# Patient Record
Sex: FEMALE | Race: WHITE | Employment: OTHER | URBAN - METROPOLITAN AREA
[De-identification: names, ages, dates, MRNs, and addresses within clinical notes are randomized per-mention and may not be internally consistent; named-entity substitution may affect disease eponyms.]

---

## 2017-02-21 ENCOUNTER — OFFICE VISIT (OUTPATIENT)
Dept: INTERNAL MEDICINE CLINIC | Age: 82
End: 2017-02-21

## 2017-02-21 VITALS
WEIGHT: 185.2 LBS | BODY MASS INDEX: 30.82 KG/M2 | HEART RATE: 84 BPM | DIASTOLIC BLOOD PRESSURE: 82 MMHG | SYSTOLIC BLOOD PRESSURE: 128 MMHG | RESPIRATION RATE: 18 BRPM

## 2017-02-21 DIAGNOSIS — M81.0 OSTEOPOROSIS: ICD-10-CM

## 2017-02-21 DIAGNOSIS — E03.9 HYPOTHYROIDISM, UNSPECIFIED TYPE: Primary | ICD-10-CM

## 2017-02-21 DIAGNOSIS — E55.9 VITAMIN D DEFICIENCY: ICD-10-CM

## 2017-02-21 LAB — TSH SERPL DL<=0.05 MIU/L-ACNC: 5.69 UIU/ML (ref 0.27–4.2)

## 2017-02-21 PROCEDURE — 1090F PRES/ABSN URINE INCON ASSESS: CPT | Performed by: INTERNAL MEDICINE

## 2017-02-21 PROCEDURE — 4040F PNEUMOC VAC/ADMIN/RCVD: CPT | Performed by: INTERNAL MEDICINE

## 2017-02-21 PROCEDURE — 99213 OFFICE O/P EST LOW 20 MIN: CPT | Performed by: INTERNAL MEDICINE

## 2017-02-21 PROCEDURE — 1036F TOBACCO NON-USER: CPT | Performed by: INTERNAL MEDICINE

## 2017-02-21 PROCEDURE — G8427 DOCREV CUR MEDS BY ELIG CLIN: HCPCS | Performed by: INTERNAL MEDICINE

## 2017-02-21 PROCEDURE — 4005F PHARM THX FOR OP RXD: CPT | Performed by: INTERNAL MEDICINE

## 2017-02-21 PROCEDURE — G8417 CALC BMI ABV UP PARAM F/U: HCPCS | Performed by: INTERNAL MEDICINE

## 2017-02-21 PROCEDURE — 1123F ACP DISCUSS/DSCN MKR DOCD: CPT | Performed by: INTERNAL MEDICINE

## 2017-02-21 PROCEDURE — G8484 FLU IMMUNIZE NO ADMIN: HCPCS | Performed by: INTERNAL MEDICINE

## 2017-02-21 RX ORDER — ERGOCALCIFEROL 1.25 MG/1
CAPSULE ORAL
Qty: 4 CAPSULE | Refills: 3 | Status: SHIPPED | OUTPATIENT
Start: 2017-02-21 | End: 2017-06-21 | Stop reason: SDUPTHER

## 2017-02-22 LAB — VITAMIN D 25-HYDROXY: 73.1 NG/ML

## 2017-02-26 ASSESSMENT — ENCOUNTER SYMPTOMS
RESPIRATORY NEGATIVE: 1
EYES NEGATIVE: 1
ALLERGIC/IMMUNOLOGIC NEGATIVE: 1
GASTROINTESTINAL NEGATIVE: 1

## 2017-04-28 RX ORDER — LEVOTHYROXINE SODIUM 0.05 MG/1
TABLET ORAL
Qty: 30 TABLET | Refills: 4 | Status: SHIPPED | OUTPATIENT
Start: 2017-04-28 | End: 2017-06-21 | Stop reason: SDUPTHER

## 2017-06-21 ENCOUNTER — OFFICE VISIT (OUTPATIENT)
Dept: INTERNAL MEDICINE CLINIC | Age: 82
End: 2017-06-21

## 2017-06-21 VITALS
RESPIRATION RATE: 16 BRPM | BODY MASS INDEX: 30.45 KG/M2 | WEIGHT: 183 LBS | SYSTOLIC BLOOD PRESSURE: 124 MMHG | HEART RATE: 74 BPM | DIASTOLIC BLOOD PRESSURE: 68 MMHG

## 2017-06-21 DIAGNOSIS — Z00.00 ANNUAL PHYSICAL EXAM: Primary | ICD-10-CM

## 2017-06-21 DIAGNOSIS — I10 ESSENTIAL HYPERTENSION: ICD-10-CM

## 2017-06-21 DIAGNOSIS — E03.9 HYPOTHYROIDISM, UNSPECIFIED TYPE: ICD-10-CM

## 2017-06-21 DIAGNOSIS — R53.83 FATIGUE, UNSPECIFIED TYPE: ICD-10-CM

## 2017-06-21 DIAGNOSIS — E55.9 VITAMIN D DEFICIENCY: ICD-10-CM

## 2017-06-21 DIAGNOSIS — M81.0 OSTEOPOROSIS: ICD-10-CM

## 2017-06-21 LAB
A/G RATIO: 1.6 (ref 1.1–2.2)
ALBUMIN SERPL-MCNC: 4.3 G/DL (ref 3.4–5)
ALP BLD-CCNC: 70 U/L (ref 40–129)
ALT SERPL-CCNC: 9 U/L (ref 10–40)
ANION GAP SERPL CALCULATED.3IONS-SCNC: 19 MMOL/L (ref 3–16)
AST SERPL-CCNC: 14 U/L (ref 15–37)
BASOPHILS ABSOLUTE: 0.1 K/UL (ref 0–0.2)
BASOPHILS RELATIVE PERCENT: 0.7 %
BILIRUB SERPL-MCNC: 0.5 MG/DL (ref 0–1)
BUN BLDV-MCNC: 29 MG/DL (ref 7–20)
CALCIUM SERPL-MCNC: 9.5 MG/DL (ref 8.3–10.6)
CHLORIDE BLD-SCNC: 105 MMOL/L (ref 99–110)
CHOLESTEROL, TOTAL: 226 MG/DL (ref 0–199)
CO2: 23 MMOL/L (ref 21–32)
CREAT SERPL-MCNC: 1 MG/DL (ref 0.6–1.2)
EOSINOPHILS ABSOLUTE: 0.3 K/UL (ref 0–0.6)
EOSINOPHILS RELATIVE PERCENT: 4.5 %
GFR AFRICAN AMERICAN: >60
GFR NON-AFRICAN AMERICAN: 52
GLOBULIN: 2.7 G/DL
GLUCOSE BLD-MCNC: 101 MG/DL (ref 70–99)
HCT VFR BLD CALC: 47.9 % (ref 36–48)
HDLC SERPL-MCNC: 51 MG/DL (ref 40–60)
HEMOGLOBIN: 15.4 G/DL (ref 12–16)
LDL CHOLESTEROL CALCULATED: 153 MG/DL
LYMPHOCYTES ABSOLUTE: 1.8 K/UL (ref 1–5.1)
LYMPHOCYTES RELATIVE PERCENT: 23.5 %
MCH RBC QN AUTO: 30 PG (ref 26–34)
MCHC RBC AUTO-ENTMCNC: 32.1 G/DL (ref 31–36)
MCV RBC AUTO: 93.5 FL (ref 80–100)
MONOCYTES ABSOLUTE: 0.8 K/UL (ref 0–1.3)
MONOCYTES RELATIVE PERCENT: 10.6 %
NEUTROPHILS ABSOLUTE: 4.5 K/UL (ref 1.7–7.7)
NEUTROPHILS RELATIVE PERCENT: 60.7 %
PDW BLD-RTO: 13.4 % (ref 12.4–15.4)
PLATELET # BLD: 226 K/UL (ref 135–450)
PMV BLD AUTO: 9.4 FL (ref 5–10.5)
POTASSIUM SERPL-SCNC: 4.6 MMOL/L (ref 3.5–5.1)
RBC # BLD: 5.12 M/UL (ref 4–5.2)
SODIUM BLD-SCNC: 147 MMOL/L (ref 136–145)
TOTAL PROTEIN: 7 G/DL (ref 6.4–8.2)
TRIGL SERPL-MCNC: 111 MG/DL (ref 0–150)
TSH SERPL DL<=0.05 MIU/L-ACNC: 4.24 UIU/ML (ref 0.27–4.2)
VLDLC SERPL CALC-MCNC: 22 MG/DL
WBC # BLD: 7.5 K/UL (ref 4–11)

## 2017-06-21 PROCEDURE — G0439 PPPS, SUBSEQ VISIT: HCPCS | Performed by: INTERNAL MEDICINE

## 2017-06-21 RX ORDER — LEVOTHYROXINE SODIUM 0.05 MG/1
TABLET ORAL
Qty: 30 TABLET | Refills: 4 | Status: SHIPPED | OUTPATIENT
Start: 2017-06-21 | End: 2017-10-23 | Stop reason: SDUPTHER

## 2017-06-21 RX ORDER — ERGOCALCIFEROL 1.25 MG/1
CAPSULE ORAL
Qty: 4 CAPSULE | Refills: 3 | Status: SHIPPED | OUTPATIENT
Start: 2017-06-21 | End: 2017-10-23 | Stop reason: SDUPTHER

## 2017-06-21 RX ORDER — ALENDRONATE SODIUM 70 MG/1
70 TABLET ORAL
Qty: 5 TABLET | Refills: 5 | Status: SHIPPED | OUTPATIENT
Start: 2017-06-21 | End: 2017-10-23 | Stop reason: SDUPTHER

## 2017-06-21 RX ORDER — TRAMADOL HYDROCHLORIDE 50 MG/1
50 TABLET ORAL 2 TIMES DAILY
Qty: 60 TABLET | Refills: 1 | Status: SHIPPED | OUTPATIENT
Start: 2017-06-21 | End: 2017-07-01

## 2017-06-22 ENCOUNTER — TELEPHONE (OUTPATIENT)
Dept: INTERNAL MEDICINE CLINIC | Age: 82
End: 2017-06-22

## 2017-06-22 LAB — VITAMIN D 25-HYDROXY: 78.8 NG/ML

## 2017-07-01 ASSESSMENT — ENCOUNTER SYMPTOMS
ALLERGIC/IMMUNOLOGIC NEGATIVE: 1
GASTROINTESTINAL NEGATIVE: 1
RESPIRATORY NEGATIVE: 1
EYES NEGATIVE: 1

## 2017-10-23 ENCOUNTER — OFFICE VISIT (OUTPATIENT)
Dept: INTERNAL MEDICINE CLINIC | Age: 82
End: 2017-10-23

## 2017-10-23 VITALS
SYSTOLIC BLOOD PRESSURE: 128 MMHG | HEART RATE: 84 BPM | DIASTOLIC BLOOD PRESSURE: 74 MMHG | RESPIRATION RATE: 14 BRPM | WEIGHT: 185.4 LBS | BODY MASS INDEX: 30.85 KG/M2

## 2017-10-23 DIAGNOSIS — M81.0 AGE-RELATED OSTEOPOROSIS WITHOUT CURRENT PATHOLOGICAL FRACTURE: ICD-10-CM

## 2017-10-23 DIAGNOSIS — I10 ESSENTIAL HYPERTENSION: Primary | ICD-10-CM

## 2017-10-23 PROCEDURE — G8484 FLU IMMUNIZE NO ADMIN: HCPCS | Performed by: INTERNAL MEDICINE

## 2017-10-23 PROCEDURE — 99213 OFFICE O/P EST LOW 20 MIN: CPT | Performed by: INTERNAL MEDICINE

## 2017-10-23 PROCEDURE — 1123F ACP DISCUSS/DSCN MKR DOCD: CPT | Performed by: INTERNAL MEDICINE

## 2017-10-23 PROCEDURE — 1036F TOBACCO NON-USER: CPT | Performed by: INTERNAL MEDICINE

## 2017-10-23 PROCEDURE — 1090F PRES/ABSN URINE INCON ASSESS: CPT | Performed by: INTERNAL MEDICINE

## 2017-10-23 PROCEDURE — 4005F PHARM THX FOR OP RXD: CPT | Performed by: INTERNAL MEDICINE

## 2017-10-23 PROCEDURE — G8427 DOCREV CUR MEDS BY ELIG CLIN: HCPCS | Performed by: INTERNAL MEDICINE

## 2017-10-23 PROCEDURE — G8417 CALC BMI ABV UP PARAM F/U: HCPCS | Performed by: INTERNAL MEDICINE

## 2017-10-23 PROCEDURE — 4040F PNEUMOC VAC/ADMIN/RCVD: CPT | Performed by: INTERNAL MEDICINE

## 2017-10-23 RX ORDER — LEVOTHYROXINE SODIUM 0.05 MG/1
TABLET ORAL
Qty: 30 TABLET | Refills: 5 | Status: SHIPPED | OUTPATIENT
Start: 2017-10-23 | End: 2018-04-24 | Stop reason: SDUPTHER

## 2017-10-23 RX ORDER — ERGOCALCIFEROL 1.25 MG/1
CAPSULE ORAL
Qty: 4 CAPSULE | Refills: 5 | Status: SHIPPED | OUTPATIENT
Start: 2017-10-23 | End: 2018-04-24 | Stop reason: SDUPTHER

## 2017-10-23 RX ORDER — ALENDRONATE SODIUM 70 MG/1
70 TABLET ORAL
Qty: 5 TABLET | Refills: 5 | Status: SHIPPED | OUTPATIENT
Start: 2017-10-23 | End: 2018-04-24 | Stop reason: SDUPTHER

## 2017-10-30 ASSESSMENT — ENCOUNTER SYMPTOMS
ALLERGIC/IMMUNOLOGIC NEGATIVE: 1
RESPIRATORY NEGATIVE: 1
EYES NEGATIVE: 1
GASTROINTESTINAL NEGATIVE: 1

## 2017-10-30 NOTE — PROGRESS NOTES
Subjective:      Patient ID: Meghana Marks is a 80 y.o. female. HPI      This patient is here for routine 3 month follow-up of her hypertension and general fatigue. She voices no new complaints at this time. Her blood pressure has remained controlled since her last visit on her current medications. Review of Systems   Constitutional: Negative. HENT: Negative. Eyes: Negative. Respiratory: Negative. Cardiovascular: Negative. Gastrointestinal: Negative. Endocrine: Negative. Genitourinary: Negative. Musculoskeletal: Negative. Allergic/Immunologic: Negative. Neurological: Negative. Hematological: Negative. Psychiatric/Behavioral: Negative. Objective:   Physical Exam   Constitutional: She is oriented to person, place, and time. She appears well-developed and well-nourished. Neck: Normal range of motion. Neck supple. No JVD present. Cardiovascular: Normal rate, regular rhythm, normal heart sounds and intact distal pulses. Pulmonary/Chest: Effort normal and breath sounds normal.   Abdominal: Soft. Bowel sounds are normal.   Musculoskeletal: Normal range of motion. Lymphadenopathy:     She has no cervical adenopathy. Neurological: She is alert and oriented to person, place, and time. Skin: Skin is warm and dry. Psychiatric: She has a normal mood and affect. Assessment:            Plan:      Bhargavi Giles was seen today for check-up, hypertension and hyperthyroidism. Diagnoses and all orders for this visit:    Essential hypertension    Age-related osteoporosis without current pathological fracture  -     vitamin D (ERGOCALCIFEROL) 23958 units CAPS capsule; TAKE ONE CAPSULE BY MOUTH ONCE WEEKLY    Other orders  -     alendronate (FOSAMAX) 70 MG tablet;  Take 1 tablet by mouth every 7 days  -     levothyroxine (SYNTHROID) 50 MCG tablet; TAKE ONE TABLET BY MOUTH DAILY

## 2018-04-24 ENCOUNTER — OFFICE VISIT (OUTPATIENT)
Dept: INTERNAL MEDICINE CLINIC | Age: 83
End: 2018-04-24

## 2018-04-24 VITALS
HEART RATE: 80 BPM | WEIGHT: 191 LBS | BODY MASS INDEX: 31.78 KG/M2 | DIASTOLIC BLOOD PRESSURE: 84 MMHG | SYSTOLIC BLOOD PRESSURE: 124 MMHG
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
## 2018-04-24 DIAGNOSIS — Z23 NEED FOR PROPHYLACTIC VACCINATION AGAINST STREPTOCOCCUS PNEUMONIAE (PNEUMOCOCCUS): ICD-10-CM

## 2018-04-24 DIAGNOSIS — E03.9 HYPOTHYROIDISM, UNSPECIFIED TYPE: Primary | ICD-10-CM

## 2018-04-24 DIAGNOSIS — M25.50 MULTIPLE JOINT PAIN: ICD-10-CM

## 2018-04-24 DIAGNOSIS — E55.9 VITAMIN D DEFICIENCY: ICD-10-CM

## 2018-04-24 DIAGNOSIS — M81.0 AGE-RELATED OSTEOPOROSIS WITHOUT CURRENT PATHOLOGICAL FRACTURE: ICD-10-CM

## 2018-04-24 DIAGNOSIS — E03.9 HYPOTHYROIDISM, UNSPECIFIED TYPE: ICD-10-CM

## 2018-04-24 DIAGNOSIS — Z91.81 AT HIGH RISK FOR FALLS: ICD-10-CM

## 2018-04-24 LAB
ANION GAP SERPL CALCULATED.3IONS-SCNC: 19 MMOL/L (ref 3–16)
BUN BLDV-MCNC: 28 MG/DL (ref 7–20)
C-REACTIVE PROTEIN: 4.2 MG/L (ref 0–5.1)
CALCIUM SERPL-MCNC: 9.4 MG/DL (ref 8.3–10.6)
CHLORIDE BLD-SCNC: 104 MMOL/L (ref 99–110)
CO2: 25 MMOL/L (ref 21–32)
CREAT SERPL-MCNC: 1 MG/DL (ref 0.6–1.2)
GFR AFRICAN AMERICAN: >60
GFR NON-AFRICAN AMERICAN: 52
GLUCOSE BLD-MCNC: 102 MG/DL (ref 70–99)
HCT VFR BLD CALC: 45.8 % (ref 36–48)
HEMOGLOBIN: 15.3 G/DL (ref 12–16)
MCH RBC QN AUTO: 31.7 PG (ref 26–34)
MCHC RBC AUTO-ENTMCNC: 33.5 G/DL (ref 31–36)
MCV RBC AUTO: 94.8 FL (ref 80–100)
PDW BLD-RTO: 13.7 % (ref 12.4–15.4)
PLATELET # BLD: 247 K/UL (ref 135–450)
PMV BLD AUTO: 9.5 FL (ref 5–10.5)
POTASSIUM SERPL-SCNC: 5.1 MMOL/L (ref 3.5–5.1)
RBC # BLD: 4.83 M/UL (ref 4–5.2)
RHEUMATOID FACTOR: <10 IU/ML
SEDIMENTATION RATE, ERYTHROCYTE: 12 MM/HR (ref 0–30)
SODIUM BLD-SCNC: 148 MMOL/L (ref 136–145)
WBC # BLD: 7.1 K/UL (ref 4–11)

## 2018-04-24 PROCEDURE — 99214 OFFICE O/P EST MOD 30 MIN: CPT | Performed by: INTERNAL MEDICINE

## 2018-04-24 PROCEDURE — G8427 DOCREV CUR MEDS BY ELIG CLIN: HCPCS | Performed by: INTERNAL MEDICINE

## 2018-04-24 PROCEDURE — G8417 CALC BMI ABV UP PARAM F/U: HCPCS | Performed by: INTERNAL MEDICINE

## 2018-04-24 PROCEDURE — 4040F PNEUMOC VAC/ADMIN/RCVD: CPT | Performed by: INTERNAL MEDICINE

## 2018-04-24 PROCEDURE — 90670 PCV13 VACCINE IM: CPT | Performed by: INTERNAL MEDICINE

## 2018-04-24 PROCEDURE — 1123F ACP DISCUSS/DSCN MKR DOCD: CPT | Performed by: INTERNAL MEDICINE

## 2018-04-24 PROCEDURE — 1036F TOBACCO NON-USER: CPT | Performed by: INTERNAL MEDICINE

## 2018-04-24 PROCEDURE — 1090F PRES/ABSN URINE INCON ASSESS: CPT | Performed by: INTERNAL MEDICINE

## 2018-04-24 PROCEDURE — G0009 ADMIN PNEUMOCOCCAL VACCINE: HCPCS | Performed by: INTERNAL MEDICINE

## 2018-04-24 RX ORDER — LEVOTHYROXINE SODIUM 0.05 MG/1
TABLET ORAL
Qty: 30 TABLET | Refills: 5 | Status: SHIPPED | OUTPATIENT
Start: 2018-04-24 | End: 2018-09-18 | Stop reason: SDUPTHER

## 2018-04-24 RX ORDER — ERGOCALCIFEROL 1.25 MG/1
CAPSULE ORAL
Qty: 4 CAPSULE | Refills: 5 | Status: SHIPPED | OUTPATIENT
Start: 2018-04-24 | End: 2018-05-22 | Stop reason: DRUGHIGH

## 2018-04-24 RX ORDER — ALENDRONATE SODIUM 70 MG/1
70 TABLET ORAL
Qty: 5 TABLET | Refills: 5 | Status: ON HOLD | OUTPATIENT
Start: 2018-04-24 | End: 2018-06-14 | Stop reason: HOSPADM

## 2018-04-24 ASSESSMENT — ENCOUNTER SYMPTOMS
WHEEZING: 0
NAUSEA: 0
PHOTOPHOBIA: 0
COUGH: 0
SHORTNESS OF BREATH: 0
VOMITING: 0
ABDOMINAL PAIN: 0
VOICE CHANGE: 0
TROUBLE SWALLOWING: 0

## 2018-04-25 LAB
ANA INTERPRETATION: ABNORMAL
ANA TITER: ABNORMAL
ANTI-NUCLEAR ANTIBODY (ANA): POSITIVE
TSH REFLEX: 3.83 UIU/ML (ref 0.27–4.2)
VITAMIN D 25-HYDROXY: 91.1 NG/ML

## 2018-04-26 ENCOUNTER — TELEPHONE (OUTPATIENT)
Dept: INTERNAL MEDICINE CLINIC | Age: 83
End: 2018-04-26

## 2018-05-22 ENCOUNTER — OFFICE VISIT (OUTPATIENT)
Dept: RHEUMATOLOGY | Age: 83
End: 2018-05-22

## 2018-05-22 VITALS
BODY MASS INDEX: 31.07 KG/M2 | HEART RATE: 80 BPM | DIASTOLIC BLOOD PRESSURE: 76 MMHG | HEIGHT: 65 IN | SYSTOLIC BLOOD PRESSURE: 114 MMHG | WEIGHT: 186.5 LBS

## 2018-05-22 DIAGNOSIS — G47.9 DISORDERED SLEEP: ICD-10-CM

## 2018-05-22 DIAGNOSIS — R53.82 CHRONIC FATIGUE: ICD-10-CM

## 2018-05-22 DIAGNOSIS — M79.10 MYALGIA: Primary | ICD-10-CM

## 2018-05-22 DIAGNOSIS — R76.8 POSITIVE ANA (ANTINUCLEAR ANTIBODY): ICD-10-CM

## 2018-05-22 LAB — TOTAL CK: 43 U/L (ref 26–192)

## 2018-05-22 PROCEDURE — 1036F TOBACCO NON-USER: CPT | Performed by: INTERNAL MEDICINE

## 2018-05-22 PROCEDURE — 99204 OFFICE O/P NEW MOD 45 MIN: CPT | Performed by: INTERNAL MEDICINE

## 2018-05-22 PROCEDURE — G8417 CALC BMI ABV UP PARAM F/U: HCPCS | Performed by: INTERNAL MEDICINE

## 2018-05-22 PROCEDURE — 4040F PNEUMOC VAC/ADMIN/RCVD: CPT | Performed by: INTERNAL MEDICINE

## 2018-05-22 PROCEDURE — G8427 DOCREV CUR MEDS BY ELIG CLIN: HCPCS | Performed by: INTERNAL MEDICINE

## 2018-05-22 PROCEDURE — 1090F PRES/ABSN URINE INCON ASSESS: CPT | Performed by: INTERNAL MEDICINE

## 2018-05-22 PROCEDURE — 1123F ACP DISCUSS/DSCN MKR DOCD: CPT | Performed by: INTERNAL MEDICINE

## 2018-05-22 RX ORDER — DULOXETIN HYDROCHLORIDE 30 MG/1
30 CAPSULE, DELAYED RELEASE ORAL DAILY
Qty: 30 CAPSULE | Refills: 2 | Status: SHIPPED | OUTPATIENT
Start: 2018-05-22 | End: 2018-09-18 | Stop reason: SDUPTHER

## 2018-05-22 RX ORDER — DULOXETIN HYDROCHLORIDE 30 MG/1
30 CAPSULE, DELAYED RELEASE ORAL DAILY
COMMUNITY
End: 2018-05-22 | Stop reason: SDUPTHER

## 2018-05-22 RX ORDER — MULTIVIT-MIN/IRON/FOLIC ACID/K 18-600-40
1 CAPSULE ORAL DAILY
COMMUNITY
End: 2022-10-19

## 2018-05-23 LAB — HEPATITIS C ANTIBODY INTERPRETATION: NORMAL
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
## 2018-05-26 PROBLEM — S72.001A CLOSED DISPLACED FRACTURE OF RIGHT FEMORAL NECK (HCC): Status: ACTIVE | Noted: 2018-05-26

## 2018-05-27 PROBLEM — S72.8X1A OTHER FRACTURE OF RIGHT FEMUR, INITIAL ENCOUNTER FOR CLOSED FRACTURE (HCC): Status: ACTIVE | Noted: 2018-05-26

## 2018-05-29 PROBLEM — S72.91XA CLOSED FRACTURE OF RIGHT FEMUR (HCC): Status: ACTIVE | Noted: 2018-05-26

## 2018-06-22 ENCOUNTER — TELEPHONE (OUTPATIENT)
Dept: ORTHOPEDIC SURGERY | Age: 83
End: 2018-06-22

## 2018-06-26 ENCOUNTER — OFFICE VISIT (OUTPATIENT)
Dept: ORTHOPEDIC SURGERY | Age: 83
End: 2018-06-26

## 2018-06-26 VITALS
SYSTOLIC BLOOD PRESSURE: 124 MMHG | BODY MASS INDEX: 30.49 KG/M2 | HEIGHT: 65 IN | WEIGHT: 183 LBS | HEART RATE: 78 BPM | RESPIRATION RATE: 16 BRPM | DIASTOLIC BLOOD PRESSURE: 69 MMHG

## 2018-06-26 DIAGNOSIS — S72.351A CLOSED DISPLACED COMMINUTED FRACTURE OF SHAFT OF RIGHT FEMUR, INITIAL ENCOUNTER (HCC): ICD-10-CM

## 2018-06-26 PROCEDURE — APPNB15 APP NON BILLABLE TIME 0-15 MINS: Performed by: NURSE PRACTITIONER

## 2018-06-26 PROCEDURE — 99024 POSTOP FOLLOW-UP VISIT: CPT | Performed by: NURSE PRACTITIONER

## 2018-06-28 ENCOUNTER — OFFICE VISIT (OUTPATIENT)
Dept: INTERNAL MEDICINE CLINIC | Age: 83
End: 2018-06-28

## 2018-06-28 VITALS — DIASTOLIC BLOOD PRESSURE: 80 MMHG | SYSTOLIC BLOOD PRESSURE: 126 MMHG | HEART RATE: 72 BPM

## 2018-06-28 DIAGNOSIS — R21 FACIAL RASH: ICD-10-CM

## 2018-06-28 DIAGNOSIS — H57.12 PAIN OF LEFT EYE: ICD-10-CM

## 2018-06-28 DIAGNOSIS — S72.351D CLOSED DISPLACED COMMINUTED FRACTURE OF SHAFT OF RIGHT FEMUR WITH ROUTINE HEALING, SUBSEQUENT ENCOUNTER: ICD-10-CM

## 2018-06-28 DIAGNOSIS — Z09 HOSPITAL DISCHARGE FOLLOW-UP: Primary | ICD-10-CM

## 2018-06-28 DIAGNOSIS — M80.00XA AGE-RELATED OSTEOPOROSIS WITH CURRENT PATHOLOGICAL FRACTURE, INITIAL ENCOUNTER: ICD-10-CM

## 2018-06-28 DIAGNOSIS — M85.851 OTHER SPECIFIED DISORDERS OF BONE DENSITY AND STRUCTURE, RIGHT THIGH: ICD-10-CM

## 2018-06-28 PROCEDURE — 1111F DSCHRG MED/CURRENT MED MERGE: CPT | Performed by: INTERNAL MEDICINE

## 2018-06-28 PROCEDURE — 99214 OFFICE O/P EST MOD 30 MIN: CPT | Performed by: INTERNAL MEDICINE

## 2018-06-28 RX ORDER — VALACYCLOVIR HYDROCHLORIDE 500 MG/1
500 TABLET, FILM COATED ORAL 3 TIMES DAILY
Qty: 21 TABLET | Refills: 0 | Status: SHIPPED | OUTPATIENT
Start: 2018-06-28 | End: 2018-07-05

## 2018-07-03 ENCOUNTER — TELEPHONE (OUTPATIENT)
Dept: ORTHOPEDIC SURGERY | Age: 83
End: 2018-07-03

## 2018-07-03 NOTE — TELEPHONE ENCOUNTER
Spoke with dennys and advised that per last office note that she can be WB as tolerated with a walker on 7/8.

## 2018-07-05 ENCOUNTER — HOSPITAL ENCOUNTER (OUTPATIENT)
Dept: GENERAL RADIOLOGY | Age: 83
Discharge: HOME | End: 2018-07-05
Attending: INTERNAL MEDICINE | Admitting: INTERNAL MEDICINE

## 2018-07-05 ENCOUNTER — TELEPHONE (OUTPATIENT)
Dept: INTERNAL MEDICINE CLINIC | Age: 83
End: 2018-07-05

## 2018-07-05 DIAGNOSIS — M85.851 OTHER SPECIFIED DISORDERS OF BONE DENSITY AND STRUCTURE, RIGHT THIGH: ICD-10-CM

## 2018-07-05 DIAGNOSIS — B02.23 POST-HERPETIC POLYNEUROPATHY: Primary | ICD-10-CM

## 2018-07-05 DIAGNOSIS — M80.00XA AGE-RELATED OSTEOPOROSIS WITH CURRENT PATHOLOGICAL FRACTURE, INITIAL ENCOUNTER: ICD-10-CM

## 2018-07-05 RX ORDER — GABAPENTIN 100 MG/1
100 CAPSULE ORAL 2 TIMES DAILY
Qty: 60 CAPSULE | Refills: 0 | Status: SHIPPED | OUTPATIENT
Start: 2018-07-05 | End: 2018-08-20 | Stop reason: HOSPADM

## 2018-07-05 NOTE — TELEPHONE ENCOUNTER
Patient was seen a week ago and dx w/shingles. The pain from the lesions are getting worse. It is painful when she touches her scalp. She has tried Tylenol but is does not manage her pain. Would like something stronger.

## 2018-07-06 NOTE — PROGRESS NOTES
Positive for osteoporosis  Please call patient and asked whether she ever had any medicine like Fosamax? If not start Fosamax 70 mg weekly.

## 2018-07-09 DIAGNOSIS — M81.0 AGE-RELATED OSTEOPOROSIS WITHOUT CURRENT PATHOLOGICAL FRACTURE: Primary | ICD-10-CM

## 2018-07-23 ENCOUNTER — TELEPHONE (OUTPATIENT)
Dept: ORTHOPEDIC SURGERY | Age: 83
End: 2018-07-23

## 2018-07-26 ENCOUNTER — OFFICE VISIT (OUTPATIENT)
Dept: ORTHOPEDIC SURGERY | Age: 83
End: 2018-07-26

## 2018-07-26 VITALS — RESPIRATION RATE: 16 BRPM | BODY MASS INDEX: 30.49 KG/M2 | HEIGHT: 65 IN | WEIGHT: 183 LBS

## 2018-07-26 DIAGNOSIS — M25.561 ACUTE PAIN OF RIGHT KNEE: Primary | ICD-10-CM

## 2018-07-26 PROCEDURE — G8417 CALC BMI ABV UP PARAM F/U: HCPCS | Performed by: ORTHOPAEDIC SURGERY

## 2018-07-26 PROCEDURE — G8427 DOCREV CUR MEDS BY ELIG CLIN: HCPCS | Performed by: ORTHOPAEDIC SURGERY

## 2018-07-26 PROCEDURE — 1123F ACP DISCUSS/DSCN MKR DOCD: CPT | Performed by: ORTHOPAEDIC SURGERY

## 2018-07-26 PROCEDURE — 1101F PT FALLS ASSESS-DOCD LE1/YR: CPT | Performed by: ORTHOPAEDIC SURGERY

## 2018-07-26 PROCEDURE — 4040F PNEUMOC VAC/ADMIN/RCVD: CPT | Performed by: ORTHOPAEDIC SURGERY

## 2018-07-26 PROCEDURE — 1036F TOBACCO NON-USER: CPT | Performed by: ORTHOPAEDIC SURGERY

## 2018-07-26 PROCEDURE — 99213 OFFICE O/P EST LOW 20 MIN: CPT | Performed by: ORTHOPAEDIC SURGERY

## 2018-07-26 PROCEDURE — 1090F PRES/ABSN URINE INCON ASSESS: CPT | Performed by: ORTHOPAEDIC SURGERY

## 2018-07-26 RX ORDER — NAPROXEN 500 MG/1
500 TABLET ORAL 2 TIMES DAILY WITH MEALS
Qty: 60 TABLET | Refills: 0 | Status: SHIPPED | OUTPATIENT
Start: 2018-07-26 | End: 2018-08-20 | Stop reason: HOSPADM

## 2018-07-26 NOTE — PROGRESS NOTES
Cancer Brother     Cancer Brother     Diabetes Brother        Current Outpatient Prescriptions on File Prior to Visit   Medication Sig Dispense Refill    gabapentin (NEURONTIN) 100 MG capsule Take 1 capsule by mouth 2 times daily for 30 days. . 60 capsule 0    acetaminophen (TYLENOL) 325 MG tablet Take 2 tablets by mouth every 6 hours as needed for Pain 120 tablet 1    aspirin 325 MG EC tablet Take 1 tablet by mouth daily for 14 days 14 tablet 0    Cholecalciferol (VITAMIN D) 2000 units CAPS capsule Take by mouth      DULoxetine (CYMBALTA) 30 MG extended release capsule Take 1 capsule by mouth daily 30 capsule 2    levothyroxine (SYNTHROID) 50 MCG tablet TAKE ONE TABLET BY MOUTH DAILY 30 tablet 5     No current facility-administered medications on file prior to visit. Pertinent items are noted in HPI  Review of systems reviewed from Patient History Form dated on 6/26/2018 and available in the patient's chart under the Media tab. No change noted. PHYSICAL EXAMINATION:  Ms. Cindi Geiger is a very pleasant 80 y.o.  female who presents today in no acute distress, awake, alert, and oriented. She is well dressed, nourished and  groomed. Patient with normal affect. Height is  5' 5\" (1.651 m), weight is 183 lb (83 kg), Body mass index is 30.45 kg/m². Resting respiratory rate is 16. Examination of the gait, showed that the patient walks 50% weightbearing using a walker.  Examination of both knees showing full ROM, right moderate crepitus, tenderness on medial joint line, stable to varus and valgus stress. She has intact sensation and good pedal pulses. She has good strength in 2 planes, and has mild tenderness on deep palpation over the medial joint line. Knee reflex 1+ bilaterally. IMAGING:  Xray 3 views of the right knee was obtained today in the office and reviewed.   These demonstrate severe degenerative changes with narrowing of the joint space in the medial joint space compartment, subchondral sclerosis, and marginal osteophytosis. IMPRESSION: Right knee DJD. PLAN: I discussed with the patient the treatment options including both surgical and non-surgical treatment. We recommended Quad exercises and stretching of the calf and hamstrings which was taught to the patient today. She will take NSAIDS as needed. I believe she will benefit from cortisone injection right knee, but she would like to hold off for now. F/u in 4 weeks, PT or cortisone injection if needed. She understands that this may need surgery if the pain did not to resolve. She would like to hold off on any surgery for her knees.     James Hoover MD

## 2018-08-07 ENCOUNTER — TELEPHONE (OUTPATIENT)
Dept: ORTHOPEDIC SURGERY | Age: 83
End: 2018-08-07

## 2018-08-07 DIAGNOSIS — S72.351A CLOSED DISPLACED COMMINUTED FRACTURE OF SHAFT OF RIGHT FEMUR, INITIAL ENCOUNTER (HCC): Primary | ICD-10-CM

## 2018-08-07 DIAGNOSIS — M17.11 OSTEOARTHRITIS OF RIGHT KNEE, UNSPECIFIED OSTEOARTHRITIS TYPE: ICD-10-CM

## 2018-08-07 NOTE — TELEPHONE ENCOUNTER
Patient is going to be discharged from in [facility] after this week    She would like to start outpatient at [facility]  Can orders be placed in [facility]

## 2018-08-14 ENCOUNTER — OFFICE VISIT (OUTPATIENT)
Dept: ORTHOPEDIC SURGERY | Age: 83
End: 2018-08-14

## 2018-08-14 VITALS — HEIGHT: 65 IN | RESPIRATION RATE: 16 BRPM | WEIGHT: 183 LBS | BODY MASS INDEX: 30.49 KG/M2

## 2018-08-14 DIAGNOSIS — S72.351A CLOSED DISPLACED COMMINUTED FRACTURE OF SHAFT OF RIGHT FEMUR, INITIAL ENCOUNTER (HCC): Primary | ICD-10-CM

## 2018-08-14 PROCEDURE — APPNB15 APP NON BILLABLE TIME 0-15 MINS: Performed by: NURSE PRACTITIONER

## 2018-08-14 PROCEDURE — 99024 POSTOP FOLLOW-UP VISIT: CPT | Performed by: NURSE PRACTITIONER

## 2018-08-20 ENCOUNTER — OFFICE VISIT (OUTPATIENT)
Dept: INTERNAL MEDICINE CLINIC | Age: 83
End: 2018-08-20

## 2018-08-20 VITALS
HEART RATE: 72 BPM | HEIGHT: 65 IN | DIASTOLIC BLOOD PRESSURE: 76 MMHG | WEIGHT: 178 LBS | BODY MASS INDEX: 29.66 KG/M2 | SYSTOLIC BLOOD PRESSURE: 120 MMHG

## 2018-08-20 DIAGNOSIS — E03.9 HYPOTHYROIDISM, UNSPECIFIED TYPE: ICD-10-CM

## 2018-08-20 DIAGNOSIS — K21.9 GASTROESOPHAGEAL REFLUX DISEASE, ESOPHAGITIS PRESENCE NOT SPECIFIED: Primary | ICD-10-CM

## 2018-08-20 DIAGNOSIS — M25.561 ACUTE PAIN OF RIGHT KNEE: ICD-10-CM

## 2018-08-20 DIAGNOSIS — M81.0 AGE-RELATED OSTEOPOROSIS WITHOUT CURRENT PATHOLOGICAL FRACTURE: ICD-10-CM

## 2018-08-20 PROCEDURE — 1101F PT FALLS ASSESS-DOCD LE1/YR: CPT | Performed by: INTERNAL MEDICINE

## 2018-08-20 PROCEDURE — G8417 CALC BMI ABV UP PARAM F/U: HCPCS | Performed by: INTERNAL MEDICINE

## 2018-08-20 PROCEDURE — G8427 DOCREV CUR MEDS BY ELIG CLIN: HCPCS | Performed by: INTERNAL MEDICINE

## 2018-08-20 PROCEDURE — 1090F PRES/ABSN URINE INCON ASSESS: CPT | Performed by: INTERNAL MEDICINE

## 2018-08-20 PROCEDURE — 1036F TOBACCO NON-USER: CPT | Performed by: INTERNAL MEDICINE

## 2018-08-20 PROCEDURE — 4040F PNEUMOC VAC/ADMIN/RCVD: CPT | Performed by: INTERNAL MEDICINE

## 2018-08-20 PROCEDURE — 99214 OFFICE O/P EST MOD 30 MIN: CPT | Performed by: INTERNAL MEDICINE

## 2018-08-20 PROCEDURE — G0444 DEPRESSION SCREEN ANNUAL: HCPCS | Performed by: INTERNAL MEDICINE

## 2018-08-20 PROCEDURE — 1123F ACP DISCUSS/DSCN MKR DOCD: CPT | Performed by: INTERNAL MEDICINE

## 2018-08-20 RX ORDER — PANTOPRAZOLE SODIUM 40 MG/1
40 TABLET, DELAYED RELEASE ORAL DAILY
Qty: 30 TABLET | Refills: 3 | Status: SHIPPED | OUTPATIENT
Start: 2018-08-20 | End: 2019-01-22 | Stop reason: SDUPTHER

## 2018-08-20 RX ORDER — ASPIRIN 81 MG/1
81 TABLET ORAL DAILY
Qty: 30 TABLET | Refills: 3 | COMMUNITY
Start: 2018-08-20 | End: 2018-09-18

## 2018-08-20 ASSESSMENT — ENCOUNTER SYMPTOMS
ABDOMINAL PAIN: 1
BLOOD IN STOOL: 0
NAUSEA: 0
STRIDOR: 0
ANAL BLEEDING: 0
DIARRHEA: 0
RECTAL PAIN: 0
SHORTNESS OF BREATH: 0
VOMITING: 0
CONSTIPATION: 0

## 2018-08-20 ASSESSMENT — PATIENT HEALTH QUESTIONNAIRE - PHQ9
4. FEELING TIRED OR HAVING LITTLE ENERGY: 3
5. POOR APPETITE OR OVEREATING: 2
3. TROUBLE FALLING OR STAYING ASLEEP: 2
10. IF YOU CHECKED OFF ANY PROBLEMS, HOW DIFFICULT HAVE THESE PROBLEMS MADE IT FOR YOU TO DO YOUR WORK, TAKE CARE OF THINGS AT HOME, OR GET ALONG WITH OTHER PEOPLE: 0
SUM OF ALL RESPONSES TO PHQ QUESTIONS 1-9: 12
8. MOVING OR SPEAKING SO SLOWLY THAT OTHER PEOPLE COULD HAVE NOTICED. OR THE OPPOSITE, BEING SO FIGETY OR RESTLESS THAT YOU HAVE BEEN MOVING AROUND A LOT MORE THAN USUAL: 0
1. LITTLE INTEREST OR PLEASURE IN DOING THINGS: 2
2. FEELING DOWN, DEPRESSED OR HOPELESS: 2
9. THOUGHTS THAT YOU WOULD BE BETTER OFF DEAD, OR OF HURTING YOURSELF: 0
6. FEELING BAD ABOUT YOURSELF - OR THAT YOU ARE A FAILURE OR HAVE LET YOURSELF OR YOUR FAMILY DOWN: 1
7. TROUBLE CONCENTRATING ON THINGS, SUCH AS READING THE NEWSPAPER OR WATCHING TELEVISION: 0
SUM OF ALL RESPONSES TO PHQ9 QUESTIONS 1 & 2: 4
SUM OF ALL RESPONSES TO PHQ QUESTIONS 1-9: 12

## 2018-08-20 NOTE — PROGRESS NOTES
Father         lung cancer; metastic    Cancer Daughter 52        colon cancer    Cancer Brother     Cancer Brother     Diabetes Brother        Review of Systems   Constitutional: Negative for fever and unexpected weight change. Respiratory: Negative for shortness of breath and stridor. Cardiovascular: Negative for chest pain and palpitations. Gastrointestinal: Positive for abdominal pain. Negative for anal bleeding, blood in stool, constipation, diarrhea, nausea, rectal pain and vomiting. Epigastric pain   Musculoskeletal: Positive for arthralgias. Neurological: Negative for dizziness, speech difficulty and light-headedness. OBJECTIVE:  Pulse Readings from Last 4 Encounters:   08/20/18 72   06/28/18 72   06/26/18 78   06/15/18 70      Wt Readings from Last 4 Encounters:   08/20/18 178 lb (80.7 kg)   08/14/18 183 lb (83 kg)   07/26/18 183 lb (83 kg)   06/26/18 183 lb (83 kg)     BP Readings from Last 4 Encounters:   08/20/18 120/76   06/28/18 126/80   06/26/18 124/69   06/15/18 116/63     Physical Exam   Eyes: Conjunctivae are normal. No scleral icterus. Neck: Neck supple. No thyromegaly present. Cardiovascular: Normal rate and normal heart sounds. Pulmonary/Chest: Effort normal and breath sounds normal. No respiratory distress. She has no wheezes. Abdominal: Soft. Bowel sounds are normal. She exhibits no distension. There is no tenderness. There is no rebound. Musculoskeletal:   Using walker   Lymphadenopathy:     She has no cervical adenopathy. Psychiatric: She has a normal mood and affect. Thought content normal.   Nursing note and vitals reviewed.       CBC:   Lab Results   Component Value Date    WBC 6.0 06/14/2018    HGB 9.4 06/14/2018    HCT 28.8 06/14/2018     06/14/2018     CMP:   Lab Results   Component Value Date     06/14/2018    K 4.6 06/14/2018     06/14/2018    CO2 29 06/14/2018    ANIONGAP 7 06/14/2018    GLUCOSE 116 06/14/2018    BUN 17 06/14/2018    CREATININE 0.8 06/14/2018    GFRAA >60 06/14/2018    GFRAA >60 04/10/2013    CALCIUM 8.5 06/14/2018    PROT 7.3 05/26/2018    PROT 7.2 03/04/2013    LABALBU 3.9 05/26/2018    AGRATIO 1.1 05/26/2018    BILITOT 0.5 05/26/2018    ALKPHOS 70 05/26/2018    ALT 10 05/26/2018    AST 17 05/26/2018    GLOB 3.4 05/26/2018     URINALYSIS:   Lab Results   Component Value Date    GLUCOSEU Negative 05/26/2018    GLUCOSEU NEGATIVE 02/21/2012    KETUA 15 05/26/2018    SPECGRAV 1.025 05/26/2018    BLOODU Negative 05/26/2018    PHUR 7.0 05/26/2018    PROTEINU Negative 05/26/2018    NITRU Negative 05/26/2018    LEUKOCYTESUR SMALL 05/26/2018    LABMICR YES 05/26/2018    URINETYPE Not Specified 05/26/2018     HBA1C:   Lab Results   Component Value Date    LABA1C 5.4 02/04/2015    .3 02/04/2015     MICRO/ALB:   Lab Results   Component Value Date    LABMICR YES 05/26/2018    LABCREA 137.3 09/02/2015     LIPID:   Lab Results   Component Value Date    CHOL 226 06/21/2017    TRIG 111 06/21/2017    HDL 51 06/21/2017    HDL 42 05/23/2012    LDLCALC 153 06/21/2017    LABVLDL 22 06/21/2017     TSH:   Lab Results   Component Value Date    TSHREFLEX 3.83 04/24/2018         ASSESSMENT/PLAN:    Hang Desouza was seen today for 3 month follow-up, abdominal pain and knee pain. Diagnoses and all orders for this visit:    Gastroesophageal reflux disease, esophagitis presence not specified  -     pantoprazole (PROTONIX) 40 MG tablet; Take 1 tablet by mouth daily  Stop naproxen and aspirin 325  Age-related osteoporosis without current pathological fracture. Patient failed with Fosamax in the past.  Does not have any rheumatology appointment. Need paperwork for Prolia approval  -     denosumab (PROLIA) 60 MG/ML SOLN SC injection;  Inject 1 mL into the skin once for 1 dose    Hypothyroidism, unspecified type  Continue current levothyroxine  Acute pain of right knee- resolved    OTC tylenol          No orders of the defined types were

## 2018-08-28 ENCOUNTER — HOSPITAL ENCOUNTER (OUTPATIENT)
Dept: PHYSICAL THERAPY | Age: 83
Discharge: HOME | End: 2018-08-31
Admitting: ORTHOPAEDIC SURGERY

## 2018-08-28 ASSESSMENT — PAIN DESCRIPTION - LOCATION: LOCATION: BACK

## 2018-08-28 ASSESSMENT — PAIN DESCRIPTION - DESCRIPTORS: DESCRIPTORS: ACHING

## 2018-08-28 ASSESSMENT — PAIN DESCRIPTION - ONSET: ONSET: AWAKENED FROM SLEEP

## 2018-08-28 ASSESSMENT — PAIN DESCRIPTION - FREQUENCY: FREQUENCY: CONTINUOUS

## 2018-08-28 ASSESSMENT — PAIN SCALES - GENERAL: PAINLEVEL_OUTOF10: 3

## 2018-08-28 ASSESSMENT — PAIN DESCRIPTION - PAIN TYPE: TYPE: ACUTE PAIN

## 2018-08-28 ASSESSMENT — PAIN DESCRIPTION - ORIENTATION: ORIENTATION: LOWER

## 2018-08-28 NOTE — FLOWSHEET NOTE
Prognosis, pathomechanics as well as frequency and duration of scheduling future physical therapy appointments. Time was also taken on this day to answer all patient questions and participation in PT. Recommended to pt and granddaughter that pt has someone live with her for increased safety due to occurrence of falls and continued high fall risk. Completed Dynamic Gait Index (DGI) this date. D/w pt and her granddtr at length re results of PT eval, DGI, and that pt continues to be at risk of falling. D/w them various strategies to increase safety at home including modifications to bathroom and increased levels of supervision/assistance from family. Pt states she feels strongly about wanting to cont to live alone    Home Exercise Program: 8/28/18 Patient was educated on home exercise program including distrubution of handout describing exercises, sets, repetitions, frequency and intensity. Exercises/activities include: marching, 3 way hip, TR/HR    Manual Treatments:      Modalities:      Timed Code Treatment Minutes:  39    Total Treatment Minutes:  59    Treatment/Activity Tolerance:  [x] Patient tolerated treatment well [] Patient limited by fatigue  [] Patient limited by pain  [] Patient limited by other medical complications  [] Other:     Prognosis: [x] Good [] Fair  [] Poor    Patient Requires Follow-up: [x] Yes  [] No    Plan:   [] Continue per plan of care [] Alter current plan (see comments)  [x] Plan of care initiated [] Hold pending MD visit [] Discharge    Plan for Next Session:  LE strengthening, balance training,     Electronically signed by:  RENETTA Paz  Therapist was present, directed the patient's care, made skilled judgement, and was responsible for assessment and treatment of the patient.       Chiki Merida, PT [id number], DPT
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
## 2018-08-28 NOTE — PLAN OF CARE
Outpatient Physical Therapy     Phone: [phone] Fax: [phone]     To: Referring Practitioner: Dr. Marlon Roth MD      Patient: Olivia Castle   : 1930   MRN: [id number]  Evaluation Date: 2018      Diagnosis Information:  · Diagnosis: Closed displaced comminuted fracture of shaft of right femur S72.351A   · Treatment Diagnosis: Decreased strength of RLE, decreased endurance, impaired balance     Physical Therapy Certification/Re-Certification Form  Dear Dr. Nathalie Hampton,  The following patient has been evaluated for physical therapy services. Please review the attached evaluation and/or summary of the patient's plan of care, and verify that you agree therapy should continue by signing the attached document and sending it back to our office. Plan of Care/Treatment to date:  [x] Therapeutic Exercise      [x] Modalities:prn for pain  [x] Therapeutic Activity        [] Ultrasound    [x] Gait Training        [] Cervical Traction   [x] Neuromuscular Re-education      [x] Cold/hotpack    [x] Instruction in HEP        [] Lumbar Traction  [x] Manual Therapy        [] Electrical Stimulation            [x] Aquatic Therapy-- may try        [] Iontophoresis        ? [] Lymphedema management  [] Women's Health     Other:  [] Vestibular Rehab        []    []  Needed     Frequency/Duration:  # Days per week: [] 1 day # Weeks: [] 1 week [] 5 weeks     [x] 2 days? [] 2 weeks [x] 6 weeks     [] 3 days   [] 3 weeks [] 7 weeks     [] 4 days   [] 4 weeks [] 8 weeks    Rehab Potential: [] Excellent [x] Good [] Fair  [] Poor     Electronically signed by:  RENETTA Ross  Therapist was present, directed the patient's care, made skilled judgement, and was responsible for assessment and treatment of the patient. Tamiko Quinteros, PT [id number], DPT    If you have any questions or concerns, please don't hesitate to call.   Thank you for your referral.      Physician

## 2018-09-01 ENCOUNTER — HOSPITAL ENCOUNTER (OUTPATIENT)
Dept: OTHER | Age: 83
Discharge: HOME | End: 2018-09-01
Attending: ORTHOPAEDIC SURGERY | Admitting: ORTHOPAEDIC SURGERY

## 2018-09-18 ENCOUNTER — OFFICE VISIT (OUTPATIENT)
Dept: INTERNAL MEDICINE CLINIC | Age: 83
End: 2018-09-18

## 2018-09-18 VITALS
HEART RATE: 70 BPM | BODY MASS INDEX: 29.89 KG/M2 | WEIGHT: 179.4 LBS | DIASTOLIC BLOOD PRESSURE: 84 MMHG | HEIGHT: 65 IN | SYSTOLIC BLOOD PRESSURE: 138 MMHG

## 2018-09-18 DIAGNOSIS — D64.9 ANEMIA, UNSPECIFIED TYPE: ICD-10-CM

## 2018-09-18 DIAGNOSIS — F03.90 DEMENTIA WITHOUT BEHAVIORAL DISTURBANCE, UNSPECIFIED DEMENTIA TYPE: ICD-10-CM

## 2018-09-18 DIAGNOSIS — M79.7 FIBROMYALGIA: ICD-10-CM

## 2018-09-18 DIAGNOSIS — E03.9 HYPOTHYROIDISM, UNSPECIFIED TYPE: ICD-10-CM

## 2018-09-18 DIAGNOSIS — K21.9 GASTROESOPHAGEAL REFLUX DISEASE, ESOPHAGITIS PRESENCE NOT SPECIFIED: Primary | ICD-10-CM

## 2018-09-18 DIAGNOSIS — M80.00XD AGE-RELATED OSTEOPOROSIS WITH CURRENT PATHOLOGICAL FRACTURE WITH ROUTINE HEALING, SUBSEQUENT ENCOUNTER: ICD-10-CM

## 2018-09-18 DIAGNOSIS — L71.9 ROSACEA: ICD-10-CM

## 2018-09-18 LAB
FERRITIN: 37.7 NG/ML (ref 15–150)
FOLATE: 6.05 NG/ML (ref 4.78–24.2)
HCT VFR BLD CALC: 45.3 % (ref 36–48)
HEMOGLOBIN: 14.2 G/DL (ref 12–16)
IRON SATURATION: 21 % (ref 15–50)
IRON: 68 UG/DL (ref 37–145)
MCH RBC QN AUTO: 28.5 PG (ref 26–34)
MCHC RBC AUTO-ENTMCNC: 31.4 G/DL (ref 31–36)
MCV RBC AUTO: 90.9 FL (ref 80–100)
PDW BLD-RTO: 15 % (ref 12.4–15.4)
PLATELET # BLD: 287 K/UL (ref 135–450)
PMV BLD AUTO: 9.4 FL (ref 5–10.5)
RBC # BLD: 4.99 M/UL (ref 4–5.2)
TOTAL IRON BINDING CAPACITY: 320 UG/DL (ref 260–445)
VITAMIN B-12: 397 PG/ML (ref 211–911)
WBC # BLD: 7.8 K/UL (ref 4–11)

## 2018-09-18 PROCEDURE — 1123F ACP DISCUSS/DSCN MKR DOCD: CPT | Performed by: INTERNAL MEDICINE

## 2018-09-18 PROCEDURE — 1101F PT FALLS ASSESS-DOCD LE1/YR: CPT | Performed by: INTERNAL MEDICINE

## 2018-09-18 PROCEDURE — G8417 CALC BMI ABV UP PARAM F/U: HCPCS | Performed by: INTERNAL MEDICINE

## 2018-09-18 PROCEDURE — 99214 OFFICE O/P EST MOD 30 MIN: CPT | Performed by: INTERNAL MEDICINE

## 2018-09-18 PROCEDURE — 1036F TOBACCO NON-USER: CPT | Performed by: INTERNAL MEDICINE

## 2018-09-18 PROCEDURE — 1090F PRES/ABSN URINE INCON ASSESS: CPT | Performed by: INTERNAL MEDICINE

## 2018-09-18 PROCEDURE — 4040F PNEUMOC VAC/ADMIN/RCVD: CPT | Performed by: INTERNAL MEDICINE

## 2018-09-18 PROCEDURE — G8427 DOCREV CUR MEDS BY ELIG CLIN: HCPCS | Performed by: INTERNAL MEDICINE

## 2018-09-18 RX ORDER — METRONIDAZOLE 7.5 MG/G
GEL TOPICAL
Qty: 45 G | Refills: 0 | Status: SHIPPED | OUTPATIENT
Start: 2018-09-18 | End: 2018-10-01

## 2018-09-18 RX ORDER — DONEPEZIL HYDROCHLORIDE 5 MG/1
5 TABLET, FILM COATED ORAL NIGHTLY
Qty: 30 TABLET | Refills: 3 | Status: SHIPPED | OUTPATIENT
Start: 2018-09-18 | End: 2018-10-01 | Stop reason: SDUPTHER

## 2018-09-18 RX ORDER — LEVOTHYROXINE SODIUM 0.05 MG/1
TABLET ORAL
Qty: 30 TABLET | Refills: 5 | Status: SHIPPED | OUTPATIENT
Start: 2018-09-18 | End: 2019-02-22 | Stop reason: SDUPTHER

## 2018-09-18 RX ORDER — DULOXETIN HYDROCHLORIDE 30 MG/1
30 CAPSULE, DELAYED RELEASE ORAL DAILY
Qty: 30 CAPSULE | Refills: 2 | Status: SHIPPED | OUTPATIENT
Start: 2018-09-18 | End: 2018-12-17 | Stop reason: SDUPTHER

## 2018-09-18 ASSESSMENT — ENCOUNTER SYMPTOMS
NAUSEA: 0
VOMITING: 0
ABDOMINAL PAIN: 0
WHEEZING: 0
PHOTOPHOBIA: 0
SHORTNESS OF BREATH: 0

## 2018-09-18 NOTE — TELEPHONE ENCOUNTER
Pt is here for an appt with Dr. Donna Mane. She is out of her Cymbalta. She is asking for a refill. She has only seen Dr. Manjeet Godfrey once as a NP and was advised to make a follow up appt for 6 weeks. She states she fractured her hip causing her to not be able to make appt with Dr. Manjeet Godfrey. Advised a message would be sent back to see if he would be ok filling this. She was also advised to make a follow up appt with Dr. Manjeet Godfrey. Pt uses [facility] on [address].

## 2018-09-18 NOTE — PROGRESS NOTES
Brother        Review of Systems   Constitutional: Negative for fatigue and unexpected weight change. Eyes: Negative for photophobia and visual disturbance. Respiratory: Negative for shortness of breath and wheezing. Cardiovascular: Negative for chest pain and palpitations. Gastrointestinal: Negative for abdominal pain, nausea and vomiting. Genitourinary: Negative for difficulty urinating and flank pain. Neurological: Negative for dizziness and light-headedness. Psychiatric/Behavioral: Negative for decreased concentration and sleep disturbance. The patient is not nervous/anxious. OBJECTIVE:  Pulse Readings from Last 4 Encounters:   09/18/18 70   08/20/18 72   06/28/18 72   06/26/18 78      Wt Readings from Last 4 Encounters:   09/18/18 179 lb 6.4 oz (81.4 kg)   08/20/18 178 lb (80.7 kg)   08/14/18 183 lb (83 kg)   07/26/18 183 lb (83 kg)     BP Readings from Last 4 Encounters:   09/18/18 138/84   08/20/18 120/76   06/28/18 126/80   06/26/18 124/69     Physical Exam   Constitutional: She is oriented to person, place, and time. She appears well-nourished. Eyes: Conjunctivae are normal. No scleral icterus. Neck: No thyromegaly present. Cardiovascular: Normal rate, regular rhythm and normal heart sounds. Pulmonary/Chest: Effort normal and breath sounds normal. She has no wheezes. Abdominal: Soft. Bowel sounds are normal.   Lymphadenopathy:     She has no cervical adenopathy. Neurological: She is alert and oriented to person, place, and time. She exhibits normal muscle tone. Psychiatric: She has a normal mood and affect. Thought content normal.   Nursing note and vitals reviewed.       CBC:   Lab Results   Component Value Date    WBC 6.0 06/14/2018    HGB 9.4 06/14/2018    HCT 28.8 06/14/2018     06/14/2018     CMP:   Lab Results   Component Value Date     06/14/2018    K 4.6 06/14/2018     06/14/2018    CO2 29 06/14/2018    ANIONGAP 7 06/14/2018    GLUCOSE 116 06/14/2018    BUN 17 06/14/2018    CREATININE 0.8 06/14/2018    GFRAA >60 06/14/2018    GFRAA >60 04/10/2013    CALCIUM 8.5 06/14/2018    PROT 7.3 05/26/2018    PROT 7.2 03/04/2013    LABALBU 3.9 05/26/2018    AGRATIO 1.1 05/26/2018    BILITOT 0.5 05/26/2018    ALKPHOS 70 05/26/2018    ALT 10 05/26/2018    AST 17 05/26/2018    GLOB 3.4 05/26/2018     URINALYSIS:   Lab Results   Component Value Date    GLUCOSEU Negative 05/26/2018    GLUCOSEU NEGATIVE 02/21/2012    KETUA 15 05/26/2018    SPECGRAV 1.025 05/26/2018    BLOODU Negative 05/26/2018    PHUR 7.0 05/26/2018    PROTEINU Negative 05/26/2018    NITRU Negative 05/26/2018    LEUKOCYTESUR SMALL 05/26/2018    LABMICR YES 05/26/2018    URINETYPE Not Specified 05/26/2018     HBA1C:   Lab Results   Component Value Date    LABA1C 5.4 02/04/2015    .3 02/04/2015     MICRO/ALB:   Lab Results   Component Value Date    LABMICR YES 05/26/2018    LABCREA 137.3 09/02/2015     LIPID:   Lab Results   Component Value Date    CHOL 226 06/21/2017    TRIG 111 06/21/2017    HDL 51 06/21/2017    HDL 42 05/23/2012    LDLCALC 153 06/21/2017    LABVLDL 22 06/21/2017     TSH:   Lab Results   Component Value Date    TSHREFLEX 3.83 04/24/2018         ASSESSMENT/PLAN:    Ross Griffin was seen today for follow-up. Diagnoses and all orders for this visit:    Gastroesophageal reflux disease, esophagitis presence not specified  As needed pantoprazole only  Hypothyroidism, unspecified type  -     levothyroxine (SYNTHROID) 50 MCG tablet; TAKE ONE TABLET BY MOUTH DAILY    Dementia without behavioral disturbance, unspecified dementia type  -     VITAMIN B12 & FOLATE; Future  -     Syphilis Antibody Cascading Reflex; Future  -     donepezil (ARICEPT) 5 MG tablet; Take 1 tablet by mouth nightly    Anemia, unspecified type  -     VITAMIN B12 & FOLATE; Future  -     CBC; Future  -     IRON AND TIBC; Future  -     FERRITIN; Future    Rosacea  -     metroNIDAZOLE (METROGEL) 0.75 % gel;  Apply topically

## 2018-09-19 LAB
RPR CONFIRMATORY: NORMAL
TOTAL SYPHILLIS IGG/IGM: REACTIVE

## 2018-09-25 ENCOUNTER — HOSPITAL ENCOUNTER (OUTPATIENT)
Dept: PHYSICAL THERAPY | Age: 83
Setting detail: THERAPIES SERIES
Discharge: HOME | End: 2018-09-25
Payer: MEDICARE

## 2018-09-25 PROCEDURE — 97116 GAIT TRAINING THERAPY: CPT

## 2018-09-25 PROCEDURE — 97110 THERAPEUTIC EXERCISES: CPT

## 2018-09-25 NOTE — FLOWSHEET NOTE
too far in front of her at times. 9/18:  Dtr present with pt this date; pt using rollator to enter clinic. Demonstrating good gait pattern however allows walker to get too far in front of her at times. Demonstrates difficulty rising from chair  9/10 pt's dtr yamilet present. Pt amb to/within clinic with 4 ww walker. Pt fatigues quickly and requires multiple seated rest breaks. Pt got SOB, lightheaded and dizzy during standing ex in // bars   9/7:  Knee crepitus B with all flexion activities  9/5: brought RW with 2 wheels, amb with hips flexed, will correct with cues  8/30: Patient presents to the clinic ambulating with arollator out in front of her with significant trunk/hip flexion  8/28: See eval  Test measurements: 9/20:   Formal measurements not taken this date   9/10 O2 sats 1 min after standing ex in // bars: 92%  And HR = 116; 5 min after standing ex: 02sats = 95% and HR = 100    Exercises:  Exercise/Equipment Resistance/Repetitions Other comments   Nustep/bike Nustep  x 4 min  Bike 2 seat#7   Stairs     CGA   IB/HR 2 x 30\"/2 x 10    // bars/barre   6\" frd step ups x 10 ea B       Hip extension B with orange t-band x 10   Hip abduction B with orange band x 10    TKE BLE x 10 with orange band        High marches x 12 B  9/10  CGA with all ex  9/10 pt c/o feeling lightheaded and dizzy after high marches and squats   TG   CGA on/off TG   Gait - see below      Mat Table     Lateral transfers    Sit to stand  From chair 2 x 5; unable to perform without slight push with UE's          Seated therex                      Other Therapeutic Activities: 9/25: amb with SPC x 80 ft, 80ft SBA  9/20:  Ambulated 115 ft x 1 and 20ft x 1 with str cane and CGA  9/18:  Ambulated 120 feet and 93 feet with str cane and CGA; demonstrated no LOB however had slight buckling of L knee x 2   9/10 d/w pt re safety with gait; advised she cont to use 4 WW until she can tolerate being up on her feet longer. Verbal review of HEP. PT advised pt and her dtr to call PCP today re lightheadedness/dizziness during standing ex today. They are agreeable. 8/30: Based on continuing to live alone and recent fall, this PT suggested increased family supervision, life alert, and returning back to a regular RW for increased safety  8/28/18 Pt and her granddtr were educated on PT POC, Diagnosis, Prognosis, pathomechanics as well as frequency and duration of scheduling future physical therapy appointments. Time was also taken on this day to answer all patient questions and participation in PT. Recommended to pt and granddaughter that pt has someone live with her for increased safety due to occurrence of falls and continued high fall risk. Completed Dynamic Gait Index (DGI) this date. D/w pt and her granddtr at length re results of PT eval, DGI, and that pt continues to be at risk of falling. D/w them various strategies to increase safety at home including modifications to bathroom and increased levels of supervision/assistance from family. Pt states she feels strongly about wanting to cont to live alone  9/7/18:  Cut 2 tennis balls for pt to use at home on RW since 4WW seems to be too fast for pt    Home Exercise Program: 9/20: No new additions this date  9/7/18: Added clamshells to HEP  8/28/18 Patient was educated on home exercise program including distrubution of handout describing exercises, sets, repetitions, frequency and intensity. Exercises/activities include: marching, 3 way hip, TR/HR    Manual Treatments:  9/20;  None this date  9/7/18:   Hip joint mobs Gr III-IV lateral and posterior/inferior; MWM hip flexion x 8 minutes    Modalities: 9/20:  None this date    Timed Code Treatment Minutes: 31    Total Treatment Minutes:  31    Treatment/Activity Tolerance:  [x] Patient tolerated treatment well [x] Patient limited by fatigue 9/10  [] Patient limited by pain  [] Patient limited by other medical complications  [x] Other: Endurance continues to improve during sessions    Prognosis: [x] Good [] Fair  [] Poor    Patient Requires Follow-up: [x] Yes  [] No    Plan:   [x] Continue per plan of care [] Alter current plan (see comments)  [] Plan of care initiated [] Hold pending MD visit [] Discharge    Plan for Next Session:  LE strengthening, balance training, gait training progressing to str cane as tolerated.       Electronically signed by:  Ananda Mesa, PT, DPT

## 2018-09-27 ENCOUNTER — HOSPITAL ENCOUNTER (OUTPATIENT)
Dept: PHYSICAL THERAPY | Age: 83
Setting detail: THERAPIES SERIES
Discharge: HOME | End: 2018-09-27
Payer: MEDICARE

## 2018-09-27 ENCOUNTER — TELEPHONE (OUTPATIENT)
Dept: INTERNAL MEDICINE CLINIC | Age: 83
End: 2018-09-27

## 2018-09-27 PROCEDURE — 97110 THERAPEUTIC EXERCISES: CPT

## 2018-09-27 NOTE — FLOWSHEET NOTE
93 feet with str cane and CGA; demonstrated no LOB however had slight buckling of L knee x 2   9/10 d/w pt re safety with gait; advised she cont to use 4 WW until she can tolerate being up on her feet longer. Verbal review of HEP. PT advised pt and her dtr to call PCP today re lightheadedness/dizziness during standing ex today. They are agreeable. 8/30: Based on continuing to live alone and recent fall, this PT suggested increased family supervision, life alert, and returning back to a regular RW for increased safety  8/28/18 Pt and her granddtr were educated on PT POC, Diagnosis, Prognosis, pathomechanics as well as frequency and duration of scheduling future physical therapy appointments. Time was also taken on this day to answer all patient questions and participation in PT. Recommended to pt and granddaughter that pt has someone live with her for increased safety due to occurrence of falls and continued high fall risk. Completed Dynamic Gait Index (DGI) this date. D/w pt and her granddtr at length re results of PT eval, DGI, and that pt continues to be at risk of falling. D/w them various strategies to increase safety at home including modifications to bathroom and increased levels of supervision/assistance from family. Pt states she feels strongly about wanting to cont to live alone  9/7/18:  Cut 2 tennis balls for pt to use at home on RW since 4WW seems to be too fast for pt    Home Exercise Program: 9/27: No new additions this date  9/7/18: Added clamshells to HEP  8/28/18 Patient was educated on home exercise program including distrubution of handout describing exercises, sets, repetitions, frequency and intensity. Exercises/activities include: marching, 3 way hip, TR/HR    Manual Treatments:  9/27;  None this date  9/7/18:   Hip joint mobs Gr III-IV lateral and posterior/inferior; MWM hip flexion x 8 minutes    Modalities: 9/27:  None this date    Timed Code Treatment Minutes: 30    Total

## 2018-10-01 ENCOUNTER — OFFICE VISIT (OUTPATIENT)
Dept: INTERNAL MEDICINE CLINIC | Age: 83
End: 2018-10-01
Payer: MEDICARE

## 2018-10-01 VITALS
HEART RATE: 72 BPM | WEIGHT: 176.4 LBS | DIASTOLIC BLOOD PRESSURE: 80 MMHG | BODY MASS INDEX: 29.35 KG/M2 | SYSTOLIC BLOOD PRESSURE: 122 MMHG

## 2018-10-01 DIAGNOSIS — F03.90 DEMENTIA WITHOUT BEHAVIORAL DISTURBANCE, UNSPECIFIED DEMENTIA TYPE: ICD-10-CM

## 2018-10-01 DIAGNOSIS — A53.9 SERUM POSITIVE FOR TREPONEMA PALLIDUM BY PCR: Primary | ICD-10-CM

## 2018-10-01 PROCEDURE — 1036F TOBACCO NON-USER: CPT | Performed by: INTERNAL MEDICINE

## 2018-10-01 PROCEDURE — 99213 OFFICE O/P EST LOW 20 MIN: CPT | Performed by: INTERNAL MEDICINE

## 2018-10-01 PROCEDURE — 4040F PNEUMOC VAC/ADMIN/RCVD: CPT | Performed by: INTERNAL MEDICINE

## 2018-10-01 PROCEDURE — 1123F ACP DISCUSS/DSCN MKR DOCD: CPT | Performed by: INTERNAL MEDICINE

## 2018-10-01 PROCEDURE — G8417 CALC BMI ABV UP PARAM F/U: HCPCS | Performed by: INTERNAL MEDICINE

## 2018-10-01 PROCEDURE — 1090F PRES/ABSN URINE INCON ASSESS: CPT | Performed by: INTERNAL MEDICINE

## 2018-10-01 PROCEDURE — G8484 FLU IMMUNIZE NO ADMIN: HCPCS | Performed by: INTERNAL MEDICINE

## 2018-10-01 PROCEDURE — G8427 DOCREV CUR MEDS BY ELIG CLIN: HCPCS | Performed by: INTERNAL MEDICINE

## 2018-10-01 PROCEDURE — 1101F PT FALLS ASSESS-DOCD LE1/YR: CPT | Performed by: INTERNAL MEDICINE

## 2018-10-01 RX ORDER — DONEPEZIL HYDROCHLORIDE 10 MG/1
10 TABLET, FILM COATED ORAL NIGHTLY
Qty: 30 TABLET | Refills: 2 | Status: SHIPPED | OUTPATIENT
Start: 2018-10-01 | End: 2018-12-17

## 2018-10-01 ASSESSMENT — ENCOUNTER SYMPTOMS
VOMITING: 0
NAUSEA: 0
SHORTNESS OF BREATH: 0
ABDOMINAL PAIN: 0
WHEEZING: 0

## 2018-10-01 NOTE — PROGRESS NOTES
Mj Ariza  4/13/1930  female  80 y.o. SUBJECTIVE:    Chief Complaint   Patient presents with    Check-Up     abdnormal labs        HPI:  Follow-up abnormal labs. 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  She get occasional rash of the face and hand which usually disappeared itself. She have early dementia symptoms. She does not thin 5 mg of Aricept has been helping him  Multiple joint arthritis pain. On further questioning in 1988 she was told to have positive syphilis test.  She can recall receiving penicillin injection from the physician at that time. She cannot recall the duration of treatment. Past Medical History:   Diagnosis Date    Acute renal failure ([facility] [address] 11-27-12    Arthritis     At risk for falling 08/28/2018    Score of 11/24 for Dynamic Gait Index    Frequent falls     Movement disorder     osteoporosis    Nephrotic syndrome 12/28/2012    Thyroid disease      Past Surgical History:   Procedure Laterality Date    COLONOSCOPY      EXCISION OF FACIAL MASS      skin ca    EYE SURGERY      cataract removal; bilat    OTHER SURGICAL HISTORY Right 05/27/2018    right gamma nail with cables    SKIN BIOPSY       Social History     Social History    Marital status:       Spouse name: N/A    Number of children: N/A    Years of education: N/A     Occupational History   [facility]      Social History Main Topics    Smoking status: Never Smoker    Smokeless tobacco: Never Used    Alcohol use No    Drug use: No    Sexual activity: Not on file     Other Topics Concern    Not on file     Social History Narrative    No narrative on file     Family History   Problem Relation Age of Onset    High Blood Pressure Mother     Cancer Father         lung cancer; metastic    Cancer Daughter 52        colon cancer    Cancer Brother     Cancer Brother     Diabetes Brother        Review of Systems   Constitutional: Negative for activity change, diaphoresis and Specified 05/26/2018     HBA1C:   Lab Results   Component Value Date    LABA1C 5.4 02/04/2015    .3 02/04/2015     MICRO/ALB:   Lab Results   Component Value Date    LABMICR YES 05/26/2018    LABCREA 137.3 09/02/2015     LIPID:   Lab Results   Component Value Date    CHOL 226 06/21/2017    TRIG 111 06/21/2017    HDL 51 06/21/2017    HDL 42 05/23/2012    LDLCALC 153 06/21/2017    LABVLDL 22 06/21/2017     TSH:   Lab Results   Component Value Date    TSHREFLEX 3.83 04/24/2018         ASSESSMENT/PLAN:    1. 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 Dementia without behavioral disturbance, unspecified dementia type    Most likely patient had a past infection of syphilis. We will obtain further recommendation from infectious diseases specialist.    2. Increase Aricept to 10 mg/qhs. Orders Placed This Encounter   Procedures   Mine Olivera MD     Referral Priority:   Routine     Referral Type:   Consult for Advice and Opinion     Referral Reason:   Specialty Services Required     Referred to Provider:   Jean Madrigal MD     Requested Specialty:   Infectious Diseases     Number of Visits Requested:   1     Current Outpatient Prescriptions   Medication Sig Dispense Refill    donepezil (ARICEPT) 10 MG tablet Take 1 tablet by mouth nightly 30 tablet 2    levothyroxine (SYNTHROID) 50 MCG tablet TAKE ONE TABLET BY MOUTH DAILY 30 tablet 5    DULoxetine (CYMBALTA) 30 MG extended release capsule Take 1 capsule by mouth daily 30 capsule 2    pantoprazole (PROTONIX) 40 MG tablet Take 1 tablet by mouth daily 30 tablet 3    acetaminophen (TYLENOL) 325 MG tablet Take 2 tablets by mouth every 6 hours as needed for Pain 120 tablet 1    Cholecalciferol (VITAMIN D) 2000 units CAPS capsule Take by mouth      denosumab (PROLIA) 60 MG/ML SOLN SC injection Inject 1 mL into the skin once for 1 dose 1 mL 0     No current facility-administered medications for this visit.         Keep appointment as

## 2018-10-02 ENCOUNTER — HOSPITAL ENCOUNTER (OUTPATIENT)
Dept: PHYSICAL THERAPY | Age: 83
Setting detail: THERAPIES SERIES
Discharge: HOME | End: 2018-10-02
Payer: MEDICARE

## 2018-10-02 PROCEDURE — G8978 MOBILITY CURRENT STATUS: HCPCS

## 2018-10-02 PROCEDURE — 97110 THERAPEUTIC EXERCISES: CPT

## 2018-10-02 PROCEDURE — G8979 MOBILITY GOAL STATUS: HCPCS

## 2018-10-02 PROCEDURE — 97530 THERAPEUTIC ACTIVITIES: CPT

## 2018-10-04 ENCOUNTER — APPOINTMENT (OUTPATIENT)
Dept: PHYSICAL THERAPY | Age: 83
End: 2018-10-04
Payer: MEDICARE

## 2018-10-09 ENCOUNTER — TELEPHONE (OUTPATIENT)
Dept: INTERNAL MEDICINE CLINIC | Age: 83
End: 2018-10-09

## 2018-10-16 ENCOUNTER — PRE-EVALUATION (OUTPATIENT)
Dept: ORTHOPEDIC SURGERY | Age: 83
End: 2018-10-16

## 2018-10-16 ENCOUNTER — OFFICE VISIT (OUTPATIENT)
Dept: ORTHOPEDIC SURGERY | Age: 83
End: 2018-10-16
Payer: MEDICARE

## 2018-10-16 VITALS — WEIGHT: 184 LBS | BODY MASS INDEX: 30.66 KG/M2 | HEIGHT: 65 IN

## 2018-10-16 DIAGNOSIS — S72.351A CLOSED DISPLACED COMMINUTED FRACTURE OF SHAFT OF RIGHT FEMUR, INITIAL ENCOUNTER (HCC): ICD-10-CM

## 2018-10-16 DIAGNOSIS — M17.11 PRIMARY OSTEOARTHRITIS OF RIGHT KNEE: Primary | ICD-10-CM

## 2018-10-16 PROCEDURE — 1123F ACP DISCUSS/DSCN MKR DOCD: CPT | Performed by: ORTHOPAEDIC SURGERY

## 2018-10-16 PROCEDURE — G8484 FLU IMMUNIZE NO ADMIN: HCPCS | Performed by: ORTHOPAEDIC SURGERY

## 2018-10-16 PROCEDURE — 1101F PT FALLS ASSESS-DOCD LE1/YR: CPT | Performed by: ORTHOPAEDIC SURGERY

## 2018-10-16 PROCEDURE — 1090F PRES/ABSN URINE INCON ASSESS: CPT | Performed by: ORTHOPAEDIC SURGERY

## 2018-10-16 PROCEDURE — 4040F PNEUMOC VAC/ADMIN/RCVD: CPT | Performed by: ORTHOPAEDIC SURGERY

## 2018-10-16 PROCEDURE — G8427 DOCREV CUR MEDS BY ELIG CLIN: HCPCS | Performed by: ORTHOPAEDIC SURGERY

## 2018-10-16 PROCEDURE — 20610 DRAIN/INJ JOINT/BURSA W/O US: CPT | Performed by: ORTHOPAEDIC SURGERY

## 2018-10-16 PROCEDURE — 99214 OFFICE O/P EST MOD 30 MIN: CPT | Performed by: ORTHOPAEDIC SURGERY

## 2018-10-16 PROCEDURE — G8417 CALC BMI ABV UP PARAM F/U: HCPCS | Performed by: ORTHOPAEDIC SURGERY

## 2018-10-16 PROCEDURE — APPSS30 APP SPLIT SHARED TIME 16-30 MINUTES: Performed by: NURSE PRACTITIONER

## 2018-10-16 PROCEDURE — 1036F TOBACCO NON-USER: CPT | Performed by: ORTHOPAEDIC SURGERY

## 2018-10-18 ENCOUNTER — OFFICE VISIT (OUTPATIENT)
Dept: INFECTIOUS DISEASES | Age: 83
End: 2018-10-18
Payer: MEDICARE

## 2018-10-18 VITALS
BODY MASS INDEX: 28.99 KG/M2 | HEART RATE: 96 BPM | TEMPERATURE: 98.1 F | WEIGHT: 174 LBS | DIASTOLIC BLOOD PRESSURE: 78 MMHG | OXYGEN SATURATION: 98 % | SYSTOLIC BLOOD PRESSURE: 126 MMHG | HEIGHT: 65 IN

## 2018-10-18 DIAGNOSIS — E03.9 HYPOTHYROIDISM, UNSPECIFIED TYPE: ICD-10-CM

## 2018-10-18 DIAGNOSIS — F03.90 SENILE DEMENTIA WITHOUT BEHAVIORAL DISTURBANCE (HCC): ICD-10-CM

## 2018-10-18 DIAGNOSIS — M25.50 MULTIPLE JOINT PAIN: ICD-10-CM

## 2018-10-18 DIAGNOSIS — R76.8 FALSE POSITIVE SYPHILIS SEROLOGY: Primary | ICD-10-CM

## 2018-10-18 DIAGNOSIS — M81.0 AGE-RELATED OSTEOPOROSIS WITHOUT CURRENT PATHOLOGICAL FRACTURE: ICD-10-CM

## 2018-10-18 PROCEDURE — 1123F ACP DISCUSS/DSCN MKR DOCD: CPT | Performed by: INTERNAL MEDICINE

## 2018-10-18 PROCEDURE — 99214 OFFICE O/P EST MOD 30 MIN: CPT | Performed by: INTERNAL MEDICINE

## 2018-10-18 PROCEDURE — 1036F TOBACCO NON-USER: CPT | Performed by: INTERNAL MEDICINE

## 2018-10-18 PROCEDURE — 4040F PNEUMOC VAC/ADMIN/RCVD: CPT | Performed by: INTERNAL MEDICINE

## 2018-10-18 PROCEDURE — G8484 FLU IMMUNIZE NO ADMIN: HCPCS | Performed by: INTERNAL MEDICINE

## 2018-10-18 PROCEDURE — 1101F PT FALLS ASSESS-DOCD LE1/YR: CPT | Performed by: INTERNAL MEDICINE

## 2018-10-18 PROCEDURE — G8427 DOCREV CUR MEDS BY ELIG CLIN: HCPCS | Performed by: INTERNAL MEDICINE

## 2018-10-18 PROCEDURE — 1090F PRES/ABSN URINE INCON ASSESS: CPT | Performed by: INTERNAL MEDICINE

## 2018-10-18 PROCEDURE — G8417 CALC BMI ABV UP PARAM F/U: HCPCS | Performed by: INTERNAL MEDICINE

## 2018-10-18 ASSESSMENT — ENCOUNTER SYMPTOMS
COUGH: 0
COLOR CHANGE: 0
CHOKING: 0
CHEST TIGHTNESS: 0
NAUSEA: 0
DIARRHEA: 0
ABDOMINAL PAIN: 0
TROUBLE SWALLOWING: 0
BLOOD IN STOOL: 0
APNEA: 0
STRIDOR: 0
FACIAL SWELLING: 0
SHORTNESS OF BREATH: 0
RHINORRHEA: 0
EYE DISCHARGE: 0
PHOTOPHOBIA: 0
EYE REDNESS: 0

## 2018-10-23 PROBLEM — M17.11 PRIMARY OSTEOARTHRITIS OF RIGHT KNEE: Status: ACTIVE | Noted: 2018-10-23

## 2018-10-24 ENCOUNTER — OFFICE VISIT (OUTPATIENT)
Dept: INTERNAL MEDICINE CLINIC | Age: 83
End: 2018-10-24
Payer: MEDICARE

## 2018-10-24 DIAGNOSIS — S50.812D ABRASION OF LEFT FOREARM, SUBSEQUENT ENCOUNTER: ICD-10-CM

## 2018-10-24 DIAGNOSIS — G30.1 LATE ONSET ALZHEIMER'S DISEASE WITHOUT BEHAVIORAL DISTURBANCE (HCC): Primary | ICD-10-CM
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
## 2018-10-24 DIAGNOSIS — F02.80 LATE ONSET ALZHEIMER'S DISEASE WITHOUT BEHAVIORAL DISTURBANCE (HCC): Primary | ICD-10-CM

## 2018-10-24 PROCEDURE — 1123F ACP DISCUSS/DSCN MKR DOCD: CPT | Performed by: INTERNAL MEDICINE

## 2018-10-24 PROCEDURE — 1036F TOBACCO NON-USER: CPT | Performed by: INTERNAL MEDICINE

## 2018-10-24 PROCEDURE — G8427 DOCREV CUR MEDS BY ELIG CLIN: HCPCS | Performed by: INTERNAL MEDICINE

## 2018-10-24 PROCEDURE — 4040F PNEUMOC VAC/ADMIN/RCVD: CPT | Performed by: INTERNAL MEDICINE

## 2018-10-24 PROCEDURE — 1090F PRES/ABSN URINE INCON ASSESS: CPT | Performed by: INTERNAL MEDICINE

## 2018-10-24 PROCEDURE — 1101F PT FALLS ASSESS-DOCD LE1/YR: CPT | Performed by: INTERNAL MEDICINE

## 2018-10-24 PROCEDURE — G8417 CALC BMI ABV UP PARAM F/U: HCPCS | Performed by: INTERNAL MEDICINE

## 2018-10-24 PROCEDURE — G8484 FLU IMMUNIZE NO ADMIN: HCPCS | Performed by: INTERNAL MEDICINE

## 2018-10-24 PROCEDURE — 99213 OFFICE O/P EST LOW 20 MIN: CPT | Performed by: INTERNAL MEDICINE

## 2018-10-24 ASSESSMENT — ENCOUNTER SYMPTOMS
NAUSEA: 0
VOMITING: 0
PHOTOPHOBIA: 0
ABDOMINAL PAIN: 0
COUGH: 0
WHEEZING: 0

## 2018-10-24 NOTE — PROGRESS NOTES
Karime Jansen  4/13/1930  female  80 y.o. SUBJECTIVE:    Chief Complaint   Patient presents with    Dementia       HPI:  Follow-up visit. Patient feels her memory is getting better with current Aricept. Since last visit seen infectious diseases specialist.  She was adequately treated for syphilis in the past.  She had a recent fall at home because she lost balance. And sustained an abrasion at the left forearm. She denies neck pain, back pain or any new musculoskeletal pain. Evaluated in urgent care. Recent tetanus immunization. She is not interested to do physical therapy to improve the gait. Past Medical History:   Diagnosis Date    Acute renal failure ([facility] [address] 11-27-12    Arthritis     At risk for falling 08/28/2018    Score of 11/24 for Dynamic Gait Index    Frequent falls     Movement disorder     osteoporosis    Nephrotic syndrome 12/28/2012    Thyroid disease      Past Surgical History:   Procedure Laterality Date    COLONOSCOPY      EXCISION OF FACIAL MASS      skin ca    EYE SURGERY      cataract removal; bilat    OTHER SURGICAL HISTORY Right 05/27/2018    right gamma nail with cables    SKIN BIOPSY       Social History     Social History    Marital status:      Spouse name: N/A    Number of children: N/A    Years of education: N/A     Occupational History    homeaker      Social History Main Topics    Smoking status: Never Smoker    Smokeless tobacco: Never Used    Alcohol use No    Drug use: No    Sexual activity: Not on file     Other Topics Concern    Not on file     Social History Narrative    No narrative on file     Family History   Problem Relation Age of Onset    High Blood Pressure Mother     Cancer Father         lung cancer; metastic    Cancer Daughter 52        colon cancer    Cancer Brother     Cancer Brother     Diabetes Brother        Review of Systems   Eyes: Negative for photophobia and visual disturbance.    Respiratory: Negative for cough and wheezing. Cardiovascular: Negative for chest pain. Gastrointestinal: Negative for abdominal pain, nausea and vomiting. Neurological: Negative for dizziness, tremors, syncope, facial asymmetry, weakness, light-headedness and headaches. Occasional tingling feelings to fore arm   Psychiatric/Behavioral: Negative for behavioral problems, hallucinations and sleep disturbance. OBJECTIVE:  Pulse Readings from Last 4 Encounters:   10/18/18 96   10/01/18 72   09/18/18 70   08/20/18 72     Wt Readings from Last 4 Encounters:   10/18/18 174 lb (78.9 kg)   10/16/18 184 lb (83.5 kg)   10/01/18 176 lb 6.4 oz (80 kg)   09/18/18 179 lb 6.4 oz (81.4 kg)     BP Readings from Last 4 Encounters:   10/18/18 126/78   10/01/18 122/80   09/18/18 138/84   08/20/18 120/76     Physical Exam   Constitutional: She is oriented to person, place, and time. Eyes: EOM are normal.   Cardiovascular: Normal rate, regular rhythm and normal heart sounds. Pulmonary/Chest: Effort normal.   Abdominal: Soft. Bowel sounds are normal.   Musculoskeletal:   Using cane   Neurological: She is alert and oriented to person, place, and time. Skin:   Abrasion to left forearm   Psychiatric: She has a normal mood and affect. Thought content normal.   Nursing note and vitals reviewed.       CBC:   Lab Results   Component Value Date    WBC 7.8 09/18/2018    HGB 14.2 09/18/2018    HCT 45.3 09/18/2018     09/18/2018     CMP:  Lab Results   Component Value Date     06/14/2018    K 4.6 06/14/2018     06/14/2018    CO2 29 06/14/2018    ANIONGAP 7 06/14/2018    GLUCOSE 116 06/14/2018    BUN 17 06/14/2018    CREATININE 0.8 06/14/2018    GFRAA >60 06/14/2018    GFRAA >60 04/10/2013    CALCIUM 8.5 06/14/2018    PROT 7.3 05/26/2018    PROT 7.2 03/04/2013    LABALBU 3.9 05/26/2018    AGRATIO 1.1 05/26/2018    BILITOT 0.5 05/26/2018    ALKPHOS 70 05/26/2018    ALT 10 05/26/2018    AST 17 05/26/2018    GLOB 3.4 05/26/2018

## 2018-12-17 ENCOUNTER — OFFICE VISIT (OUTPATIENT)
Dept: RHEUMATOLOGY | Age: 83
End: 2018-12-17
Payer: MEDICARE

## 2018-12-17 VITALS
HEART RATE: 72 BPM | HEIGHT: 65 IN | WEIGHT: 171.38 LBS | DIASTOLIC BLOOD PRESSURE: 76 MMHG | BODY MASS INDEX: 28.55 KG/M2 | SYSTOLIC BLOOD PRESSURE: 132 MMHG

## 2018-12-17 DIAGNOSIS — M81.0 AGE-RELATED OSTEOPOROSIS WITHOUT CURRENT PATHOLOGICAL FRACTURE: Primary | ICD-10-CM

## 2018-12-17 DIAGNOSIS — M79.7 FIBROMYALGIA: ICD-10-CM

## 2018-12-17 LAB
ALP BLD-CCNC: 133 U/L (ref 40–129)
CALCIUM SERPL-MCNC: 9.6 MG/DL (ref 8.3–10.6)
PARATHYROID HORMONE INTACT: 64.4 PG/ML (ref 14–72)

## 2018-12-17 PROCEDURE — 1036F TOBACCO NON-USER: CPT | Performed by: INTERNAL MEDICINE

## 2018-12-17 PROCEDURE — 1090F PRES/ABSN URINE INCON ASSESS: CPT | Performed by: INTERNAL MEDICINE

## 2018-12-17 PROCEDURE — 1101F PT FALLS ASSESS-DOCD LE1/YR: CPT | Performed by: INTERNAL MEDICINE

## 2018-12-17 PROCEDURE — 1123F ACP DISCUSS/DSCN MKR DOCD: CPT | Performed by: INTERNAL MEDICINE

## 2018-12-17 PROCEDURE — G8427 DOCREV CUR MEDS BY ELIG CLIN: HCPCS | Performed by: INTERNAL MEDICINE

## 2018-12-17 PROCEDURE — 4040F PNEUMOC VAC/ADMIN/RCVD: CPT | Performed by: INTERNAL MEDICINE

## 2018-12-17 PROCEDURE — G8417 CALC BMI ABV UP PARAM F/U: HCPCS | Performed by: INTERNAL MEDICINE

## 2018-12-17 PROCEDURE — 99213 OFFICE O/P EST LOW 20 MIN: CPT | Performed by: INTERNAL MEDICINE

## 2018-12-17 PROCEDURE — G8484 FLU IMMUNIZE NO ADMIN: HCPCS | Performed by: INTERNAL MEDICINE

## 2018-12-17 RX ORDER — DULOXETIN HYDROCHLORIDE 30 MG/1
30 CAPSULE, DELAYED RELEASE ORAL DAILY
Qty: 30 CAPSULE | Refills: 2 | Status: SHIPPED | OUTPATIENT
Start: 2018-12-17 | End: 2019-02-19 | Stop reason: SDUPTHER

## 2018-12-19 LAB
IGA: 165 MG/DL (ref 68–408)
TISSUE TRANSGLUTAMINASE IGA: 1 U/ML (ref 0–3)

## 2018-12-26 ENCOUNTER — TELEPHONE (OUTPATIENT)
Dept: INTERNAL MEDICINE CLINIC | Age: 83
End: 2018-12-26

## 2018-12-26 NOTE — TELEPHONE ENCOUNTER
[employer] calling-on the enrollment the gender is checked off as make but pt is female  So that needs to be corrected       They also need the prescription coverage Part D of her medicare coverage.     Fax [phone]

## 2019-01-02 ENCOUNTER — TELEPHONE (OUTPATIENT)
Dept: INTERNAL MEDICINE CLINIC | Age: 84
End: 2019-01-02

## 2019-01-22 ENCOUNTER — OFFICE VISIT (OUTPATIENT)
Dept: INTERNAL MEDICINE CLINIC | Age: 84
End: 2019-01-22
Payer: MEDICARE

## 2019-01-22 ENCOUNTER — OFFICE VISIT (OUTPATIENT)
Dept: RHEUMATOLOGY | Age: 84
End: 2019-01-22
Payer: MEDICARE

## 2019-01-22 ENCOUNTER — TELEPHONE (OUTPATIENT)
Dept: RHEUMATOLOGY | Age: 84
End: 2019-01-22

## 2019-01-22 VITALS
BODY MASS INDEX: 29.06 KG/M2 | HEIGHT: 65 IN | HEART RATE: 82 BPM | SYSTOLIC BLOOD PRESSURE: 120 MMHG | WEIGHT: 174.4 LBS | DIASTOLIC BLOOD PRESSURE: 84 MMHG

## 2019-01-22 VITALS
WEIGHT: 174.43 LBS | HEART RATE: 82 BPM | BODY MASS INDEX: 29.06 KG/M2 | DIASTOLIC BLOOD PRESSURE: 84 MMHG | SYSTOLIC BLOOD PRESSURE: 120 MMHG | HEIGHT: 65 IN

## 2019-01-22 DIAGNOSIS — M81.0 AGE-RELATED OSTEOPOROSIS WITHOUT CURRENT PATHOLOGICAL FRACTURE: Primary | ICD-10-CM

## 2019-01-22 DIAGNOSIS — K21.9 GASTROESOPHAGEAL REFLUX DISEASE, ESOPHAGITIS PRESENCE NOT SPECIFIED: ICD-10-CM

## 2019-01-22 DIAGNOSIS — R76.8 FALSE POSITIVE SYPHILIS SEROLOGY: ICD-10-CM

## 2019-01-22 DIAGNOSIS — M17.11 PRIMARY OSTEOARTHRITIS OF RIGHT KNEE: ICD-10-CM

## 2019-01-22 DIAGNOSIS — E03.9 HYPOTHYROIDISM, UNSPECIFIED TYPE: Primary | ICD-10-CM

## 2019-01-22 DIAGNOSIS — M25.50 MULTIPLE JOINT PAIN: ICD-10-CM

## 2019-01-22 DIAGNOSIS — F03.90 DEMENTIA WITHOUT BEHAVIORAL DISTURBANCE, UNSPECIFIED DEMENTIA TYPE: ICD-10-CM

## 2019-01-22 DIAGNOSIS — R25.1 TREMOR OF BOTH HANDS: ICD-10-CM

## 2019-01-22 PROCEDURE — 1036F TOBACCO NON-USER: CPT | Performed by: INTERNAL MEDICINE

## 2019-01-22 PROCEDURE — 1123F ACP DISCUSS/DSCN MKR DOCD: CPT | Performed by: INTERNAL MEDICINE

## 2019-01-22 PROCEDURE — 99214 OFFICE O/P EST MOD 30 MIN: CPT | Performed by: INTERNAL MEDICINE

## 2019-01-22 PROCEDURE — G8484 FLU IMMUNIZE NO ADMIN: HCPCS | Performed by: INTERNAL MEDICINE

## 2019-01-22 PROCEDURE — 1101F PT FALLS ASSESS-DOCD LE1/YR: CPT | Performed by: INTERNAL MEDICINE

## 2019-01-22 PROCEDURE — 1090F PRES/ABSN URINE INCON ASSESS: CPT | Performed by: INTERNAL MEDICINE

## 2019-01-22 PROCEDURE — G8417 CALC BMI ABV UP PARAM F/U: HCPCS | Performed by: INTERNAL MEDICINE

## 2019-01-22 PROCEDURE — G8427 DOCREV CUR MEDS BY ELIG CLIN: HCPCS | Performed by: INTERNAL MEDICINE

## 2019-01-22 PROCEDURE — 4040F PNEUMOC VAC/ADMIN/RCVD: CPT | Performed by: INTERNAL MEDICINE

## 2019-01-22 PROCEDURE — 99213 OFFICE O/P EST LOW 20 MIN: CPT | Performed by: INTERNAL MEDICINE

## 2019-01-22 RX ORDER — DONEPEZIL HYDROCHLORIDE 10 MG/1
10 TABLET, FILM COATED ORAL NIGHTLY
COMMUNITY
End: 2019-02-22 | Stop reason: SDUPTHER

## 2019-01-22 RX ORDER — PANTOPRAZOLE SODIUM 40 MG/1
40 TABLET, DELAYED RELEASE ORAL DAILY
Qty: 30 TABLET | Refills: 5 | Status: SHIPPED | OUTPATIENT
Start: 2019-01-22 | End: 2019-09-17 | Stop reason: SDUPTHER

## 2019-01-22 ASSESSMENT — ENCOUNTER SYMPTOMS
WHEEZING: 0
PHOTOPHOBIA: 0
SHORTNESS OF BREATH: 0
ABDOMINAL PAIN: 0
VOICE CHANGE: 0
VOMITING: 0
NAUSEA: 0
TROUBLE SWALLOWING: 0

## 2019-01-23 ENCOUNTER — TELEPHONE (OUTPATIENT)
Dept: INTERNAL MEDICINE CLINIC | Age: 84
End: 2019-01-23

## 2019-01-23 DIAGNOSIS — M81.0 AGE-RELATED OSTEOPOROSIS WITHOUT CURRENT PATHOLOGICAL FRACTURE: Primary | ICD-10-CM

## 2019-02-11 ENCOUNTER — TELEPHONE (OUTPATIENT)
Dept: INTERNAL MEDICINE CLINIC | Age: 84
End: 2019-02-11

## 2019-02-11 DIAGNOSIS — M81.0 AGE-RELATED OSTEOPOROSIS WITHOUT CURRENT PATHOLOGICAL FRACTURE: Primary | ICD-10-CM

## 2019-02-19 DIAGNOSIS — M79.7 FIBROMYALGIA: ICD-10-CM

## 2019-02-19 LAB
CALCIUM SERPL-MCNC: 9.3 MG/DL (ref 8.3–10.6)
CREAT SERPL-MCNC: 1 MG/DL (ref 0.6–1.2)
GFR AFRICAN AMERICAN: >60
GFR NON-AFRICAN AMERICAN: 52

## 2019-02-19 RX ORDER — DULOXETIN HYDROCHLORIDE 30 MG/1
30 CAPSULE, DELAYED RELEASE ORAL DAILY
Qty: 30 CAPSULE | Refills: 3 | Status: SHIPPED | OUTPATIENT
Start: 2019-02-19 | End: 2019-07-15 | Stop reason: SDUPTHER

## 2019-02-20 ENCOUNTER — TELEPHONE (OUTPATIENT)
Dept: RHEUMATOLOGY | Age: 84
End: 2019-02-20

## 2019-02-21 ENCOUNTER — TELEPHONE (OUTPATIENT)
Dept: INTERNAL MEDICINE CLINIC | Age: 84
End: 2019-02-21

## 2019-02-22 ENCOUNTER — OFFICE VISIT (OUTPATIENT)
Dept: INTERNAL MEDICINE CLINIC | Age: 84
End: 2019-02-22
Payer: MEDICARE

## 2019-02-22 VITALS
BODY MASS INDEX: 29.32 KG/M2 | HEIGHT: 65 IN | DIASTOLIC BLOOD PRESSURE: 98 MMHG | HEART RATE: 80 BPM | WEIGHT: 176 LBS | SYSTOLIC BLOOD PRESSURE: 148 MMHG

## 2019-02-22 DIAGNOSIS — R25.1 SHAKING: ICD-10-CM

## 2019-02-22 DIAGNOSIS — R61 NIGHT SWEAT: ICD-10-CM

## 2019-02-22 DIAGNOSIS — Z23 NEED FOR PROPHYLACTIC VACCINATION AGAINST STREPTOCOCCUS PNEUMONIAE (PNEUMOCOCCUS): ICD-10-CM

## 2019-02-22 DIAGNOSIS — F51.5 BAD DREAMS: ICD-10-CM

## 2019-02-22 DIAGNOSIS — R61 NIGHT SWEAT: Primary | ICD-10-CM

## 2019-02-22 DIAGNOSIS — M81.0 AGE-RELATED OSTEOPOROSIS WITHOUT CURRENT PATHOLOGICAL FRACTURE: ICD-10-CM

## 2019-02-22 DIAGNOSIS — E03.9 HYPOTHYROIDISM, UNSPECIFIED TYPE: ICD-10-CM

## 2019-02-22 LAB
A/G RATIO: 1.8 (ref 1.1–2.2)
ALBUMIN SERPL-MCNC: 4.4 G/DL (ref 3.4–5)
ALP BLD-CCNC: 123 U/L (ref 40–129)
ALT SERPL-CCNC: 8 U/L (ref 10–40)
ANION GAP SERPL CALCULATED.3IONS-SCNC: 13 MMOL/L (ref 3–16)
AST SERPL-CCNC: 14 U/L (ref 15–37)
BASOPHILS ABSOLUTE: 0.1 K/UL (ref 0–0.2)
BASOPHILS RELATIVE PERCENT: 1.1 %
BILIRUB SERPL-MCNC: 0.6 MG/DL (ref 0–1)
BILIRUBIN URINE: ABNORMAL
BLOOD, URINE: NEGATIVE
BUN BLDV-MCNC: 22 MG/DL (ref 7–20)
CALCIUM SERPL-MCNC: 9.4 MG/DL (ref 8.3–10.6)
CHLORIDE BLD-SCNC: 103 MMOL/L (ref 99–110)
CLARITY: CLEAR
CO2: 27 MMOL/L (ref 21–32)
COLOR: ABNORMAL
CREAT SERPL-MCNC: 1 MG/DL (ref 0.6–1.2)
EOSINOPHILS ABSOLUTE: 0.1 K/UL (ref 0–0.6)
EOSINOPHILS RELATIVE PERCENT: 1.9 %
EPITHELIAL CELLS, UA: 2 /HPF (ref 0–5)
GFR AFRICAN AMERICAN: >60
GFR NON-AFRICAN AMERICAN: 52
GLOBULIN: 2.5 G/DL
GLUCOSE BLD-MCNC: 100 MG/DL (ref 70–99)
GLUCOSE URINE: NEGATIVE MG/DL
HCT VFR BLD CALC: 46.1 % (ref 36–48)
HEMOGLOBIN: 15.1 G/DL (ref 12–16)
HYALINE CASTS: 4 /LPF (ref 0–8)
KETONES, URINE: ABNORMAL MG/DL
LEUKOCYTE ESTERASE, URINE: NEGATIVE
LYMPHOCYTES ABSOLUTE: 1.5 K/UL (ref 1–5.1)
LYMPHOCYTES RELATIVE PERCENT: 22.7 %
MCH RBC QN AUTO: 31.4 PG (ref 26–34)
MCHC RBC AUTO-ENTMCNC: 32.7 G/DL (ref 31–36)
MCV RBC AUTO: 96.3 FL (ref 80–100)
MICROSCOPIC EXAMINATION: YES
MONOCYTES ABSOLUTE: 0.7 K/UL (ref 0–1.3)
MONOCYTES RELATIVE PERCENT: 10.2 %
NEUTROPHILS ABSOLUTE: 4.3 K/UL (ref 1.7–7.7)
NEUTROPHILS RELATIVE PERCENT: 64.1 %
NITRITE, URINE: NEGATIVE
PDW BLD-RTO: 13.1 % (ref 12.4–15.4)
PH UA: 5.5
PLATELET # BLD: 287 K/UL (ref 135–450)
PMV BLD AUTO: 9 FL (ref 5–10.5)
POTASSIUM SERPL-SCNC: 4.5 MMOL/L (ref 3.5–5.1)
PROTEIN UA: ABNORMAL MG/DL
RBC # BLD: 4.79 M/UL (ref 4–5.2)
RBC UA: 5 /HPF (ref 0–4)
SODIUM BLD-SCNC: 143 MMOL/L (ref 136–145)
SPECIFIC GRAVITY UA: >1.03
TOTAL PROTEIN: 6.9 G/DL (ref 6.4–8.2)
TSH REFLEX: 3.47 UIU/ML (ref 0.27–4.2)
URINE TYPE: ABNORMAL
UROBILINOGEN, URINE: 1 E.U./DL
WBC # BLD: 6.7 K/UL (ref 4–11)
WBC UA: 2 /HPF (ref 0–5)

## 2019-02-22 PROCEDURE — 1101F PT FALLS ASSESS-DOCD LE1/YR: CPT | Performed by: INTERNAL MEDICINE

## 2019-02-22 PROCEDURE — 4040F PNEUMOC VAC/ADMIN/RCVD: CPT | Performed by: INTERNAL MEDICINE

## 2019-02-22 PROCEDURE — 1036F TOBACCO NON-USER: CPT | Performed by: INTERNAL MEDICINE

## 2019-02-22 PROCEDURE — 1123F ACP DISCUSS/DSCN MKR DOCD: CPT | Performed by: INTERNAL MEDICINE

## 2019-02-22 PROCEDURE — 90732 PPSV23 VACC 2 YRS+ SUBQ/IM: CPT | Performed by: INTERNAL MEDICINE

## 2019-02-22 PROCEDURE — G8427 DOCREV CUR MEDS BY ELIG CLIN: HCPCS | Performed by: INTERNAL MEDICINE

## 2019-02-22 PROCEDURE — 96372 THER/PROPH/DIAG INJ SC/IM: CPT | Performed by: INTERNAL MEDICINE

## 2019-02-22 PROCEDURE — G8484 FLU IMMUNIZE NO ADMIN: HCPCS | Performed by: INTERNAL MEDICINE

## 2019-02-22 PROCEDURE — G8417 CALC BMI ABV UP PARAM F/U: HCPCS | Performed by: INTERNAL MEDICINE

## 2019-02-22 PROCEDURE — G0009 ADMIN PNEUMOCOCCAL VACCINE: HCPCS | Performed by: INTERNAL MEDICINE

## 2019-02-22 PROCEDURE — 99214 OFFICE O/P EST MOD 30 MIN: CPT | Performed by: INTERNAL MEDICINE

## 2019-02-22 PROCEDURE — 1090F PRES/ABSN URINE INCON ASSESS: CPT | Performed by: INTERNAL MEDICINE

## 2019-02-22 RX ORDER — LEVOTHYROXINE SODIUM 0.05 MG/1
TABLET ORAL
Qty: 30 TABLET | Refills: 5 | Status: SHIPPED | OUTPATIENT
Start: 2019-02-22 | End: 2019-10-01 | Stop reason: SDUPTHER

## 2019-02-22 RX ORDER — DONEPEZIL HYDROCHLORIDE 10 MG/1
10 TABLET, FILM COATED ORAL NIGHTLY
Qty: 30 TABLET | Refills: 5 | Status: SHIPPED | OUTPATIENT
Start: 2019-02-22 | End: 2019-02-25 | Stop reason: SDUPTHER

## 2019-02-22 ASSESSMENT — ENCOUNTER SYMPTOMS
VOICE CHANGE: 0
VOMITING: 0
SHORTNESS OF BREATH: 0
CHEST TIGHTNESS: 0
ABDOMINAL PAIN: 0
WHEEZING: 0
TROUBLE SWALLOWING: 0
COUGH: 0

## 2019-02-25 ENCOUNTER — TELEPHONE (OUTPATIENT)
Dept: INTERNAL MEDICINE CLINIC | Age: 84
End: 2019-02-25

## 2019-02-25 DIAGNOSIS — R41.3 MEMORY DEFICIT: Primary | ICD-10-CM

## 2019-02-25 RX ORDER — DONEPEZIL HYDROCHLORIDE 5 MG/1
5 TABLET, FILM COATED ORAL NIGHTLY
Qty: 90 TABLET | Refills: 1 | Status: SHIPPED | OUTPATIENT
Start: 2019-02-25 | End: 2019-10-17 | Stop reason: SDUPTHER

## 2019-02-25 RX ORDER — DONEPEZIL HYDROCHLORIDE 10 MG/1
5 TABLET, FILM COATED ORAL NIGHTLY
Qty: 30 TABLET | Refills: 5
Start: 2019-02-25 | End: 2019-02-25 | Stop reason: SDUPTHER

## 2019-03-21 ENCOUNTER — TELEPHONE (OUTPATIENT)
Dept: INTERNAL MEDICINE CLINIC | Age: 84
End: 2019-03-21

## 2019-04-16 ENCOUNTER — TELEPHONE (OUTPATIENT)
Dept: INTERNAL MEDICINE CLINIC | Age: 84
End: 2019-04-16

## 2019-04-16 ENCOUNTER — OFFICE VISIT (OUTPATIENT)
Dept: ORTHOPEDIC SURGERY | Age: 84
End: 2019-04-16
Payer: MEDICARE

## 2019-04-16 VITALS — HEIGHT: 65 IN | RESPIRATION RATE: 16 BRPM | WEIGHT: 176 LBS | BODY MASS INDEX: 29.32 KG/M2

## 2019-04-16 DIAGNOSIS — M17.11 PRIMARY OSTEOARTHRITIS OF RIGHT KNEE: ICD-10-CM

## 2019-04-16 DIAGNOSIS — S72.351A CLOSED DISPLACED COMMINUTED FRACTURE OF SHAFT OF RIGHT FEMUR, INITIAL ENCOUNTER (HCC): Primary | ICD-10-CM

## 2019-04-16 DIAGNOSIS — M17.12 PRIMARY OSTEOARTHRITIS OF LEFT KNEE: ICD-10-CM

## 2019-04-16 PROCEDURE — G8427 DOCREV CUR MEDS BY ELIG CLIN: HCPCS | Performed by: ORTHOPAEDIC SURGERY

## 2019-04-16 PROCEDURE — 1090F PRES/ABSN URINE INCON ASSESS: CPT | Performed by: ORTHOPAEDIC SURGERY

## 2019-04-16 PROCEDURE — 20610 DRAIN/INJ JOINT/BURSA W/O US: CPT | Performed by: ORTHOPAEDIC SURGERY

## 2019-04-16 PROCEDURE — 99214 OFFICE O/P EST MOD 30 MIN: CPT | Performed by: ORTHOPAEDIC SURGERY

## 2019-04-16 PROCEDURE — 1036F TOBACCO NON-USER: CPT | Performed by: ORTHOPAEDIC SURGERY

## 2019-04-16 PROCEDURE — 1123F ACP DISCUSS/DSCN MKR DOCD: CPT | Performed by: ORTHOPAEDIC SURGERY

## 2019-04-16 PROCEDURE — G8417 CALC BMI ABV UP PARAM F/U: HCPCS | Performed by: ORTHOPAEDIC SURGERY

## 2019-04-16 PROCEDURE — 4040F PNEUMOC VAC/ADMIN/RCVD: CPT | Performed by: ORTHOPAEDIC SURGERY

## 2019-04-16 NOTE — TELEPHONE ENCOUNTER
Daughter came in to get renewal for handicap renewal expires in June. Call daughter if any questions.

## 2019-04-16 NOTE — PROGRESS NOTES
DIAGNOSIS:    1-Right femur proximal shaft comminuted displaced fracture, status post Gamma nail. 2-Bilateral knee pain/osteoarthritis-new. DATE OF SURGERY:  5/27/2018 . HISTORY OF PRESENT ILLNESS: Flavio Harris 80 y.o.  female  who came in today for 1 year postoperative visit and for c/o right knee pain. The patient denies any pain in the right hip and is doing very well. Denies any right hip pain. She has been working on ROM and WBAT using a walker. She has completed physical therapy and has been doing a home exercise program. She is only walking a few steps around her home with assistance using a walker for longer distances. No numbness or tingling sensation. No fever or Chills. She had a right knee cortisone injection on 10/16/2018 with good improvement until recent. She states now that her left knee has been bothering her more Rates her bilateral right knee pain a 10/10 VAS moderate, aching, intermittent and are worsening. Aggravating factors walking. Alleviating factors elevation and rest. She reports that she has been told she has bad arthritis in her right knee and has had a cortisone injection in the past several months ago with good improvement. Past Medical History:   Diagnosis Date    Acute renal failure ([facility] [address] 11-27-12    Arthritis     At risk for falling 08/28/2018    Score of 11/24 for Dynamic Gait Index    Frequent falls     Gastroesophageal reflux disease 1/22/2019    Movement disorder     osteoporosis    Nephrotic syndrome 12/28/2012    Thyroid disease        Past Surgical History:   Procedure Laterality Date    COLONOSCOPY      EXCISION OF FACIAL MASS      skin ca    EYE SURGERY      cataract removal; bilat    OTHER SURGICAL HISTORY Right 05/27/2018    right gamma nail with cables    SKIN BIOPSY         Social History     Socioeconomic History    Marital status:       Spouse name: Not on file    Number of children: Not on file    Years of education: Not on file    Highest education level: Not on file   Occupational History    Occupation: homeaker   Social Needs    Financial resource strain: Not on file    Food insecurity:     Worry: Not on file     Inability: Not on file    Transportation needs:     Medical: Not on file     Non-medical: Not on file   Tobacco Use    Smoking status: Never Smoker    Smokeless tobacco: Never Used   Substance and Sexual Activity    Alcohol use: No    Drug use: No    Sexual activity: Not on file   Lifestyle    Physical activity:     Days per week: Not on file     Minutes per session: Not on file    Stress: Not on file   Relationships    Social connections:     Talks on phone: Not on file     Gets together: Not on file     Attends Hoahaoism service: Not on file     Active member of club or organization: Not on file     Attends meetings of clubs or organizations: Not on file     Relationship status: Not on file    Intimate partner violence:     Fear of current or ex partner: Not on file     Emotionally abused: Not on file     Physically abused: Not on file     Forced sexual activity: Not on file   Other Topics Concern    Not on file   Social History Narrative    Not on file       Family History   Problem Relation Age of Onset    High Blood Pressure Mother     Cancer Father         lung cancer; metastic    Cancer Daughter 52        colon cancer    Cancer Brother     Cancer Brother     Diabetes Brother        Current Outpatient Medications on File Prior to Visit   Medication Sig Dispense Refill    donepezil (ARICEPT) 5 MG tablet Take 1 tablet by mouth nightly 90 tablet 1    levothyroxine (SYNTHROID) 50 MCG tablet TAKE ONE TABLET BY MOUTH DAILY 30 tablet 5    DULoxetine (CYMBALTA) 30 MG extended release capsule Take 1 capsule by mouth daily 30 capsule 3    pantoprazole (PROTONIX) 40 MG tablet Take 1 tablet by mouth daily 30 tablet 5    acetaminophen (TYLENOL) 325 MG tablet Take 2 tablets by mouth every 6 hours as the hip:  I have told the patient to work on ROM, WBAT as well as strengthening exercises. Compression hose to help with swelling. The patient will come back for a follow up PRN. For the knees:  I discussed with the patient the treatment options including both surgical and non-surgical treatment. We recommended Quad exercises and stretching of the calf and hamstrings which was taught to the patient today. I believe she will benefit from cortisone injection bilateral knee, and she agreed to have. PROCEDURE:    With the patient's permission, and under sterile condition, the bilateral knee was prepared and draped with alcohol and injected with a mixture of 1 mL Kenalog 40mg and 4 mL of 1% lidocaine through the medial parapatellar port area. The patient tolerated the procedure well. A band-aid was applied and the patient was advised to ice the knee for 15-20 minutes to relieve any injection site related pain. She reported a good improvement immediatly after the injection. F/u in 3-4 months, PT if needed. AFTER repeat cortisone injection in the future if needed. She understands that this may need surgery if the pain did not to resolve. As this patient has demonstrated risk factors for osteoporosis, such as age greater than [de-identified] years and evidence of a fracture, I have referred the patient back to the primary care physician for evaluation for osteoporosis, including consideration for DEXA scanning, if this is felt to be clinically indicated. The patient is advised to contact the primary care physician to follow-up for further evaluation.        Adan Weldon MD

## 2019-04-16 NOTE — LETTER
[facility]  [address] [address]  Phone: [phone]  Fax: [phone]    Daisy Miranda MD        April 16, 2019     Patient: Darrius Skinner   YOB: 1930   Date of Visit: 4/16/2019       To Whom It May Concern: It is my medical opinion that Darrius Skinner requires a disability parking placard for the following reasons:  She has limited walking ability due to an orthopedic condition. Duration of need: 6 months    If you have any questions or concerns, please don't hesitate to call.     Sincerely,      Daisy Miranda MD

## 2019-05-17 ENCOUNTER — TELEPHONE (OUTPATIENT)
Dept: INTERNAL MEDICINE CLINIC | Age: 84
End: 2019-05-17

## 2019-07-15 DIAGNOSIS — M79.7 FIBROMYALGIA: ICD-10-CM

## 2019-07-15 RX ORDER — DULOXETIN HYDROCHLORIDE 30 MG/1
CAPSULE, DELAYED RELEASE ORAL
Qty: 30 CAPSULE | Refills: 2 | Status: SHIPPED | OUTPATIENT
Start: 2019-07-15 | End: 2019-11-03 | Stop reason: SDUPTHER

## 2019-07-17 ENCOUNTER — OFFICE VISIT (OUTPATIENT)
Dept: NEUROLOGY | Age: 84
End: 2019-07-17
Payer: MEDICARE

## 2019-07-17 VITALS
HEART RATE: 87 BPM | DIASTOLIC BLOOD PRESSURE: 70 MMHG | HEIGHT: 65 IN | WEIGHT: 182 LBS | BODY MASS INDEX: 30.32 KG/M2 | SYSTOLIC BLOOD PRESSURE: 133 MMHG

## 2019-07-17 DIAGNOSIS — F02.80 LATE ONSET ALZHEIMER'S DISEASE WITHOUT BEHAVIORAL DISTURBANCE (HCC): Primary | ICD-10-CM

## 2019-07-17 DIAGNOSIS — G30.1 LATE ONSET ALZHEIMER'S DISEASE WITHOUT BEHAVIORAL DISTURBANCE (HCC): Primary | ICD-10-CM

## 2019-07-17 DIAGNOSIS — G47.33 OBSTRUCTIVE SLEEP APNEA: ICD-10-CM

## 2019-07-17 PROCEDURE — 4040F PNEUMOC VAC/ADMIN/RCVD: CPT | Performed by: PSYCHIATRY & NEUROLOGY

## 2019-07-17 PROCEDURE — 99205 OFFICE O/P NEW HI 60 MIN: CPT | Performed by: PSYCHIATRY & NEUROLOGY

## 2019-07-17 PROCEDURE — 1090F PRES/ABSN URINE INCON ASSESS: CPT | Performed by: PSYCHIATRY & NEUROLOGY

## 2019-07-17 PROCEDURE — G8427 DOCREV CUR MEDS BY ELIG CLIN: HCPCS | Performed by: PSYCHIATRY & NEUROLOGY

## 2019-07-17 PROCEDURE — 1123F ACP DISCUSS/DSCN MKR DOCD: CPT | Performed by: PSYCHIATRY & NEUROLOGY

## 2019-07-17 PROCEDURE — G8417 CALC BMI ABV UP PARAM F/U: HCPCS | Performed by: PSYCHIATRY & NEUROLOGY

## 2019-07-17 PROCEDURE — 1036F TOBACCO NON-USER: CPT | Performed by: PSYCHIATRY & NEUROLOGY

## 2019-07-17 NOTE — PROGRESS NOTES
NEUROLOGY CONSULTATION     Chief Complaint   Patient presents with    Tremors     Patient is here today to establish care for several symptoms such as dizziness,tremors and poor memory. HISTORY OF PRESENT ILLNESS :    Mandi Henson is a 80 y.o. female who is referred by Dr. Priyank Solis   History was obtained from patient  Additional history was obtained from the patient's daughter. Patient was referred for evaluation of memory impairment. Symptoms started early 2018. Onset was gradual and the symptoms are slowly progressive. No clear aggravating or relieving factors. Patient is on low-dose Aricept 5 mg daily. Patient's daughter takes care of her financial affairs. Patient still drives an automobile. She states that she would get lost in unfamiliar areas but usually she is able to go to the grocery store and come back. Patient had a hip fracture last year and since then has had some difficulty with her gait and uses a walker. Patient's daughter states that patient snores at night. Patient sleeps poorly at night and does not feel rested when she wakes up in the morning. She takes daytime naps.     REVIEW OF SYSTEMS    Constitutional:  []   Chills   [x]  Fatigue   []  Fevers   []  Malaise   []  Weight loss     [] Denies all of the above    Eyes:  []  Double vision   []  Blurry vision     [x] Denies all of the above    Ears, nose, mouth, throat, and face:   [x] Hearing loss    []   Hoarseness      []  Snoring    []  Tinnitus       [] Denies all of the above     Respiratory:   []  Cough    [x]  Shortness of breath         [] Denies all of the above     Cardiovascular:   []  Chest pain    []  Exertional chest pressure/discomfort           [] Palpitations    []  Syncope     [x] Denies all of the above    Gastrointestinal:   []  Abdominal pain   []  Constipation    []  Diarrhea    []   Dysphagia                      [x] Denies all of

## 2019-07-17 NOTE — PATIENT INSTRUCTIONS
Please call with any questions or concerns:   [facility]  @ [phone]. LAB RESULTS:  Please obtain any labs or diagnostic tests as discussed today. You should call the office to check the results. Please allow  3 to 7 days for us to get these results. MEDICATION LIST:  Please bring an accurate list of your medications to every visit. APPOINTMENT CONFIRMATION:  We will call you the day before your scheduled appointment to confirm. If we are unable to reach you, you MUST call back by the end of the day to confirm the appointment or we may be forced to cancel.

## 2019-08-20 ENCOUNTER — APPOINTMENT (OUTPATIENT)
Dept: GENERAL RADIOLOGY | Age: 84
DRG: 482 | End: 2019-08-20
Payer: MEDICARE

## 2019-08-20 ENCOUNTER — APPOINTMENT (OUTPATIENT)
Dept: CT IMAGING | Age: 84
DRG: 482 | End: 2019-08-20
Payer: MEDICARE

## 2019-08-20 ENCOUNTER — HOSPITAL ENCOUNTER (INPATIENT)
Age: 84
LOS: 2 days | Discharge: NURSING, REHAB OR LONG-TERM CARE | DRG: 482 | End: 2019-08-23
Attending: EMERGENCY MEDICINE | Admitting: INTERNAL MEDICINE
Payer: MEDICARE

## 2019-08-20 DIAGNOSIS — S72.002A CLOSED FRACTURE OF LEFT HIP, INITIAL ENCOUNTER (HCC): ICD-10-CM
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
## 2019-08-20 LAB
APTT: 28.1 SEC (ref 26–36)
BASOPHILS ABSOLUTE: 0.1 K/UL (ref 0–0.2)
BASOPHILS RELATIVE PERCENT: 0.4 %
EOSINOPHILS ABSOLUTE: 0.1 K/UL (ref 0–0.6)
EOSINOPHILS RELATIVE PERCENT: 0.7 %
HCT VFR BLD CALC: 42.1 % (ref 36–48)
HEMOGLOBIN: 14 G/DL (ref 12–16)
INR BLD: 1.01 (ref 0.86–1.14)
LYMPHOCYTES ABSOLUTE: 1.7 K/UL (ref 1–5.1)
LYMPHOCYTES RELATIVE PERCENT: 9.9 %
MCH RBC QN AUTO: 31 PG (ref 26–34)
MCHC RBC AUTO-ENTMCNC: 33.2 G/DL (ref 31–36)
MCV RBC AUTO: 93.3 FL (ref 80–100)
MONOCYTES ABSOLUTE: 1.4 K/UL (ref 0–1.3)
MONOCYTES RELATIVE PERCENT: 8.6 %
NEUTROPHILS ABSOLUTE: 13.4 K/UL (ref 1.7–7.7)
NEUTROPHILS RELATIVE PERCENT: 80.4 %
PDW BLD-RTO: 13.1 % (ref 12.4–15.4)
PLATELET # BLD: 231 K/UL (ref 135–450)
PMV BLD AUTO: 8.9 FL (ref 5–10.5)
PROTHROMBIN TIME: 11.5 SEC (ref 9.8–13)
RBC # BLD: 4.51 M/UL (ref 4–5.2)
WBC # BLD: 16.7 K/UL (ref 4–11)

## 2019-08-20 PROCEDURE — 86901 BLOOD TYPING SEROLOGIC RH(D): CPT

## 2019-08-20 PROCEDURE — 85610 PROTHROMBIN TIME: CPT

## 2019-08-20 PROCEDURE — 70450 CT HEAD/BRAIN W/O DYE: CPT

## 2019-08-20 PROCEDURE — 72125 CT NECK SPINE W/O DYE: CPT

## 2019-08-20 PROCEDURE — 80053 COMPREHEN METABOLIC PANEL: CPT

## 2019-08-20 PROCEDURE — 85730 THROMBOPLASTIN TIME PARTIAL: CPT

## 2019-08-20 PROCEDURE — 86850 RBC ANTIBODY SCREEN: CPT

## 2019-08-20 PROCEDURE — 82550 ASSAY OF CK (CPK): CPT

## 2019-08-20 PROCEDURE — 85025 COMPLETE CBC W/AUTO DIFF WBC: CPT

## 2019-08-20 PROCEDURE — 86900 BLOOD TYPING SEROLOGIC ABO: CPT

## 2019-08-20 PROCEDURE — 99285 EMERGENCY DEPT VISIT HI MDM: CPT

## 2019-08-20 RX ORDER — ONDANSETRON 2 MG/ML
4 INJECTION INTRAMUSCULAR; INTRAVENOUS ONCE
Status: COMPLETED | OUTPATIENT
Start: 2019-08-20 | End: 2019-08-21

## 2019-08-20 RX ORDER — CEPHALEXIN 500 MG/1
500 CAPSULE ORAL 4 TIMES DAILY
COMMUNITY
End: 2019-08-21 | Stop reason: ALTCHOICE

## 2019-08-20 RX ORDER — MORPHINE SULFATE 4 MG/ML
4 INJECTION, SOLUTION INTRAMUSCULAR; INTRAVENOUS ONCE
Status: COMPLETED | OUTPATIENT
Start: 2019-08-20 | End: 2019-08-21

## 2019-08-20 ASSESSMENT — PAIN SCALES - GENERAL: PAINLEVEL_OUTOF10: 10

## 2019-08-21 ENCOUNTER — APPOINTMENT (OUTPATIENT)
Dept: CT IMAGING | Age: 84
DRG: 482 | End: 2019-08-21
Payer: MEDICARE

## 2019-08-21 ENCOUNTER — TELEPHONE (OUTPATIENT)
Dept: ORTHOPEDIC SURGERY | Age: 84
End: 2019-08-21

## 2019-08-21 ENCOUNTER — ANESTHESIA (OUTPATIENT)
Dept: OPERATING ROOM | Age: 84
DRG: 482 | End: 2019-08-21
Payer: MEDICARE

## 2019-08-21 ENCOUNTER — APPOINTMENT (OUTPATIENT)
Dept: GENERAL RADIOLOGY | Age: 84
DRG: 482 | End: 2019-08-21
Payer: MEDICARE

## 2019-08-21 ENCOUNTER — ANESTHESIA EVENT (OUTPATIENT)
Dept: OPERATING ROOM | Age: 84
DRG: 482 | End: 2019-08-21
Payer: MEDICARE

## 2019-08-21 VITALS
SYSTOLIC BLOOD PRESSURE: 173 MMHG | OXYGEN SATURATION: 100 % | TEMPERATURE: 97.7 F | RESPIRATION RATE: 17 BRPM | DIASTOLIC BLOOD PRESSURE: 82 MMHG

## 2019-08-21 PROBLEM — S72.142A CLOSED DISPLACED INTERTROCHANTERIC FRACTURE OF LEFT FEMUR (HCC): Status: ACTIVE | Noted: 2019-08-21

## 2019-08-21 LAB
A/G RATIO: 1.1 (ref 1.1–2.2)
ABO/RH: NORMAL
ALBUMIN SERPL-MCNC: 3.4 G/DL (ref 3.4–5)
ALP BLD-CCNC: 105 U/L (ref 40–129)
ALT SERPL-CCNC: 12 U/L (ref 10–40)
ANION GAP SERPL CALCULATED.3IONS-SCNC: 11 MMOL/L (ref 3–16)
ANTIBODY SCREEN: NORMAL
AST SERPL-CCNC: 17 U/L (ref 15–37)
BILIRUB SERPL-MCNC: 0.4 MG/DL (ref 0–1)
BILIRUBIN URINE: NEGATIVE
BLOOD, URINE: NEGATIVE
BUN BLDV-MCNC: 24 MG/DL (ref 7–20)
CALCIUM SERPL-MCNC: 8.9 MG/DL (ref 8.3–10.6)
CHLORIDE BLD-SCNC: 104 MMOL/L (ref 99–110)
CLARITY: CLEAR
CO2: 24 MMOL/L (ref 21–32)
COLOR: YELLOW
CREAT SERPL-MCNC: 1.5 MG/DL (ref 0.6–1.2)
EKG ATRIAL RATE: 83 BPM
EKG DIAGNOSIS: NORMAL
EKG P AXIS: 89 DEGREES
EKG P-R INTERVAL: 156 MS
EKG Q-T INTERVAL: 426 MS
EKG QRS DURATION: 120 MS
EKG QTC CALCULATION (BAZETT): 500 MS
EKG R AXIS: 1 DEGREES
EKG T AXIS: 29 DEGREES
EKG VENTRICULAR RATE: 83 BPM
GFR AFRICAN AMERICAN: 40
GFR NON-AFRICAN AMERICAN: 33
GLOBULIN: 3.2 G/DL
GLUCOSE BLD-MCNC: 133 MG/DL (ref 70–99)
GLUCOSE URINE: NEGATIVE MG/DL
KETONES, URINE: NEGATIVE MG/DL
LEUKOCYTE ESTERASE, URINE: NEGATIVE
MICROSCOPIC EXAMINATION: NORMAL
NITRITE, URINE: NEGATIVE
PH UA: 5.5 (ref 5–8)
POTASSIUM REFLEX MAGNESIUM: 3.9 MMOL/L (ref 3.5–5.1)
PROTEIN UA: NEGATIVE MG/DL
SODIUM BLD-SCNC: 139 MMOL/L (ref 136–145)
SPECIFIC GRAVITY UA: 1.02 (ref 1–1.03)
TOTAL CK: 66 U/L (ref 26–192)
TOTAL PROTEIN: 6.6 G/DL (ref 6.4–8.2)
URINE REFLEX TO CULTURE: NORMAL
URINE TYPE: NORMAL
UROBILINOGEN, URINE: 1 E.U./DL

## 2019-08-21 PROCEDURE — 2720000010 HC SURG SUPPLY STERILE: Performed by: ORTHOPAEDIC SURGERY

## 2019-08-21 PROCEDURE — 96365 THER/PROPH/DIAG IV INF INIT: CPT

## 2019-08-21 PROCEDURE — C1713 ANCHOR/SCREW BN/BN,TIS/BN: HCPCS | Performed by: ORTHOPAEDIC SURGERY

## 2019-08-21 PROCEDURE — 2709999900 HC NON-CHARGEABLE SUPPLY: Performed by: ORTHOPAEDIC SURGERY

## 2019-08-21 PROCEDURE — 73560 X-RAY EXAM OF KNEE 1 OR 2: CPT

## 2019-08-21 PROCEDURE — 3209999900 FLUORO FOR SURGICAL PROCEDURES

## 2019-08-21 PROCEDURE — 6360000002 HC RX W HCPCS: Performed by: NURSE ANESTHETIST, CERTIFIED REGISTERED

## 2019-08-21 PROCEDURE — 3600000014 HC SURGERY LEVEL 4 ADDTL 15MIN: Performed by: ORTHOPAEDIC SURGERY

## 2019-08-21 PROCEDURE — 6360000002 HC RX W HCPCS: Performed by: PHYSICIAN ASSISTANT

## 2019-08-21 PROCEDURE — 3700000000 HC ANESTHESIA ATTENDED CARE: Performed by: ORTHOPAEDIC SURGERY

## 2019-08-21 PROCEDURE — 1200000000 HC SEMI PRIVATE

## 2019-08-21 PROCEDURE — 2580000003 HC RX 258: Performed by: ANESTHESIOLOGY

## 2019-08-21 PROCEDURE — 96361 HYDRATE IV INFUSION ADD-ON: CPT

## 2019-08-21 PROCEDURE — 2580000003 HC RX 258: Performed by: PHYSICIAN ASSISTANT

## 2019-08-21 PROCEDURE — 7100000000 HC PACU RECOVERY - FIRST 15 MIN: Performed by: ORTHOPAEDIC SURGERY

## 2019-08-21 PROCEDURE — 96376 TX/PRO/DX INJ SAME DRUG ADON: CPT

## 2019-08-21 PROCEDURE — 2500000003 HC RX 250 WO HCPCS: Performed by: NURSE ANESTHETIST, CERTIFIED REGISTERED

## 2019-08-21 PROCEDURE — 72128 CT CHEST SPINE W/O DYE: CPT

## 2019-08-21 PROCEDURE — 2580000003 HC RX 258: Performed by: ORTHOPAEDIC SURGERY

## 2019-08-21 PROCEDURE — 73502 X-RAY EXAM HIP UNI 2-3 VIEWS: CPT

## 2019-08-21 PROCEDURE — 2580000003 HC RX 258: Performed by: NURSE ANESTHETIST, CERTIFIED REGISTERED

## 2019-08-21 PROCEDURE — 96375 TX/PRO/DX INJ NEW DRUG ADDON: CPT

## 2019-08-21 PROCEDURE — 6360000002 HC RX W HCPCS: Performed by: ANESTHESIOLOGY

## 2019-08-21 PROCEDURE — 2500000003 HC RX 250 WO HCPCS: Performed by: PHYSICIAN ASSISTANT

## 2019-08-21 PROCEDURE — 0QS706Z REPOSITION LEFT UPPER FEMUR WITH INTRAMEDULLARY INTERNAL FIXATION DEVICE, OPEN APPROACH: ICD-10-PCS | Performed by: ORTHOPAEDIC SURGERY

## 2019-08-21 PROCEDURE — 3700000001 HC ADD 15 MINUTES (ANESTHESIA): Performed by: ORTHOPAEDIC SURGERY

## 2019-08-21 PROCEDURE — 6360000002 HC RX W HCPCS: Performed by: INTERNAL MEDICINE

## 2019-08-21 PROCEDURE — 7100000001 HC PACU RECOVERY - ADDTL 15 MIN: Performed by: ORTHOPAEDIC SURGERY

## 2019-08-21 PROCEDURE — 96366 THER/PROPH/DIAG IV INF ADDON: CPT

## 2019-08-21 PROCEDURE — 6360000002 HC RX W HCPCS: Performed by: NURSE PRACTITIONER

## 2019-08-21 PROCEDURE — 93005 ELECTROCARDIOGRAM TRACING: CPT | Performed by: PHYSICIAN ASSISTANT

## 2019-08-21 PROCEDURE — 93010 ELECTROCARDIOGRAM REPORT: CPT | Performed by: INTERNAL MEDICINE

## 2019-08-21 PROCEDURE — 2580000003 HC RX 258: Performed by: INTERNAL MEDICINE

## 2019-08-21 PROCEDURE — 71045 X-RAY EXAM CHEST 1 VIEW: CPT

## 2019-08-21 PROCEDURE — 27245 TREAT THIGH FRACTURE: CPT | Performed by: ORTHOPAEDIC SURGERY

## 2019-08-21 PROCEDURE — C1769 GUIDE WIRE: HCPCS | Performed by: ORTHOPAEDIC SURGERY

## 2019-08-21 PROCEDURE — 99222 1ST HOSP IP/OBS MODERATE 55: CPT | Performed by: ORTHOPAEDIC SURGERY

## 2019-08-21 PROCEDURE — 3600000004 HC SURGERY LEVEL 4 BASE: Performed by: ORTHOPAEDIC SURGERY

## 2019-08-21 PROCEDURE — 81003 URINALYSIS AUTO W/O SCOPE: CPT

## 2019-08-21 DEVICE — LOCKING SCREW, FULLY THREADED: Type: IMPLANTABLE DEVICE | Site: HIP | Status: FUNCTIONAL

## 2019-08-21 DEVICE — LONG NAIL KIT R1.5, TI, LEFT
Type: IMPLANTABLE DEVICE | Site: HIP | Status: FUNCTIONAL
Brand: GAMMA

## 2019-08-21 DEVICE — LAG SCREW, TI
Type: IMPLANTABLE DEVICE | Site: HIP | Status: FUNCTIONAL
Brand: GAMMA

## 2019-08-21 RX ORDER — DULOXETIN HYDROCHLORIDE 30 MG/1
30 CAPSULE, DELAYED RELEASE ORAL DAILY
Status: DISCONTINUED | OUTPATIENT
Start: 2019-08-21 | End: 2019-08-23 | Stop reason: HOSPADM

## 2019-08-21 RX ORDER — ONDANSETRON 2 MG/ML
4 INJECTION INTRAMUSCULAR; INTRAVENOUS
Status: DISCONTINUED | OUTPATIENT
Start: 2019-08-21 | End: 2019-08-21 | Stop reason: HOSPADM

## 2019-08-21 RX ORDER — SODIUM CHLORIDE 450 MG/100ML
INJECTION, SOLUTION INTRAVENOUS CONTINUOUS
Status: DISCONTINUED | OUTPATIENT
Start: 2019-08-21 | End: 2019-08-22

## 2019-08-21 RX ORDER — HYDROCODONE BITARTRATE AND ACETAMINOPHEN 5; 325 MG/1; MG/1
1 TABLET ORAL
Status: DISCONTINUED | OUTPATIENT
Start: 2019-08-21 | End: 2019-08-21 | Stop reason: HOSPADM

## 2019-08-21 RX ORDER — GLYCOPYRROLATE 1 MG/5 ML
SYRINGE (ML) INTRAVENOUS PRN
Status: DISCONTINUED | OUTPATIENT
Start: 2019-08-21 | End: 2019-08-21 | Stop reason: SDUPTHER

## 2019-08-21 RX ORDER — ACETAMINOPHEN 500 MG
1000 TABLET ORAL EVERY 6 HOURS PRN
Status: DISCONTINUED | OUTPATIENT
Start: 2019-08-21 | End: 2019-08-23 | Stop reason: HOSPADM

## 2019-08-21 RX ORDER — LORAZEPAM 2 MG/ML
0.25 INJECTION INTRAMUSCULAR ONCE
Status: COMPLETED | OUTPATIENT
Start: 2019-08-21 | End: 2019-08-21

## 2019-08-21 RX ORDER — HYDROMORPHONE HCL 110MG/55ML
0.5 PATIENT CONTROLLED ANALGESIA SYRINGE INTRAVENOUS EVERY 5 MIN PRN
Status: DISCONTINUED | OUTPATIENT
Start: 2019-08-21 | End: 2019-08-21 | Stop reason: HOSPADM

## 2019-08-21 RX ORDER — DOCUSATE SODIUM 100 MG/1
100 CAPSULE, LIQUID FILLED ORAL 2 TIMES DAILY
Status: DISCONTINUED | OUTPATIENT
Start: 2019-08-21 | End: 2019-08-23 | Stop reason: HOSPADM

## 2019-08-21 RX ORDER — LEVOTHYROXINE SODIUM 0.03 MG/1
50 TABLET ORAL DAILY
Status: DISCONTINUED | OUTPATIENT
Start: 2019-08-22 | End: 2019-08-23 | Stop reason: HOSPADM

## 2019-08-21 RX ORDER — ONDANSETRON 2 MG/ML
4 INJECTION INTRAMUSCULAR; INTRAVENOUS EVERY 6 HOURS PRN
Status: DISCONTINUED | OUTPATIENT
Start: 2019-08-21 | End: 2019-08-23 | Stop reason: HOSPADM

## 2019-08-21 RX ORDER — SODIUM CHLORIDE, SODIUM LACTATE, POTASSIUM CHLORIDE, CALCIUM CHLORIDE 600; 310; 30; 20 MG/100ML; MG/100ML; MG/100ML; MG/100ML
INJECTION, SOLUTION INTRAVENOUS CONTINUOUS
Status: DISCONTINUED | OUTPATIENT
Start: 2019-08-21 | End: 2019-08-22

## 2019-08-21 RX ORDER — CEFAZOLIN SODIUM 2 G/100ML
2 INJECTION, SOLUTION INTRAVENOUS ONCE
Status: COMPLETED | OUTPATIENT
Start: 2019-08-21 | End: 2019-08-21

## 2019-08-21 RX ORDER — DEXAMETHASONE SODIUM PHOSPHATE 4 MG/ML
INJECTION, SOLUTION INTRA-ARTICULAR; INTRALESIONAL; INTRAMUSCULAR; INTRAVENOUS; SOFT TISSUE PRN
Status: DISCONTINUED | OUTPATIENT
Start: 2019-08-21 | End: 2019-08-21 | Stop reason: SDUPTHER

## 2019-08-21 RX ORDER — HYDROMORPHONE HCL 110MG/55ML
0.25 PATIENT CONTROLLED ANALGESIA SYRINGE INTRAVENOUS EVERY 5 MIN PRN
Status: COMPLETED | OUTPATIENT
Start: 2019-08-21 | End: 2019-08-21

## 2019-08-21 RX ORDER — NEOSTIGMINE METHYLSULFATE 5 MG/5 ML
SYRINGE (ML) INTRAVENOUS PRN
Status: DISCONTINUED | OUTPATIENT
Start: 2019-08-21 | End: 2019-08-21 | Stop reason: SDUPTHER

## 2019-08-21 RX ORDER — HYDROMORPHONE HYDROCHLORIDE 1 MG/ML
0.5 INJECTION, SOLUTION INTRAMUSCULAR; INTRAVENOUS; SUBCUTANEOUS ONCE
Status: DISCONTINUED | OUTPATIENT
Start: 2019-08-21 | End: 2019-08-21

## 2019-08-21 RX ORDER — 0.9 % SODIUM CHLORIDE 0.9 %
1000 INTRAVENOUS SOLUTION INTRAVENOUS ONCE
Status: COMPLETED | OUTPATIENT
Start: 2019-08-21 | End: 2019-08-21

## 2019-08-21 RX ORDER — PROPOFOL 10 MG/ML
INJECTION, EMULSION INTRAVENOUS PRN
Status: DISCONTINUED | OUTPATIENT
Start: 2019-08-21 | End: 2019-08-21 | Stop reason: SDUPTHER

## 2019-08-21 RX ORDER — ONDANSETRON 2 MG/ML
4 INJECTION INTRAMUSCULAR; INTRAVENOUS EVERY 6 HOURS PRN
Status: DISCONTINUED | OUTPATIENT
Start: 2019-08-21 | End: 2019-08-21 | Stop reason: SDUPTHER

## 2019-08-21 RX ORDER — BUPIVACAINE HYDROCHLORIDE AND EPINEPHRINE 5; 5 MG/ML; UG/ML
INJECTION, SOLUTION PERINEURAL
Status: COMPLETED | OUTPATIENT
Start: 2019-08-21 | End: 2019-08-21

## 2019-08-21 RX ORDER — SODIUM CHLORIDE 0.9 % (FLUSH) 0.9 %
10 SYRINGE (ML) INJECTION EVERY 12 HOURS SCHEDULED
Status: DISCONTINUED | OUTPATIENT
Start: 2019-08-21 | End: 2019-08-23 | Stop reason: HOSPADM

## 2019-08-21 RX ORDER — PANTOPRAZOLE SODIUM 40 MG/1
40 TABLET, DELAYED RELEASE ORAL DAILY
Status: DISCONTINUED | OUTPATIENT
Start: 2019-08-22 | End: 2019-08-23 | Stop reason: HOSPADM

## 2019-08-21 RX ORDER — MORPHINE SULFATE 2 MG/ML
2 INJECTION, SOLUTION INTRAMUSCULAR; INTRAVENOUS
Status: DISCONTINUED | OUTPATIENT
Start: 2019-08-21 | End: 2019-08-23 | Stop reason: HOSPADM

## 2019-08-21 RX ORDER — ROCURONIUM BROMIDE 10 MG/ML
INJECTION, SOLUTION INTRAVENOUS PRN
Status: DISCONTINUED | OUTPATIENT
Start: 2019-08-21 | End: 2019-08-21 | Stop reason: SDUPTHER

## 2019-08-21 RX ORDER — POTASSIUM CHLORIDE 7.45 MG/ML
10 INJECTION INTRAVENOUS PRN
Status: DISCONTINUED | OUTPATIENT
Start: 2019-08-21 | End: 2019-08-23 | Stop reason: HOSPADM

## 2019-08-21 RX ORDER — DONEPEZIL HYDROCHLORIDE 5 MG/1
5 TABLET, FILM COATED ORAL NIGHTLY
Status: DISCONTINUED | OUTPATIENT
Start: 2019-08-21 | End: 2019-08-23 | Stop reason: HOSPADM

## 2019-08-21 RX ORDER — LIDOCAINE HYDROCHLORIDE 10 MG/ML
1 INJECTION, SOLUTION EPIDURAL; INFILTRATION; INTRACAUDAL; PERINEURAL
Status: DISCONTINUED | OUTPATIENT
Start: 2019-08-21 | End: 2019-08-21 | Stop reason: HOSPADM

## 2019-08-21 RX ORDER — SODIUM CHLORIDE 0.9 % (FLUSH) 0.9 %
10 SYRINGE (ML) INJECTION PRN
Status: DISCONTINUED | OUTPATIENT
Start: 2019-08-21 | End: 2019-08-23 | Stop reason: HOSPADM

## 2019-08-21 RX ORDER — SODIUM CHLORIDE 0.9 % (FLUSH) 0.9 %
10 SYRINGE (ML) INJECTION EVERY 12 HOURS SCHEDULED
Status: DISCONTINUED | OUTPATIENT
Start: 2019-08-21 | End: 2019-08-21 | Stop reason: SDUPTHER

## 2019-08-21 RX ORDER — SODIUM CHLORIDE 0.9 % (FLUSH) 0.9 %
10 SYRINGE (ML) INJECTION PRN
Status: DISCONTINUED | OUTPATIENT
Start: 2019-08-21 | End: 2019-08-21 | Stop reason: SDUPTHER

## 2019-08-21 RX ORDER — ONDANSETRON 2 MG/ML
INJECTION INTRAMUSCULAR; INTRAVENOUS PRN
Status: DISCONTINUED | OUTPATIENT
Start: 2019-08-21 | End: 2019-08-21 | Stop reason: SDUPTHER

## 2019-08-21 RX ORDER — MAGNESIUM SULFATE 1 G/100ML
1 INJECTION INTRAVENOUS PRN
Status: DISCONTINUED | OUTPATIENT
Start: 2019-08-21 | End: 2019-08-23 | Stop reason: HOSPADM

## 2019-08-21 RX ORDER — ONDANSETRON 2 MG/ML
4 INJECTION INTRAMUSCULAR; INTRAVENOUS ONCE
Status: COMPLETED | OUTPATIENT
Start: 2019-08-21 | End: 2019-08-21

## 2019-08-21 RX ORDER — PHENYLEPHRINE HCL IN 0.9% NACL 1 MG/10 ML
SYRINGE (ML) INTRAVENOUS PRN
Status: DISCONTINUED | OUTPATIENT
Start: 2019-08-21 | End: 2019-08-21 | Stop reason: SDUPTHER

## 2019-08-21 RX ORDER — HYDROMORPHONE HYDROCHLORIDE 1 MG/ML
0.5 INJECTION, SOLUTION INTRAMUSCULAR; INTRAVENOUS; SUBCUTANEOUS ONCE
Status: COMPLETED | OUTPATIENT
Start: 2019-08-21 | End: 2019-08-21

## 2019-08-21 RX ORDER — SODIUM CHLORIDE 9 MG/ML
INJECTION, SOLUTION INTRAVENOUS CONTINUOUS
Status: DISCONTINUED | OUTPATIENT
Start: 2019-08-21 | End: 2019-08-21

## 2019-08-21 RX ORDER — FENTANYL CITRATE 50 UG/ML
INJECTION, SOLUTION INTRAMUSCULAR; INTRAVENOUS PRN
Status: DISCONTINUED | OUTPATIENT
Start: 2019-08-21 | End: 2019-08-21 | Stop reason: SDUPTHER

## 2019-08-21 RX ORDER — LIDOCAINE HYDROCHLORIDE 20 MG/ML
INJECTION, SOLUTION INFILTRATION; PERINEURAL PRN
Status: DISCONTINUED | OUTPATIENT
Start: 2019-08-21 | End: 2019-08-21 | Stop reason: SDUPTHER

## 2019-08-21 RX ORDER — CEFAZOLIN SODIUM 2 G/100ML
2 INJECTION, SOLUTION INTRAVENOUS EVERY 8 HOURS
Status: COMPLETED | OUTPATIENT
Start: 2019-08-22 | End: 2019-08-22

## 2019-08-21 RX ORDER — SODIUM CHLORIDE 9 MG/ML
INJECTION, SOLUTION INTRAVENOUS CONTINUOUS PRN
Status: DISCONTINUED | OUTPATIENT
Start: 2019-08-21 | End: 2019-08-21 | Stop reason: SDUPTHER

## 2019-08-21 RX ADMIN — ONDANSETRON 4 MG: 2 INJECTION INTRAMUSCULAR; INTRAVENOUS at 00:02

## 2019-08-21 RX ADMIN — SODIUM CHLORIDE, POTASSIUM CHLORIDE, SODIUM LACTATE AND CALCIUM CHLORIDE: 600; 310; 30; 20 INJECTION, SOLUTION INTRAVENOUS at 03:49

## 2019-08-21 RX ADMIN — ONDANSETRON 4 MG: 2 INJECTION INTRAMUSCULAR; INTRAVENOUS at 02:31

## 2019-08-21 RX ADMIN — HYDROMORPHONE HYDROCHLORIDE 0.5 MG: 1 INJECTION, SOLUTION INTRAMUSCULAR; INTRAVENOUS; SUBCUTANEOUS at 02:30

## 2019-08-21 RX ADMIN — CEFAZOLIN SODIUM 2 G: 2 INJECTION, SOLUTION INTRAVENOUS at 16:25

## 2019-08-21 RX ADMIN — Medication 0.4 MG: at 17:23

## 2019-08-21 RX ADMIN — LIDOCAINE HYDROCHLORIDE 60 MG: 20 INJECTION, SOLUTION INFILTRATION; PERINEURAL at 16:37

## 2019-08-21 RX ADMIN — HYDROMORPHONE HYDROCHLORIDE 0.5 MG: 2 INJECTION INTRAMUSCULAR; INTRAVENOUS; SUBCUTANEOUS at 18:35

## 2019-08-21 RX ADMIN — MORPHINE SULFATE 2 MG: 2 INJECTION, SOLUTION INTRAMUSCULAR; INTRAVENOUS at 03:52

## 2019-08-21 RX ADMIN — Medication 100 MCG: at 16:59

## 2019-08-21 RX ADMIN — Medication 4 MG: at 17:23

## 2019-08-21 RX ADMIN — LORAZEPAM 0.25 MG: 2 INJECTION INTRAMUSCULAR; INTRAVENOUS at 19:04

## 2019-08-21 RX ADMIN — SODIUM CHLORIDE: 9 INJECTION, SOLUTION INTRAVENOUS at 16:30

## 2019-08-21 RX ADMIN — MORPHINE SULFATE 4 MG: 4 INJECTION INTRAVENOUS at 00:02

## 2019-08-21 RX ADMIN — HYDROMORPHONE HYDROCHLORIDE 0.25 MG: 2 INJECTION INTRAMUSCULAR; INTRAVENOUS; SUBCUTANEOUS at 18:20

## 2019-08-21 RX ADMIN — DEXAMETHASONE SODIUM PHOSPHATE 4 MG: 4 INJECTION, SOLUTION INTRAMUSCULAR; INTRAVENOUS at 16:50

## 2019-08-21 RX ADMIN — ROCURONIUM BROMIDE 20 MG: 10 INJECTION, SOLUTION INTRAVENOUS at 16:37

## 2019-08-21 RX ADMIN — PROPOFOL 70 MG: 10 INJECTION, EMULSION INTRAVENOUS at 16:37

## 2019-08-21 RX ADMIN — FENTANYL CITRATE 50 MCG: 50 INJECTION, SOLUTION INTRAMUSCULAR; INTRAVENOUS at 17:22

## 2019-08-21 RX ADMIN — MORPHINE SULFATE 2 MG: 2 INJECTION, SOLUTION INTRAMUSCULAR; INTRAVENOUS at 08:10

## 2019-08-21 RX ADMIN — SODIUM CHLORIDE 1000 ML: 9 INJECTION, SOLUTION INTRAVENOUS at 02:31

## 2019-08-21 RX ADMIN — ONDANSETRON 4 MG: 2 INJECTION INTRAMUSCULAR; INTRAVENOUS at 16:50

## 2019-08-21 RX ADMIN — HYDROMORPHONE HYDROCHLORIDE 0.25 MG: 2 INJECTION INTRAMUSCULAR; INTRAVENOUS; SUBCUTANEOUS at 18:10

## 2019-08-21 RX ADMIN — SODIUM CHLORIDE: 9 INJECTION, SOLUTION INTRAVENOUS at 15:34

## 2019-08-21 RX ADMIN — SODIUM CHLORIDE: 4.5 INJECTION, SOLUTION INTRAVENOUS at 19:00

## 2019-08-21 RX ADMIN — FENTANYL CITRATE 50 MCG: 50 INJECTION, SOLUTION INTRAMUSCULAR; INTRAVENOUS at 16:37

## 2019-08-21 ASSESSMENT — PULMONARY FUNCTION TESTS
PIF_VALUE: 21
PIF_VALUE: 1
PIF_VALUE: 18
PIF_VALUE: 0
PIF_VALUE: 0
PIF_VALUE: 21
PIF_VALUE: 2
PIF_VALUE: 20
PIF_VALUE: 21
PIF_VALUE: 21
PIF_VALUE: 0
PIF_VALUE: 21
PIF_VALUE: 22
PIF_VALUE: 21
PIF_VALUE: 21
PIF_VALUE: 15
PIF_VALUE: 15
PIF_VALUE: 2
PIF_VALUE: 21
PIF_VALUE: 21
PIF_VALUE: 3
PIF_VALUE: 22
PIF_VALUE: 22
PIF_VALUE: 3
PIF_VALUE: 29
PIF_VALUE: 22
PIF_VALUE: 24
PIF_VALUE: 25
PIF_VALUE: 2
PIF_VALUE: 21
PIF_VALUE: 22
PIF_VALUE: 21
PIF_VALUE: 21
PIF_VALUE: 11
PIF_VALUE: 22
PIF_VALUE: 21
PIF_VALUE: 15
PIF_VALUE: 2
PIF_VALUE: 0
PIF_VALUE: 29
PIF_VALUE: 19
PIF_VALUE: 2
PIF_VALUE: 30
PIF_VALUE: 21
PIF_VALUE: 20
PIF_VALUE: 26
PIF_VALUE: 26
PIF_VALUE: 1
PIF_VALUE: 21
PIF_VALUE: 2
PIF_VALUE: 21
PIF_VALUE: 22
PIF_VALUE: 24

## 2019-08-21 ASSESSMENT — ENCOUNTER SYMPTOMS
SHORTNESS OF BREATH: 0
COLOR CHANGE: 0
PHOTOPHOBIA: 0
COUGH: 0
ABDOMINAL PAIN: 0
BACK PAIN: 0
DIARRHEA: 0
RESPIRATORY NEGATIVE: 1
CONSTIPATION: 0
VOMITING: 0
NAUSEA: 0

## 2019-08-21 ASSESSMENT — PAIN DESCRIPTION - PAIN TYPE
TYPE: SURGICAL PAIN

## 2019-08-21 ASSESSMENT — PAIN SCALES - GENERAL
PAINLEVEL_OUTOF10: 0
PAINLEVEL_OUTOF10: 6
PAINLEVEL_OUTOF10: 10
PAINLEVEL_OUTOF10: 4
PAINLEVEL_OUTOF10: 6
PAINLEVEL_OUTOF10: 10
PAINLEVEL_OUTOF10: 6
PAINLEVEL_OUTOF10: 10
PAINLEVEL_OUTOF10: 0

## 2019-08-21 ASSESSMENT — PAIN DESCRIPTION - LOCATION
LOCATION: HIP

## 2019-08-21 ASSESSMENT — PAIN - FUNCTIONAL ASSESSMENT: PAIN_FUNCTIONAL_ASSESSMENT: 0-10

## 2019-08-21 ASSESSMENT — PAIN DESCRIPTION - ORIENTATION
ORIENTATION: LEFT

## 2019-08-21 ASSESSMENT — PAIN DESCRIPTION - ONSET: ONSET: ON-GOING

## 2019-08-21 NOTE — ED PROVIDER NOTES
[phone]   CK    Narrative:     Performed at:  [facility]  [address] [facility], [address]   Phone [phone]   URINE RT REFLEX TO CULTURE   TYPE AND SCREEN    Narrative:     Performed at:  [facility]  [address] [facility], [address]   Phone [phone]     RADIOLOGY:     Plain x-rays were viewed by me:   XR KNEE LEFT (1-2 VIEWS)   Final Result   No acute osseous abnormality. Moderate to severe tricompartmental degenerative changes, similar in   appearance. CT Thoracic Spine WO Contrast   Final Result   There is minimal compression deformity involving the superior endplate of T1   which is age indeterminate but remote/subacute in appearance. Remainder of   thoracic vertebra are without acute process. Mild multilevel degenerative changes. XR HIP 2-3 VW W PELVIS LEFT   Final Result   Acute traumatic mildly displaced left intertrochanteric femur fracture. XR CHEST 1 VW   Final Result   Chronic interstitial fibrotic changes. No acute process. CT Cervical Spine WO Contrast   Final Result   Head CT: No acute intracranial abnormality. Cervical spine CT: Age-indeterminate but probably subacute to chronic mild   compression fracture of the superior endplate of T1. No acute fracture or   traumatic malalignment is seen otherwise. Ground-glass opacity within the upper lungs bilaterally. Correlate with any   clinical evidence edema. CT Head WO Contrast   Final Result   Head CT: No acute intracranial abnormality. Cervical spine CT: Age-indeterminate but probably subacute to chronic mild   compression fracture of the superior endplate of T1. No acute fracture or   traumatic malalignment is seen otherwise. Ground-glass opacity within the upper lungs bilaterally. Correlate with any   clinical evidence edema.            EKG:  Read by me in the absence of a cardiologist shows: Sinus rhythm, right bundle branch block, rate 83, nonspecific ST-T wave abnormality, axis normal, prior EKG similar from September 2015 with less artifact    Medications administered:  Medications   0.9 % sodium chloride bolus (1,000 mLs Intravenous New Bag 8/21/19 0231)   morphine injection 4 mg (4 mg Intravenous Given 8/21/19 0002)   ondansetron (ZOFRAN) injection 4 mg (4 mg Intravenous Given 8/21/19 0002)   HYDROmorphone HCl PF (DILAUDID) injection 0.5 mg (0.5 mg Intravenous Given 8/21/19 0230)   ondansetron (ZOFRAN) injection 4 mg (4 mg Intravenous Given 8/21/19 0231)     FINAL IMPRESSION:    1. Fall from slip, trip, or stumble, initial encounter    2.  Closed fracture of left hip, [facility]       DISPOSITION Admitted 08/21/2019 02:07:28 AM       Tray Gonzalez MD  08/21/19 5160

## 2019-08-21 NOTE — ED NOTES
Pharmacy Medication History Note      List of current medications patient is taking is complete. Source of information: Emmanuel    Changes made to medication list:  Medications flagged for removal (include reason, ex. noncompliance):  N/A    Medications removed (include reason, ex. therapy complete or physician discontinued):  Cephalexin- therapy complete  Tylenol- therapy complete    Medications added/doses adjusted:  N/A    Other notes (ex. Recent course of antibiotics, Coumadin dosing):  Denies use of other OTC or herbal medications. Last dose times updated. Alexandra Campbell [facility]    No current facility-administered medications on file prior to encounter.         Current Outpatient Medications on File Prior to Encounter   Medication Sig Dispense Refill    DULoxetine (CYMBALTA) 30 MG extended release capsule TAKE ONE CAPSULE BY MOUTH DAILY 30 capsule 2    donepezil (ARICEPT) 5 MG tablet Take 1 tablet by mouth nightly 90 tablet 1    levothyroxine (SYNTHROID) 50 MCG tablet TAKE ONE TABLET BY MOUTH DAILY 30 tablet 5    pantoprazole (PROTONIX) 40 MG tablet Take 1 tablet by mouth daily 30 tablet 5    Cholecalciferol (VITAMIN D) 2000 units CAPS capsule Take 1 capsule by mouth daily       [DISCONTINUED] cephALEXin (KEFLEX) 500 MG capsule Take 500 mg by mouth 4 times daily      [DISCONTINUED] acetaminophen (TYLENOL) 325 MG tablet Take 2 tablets by mouth every 6 hours as needed for Pain (Patient not taking: Reported on 7/17/2019) 120 tablet 1

## 2019-08-21 NOTE — TELEPHONE ENCOUNTER
[facility]ult [facility] for a left leg fx and nurse is Harinder Wilson and her call back number is [phone]. Dank Deng has been texted.

## 2019-08-21 NOTE — CONSULTS
on file   Tobacco Use    Smoking status: Never Smoker    Smokeless tobacco: Never Used   Substance and Sexual Activity    Alcohol use: No    Drug use: No    Sexual activity: Not on file   Lifestyle    Physical activity:     Days per week: Not on file     Minutes per session: Not on file    Stress: Not on file   Relationships    Social connections:     Talks on phone: Not on file     Gets together: Not on file     Attends Alevism service: Not on file     Active member of club or organization: Not on file     Attends meetings of clubs or organizations: Not on file     Relationship status: Not on file    Intimate partner violence:     Fear of current or ex partner: Not on file     Emotionally abused: Not on file     Physically abused: Not on file     Forced sexual activity: Not on file   Other Topics Concern    Not on file   Social History Narrative    Not on file       Family History:  Family History   Problem Relation Age of Onset    High Blood Pressure Mother     Cancer Father         lung cancer; metastic    Cancer Daughter 52        colon cancer    Cancer Brother     Cancer Brother     Diabetes Brother          REVIEW OF SYSTEMS:   CONSTITUTIONAL: Denies unexplained weight loss, fevers, chills or fatigue  NEUROLOGICAL: Denies unsteady gait or progressive weakness    PSYCHOLOGICAL: Denies anxiety, depression   SKIN: Denies skin changes, delayed healing, rash, itching   HEMATOLOGIC: Denies easy bleeding or bruising  ENDOCRINE: Denies excessive thirst, urination, heat/cold  RESPIRATORY: Denies current dyspnea, cough  CARDIOVASCULAR: Negative for chest pain at this time. EYES: Negative for photophobia and visual disturbance. ENT:  Negative for rhinorrhea, epistaxis, sore throat, or hearing loss. GI: Denies nausea, vomiting, diarrhea   : Denies bowel or bladder issues   MUSCULOSKELETAL: Left hip pain. All other ROS reviewed in chart or with patient or family and are grossly negative.

## 2019-08-21 NOTE — ANESTHESIA PRE PROCEDURE
CMP:   Lab Results   Component Value Date     08/20/2019    K 3.9 08/20/2019     08/20/2019    CO2 24 08/20/2019    BUN 24 08/20/2019    CREATININE 1.5 08/20/2019    GFRAA 40 08/20/2019    GFRAA >60 04/10/2013    AGRATIO 1.1 08/20/2019    LABGLOM 33 08/20/2019    GLUCOSE 133 08/20/2019    PROT 6.6 08/20/2019    PROT 7.2 03/04/2013    CALCIUM 8.9 08/20/2019    BILITOT 0.4 08/20/2019    ALKPHOS 105 08/20/2019    AST 17 08/20/2019    ALT 12 08/20/2019       POC Tests: No results for input(s): POCGLU, POCNA, POCK, POCCL, POCBUN, POCHEMO, POCHCT in the last 72 hours. Coags:   Lab Results   Component Value Date    PROTIME 11.5 08/20/2019    INR 1.01 08/20/2019    APTT 28.1 08/20/2019       HCG (If Applicable): No results found for: PREGTESTUR, PREGSERUM, HCG, HCGQUANT     ABGs: No results found for: PHART, PO2ART, MMH7JCM, MVQ2DVX, BEART, V1PDBHSZ     Type & Screen (If Applicable):  No results found for: LABABO, [address]    Anesthesia Evaluation  Patient summary reviewed no history of anesthetic complications:   Airway: Mallampati: II  TM distance: >3 FB   Neck ROM: full  Mouth opening: > = 3 FB Dental:          Pulmonary:Negative Pulmonary ROS breath sounds clear to auscultation                             Cardiovascular:        (-) CABG/stent, dysrhythmias and  angina      Rhythm: regular  Rate: normal                    Neuro/Psych:      (-) seizures, TIA and CVA            ROS comment: Uses walker GI/Hepatic/Renal:            ROS comment: Patient denies acid reflux symptoms. Endo/Other:    (+) hypothyroidism: arthritis: OA., .                 Abdominal:           Vascular:                                        Anesthesia Plan      general     ASA 3       Induction: intravenous. MIPS: Postoperative opioids intended, Prophylactic antiemetics administered and Postoperative trial extubation. Anesthetic plan and risks discussed with patient. Plan discussed with CRNA.                   Nae Ann

## 2019-08-21 NOTE — ED PROVIDER NOTES
[address] [facility]        Pt Name: Sukhwinder Esparza  MRN: [id number]  [place] 4/13/1930  Date of evaluation: 8/20/2019  Provider: NISHANT Jones  PCP: Edgar Plaza MD    This patient was seen and evaluated by the attending physician Yesenia Ramon, *. CHIEF COMPLAINT       Chief Complaint   Patient presents with    Fall     Pt fell this evening after losing their balance while going to the kitchen to get popcorn. Pt states they fell on their left hip. Has hx of falls, uses cane and walker. Denies taking bloodthinner        HISTORY OF PRESENT ILLNESS   (Location/Symptom, Timing/Onset, Context/Setting, Quality, Duration, Modifying Factors, Severity)  Note limiting factors. Sukhwinder Esparza is a 80 y.o. female with past medical history of reflux, frequent falls, osteoporosis, thyroid disease and previous renal failure who presents to the ED with complaint of a fall. Patient states she normally walks with a walker. Patient states she also uses a cane. She states she was watching the [employer] game this afternoon and states she was making some popcorn. States she went to the kitchen to get her popcorn when her walker slipped out from underneath her. Patient states she did not have the brakes on and states she fell. States she fell onto her left hip. Denies head trauma or loss of consciousness. Denies neck pain or stiffness. States she has pain to her left hip that she rates as a 10/10. States pain is sharp in nature. States worsened with palpation and movement. States she did not have her life alert bracelet on so had to crawl to her tablet and was able to send some emails to her granddaughter who called the police/EMS and she was brought to the ED. Apparently fell around 8:00 and thinks she was on the ground for approximately 2 to 3 hours.   Patient denies chest pain, shortness of breath, abdominal pain, nausea/vomiting, strength. Distal neurovascular intact. No pelvis instability. Patient does have tender palpation over the left hip. Patient has what appears to be shortening and rotation to the left lower extremity. Skin tear noted to the left leg without bleeding or drainage. No wound requiring repair. Decreased range of motion and strength at the hip and to the left leg due to pain. Able to wiggle toes without difficulty. Full plantar flexion and dorsiflexion. No other TTP to the lower extremities bilaterally. Gait obviously deferred. Compartments soft. Lymphadenopathy:     She has no cervical adenopathy. Neurological: She is alert and oriented to person, place, and time. No cranial nerve deficit or sensory deficit. GCS eye subscore is 4. GCS verbal subscore is 5. GCS motor subscore is 6. Skin: Skin is warm and dry. No rash noted. She is not diaphoretic. No erythema. No pallor. Psychiatric: She has a normal mood and affect.  Her behavior is normal.       DIAGNOSTIC RESULTS   LABS:    Labs Reviewed   CBC WITH AUTO DIFFERENTIAL - Abnormal; Notable for the following components:       Result Value    WBC 16.7 (*)     Neutrophils # 13.4 (*)     Monocytes # 1.4 (*)     All other components within normal limits    Narrative:     Performed at:  [facility] [address], [place], [address]   Phone [phone]   COMPREHENSIVE METABOLIC PANEL W/ REFLEX TO MG FOR LOW K - Abnormal; Notable for the following components:    Glucose 133 (*)     BUN 24 (*)     CREATININE 1.5 (*)     GFR Non- 33 (*)     GFR  40 (*)     All other components within normal limits    Narrative:     Performed at:  [facility]  [address] [facility] [employer]   Phone [phone]   PROTIME-INR    Narrative:     Performed at:  [facility]  [address] [address] [address]   Phone [phone]   APTT to get popcorn. Pt states they fell on their left hip. Has hx of falls, uses cane and walker. Denies taking bloodthinner ) FINDINGS: BONES/ALIGNMENT: There is convex right curvature of the thoracic spine. There is mild compression deformity involving the superior endplate of T1 which is remote/subacute in appearance. The remainder of the thoracic vertebrae demonstrate normal height without fracture identified. No areas of subluxation identified. There are no suspicious osseous lesions. The vertebral body heights are maintained. No osseous destructive lesion is seen. DEGENERATIVE CHANGES: There is mild multilevel disc height loss and degenerative endplate spurring. No significant bony canal stenosis. SOFT TISSUES: No paraspinal mass is seen. There is minimal compression deformity involving the superior endplate of T1 which is age indeterminate but remote/subacute in appearance. Remainder of thoracic vertebra are without acute process. Mild multilevel degenerative changes. Xr Chest 1 Vw    Result Date: 8/21/2019  EXAMINATION: ONE XRAY VIEW OF THE CHEST 8/20/2019 11:28 pm COMPARISON: Chest radiograph May 26, 2018. HISTORY: ORDERING SYSTEM PROVIDED HISTORY: pre-op TECHNOLOGIST PROVIDED HISTORY: Reason for exam:->pre-op Reason for Exam: pre-op L/hip fx Acuity: Acute Type of Exam: Initial FINDINGS: Chronic interstitial fibrotic changes are again seen within the lungs. No focal acute process. No pneumothorax or pleural effusion. Cardiac and mediastinal contours are without acute process. No acute osseous abnormality. Chronic interstitial fibrotic changes. No acute process. Xr Hip 2-3 Vw W Pelvis Left    Result Date: 8/21/2019  EXAMINATION: ONE XRAY VIEW OF THE PELVIS AND TWO XRAY VIEWS LEFT HIP 8/20/2019 11:28 pm COMPARISON: April 16, 2019.  HISTORY: ORDERING SYSTEM PROVIDED HISTORY: fall - fx TECHNOLOGIST PROVIDED HISTORY: Reason for exam:->fall - fx Reason for Exam: L/hip pain Acuity: Acute Type

## 2019-08-22 LAB
HCT VFR BLD CALC: 37.1 % (ref 36–48)
HEMOGLOBIN: 12.2 G/DL (ref 12–16)

## 2019-08-22 PROCEDURE — 99024 POSTOP FOLLOW-UP VISIT: CPT | Performed by: NURSE PRACTITIONER

## 2019-08-22 PROCEDURE — 85014 HEMATOCRIT: CPT

## 2019-08-22 PROCEDURE — 1200000000 HC SEMI PRIVATE

## 2019-08-22 PROCEDURE — 2700000000 HC OXYGEN THERAPY PER DAY

## 2019-08-22 PROCEDURE — 6360000002 HC RX W HCPCS: Performed by: ORTHOPAEDIC SURGERY

## 2019-08-22 PROCEDURE — 6370000000 HC RX 637 (ALT 250 FOR IP): Performed by: HOSPITALIST

## 2019-08-22 PROCEDURE — 6370000000 HC RX 637 (ALT 250 FOR IP): Performed by: ORTHOPAEDIC SURGERY

## 2019-08-22 PROCEDURE — 97116 GAIT TRAINING THERAPY: CPT

## 2019-08-22 PROCEDURE — 97165 OT EVAL LOW COMPLEX 30 MIN: CPT

## 2019-08-22 PROCEDURE — 97530 THERAPEUTIC ACTIVITIES: CPT

## 2019-08-22 PROCEDURE — 85018 HEMOGLOBIN: CPT

## 2019-08-22 PROCEDURE — APPNB60 APP NON BILLABLE TIME 46-60 MINS: Performed by: NURSE PRACTITIONER

## 2019-08-22 PROCEDURE — 36415 COLL VENOUS BLD VENIPUNCTURE: CPT

## 2019-08-22 PROCEDURE — 2580000003 HC RX 258: Performed by: ORTHOPAEDIC SURGERY

## 2019-08-22 PROCEDURE — 97535 SELF CARE MNGMENT TRAINING: CPT

## 2019-08-22 PROCEDURE — 51798 US URINE CAPACITY MEASURE: CPT

## 2019-08-22 PROCEDURE — 94760 N-INVAS EAR/PLS OXIMETRY 1: CPT

## 2019-08-22 PROCEDURE — 97162 PT EVAL MOD COMPLEX 30 MIN: CPT

## 2019-08-22 RX ORDER — ASPIRIN 325 MG
325 TABLET, DELAYED RELEASE (ENTERIC COATED) ORAL DAILY
Qty: 30 TABLET | Refills: 0 | Status: ON HOLD | OUTPATIENT
Start: 2019-08-22 | End: 2019-09-05 | Stop reason: SDUPTHER

## 2019-08-22 RX ORDER — DIAPER,BRIEF,INFANT-TODD,DISP
EACH MISCELLANEOUS 2 TIMES DAILY
Status: DISCONTINUED | OUTPATIENT
Start: 2019-08-22 | End: 2019-08-23 | Stop reason: HOSPADM

## 2019-08-22 RX ORDER — HYDROCODONE BITARTRATE AND ACETAMINOPHEN 5; 325 MG/1; MG/1
1 TABLET ORAL EVERY 4 HOURS PRN
Status: DISCONTINUED | OUTPATIENT
Start: 2019-08-22 | End: 2019-08-23 | Stop reason: HOSPADM

## 2019-08-22 RX ORDER — HYDROCODONE BITARTRATE AND ACETAMINOPHEN 5; 325 MG/1; MG/1
2 TABLET ORAL EVERY 4 HOURS PRN
Status: DISCONTINUED | OUTPATIENT
Start: 2019-08-22 | End: 2019-08-23 | Stop reason: HOSPADM

## 2019-08-22 RX ORDER — HYDROCODONE BITARTRATE AND ACETAMINOPHEN 5; 325 MG/1; MG/1
1-2 TABLET ORAL EVERY 4 HOURS PRN
Qty: 40 TABLET | Refills: 0 | Status: ON HOLD | OUTPATIENT
Start: 2019-08-22 | End: 2019-09-05 | Stop reason: HOSPADM

## 2019-08-22 RX ADMIN — DONEPEZIL HYDROCHLORIDE 5 MG: 5 TABLET, FILM COATED ORAL at 22:52

## 2019-08-22 RX ADMIN — SODIUM CHLORIDE: 4.5 INJECTION, SOLUTION INTRAVENOUS at 07:12

## 2019-08-22 RX ADMIN — DONEPEZIL HYDROCHLORIDE 5 MG: 5 TABLET, FILM COATED ORAL at 01:15

## 2019-08-22 RX ADMIN — HYDROCORTISONE: 1 CREAM TOPICAL at 22:52

## 2019-08-22 RX ADMIN — CEFAZOLIN SODIUM 2 G: 2 INJECTION, SOLUTION INTRAVENOUS at 08:41

## 2019-08-22 RX ADMIN — DOCUSATE SODIUM 100 MG: 100 CAPSULE, LIQUID FILLED ORAL at 08:41

## 2019-08-22 RX ADMIN — HYDROCODONE BITARTRATE AND ACETAMINOPHEN 1 TABLET: 5; 325 TABLET ORAL at 13:44

## 2019-08-22 RX ADMIN — MORPHINE SULFATE 2 MG: 2 INJECTION, SOLUTION INTRAMUSCULAR; INTRAVENOUS at 01:15

## 2019-08-22 RX ADMIN — CEFAZOLIN SODIUM 2 G: 2 INJECTION, SOLUTION INTRAVENOUS at 01:16

## 2019-08-22 RX ADMIN — HYDROCODONE BITARTRATE AND ACETAMINOPHEN 1 TABLET: 5; 325 TABLET ORAL at 22:52

## 2019-08-22 RX ADMIN — DOCUSATE SODIUM 100 MG: 100 CAPSULE, LIQUID FILLED ORAL at 22:52

## 2019-08-22 RX ADMIN — MORPHINE SULFATE 2 MG: 2 INJECTION, SOLUTION INTRAMUSCULAR; INTRAVENOUS at 08:41

## 2019-08-22 RX ADMIN — LEVOTHYROXINE SODIUM 50 MCG: 25 TABLET ORAL at 08:41

## 2019-08-22 RX ADMIN — ENOXAPARIN SODIUM 30 MG: 30 INJECTION SUBCUTANEOUS at 08:41

## 2019-08-22 RX ADMIN — PANTOPRAZOLE SODIUM 40 MG: 40 TABLET, DELAYED RELEASE ORAL at 08:41

## 2019-08-22 RX ADMIN — DULOXETINE HYDROCHLORIDE 30 MG: 30 CAPSULE, DELAYED RELEASE ORAL at 08:41

## 2019-08-22 RX ADMIN — HYDROCORTISONE: 1 CREAM TOPICAL at 10:44

## 2019-08-22 RX ADMIN — DOCUSATE SODIUM 100 MG: 100 CAPSULE, LIQUID FILLED ORAL at 01:15

## 2019-08-22 ASSESSMENT — PAIN SCALES - GENERAL
PAINLEVEL_OUTOF10: 2
PAINLEVEL_OUTOF10: 0
PAINLEVEL_OUTOF10: 7
PAINLEVEL_OUTOF10: 10
PAINLEVEL_OUTOF10: 5
PAINLEVEL_OUTOF10: 5
PAINLEVEL_OUTOF10: 7
PAINLEVEL_OUTOF10: 2
PAINLEVEL_OUTOF10: 10

## 2019-08-22 ASSESSMENT — PAIN DESCRIPTION - LOCATION
LOCATION: LEG
LOCATION: KNEE
LOCATION: LEG

## 2019-08-22 ASSESSMENT — PAIN DESCRIPTION - PAIN TYPE
TYPE: SURGICAL PAIN
TYPE: SURGICAL PAIN;ACUTE PAIN
TYPE: SURGICAL PAIN
TYPE: ACUTE PAIN
TYPE: SURGICAL PAIN

## 2019-08-22 ASSESSMENT — PAIN DESCRIPTION - ORIENTATION
ORIENTATION: LEFT

## 2019-08-22 ASSESSMENT — PAIN DESCRIPTION - DIRECTION: RADIATING_TOWARDS: KNEE DOWN

## 2019-08-22 NOTE — PROGRESS NOTES
rehabilitation services?: Yes  Family / Caregiver Present: No  Referring Practitioner: Fabienne Almanza MD, for d/c planning  Diagnosis: L femur fx/ s/p ORIF  Subjective  Subjective: Pt supine in bed, pleasant and agreeable to OT eval. Pt reports feeling \"awful\" amd 10/10 L LE, knee to foot. RN awre. General Comment  Comments: Pt recently had pain medication.   Patient Currently in Pain: Yes  Pain Assessment  Pain Assessment: 0-10  Pain Level: 10  Pain Type: Surgical pain  Pain Location: Leg  Pain Orientation: Left  Pain Radiating Towards: knee down  Response to Pain Intervention: Patient Satisfied  Pre Treatment Pain Screening  Intervention List: Patient able to continue with treatment;Nurse/Physician notified  Vital Signs  Pulse: 90  Heart Rate Source: Monitor  BP: 103/64  BP Location: Right upper arm  BP Upper/Lower: Upper  Patient Position: Sitting  Patient Currently in Pain: Yes  Oxygen Therapy  SpO2: 96 %  Pulse Oximeter Device Mode: Intermittent  Pulse Oximeter Device Location: Finger  O2 Device: Nasal cannula  O2 Flow Rate (L/min): 1L/min  Social/Functional History  Social/Functional History  Lives With: Alone  Type of Home: Apartment(condo)  Home Layout: One level  Home Access: Stairs to enter without rails(1 SHERON)  Bathroom Shower/Tub: Tub/Shower unit(been sponge bathing)  Bathroom Toilet: Standard  Bathroom Accessibility: Not accessible  Home Equipment: 4 wheeled walker, Rolling walker, Wheelchair-manual, Cane, Reacher, Sock aid, Leg , Alert Button, Lift chair(sliding board)  ADL Assistance: Independent(prefers tub baths, but unable recently, so performs sponge baths.; pt reports don/doff socks increasingly difficult; doesn't use equipment, takes a lot of tiime)  Homemaking Assistance: Needs assistance(assist from family with food but mostly does for herself.)  Homemaking Responsibilities: Yes  Ambulation Assistance: Independent  Transfer Assistance: Independent  Active : Yes  Leisure & Hobbies: watch TV  Additional Comments: Pt reporting 4 falls in past 6 mo. suffering skin tears. Pt able to get self up from floor. DAughter lives close and helps with rides and grocery shopping. Objective        Orientation  Overall Orientation Status: Within Normal Limits  Observation/Palpation  Observation: abrasion R knee with bandage  Edema: mod swelling L LE. Balance  Sitting Balance: Stand by assistance(on EOB)  Standing Balance: Dependent/Total(initially Min/Mod A; Max A of 2 for final transfer, d/t lightheadedness; used RW)  Standing Balance  Time: 4 min, ~2 min  Activity: static standing, wt shifting, SPT EOB to BSC on R; transfer BSC to recliner  Functional Mobility  Functional - Mobility Device: Rolling Walker  Activity: Other(transfers)  Assist Level: Dependent/Total(Min/Mod A to Max A of 2)  Functional Mobility Comments: Needed assist to turn walker; pt increasingly lightheaded. Toilet Transfers  Toilet - Technique: To right  Equipment Used: Extra wide bedside commode  Toilet Transfer: Moderate assistance;Minimal assistance  ADL  Grooming: Setup(oral care, wash face, seated)  LE Dressing: Dependent/Total(socks, don pullup)  Toileting: Dependent/Total  Tone RUE  RUE Tone: Normotonic  Tone LUE  LUE Tone: Normotonic  Coordination  Movements Are Fluid And Coordinated: Yes     Bed mobility  Supine to Sit: Moderate assistance(HOB elevated)  Transfers  Sit to stand: Moderate assistance(from EOB, from [facility])  Stand to sit: Dependent/Total(Min A SPT to BSC on R; Max A of 2 to recliner d/t pt BP drop.)  Transfer Comments: Max verbal cues, assist to turn walker. Extra time. Vision - Basic Assessment  Prior Vision: Wears glasses only for reading  Visual History: Cataracts; Corrective eye surgery(bilat cataract removal)  Patient Visual Report: No visual complaint reported.   Cognition  Overall Cognitive Status: WNL  Perception  Overall Perceptual Status: WFL     Sensation  Overall Sensation Status: WNL(UEs WNL) LUE AROM (degrees)  LUE AROM : Exceptions  LUE General AROM: shoulder flexion ~60 degrees, elbow to wrist WFL  Left Hand AROM (degrees)  Left Hand AROM: WFL  RUE AROM (degrees)  RUE AROM : Exceptions  RUE General AROM: shoulder flexion ~60 degrees, elbow to wrist WFL  Right Hand AROM (degrees)  Right Hand AROM: Exceptions  Right Hand General AROM: WFL except 3rd digit unable to flex  LUE Strength  Gross LUE Strength: WFL(elbow grossly 5/5, NT shoulder)  L Hand General: 5/5  RUE Strength  Gross RUE Strength: WFL(5/5 elbow, NT shoulder )  R Hand General: 5/5                   Plan   Plan  Times per week: 7  Times per day: Daily  Specific instructions for Next Treatment: cotx  Current Treatment Recommendations: Endurance Training, Functional Mobility Training, Balance Training, Safety Education & Training    G-Code     OutComes Score                                                  AM-PAC Score        AM-[facility] Inpatient Daily Activity Raw Score: 13 (08/22/19 1049)  AM-PAC Inpatient ADL T-Scale Score : 32.03 (08/22/19 1049)  ADL Inpatient CMS 0-100% Score: 63.03 (08/22/19 1049)  ADL Inpatient CMS G-Code Modifier : CL (08/22/19 1049)    Goals  Short term goals  Time Frame for Short term goals: Discharge  Short term goal 1: CGA for functional transfers to ADL surfaces w/RW  Short term goal 2: CGA for functional mobility w/RW for ADL activity  Short term goal 3: Mod A for toileting  Short term goal 4: Min A for LB bathing/dressing w/AE  Short term goal 5: Set-up for UB bathing/dressing  Long term goals  Time Frame for Long term goals : LTG=STG  Long term goal 1: Pt to tolerate standing 5-10 min for ADL/IADL activity/functional mobility       Therapy Time   Individual Concurrent Group Co-treatment   Time In 0932         Time Out 1032         Minutes 60               Timed Code Treatment Minutes:  45 Minutes    Total Treatment Minutes: [address], [place] [address], OTR/L, [id number]

## 2019-08-22 NOTE — PROGRESS NOTES
[facility]   Progress Note    CHIEF COMPLAINT/DIAGNOSIS: S/p LEFT gamma nail    SUBJECTIVE: Patient is sitting up in the bed; describes moderate achinig pain in her left lower leg and hip. Denies numbness or tingling. Has not yet worked with therapy. Kris removed this AM, but has not yet tried to void. OBJECTIVE  Physical    VITALS:  /74   Pulse 87   Temp 98.1 °F (36.7 °C) (Oral)   Resp 16   Ht 5' 5\" (1.651 m)   Wt 184 lb (83.5 kg)   SpO2 95%   BMI 30.62 kg/m²     GENERAL: Alert and oriented x3, in no acute distress. MUSCULOSKELETAL:  Able to dorsi and plantarflex the ankle without issue. INCISION:  Covered with post-op dressing; moderate serosanguinous drainage noted on all three dressings. ROM: Limited secondary pain and swelling. Sensory:  Intact to light touch in peroneal and tibial distributions. Vascular:   2+ DP pulses with brisk cap refill;  calf soft and nontender. Data    ALL MEDICATIONS HAVE BEEN REVIEWED    CBC:   Recent Labs     08/20/19  2342 08/22/19  0553   WBC 16.7*  --    HGB 14.0 12.2   HCT 42.1 37.1     --      BMP:   Recent Labs     08/20/19  2342      K 3.9      CO2 24   BUN 24*   CREATININE 1.5*     INR:   Recent Labs     08/20/19  2342   INR 1.01       ASSESSMENT:  S/p LEFT gamma nail (8/21/19), POD#1  Osteoporosis - treated with Prolia    PLAN:   - WB status:  100% weight bearing through operative extremity   - DVT prophylaxis: Lovenox as inpt, will d/c with ASA 325mg daily x 30 days (script in chart). - PT/OT  - D/C Plan: pending therapy recs (went to ARU after right gamma nail last year)  - Pain Control: current regimen. Norco script in chart. Due to orthopaedic surgical procedure/condition, patient may require pain medication for up to 6-8 weeks. - Expected post op acute blood loss anemia: H/H today:12.2/37.1     Follow-up in two weeks with Dr. Mario Schneider.   Office # [phone]    Halle Villa, APRN-CNP  8/22/2019  9:03 AM

## 2019-08-22 NOTE — PROGRESS NOTES
Urine output is 125 mL for whole shift, message sent to doctor asking if they want fluids restarted on patient. Bladder scanned at 56 mL. No need to void at this time. 1949 no response/new orders placed.

## 2019-08-22 NOTE — DISCHARGE INSTR - COC
Continuity of Care Form    Patient Name: Rolando Jane   :  1930  MRN:  [id number]    Admit date:  2019  Discharge date:  ***    Code Status Order: Full Code   Advance Directives:   [address] [facility] Documentation     Date/Time Healthcare Directive Type of Healthcare Directive Copy in [address] Agent's Name Healthcare Agent's Phone Number    19 1524  No, patient does not have an advance directive for healthcare treatment -- -- -- -- --          Admitting Physician:  Author Taya MD  PCP: Ranjeet Saini MD    Discharging Nurse: [facility] Unit/Room#: 0LP-9007/3664-42  Discharging Unit Phone Number: ***    Emergency Contact:   Extended Emergency Contact Information  Primary Emergency Contact: Anthony Rudolph[address] [facility] [address] Phone: [phone]  Relation: Child  Secondary Emergency Contact: Snow Goodman  [place] Phone: [phone]  Relation: Grandchild    Past Surgical History:  Past Surgical History:   Procedure Laterality Date    COLONOSCOPY      EXCISION OF FACIAL MASS      skin ca    EYE SURGERY      cataract removal; bilat    OTHER SURGICAL HISTORY Right 2018    right gamma nail with cables    SKIN BIOPSY         Immunization History:   Immunization History   Administered Date(s) Administered    Pneumococcal Conjugate 13-valent (Usepokn96) 2018    Pneumococcal Polysaccharide (Ljxpzlylq33) 2019    Td, unspecified formulation 2014    Tdap (Boostrix, Adacel) 10/18/2018       Active Problems:  Patient Active Problem List   Diagnosis Code    Nephrotic syndrome N04.9    Edema R60.9    Hypothyroid E03.9    Multiple joint pain M25.50    Age-related osteoporosis without current pathological fracture M81.0    Closed fracture of right femur ([address] S72.91XA    Closed displaced comminuted fracture of shaft of right femur ([address] S72.351A    Primary osteoarthritis Net -525 ml     I/O last 3 completed shifts: In: 500 [I.V.:500]  Out: 1025 [Urine:1000; Blood:25]    Safety Concerns:     [employer] Safety Concerns:027605651}    Impairments/Disabilities:      [employer] Impairments/Disabilities:531298789}    Nutrition Therapy:  Current Nutrition Therapy:   [employer] Diet List:182178668}    Routes of Feeding: {[facility] DME Other Feedings:938383280}  Liquids: {Slp liquid thickness:46670}  Daily Fluid Restriction: {P DME Yes amt example:729501195}  Last Modified Barium Swallow with Video (Video Swallowing Test): {Done Not Done JPDF:140708734}    Treatments at the Time of Hospital Discharge:   Respiratory Treatments: ***  Oxygen Therapy:  {Therapy; copd oxygen:39419}  Ventilator:    {MH CC Vent [facility]:254879775}    Rehab Therapies: Physical Therapy and Occupational Therapy  Weight Bearing Status/Restrictions: No weight bearing restirctions  Other Medical Equipment (for information only, NOT a DME order):  {EQUIPMENT:089677710}  Other Treatments: Wound Care:   Change dressing daily as needed for saturation. May leave wound open to air on postop day #7 if no drainage. Home health care or facility to discontinue staples/sutures 10-14 days post op. DVT prophylaxis:  Aspirin 325mg daily for 30 days after surgery    Follow up: Follow up Dr Jose Serrano 2 weeks post op. Call [phone] for appointment. You have demonstrated risk factors for osteoporosis, such as age greater than [de-identified] years and evidence of a fracture. Please see your primary care physician for evaluation for osteoporosis, including consideration for DEXA scanning, if this is felt to be clinically indicated.     Patient's personal belongings (please select all that are sent with patient):  {[facility] DME Belongings:013724000}    RN SIGNATURE:  {Esignature:648045861}    CASE MANAGEMENT/SOCIAL WORK SECTION    Inpatient Status Date: ***    Readmission Risk Assessment Score:  Readmission Risk              Risk of Unplanned Readmission:        11           Discharging to Facility/ Agency   · Name:   · Address:  · Phone:  · Fax:    Dialysis Facility (if applicable)   · Name:  · Address:  · Dialysis Schedule:  · Phone:  · Fax:    / signature: {Esignature:136504746}    PHYSICIAN SECTION    Prognosis: {Prognosis:0999854507}    Condition at Discharge: Juana Johnson Patient Condition:092513610}    Rehab Potential (if transferring to Rehab): {Prognosis:0207869803}    Recommended Labs or Other Treatments After Discharge: ***    Physician Certification: I certify the above information and transfer of Andrea Montes  is necessary for the continuing treatment of the diagnosis listed and that she requires {Admit to Appropriate Level of Care:11333} for {GREATER/LESS:820283676} 30 days.      Update Admission H&P: {CHP DME Changes in MBPSR:655912651}    PHYSICIAN SIGNATURE:  {Esignature:841116717}

## 2019-08-23 ENCOUNTER — HOSPITAL ENCOUNTER (INPATIENT)
Age: 84
LOS: 14 days | Discharge: HOME HEALTH | DRG: 561 | End: 2019-09-06
Attending: PHYSICAL MEDICINE & REHABILITATION | Admitting: PHYSICAL MEDICINE & REHABILITATION
Payer: MEDICARE

## 2019-08-23 VITALS
OXYGEN SATURATION: 97 % | BODY MASS INDEX: 30.66 KG/M2 | WEIGHT: 184 LBS | HEART RATE: 82 BPM | RESPIRATION RATE: 16 BRPM | SYSTOLIC BLOOD PRESSURE: 102 MMHG | DIASTOLIC BLOOD PRESSURE: 57 MMHG | TEMPERATURE: 97.8 F | HEIGHT: 65 IN

## 2019-08-23 DIAGNOSIS — S72.002A CLOSED FRACTURE OF LEFT HIP, INITIAL ENCOUNTER (HCC): ICD-10-CM

## 2019-08-23 DIAGNOSIS — S72.002D CLOSED FRACTURE OF NECK OF LEFT FEMUR WITH ROUTINE HEALING, SUBSEQUENT ENCOUNTER: Primary | ICD-10-CM

## 2019-08-23 PROBLEM — S72.92XA CLOSED FRACTURE OF LEFT FEMUR (HCC): Status: ACTIVE | Noted: 2019-08-23

## 2019-08-23 LAB
ANION GAP SERPL CALCULATED.3IONS-SCNC: 8 MMOL/L (ref 3–16)
BUN BLDV-MCNC: 23 MG/DL (ref 7–20)
CALCIUM SERPL-MCNC: 8.4 MG/DL (ref 8.3–10.6)
CHLORIDE BLD-SCNC: 101 MMOL/L (ref 99–110)
CO2: 28 MMOL/L (ref 21–32)
CREAT SERPL-MCNC: 1.4 MG/DL (ref 0.6–1.2)
GFR AFRICAN AMERICAN: 43
GFR NON-AFRICAN AMERICAN: 35
GLUCOSE BLD-MCNC: 131 MG/DL (ref 70–99)
POTASSIUM SERPL-SCNC: 4 MMOL/L (ref 3.5–5.1)
SODIUM BLD-SCNC: 137 MMOL/L (ref 136–145)

## 2019-08-23 PROCEDURE — 2700000000 HC OXYGEN THERAPY PER DAY

## 2019-08-23 PROCEDURE — 6370000000 HC RX 637 (ALT 250 FOR IP): Performed by: PHYSICAL MEDICINE & REHABILITATION

## 2019-08-23 PROCEDURE — 36415 COLL VENOUS BLD VENIPUNCTURE: CPT

## 2019-08-23 PROCEDURE — 97530 THERAPEUTIC ACTIVITIES: CPT

## 2019-08-23 PROCEDURE — 99024 POSTOP FOLLOW-UP VISIT: CPT | Performed by: NURSE PRACTITIONER

## 2019-08-23 PROCEDURE — APPNB30 APP NON BILLABLE TIME 0-30 MINS: Performed by: NURSE PRACTITIONER

## 2019-08-23 PROCEDURE — 1280000000 HC REHAB R&B

## 2019-08-23 PROCEDURE — 6370000000 HC RX 637 (ALT 250 FOR IP): Performed by: ORTHOPAEDIC SURGERY

## 2019-08-23 PROCEDURE — 6360000002 HC RX W HCPCS: Performed by: ORTHOPAEDIC SURGERY

## 2019-08-23 PROCEDURE — 94760 N-INVAS EAR/PLS OXIMETRY 1: CPT

## 2019-08-23 PROCEDURE — 80048 BASIC METABOLIC PNL TOTAL CA: CPT

## 2019-08-23 PROCEDURE — 97116 GAIT TRAINING THERAPY: CPT

## 2019-08-23 RX ORDER — TRAZODONE HYDROCHLORIDE 50 MG/1
50 TABLET ORAL NIGHTLY PRN
Status: CANCELLED | OUTPATIENT
Start: 2019-08-23

## 2019-08-23 RX ORDER — ONDANSETRON 2 MG/ML
4 INJECTION INTRAMUSCULAR; INTRAVENOUS EVERY 6 HOURS PRN
Status: DISCONTINUED | OUTPATIENT
Start: 2019-08-23 | End: 2019-09-06 | Stop reason: HOSPADM

## 2019-08-23 RX ORDER — SODIUM CHLORIDE 0.9 % (FLUSH) 0.9 %
10 SYRINGE (ML) INJECTION PRN
Status: DISCONTINUED | OUTPATIENT
Start: 2019-08-23 | End: 2019-09-06 | Stop reason: HOSPADM

## 2019-08-23 RX ORDER — HYDROCODONE BITARTRATE AND ACETAMINOPHEN 5; 325 MG/1; MG/1
1 TABLET ORAL EVERY 4 HOURS PRN
Status: CANCELLED | OUTPATIENT
Start: 2019-08-23

## 2019-08-23 RX ORDER — ONDANSETRON 2 MG/ML
4 INJECTION INTRAMUSCULAR; INTRAVENOUS EVERY 6 HOURS PRN
Status: CANCELLED | OUTPATIENT
Start: 2019-08-23

## 2019-08-23 RX ORDER — DONEPEZIL HYDROCHLORIDE 5 MG/1
5 TABLET, FILM COATED ORAL NIGHTLY
Status: CANCELLED | OUTPATIENT
Start: 2019-08-23

## 2019-08-23 RX ORDER — ONDANSETRON 4 MG/1
4 TABLET, ORALLY DISINTEGRATING ORAL EVERY 8 HOURS PRN
Status: CANCELLED | OUTPATIENT
Start: 2019-08-23

## 2019-08-23 RX ORDER — HYDRALAZINE HYDROCHLORIDE 25 MG/1
50 TABLET, FILM COATED ORAL EVERY 8 HOURS PRN
Status: DISCONTINUED | OUTPATIENT
Start: 2019-08-23 | End: 2019-09-06 | Stop reason: HOSPADM

## 2019-08-23 RX ORDER — SODIUM CHLORIDE 0.9 % (FLUSH) 0.9 %
10 SYRINGE (ML) INJECTION EVERY 12 HOURS SCHEDULED
Status: DISCONTINUED | OUTPATIENT
Start: 2019-08-23 | End: 2019-08-27

## 2019-08-23 RX ORDER — TRAZODONE HYDROCHLORIDE 50 MG/1
50 TABLET ORAL NIGHTLY PRN
Status: DISCONTINUED | OUTPATIENT
Start: 2019-08-23 | End: 2019-09-06 | Stop reason: HOSPADM

## 2019-08-23 RX ORDER — DIPHENHYDRAMINE HCL 25 MG
25 TABLET ORAL EVERY 6 HOURS PRN
Status: CANCELLED | OUTPATIENT
Start: 2019-08-23

## 2019-08-23 RX ORDER — DULOXETIN HYDROCHLORIDE 30 MG/1
30 CAPSULE, DELAYED RELEASE ORAL DAILY
Status: CANCELLED | OUTPATIENT
Start: 2019-08-24

## 2019-08-23 RX ORDER — DOCUSATE SODIUM 100 MG/1
100 CAPSULE, LIQUID FILLED ORAL 2 TIMES DAILY
Status: CANCELLED | OUTPATIENT
Start: 2019-08-23

## 2019-08-23 RX ORDER — PANTOPRAZOLE SODIUM 40 MG/1
40 TABLET, DELAYED RELEASE ORAL DAILY
Status: CANCELLED | OUTPATIENT
Start: 2019-08-24

## 2019-08-23 RX ORDER — DOCUSATE SODIUM 100 MG/1
100 CAPSULE, LIQUID FILLED ORAL 2 TIMES DAILY
Status: DISCONTINUED | OUTPATIENT
Start: 2019-08-23 | End: 2019-09-06 | Stop reason: HOSPADM

## 2019-08-23 RX ORDER — HYDROCODONE BITARTRATE AND ACETAMINOPHEN 5; 325 MG/1; MG/1
1 TABLET ORAL EVERY 4 HOURS PRN
Status: DISCONTINUED | OUTPATIENT
Start: 2019-08-23 | End: 2019-09-06 | Stop reason: HOSPADM

## 2019-08-23 RX ORDER — SODIUM CHLORIDE 0.9 % (FLUSH) 0.9 %
10 SYRINGE (ML) INJECTION EVERY 12 HOURS SCHEDULED
Status: CANCELLED | OUTPATIENT
Start: 2019-08-23

## 2019-08-23 RX ORDER — PANTOPRAZOLE SODIUM 40 MG/1
40 TABLET, DELAYED RELEASE ORAL DAILY
Status: DISCONTINUED | OUTPATIENT
Start: 2019-08-24 | End: 2019-09-06 | Stop reason: HOSPADM

## 2019-08-23 RX ORDER — LEVOTHYROXINE SODIUM 0.03 MG/1
50 TABLET ORAL DAILY
Status: CANCELLED | OUTPATIENT
Start: 2019-08-24

## 2019-08-23 RX ORDER — DIAPER,BRIEF,INFANT-TODD,DISP
EACH MISCELLANEOUS 2 TIMES DAILY
Status: CANCELLED | OUTPATIENT
Start: 2019-08-23

## 2019-08-23 RX ORDER — ACETAMINOPHEN 500 MG
1000 TABLET ORAL EVERY 6 HOURS PRN
Status: DISCONTINUED | OUTPATIENT
Start: 2019-08-23 | End: 2019-09-06 | Stop reason: HOSPADM

## 2019-08-23 RX ORDER — HYDROCODONE BITARTRATE AND ACETAMINOPHEN 5; 325 MG/1; MG/1
2 TABLET ORAL EVERY 4 HOURS PRN
Status: CANCELLED | OUTPATIENT
Start: 2019-08-23

## 2019-08-23 RX ORDER — HYDRALAZINE HYDROCHLORIDE 25 MG/1
50 TABLET, FILM COATED ORAL EVERY 8 HOURS PRN
Status: CANCELLED | OUTPATIENT
Start: 2019-08-23

## 2019-08-23 RX ORDER — SODIUM CHLORIDE 0.9 % (FLUSH) 0.9 %
10 SYRINGE (ML) INJECTION PRN
Status: CANCELLED | OUTPATIENT
Start: 2019-08-23

## 2019-08-23 RX ORDER — ONDANSETRON 4 MG/1
4 TABLET, ORALLY DISINTEGRATING ORAL EVERY 8 HOURS PRN
Status: DISCONTINUED | OUTPATIENT
Start: 2019-08-23 | End: 2019-09-06 | Stop reason: HOSPADM

## 2019-08-23 RX ORDER — DULOXETIN HYDROCHLORIDE 30 MG/1
30 CAPSULE, DELAYED RELEASE ORAL DAILY
Status: DISCONTINUED | OUTPATIENT
Start: 2019-08-24 | End: 2019-09-06 | Stop reason: HOSPADM

## 2019-08-23 RX ORDER — ACETAMINOPHEN 500 MG
1000 TABLET ORAL EVERY 6 HOURS PRN
Status: CANCELLED | OUTPATIENT
Start: 2019-08-23

## 2019-08-23 RX ORDER — DIPHENHYDRAMINE HCL 25 MG
25 TABLET ORAL EVERY 6 HOURS PRN
Status: DISCONTINUED | OUTPATIENT
Start: 2019-08-23 | End: 2019-09-06 | Stop reason: HOSPADM

## 2019-08-23 RX ORDER — LEVOTHYROXINE SODIUM 0.1 MG/1
50 TABLET ORAL DAILY
Status: DISCONTINUED | OUTPATIENT
Start: 2019-08-24 | End: 2019-09-06 | Stop reason: HOSPADM

## 2019-08-23 RX ORDER — DONEPEZIL HYDROCHLORIDE 5 MG/1
5 TABLET, FILM COATED ORAL NIGHTLY
Status: DISCONTINUED | OUTPATIENT
Start: 2019-08-23 | End: 2019-09-06 | Stop reason: HOSPADM

## 2019-08-23 RX ORDER — DIAPER,BRIEF,INFANT-TODD,DISP
EACH MISCELLANEOUS 2 TIMES DAILY
Status: DISCONTINUED | OUTPATIENT
Start: 2019-08-23 | End: 2019-09-06 | Stop reason: HOSPADM

## 2019-08-23 RX ORDER — HYDROCODONE BITARTRATE AND ACETAMINOPHEN 5; 325 MG/1; MG/1
2 TABLET ORAL EVERY 4 HOURS PRN
Status: DISCONTINUED | OUTPATIENT
Start: 2019-08-23 | End: 2019-09-06 | Stop reason: HOSPADM

## 2019-08-23 RX ADMIN — ENOXAPARIN SODIUM 30 MG: 30 INJECTION SUBCUTANEOUS at 09:45

## 2019-08-23 RX ADMIN — HYDROCORTISONE: 1 CREAM TOPICAL at 09:45

## 2019-08-23 RX ADMIN — DOCUSATE SODIUM 100 MG: 100 CAPSULE, LIQUID FILLED ORAL at 09:45

## 2019-08-23 RX ADMIN — HYDROCODONE BITARTRATE AND ACETAMINOPHEN 1 TABLET: 5; 325 TABLET ORAL at 20:20

## 2019-08-23 RX ADMIN — HYDROCODONE BITARTRATE AND ACETAMINOPHEN 1 TABLET: 5; 325 TABLET ORAL at 08:16

## 2019-08-23 RX ADMIN — DULOXETINE HYDROCHLORIDE 30 MG: 30 CAPSULE, DELAYED RELEASE ORAL at 09:45

## 2019-08-23 RX ADMIN — LEVOTHYROXINE SODIUM 50 MCG: 25 TABLET ORAL at 09:45

## 2019-08-23 RX ADMIN — DONEPEZIL HYDROCHLORIDE 5 MG: 5 TABLET, FILM COATED ORAL at 20:19

## 2019-08-23 RX ADMIN — HYDROCORTISONE: 1 CREAM TOPICAL at 20:24

## 2019-08-23 RX ADMIN — PANTOPRAZOLE SODIUM 40 MG: 40 TABLET, DELAYED RELEASE ORAL at 09:45

## 2019-08-23 RX ADMIN — DOCUSATE SODIUM 100 MG: 100 CAPSULE, LIQUID FILLED ORAL at 20:19

## 2019-08-23 ASSESSMENT — PAIN DESCRIPTION - LOCATION
LOCATION: LEG
LOCATION: LEG
LOCATION: HIP

## 2019-08-23 ASSESSMENT — PAIN DESCRIPTION - ONSET
ONSET: ON-GOING
ONSET: ON-GOING

## 2019-08-23 ASSESSMENT — PAIN SCALES - GENERAL
PAINLEVEL_OUTOF10: 1
PAINLEVEL_OUTOF10: 3
PAINLEVEL_OUTOF10: 6
PAINLEVEL_OUTOF10: 0
PAINLEVEL_OUTOF10: 6
PAINLEVEL_OUTOF10: 4

## 2019-08-23 ASSESSMENT — PAIN DESCRIPTION - PAIN TYPE
TYPE: SURGICAL PAIN

## 2019-08-23 ASSESSMENT — PAIN DESCRIPTION - ORIENTATION
ORIENTATION: LEFT

## 2019-08-23 ASSESSMENT — PAIN DESCRIPTION - FREQUENCY: FREQUENCY: INTERMITTENT

## 2019-08-23 ASSESSMENT — PAIN DESCRIPTION - PROGRESSION: CLINICAL_PROGRESSION: GRADUALLY IMPROVING

## 2019-08-23 NOTE — PROGRESS NOTES
[facility] Orthopedic Surgery   Progress Note    CHIEF COMPLAINT/DIAGNOSIS:  8/21/19 LEFT hip IM nail    SUBJECTIVE: Patient sitting up in chair with family at bedside; describes hip pain well controlled. Plans to go to Acute Rehab today    OBJECTIVE  Physical    VITALS:  BP 96/63   Pulse 84   Temp 98.6 °F (37 °C) (Oral)   Resp 16   Ht 5' 5\" (1.651 m)   Wt 184 lb (83.5 kg)   SpO2 96%   BMI 30.62 kg/m²     GENERAL: Alert, NAD   MUSCULOSKELETAL: LEFT LE  INCISION:  Proximal dressing with moderate serosanguinous drainage  ROM: intact DF/PF  Sensory:  Intact to light touch in peroneal and tibial distributions  Vascular:   Intact post tib pulse;  calf soft and nontender    Data    ALL MEDICATIONS HAVE BEEN REVIEWED    CBC:   Recent Labs     08/20/19 2342 08/22/19  0553   WBC 16.7*  --    HGB 14.0 12.2   HCT 42.1 37.1     --      BMP:   Recent Labs     08/20/19 2342 08/23/19  0320    137   K 3.9 4.0    101   CO2 24 28   BUN 24* 23*   CREATININE 1.5* 1.4*     INR:   Recent Labs     08/20/19 2342   INR 1.01       ASSESSMENT:  8/21/19 LEFT hip IM nail, POD#2  Movement disorder    PLAN:   - WB status:  WBAT   - DVT prophylaxis: Lovenox; transition to ASA as OP  - PT/OT  - D/C Plan: Acute Rehab today  - Pain Control: current regimen. Due to orthopaedic surgical procedure/condition, patient may require pain medication for up to 6-8 weeks. - F U with Dr. Jasmyne Talbot in 2 weeks; call [phone] for appt.       PINEDA DISLA-CNP  8/23/2019  10:44 AM    .

## 2019-08-23 NOTE — PROGRESS NOTES
Physical Therapy  Facility/Department: [address] ORTHO/NEURO NURSING  Daily Treatment Note  NAME: Lucero Hairston  : 1930  MRN: [id number]    Date of Service: 2019    Discharge Recommendations:    Lucero Hairston scored a 17/24 on the AM-PAC short mobility form. Current research shows that an AM-PAC score of 17 or less is typically not associated with a discharge to the patient's home setting. Based on the patients AM-PAC score and their current functional mobility deficits, it is recommended that the patient have 5-7 sessions per week of Physical Therapy at d/c to increase the patients independence. PT Equipment Recommendations  Equipment Needed: No    Assessment   Body structures, Functions, Activity limitations: Decreased functional mobility ; Decreased ADL status; Decreased ROM; Decreased strength;Decreased safe awareness;Decreased endurance;Decreased balance; Increased Pain  Assessment: Pt with increased pain, decreased mobility and balance following hip surgery. Pt needing skilled PT services to address these issues. Treatment Diagnosis: difficulty with gait, weakness  Prognosis: Good  Decision Making: Medium Complexity  PT Education: Goals;PT Role;Plan of Care;Transfer Training;General Safety;Weight-bearing Education;Gait Training;Energy Conservation  REQUIRES PT FOLLOW UP: Yes  Activity Tolerance  Activity Tolerance: Patient limited by pain; Patient limited by endurance; Patient limited by fatigue;Patient Tolerated treatment well     Patient Diagnosis(es): The primary encounter diagnosis was Fall from slip, trip, or stumble, initial encounter. A diagnosis of Closed fracture of left hip, initial encounter [facility]) was also pertinent to this visit. has a past medical history of Acute renal failure ([facility] [address] [facility] Arthritis, At risk for falling, Frequent falls, Gastroesophageal reflux disease, Movement disorder, Nephrotic syndrome, and Thyroid disease.    has a past surgical history that includes standing rest breaks     Balance  Posture: Fair  Sitting - Static: Good;-  Sitting - Dynamic: Fair;+  Standing - Static: Fair  Standing - Dynamic: Fair;-  Comments: RW needed  Exercises  Comments: educated on B LE AP, LAQ and B UE stretches                        G-Code     OutComes Score                                                     AM-PAC Score  AM-PAC Inpatient Mobility Raw Score : 17 (08/23/19 0855)  AM-PAC Inpatient T-Scale Score : 42.13 (08/23/19 0855)  Mobility Inpatient CMS 0-100% Score: 50.57 (08/23/19 0855)  Mobility Inpatient CMS G-Code Modifier : CK (08/23/19 0855)          Goals  Short term goals  Time Frame for Short term goals: To be met by DC. Short term goal 1: Pt to perform bed mob with SBA. Short term goal 2: Pt to perform transfers with Tika. Short term goal 3: Pt to perform amb with AAD and CGA for 10 ft. Long term goals  Time Frame for Long term goals : LTGs=STGs  Patient Goals   Patient goals : To go to ARU, eventually life independly again   **no goals met this date    Plan    Plan  Times per week: 7x  Times per day: Daily  Current Treatment Recommendations: Strengthening, ROM, Balance Training, Functional Mobility Training, Transfer Training, Endurance Training, Gait Training, Stair training, Safety Education & Training, Home Exercise Program, Pain Management, Patient/Caregiver Education & Training, Equipment Evaluation, Education, & procurement  Safety Devices  Type of devices: All fall risk precautions in place, Call light within reach, Chair alarm in place, Gait belt, Patient at risk for falls, Left in chair, Nurse notified  Restraints  Initially in place: No     Therapy Time   Individual Concurrent Group Co-treatment   Time In       0808   Time Out       0839   Minutes       31   Timed Code Treatment Minutes: [address], [address]  I agree with the above note and have addressed this patient's goals.   hTien Thayer, PT, DPT, [id number]

## 2019-08-23 NOTE — PLAN OF CARE
ADLs with AE PRN   Short term goal 4: mod I toileting   Short term goal 5: SBA simple meal prep  :  Long term goals  Time Frame for Long term goals : LTG=STG :  These goals were reviewed with this patient at the time of assessment and Praveen Angella is in agreement    Plan of Care:  Pt to be seen 5 out of 7 days per week per ARU protocol (90   minutes with OT)       SPEECH THERAPY: Goals will be left blank if speech is not following this patient. Goals: These goals were reviewed with this patient at the time of assessment and Praveen Perales is in agreement    Plan of Care:  Pt to be seen 5 out of 7 days per week per ARU protocol (    minutes with SLP)    Therapy Treatments will include:  [x]  therapeutic exercises    [x]  gait training     [x]  neuromuscular re-ed                            [x]  transfer training             [] community reintegration    [x] bed mobility                          [x]  w/c mobility and training  [x]  self care    [x]home mgmt    [x]  cognitive training            [x]  energy conservation        []  dysphagia tx    []  speech/language/communication therapy   [x]  group therapy    [x]  patient/family education    [] Other:    If the patient is admitted with a diagnosis of 'CVA' and is appropriate for group therapy, their treatment plan will include educational group therapy interventions in the form of \"Stroke Education Group Therapy Class\". CASE MANAGEMENT:  Goals:   Assist patient/family with discharge planning, patient/family counseling,   and coordination with insurance during ARU stay.     Admission Period/Goal FIM SCORES  FIM Admit/Goal Score   Eating/Swallowing 6/6   Grooming 5/6   Bathing 3/5   Upper Body Dressing 5/7   Lower Body Dressing 2/4   Toileting 1/6   Bladder More, Accidents = FIM 2/6   Bowel  More, Accidents = FIM 2/6   Bed/Chair Transfer 3/6   Toilet Transfer 2/6   Tub/Shower Transfer  3 Locomotion:  Ambulation (Walk) / Wheelchair:  W = walk , w/c = wheelchair  Distance:   1= 0-49 ft , 2=  ft, 3= 150+ ft Distance: 1   Level of Assist: 1   Mode: W   FIM: 1/6      Stairs 1   Comprehension 4/6   Expression 3/6   Social Interaction 3/6   Problem Solving 3/5   Memory 3/5        Tamika Hoover will be seen a minimum of 3 hours of therapy per day, a minimum of 5 out of 7 days per week  (please see above for specific treatment plan per PT/OT/SLP). [] In this rare instance due to the nature of this patient's medical involvement, this patient will be seen 15 hours per week (900 minutes within a 7 day period). In addition, dietician/nutritionist may monitor calorie count as well as intake and collaboratively work with SLP on dietary upgrades. Neuropsychology/Psychology may evaluate and provide necessary support. Medical issues being managed closely and that require 24 hour availability of a physician:   [] Swallowing Precautions  [x] Bowel/Bladder Fx  [] Weight bearing precautions   [] Wound Care    [x] Pain Mgmt   [] Infection Protection   [x] DVT Prophylaxis   [x] Fall Precautions  [x] Fluid/Electrolyte/Nutrition Balance   [] Voice Protection   [] Respiratory  [] Other:    Medical Prognosis: [x] Good  [] Fair    [] Guarded   Total expected IRF days 7 days  Anticipated discharge destination:    [] Home Independently   [] Home Modified Independent  [x] Home with supervision    []SNF     [] Other                                           Physician anticipated functional outcomes:   Will regain function to a supervision level for most activities   IPOC brief synthesis: 70-year-old female with a history of GERD, and prior right femur fracture who was admitted on 8/21 after a fall with resultant left hip pain.  Imaging revealed an intertrochanteric femur fracture.  She was evaluated by Ortho and suggested to undergo an IMN which was performed on the same date with Dr. Kingston Aguilera procedure was uncomplicated. Nae Fuller postoperative course has been unremarkable with the exception of anticipated blood loss anemia.  She reports her pain remains mildly uncontrolled especially with movement.  She was evaluated by therapy and suggested to continue in an inpatient setting prior to returning home. She was admitted with the above goal.    This plan has been reviewed with Khadra Castle on ______8/26/2019______  in a language the patient understands. Khadra Castle has had the opportunity to include input with the therapy team.    I have reviewed this initial plan of care and agree with its contents:    Title   Name    Date    Time    Physician: Electronically signed by Yi Isabel MD on 8/26/2019 at 9:51 AM      Case Mgmt: DORA Barillas, LSW on 8/26/19 at 8:02am    OT: Rohith Loza OTR/JOSE [id number], 8/24/2019, 2:47 PM    PT: Levi Barraza DPT [id number] 8/24/19 1040    RN:  Winter Craft RN MSn CRRN  08/23/19  2718    :  Norman Mendoza, [address] [place] [address], 8/26/19 8055    Other:

## 2019-08-23 NOTE — PROGRESS NOTES
Incentive Spirometry education and demonstration completed by Respiratory Therapy Yes      Response to education: Excellent     Teaching Time: 5 minutes    Minimum Predicted Vital Capacity - 547 mL. Patient's Actual Vital Capacity - 500 mL. Turning over to Nursing for routine follow-up Yes.     Comments:     Electronically signed by Jamil Laguna RCP on 8/22/2019 at 8:48 PM

## 2019-08-23 NOTE — FLOWSHEET NOTE
Patient admitted to room 4903 Oriented to room, call light, phone, TV, thermostat, bed controls, bathroom, emergency cord, white board, therapy times, unit routine, visiting hours,  and meal times. Patient verbalized understanding. States bowel routine is every day  Call light and personal items in reach, alarms active and in place.

## 2019-08-23 NOTE — PLAN OF CARE
Problem: Falls - Risk of:  Goal: Will remain free from falls  Description  Will remain free from falls  Outcome: Ongoing  Goal: Absence of physical injury  Description  Absence of physical injury  Outcome: Ongoing  Assess risk factors for falls  Assess fall prevention measures  Assist ambulation and toileting    Problem: Skin Integrity:  Goal: Will show no infection signs and symptoms  Description  Will show no infection signs and symptoms  Outcome: Ongoing  Goal: Absence of new skin breakdown  Description  Absence of new skin breakdown  Outcome: Ongoing  Goal: Demonstration of wound healing without infection will improve  Description  Demonstration of wound healing without infection will improve  Outcome: Ongoing    Problem: Activity:  Goal: Ability to ambulate will improve  Description  Ability to ambulate will improve  Outcome: Ongoing  Goal: Ability to perform activities at highest level will improve  Description  Ability to perform activities at highest level will improve  Outcome: Ongoing     Problem: Nutritional:  Goal: Ability to attain and maintain optimal nutritional status will improve  Description  Ability to attain and maintain optimal nutritional status will improve  Outcome: Ongoing     Problem: Physical Regulation:  Goal: Will remain free from infection  Description  Will remain free from infection  Outcome: Ongoing  Goal: Postoperative complications will be avoided or minimized  Description  Postoperative complications will be avoided or minimized  Outcome: Ongoing    Problem: Respiratory:  Goal: Ability to maintain adequate ventilation will improve  Description  Ability to maintain adequate ventilation will improve  Outcome: Ongoing  IS use encouraged. Problem: Sensory:  Goal: Pain level will decrease  Description  Pain level will decrease  Outcome: Ongoing  Patient is able to rate pain on 0-10 pain scale. Pain level tolerable with PRN pain medications.     Patient aware of medications, side
to assist in meeting health care needs will improve  8/22/2019 0736 by Parvez Shipley RN  Outcome: Ongoing  8/22/2019 0324 by Lala Patterson RN  Outcome: Ongoing     Problem: Nutritional:  Goal: Ability to attain and maintain optimal nutritional status will improve  Description  Ability to attain and maintain optimal nutritional status will improve  8/22/2019 0736 by Parvez Shipley RN  Outcome: Ongoing  8/22/2019 0324 by Lala Patterson RN  Outcome: Ongoing     Problem: Physical Regulation:  Goal: Will remain free from infection  Description  Will remain free from infection  8/22/2019 0736 by Parvez Shipley RN  Outcome: Ongoing  8/22/2019 0324 by Lala Patterson RN  Outcome: Ongoing  Goal: Postoperative complications will be avoided or minimized  Description  Postoperative complications will be avoided or minimized  8/22/2019 0736 by Parvez Shipley RN  Outcome: Ongoing  8/22/2019 0324 by Lala aPtterson RN  Outcome: Ongoing  Goal: Diagnostic test results will improve  Description  Diagnostic test results will improve  8/22/2019 0736 by Parvez Shipley RN  Outcome: Ongoing  8/22/2019 0324 by Lala Patterson RN  Outcome: Ongoing     Problem: Respiratory:  Goal: Ability to maintain adequate ventilation will improve  Description  Ability to maintain adequate ventilation will improve  8/22/2019 0736 by Parvez Shipley RN  Outcome: Ongoing  8/22/2019 0324 by Lala Patterson RN  Outcome: Ongoing     Problem: Safety:  Goal: Ability to remain free from injury will improve  Description  Ability to remain free from injury will improve  8/22/2019 0736 by Parvez Shipley RN  Outcome: Ongoing  8/22/2019 0324 by Lala Patterson RN  Outcome: Ongoing     Problem: Self-Care:  Goal: Ability to meet self-care needs will improve  Description  Ability to meet self-care needs will improve  8/22/2019 0736 by Parvez Shipley RN  Outcome: Ongoing  8/22/2019 0324 by Lala Patterson RN  Outcome: Ongoing     Problem: Self-Concept:  Goal: Ability to maintain and perform

## 2019-08-23 NOTE — H&P
pressure/discomfort, edema, syncope. GASTROINTESTINAL: negative for nausea, vomiting, diarrhea, constipation, blood in stool and abdominal pain. GENITOURINARY: negative for frequency, dysuria, urinary incontinence, decreased urine volume, and hematuria. HEMATOLOGIC/LYMPHATIC: negative for easy bruising, bleeding and lymphadenopathy. ALLERGIC/IMMUNOLOGIC: negative for recurrent infections, angioedema, anaphylaxis and drug reactions. ENDOCRINE: negative for weight changes and diabetic symptoms including polyuria, polydipsia and polyphagia. MUSCULOSKELETAL: negative for pain, joint swelling, decreased range of motion and muscle weakness. NEUROLOGICAL: negative for headaches, slurred speech, unilateral weakness. PSYCHIATRIC/BEHAVIORAL: negative for hallucinations, behavioral problems, confusion and agitation.      All pertinent positives are noted in the HPI. [facility] Physical Examination:  Vitals:   Patient Vitals for the past 24 hrs:   BP Temp Temp src Pulse Resp Height Weight   08/23/19 1421 (!) 110/58 98.3 °F (36.8 °C) Oral 88 18 5' 5\" (1.651 m) 183 lb (83 kg)     Psych: Stable mood, normal judgement, normal affect   Const: No distress  Eyes: Conjunctiva noninjected, no icterus noted; pupils equal, round. HENT: Atraumatic, normocephalic; Oral mucosa moist  Neck: Trachea midline, neck supple. No thyromegaly noted. CV: Regular rate and rhythm, no murmur rub or gallop noted  Resp: Lungs clear to auscultation bilaterally, no rales wheezes or ronchi, no retractions. Respirations unlabored. GI: Soft, nontender, nondistended. Normal bowel sounds. No palpable masses. Neuro: Alert, oriented, appropriate. No cranial nerve deficits appreciated. Sensation intact to light touch. Motor examination reveals: BUE 5/5 RLE 4/5 LLE 2/5 HF/KE 5/5 DF/PF. Reflexes normal and symmetric. Skin: Normal temperature and turgor. Surgical sites covered with clean dressings. MSK: No joint abnormalities noted.      Ext: No inpatient  rehabilitation facility consisting of at least 180 minutes of therapy a day,  5 out of 7 days a week. The patient/family has a good understanding of our discharge process. The  patient has potential to make improvement and is in need of at least two of  the following multidisciplinary therapies including but not limited to  physical, occupational, respiratory, and speech, nutritional services, wound care, and prosthetics and orthotics. Given the patients complex condition  and risk of further medical complications, rehabilitation services cannot be  safely provided at a lower level of care such as a skilled nursing facility. I have compared the patients medical and functional status at the time of the  preadmission screening and the same on this date, and there are no significant changes except as documented below in the assessment and plan. By signing this document, I acknowledge that I have personally performed a  full physical examination on this patient within 24 hours of admission to  this inpatient rehabilitation facility and have determined the patient to be  able to tolerate the above course of treatment at an intensive level for a  reasonable period of time. I will be completing a detailed individualized  Plan of Care for this patient by day four of the patients stay based upon the  Preadmission Screen, this Post-Admission Evaluation, and the therapy  evaluations. Assessment and Plan:  Left intertrochanteric femur fracture: S/P IMN on 8/21 with Dr. Mayco Hill. WBAT. Pain control. PT/OT. Postoperative anemia: Monitor    Mild dementia: Aricept per home regimen    Hypothyroidism: Synthroid 50      Bowels: Per protocol  Bladder: Per protocol   Sleep: Trazodone provided prn. Pain: Cymbalta, Norco  DVT PPx: Lovenox    Kendal Libman, MD 8/23/2019, 3:57 PM     * This document was created using dictation software. While all precautions were taken to ensure accuracy, errors may have occurred.

## 2019-08-24 PROCEDURE — 97530 THERAPEUTIC ACTIVITIES: CPT

## 2019-08-24 PROCEDURE — 1280000000 HC REHAB R&B

## 2019-08-24 PROCEDURE — 97165 OT EVAL LOW COMPLEX 30 MIN: CPT

## 2019-08-24 PROCEDURE — 6360000002 HC RX W HCPCS: Performed by: PHYSICAL MEDICINE & REHABILITATION

## 2019-08-24 PROCEDURE — 6370000000 HC RX 637 (ALT 250 FOR IP): Performed by: PHYSICAL MEDICINE & REHABILITATION

## 2019-08-24 PROCEDURE — 97162 PT EVAL MOD COMPLEX 30 MIN: CPT

## 2019-08-24 PROCEDURE — 97535 SELF CARE MNGMENT TRAINING: CPT

## 2019-08-24 PROCEDURE — 97116 GAIT TRAINING THERAPY: CPT

## 2019-08-24 PROCEDURE — 97110 THERAPEUTIC EXERCISES: CPT

## 2019-08-24 RX ADMIN — HYDROCODONE BITARTRATE AND ACETAMINOPHEN 2 TABLET: 5; 325 TABLET ORAL at 09:05

## 2019-08-24 RX ADMIN — DOCUSATE SODIUM 100 MG: 100 CAPSULE, LIQUID FILLED ORAL at 09:05

## 2019-08-24 RX ADMIN — ENOXAPARIN SODIUM 30 MG: 30 INJECTION SUBCUTANEOUS at 09:05

## 2019-08-24 RX ADMIN — LEVOTHYROXINE SODIUM 50 MCG: 25 TABLET ORAL at 06:27

## 2019-08-24 RX ADMIN — DONEPEZIL HYDROCHLORIDE 5 MG: 5 TABLET, FILM COATED ORAL at 21:44

## 2019-08-24 RX ADMIN — DULOXETINE HYDROCHLORIDE 30 MG: 30 CAPSULE, DELAYED RELEASE ORAL at 09:05

## 2019-08-24 RX ADMIN — HYDROCODONE BITARTRATE AND ACETAMINOPHEN 2 TABLET: 5; 325 TABLET ORAL at 13:42

## 2019-08-24 RX ADMIN — PANTOPRAZOLE SODIUM 40 MG: 40 TABLET, DELAYED RELEASE ORAL at 06:27

## 2019-08-24 RX ADMIN — HYDROCORTISONE: 1 CREAM TOPICAL at 21:45

## 2019-08-24 RX ADMIN — DOCUSATE SODIUM 100 MG: 100 CAPSULE, LIQUID FILLED ORAL at 21:44

## 2019-08-24 ASSESSMENT — PAIN SCALES - GENERAL
PAINLEVEL_OUTOF10: 3
PAINLEVEL_OUTOF10: 5
PAINLEVEL_OUTOF10: 1
PAINLEVEL_OUTOF10: 10
PAINLEVEL_OUTOF10: 10

## 2019-08-24 ASSESSMENT — PAIN DESCRIPTION - LOCATION: LOCATION: HIP

## 2019-08-24 ASSESSMENT — PAIN DESCRIPTION - FREQUENCY: FREQUENCY: INTERMITTENT

## 2019-08-24 ASSESSMENT — PAIN DESCRIPTION - ORIENTATION: ORIENTATION: LEFT

## 2019-08-24 ASSESSMENT — PAIN DESCRIPTION - PAIN TYPE: TYPE: SURGICAL PAIN

## 2019-08-24 ASSESSMENT — PAIN DESCRIPTION - PROGRESSION: CLINICAL_PROGRESSION: GRADUALLY IMPROVING

## 2019-08-24 ASSESSMENT — PAIN DESCRIPTION - ONSET: ONSET: ON-GOING

## 2019-08-24 NOTE — PROGRESS NOTES
Contact guard assistance  Functional Mobility  Functional - Mobility Device: Rolling Walker  Assist Level: Contact guard assistance  Functional Mobility Comments: 5 steps only prior to fatiguing   Shower Transfers  Shower - Transfer From: Wheelchair  Shower - Transfer Type: To and From  Shower - Transfer To: Shower seat with back  Shower - Technique: Stand pivot  Shower Transfers: Minimal assistance  ADL  Grooming: Setup;Stand by assistance(in sitting at sink )  UE Bathing: Stand by assistance;Setup  LE Bathing: Maximum assistance  UE Dressing: Stand by assistance;Setup(pull over shirt )  LE Dressing: Dependent/Total  Toileting: None  Additional Comments: pt in chair on arrival - ambulated about 5 steps to w/c and then w/c used to enter bathroom - SPT to shower chair and showering and dressing completed from shower chair in walk in shower - RN Giselle Meng in to address L hip incision and skin tear L LE - pt sat at sink to brush teeth and remained up in chair end of session   Tone RUE  RUE Tone: Normotonic  Tone LUE  LUE Tone: Normotonic  Coordination  Movements Are Fluid And Coordinated: Yes     Bed mobility  Comment: up in chair beginning and end of session   Transfers  Stand Step Transfers: Contact guard assistance  Vision - Basic Assessment  Prior Vision: Wears glasses only for reading  Patient Visual Report: No visual complaint reported. Cognition  Overall Cognitive Status: Exceptions  Following Commands:  Follows one step commands consistently  Attention Span: Appears intact  Memory: Decreased recall of biographical Information;Decreased recall of recent events;Decreased short term memory  Safety Judgement: Good awareness of safety precautions  Problem Solving: Assistance required to generate solutions;Assistance required to implement solutions  Insights: Decreased awareness of deficits  Initiation: Requires cues for some  Sequencing: Requires cues for some  Cognition Comment: pt reports difficulty with memory at home including occaisionally forgetting her medication, states she will no longer shop along bc she gets lost in stores          LUE AROM (degrees)  LUE AROM : WFL  LUE General AROM: WFL for ADl tasks   Left Hand AROM (degrees)  Left Hand AROM: WFL  RUE AROM (degrees)  RUE AROM : WFL  LUE Strength  Gross LUE Strength: WFL  RUE Strength  Gross RUE Strength: WFL       PM session - pt in chair on arrival with family present - complaining of increased pain this pm as compared to am and RN provided pain medication during session - ambulated 5 steps with RW and CGA to w/c - to ADL suite in w/c - practiced dry tub transfer with mod A (CGA during SPT but assist to bring B LEs in/out of tub) - pt SPT to return to w/c with CGA - returned to room - SPT to toilet with CGA - max A for toileting - mod A to stand from toilet and CGA to ambulated to return to room - mod A sit to supine for B LEs - in bed with alarm in place and needs in reach end of session  Plan   Plan  Times per week: 90 minutes 5 days per week   Current Treatment Recommendations: Endurance Training, Functional Mobility Training, Balance Training, Safety Education & Training, Equipment Evaluation, Education, & procurement, Self-Care / ADL, Patient/Caregiver Education & Training            Goals  Short term goals  Time Frame for Short term goals: 10 days   Short term goal 1: mod I functional transfer to toilet, supevision to tub bench   Short term goal 2: mod I UB ADLs  Short term goal 3: min A LB ADLs with AE PRN   Short term goal 4: mod I toileting   Short term goal 5: SBA simple meal prep   Long term goals  Time Frame for Long term goals : LTG=STG       Therapy Time   Individual Concurrent Group Co-treatment   Time In 0830         Time Out 0930         Minutes 60         Second Session Therapy Time:   Individual Concurrent Group Co-treatment   Time In 1320         Time Out 1350         Minutes 30           Timed Code Treatment Minutes:  80    Total Treatment

## 2019-08-24 NOTE — PROGRESS NOTES
Nutrition Assessment (Low Risk)    Type and Reason for Visit: Initial, Consult(new rehab admission)    Nutrition Recommendations:   D/c Ensure Enlive     Nutrition Assessment:  Patient assessed for nutritional risk. Deemed to be at low risk at this time. Will continue to monitor for changes in status. Pt is adequately nourished AEB po intake at least 50% of meals & wt is stable per hx. Pt with increased nutrient needs d/t left femur fx, however pt declines need for supplements & reports appetite is good. No further nutrition compromise indicated.      Malnutrition Assessment:  · Malnutrition Status: No malnutrition    Nutrition Risk Level   Risk Level: Low    Nutrition Diagnosis:   · Problem: Increased nutrient needs  · Etiology: Increased demand for energy/nutrients    Signs and symptoms: Presence of wounds(s/p surgery)    Nutrition Intervention:  Food and/or Delivery: Continue current diet, Discontinue ONS  Nutrition Education/Counseling/Coordination of Care:  Continued Inpatient Monitoring, Education Not Indicated      Electronically signed by Shaun Mcallister RD, LD on 8/24/19 at 11:53 AM    Contact Number: [phone]

## 2019-08-24 NOTE — PROGRESS NOTES
Physical Therapy    Facility/Department: [facility] [place] ACUTE REHAB UNIT  Initial Assessment    NAME: Mandi Henson  : 1930  MRN: [id number]    Date of Service: 2019    Discharge Recommendations:  Home with assist PRN        Assessment   Body structures, Functions, Activity limitations: Decreased functional mobility ; Decreased ADL status; Decreased ROM; Decreased strength;Decreased safe awareness;Decreased endurance;Decreased balance; Increased Pain  Assessment: Pt with increased pain, decreased mobility and balance following hip surgery. Pt needing skilled PT services to address these issues. Treatment Diagnosis: difficulty with gait, weakness  Prognosis: Good  Decision Making: Medium Complexity  PT Education: Home Exercise Program;Disease Specific Education;Gait Training;Transfer Training;PT Role;Goals;Plan of Care  Barriers to Learning: [place]  REQUIRES PT FOLLOW UP: Yes  Activity Tolerance  Activity Tolerance: Patient Tolerated treatment well;Patient limited by fatigue;Patient limited by pain       Patient Diagnosis(es): There were no encounter diagnoses. has a past medical history of Acute renal failure ([facility] [address] Arthritis, At risk for falling, Frequent falls, Gastroesophageal reflux disease, Movement disorder, Nephrotic syndrome, and Thyroid disease. has a past surgical history that includes Excision of Facial Mass; skin biopsy; Colonoscopy; eye surgery; other surgical history (Right, 2018); and Hip fracture surgery (Left, 2019). Restrictions  Position Activity Restriction  Other position/activity restrictions: Pt fell after losing their balance while going to the kitchen to get popcorn. Pt states they fell on their left hip. Has hx of falls, uses cane and walker.  Denies taking bloodthinner   Vision/Hearing  Vision: Impaired  Vision Exceptions: Wears glasses for reading  Hearing: Exceptions to [facility]  Hearing Exceptions: Hard of hearing/hearing concerns     Subjective  General  Chart Reviewed:

## 2019-08-25 PROCEDURE — 1280000000 HC REHAB R&B

## 2019-08-25 PROCEDURE — 6360000002 HC RX W HCPCS: Performed by: PHYSICAL MEDICINE & REHABILITATION

## 2019-08-25 PROCEDURE — 6370000000 HC RX 637 (ALT 250 FOR IP): Performed by: PHYSICAL MEDICINE & REHABILITATION

## 2019-08-25 RX ADMIN — LEVOTHYROXINE SODIUM 50 MCG: 25 TABLET ORAL at 06:42

## 2019-08-25 RX ADMIN — DOCUSATE SODIUM 100 MG: 100 CAPSULE, LIQUID FILLED ORAL at 20:38

## 2019-08-25 RX ADMIN — ENOXAPARIN SODIUM 30 MG: 30 INJECTION SUBCUTANEOUS at 09:01

## 2019-08-25 RX ADMIN — PANTOPRAZOLE SODIUM 40 MG: 40 TABLET, DELAYED RELEASE ORAL at 06:42

## 2019-08-25 RX ADMIN — DONEPEZIL HYDROCHLORIDE 5 MG: 5 TABLET, FILM COATED ORAL at 20:38

## 2019-08-25 RX ADMIN — HYDROCODONE BITARTRATE AND ACETAMINOPHEN 2 TABLET: 5; 325 TABLET ORAL at 03:57

## 2019-08-25 RX ADMIN — DULOXETINE HYDROCHLORIDE 30 MG: 30 CAPSULE, DELAYED RELEASE ORAL at 09:01

## 2019-08-25 RX ADMIN — HYDROCODONE BITARTRATE AND ACETAMINOPHEN 2 TABLET: 5; 325 TABLET ORAL at 20:37

## 2019-08-25 RX ADMIN — DOCUSATE SODIUM 100 MG: 100 CAPSULE, LIQUID FILLED ORAL at 09:01

## 2019-08-25 ASSESSMENT — PAIN SCALES - GENERAL
PAINLEVEL_OUTOF10: 0
PAINLEVEL_OUTOF10: 4
PAINLEVEL_OUTOF10: 7
PAINLEVEL_OUTOF10: 0
PAINLEVEL_OUTOF10: 10

## 2019-08-25 ASSESSMENT — PAIN DESCRIPTION - LOCATION: LOCATION: HIP

## 2019-08-25 ASSESSMENT — PAIN DESCRIPTION - ORIENTATION: ORIENTATION: LEFT

## 2019-08-25 ASSESSMENT — PAIN DESCRIPTION - PROGRESSION: CLINICAL_PROGRESSION: NOT CHANGED

## 2019-08-25 ASSESSMENT — PAIN DESCRIPTION - FREQUENCY: FREQUENCY: INTERMITTENT

## 2019-08-25 ASSESSMENT — PAIN DESCRIPTION - ONSET: ONSET: ON-GOING

## 2019-08-25 ASSESSMENT — PAIN DESCRIPTION - PAIN TYPE: TYPE: ACUTE PAIN;SURGICAL PAIN

## 2019-08-25 NOTE — PROGRESS NOTES
AM assessment complete, charted in doc flowsheet. VSS. Pt assisted to bathroom, pt voided, brushed teeth, cleaned and put in dentures and retur to bedside chair, call light and telephone in reach. Bed and chair alarm on.

## 2019-08-25 NOTE — PLAN OF CARE
Problem: Falls - Risk of:  Goal: Will remain free from falls  Description  Will remain free from falls  Outcome: Ongoing  Goal: Absence of physical injury  Description  Absence of physical injury  Outcome: Ongoing     Problem: Risk for Impaired Skin Integrity  Goal: Tissue integrity - skin and mucous membranes  Description  Structural intactness and normal physiological function of skin and  mucous membranes. Outcome: Ongoing     Problem: NUTRITION  Goal: Patient maintains adequate hydration  Outcome: Ongoing     Problem: SAFETY  Goal: LTG - Patient will demonstrate safety requirements appropriate to situation/environment  Outcome: Ongoing     Problem: SKIN INTEGRITY  Goal: LTG - patient will maintain/improve skin integrity through proper skin care techniques  Outcome: Ongoing     Problem: PAIN  Goal: STG - pain is manageable through therapies  Outcome: Ongoing  Goal: STG - Patient will verbalize an acceptable level of pain  Outcome: Ongoing  Note:   Pain assessment done every shift, pain is currently being managed with PO medications, see MAR. Staff assist with repositioning when needed and pillow support is provided for comfort, patient verbalizes satisfaction with current pain interventions.        Problem: Pain:  Goal: Pain level will decrease  Description  Pain level will decrease  Outcome: Ongoing  Goal: Control of acute pain  Description  Control of acute pain  Outcome: Ongoing  Goal: Control of chronic pain  Description  Control of chronic pain  Outcome: Ongoing

## 2019-08-26 LAB
ANION GAP SERPL CALCULATED.3IONS-SCNC: 7 MMOL/L (ref 3–16)
BUN BLDV-MCNC: 25 MG/DL (ref 7–20)
CALCIUM SERPL-MCNC: 8.5 MG/DL (ref 8.3–10.6)
CHLORIDE BLD-SCNC: 105 MMOL/L (ref 99–110)
CO2: 29 MMOL/L (ref 21–32)
CREAT SERPL-MCNC: 0.8 MG/DL (ref 0.6–1.2)
GFR AFRICAN AMERICAN: >60
GFR NON-AFRICAN AMERICAN: >60
GLUCOSE BLD-MCNC: 109 MG/DL (ref 70–99)
HCT VFR BLD CALC: 30.6 % (ref 36–48)
HEMOGLOBIN: 10.3 G/DL (ref 12–16)
MCH RBC QN AUTO: 31.4 PG (ref 26–34)
MCHC RBC AUTO-ENTMCNC: 33.5 G/DL (ref 31–36)
MCV RBC AUTO: 93.5 FL (ref 80–100)
PDW BLD-RTO: 13.1 % (ref 12.4–15.4)
PLATELET # BLD: 267 K/UL (ref 135–450)
PMV BLD AUTO: 8.3 FL (ref 5–10.5)
POTASSIUM SERPL-SCNC: 4.1 MMOL/L (ref 3.5–5.1)
RBC # BLD: 3.27 M/UL (ref 4–5.2)
SODIUM BLD-SCNC: 141 MMOL/L (ref 136–145)
WBC # BLD: 7.7 K/UL (ref 4–11)

## 2019-08-26 PROCEDURE — 80048 BASIC METABOLIC PNL TOTAL CA: CPT

## 2019-08-26 PROCEDURE — 6370000000 HC RX 637 (ALT 250 FOR IP): Performed by: PHYSICAL MEDICINE & REHABILITATION

## 2019-08-26 PROCEDURE — 97530 THERAPEUTIC ACTIVITIES: CPT

## 2019-08-26 PROCEDURE — 97535 SELF CARE MNGMENT TRAINING: CPT

## 2019-08-26 PROCEDURE — 97110 THERAPEUTIC EXERCISES: CPT

## 2019-08-26 PROCEDURE — 85027 COMPLETE CBC AUTOMATED: CPT

## 2019-08-26 PROCEDURE — 6360000002 HC RX W HCPCS: Performed by: PHYSICAL MEDICINE & REHABILITATION

## 2019-08-26 PROCEDURE — 97116 GAIT TRAINING THERAPY: CPT

## 2019-08-26 PROCEDURE — 36415 COLL VENOUS BLD VENIPUNCTURE: CPT

## 2019-08-26 PROCEDURE — 1280000000 HC REHAB R&B

## 2019-08-26 RX ADMIN — HYDROCODONE BITARTRATE AND ACETAMINOPHEN 2 TABLET: 5; 325 TABLET ORAL at 05:07

## 2019-08-26 RX ADMIN — DOCUSATE SODIUM 100 MG: 100 CAPSULE, LIQUID FILLED ORAL at 22:30

## 2019-08-26 RX ADMIN — HYDROCORTISONE: 1 CREAM TOPICAL at 22:35

## 2019-08-26 RX ADMIN — DONEPEZIL HYDROCHLORIDE 5 MG: 5 TABLET, FILM COATED ORAL at 22:31

## 2019-08-26 RX ADMIN — LEVOTHYROXINE SODIUM 50 MCG: 25 TABLET ORAL at 05:07

## 2019-08-26 RX ADMIN — HYDROCODONE BITARTRATE AND ACETAMINOPHEN 2 TABLET: 5; 325 TABLET ORAL at 12:55

## 2019-08-26 RX ADMIN — PANTOPRAZOLE SODIUM 40 MG: 40 TABLET, DELAYED RELEASE ORAL at 05:07

## 2019-08-26 RX ADMIN — HYDROCODONE BITARTRATE AND ACETAMINOPHEN 2 TABLET: 5; 325 TABLET ORAL at 18:40

## 2019-08-26 RX ADMIN — DOCUSATE SODIUM 100 MG: 100 CAPSULE, LIQUID FILLED ORAL at 08:44

## 2019-08-26 RX ADMIN — DULOXETINE HYDROCHLORIDE 30 MG: 30 CAPSULE, DELAYED RELEASE ORAL at 08:44

## 2019-08-26 RX ADMIN — HYDROCODONE BITARTRATE AND ACETAMINOPHEN 2 TABLET: 5; 325 TABLET ORAL at 22:31

## 2019-08-26 RX ADMIN — ENOXAPARIN SODIUM 30 MG: 30 INJECTION SUBCUTANEOUS at 08:44

## 2019-08-26 RX ADMIN — HYDROCORTISONE: 1 CREAM TOPICAL at 05:08

## 2019-08-26 RX ADMIN — HYDROCODONE BITARTRATE AND ACETAMINOPHEN 1 TABLET: 5; 325 TABLET ORAL at 08:44

## 2019-08-26 ASSESSMENT — PAIN DESCRIPTION - LOCATION
LOCATION: HIP
LOCATION: HIP

## 2019-08-26 ASSESSMENT — PAIN DESCRIPTION - ORIENTATION
ORIENTATION: LEFT
ORIENTATION: LEFT

## 2019-08-26 ASSESSMENT — PAIN SCALES - GENERAL
PAINLEVEL_OUTOF10: 7
PAINLEVEL_OUTOF10: 5
PAINLEVEL_OUTOF10: 3
PAINLEVEL_OUTOF10: 10
PAINLEVEL_OUTOF10: 7

## 2019-08-26 NOTE — DISCHARGE SUMMARY
MG EC tablet  Take 1 tablet by mouth daily     HYDROcodone-acetaminophen 5-325 MG per tablet  Commonly known as:  NORCO  Take 1-2 tablets by mouth every 4 hours as needed for Pain for up to 7 days. CONTINUE taking these medications    donepezil 5 MG tablet  Commonly known as:  ARICEPT  Take 1 tablet by mouth nightly     DULoxetine 30 MG extended release capsule  Commonly known as:  CYMBALTA  TAKE ONE CAPSULE BY MOUTH DAILY     levothyroxine 50 MCG tablet  Commonly known as:  SYNTHROID  TAKE ONE TABLET BY MOUTH DAILY     pantoprazole 40 MG tablet  Commonly known as:  PROTONIX  Take 1 tablet by mouth daily     vitamin D 2000 units Caps capsule        STOP taking these medications    acetaminophen 325 MG tablet  Commonly known as:  TYLENOL     cephALEXin 500 MG capsule  Commonly known as:  Ralene Plum           Where to Get Your Medications      You can get these medications from any pharmacy    Bring a paper prescription for each of these medications  · aspirin 325 MG EC tablet  · HYDROcodone-acetaminophen 5-325 MG per tablet         Discharge recommendations given to patient. Follow Up. pcp in 1 week   Disposition. akbar Avelar  Activity. activity as tolerated  Diet: No diet orders on file      Spent 45  minutes in discharge process.     Signed:  Mary Longo MD     8/26/2019 3:35 PM

## 2019-08-26 NOTE — PROGRESS NOTES
Occupational Therapy  Facility/Department: [facility] ACUTE REHAB UNIT  Daily Treatment Note  NAME: Mandi Henson  : 1930  MRN: [id number]    Date of Service: 2019    Discharge Recommendations:  Home with assist PRN, Home with nursing aide, Home with Home health OT, S Level 3  OT Equipment Recommendations  Other: pt owns tub transfer bench, reacher     Assessment   Performance deficits / Impairments: Decreased functional mobility ; Decreased ADL status; Decreased endurance;Decreased balance;Decreased high-level IADLs  Assessment: Pt is not at her baseline level of occupational function, based on the above deficits associated with L femur fx, s/p ORIF. Pt would benefit from continued skilled acute OT services to address these deficits. Sonny's increased independence with transfers, mobility and LB dressing, continues to require max A with LB dressing at this time and limited distance in ambulation due to pain/fatigue   Treatment Diagnosis: Decreased ADL/IADL status, functional mobility, endurance, and balance associated with L femur fx, s/p ORIF  Prognosis: Good;Fair  REQUIRES OT FOLLOW UP: Yes  Activity Tolerance  Activity Tolerance: Patient Tolerated treatment well  Safety Devices  Safety Devices in place: Yes  Type of devices: Call light within reach; Chair alarm in place; Left in chair         Patient Diagnosis(es): R ORIF after fall/fracture      has a past medical history of Acute renal failure ([address] Arthritis, At risk for falling, Frequent falls, Gastroesophageal reflux disease, Movement disorder, Nephrotic syndrome, and Thyroid disease. has a past surgical history that includes Excision of Facial Mass; skin biopsy; Colonoscopy; eye surgery; other surgical history (Right, 2018); and Hip fracture surgery (Left, 2019).     Restrictions  Restrictions/Precautions  Restrictions/Precautions: Fall Risk(high fall risk )  Lower Extremity Weight Bearing Restrictions  Right Lower Extremity Weight

## 2019-08-26 NOTE — PROGRESS NOTES
Occupational Therapy  Occupational Therapy Discharge Summary    Name: Eddie Skaggs  : 1930    The pt was evaluated by OT on 19 and seen for 1 treatment sessions prior to DC to ARU on 19 per MD order. The pt's acute therapy goals were:  Short term goals  Time Frame for Short term goals: Discharge  Short term goal 1: CGA for functional transfers to ADL surfaces w/RW - goal met EOB to chair   Short term goal 2: CGA for functional mobility w/RW for ADL activity - goal met   Short term goal 3: Mod A for toileting - pt declined   Short term goal 4: Min A for LB bathing/dressing w/AE - goal not addressed   Short term goal 5: Set-up for UB bathing/dressing - goal not addressed   Long term goals  Time Frame for Long term goals : LTG=STG  Long term goal 1: Pt to tolerate standing 5-10 min for ADL/IADL activity/functional mobility - goal met      Patient met 2 goals during stay. Number of Refusals:0  Number of Holds: 0  During this hospitalization, the patient was educated on:  Patient Education: D/C recommendation    DC pt from OT caseload at this time. Thank you!     Darien Lindquist OTR/L [id number]

## 2019-08-27 PROCEDURE — 97535 SELF CARE MNGMENT TRAINING: CPT

## 2019-08-27 PROCEDURE — 1280000000 HC REHAB R&B

## 2019-08-27 PROCEDURE — 94760 N-INVAS EAR/PLS OXIMETRY 1: CPT

## 2019-08-27 PROCEDURE — 97530 THERAPEUTIC ACTIVITIES: CPT

## 2019-08-27 PROCEDURE — 6360000002 HC RX W HCPCS: Performed by: PHYSICAL MEDICINE & REHABILITATION

## 2019-08-27 PROCEDURE — 6370000000 HC RX 637 (ALT 250 FOR IP): Performed by: PHYSICAL MEDICINE & REHABILITATION

## 2019-08-27 PROCEDURE — 97110 THERAPEUTIC EXERCISES: CPT

## 2019-08-27 PROCEDURE — 97116 GAIT TRAINING THERAPY: CPT

## 2019-08-27 PROCEDURE — 92523 SPEECH SOUND LANG COMPREHEN: CPT

## 2019-08-27 RX ADMIN — LEVOTHYROXINE SODIUM 50 MCG: 25 TABLET ORAL at 06:29

## 2019-08-27 RX ADMIN — ACETAMINOPHEN 1000 MG: 325 TABLET, FILM COATED ORAL at 14:35

## 2019-08-27 RX ADMIN — HYDROCODONE BITARTRATE AND ACETAMINOPHEN 2 TABLET: 5; 325 TABLET ORAL at 06:29

## 2019-08-27 RX ADMIN — ENOXAPARIN SODIUM 30 MG: 30 INJECTION SUBCUTANEOUS at 09:55

## 2019-08-27 RX ADMIN — DULOXETINE HYDROCHLORIDE 30 MG: 30 CAPSULE, DELAYED RELEASE ORAL at 09:56

## 2019-08-27 RX ADMIN — DONEPEZIL HYDROCHLORIDE 5 MG: 5 TABLET, FILM COATED ORAL at 20:09

## 2019-08-27 RX ADMIN — DOCUSATE SODIUM 100 MG: 100 CAPSULE, LIQUID FILLED ORAL at 20:09

## 2019-08-27 RX ADMIN — HYDROCODONE BITARTRATE AND ACETAMINOPHEN 2 TABLET: 5; 325 TABLET ORAL at 20:13

## 2019-08-27 RX ADMIN — PANTOPRAZOLE SODIUM 40 MG: 40 TABLET, DELAYED RELEASE ORAL at 06:28

## 2019-08-27 RX ADMIN — DOCUSATE SODIUM 100 MG: 100 CAPSULE, LIQUID FILLED ORAL at 09:56

## 2019-08-27 ASSESSMENT — PAIN SCALES - GENERAL
PAINLEVEL_OUTOF10: 7
PAINLEVEL_OUTOF10: 7
PAINLEVEL_OUTOF10: 0
PAINLEVEL_OUTOF10: 6
PAINLEVEL_OUTOF10: 0
PAINLEVEL_OUTOF10: 7

## 2019-08-27 NOTE — PROGRESS NOTES
Occupational Therapy  Facility/Department: [facility] ACUTE REHAB UNIT  Daily Treatment Note  NAME: Mellisa Ibarra  : 1930  MRN: [id number]    Date of Service: 2019    Discharge Recommendations:  Home with assist PRN, Home with nursing aide, Home with Home health OT, S Level 3  OT Equipment Recommendations  Other: pt owns: tub transfer bench (declines to use and prefers to sponge bathe per pt, reacher, sock aid, long handles shoe horn    Assessment   Performance deficits / Impairments: Decreased functional mobility ; Decreased ADL status; Decreased endurance;Decreased balance;Decreased high-level IADLs  Assessment: Pt is not at her baseline level of occupational function, based on the above deficits associated with L femur fx, s/p ORIF. Pt would benefit from continued skilled acute OT services to address these deficits. Sonny's increased independence with transfers, mobility and LB dressing, continues to require max A with LB dressing at this time and limited distance in ambulation due to pain/fatigue   Treatment Diagnosis: Decreased ADL/IADL status, functional mobility, endurance, and balance associated with L femur fx, s/p ORIF  Prognosis: Good  History: Pt 79 yo, lives alone, family support, independent ADLs, IADLs, dtr brings meals at times, independent ambulation, 4 falls past 6 mos. PMH: R femur fx 1 yr ago, per pt, osteoporosis, acute renal failure, frequent falls  Exam: ADLs  Assistance / Modification: CGA functional mobility  OT Education: OT Role;Plan of Care;Orientation;IADL Safety;Precautions; ADL Adaptive Strategies;Transfer Training  Patient Education: safety  Barriers to Learning: short term memory   REQUIRES OT FOLLOW UP: Yes  Activity Tolerance  Activity Tolerance: Patient Tolerated treatment well  Safety Devices  Safety Devices in place: Yes  Type of devices: Call light within reach; Chair alarm in place; Left in chair; All fall risk precautions in place         Patient Diagnosis(es): There were Appears intact  Memory: Decreased recall of biographical Information;Decreased recall of recent events;Decreased short term memory  Safety Judgement: Good awareness of safety precautions  Problem Solving: Assistance required to generate solutions;Assistance required to implement solutions  Insights: Decreased awareness of deficits  Initiation: Requires cues for some  Sequencing: Requires cues for some                  Second Tx session: Patient seated in recliner at arrival, agreeable to tx. Pt's granddaughter present initially, but left as session started. Pt completed functional transfers/mobility with RW and CGA to from bathroom and in room. Toileting:   CGA stand to sit,  MOD A sit to stand using grab bar from standard toilet. CGA in stance during pericare and clothing management. Extended time in bathroom secondary to patient attempting to have a BM (unsuccesseful). Hand washing in stance at sink w/ CGA. Bed mobility sit to supine with MOD A for both LE's. Patient left in bed, alarm set, call light and needs in reach. Pt tolerated well, but fatigued at end of tx session.                         Plan   Plan  Times per week: 90 minutes 5 days per week   Times per day: Daily  Specific instructions for Next Treatment: cotx  Current Treatment Recommendations: Endurance Training, Functional Mobility Training, Balance Training, Safety Education & Training, Equipment Evaluation, Education, & procurement, Self-Care / ADL, Patient/Caregiver Education & Training  G-Code     OutComes Score                                                  AM-PAC Score             Goals  Short term goals  Time Frame for Short term goals: 10 days   Short term goal 1: mod I functional transfer to toilet, supevision to tub bench   Short term goal 2: mod I UB ADLs -   Short term goal 3: min A LB ADLs with AE PRN   Short term goal 4: mod I toileting   Short term goal 5: SBA simple meal prep   Long term goals  Time Frame for Long term goals : LTG=STG       Therapy Time   Individual Concurrent Group Co-treatment   Time In 0900  (1030)         Time Out 1000 (1100)         Minutes 60  (30)          Timed Code Treatment Minutes: 60 and 30     Total Treatment Minutes:   German 6471,  [address] (second tx session)     ROWDY Rogers/L [id number]    I have reviewed and am in agreement with this treatment session.     STORMY Covington OTR/L [id number]

## 2019-08-28 PROCEDURE — 6360000002 HC RX W HCPCS: Performed by: PHYSICAL MEDICINE & REHABILITATION

## 2019-08-28 PROCEDURE — 6370000000 HC RX 637 (ALT 250 FOR IP): Performed by: PHYSICAL MEDICINE & REHABILITATION

## 2019-08-28 PROCEDURE — 97535 SELF CARE MNGMENT TRAINING: CPT

## 2019-08-28 PROCEDURE — 97116 GAIT TRAINING THERAPY: CPT

## 2019-08-28 PROCEDURE — 1280000000 HC REHAB R&B

## 2019-08-28 PROCEDURE — 97530 THERAPEUTIC ACTIVITIES: CPT

## 2019-08-28 PROCEDURE — 97127 HC SP THER IVNTJ W/FOCUS COG FUNCJ: CPT

## 2019-08-28 PROCEDURE — 97110 THERAPEUTIC EXERCISES: CPT

## 2019-08-28 RX ADMIN — HYDROCODONE BITARTRATE AND ACETAMINOPHEN 2 TABLET: 5; 325 TABLET ORAL at 21:03

## 2019-08-28 RX ADMIN — DOCUSATE SODIUM 100 MG: 100 CAPSULE, LIQUID FILLED ORAL at 21:00

## 2019-08-28 RX ADMIN — HYDROCODONE BITARTRATE AND ACETAMINOPHEN 2 TABLET: 5; 325 TABLET ORAL at 10:24

## 2019-08-28 RX ADMIN — ENOXAPARIN SODIUM 30 MG: 30 INJECTION SUBCUTANEOUS at 08:22

## 2019-08-28 RX ADMIN — DULOXETINE HYDROCHLORIDE 30 MG: 30 CAPSULE, DELAYED RELEASE ORAL at 08:22

## 2019-08-28 RX ADMIN — HYDROCORTISONE: 1 CREAM TOPICAL at 21:22

## 2019-08-28 RX ADMIN — DOCUSATE SODIUM 100 MG: 100 CAPSULE, LIQUID FILLED ORAL at 08:22

## 2019-08-28 RX ADMIN — ACETAMINOPHEN 675 MG: 325 TABLET, FILM COATED ORAL at 14:27

## 2019-08-28 RX ADMIN — PANTOPRAZOLE SODIUM 40 MG: 40 TABLET, DELAYED RELEASE ORAL at 05:47

## 2019-08-28 RX ADMIN — DONEPEZIL HYDROCHLORIDE 5 MG: 5 TABLET, FILM COATED ORAL at 21:00

## 2019-08-28 RX ADMIN — LEVOTHYROXINE SODIUM 50 MCG: 25 TABLET ORAL at 05:47

## 2019-08-28 RX ADMIN — HYDROCORTISONE: 1 CREAM TOPICAL at 09:04

## 2019-08-28 RX ADMIN — HYDROCODONE BITARTRATE AND ACETAMINOPHEN 2 TABLET: 5; 325 TABLET ORAL at 05:47

## 2019-08-28 ASSESSMENT — PAIN DESCRIPTION - ORIENTATION
ORIENTATION: LEFT
ORIENTATION: LEFT

## 2019-08-28 ASSESSMENT — PAIN DESCRIPTION - PAIN TYPE
TYPE: ACUTE PAIN
TYPE: ACUTE PAIN

## 2019-08-28 ASSESSMENT — PAIN DESCRIPTION - LOCATION
LOCATION: KNEE
LOCATION: HIP

## 2019-08-28 ASSESSMENT — PAIN SCALES - GENERAL
PAINLEVEL_OUTOF10: 7
PAINLEVEL_OUTOF10: 8
PAINLEVEL_OUTOF10: 10
PAINLEVEL_OUTOF10: 10
PAINLEVEL_OUTOF10: 0
PAINLEVEL_OUTOF10: 10

## 2019-08-28 ASSESSMENT — PAIN DESCRIPTION - DESCRIPTORS: DESCRIPTORS: ACHING;CONSTANT

## 2019-08-28 ASSESSMENT — PAIN DESCRIPTION - FREQUENCY: FREQUENCY: INTERMITTENT

## 2019-08-28 ASSESSMENT — PAIN DESCRIPTION - DIRECTION: RADIATING_TOWARDS: L FOOT

## 2019-08-28 NOTE — PROGRESS NOTES
ACUTE REHAB UNIT  SPEECH/LANGUAGE PATHOLOGY      [x] Daily  [] Weekly Care Conference Note  [] Discharge    Clair Renee     FHV:[id number]  PVB:[id number]  Room #: DGV-6776/1491-31   Rehab Dx/Hx: Closed fracture of left femur [facility]) Dexter Mcwilliams  Date of Admit: 2019      Precautions: falls  Home situation: Pt lives in an apartment by herself. Pt no longer driving. Pt's daughter recently began assisting with medical and finance management due to memory decline. ST Dx: Cognitive linguistic deficits     Daily Treatment Info:   Date: 2019   Tx session 1   Pain Left knee pain. Denied intervention    Subjective     Pt sitting up in recliner chair. Remained alert and engaged in session. Objective:  Goals    Patient will be instructed in memory strategies to utilize in order to promote recall of newly learned information with min cues   Reviewed 'writing list' and 'calendar' strategies from improved recall of daily information and management of scheduling. Pt reported she was using a schedule but got behind on it so her daughter took it over. Encouraged pt to begin use to remain more engaged in self care. Verbalized understanding and agreement. Completed working memory task (My sister Laurie Smith)   Recalled list of 10 objects with 90% accuracy, using built in rehearsal and repetition techniques. Completed 'Listening Comprehension' subtest of  [address] Processing Assessment - Geriatric, 2nd Edition (RIPA )  Scored in 44th percentile, WNL in severity according to age comparison. Pt did have moderate difficulty with story recall      Pt will recall safety routines in variety of environments given occ cues    Goal not targeted this session. Pt will complete basic problem solving tasks (finance/medication management) with 80% accuracy or min cues    Pt reported that her neighbor has her daughter's phone number and has used to call in emergency when she has fallen.      Pt will

## 2019-08-28 NOTE — PROGRESS NOTES
disease    False positive syphilis serology    Memory deficit    Closed displaced intertrochanteric fracture of left femur ([address]    Closed fracture of left femur (HCC)       Plan:   Left intertrochanteric femur fracture: S/P IMN on 8/21 with Dr. Jhonny Blount. WBAT. Pain control. PT/OT.     Postoperative anemia: Monitor     Mild dementia: Aricept per home regimen     Hypothyroidism: Synthroid 50      Decreased cognition: SLP     Bowels: Per protocol  Bladder: Per protocol   Sleep: Trazodone provided prn. Pain: Cymbalta, Norco  DVT PPx: Lovenox, AC for 4 weeks    Angela Yeung MD 8/28/2019, 8:52 AM    * This document was created using dictation software. While all precautions were taken to ensure accuracy, errors may have occurred. Please disregard any typographical errors.

## 2019-08-29 LAB
ANION GAP SERPL CALCULATED.3IONS-SCNC: 8 MMOL/L (ref 3–16)
BUN BLDV-MCNC: 23 MG/DL (ref 7–20)
CALCIUM SERPL-MCNC: 8.4 MG/DL (ref 8.3–10.6)
CHLORIDE BLD-SCNC: 101 MMOL/L (ref 99–110)
CO2: 30 MMOL/L (ref 21–32)
CREAT SERPL-MCNC: 0.8 MG/DL (ref 0.6–1.2)
GFR AFRICAN AMERICAN: >60
GFR NON-AFRICAN AMERICAN: >60
GLUCOSE BLD-MCNC: 104 MG/DL (ref 70–99)
HCT VFR BLD CALC: 33 % (ref 36–48)
HEMOGLOBIN: 11 G/DL (ref 12–16)
MCH RBC QN AUTO: 31.3 PG (ref 26–34)
MCHC RBC AUTO-ENTMCNC: 33.3 G/DL (ref 31–36)
MCV RBC AUTO: 93.9 FL (ref 80–100)
PDW BLD-RTO: 13.2 % (ref 12.4–15.4)
PLATELET # BLD: 321 K/UL (ref 135–450)
PMV BLD AUTO: 7.6 FL (ref 5–10.5)
POTASSIUM SERPL-SCNC: 4.2 MMOL/L (ref 3.5–5.1)
RBC # BLD: 3.52 M/UL (ref 4–5.2)
SODIUM BLD-SCNC: 139 MMOL/L (ref 136–145)
WBC # BLD: 7.5 K/UL (ref 4–11)

## 2019-08-29 PROCEDURE — 6360000002 HC RX W HCPCS: Performed by: PHYSICAL MEDICINE & REHABILITATION

## 2019-08-29 PROCEDURE — 85027 COMPLETE CBC AUTOMATED: CPT

## 2019-08-29 PROCEDURE — 97110 THERAPEUTIC EXERCISES: CPT

## 2019-08-29 PROCEDURE — 97535 SELF CARE MNGMENT TRAINING: CPT

## 2019-08-29 PROCEDURE — 97530 THERAPEUTIC ACTIVITIES: CPT

## 2019-08-29 PROCEDURE — 97127 HC SP THER IVNTJ W/FOCUS COG FUNCJ: CPT

## 2019-08-29 PROCEDURE — 36415 COLL VENOUS BLD VENIPUNCTURE: CPT

## 2019-08-29 PROCEDURE — 1280000000 HC REHAB R&B

## 2019-08-29 PROCEDURE — 6370000000 HC RX 637 (ALT 250 FOR IP): Performed by: PHYSICAL MEDICINE & REHABILITATION

## 2019-08-29 PROCEDURE — 97116 GAIT TRAINING THERAPY: CPT

## 2019-08-29 PROCEDURE — 80048 BASIC METABOLIC PNL TOTAL CA: CPT

## 2019-08-29 RX ADMIN — DONEPEZIL HYDROCHLORIDE 5 MG: 5 TABLET, FILM COATED ORAL at 20:43

## 2019-08-29 RX ADMIN — ENOXAPARIN SODIUM 30 MG: 30 INJECTION SUBCUTANEOUS at 09:12

## 2019-08-29 RX ADMIN — DOCUSATE SODIUM 100 MG: 100 CAPSULE, LIQUID FILLED ORAL at 20:43

## 2019-08-29 RX ADMIN — DULOXETINE HYDROCHLORIDE 30 MG: 30 CAPSULE, DELAYED RELEASE ORAL at 09:12

## 2019-08-29 RX ADMIN — HYDROCODONE BITARTRATE AND ACETAMINOPHEN 2 TABLET: 5; 325 TABLET ORAL at 11:30

## 2019-08-29 RX ADMIN — HYDROCODONE BITARTRATE AND ACETAMINOPHEN 2 TABLET: 5; 325 TABLET ORAL at 06:59

## 2019-08-29 RX ADMIN — PANTOPRAZOLE SODIUM 40 MG: 40 TABLET, DELAYED RELEASE ORAL at 06:12

## 2019-08-29 RX ADMIN — HYDROCORTISONE: 1 CREAM TOPICAL at 09:12

## 2019-08-29 RX ADMIN — DOCUSATE SODIUM 100 MG: 100 CAPSULE, LIQUID FILLED ORAL at 09:12

## 2019-08-29 RX ADMIN — HYDROCORTISONE: 1 CREAM TOPICAL at 20:44

## 2019-08-29 RX ADMIN — LEVOTHYROXINE SODIUM 50 MCG: 25 TABLET ORAL at 06:12

## 2019-08-29 RX ADMIN — HYDROCODONE BITARTRATE AND ACETAMINOPHEN 2 TABLET: 5; 325 TABLET ORAL at 20:43

## 2019-08-29 ASSESSMENT — PAIN DESCRIPTION - ORIENTATION
ORIENTATION: LEFT

## 2019-08-29 ASSESSMENT — PAIN SCALES - GENERAL
PAINLEVEL_OUTOF10: 3
PAINLEVEL_OUTOF10: 0
PAINLEVEL_OUTOF10: 6
PAINLEVEL_OUTOF10: 3
PAINLEVEL_OUTOF10: 4
PAINLEVEL_OUTOF10: 8
PAINLEVEL_OUTOF10: 8

## 2019-08-29 ASSESSMENT — PAIN DESCRIPTION - LOCATION
LOCATION: KNEE

## 2019-08-29 ASSESSMENT — PAIN DESCRIPTION - PAIN TYPE
TYPE: ACUTE PAIN

## 2019-08-29 ASSESSMENT — PAIN DESCRIPTION - DESCRIPTORS: DESCRIPTORS: CONSTANT

## 2019-08-29 NOTE — PROGRESS NOTES
ACUTE REHAB UNIT  SPEECH/LANGUAGE PATHOLOGY      [x] Daily  [x] Weekly Care Conference Note  [] Discharge    Sun Payne     [facility]:0/45/3354  IYT:[id number]  Room #: LQY-2030/1586-50   Rehab Dx/Hx: Closed fracture of left femur [facility]) Hellen Lozoya  Date of Admit: 8/23/2019      Precautions: falls  Home situation: Pt lives in an apartment by herself. Pt no longer driving. Pt's daughter recently began assisting with medical and finance management due to memory decline. ST Dx: Cognitive linguistic deficits     Daily Treatment Info:   Date: 8/29/2019   Tx session 1 Tx session 2   Pain Reported 10/10 pain in hip. Notified PCA/RN via call light. None reported   Subjective     Pt seen sitting upright in wheelchair following OT session. Pt pleasant and cooperative. Missed minutes due to OT session running into ST session. Will make up minutes later this date. Made up missed minutes from earlier session. Pt seen sitting upright in wheelchair. She was alert and cooperative. Daughter present during session. Weekly Conference: Pt presents with mild to moderate cognitive linguistic deficits characterized by reduced short term memory resulting in reduced safety in environment (frequent falls, getting lost, forgetting life alert), ability to complete problem solving tasks (medication and finance management) and reduced thought organization (occasional word finding). Pt would benefit from increasing engagement in functional cognitive tasks. Objective:  Goals     Patient will be instructed in memory strategies to utilize in order to promote recall of newly learned information with min cues   Reviewed writing lists and using a calendar/schedule strategies discussed yesterday. Educated pt re: use of mnemonic devices to assist with memory. Provided mnemonic device to recall memory strategies (WRAP=write, repeat, associate, picture). Provided examples of each strategy.  Pt provided an example of how she utilized

## 2019-08-29 NOTE — PROGRESS NOTES
39 Minutes     Second Session Therapy Time:   Individual Concurrent Group Co-treatment   Time In 3932         Time Out 1315         Minutes 30           Timed Code Treatment Minutes:  45 + 30    Total Treatment Minutes:  75 minutes    Kingsley Keith PT, DPT - [id number]

## 2019-08-30 PROCEDURE — 97535 SELF CARE MNGMENT TRAINING: CPT

## 2019-08-30 PROCEDURE — 97127 HC SP THER IVNTJ W/FOCUS COG FUNCJ: CPT

## 2019-08-30 PROCEDURE — 97116 GAIT TRAINING THERAPY: CPT

## 2019-08-30 PROCEDURE — 6370000000 HC RX 637 (ALT 250 FOR IP): Performed by: PHYSICAL MEDICINE & REHABILITATION

## 2019-08-30 PROCEDURE — 97110 THERAPEUTIC EXERCISES: CPT

## 2019-08-30 PROCEDURE — 1280000000 HC REHAB R&B

## 2019-08-30 PROCEDURE — 97530 THERAPEUTIC ACTIVITIES: CPT

## 2019-08-30 PROCEDURE — 6360000002 HC RX W HCPCS: Performed by: PHYSICAL MEDICINE & REHABILITATION

## 2019-08-30 RX ORDER — FUROSEMIDE 40 MG/1
40 TABLET ORAL DAILY
Status: COMPLETED | OUTPATIENT
Start: 2019-08-30 | End: 2019-08-31

## 2019-08-30 RX ADMIN — ENOXAPARIN SODIUM 30 MG: 30 INJECTION SUBCUTANEOUS at 09:01

## 2019-08-30 RX ADMIN — PANTOPRAZOLE SODIUM 40 MG: 40 TABLET, DELAYED RELEASE ORAL at 05:15

## 2019-08-30 RX ADMIN — DONEPEZIL HYDROCHLORIDE 5 MG: 5 TABLET, FILM COATED ORAL at 20:09

## 2019-08-30 RX ADMIN — HYDROCORTISONE: 1 CREAM TOPICAL at 09:04

## 2019-08-30 RX ADMIN — DULOXETINE HYDROCHLORIDE 30 MG: 30 CAPSULE, DELAYED RELEASE ORAL at 09:01

## 2019-08-30 RX ADMIN — DOCUSATE SODIUM 100 MG: 100 CAPSULE, LIQUID FILLED ORAL at 09:01

## 2019-08-30 RX ADMIN — LEVOTHYROXINE SODIUM 50 MCG: 25 TABLET ORAL at 05:15

## 2019-08-30 RX ADMIN — DOCUSATE SODIUM 100 MG: 100 CAPSULE, LIQUID FILLED ORAL at 20:10

## 2019-08-30 RX ADMIN — FUROSEMIDE 40 MG: 40 TABLET ORAL at 09:01

## 2019-08-30 RX ADMIN — HYDROCODONE BITARTRATE AND ACETAMINOPHEN 2 TABLET: 5; 325 TABLET ORAL at 05:15

## 2019-08-30 RX ADMIN — HYDROCORTISONE: 1 CREAM TOPICAL at 20:10

## 2019-08-30 RX ADMIN — HYDROCODONE BITARTRATE AND ACETAMINOPHEN 1 TABLET: 5; 325 TABLET ORAL at 20:09

## 2019-08-30 ASSESSMENT — PAIN SCALES - GENERAL
PAINLEVEL_OUTOF10: 0
PAINLEVEL_OUTOF10: 3
PAINLEVEL_OUTOF10: 0
PAINLEVEL_OUTOF10: 10
PAINLEVEL_OUTOF10: 0

## 2019-08-30 NOTE — PROGRESS NOTES
Von Reese  8/30/2019  [id number]    Chief Complaint: Closed fracture of left femur ([facility] [address]    Subjective:   No overnight events. No current complaints. Pain controlled currently. ROS: No CP, SOB, dyspnea    Objective:  Patient Vitals for the past 24 hrs:   BP Temp Temp src Pulse Resp SpO2   08/30/19 0745 112/65 97.6 °F (36.4 °C) Oral 88 18 97 %   08/29/19 2030 121/63 98 °F (36.7 °C) Oral 83 18 95 %     Gen: No distress, pleasant. Resting in bed  HEENT: Normocephalic, atraumatic  CV: Regular rate and rhythm. No MRG   Resp: No respiratory distress. CTAB   Abd: Soft, nontender nondistended  Ext: 2+ LLE and 1+ RLE edema. Left thigh wound well approximated with moderate serosanguineous drainage  Neuro: Alert, oriented, appropriately interactive. Laboratory data: Available via EMR. Therapy progress:  PT  Position Activity Restriction  Other position/activity restrictions: Pt fell after losing their balance while going to the kitchen to get popcorn. Pt states they fell on their left hip. Has hx of falls, uses cane and walker. Denies taking bloodthinner   Objective     Sit to Stand: Stand by assistance  Stand to sit: Stand by assistance  Bed to Chair: Stand by assistance  Device: Rolling Walker  Assistance: Stand by assistance  Distance: 140 feet  OT  PT Equipment Recommendations  Equipment Needed: No  Toilet - Technique: Ambulating  Equipment Used: Standard toilet  Toilet Transfers Comments: use of grab bars  Assessment        SLP                Body mass index is 30.45 kg/m².     Assessment:  Patient Active Problem List   Diagnosis    Nephrotic syndrome    Edema    Hypothyroid    Multiple joint pain    Age-related osteoporosis without current pathological fracture    Closed fracture of right femur ([facility] [address]    Closed displaced comminuted fracture of shaft of right femur ([facility] [address]    Primary osteoarthritis of right knee    Gastroesophageal reflux disease    False positive syphilis serology    Memory deficit    Closed displaced intertrochanteric fracture of left femur ([address]    Closed fracture of left femur (HCC)       Plan:   Left intertrochanteric femur fracture: S/P IMN on 8/21 with Dr. Marnie Miller. WBAT. Pain control. PT/OT.     Postoperative anemia: Monitor     Mild dementia: Aricept per home regimen     Hypothyroidism: Synthroid 50      Decreased cognition: SLP    BLE edema: recurrent - start lasix 40 x2 and daily weights     Bowels: Per protocol  Bladder: Per protocol   Sleep: Trazodone provided prn. Pain: Cymbalta, Norco  DVT PPx: Lovenox, AC for 4 weeks    Kalen Brower MD 8/30/2019, 4:17 PM    * This document was created using dictation software. While all precautions were taken to ensure accuracy, errors may have occurred. Please disregard any typographical errors.

## 2019-08-30 NOTE — PROGRESS NOTES
Assist patient to toilet. Patient tolerated well. Assist back to chair. Dressing to left lower extremity saturated. New dressing applied. Assessment completed. See flow sheet. Denies any need for pain intervention. Pain tolerable at 3 on 10 scale.

## 2019-08-30 NOTE — PROGRESS NOTES
Physical Therapy  Facility/Department: [facility] ACUTE REHAB UNIT  Daily Treatment Note  NAME: Khadra January  : 1930  MRN: [id number]    Date of Service: 2019    Discharge Recommendations:  Home with assist PRN, Home with Home health PT, S Level 3        Assessment   Body structures, Functions, Activity limitations: Decreased functional mobility ; Decreased ADL status; Decreased ROM; Decreased strength;Decreased safe awareness;Decreased endurance;Decreased balance; Increased Pain  Assessment: Pt presenting with continued improvement in gait mechanics and significant increase in ambulation distance. Pt would benefit from ongoing skilled PT services to regain baseline functional independence to decrease risk of falling. Treatment Diagnosis: impaired gait, weakness, impaired balance  Prognosis: Good  PT Education: Gait Training;Plan of Care; Functional Mobility Training;Transfer Training;Equipment  Barriers to Learning: [place]  REQUIRES PT FOLLOW UP: Yes  Activity Tolerance  Activity Tolerance: Patient Tolerated treatment well     Patient Diagnosis(es): IM Nailing L hip   has a past medical history of Acute renal failure ([address] Arthritis, At risk for falling, Frequent falls, Gastroesophageal reflux disease, Movement disorder, Nephrotic syndrome, and Thyroid disease. has a past surgical history that includes Excision of Facial Mass; skin biopsy; Colonoscopy; eye surgery; other surgical history (Right, 2018); and Hip fracture surgery (Left, 2019). Restrictions  Restrictions/Precautions  Restrictions/Precautions: Fall Risk(high fall risk )  Lower Extremity Weight Bearing Restrictions  Right Lower Extremity Weight Bearing: Weight Bearing As Tolerated  Position Activity Restriction  Other position/activity restrictions: Pt fell after losing their balance while going to the kitchen to get popcorn. Pt states they fell on their left hip. Has hx of falls, uses cane and walker.  Denies taking bloodthinner

## 2019-08-30 NOTE — PROGRESS NOTES
risk for falling, Frequent falls, Gastroesophageal reflux disease, Movement disorder, Nephrotic syndrome, and Thyroid disease. has a past surgical history that includes Excision of Facial Mass; skin biopsy; Colonoscopy; eye surgery; other surgical history (Right, 05/27/2018); and Hip fracture surgery (Left, 8/21/2019). Restrictions  Restrictions/Precautions  Restrictions/Precautions: Fall Risk(high fall risk )  Lower Extremity Weight Bearing Restrictions  Right Lower Extremity Weight Bearing: Weight Bearing As Tolerated  Position Activity Restriction  Other position/activity restrictions: Pt fell after losing their balance while going to the kitchen to get popcorn. Pt states they fell on their left hip. Has hx of falls, uses cane and walker. Denies taking bloodthinner   Subjective   General  Chart Reviewed: Yes  Patient assessed for rehabilitation services?: Yes  Response to previous treatment: Patient with no complaints from previous session  Family / Caregiver Present: No  Referring Practitioner: Lorraine Perdomo MD, for d/c planning  Diagnosis: L femur fx/ s/p ORIF  Subjective  Subjective: Pt seated in chair upon entry, pleasant and having just returned from toileting with nursing. Pt agreeable to therapy session/ADL shower.   Vital Signs  Patient Currently in Pain: Denies   Orientation  Orientation  Overall Orientation Status: Within Functional Limits  Objective    ADL  Equipment Provided: Reacher;Long-handled sponge;Sock aid  Grooming: Setup  UE Bathing: Modified independent   LE Bathing: Contact guard assistance  UE Dressing: Modified independent   LE Dressing: Contact guard assistance  Toileting: Contact guard assistance     Balance  Standing Balance: Contact guard assistance(SBA for static and CGA for dynamic)  Functional Mobility  Functional - Mobility Device: Rolling Walker  Assist Level: Stand by assistance  Toilet Transfers  Toilet - Technique: Ambulating  Equipment Used: Standard toilet  Toilet reps x2, wrist flexion/exension x10 reps x2, push/pull x10 reps x2. Handoff w/ PT in therapy gym at end of session.          Plan   Plan  Times per week: 75 minutes 5 days per week   Times per day: Daily  Specific instructions for Next Treatment: cotx  Current Treatment Recommendations: Endurance Training, Functional Mobility Training, Balance Training, Safety Education & Training, Equipment Evaluation, Education, & procurement, Self-Care / ADL, Patient/Caregiver Education & Training    Goals  Short term goals  Time Frame for Short term goals: 10 days   Short term goal 1: mod I functional transfer to toilet, supevision to tub bench   Short term goal 2: mod I UB ADLs -   Short term goal 3: min A LB ADLs with AE PRN - goal met 8/30  Short term goal 4: mod I toileting   Short term goal 5: SBA simple meal prep   Long term goals  Time Frame for Long term goals : LTG=STG         First Session Therapy Time:   Individual Concurrent Group Co-treatment   Time In 900         Time Out 930         Minutes 30            Timed Code Treatment Minutes:  30         Therapy Time   Individual Concurrent Group Co-treatment   Time In 1100         Time Out 1145         Minutes 45         Timed Code Treatment Minutes: 45 Minutes       Timed Code Treatment Minutes:  30 + 45   Total Treatment Minutes:  75 minutes     Lenard GROVE/JOSE [place] #[id number]  (Treatment and documentation for first session only)     TALAT Peters [id number]  Cosign: Lenard CONNER [place] #[id number]

## 2019-08-31 PROCEDURE — 97116 GAIT TRAINING THERAPY: CPT

## 2019-08-31 PROCEDURE — 6370000000 HC RX 637 (ALT 250 FOR IP): Performed by: PHYSICAL MEDICINE & REHABILITATION

## 2019-08-31 PROCEDURE — 97127 HC SP THER IVNTJ W/FOCUS COG FUNCJ: CPT

## 2019-08-31 PROCEDURE — 1280000000 HC REHAB R&B

## 2019-08-31 PROCEDURE — 97110 THERAPEUTIC EXERCISES: CPT

## 2019-08-31 PROCEDURE — 94760 N-INVAS EAR/PLS OXIMETRY 1: CPT

## 2019-08-31 PROCEDURE — 6360000002 HC RX W HCPCS: Performed by: PHYSICAL MEDICINE & REHABILITATION

## 2019-08-31 PROCEDURE — 97530 THERAPEUTIC ACTIVITIES: CPT

## 2019-08-31 PROCEDURE — 97535 SELF CARE MNGMENT TRAINING: CPT

## 2019-08-31 RX ADMIN — PANTOPRAZOLE SODIUM 40 MG: 40 TABLET, DELAYED RELEASE ORAL at 05:54

## 2019-08-31 RX ADMIN — HYDROCORTISONE: 1 CREAM TOPICAL at 20:50

## 2019-08-31 RX ADMIN — HYDROCORTISONE: 1 CREAM TOPICAL at 10:17

## 2019-08-31 RX ADMIN — DONEPEZIL HYDROCHLORIDE 5 MG: 5 TABLET, FILM COATED ORAL at 20:38

## 2019-08-31 RX ADMIN — HYDROCODONE BITARTRATE AND ACETAMINOPHEN 1 TABLET: 5; 325 TABLET ORAL at 05:54

## 2019-08-31 RX ADMIN — HYDROCODONE BITARTRATE AND ACETAMINOPHEN 2 TABLET: 5; 325 TABLET ORAL at 10:16

## 2019-08-31 RX ADMIN — ENOXAPARIN SODIUM 30 MG: 30 INJECTION SUBCUTANEOUS at 10:16

## 2019-08-31 RX ADMIN — DOCUSATE SODIUM 100 MG: 100 CAPSULE, LIQUID FILLED ORAL at 10:16

## 2019-08-31 RX ADMIN — FUROSEMIDE 40 MG: 40 TABLET ORAL at 10:16

## 2019-08-31 RX ADMIN — HYDROCODONE BITARTRATE AND ACETAMINOPHEN 2 TABLET: 5; 325 TABLET ORAL at 20:37

## 2019-08-31 RX ADMIN — LEVOTHYROXINE SODIUM 50 MCG: 25 TABLET ORAL at 05:54

## 2019-08-31 RX ADMIN — DOCUSATE SODIUM 100 MG: 100 CAPSULE, LIQUID FILLED ORAL at 20:38

## 2019-08-31 RX ADMIN — DULOXETINE HYDROCHLORIDE 30 MG: 30 CAPSULE, DELAYED RELEASE ORAL at 10:16

## 2019-08-31 ASSESSMENT — PAIN DESCRIPTION - PAIN TYPE: TYPE: ACUTE PAIN

## 2019-08-31 ASSESSMENT — PAIN DESCRIPTION - PROGRESSION
CLINICAL_PROGRESSION: GRADUALLY WORSENING
CLINICAL_PROGRESSION: GRADUALLY IMPROVING
CLINICAL_PROGRESSION: GRADUALLY WORSENING

## 2019-08-31 ASSESSMENT — PAIN DESCRIPTION - ORIENTATION: ORIENTATION: LEFT

## 2019-08-31 ASSESSMENT — PAIN DESCRIPTION - ONSET
ONSET: ON-GOING
ONSET: ON-GOING

## 2019-08-31 ASSESSMENT — PAIN SCALES - GENERAL
PAINLEVEL_OUTOF10: 7
PAINLEVEL_OUTOF10: 0
PAINLEVEL_OUTOF10: 5
PAINLEVEL_OUTOF10: 2
PAINLEVEL_OUTOF10: 10

## 2019-08-31 ASSESSMENT — PAIN DESCRIPTION - LOCATION: LOCATION: KNEE

## 2019-08-31 ASSESSMENT — PAIN - FUNCTIONAL ASSESSMENT: PAIN_FUNCTIONAL_ASSESSMENT: PREVENTS OR INTERFERES WITH MANY ACTIVE NOT PASSIVE ACTIVITIES

## 2019-08-31 ASSESSMENT — PAIN DESCRIPTION - DESCRIPTORS: DESCRIPTORS: ACHING;SHARP

## 2019-08-31 ASSESSMENT — PAIN DESCRIPTION - FREQUENCY: FREQUENCY: INTERMITTENT

## 2019-08-31 ASSESSMENT — PAIN DESCRIPTION - DIRECTION: RADIATING_TOWARDS: FOOT

## 2019-08-31 NOTE — PROGRESS NOTES
reduced safety in environment (frequent falls, getting lost, forgetting life alert), ability to complete problem solving tasks (medication and finance management) and reduced thought organization (occasional word finding),     Current Diet Order: DIET GENERAL;  Dietary Nutrition Supplements: Standard High Calorie Oral Supplement            Plan:     Frequency:  5days/week   30 minutes/day  Discharge Recommendations:   Barriers: cognition   Discharge Recommendations:  [] Home independently  [x] Home with assistance []  24 hour supervision  [] SNF [] Other:  Continued SLP Treatment:  [x] Yes [] No [x] TBD based on progress while on ARU [] Vital Stim indicated [] Other:   Estimated discharge date: 9/6/19    Type of Total Treatment Minutes   Session 1     Time In 1130   Time Out 1200   Timed Code Minutes  30   Individual Treatment Minutes  30   Co-Treatment Minutes     Group Treatment Minutes     Concurrent Treatment Minutes       TOTAL DAILY MINUTES:  30    Electronically Signed by     Debbie REY CCC-SLP,   Speech-Language Pathologist   8/31/2019 12:33 PM

## 2019-08-31 NOTE — PROGRESS NOTES
Occupational Therapy  Facility/Department: [facility] ACUTE REHAB UNIT  Daily Treatment Note  NAME: Mandi Henson  : 1930  MRN: [id number]    Date of Service: 2019    Discharge Recommendations:  Home with assist PRN, Home with nursing aide, Home with Home health OT, S Level 3  OT Equipment Recommendations  Other: pt owns: tub transfer bench, has reacher and sock aid and elevated commode with arms     Assessment   Performance deficits / Impairments: Decreased functional mobility ; Decreased ADL status; Decreased endurance;Decreased balance;Decreased high-level IADLs  Assessment: Pt is not at her baseline level of occupational function, based on the above deficits associated with L femur fx, s/p ORIF. Pt would benefit from continued skilled acute OT services to address these deficits. Demo's increased independence with transfers, mobility and LB dressing    Treatment Diagnosis: Decreased ADL/IADL status, functional mobility, endurance, and balance associated with L femur fx, s/p ORIF  Prognosis: Good  REQUIRES OT FOLLOW UP: Yes  Activity Tolerance  Activity Tolerance: Patient Tolerated treatment well  Safety Devices  Safety Devices in place: Yes  Type of devices: Call light within reach; Chair alarm in place; Left in chair; All fall risk precautions in place  Restraints  Initially in place: No         Patient Diagnosis(es): L hip fracture with ORIF      has a past medical history of Acute renal failure ([address] Arthritis, At risk for falling, Frequent falls, Gastroesophageal reflux disease, Movement disorder, Nephrotic syndrome, and Thyroid disease. has a past surgical history that includes Excision of Facial Mass; skin biopsy; Colonoscopy; eye surgery; other surgical history (Right, 2018); and Hip fracture surgery (Left, 2019).     Restrictions  Restrictions/Precautions  Restrictions/Precautions: Fall Risk(high fall risk )  Lower Extremity Weight Bearing Restrictions  Right Lower Extremity Weight Bearing: Weight Bearing As Tolerated  Position Activity Restriction  Other position/activity restrictions: Pt fell after losing their balance while going to the kitchen to get popcorn. Pt states they fell on their left hip. Has hx of falls, uses cane and walker. Denies taking bloodthinner   Subjective   General  Chart Reviewed: Yes  Patient assessed for rehabilitation services?: Yes  Response to previous treatment: Patient with no complaints from previous session  Family / Caregiver Present: No  Referring Practitioner: Dr. John Sam  Diagnosis: L femur fx/ s/p ORIF  Subjective  Subjective: \"I have no pain unless I move\" states she took pain medications recently   General Comment  Comments: Pt sit to stand SBA and pt ambulated with rw at SBA ~20 ft total (stopping to retrieve clothing from closet using rw) and entering bathroom. Pt stated her brief was dry, pt incontinent of urine in brief. Pt shower transfer (SBA to and CGA from shower chair). Pt bathed UB (Mod I) and LB (CGA in stance to wash periarea/buttocks, sock aid, reacher). Pt became incontinent of urine in shower. Pt combed hair in shower at setup. Pt then toilet transfer SBA and pt toileted CGA. Pt then ambulated SBA to recliner ~20 ft with rw and stand to sit SBA. Pt fatigued at end of therapy session. Call light in reach and chair alarm on.       Orientation  Orientation  Overall Orientation Status: Within Functional Limits  Objective    ADL  Grooming: Setup;Supervision(in standing at sink, heavy reliance on elbows/UEs for prolonged standing )  UE Dressing: Modified independent ;Setup  LE Dressing: Minimal assistance(assist with donning and tying shoes, able to denise underwear and pants with reacher and socks with sock aid )  Toileting: Stand by assistance  Additional Comments: pt in bed on arrival - ambulated to/from bathroom with RW and SBA - voided urine and sponge bathed LB, changed clothing from raised toilet seat with rails using reacher, sock aid - stood at sink to brush teeth - pt c/o difficulty with FM coordination during ADL tasks and provided with theraputty and written exercises once sitting in chair - set up with breakfast end of session         Toilet Transfers  Equipment Used: Raised toilet seat with rails  Toilet Transfer: Stand by assistance  Bed mobility  Supine to Sit: Stand by assistance(HOB raised and use of grab bar )  Transfers  Stand Step Transfers: Stand by assistance         Plan   Plan  Times per week: 75 minutes 5 days per week   Times per day: Daily  Specific instructions for Next Treatment: cotx  Current Treatment Recommendations: Endurance Training, Functional Mobility Training, Balance Training, Safety Education & Training, Equipment Evaluation, Education, & procurement, Self-Care / ADL, Patient/Caregiver Education & Training       Goals  Short term goals  Time Frame for Short term goals: 10 days   Short term goal 1: mod I functional transfer to toilet, supevision to tub bench   Short term goal 2: mod I UB ADLs -   Short term goal 3: min A LB ADLs with AE PRN - goal met 8/30  Short term goal 4: mod I toileting   Short term goal 5: SBA simple meal prep   Long term goals  Time Frame for Long term goals : LTG=STG       Therapy Time   Individual Concurrent Group Co-treatment   Time In 0830         Time Out 0912         Minutes 42         Timed Code Treatment Minutes: 42 Minutes   Total minutes 42    Rohith Hopkins, OT   Rohith GROVE/JOSE [id number], 8/31/2019, 9:26 AM

## 2019-08-31 NOTE — PROGRESS NOTES
Physical Therapy  Facility/Department: [facility] ACUTE REHAB UNIT  Daily Treatment Note  NAME: Sukhwinder Esparza  : 1930  MRN: [id number]    Date of Service: 2019    Discharge Recommendations:  Home with assist PRN, Home with Home health PT, S Level 3   PT Equipment Recommendations  Equipment Needed: No    Assessment   Body structures, Functions, Activity limitations: Decreased functional mobility ; Decreased ADL status; Decreased ROM; Decreased strength;Decreased safe awareness;Decreased endurance;Decreased balance; Increased Pain  Assessment: Pt presenting with continued improvement in stability during transfers and continued improvement in ambulation distance. Pt would benefit from ongoing skilled PT services to regain baseline functional independence to decrease risk of falling. Treatment Diagnosis: impaired gait, weakness, impaired balance  Prognosis: Good  PT Education: Gait Training;Plan of Care; Functional Mobility Training;Transfer Training;Equipment  Barriers to Learning: [place]  REQUIRES PT FOLLOW UP: Yes  Activity Tolerance  Activity Tolerance: Patient Tolerated treatment well;Patient limited by pain  Activity Tolerance: Ongoing c/o pain with mobility tasks, but presenting with increased capacity to complete standing based activities. Patient Diagnosis(es): (L) femur fracture   has a past medical history of Acute renal failure ([facility] [address] Arthritis, At risk for falling, Frequent falls, Gastroesophageal reflux disease, Movement disorder, Nephrotic syndrome, and Thyroid disease. has a past surgical history that includes Excision of Facial Mass; skin biopsy; Colonoscopy; eye surgery; other surgical history (Right, 2018); and Hip fracture surgery (Left, 2019).     Restrictions  Restrictions/Precautions  Restrictions/Precautions: Fall Risk(high fall risk )  Lower Extremity Weight Bearing Restrictions  Right Lower Extremity Weight Bearing: Weight Bearing As Tolerated  Position Activity

## 2019-08-31 NOTE — PLAN OF CARE
Problem: Falls - Risk of:  Goal: Will remain free from falls  Description  Will remain free from falls  Outcome: Ongoing   Fall precautions in place, hourly rounding, call light and belongings in reach, bed in lowest position, wheels locked in place, side rails up x 2, walkways free of clutter    Problem: Risk for Impaired Skin Integrity  Goal: Tissue integrity - skin and mucous membranes  Description  Structural intactness and normal physiological function of skin and  mucous membranes. Outcome: Ongoing   Skin remains dry and intact, no signs of breakdown noted. Patient encouraged to reposition every 2 hours and is assisted to do so when unable to reposition independently.  Dressing on left lower leg changed,

## 2019-08-31 NOTE — PROGRESS NOTES
Shift assessment completed ,patient sitting up in chair, no needs at this time. Dressing on left leg changed to ABD pad and kerlix, large amount of sanguinous drainage. Swelling in bilateral lower legs. Legs elevated. Fall precautions in place, hourly rounding, call light and belongings in reach, bed in lowest position, wheels locked in place, side rails up x 2, walkways free of clutter, chair alarm on.

## 2019-08-31 NOTE — PROGRESS NOTES
Patient incontinent of urine. Elizabeth care provided. Assist back to bed with one person min assist with walker. Compression stockings removed. Pain managed with 1 norco at this time. Denies any additional needs. Call light in reach. Bed alarm on.

## 2019-09-01 PROCEDURE — 6360000002 HC RX W HCPCS: Performed by: PHYSICAL MEDICINE & REHABILITATION

## 2019-09-01 PROCEDURE — 97116 GAIT TRAINING THERAPY: CPT

## 2019-09-01 PROCEDURE — 97535 SELF CARE MNGMENT TRAINING: CPT

## 2019-09-01 PROCEDURE — 97530 THERAPEUTIC ACTIVITIES: CPT

## 2019-09-01 PROCEDURE — 97112 NEUROMUSCULAR REEDUCATION: CPT

## 2019-09-01 PROCEDURE — 94760 N-INVAS EAR/PLS OXIMETRY 1: CPT

## 2019-09-01 PROCEDURE — 97110 THERAPEUTIC EXERCISES: CPT

## 2019-09-01 PROCEDURE — 6370000000 HC RX 637 (ALT 250 FOR IP): Performed by: PHYSICAL MEDICINE & REHABILITATION

## 2019-09-01 PROCEDURE — 97127 HC SP THER IVNTJ W/FOCUS COG FUNCJ: CPT

## 2019-09-01 PROCEDURE — 1280000000 HC REHAB R&B

## 2019-09-01 RX ADMIN — LEVOTHYROXINE SODIUM 50 MCG: 25 TABLET ORAL at 06:17

## 2019-09-01 RX ADMIN — DOCUSATE SODIUM 100 MG: 100 CAPSULE, LIQUID FILLED ORAL at 20:49

## 2019-09-01 RX ADMIN — ACETAMINOPHEN 1000 MG: 325 TABLET, FILM COATED ORAL at 03:20

## 2019-09-01 RX ADMIN — HYDROCORTISONE: 1 CREAM TOPICAL at 08:50

## 2019-09-01 RX ADMIN — HYDROCODONE BITARTRATE AND ACETAMINOPHEN 2 TABLET: 5; 325 TABLET ORAL at 06:18

## 2019-09-01 RX ADMIN — HYDROCODONE BITARTRATE AND ACETAMINOPHEN 2 TABLET: 5; 325 TABLET ORAL at 13:48

## 2019-09-01 RX ADMIN — DONEPEZIL HYDROCHLORIDE 5 MG: 5 TABLET, FILM COATED ORAL at 20:49

## 2019-09-01 RX ADMIN — ENOXAPARIN SODIUM 30 MG: 30 INJECTION SUBCUTANEOUS at 08:49

## 2019-09-01 RX ADMIN — PANTOPRAZOLE SODIUM 40 MG: 40 TABLET, DELAYED RELEASE ORAL at 06:18

## 2019-09-01 RX ADMIN — DOCUSATE SODIUM 100 MG: 100 CAPSULE, LIQUID FILLED ORAL at 08:49

## 2019-09-01 RX ADMIN — HYDROCORTISONE: 1 CREAM TOPICAL at 20:49

## 2019-09-01 RX ADMIN — DULOXETINE HYDROCHLORIDE 30 MG: 30 CAPSULE, DELAYED RELEASE ORAL at 08:50

## 2019-09-01 ASSESSMENT — PAIN SCALES - GENERAL
PAINLEVEL_OUTOF10: 2
PAINLEVEL_OUTOF10: 10
PAINLEVEL_OUTOF10: 2
PAINLEVEL_OUTOF10: 6

## 2019-09-01 NOTE — PROGRESS NOTES
Performed the following exercises in sitting x 10 reps: AP, LAQ, marching, hip add with pillow. All patient needs met. Call bell and phone left within patient reach. G-Code     OutComes Score                                                     AM-PAC Score             Goals  Short term goals  Time Frame for Short term goals: To be met by DC from ARU  Short term goal 1: Patient to perform all bed mobility tasks with modified independence  Short term goal 2: Patient to perform sit to stand transfer with modified independence  Short term goal 3: Patient to be able to ambulate 150 feet with rolling walker and modified independence  Short term goal 4: Patient to be able to walk up and down a curb step with rolling walker and SBA  Long term goals  Time Frame for Long term goals : LTGs=STGs  Patient Goals   Patient goals : To go to ARU, eventually life independly again    Plan    Plan  Times per week: 90 minutes of PT a day, 5 out of the 7 days of the week  Times per day: Daily  Current Treatment Recommendations: Strengthening, ROM, Balance Training, Functional Mobility Training, Transfer Training, Endurance Training, Gait Training, Stair training, Safety Education & Training, Home Exercise Program, Pain Management, Patient/Caregiver Education & Training, Equipment Evaluation, Education, & procurement, ADL/Self-care Training, Manual Therapy - Soft Tissue Mobilization, Modalities  Safety Devices  Type of devices:  All fall risk precautions in place, Call light within reach, Gait belt, Chair alarm in place, Left in chair  Restraints  Initially in place: No     Therapy Time   Individual Concurrent Group Co-treatment   Time In 0730         Time Out 0815         Minutes 45             Therapy Time   Individual Concurrent Group Co-treatment   Time In 1015         Time Out  [place], [place]

## 2019-09-01 NOTE — PROGRESS NOTES
Stan Hines  9/1/2019  [id number]    Chief Complaint: Closed fracture of left femur ([facility] [address]    Subjective:   No overnight events. No current complaints. Pain controlled. Feels her legs are more \"loose\" following lasix. ROS: No CP, SOB, dyspnea    Objective:  Patient Vitals for the past 24 hrs:   BP Temp Temp src Pulse Resp SpO2   08/1934 (!) 108/55 98.3 °F (36.8 °C) Oral 84 16 94 %   08/31/19 1000 (!) 95/54 98 °F (36.7 °C) Oral 83 16 98 %   08/31/19 0951 -- -- -- -- 16 98 %     Gen: No distress, pleasant. Ambulating with therapy. HEENT: Normocephalic, atraumatic  CV: Regular rate and rhythm. No MRG   Resp: No respiratory distress. CTAB   Abd: Soft, nontender nondistended  Ext: 1+ LLE and 1+ RLE edema. Left thigh wound well approximated without drainage  Neuro: Alert, oriented, appropriately interactive. Laboratory data: Available via EMR. Therapy progress:  PT  Position Activity Restriction  Other position/activity restrictions: Pt fell after losing their balance while going to the kitchen to get popcorn. Pt states they fell on their left hip. Has hx of falls, uses cane and walker. Denies taking bloodthinner   Objective     Sit to Stand: Stand by assistance  Stand to sit: Stand by assistance  Bed to Chair: Stand by assistance  Device: Rolling Walker  Assistance: Contact guard assistance  Distance: 160 feet + 100 feet  OT  PT Equipment Recommendations  Equipment Needed: No  Toilet - Technique: Ambulating  Equipment Used: Raised toilet seat with rails  Toilet Transfers Comments: use of grab bars  Assessment        SLP                Body mass index is 30.45 kg/m².     Assessment:  Patient Active Problem List   Diagnosis    Nephrotic syndrome    Edema    Hypothyroid    Multiple joint pain    Age-related osteoporosis without current pathological fracture    Closed fracture of right femur ([facility] [address]    Closed displaced comminuted fracture of shaft of right femur ([facility] [address]    Primary osteoarthritis of

## 2019-09-01 NOTE — PROGRESS NOTES
ACUTE REHAB UNIT  SPEECH/LANGUAGE PATHOLOGY      [x] Daily  [] Weekly Care Conference Note  [] Discharge    Ricarda Smaller     EGP:[birth year]  YJE:[id number]  Room #: AXN-9843/5962-07   Rehab Dx/Hx: Closed fracture of left femur [facility]) [place] [facility]  Date of Admit: 8/23/2019      Precautions: falls  Home situation: Pt lives in an apartment by herself. Pt no longer driving. Pt's daughter recently began assisting with medical and finance management due to memory decline. ST Dx: Cognitive linguistic deficits     Daily Treatment Info:   Date: 9/1/2019   Tx session 1   Pain No pain complaints    Subjective     Pt was seen bedside  Pt was in recliner and had just finished OT   Objective:  Goals    Patient will be instructed in memory strategies to utilize in order to promote recall of newly learned information with min cues   Targeted auditory working memory strategies:    -without use of strategy for recall of 3 pc information for manipulation:  mild repetition warranted  -with use of strategy for recall of 3 pc information for manipulation: intermittent to minimal repetition   Pt will recall safety routines in variety of environments given occ cues    VPS  -structured Rehab Unit (safety routines-call light etc): >90%  -Home situations (safety routines 911 etc): VPS via [address] [facility] ? Format: 100%; VPS via [address]? Format: mild prompting warranted   Pt will complete basic problem solving tasks (finance/medication management) with 80% accuracy or min cues    [facility] 3 utilized  -Recognizing problems in printed descriptions of events: [facility]? Format: 100%; [address]? Format >90%  -ID POA to problems situations: [facility]? Formation 100%; [address]? Format: >80%     Pt will complete convergent/ divergent naming tasks to improve overall thought organization to > 90%. Divergent naming: (target of 10 items during generative naming task): minimal prompts for consistent target of '10' items listed.  (concrete categories)    -convergent Naming (making inferences): 80%     Other areas targeted: Task attention: Mild frequency of pt straying from task with need to re-direct. Education:   Provided education re: for therapeutic tasks completed this date    Safety Devices: [x] Call light within reach  [x] Chair alarm activated  [] Bed alarm activated  [] Other:     Assessment:   Speech Therapy Diagnosis  Cognitive Diagnosis: Pt presents with mild to moderate cognitive linguistic deficits characterized by reduced short term memory resulting in reduced safety in environment (frequent falls, getting lost, forgetting life alert), ability to complete problem solving tasks (medication and finance management) and reduced thought organization (occasional word finding),     Current Diet Order: DIET GENERAL;  Dietary Nutrition Supplements: Standard High Calorie Oral Supplement            Plan:     Frequency:  5days/week   30 minutes/day  Discharge Recommendations:   Barriers: cognition   Discharge Recommendations:  [] Home independently  [x] Home with assistance []  24 hour supervision  [] SNF [] Other:  Continued SLP Treatment:  [x] Yes [] No [x] TBD based on progress while on ARU [] Vital Stim indicated [] Other:   Estimated discharge date: 9/6/19    Type of Total Treatment Minutes   Session 1     Time In 0945   Time Out 1016   Timed Code Minutes  30   Individual Treatment Minutes  30   Co-Treatment Minutes     Group Treatment Minutes     Concurrent Treatment Minutes       TOTAL DAILY MINUTES:  30    Electronically Signed by     Suzanne Tiwari. [place],MS,CCC,SLP [id number]  Speech and Language Pathologist    9/1/2019 10:51 AM

## 2019-09-01 NOTE — PROGRESS NOTES
Orientation  Orientation  Overall Orientation Status: Within Functional Limits  Objective    ADL  Equipment Provided: Reacher;Long-handled sponge;Sock aid  Grooming: Stand by assistance;Contact guard assistance;Setup  UE Bathing: Modified independent   LE Bathing: Contact guard assistance;Setup  UE Dressing: Modified independent ;Setup  LE Dressing: Stand by assistance;Supervision  Toileting: Stand by assistance;Contact guard assistance  Additional Comments: Pt completed shower level ADL (shower chair w/ back, walk in shower)  Pt used AE for bathing dressing assist (long sponge, reacher, sock aid)  Seated for majority of time, CGA in stance, grab bars for steadying assist.         Balance  Sitting Balance: Supervision  Standing Balance: Contact guard assistance  Standing Balance  Time: ~  20 minutes   Activity: portions of bathing/dressing, functional transfers/mobility to /from bathroom, and in room   Comment: RW, CGA   Functional Mobility  Functional - Mobility Device: Rolling Walker  Activity: To/from bathroom; Other  Assist Level: Contact guard assistance  Functional Mobility Comments: RW, CGA, w/ periods of SBA   Toilet Transfers  Toilet - Technique: Ambulating  Equipment Used: Raised toilet seat with rails  Toilet Transfer: Stand by assistance  Shower Transfers  Shower - Transfer From: Dianne Zabala - Transfer Type: To and From  Shower - Transfer To: Shower seat with back  Shower - Technique: Ambulating  Shower Transfers: Contact Guard  Bed mobility  Comment: Not Assesesd this tx session  Transfers  Stand Step Transfers: Stand by assistance;Contact guard assistance  Sit to stand: Stand by assistance  Stand to sit: Stand by assistance  Transfer Comments: functional mobility RW, CGA, w/ periods of SBA , sit<>stands with SBA                 Second Tx session:  Pt seated in recliner at arrival, agreeable to tx. Sit <>stands with SBA.   Functional mobility to/form bathroom , in room and hallway with RW, CGA, w/

## 2019-09-02 LAB
ANION GAP SERPL CALCULATED.3IONS-SCNC: 8 MMOL/L (ref 3–16)
BUN BLDV-MCNC: 28 MG/DL (ref 7–20)
CALCIUM SERPL-MCNC: 8 MG/DL (ref 8.3–10.6)
CHLORIDE BLD-SCNC: 106 MMOL/L (ref 99–110)
CO2: 30 MMOL/L (ref 21–32)
CREAT SERPL-MCNC: 0.9 MG/DL (ref 0.6–1.2)
GFR AFRICAN AMERICAN: >60
GFR NON-AFRICAN AMERICAN: 59
GLUCOSE BLD-MCNC: 121 MG/DL (ref 70–99)
HCT VFR BLD CALC: 32.1 % (ref 36–48)
HEMOGLOBIN: 10.4 G/DL (ref 12–16)
MCH RBC QN AUTO: 30.9 PG (ref 26–34)
MCHC RBC AUTO-ENTMCNC: 32.3 G/DL (ref 31–36)
MCV RBC AUTO: 95.6 FL (ref 80–100)
PDW BLD-RTO: 13.5 % (ref 12.4–15.4)
PLATELET # BLD: 346 K/UL (ref 135–450)
PMV BLD AUTO: 7.8 FL (ref 5–10.5)
POTASSIUM SERPL-SCNC: 4.4 MMOL/L (ref 3.5–5.1)
RBC # BLD: 3.36 M/UL (ref 4–5.2)
SODIUM BLD-SCNC: 144 MMOL/L (ref 136–145)
WBC # BLD: 8.2 K/UL (ref 4–11)

## 2019-09-02 PROCEDURE — 6360000002 HC RX W HCPCS: Performed by: PHYSICAL MEDICINE & REHABILITATION

## 2019-09-02 PROCEDURE — 80048 BASIC METABOLIC PNL TOTAL CA: CPT

## 2019-09-02 PROCEDURE — 36415 COLL VENOUS BLD VENIPUNCTURE: CPT

## 2019-09-02 PROCEDURE — 85027 COMPLETE CBC AUTOMATED: CPT

## 2019-09-02 PROCEDURE — 1280000000 HC REHAB R&B

## 2019-09-02 PROCEDURE — 94760 N-INVAS EAR/PLS OXIMETRY 1: CPT

## 2019-09-02 PROCEDURE — 6370000000 HC RX 637 (ALT 250 FOR IP): Performed by: PHYSICAL MEDICINE & REHABILITATION

## 2019-09-02 RX ADMIN — HYDROCORTISONE: 1 CREAM TOPICAL at 20:54

## 2019-09-02 RX ADMIN — HYDROCODONE BITARTRATE AND ACETAMINOPHEN 1 TABLET: 5; 325 TABLET ORAL at 21:01

## 2019-09-02 RX ADMIN — DOCUSATE SODIUM 100 MG: 100 CAPSULE, LIQUID FILLED ORAL at 09:29

## 2019-09-02 RX ADMIN — LEVOTHYROXINE SODIUM 50 MCG: 25 TABLET ORAL at 05:33

## 2019-09-02 RX ADMIN — ENOXAPARIN SODIUM 30 MG: 30 INJECTION SUBCUTANEOUS at 09:28

## 2019-09-02 RX ADMIN — DONEPEZIL HYDROCHLORIDE 5 MG: 5 TABLET, FILM COATED ORAL at 21:01

## 2019-09-02 RX ADMIN — HYDROCORTISONE: 1 CREAM TOPICAL at 09:29

## 2019-09-02 RX ADMIN — DOCUSATE SODIUM 100 MG: 100 CAPSULE, LIQUID FILLED ORAL at 21:01

## 2019-09-02 RX ADMIN — HYDROCODONE BITARTRATE AND ACETAMINOPHEN 1 TABLET: 5; 325 TABLET ORAL at 14:26

## 2019-09-02 RX ADMIN — DULOXETINE HYDROCHLORIDE 30 MG: 30 CAPSULE, DELAYED RELEASE ORAL at 09:29

## 2019-09-02 RX ADMIN — PANTOPRAZOLE SODIUM 40 MG: 40 TABLET, DELAYED RELEASE ORAL at 05:34

## 2019-09-02 RX ADMIN — HYDROCODONE BITARTRATE AND ACETAMINOPHEN 2 TABLET: 5; 325 TABLET ORAL at 05:33

## 2019-09-02 ASSESSMENT — PAIN SCALES - GENERAL
PAINLEVEL_OUTOF10: 7
PAINLEVEL_OUTOF10: 9
PAINLEVEL_OUTOF10: 9
PAINLEVEL_OUTOF10: 2
PAINLEVEL_OUTOF10: 2
PAINLEVEL_OUTOF10: 4
PAINLEVEL_OUTOF10: 6

## 2019-09-02 ASSESSMENT — PAIN DESCRIPTION - ONSET: ONSET: ON-GOING

## 2019-09-02 ASSESSMENT — PAIN DESCRIPTION - PAIN TYPE: TYPE: ACUTE PAIN

## 2019-09-02 ASSESSMENT — PAIN DESCRIPTION - DESCRIPTORS: DESCRIPTORS: CONSTANT

## 2019-09-02 ASSESSMENT — PAIN DESCRIPTION - FREQUENCY: FREQUENCY: CONTINUOUS

## 2019-09-02 ASSESSMENT — PAIN DESCRIPTION - ORIENTATION: ORIENTATION: RIGHT;LEFT

## 2019-09-02 ASSESSMENT — PAIN DESCRIPTION - PROGRESSION: CLINICAL_PROGRESSION: NOT CHANGED

## 2019-09-02 ASSESSMENT — PAIN DESCRIPTION - LOCATION: LOCATION: KNEE

## 2019-09-02 NOTE — PLAN OF CARE
Problem: Falls - Risk of:  Goal: Will remain free from falls  Description  Will remain free from falls  9/1/2019 1117 by Gail Benitez RN  Outcome: Ongoing     Problem: Risk for Impaired Skin Integrity  Goal: Tissue integrity - skin and mucous membranes  Description  Structural intactness and normal physiological function of skin and  mucous membranes. 9/1/2019 1117 by Gail Benitez RN  Outcome: Ongoing     Problem: NUTRITION  Description  Altered Nutrition and Hydration  Goal: Patient maintains adequate hydration  9/1/2019 1117 by Gail Benitez RN  Outcome: Ongoing     Problem: SAFETY  Goal: LTG - Patient will demonstrate safety requirements appropriate to situation/environment  9/1/2019 1117 by Gail Benitez RN  Outcome: Ongoing     Problem: SKIN INTEGRITY  Goal: LTG - patient will maintain/improve skin integrity through proper skin care techniques  9/1/2019 1117 by Gail Benitez RN  Outcome: Ongoing     Problem: PAIN  Goal: STG - pain is manageable through therapies  9/1/2019 1117 by Gail Benitez RN  Outcome: Ongoing  Goal: STG - Patient will verbalize an acceptable level of pain  9/1/2019 2027 by Dionne Brewer RN  Outcome: Ongoing  9/1/2019 1117 by Gail Benitez RN  Outcome: Ongoing     Problem: Pain:  Description  Pain management should include both nonpharmacologic and pharmacologic interventions.   Goal: Pain level will decrease  Description  Pain level will decrease  9/1/2019 1117 by Gail Benitez RN  Outcome: Ongoing  Goal: Control of acute pain  Description  Control of acute pain  9/1/2019 1117 by Gail Benitez RN  Outcome: Ongoing  Goal: Control of chronic pain  Description  Control of chronic pain  9/1/2019 1117 by Gail Benitez RN  Outcome: Ongoing

## 2019-09-02 NOTE — PLAN OF CARE
Problem: Falls - Risk of:  Goal: Will remain free from falls  Description  Will remain free from falls  Outcome: Ongoing     Problem: Risk for Impaired Skin Integrity  Goal: Tissue integrity - skin and mucous membranes  Description  Structural intactness and normal physiological function of skin and  mucous membranes.   Outcome: Ongoing     Problem: NUTRITION  Goal: Patient maintains adequate hydration  Outcome: Ongoing     Problem: SAFETY  Goal: LTG - Patient will demonstrate safety requirements appropriate to situation/environment  Outcome: Ongoing     Problem: SKIN INTEGRITY  Goal: LTG - patient will maintain/improve skin integrity through proper skin care techniques  Outcome: Ongoing     Problem: PAIN  Goal: STG - pain is manageable through therapies  Outcome: Ongoing  Goal: STG - Patient will verbalize an acceptable level of pain  Outcome: Ongoing     Problem: Pain:  Goal: Pain level will decrease  Description  Pain level will decrease  Outcome: Ongoing  Goal: Control of acute pain  Description  Control of acute pain  Outcome: Ongoing  Goal: Control of chronic pain  Description  Control of chronic pain  Outcome: Ongoing

## 2019-09-03 PROCEDURE — 6360000002 HC RX W HCPCS: Performed by: PHYSICAL MEDICINE & REHABILITATION

## 2019-09-03 PROCEDURE — 1280000000 HC REHAB R&B

## 2019-09-03 PROCEDURE — 6370000000 HC RX 637 (ALT 250 FOR IP): Performed by: PHYSICAL MEDICINE & REHABILITATION

## 2019-09-03 PROCEDURE — 97530 THERAPEUTIC ACTIVITIES: CPT

## 2019-09-03 PROCEDURE — 97535 SELF CARE MNGMENT TRAINING: CPT

## 2019-09-03 PROCEDURE — 94760 N-INVAS EAR/PLS OXIMETRY 1: CPT

## 2019-09-03 PROCEDURE — 97127 HC SP THER IVNTJ W/FOCUS COG FUNCJ: CPT

## 2019-09-03 PROCEDURE — 97110 THERAPEUTIC EXERCISES: CPT

## 2019-09-03 PROCEDURE — 97116 GAIT TRAINING THERAPY: CPT

## 2019-09-03 RX ADMIN — LEVOTHYROXINE SODIUM 50 MCG: 25 TABLET ORAL at 06:41

## 2019-09-03 RX ADMIN — ENOXAPARIN SODIUM 30 MG: 30 INJECTION SUBCUTANEOUS at 09:07

## 2019-09-03 RX ADMIN — DOCUSATE SODIUM 100 MG: 100 CAPSULE, LIQUID FILLED ORAL at 09:07

## 2019-09-03 RX ADMIN — DONEPEZIL HYDROCHLORIDE 5 MG: 5 TABLET, FILM COATED ORAL at 20:54

## 2019-09-03 RX ADMIN — DOCUSATE SODIUM 100 MG: 100 CAPSULE, LIQUID FILLED ORAL at 20:54

## 2019-09-03 RX ADMIN — HYDROCODONE BITARTRATE AND ACETAMINOPHEN 2 TABLET: 5; 325 TABLET ORAL at 20:54

## 2019-09-03 RX ADMIN — DULOXETINE HYDROCHLORIDE 30 MG: 30 CAPSULE, DELAYED RELEASE ORAL at 09:08

## 2019-09-03 RX ADMIN — HYDROCODONE BITARTRATE AND ACETAMINOPHEN 2 TABLET: 5; 325 TABLET ORAL at 09:33

## 2019-09-03 RX ADMIN — PANTOPRAZOLE SODIUM 40 MG: 40 TABLET, DELAYED RELEASE ORAL at 06:41

## 2019-09-03 ASSESSMENT — PAIN DESCRIPTION - ORIENTATION: ORIENTATION: LEFT

## 2019-09-03 ASSESSMENT — PAIN SCALES - GENERAL
PAINLEVEL_OUTOF10: 10
PAINLEVEL_OUTOF10: 8
PAINLEVEL_OUTOF10: 0
PAINLEVEL_OUTOF10: 0
PAINLEVEL_OUTOF10: 5

## 2019-09-03 ASSESSMENT — PAIN DESCRIPTION - LOCATION: LOCATION: HIP

## 2019-09-03 ASSESSMENT — PAIN DESCRIPTION - PAIN TYPE: TYPE: ACUTE PAIN

## 2019-09-03 NOTE — PROGRESS NOTES
items from cabinets and placing in microwave - pt with difficulty reaching items in upper cabinets due to shoulder weakness - states she plans to leave frequent use items on counter and have microwave moved down to counter height - discussed sliding items and passing between surfaces instead of carrying with walker - pt ambulating or standing for about 3 minutes x 4 during session -  pt's daughter present at end of session and pt and daughter agreed that daughter would stay with pt upon initial discharge to home - pt toileted with SBA and assist to change brief soiled due to stress incontinence - discussed using pads inside brief or instead of brief during the day at home to avoid having to change LB clothing completely when underwear partially soiled - SBA for transfer on/off toilet from elevated commode seat - pt remained up in reclining chair end of session with needs in reach   Plan   Plan  Times per week: 75 minutes 5 days per week   Times per day: Daily  Specific instructions for Next Treatment: .   Current Treatment Recommendations: Endurance Training, Functional Mobility Training, Balance Training, Safety Education & Training, Equipment Evaluation, Education, & procurement, Self-Care / ADL, Patient/Caregiver Education & Training          Goals  Short term goals  Time Frame for Short term goals: 10 days   Short term goal 1: mod I functional transfer to toilet, supevision to tub bench   Short term goal 2: mod I UB ADLs -   Short term goal 3: min A LB ADLs with AE PRN - goal met 8/30  Short term goal 4: mod I toileting   Short term goal 5: SBA simple meal prep   Long term goals  Time Frame for Long term goals : LTG=STG       Therapy Time   Individual Concurrent Group Co-treatment   Time In 0920         Time Out 1005         Minutes 45         Timed Code Treatment Minutes: 45 Minutes    Second Session Therapy Time:   Individual Concurrent Group Co-treatment   Time In 3683         Time Out 3476         LBAGPDD 42

## 2019-09-03 NOTE — PROGRESS NOTES
ACUTE REHAB UNIT  SPEECH/LANGUAGE PATHOLOGY      [x] Daily  [] Weekly Care Conference Note  [] Discharge    Nadira Groves     NQR:0/83/1060  OUE:[id number]  Room #: CWV-9611/0463-80   Rehab Dx/Hx: Closed fracture of left femur [facility]) Pricila Becerra  Date of Admit: 8/23/2019      Precautions: falls  Home situation: Pt lives in an apartment by herself. Pt no longer driving. Pt's daughter recently began assisting with medical and finance management due to memory decline. ST Dx: Cognitive linguistic deficits     Daily Treatment Info:   Date: 9/3/2019   Tx session 1   Pain Denied pain    Subjective     Pt sitting up in recliner chair. Remained alert and engaged in treatment    Objective:  Goals    Patient will be instructed in memory strategies to utilize in order to promote recall of newly learned information with min cues   Reviewed and implemented strategies for delayed recall task: Rehearsal, Visualization, Repetition     Recall of new but personally relevant information  - 2/3 after 5 minutes;   - 3/3 after 10 minutes and additional implementation of strategy     Pt unable to recall information obtain last night ([employer] game, winner, team played) but able to grossly recall watching the game and talking to her daughter about it. Pt will recall safety routines in variety of environments given occ cues    Goal not targeted this session. Pt will complete basic problem solving tasks (finance/medication management) with 80% accuracy or min cues    Goal not targeted this session. Pt will complete convergent/ divergent naming tasks to improve overall thought organization to > 90%. Divergent naming (initial letter)   - 13 words/ minute     Thought organization via recipe recall   - able to recall 2 personal recipes with min cues for details and one redirection (\"wait I have to ask you, am I telling you how to make bread\"). Other areas targeted:  Thought organization and working memory targeted via

## 2019-09-03 NOTE — PROGRESS NOTES
fracture of shaft of right femur ([facility] [address]    Primary osteoarthritis of right knee    Gastroesophageal reflux disease    False positive syphilis serology    Memory deficit    Closed displaced intertrochanteric fracture of left femur ([address]    Closed fracture of left femur ([facility])       Plan:   Left intertrochanteric femur fracture: S/P IMN on 8/21 with Dr. Janine Gould. WBAT. Pain control. PT/OT. - ortho FU tomorrow.     Postoperative anemia: Monitor     Mild dementia: Aricept per home regimen     Hypothyroidism: Synthroid 50      Decreased cognition: SLP    BLE edema: recurrent, s/p lasix 40 x2 and daily weights     Bowels: Per protocol  Bladder: Per protocol   Sleep: Trazodone provided prn. Pain: Cymbalta, Norco  DVT PPx: Lovenox, AC for 4 weeks    Lidia Servin MD 9/3/2019, 8:39 AM    * This document was created using dictation software. While all precautions were taken to ensure accuracy, errors may have occurred. Please disregard any typographical errors.

## 2019-09-03 NOTE — PROGRESS NOTES
Nutrition Assessment     Type and Reason for Visit: Reassess    Nutrition Recommendations:   Continue general diet  Discontinue Ensure Enlive     Nutrition Assessment:   Pt nutritionally stable. Pt on general diet and po intake recorded as % most meals. Pt states her appetite has been just ok, she does not care for the Ensure and is not drinking. Wt up 4 lb since admission, pt remains on lasix. Not at risk for nutrition compromise at this time. Will continue to monitor for changes in status.     Malnutrition Assessment:  · Malnutrition Status: No malnutrition    Nutrition Risk Level   Risk Level: Low    Nutrition Diagnosis:   · Problem: No nutrition diagnosis at this time  · Etiology: Increased demand for energy/nutrients    Signs and symptoms: Presence of wounds(s/p surgery)    Nutrition Intervention:  Food and/or Delivery: Continue current diet, Discontinue Tube Feeding  Nutrition Education/Counseling/Coordination of Care:  Continued Inpatient Monitoring, Education Not Indicated      Electronically signed by Rodriguez Montes RD, LD on 9/3/19 at 3:34 PM  Contact Number: [phone]

## 2019-09-03 NOTE — PROGRESS NOTES
Physical Therapy  Facility/Department: [facility] ACUTE REHAB UNIT  Daily Treatment Note  NAME: Estuardo Galvan  : 1930  MRN: [id number]    Date of Service: 9/3/2019    Discharge Recommendations:  Home with assist PRN, Home with Home health PT, S Level 3   PT Equipment Recommendations  Equipment Needed: No    Assessment   Body structures, Functions, Activity limitations: Decreased functional mobility ; Decreased ADL status; Decreased ROM; Decreased strength;Decreased safe awareness;Decreased endurance;Decreased balance; Increased Pain  Assessment: Strength, endurance, and balance deficits causing continued difficulty with walking  Treatment Diagnosis: impaired gait, weakness, impaired balance  Prognosis: Good  Decision Making: Medium Complexity  PT Education: Gait Training;Plan of Care; Functional Mobility Training;Transfer Training;Equipment  Barriers to Learning: [place]  REQUIRES PT FOLLOW UP: Yes  Activity Tolerance  Activity Tolerance: Patient Tolerated treatment well;Patient limited by endurance     Patient Diagnosis(es): There were no encounter diagnoses. has a past medical history of Acute renal failure ([facility] [address] Arthritis, At risk for falling, Frequent falls, Gastroesophageal reflux disease, Movement disorder, Nephrotic syndrome, and Thyroid disease. has a past surgical history that includes Excision of Facial Mass; skin biopsy; Colonoscopy; eye surgery; other surgical history (Right, 2018); and Hip fracture surgery (Left, 2019). Restrictions  Restrictions/Precautions  Restrictions/Precautions: Fall Risk  Lower Extremity Weight Bearing Restrictions  Right Lower Extremity Weight Bearing: Weight Bearing As Tolerated  Position Activity Restriction  Other position/activity restrictions: Pt fell after losing their balance while going to the kitchen to get popcorn. Pt states they fell on their left hip. Has hx of falls, uses cane and walker.  Denies taking bloodthinner   Subjective   General  Response To

## 2019-09-04 ENCOUNTER — APPOINTMENT (OUTPATIENT)
Dept: GENERAL RADIOLOGY | Age: 84
DRG: 561 | End: 2019-09-04
Attending: PHYSICAL MEDICINE & REHABILITATION
Payer: MEDICARE

## 2019-09-04 PROCEDURE — 6360000002 HC RX W HCPCS: Performed by: PHYSICAL MEDICINE & REHABILITATION

## 2019-09-04 PROCEDURE — 97116 GAIT TRAINING THERAPY: CPT

## 2019-09-04 PROCEDURE — 97110 THERAPEUTIC EXERCISES: CPT

## 2019-09-04 PROCEDURE — 97127 HC SP THER IVNTJ W/FOCUS COG FUNCJ: CPT

## 2019-09-04 PROCEDURE — 1280000000 HC REHAB R&B

## 2019-09-04 PROCEDURE — 99024 POSTOP FOLLOW-UP VISIT: CPT | Performed by: NURSE PRACTITIONER

## 2019-09-04 PROCEDURE — 6370000000 HC RX 637 (ALT 250 FOR IP): Performed by: PHYSICAL MEDICINE & REHABILITATION

## 2019-09-04 PROCEDURE — 97530 THERAPEUTIC ACTIVITIES: CPT

## 2019-09-04 PROCEDURE — 73502 X-RAY EXAM HIP UNI 2-3 VIEWS: CPT

## 2019-09-04 PROCEDURE — 94760 N-INVAS EAR/PLS OXIMETRY 1: CPT

## 2019-09-04 PROCEDURE — 73552 X-RAY EXAM OF FEMUR 2/>: CPT

## 2019-09-04 PROCEDURE — 97535 SELF CARE MNGMENT TRAINING: CPT

## 2019-09-04 PROCEDURE — APPNB15 APP NON BILLABLE TIME 0-15 MINS: Performed by: NURSE PRACTITIONER

## 2019-09-04 RX ORDER — FUROSEMIDE 40 MG/1
40 TABLET ORAL DAILY
Status: DISCONTINUED | OUTPATIENT
Start: 2019-09-04 | End: 2019-09-06 | Stop reason: HOSPADM

## 2019-09-04 RX ADMIN — DOCUSATE SODIUM 100 MG: 100 CAPSULE, LIQUID FILLED ORAL at 07:46

## 2019-09-04 RX ADMIN — DOCUSATE SODIUM 100 MG: 100 CAPSULE, LIQUID FILLED ORAL at 21:51

## 2019-09-04 RX ADMIN — PANTOPRAZOLE SODIUM 40 MG: 40 TABLET, DELAYED RELEASE ORAL at 05:57

## 2019-09-04 RX ADMIN — LEVOTHYROXINE SODIUM 50 MCG: 25 TABLET ORAL at 05:57

## 2019-09-04 RX ADMIN — ENOXAPARIN SODIUM 30 MG: 30 INJECTION SUBCUTANEOUS at 07:46

## 2019-09-04 RX ADMIN — DONEPEZIL HYDROCHLORIDE 5 MG: 5 TABLET, FILM COATED ORAL at 21:51

## 2019-09-04 RX ADMIN — DULOXETINE HYDROCHLORIDE 30 MG: 30 CAPSULE, DELAYED RELEASE ORAL at 07:46

## 2019-09-04 RX ADMIN — HYDROCODONE BITARTRATE AND ACETAMINOPHEN 1 TABLET: 5; 325 TABLET ORAL at 21:52

## 2019-09-04 ASSESSMENT — PAIN DESCRIPTION - ORIENTATION: ORIENTATION: LEFT

## 2019-09-04 ASSESSMENT — PAIN DESCRIPTION - LOCATION: LOCATION: HIP

## 2019-09-04 ASSESSMENT — PAIN SCALES - GENERAL
PAINLEVEL_OUTOF10: 4
PAINLEVEL_OUTOF10: 4
PAINLEVEL_OUTOF10: 3
PAINLEVEL_OUTOF10: 1

## 2019-09-04 ASSESSMENT — PAIN DESCRIPTION - PAIN TYPE: TYPE: SURGICAL PAIN

## 2019-09-04 NOTE — PROGRESS NOTES
[facility] Orthopedic Surgery   Progress Note    CHIEF COMPLAINT/DIAGNOSIS:  8/21/19 LEFT hip IM nail    SUBJECTIVE: Patient sitting up in chair in Acute Rehab. She is 2 weeks post op. Reports minimal pain and is ambulating. OBJECTIVE  Physical    VITALS:  /63   Pulse 70   Temp 97.5 °F (36.4 °C) (Oral)   Resp 16   Ht 5' 5\" (1.651 m)   Wt 197 lb 6.4 oz (89.5 kg)   SpO2 94%   BMI 32.85 kg/m²     GENERAL: Alert, NAD   MUSCULOSKELETAL: LEFT LE  INCISION:  Surgical incision is well approximated with staples which are ready to be removed  ROM: unable to lift leg off chair in seated position. Unable to externally rotate hip; but active internal rotation w/o pain. Passive hip adduction/abduction without pain. Sensory:  Intact to light touch in peroneal and tibial distributions  Vascular:   Intact post tib pulse;  calf soft and nontender    Data    ALL MEDICATIONS HAVE BEEN REVIEWED    CBC:   Recent Labs     09/02/19  0637   WBC 8.2   HGB 10.4*   HCT 32.1*        BMP:   Recent Labs     09/02/19  0637      K 4.4      CO2 30   BUN 28*   CREATININE 0.9     INR:   No results for input(s): INR in the last 72 hours. ASSESSMENT:  8/21/19 LEFT hip IM nail  Movement disorder    PLAN:   - DC staples today  - Post op x-rays pending  - Patient reports she plans to DC from Acute Rehab on Friday. - F JEET with Dr. Yamilet Morton in 6 weeks; call [phone] for appt.       PINEDA DISLA-CNP  9/4/2019  12:48 PM    .

## 2019-09-04 NOTE — PROGRESS NOTES
while going to the kitchen to get popcorn. Pt states they fell on their left hip. Has hx of falls, uses cane and walker. Denies taking bloodthinner     Subjective   General  Chart Reviewed: Yes  Additional Pertinent Hx: Pt with R hip fx 1 yr ago with ORIF, went to ARU and home. Response To Previous Treatment: Patient with no complaints from previous session. Family / Caregiver Present: No  Subjective  Subjective: Denies pain at rest, reporting 5/10 pain during ambulation. RN delivered pain medication per patient request.   General Comment  Comments: Pt seated in recliner upon arrival.  Agreeable to PT session. Objective   Bed mobility  Rolling to Left: Modified independent  Rolling to Right: Modified independent  Supine to Sit: Modified independent  Sit to Supine: Modified independent  Scooting: Modified independent  Comment: Completed on standard bed without use of HR. Modified independent with use of leg . Transfers  Sit to Stand: Supervision  Stand to sit: Supervision  Bed to Chair: Supervision  Stand Pivot Transfers: Supervision  Ambulation  Ambulation?: Yes  Ambulation 1  Surface: level tile  Device: Rolling Walker  Assistance: Supervision  Quality of Gait: slow kaelyn with antalgic gait pattern resulting in decreased (L) LE stance time. No evidence of balance loss. Gait Deviations: Slow Kaelyn;Decreased step length  Distance: 170 ft + 100 ft  Stairs/Curb  Stairs?: Yes  Stairs  # Steps : 6  Stairs Height: 6\"  Rails: Bilateral  Assistance: Stand by assistance  Comment: Step to pattern. Requires VC for sequence     Balance  Posture: Fair  Sitting - Static: Good  Sitting - Dynamic: Good  Standing - Static: Fair;+  Standing - Dynamic: Fair;-     Comment: 1st session: See above functional mobility. Session begins with LB/UB dressing completed at supervision level with use of RW for external support during LB clothing management.        2nd session:  Pt supine in bed upon arrival, reporting Therapy - Soft Tissue Mobilization, Modalities  Safety Devices  Type of devices:  All fall risk precautions in place, Call light within reach, Gait belt, Chair alarm in place, Left in chair, Patient at risk for falls, Nurse notified  Restraints  Initially in place: No     Therapy Time   Individual Concurrent Group Co-treatment   Time In 0830         Time Out 0915         Minutes 45         Timed Code Treatment Minutes: [address] Time:   Individual Concurrent Group Co-treatment   Time In 1445         Time Out 1515         Minutes 30           Timed Code Treatment Minutes:  45 + 30     Total Treatment Minutes:  75 minutes    Abiodun Dawson PT, DPT - [id number]

## 2019-09-04 NOTE — PROGRESS NOTES
ORIF     has a past medical history of Acute renal failure ([facility] [address] Arthritis, At risk for falling, Frequent falls, Gastroesophageal reflux disease, Movement disorder, Nephrotic syndrome, and Thyroid disease. has a past surgical history that includes Excision of Facial Mass; skin biopsy; Colonoscopy; eye surgery; other surgical history (Right, 05/27/2018); and Hip fracture surgery (Left, 8/21/2019). Restrictions  Restrictions/Precautions  Restrictions/Precautions: Fall Risk(High Fall Risk)  Lower Extremity Weight Bearing Restrictions  Right Lower Extremity Weight Bearing: Weight Bearing As Tolerated  Position Activity Restriction  Other position/activity restrictions: Pt fell after losing their balance while going to the kitchen to get popcorn. Pt states they fell on their left hip. Has hx of falls, uses cane and walker. Denies taking bloodthinner   Subjective   General  Chart Reviewed: Yes  Patient assessed for rehabilitation services?: Yes  Response to previous treatment: Patient with no complaints from previous session  Family / Caregiver Present: No  Referring Practitioner: Dr. Deandra Riddle  Diagnosis: L femur fx/ s/p ORIF  Subjective  Subjective: Pt seated in recliner chair upon arrival-denies pain at this time. Agreeable to therapy session  General Comment  Comments: Terry Jules Vital Signs  Patient Currently in Pain: Denies   Orientation  Orientation  Overall Orientation Status: Within Functional Limits  Objective    ADL  Feeding: None  Grooming: Stand by assistance(pt performed hand hygiene in stance at sink following toileting)  UE Bathing: None  LE Bathing: None  UE Dressing: None  LE Dressing: None  Toileting: Stand by assistance;Contact guard assistance(CGA while pt in stance to complete caro care for steadying assist)  Additional Comments: Pt completed toileting SBA. CGA while pt in stance to complete caro care and LE clothing management over hips for steadying assist. Pt complete hand hygiene in stance with CGA.

## 2019-09-04 NOTE — PROGRESS NOTES
Andrea Conway  9/4/2019  [id number]    Chief Complaint: Closed fracture of left femur ([address]    Subjective:   No overnight events. No current complaints. ROS: No CP, SOB, dyspnea    Objective:  Patient Vitals for the past 24 hrs:   BP Temp Temp src Pulse Resp SpO2 Weight   09/04/19 0800 126/63 97.5 °F (36.4 °C) Oral 70 16 94 % --   09/04/19 0552 -- -- -- -- -- -- 197 lb 6.4 oz (89.5 kg)   09/03/19 2030 137/76 97.3 °F (36.3 °C) Oral 74 16 95 % --   09/03/19 1722 -- -- -- -- 16 95 % --     Gen: No distress, pleasant. Resting in bed  HEENT: Normocephalic, atraumatic  CV: Regular rate and rhythm. No MRG   Resp: No respiratory distress. CTAB   Abd: Soft, nontender nondistended  Ext: 2+ LLE and 1+ RLE edema. Left thigh wound well approximated with staples  Neuro: Alert, oriented, appropriately interactive. Laboratory data: Available via EMR. Therapy progress:  PT  Position Activity Restriction  Other position/activity restrictions: Pt fell after losing their balance while going to the kitchen to get popcorn. Pt states they fell on their left hip. Has hx of falls, uses cane and walker. Denies taking bloodthinner   Objective     Sit to Stand: Stand by assistance, Contact guard assistance(SBA from EOB to RW; CGA from chair in gym to RW)  Stand to sit: Stand by assistance, Contact guard assistance  Bed to Chair: Stand by assistance  Device: Rolling Walker  Assistance: Contact guard assistance  Distance: 160 feet + 100 feet  OT  PT Equipment Recommendations  Equipment Needed: No  Toilet - Technique: Ambulating  Equipment Used: Raised toilet seat with rails  Toilet Transfers Comments: use of grab bars and RW   Assessment        SLP                Body mass index is 32.85 kg/m².     Assessment:  Patient Active Problem List   Diagnosis    Nephrotic syndrome    Edema    Hypothyroid    Multiple joint pain    Age-related osteoporosis without current pathological fracture    Closed fracture of right femur ([address]   

## 2019-09-05 LAB
ANION GAP SERPL CALCULATED.3IONS-SCNC: 9 MMOL/L (ref 3–16)
BUN BLDV-MCNC: 21 MG/DL (ref 7–20)
CALCIUM SERPL-MCNC: 8.6 MG/DL (ref 8.3–10.6)
CHLORIDE BLD-SCNC: 104 MMOL/L (ref 99–110)
CO2: 28 MMOL/L (ref 21–32)
CREAT SERPL-MCNC: 0.9 MG/DL (ref 0.6–1.2)
GFR AFRICAN AMERICAN: >60
GFR NON-AFRICAN AMERICAN: 59
GLUCOSE BLD-MCNC: 114 MG/DL (ref 70–99)
HCT VFR BLD CALC: 34.6 % (ref 36–48)
HEMOGLOBIN: 11.3 G/DL (ref 12–16)
MCH RBC QN AUTO: 31.2 PG (ref 26–34)
MCHC RBC AUTO-ENTMCNC: 32.7 G/DL (ref 31–36)
MCV RBC AUTO: 95.4 FL (ref 80–100)
PDW BLD-RTO: 13.7 % (ref 12.4–15.4)
PLATELET # BLD: 373 K/UL (ref 135–450)
PMV BLD AUTO: 7.8 FL (ref 5–10.5)
POTASSIUM SERPL-SCNC: 4.4 MMOL/L (ref 3.5–5.1)
RBC # BLD: 3.62 M/UL (ref 4–5.2)
SODIUM BLD-SCNC: 141 MMOL/L (ref 136–145)
WBC # BLD: 6.8 K/UL (ref 4–11)

## 2019-09-05 PROCEDURE — 97127 HC SP THER IVNTJ W/FOCUS COG FUNCJ: CPT

## 2019-09-05 PROCEDURE — 1280000000 HC REHAB R&B

## 2019-09-05 PROCEDURE — 97110 THERAPEUTIC EXERCISES: CPT

## 2019-09-05 PROCEDURE — 97116 GAIT TRAINING THERAPY: CPT

## 2019-09-05 PROCEDURE — 6370000000 HC RX 637 (ALT 250 FOR IP): Performed by: PHYSICAL MEDICINE & REHABILITATION

## 2019-09-05 PROCEDURE — 85027 COMPLETE CBC AUTOMATED: CPT

## 2019-09-05 PROCEDURE — 80048 BASIC METABOLIC PNL TOTAL CA: CPT

## 2019-09-05 PROCEDURE — 94760 N-INVAS EAR/PLS OXIMETRY 1: CPT

## 2019-09-05 PROCEDURE — 6360000002 HC RX W HCPCS: Performed by: PHYSICAL MEDICINE & REHABILITATION

## 2019-09-05 PROCEDURE — 97535 SELF CARE MNGMENT TRAINING: CPT

## 2019-09-05 PROCEDURE — 36415 COLL VENOUS BLD VENIPUNCTURE: CPT

## 2019-09-05 PROCEDURE — 97530 THERAPEUTIC ACTIVITIES: CPT

## 2019-09-05 RX ORDER — ASPIRIN 325 MG
325 TABLET, DELAYED RELEASE (ENTERIC COATED) ORAL DAILY
Qty: 14 TABLET | Refills: 0 | Status: SHIPPED | OUTPATIENT
Start: 2019-09-05 | End: 2022-10-19

## 2019-09-05 RX ORDER — HYDROCODONE BITARTRATE AND ACETAMINOPHEN 5; 325 MG/1; MG/1
1-2 TABLET ORAL EVERY 4 HOURS PRN
Qty: 40 TABLET | Refills: 0 | Status: SHIPPED | OUTPATIENT
Start: 2019-09-05 | End: 2019-09-12

## 2019-09-05 RX ADMIN — HYDROCODONE BITARTRATE AND ACETAMINOPHEN 2 TABLET: 5; 325 TABLET ORAL at 06:47

## 2019-09-05 RX ADMIN — FUROSEMIDE 40 MG: 40 TABLET ORAL at 08:33

## 2019-09-05 RX ADMIN — DOCUSATE SODIUM 100 MG: 100 CAPSULE, LIQUID FILLED ORAL at 21:22

## 2019-09-05 RX ADMIN — HYDROCODONE BITARTRATE AND ACETAMINOPHEN 2 TABLET: 5; 325 TABLET ORAL at 12:11

## 2019-09-05 RX ADMIN — HYDROCORTISONE: 1 CREAM TOPICAL at 21:30

## 2019-09-05 RX ADMIN — LEVOTHYROXINE SODIUM 50 MCG: 25 TABLET ORAL at 06:30

## 2019-09-05 RX ADMIN — ACETAMINOPHEN 1000 MG: 325 TABLET, FILM COATED ORAL at 21:28

## 2019-09-05 RX ADMIN — DULOXETINE HYDROCHLORIDE 30 MG: 30 CAPSULE, DELAYED RELEASE ORAL at 08:33

## 2019-09-05 RX ADMIN — DOCUSATE SODIUM 100 MG: 100 CAPSULE, LIQUID FILLED ORAL at 08:33

## 2019-09-05 RX ADMIN — HYDROCORTISONE: 1 CREAM TOPICAL at 08:34

## 2019-09-05 RX ADMIN — ENOXAPARIN SODIUM 30 MG: 30 INJECTION SUBCUTANEOUS at 08:33

## 2019-09-05 RX ADMIN — DONEPEZIL HYDROCHLORIDE 5 MG: 5 TABLET, FILM COATED ORAL at 21:22

## 2019-09-05 ASSESSMENT — PAIN SCALES - GENERAL
PAINLEVEL_OUTOF10: 0
PAINLEVEL_OUTOF10: 10
PAINLEVEL_OUTOF10: 6
PAINLEVEL_OUTOF10: 0
PAINLEVEL_OUTOF10: 4

## 2019-09-05 ASSESSMENT — PAIN DESCRIPTION - PAIN TYPE: TYPE: ACUTE PAIN

## 2019-09-05 ASSESSMENT — PAIN DESCRIPTION - LOCATION: LOCATION: HEAD

## 2019-09-05 ASSESSMENT — PAIN DESCRIPTION - DESCRIPTORS: DESCRIPTORS: HEADACHE

## 2019-09-05 NOTE — FLOWSHEET NOTE
ARU PATIENT TREATMENT PLAN  [facility]   [address] [facility], [address]  [phone]      Selvin Zhong    : 1930  Acct #: [de-identified]  MRN: [id number]   PHYSICIAN:  Doc Adame MD  Primary Problem    Patient Active Problem List   Diagnosis    Nephrotic syndrome    Edema    Hypothyroid    Multiple joint pain    Age-related osteoporosis without current pathological fracture    Closed fracture of right femur ([address]    Closed displaced comminuted fracture of shaft of right femur ([address]    Primary osteoarthritis of right knee    Gastroesophageal reflux disease    False positive syphilis serology    Memory deficit    Closed displaced intertrochanteric fracture of left femur ([address]    Closed fracture of left femur ([address]       Rehabilitation Diagnosis:     ***   ADMIT DATE:2019    Patient Goals: ***  Admitting Impairments: ***  Activities: ***  Participation: ***   CARE PLAN     NURSING:  Selvin Zhong while on this unit will:   [x] Be continent of bowel and bladder      [x] Have an adequate number of bowel movements   [] Urinate with no urinary retention >300ml in bladder   [] Complete bladder protocol with marcos removal   [x] Maintain O2 SATs at ___%   [x] Have pain managed while on ARU        [x] Be pain free by discharge    [x] Have no skin breakdown while on ARU   [] Have improved skin integrity via wound measurements   [] Have no signs/symptoms of infection at the wound site  [] Be free from injury during hospitalization   [] Complete education with patient/family with understanding demonstrated for:  [] Adjustment   [] Other:   Nursing Interventions will include:  [x] bowel/bladder training   [x] education for medical assistive devices   [] medication education   [x] O2 saturation management   [x] energy conservation   [x] stress management techniques   [] fall prevention   [] alarms protocol   [] seating and positioning   [x] skin/wound care   [] pressure relief instruction   [x] dressing changes     [] infection protection   [] DVT prophylaxis  [x] assistance with in room safety with transfers to bed, toilet, wheelchair, shower   [x] bathroom activities and hygiene  [] Other:    Patient/Caregiver Education for:   [] Disease/sustained injury/management      [x] Medication Use   [x] Surgical intervention   [x] Safety   [x] Body mechanics and or joint protection   [] Health maintenance      [] Other:     PHYSICAL THERAPY:  Goals:                  Short term goals  Time Frame for Short term goals: To be met by DC from ARU  Short term goal 1: Patient to perform all bed mobility tasks with modified independence - goal met 9/4/19  Short term goal 2: Patient to perform sit to stand transfer with modified independence - goal met 9/5/19  Short term goal 3: Patient to be able to ambulate 150 feet with rolling walker and modified independence - goal met 9/5/19  Short term goal 4: Patient to be able to walk up and down a curb step with rolling walker and SBA - goal met 9/5/19            Long term goals  Time Frame for Long term goals : LTGs=STGs  These goals were reviewed with this patient at the time of assessment and Praveen Perales is in agreement.      Plan of Care: Pt to be seen 5 out of 7 days per week per ARU protocol (   minutes with PT)                  Current Treatment Recommendations: Strengthening, ROM, Balance Training, Functional Mobility Training, Transfer Training, Endurance Training, Gait Training, Stair training, Safety Education & Training, Home Exercise Program, Pain Management, Patient/Caregiver Education & Training, Equipment Evaluation, Education, & procurement, ADL/Self-care Training, Manual Therapy - Soft Tissue Mobilization, Modalities    OCCUPATIONAL THERAPY:  Goals:             Short term goals  Time Frame for Short term goals: 10 days   Short term goal 1: mod I functional transfer to toilet, supevision to tub bench   Short term goal 2: mod I UB ADLs -   Short term goal 3: min A LB ADLs with AE PRN - goal met 8/30  Short term goal 4: mod I toileting   Short term goal 5: SBA simple meal prep  :  Long term goals  Time Frame for Long term goals : LTG=STG  Long term goal 1: Pt to tolerate standing 5-10 min for ADL/IADL activity/functional mobility  : These goals were reviewed with this patient at the time of assessment and Stan Hines is in agreement    Plan of Care:  Pt to be seen 5 out of 7 days per week per ARU protocol (   minutes with OT)  Patient Education: safety, LB dressing     SPEECH THERAPY: Goals will be left blank if speech is not following this patient. Goals:                                                         Short-term Goals  Goal 1: Patient will be instructed in memory strategies to utilize in order to promote recall of newly learned information with min cues  Goal 2: Pt will recall safety routines in variety of environments given occ cues   Goal 3: Pt will complete basic problem solving tasks (finance/medication management) with 80% accuracy or min cues   Goal 4: Pt will complete convergent/ divergent naming tasks to improve overall thought organization to > 90%.                   These goals were reviewed with this patient at the time of assessment and Stan Hines is in agreement    Plan of Care:  Pt to be seen 5 out of 7 days per week per ARU protocol (    minutes with SLP)    Therapy Treatments will include:  []  therapeutic exercises    []  gait training     []  neuromuscular re-ed                            []  transfer training             [] community reintegration    [] bed mobility                          []  w/c mobility and training  []  self care    []home mgmt    []  cognitive training            []  energy conservation        []  dysphagia tx    []  speech/language/communication therapy   []  group therapy    []  patient/family education    [] Other:    If the patient is admitted with a diagnosis of 'CVA' and is appropriate for group therapy, their treatment plan will include educational group therapy interventions in the form of \"Stroke Education Group Therapy Class\". CASE MANAGEMENT:  Goals:   Assist patient/family with discharge planning, patient/family counseling,   and coordination with insurance during ARU stay. Admission Period/Goal FIM SCORES  FIM Admit/Goal Score   Eating/Swallowing    Grooming    Bathing    Upper Body Dressing    Lower Body Dressing    Toileting    Bladder More, Accidents = FIM    Bowel  More, Accidents = FIM    Bed/Chair Transfer    Toilet Transfer    Tub/Shower Transfer     Locomotion:  Ambulation (Walk) / Wheelchair:  W = walk , w/c = wheelchair  Distance:   1= 0-49 ft , 2=  ft, 3= 150+ ft Distance:    Level of Assist:    Mode:    FIM:       Stairs    Comprehension    Expression    Social Interaction    Problem Solving    Memory         Khadra Castle will be seen a minimum of 3 hours of therapy per day, a minimum of 5 out of 7 days per week  (please see above for specific treatment plan per PT/OT/SLP). [] In this rare instance due to the nature of this patient's medical involvement, this patient will be seen 15 hours per week (900 minutes within a 7 day period). In addition, dietician/nutritionist may monitor calorie count as well as intake and collaboratively work with SLP on dietary upgrades. Neuropsychology/Psychology may evaluate and provide necessary support.     Medical issues being managed closely and that require 24 hour availability of a physician:   [] Swallowing Precautions  [] Bowel/Bladder Fx  [] Weight bearing precautions   [] Wound Care    [] Pain Mgmt   [] Infection Protection   [] DVT Prophylaxis   [] Fall Precautions  [] Fluid/Electrolyte/Nutrition Balance   [] Voice Protection   [] Respiratory  [] Other:    Medical Prognosis: [] Good  [] Fair    [] Guarded   Total expected IRF days ***  Anticipated discharge destination:    [] Home Independently   [] Home Modified Independent  [] Home with supervision    []SNF     [] Other                                           Physician anticipated functional outcomes:  ***   IPOC brief synthesis: ***    This plan has been reviewed with Lucero Hairston on ____________  in a language the patient understands. Lucero Hairston has had the opportunity to include input with the therapy team.    I have reviewed this initial plan of care and agree with its contents:    Title   Name    Date    Time    Physician:     Case Mgmt:    OT:     PT:    RN: Phyllis Munson RN MSN  09/05/19   1081  ST:    :     Other:

## 2019-09-05 NOTE — CARE COORDINATION
[address] Transitions Interview     2019    Patient: Roylene Meckel Patient : 1930   MRN: [id number]  Reason for Admission: LEFT hip IM nail  RARS: Readmission Risk Score: 14    OT/PT in room at this time, will attempt to revisit         Follow Up  Future Appointments   Date Time Provider Marco Barreto   10/15/2019 11:20 AM Brittany Keita MD FF SLEEP MED [facility]   2019 11:10 AM Ramon King MD FF NEURO [facility]       Health Maintenance  There are no preventive care reminders to display for this patient.     Saud Kennedy RN
Discharge planning-    Anticipate discharge tomorrow. [facility] to be delivered by [facility]. Discussed with the patient at the bedside. IMM letter signed- copy placed on hard chart, original given to the patient. Patient identifies no other needs at this time. Plan- [facility]. Referral to [facility] for walker.     Will continue to follow for support and discharge planning.    -Joce Magdaleno, MSW, LSW
Social Work Admission Assessment    Objective:  Met with pt to complete initial assessment and review role of  in rehab process. Pt oriented to unit. Pt states understanding of this. Current Home Situation:  Patient lives at home alone. Pt's plans re:  Return to work/school/volunteer:  Patient was a homemaker. Accessibility to community resources/transportation:  Patient reports that she was independent in her ADLs prior to admission. Patient reports that her daughter and granddaughter are supportive. Has pt experienced a recent loss or signigicant life event that would impact their care or ability to participate? XNo  __Yes - Explain    Has pt ever been treated for emotional disorders? XNo  __Yes--How does that affect current situation:    How does pt and family cope with stressful events and this hospitalization? Patient appears to be coping appropriately with recent change in health status; hospitalization. Special Problem Areas:  None identified. Discharge Plan: Home with family, possible home care needs (has used [facility] in the past) vs outpatient therapy needs. Impression/Plan: Patient is a pleasant 70-year-old female who appears motivated to participate in ARU therapies. Discussed with the patient at the bedside. Patient reports that she has been a patient on ARU in the past, and is familiar with what to expect. Discussed Team Conferences on Thursday. This worker offered to call the patient's family to notify/ invite to conference, patient reports that her family will be on the unit later today and she will notify them at that time. Patient reports that she has used [facility] for home care in the past- would like to use [facility] again, if home care recommended at discharge. Patient identifies no other needs at this time.     Will continue to follow for support and discharge planning.    -Beto Richard, MSW, LSW
Assessment: Pt is not at her baseline level of occupational function, based on the above deficits associated with L femur fx, s/p ORIF. Pt would benefit from continued skilled acute OT services to address these deficits. Sonny's increased independence with transfers, mobility and LB dressing, continues to require max A with LB dressing at this time and limited distance in ambulation due to pain/fatigue       NUTRITION:  Weight: 183 lb (83 kg) / Body mass index is 30.45 kg/m². Diet Order: General    Supplements: Ensure Enlive    Please see nutrition note for details. NURSING:  FIMS:  Bladder: 6 - Uses device independently (including EMPTYING of device, or uses medication)  Bladder Level of Assistance: 1- requires 75%+ assistance for bladder management tasks, helper changes soiled linens, clothes, absorbant pads, helper cares for and empties marcos catheter  Bladder Frequency of Accidents: (1)  Bowel: 6 - Uses toilet independently with device or oral medication(s)  Bowel Level of Assistance: 6- Requires device like bedpan, diaper, bedside commode, but patient obtains and empties own device including colostomy.   Or requires bowel management medication including stool softeners, laxatives, inserts own suppository  Bowel Frequency of Accidents: 1 - Five or more accidents (soiling linens or clothing) in past 7 days, includes bedpan spills by patient    [address] Score:High  Wounds/Incisions/Ulcers:Left hip incision and multiple skin tears  Medication Review: Reviewed daily with patient  Pain: Left hip pain  Consultations/Labs/X-rays: Labs Monday and Thursday            Risk for Readmission: 14%  Critical Items: If High Risk, consider the following recommendations:Patient not at high risk        Patient/Family Education provided by team:        Discharge Plan   Estimated Length of Stay:8 days  Destination: home health  Pass:No  Services at Discharge: HHOT/PT s3  Equipment at Discharge: owns tub transfer

## 2019-09-05 NOTE — PROGRESS NOTES
Memory deficit    Closed displaced intertrochanteric fracture of left femur ([facility] [address]    Closed fracture of left femur ([facility] [address]       Plan:   Left intertrochanteric femur fracture: S/P IMN on 8/21 with Dr. Ross Newton. WBAT. Pain control. PT/OT. Appreciate ortho FU      Postoperative anemia: Monitor     Mild dementia: Aricept per home regimen     Hypothyroidism: Synthroid 50      Decreased cognition: SLP    BLE edema: recurrent, s/p lasix 40 x2 and daily weights. Resumed lasix while in house     Bowels: Per protocol  Bladder: Per protocol   Sleep: Trazodone provided prn. Pain: Cymbalta, Norco  DVT PPx: Lovenox, AC for 4 weeks    Dispo: Prep d/c for tomorrow. Team conference was held today on the patient and discussed directly with the patient utilizing their entire treatment team. Please see separate team note for details. Total treatment time for today's care >35 min. Lina Ridley was evaluated today and a DME order was entered for a wheeled walker because she requires this to successfully complete daily living tasks of personal cares and ambulating. A wheeled walker is necessary due to the patient's unsteady gait, upper body weakness, and inability to  an ambulation device; and she can ambulate only by pushing a walker instead of a lesser assistive device such as a cane, crutch, or standard walker. The need for this equipment was discussed with the patient and she understands and is in agreement. Cisco Dugan MD 9/5/2019, 11:25 AM    * This document was created using dictation software. While all precautions were taken to ensure accuracy, errors may have occurred. Please disregard any typographical errors.

## 2019-09-05 NOTE — PROGRESS NOTES
disease, Movement disorder, Nephrotic syndrome, and Thyroid disease. has a past surgical history that includes Excision of Facial Mass; skin biopsy; Colonoscopy; eye surgery; other surgical history (Right, 05/27/2018); and Hip fracture surgery (Left, 8/21/2019). Restrictions  Restrictions/Precautions  Restrictions/Precautions: Fall Risk(High Fall Risk )  Lower Extremity Weight Bearing Restrictions  Right Lower Extremity Weight Bearing: Weight Bearing As Tolerated  Position Activity Restriction  Other position/activity restrictions: Pt fell after losing their balance while going to the kitchen to get popcorn. Pt states they fell on their left hip. Has hx of falls, uses cane and walker. Denies taking bloodthinner     Subjective   General  Chart Reviewed: Yes  Additional Pertinent Hx: Pt with R hip fx 1 yr ago with ORIF, went to ARU and home. Response To Previous Treatment: Patient with no complaints from previous session. Family / Caregiver Present: No  Subjective  Subjective: Denies pain at rest, reporting 4/10 pain during ambulation. Pain medication in place prior to arrival.   General Comment  Comments: Pt seated in recliner upon arrival.  Agreeable to PT session. Cognition   Cognition  Overall Cognitive Status: Exceptions  Following Commands:  Follows one step commands consistently  Attention Span: Appears intact  Memory: Decreased recall of biographical Information;Decreased recall of recent events;Decreased short term memory  Safety Judgement: Good awareness of safety precautions  Problem Solving: Assistance required to generate solutions;Assistance required to implement solutions  Insights: Decreased awareness of deficits  Initiation: Requires cues for some  Sequencing: Requires cues for some    Objective   Bed mobility  Bridging: Modified independent   Rolling to Left: Modified independent  Rolling to Right: Modified independent  Supine to Sit: Modified independent  Sit to Supine: Modified Adduction with ball squeeze, Hip Abduction with purple t-band, LAQ, marching. Goals  Short term goals  Time Frame for Short term goals: To be met by DC from [facility]  Short term goal 1: Patient to perform all bed mobility tasks with modified independence - goal met 9/4/19  Short term goal 2: Patient to perform sit to stand transfer with modified independence - goal met 9/5/19  Short term goal 3: Patient to be able to ambulate 150 feet with rolling walker and modified independence - goal met 9/5/19  Short term goal 4: Patient to be able to walk up and down a curb step with rolling walker and SBA - goal met 9/5/19  Long term goals  Time Frame for Long term goals : LTGs=STGs  Patient Goals   Patient goals : To go to ARU, eventually life independly again    Plan    Plan  Times per week: 90 minutes of PT a day, 5 out of the 7 days of the week  Times per day: Daily  Current Treatment Recommendations: Strengthening, ROM, Balance Training, Functional Mobility Training, Transfer Training, Endurance Training, Gait Training, Stair training, Safety Education & Training, Home Exercise Program, Pain Management, Patient/Caregiver Education & Training, Equipment Evaluation, Education, & procurement, ADL/Self-care Training, Manual Therapy - Soft Tissue Mobilization, Modalities  Plan Comment: Plan for d/c to home on 9/6/19 with transition to home health services. Patient reports daughter to stay in home a few weeks to ease transition. Safety Devices  Type of devices:  All fall risk precautions in place, Call light within reach, Gait belt, Chair alarm in place, Left in chair  Restraints  Initially in place: No     Therapy Time   Individual Concurrent Group Co-treatment   Time In  0830         Time Out  0945         Minutes  75         Timed Code Treatment Minutes: [address] John Miller PT, DPT - [id number]

## 2019-09-05 NOTE — PLAN OF CARE
Problem: Pain:  Description  Pain management should include both nonpharmacologic and pharmacologic interventions.   Goal: Pain level will decrease  Description  Pain level will decrease  Outcome: Ongoing  Goal: Control of acute pain  Description  Control of acute pain  Outcome: Ongoing

## 2019-09-05 NOTE — PROGRESS NOTES
ACUTE REHAB UNIT  SPEECH/LANGUAGE PATHOLOGY      [x] Daily  [x] Weekly Care Conference Note  [x] Discharge    lCair Renee     MRP:[birth year]  [facility]:[id number]  Room #: YHP-8307/7025-02   Rehab Dx/Hx: Closed fracture of left femur [facility]) [S72.92XA]  Date of Admit: 8/23/2019      Precautions: falls  Home situation: Pt lives in an apartment by herself. Pt no longer driving. Pt's daughter recently began assisting with medical and finance management due to memory decline. ST Dx: Cognitive linguistic deficits     Daily Treatment Info:   Date: 9/5/2019   Tx session 1   Pain Pt reported pain in hip but declined intervention, indicating she took pain medication earlier. Subjective     Pt sitting upright in recliner chair for session. She was pleasant and cooperative. Weekly progress/discharge summary: Pt continues to present with cognitive-linguistic deficits characterized by deficits in short-term memory, problem solving, and thought organization. She is progressing with use of memory strategies but does require cues to initiate use of strategies. Pt may require assistance with medication management tasks upon return to home. Objective:  Goals    Patient will be instructed in memory strategies to utilize in order to promote recall of newly learned information with min cues   Reviewed memory strategies, including \"WRAP\" mnemonic device written down on a paper in pt's room to assist with recall of common memory strategies. Encouraged use of memory book when returning to home where pt writes down any information she wants/needs to remember so it is all in one place. Goal met inconsistently; recommend continue goal for consistency. Pt will recall safety routines in variety of environments given occ cues    Goal not targeted this session.     GOAL MET   Pt will complete basic problem solving tasks (finance/medication management) with 80% accuracy or min cues    ID errors in pill organizer:  -pt

## 2019-09-05 NOTE — DISCHARGE SUMMARY
HCT 34.6 (L) 09/05/2019    MCV 95.4 09/05/2019     09/05/2019       Disposition:  Home in stable condition. Follow-up:  See after visit summary from hospitalization    Discharge Medications:     Medication List      CONTINUE taking these medications    aspirin 325 MG EC tablet  Take 1 tablet by mouth daily     donepezil 5 MG tablet  Commonly known as:  ARICEPT  Take 1 tablet by mouth nightly     DULoxetine 30 MG extended release capsule  Commonly known as:  CYMBALTA  TAKE ONE CAPSULE BY MOUTH DAILY     HYDROcodone-acetaminophen 5-325 MG per tablet  Commonly known as:  NORCO  Take 1-2 tablets by mouth every 4 hours as needed for Pain for up to 7 days. levothyroxine 50 MCG tablet  Commonly known as:  SYNTHROID  TAKE ONE TABLET BY MOUTH DAILY     pantoprazole 40 MG tablet  Commonly known as:  PROTONIX  Take 1 tablet by mouth daily     vitamin D 2000 units Caps capsule           Where to Get Your Medications      You can get these medications from any pharmacy    Bring a paper prescription for each of these medications  · aspirin 325 MG EC tablet  · HYDROcodone-acetaminophen 5-325 MG per tablet         I spent over 32 minutes on this discharge encounter between counseling, coordination of care, and medication reconciliation. To comply with Branda Hamman bylaw R.II.4.1:  Discharge order placed in advance to facilitate patient's discharge needs. Martin Garcia MD    * This document was created using dictation software. While all precautions were taken to ensure accuracy, errors may have occurred. Please disregard any typographical errors.

## 2019-09-05 NOTE — PROGRESS NOTES
clothing items SBA. Pt completed shower SBA. Pt requiring CGA in stance to wash caro area and for LE clothing management over hips. Balance  Sitting Balance: Supervision  Standing Balance: Stand by assistance  Standing Balance  Time: up to 5 minutes  Activity: functional mobility to/from restroom, in stance to complete LE bathing/donning clothing over hips  Comment: with use of rw, SBA  Functional Mobility  Functional - Mobility Device: Rolling Walker  Activity: To/from bathroom  Assist Level: Stand by assistance  Functional Mobility Comments: with use of rw, SBA   Shower Transfers  Shower - Transfer From: Walker  Shower - Transfer Type: To and From  Shower - Transfer To: Shower seat with back  Shower - Technique: Ambulating  Shower Transfers: Stand by assistance  Bed mobility  Supine to Sit: Modified independent  Scooting: Modified independent  Comment: HOB elevated, increased time to complete   Transfers  Sit to stand: Stand by assistance  Stand to sit: Stand by assistance        Cognition  Overall Cognitive Status: Exceptions  Following Commands: Follows one step commands consistently  Attention Span: Appears intact  Memory: Decreased recall of biographical Information;Decreased recall of recent events;Decreased short term memory  Safety Judgement: Good awareness of safety precautions  Problem Solving: Assistance required to generate solutions;Assistance required to implement solutions  Insights: Decreased awareness of deficits  Initiation: Requires cues for some  Sequencing: Requires cues for some      ADDENDUM: [id number]    Pt seated in recliner chair upon arrival-reporting some pain following physical therapy session, but able to continue with treatment session. Pt sit to stand SBA and ambulated ~20 ft to w/c stand to sit SBA. Pt demonstrating good understanding of hand placement with transfers. Pt propelled in w/c by therapist to ADL suite to complete dry run of tub transfer.  Pt ambulated ~5 ft to side

## 2019-09-06 VITALS
HEART RATE: 79 BPM | OXYGEN SATURATION: 95 % | WEIGHT: 195.99 LBS | DIASTOLIC BLOOD PRESSURE: 57 MMHG | RESPIRATION RATE: 16 BRPM | BODY MASS INDEX: 32.65 KG/M2 | SYSTOLIC BLOOD PRESSURE: 103 MMHG | TEMPERATURE: 97.5 F | HEIGHT: 65 IN

## 2019-09-06 PROCEDURE — 6370000000 HC RX 637 (ALT 250 FOR IP): Performed by: PHYSICAL MEDICINE & REHABILITATION

## 2019-09-06 PROCEDURE — 6360000002 HC RX W HCPCS: Performed by: PHYSICAL MEDICINE & REHABILITATION

## 2019-09-06 RX ADMIN — PANTOPRAZOLE SODIUM 40 MG: 40 TABLET, DELAYED RELEASE ORAL at 05:31

## 2019-09-06 RX ADMIN — DULOXETINE HYDROCHLORIDE 30 MG: 30 CAPSULE, DELAYED RELEASE ORAL at 09:17

## 2019-09-06 RX ADMIN — DOCUSATE SODIUM 100 MG: 100 CAPSULE, LIQUID FILLED ORAL at 09:17

## 2019-09-06 RX ADMIN — ENOXAPARIN SODIUM 30 MG: 30 INJECTION SUBCUTANEOUS at 09:17

## 2019-09-06 RX ADMIN — LEVOTHYROXINE SODIUM 50 MCG: 25 TABLET ORAL at 05:31

## 2019-09-06 RX ADMIN — FUROSEMIDE 40 MG: 40 TABLET ORAL at 09:17

## 2019-09-06 NOTE — PLAN OF CARE
Problem: Falls - Risk of:  Goal: Will remain free from falls  Description  Will remain free from falls  9/6/2019 0357 by Belkis Moore RN  Outcome: Ongoing  Note:   No falls thus far this shift. Fall precautions remain in place.    9/5/2019 1631 by Esteban Bradford RN  Outcome: Ongoing

## 2019-09-07 ENCOUNTER — CARE COORDINATION (OUTPATIENT)
Dept: CASE MANAGEMENT | Age: 84
End: 2019-09-07

## 2019-09-07 DIAGNOSIS — S72.8X2D OTHER CLOSED FRACTURE OF LEFT FEMUR WITH ROUTINE HEALING, UNSPECIFIED PORTION OF FEMUR, SUBSEQUENT ENCOUNTER: Primary | ICD-10-CM

## 2019-09-07 PROCEDURE — 1111F DSCHRG MED/CURRENT MED MERGE: CPT | Performed by: INTERNAL MEDICINE

## 2019-09-07 NOTE — CARE COORDINATION
[facility] Transitions Initial Follow Up Call    Call within 2 business days of discharge: Yes    Patient: Sukhwinder Esparza Patient : 1930   MRN: [id number]  Reason for Admission: Fall IM nail  Discharge Date: 19 RARS: Readmission Risk Score: 16      Last Discharge [facility]       Complaint Diagnosis Description Type Department Provider    19  Closed fracture of neck of left femur with routine healing, subsequent encounter . .. Admission (Discharged) Araceli Colunga MD           Spoke with: Daughter \"Siomara\"    Facility: [facility]    Non-face-to-face services provided:  Obtained and reviewed discharge summary and/or continuity of care documents    Care Transitions 24 Hour Call    Do you have any ongoing symptoms?:  No  Do you have a copy of your discharge instructions?:  Yes  Do you have all of your prescriptions and are they filled?:  No  Have you been contacted by a [address]?:  No  Have you scheduled your follow up appointment?:  Yes  How are you going to get to your appointment?:  Car - family or friend to transport  Were you discharged with any Home Care or Post Acute Services:  Yes  Post Acute Services:  [facility] [address] you feel like you have everything you need to keep you well at home?:  Yes  Care Transitions Interventions         Follow Up: Patient's daughter reports that she is doing very well, home health care has been out to visit already today. Discussed discharge instructions, daughter will  new medications today, and [facility] RN reviewed med list with them. CTN reviewed medications also, 1111F completed. Patient's incisions are clean, dry, and intact, no redness or swelling, \"healing nicely\" according to daughter. [facility] RN is going to bring a bandage for an scraped area on patient's knee, it is uncomfortable for the patient.   CTN encouraged daughter to call Dr. Eh Moreira and see if she can get [facility] f/u moved up, she verbalized that she would like to keep it the

## 2019-09-10 ENCOUNTER — CARE COORDINATION (OUTPATIENT)
Dept: CASE MANAGEMENT | Age: 84
End: 2019-09-10

## 2019-09-17 ENCOUNTER — OFFICE VISIT (OUTPATIENT)
Dept: RHEUMATOLOGY | Age: 84
End: 2019-09-17
Payer: MEDICARE

## 2019-09-17 ENCOUNTER — OFFICE VISIT (OUTPATIENT)
Dept: INTERNAL MEDICINE CLINIC | Age: 84
End: 2019-09-17
Payer: MEDICARE

## 2019-09-17 ENCOUNTER — TELEPHONE (OUTPATIENT)
Dept: RHEUMATOLOGY | Age: 84
End: 2019-09-17

## 2019-09-17 ENCOUNTER — HOSPITAL ENCOUNTER (OUTPATIENT)
Age: 84
Discharge: HOME | End: 2019-09-17
Payer: MEDICARE

## 2019-09-17 ENCOUNTER — HOSPITAL ENCOUNTER (OUTPATIENT)
Dept: GENERAL RADIOLOGY | Age: 84
Discharge: HOME | End: 2019-09-17
Payer: MEDICARE

## 2019-09-17 VITALS
DIASTOLIC BLOOD PRESSURE: 78 MMHG | BODY MASS INDEX: 31.32 KG/M2 | HEIGHT: 65 IN | HEART RATE: 78 BPM | SYSTOLIC BLOOD PRESSURE: 126 MMHG | WEIGHT: 188 LBS

## 2019-09-17 VITALS
HEIGHT: 65 IN | HEART RATE: 78 BPM | SYSTOLIC BLOOD PRESSURE: 126 MMHG | WEIGHT: 188 LBS | DIASTOLIC BLOOD PRESSURE: 78 MMHG | BODY MASS INDEX: 31.32 KG/M2

## 2019-09-17 DIAGNOSIS — M81.0 AGE-RELATED OSTEOPOROSIS WITHOUT CURRENT PATHOLOGICAL FRACTURE: Primary | ICD-10-CM

## 2019-09-17 DIAGNOSIS — S89.92XD INJURY OF LEFT KNEE, SUBSEQUENT ENCOUNTER: ICD-10-CM

## 2019-09-17 DIAGNOSIS — Z09 HOSPITAL DISCHARGE FOLLOW-UP: Primary | ICD-10-CM

## 2019-09-17 DIAGNOSIS — L03.116 CELLULITIS OF LEFT LOWER EXTREMITY: ICD-10-CM

## 2019-09-17 DIAGNOSIS — L08.9 WOUND INFECTION: ICD-10-CM

## 2019-09-17 DIAGNOSIS — M80.00XD AGE-RELATED OSTEOPOROSIS WITH CURRENT PATHOLOGICAL FRACTURE WITH ROUTINE HEALING, SUBSEQUENT ENCOUNTER: ICD-10-CM

## 2019-09-17 DIAGNOSIS — K21.9 GASTROESOPHAGEAL REFLUX DISEASE, ESOPHAGITIS PRESENCE NOT SPECIFIED: ICD-10-CM

## 2019-09-17 DIAGNOSIS — T14.8XXA WOUND INFECTION: ICD-10-CM

## 2019-09-17 PROCEDURE — 99213 OFFICE O/P EST LOW 20 MIN: CPT | Performed by: INTERNAL MEDICINE

## 2019-09-17 PROCEDURE — 73560 X-RAY EXAM OF KNEE 1 OR 2: CPT

## 2019-09-17 PROCEDURE — G8427 DOCREV CUR MEDS BY ELIG CLIN: HCPCS | Performed by: INTERNAL MEDICINE

## 2019-09-17 PROCEDURE — 1111F DSCHRG MED/CURRENT MED MERGE: CPT | Performed by: INTERNAL MEDICINE

## 2019-09-17 PROCEDURE — 1123F ACP DISCUSS/DSCN MKR DOCD: CPT | Performed by: INTERNAL MEDICINE

## 2019-09-17 PROCEDURE — 1036F TOBACCO NON-USER: CPT | Performed by: INTERNAL MEDICINE

## 2019-09-17 PROCEDURE — 4040F PNEUMOC VAC/ADMIN/RCVD: CPT | Performed by: INTERNAL MEDICINE

## 2019-09-17 PROCEDURE — G8417 CALC BMI ABV UP PARAM F/U: HCPCS | Performed by: INTERNAL MEDICINE

## 2019-09-17 PROCEDURE — 99495 TRANSJ CARE MGMT MOD F2F 14D: CPT | Performed by: INTERNAL MEDICINE

## 2019-09-17 PROCEDURE — 1090F PRES/ABSN URINE INCON ASSESS: CPT | Performed by: INTERNAL MEDICINE

## 2019-09-17 RX ORDER — PANTOPRAZOLE SODIUM 40 MG/1
40 TABLET, DELAYED RELEASE ORAL DAILY
Qty: 30 TABLET | Refills: 5 | Status: SHIPPED | OUTPATIENT
Start: 2019-09-17 | End: 2019-11-13 | Stop reason: ALTCHOICE

## 2019-09-17 RX ORDER — CEPHALEXIN 500 MG/1
500 CAPSULE ORAL 4 TIMES DAILY
Qty: 40 CAPSULE | Refills: 0 | Status: SHIPPED | OUTPATIENT
Start: 2019-09-17 | End: 2019-11-13 | Stop reason: ALTCHOICE

## 2019-09-17 RX ORDER — CAL/D3/MAG11/ZINC/COP/MANG/BOR 600 MG-800
1 TABLET ORAL 2 TIMES DAILY
Qty: 180 TABLET | Refills: 1 | Status: SHIPPED | OUTPATIENT
Start: 2019-09-17 | End: 2020-08-27 | Stop reason: SDUPTHER

## 2019-09-17 RX ORDER — HYDROCODONE BITARTRATE AND ACETAMINOPHEN 5; 325 MG/1; MG/1
1 TABLET ORAL EVERY 6 HOURS PRN
COMMUNITY
End: 2019-11-13 | Stop reason: ALTCHOICE

## 2019-09-17 ASSESSMENT — PATIENT HEALTH QUESTIONNAIRE - PHQ9
SUM OF ALL RESPONSES TO PHQ9 QUESTIONS 1 & 2: 0
SUM OF ALL RESPONSES TO PHQ QUESTIONS 1-9: 0
SUM OF ALL RESPONSES TO PHQ QUESTIONS 1-9: 0
1. LITTLE INTEREST OR PLEASURE IN DOING THINGS: 0
2. FEELING DOWN, DEPRESSED OR HOPELESS: 0

## 2019-09-17 NOTE — PROGRESS NOTES
Discharge summary reviewed- see chart. Interval history/Current status: left leg/knee wound infection. A comprehensive review of systems was negative. Patient denies any exertional chest pain, dyspnea, palpitations, syncope, orthopnea, edema or paroxysmal nocturnal dyspnea. The patient denies cough, chest pain, dyspnea, wheezing or hemoptysis. Mild pain at the left hip. Pain at the left knee. Increased pain on weightbearing. Using a walker    Vitals:    09/17/19 1125   BP: 126/78   Site: Right Upper Arm   Position: Sitting   Cuff Size: Large Adult   Pulse: 78   Weight: 188 lb (85.3 kg)   Height: 5' 5\" (1.651 m)     Body mass index is 31.28 kg/m². Wt Readings from Last 3 Encounters:   09/17/19 188 lb (85.3 kg)   09/06/19 195 lb 15.8 oz (88.9 kg)   08/20/19 184 lb (83.5 kg)     BP Readings from Last 3 Encounters:   09/17/19 126/78   09/06/19 (!) 103/57   08/23/19 (!) 102/57        Physical Exam:  General Appearance: alert and oriented to person, place and time, well developed and well- nourished, in no acute distress  Skin: warm and dry, no rash or erythema  Head: normocephalic and atraumatic  Eyes: pupils equal, round, and reactive to light, extraocular eye movements intact, conjunctivae normal  ENT: tympanic membrane, external ear and ear canal normal bilaterally, nose without deformity, nasal mucosa and turbinates normal without polyps  Neck: supple and non-tender without mass, no thyromegaly or thyroid nodules, no cervical lymphadenopathy  Pulmonary/Chest: clear to auscultation bilaterally- no wheezes, rales or rhonchi, normal air movement, no respiratory distress  Cardiovascular: normal rate, regular rhythm, normal S1 and S2, no murmurs, rubs, clicks, or gallops, distal pulses intact, no carotid bruits  Abdomen: soft, non-tender, non-distended, normal bowel sounds, no masses or organomegaly  Extremities: No left hip tenderness. Open wound at the lower leg just below the knee.   Positive yellowish drainage. Positive local redness  Musculoskeletal: No midline spine tenderness  Neurologic: reflexes normal and symmetric, no cranial nerve deficit, gait,speech normal.  Using a walker    Assessment/Plan:  1. Hospital discharge follow-up  Patient will continue to benefit from home physical therapy  - OR DISCHARGE MEDS RECONCILED W/ CURRENT OUTPATIENT MED LIST    2. Gastroesophageal reflux disease, esophagitis presence not specified  refill  - pantoprazole (PROTONIX) 40 MG tablet; Take 1 tablet by mouth daily  Dispense: 30 tablet; Refill: 5    3. Cellulitis of left lower extremity    - cephALEXin (KEFLEX) 500 MG capsule; Take 1 capsule by mouth 4 times daily  Dispense: 40 capsule; Refill: 0    4. Injury of left knee, subsequent encounter    - XR KNEE LEFT (3 VIEWS); Future    5. Age-related osteoporosis with current pathological fracture with routine healing, subsequent encounter  With both hip fracture as well as mild compression fracture at the T1 thoracic spine. Patient is due for Prolia. She is going to see Dr. Dick Carney to consider any other alternative medications or not. 6. Wound infection  Will call at [facility] [employer] to continue dressing of the local wound and provide home care service  - cephALEXin (KEFLEX) 500 MG capsule; Take 1 capsule by mouth 4 times daily  Dispense: 40 capsule; Refill: 0  - OR DISCHARGE MEDS RECONCILED W/ CURRENT OUTPATIENT MED LIST        Medical Decision Making: moderate complexity    An After Visit Summary was printed and given to the patient. Documentation was done using voice recognition dragon software. Every effort was made to ensure accuracy; however, inadvertent  Unintentional computerized transcription errors may be present.

## 2019-09-17 NOTE — PROGRESS NOTES
Subjective:      Patient ID: Cameron Carlos is a 80 y.o. female. HPI  The patient returns for follow-up of osteoporosis. Since I last saw her she fractured her other  hip. She did receive 1 injection of Prolia sometime in January  Review of Systems  No recent cardiovascular or thrombotic event to contraindicate the use of Evenity  Objective:   Physical Exam  /78   Pulse 78   Ht 5' 5\" (1.651 m)   Wt 188 lb (85.3 kg)   BMI 31.28 kg/m²   Alert female in no acute distress. Assessment:      Osteoporosis      Plan: We will attempt to get coverage for Evenity. .  Patient will optimize her calcium and vitamin D intake. A work-up for secondary causes of osteoporosis was performed late last year. I will see her back in 3 months time but she will be contacted by telephone to begin treatment once approval is documented.     Aristides Snyder MD

## 2019-09-18 ENCOUNTER — CARE COORDINATION (OUTPATIENT)
Dept: CASE MANAGEMENT | Age: 84
End: 2019-09-18

## 2019-09-19 ENCOUNTER — TELEPHONE (OUTPATIENT)
Dept: INTERNAL MEDICINE CLINIC | Age: 84
End: 2019-09-19

## 2019-09-19 DIAGNOSIS — R60.0 BILATERAL LEG EDEMA: Primary | ICD-10-CM

## 2019-09-19 DIAGNOSIS — N04.9 NEPHROTIC SYNDROME: ICD-10-CM

## 2019-09-19 RX ORDER — FUROSEMIDE 20 MG/1
20 TABLET ORAL DAILY
Qty: 60 TABLET | Refills: 3 | Status: SHIPPED | OUTPATIENT
Start: 2019-09-19 | End: 2020-01-13 | Stop reason: SDUPTHER

## 2019-09-20 ENCOUNTER — HOSPITAL ENCOUNTER (OUTPATIENT)
Age: 84
Setting detail: SPECIMEN
Discharge: HOME | End: 2019-09-20
Payer: MEDICARE

## 2019-09-20 LAB
ALBUMIN SERPL-MCNC: 3.6 G/DL (ref 3.4–5)
ALP BLD-CCNC: 199 U/L (ref 40–129)
ALT SERPL-CCNC: 7 U/L (ref 10–40)
AST SERPL-CCNC: 13 U/L (ref 15–37)
BILIRUB SERPL-MCNC: 0.4 MG/DL (ref 0–1)
BILIRUBIN DIRECT: <0.2 MG/DL (ref 0–0.3)
BILIRUBIN URINE: NEGATIVE
BILIRUBIN, INDIRECT: ABNORMAL MG/DL (ref 0–1)
BLOOD, URINE: NEGATIVE
CLARITY: CLEAR
COLOR: YELLOW
GLUCOSE URINE: NEGATIVE MG/DL
KETONES, URINE: NEGATIVE MG/DL
LEUKOCYTE ESTERASE, URINE: NEGATIVE
MICROSCOPIC EXAMINATION: NORMAL
NITRITE, URINE: NEGATIVE
PH UA: 6 (ref 5–8)
PROTEIN UA: NEGATIVE MG/DL
SPECIFIC GRAVITY UA: 1.02 (ref 1–1.03)
TOTAL PROTEIN: 6.5 G/DL (ref 6.4–8.2)
URINE TYPE: NORMAL
UROBILINOGEN, URINE: 1 E.U./DL

## 2019-09-20 PROCEDURE — 81003 URINALYSIS AUTO W/O SCOPE: CPT

## 2019-09-20 PROCEDURE — 36415 COLL VENOUS BLD VENIPUNCTURE: CPT

## 2019-09-20 PROCEDURE — 80076 HEPATIC FUNCTION PANEL: CPT

## 2019-09-23 ENCOUNTER — HOSPITAL ENCOUNTER (OUTPATIENT)
Age: 84
Setting detail: SPECIMEN
Discharge: HOME | End: 2019-09-23
Payer: MEDICARE

## 2019-09-23 LAB
24HR URINE VOLUME (ML): 900 ML
CREATININE 24 HOUR URINE: 0.6 G/24HR (ref 0.6–1.5)
PROTEIN 24 HOUR URINE: 0.14 G/24HR

## 2019-09-23 PROCEDURE — 84156 ASSAY OF PROTEIN URINE: CPT

## 2019-09-26 ENCOUNTER — TELEPHONE (OUTPATIENT)
Dept: INTERNAL MEDICINE CLINIC | Age: 84
End: 2019-09-26

## 2019-10-01 DIAGNOSIS — E03.9 HYPOTHYROIDISM, UNSPECIFIED TYPE: ICD-10-CM

## 2019-10-01 RX ORDER — LEVOTHYROXINE SODIUM 0.05 MG/1
TABLET ORAL
Qty: 30 TABLET | Refills: 4 | Status: SHIPPED | OUTPATIENT
Start: 2019-10-01 | End: 2020-03-16

## 2019-10-03 ENCOUNTER — OFFICE VISIT (OUTPATIENT)
Dept: ORTHOPEDIC SURGERY | Age: 84
End: 2019-10-03

## 2019-10-03 VITALS — RESPIRATION RATE: 16 BRPM | WEIGHT: 188 LBS | HEIGHT: 65 IN | BODY MASS INDEX: 31.32 KG/M2

## 2019-10-03 DIAGNOSIS — S72.351A CLOSED DISPLACED COMMINUTED FRACTURE OF SHAFT OF RIGHT FEMUR, INITIAL ENCOUNTER (HCC): Primary | ICD-10-CM

## 2019-10-03 PROCEDURE — APPNB30 APP NON BILLABLE TIME 0-30 MINS: Performed by: NURSE PRACTITIONER

## 2019-10-03 PROCEDURE — 99024 POSTOP FOLLOW-UP VISIT: CPT | Performed by: NURSE PRACTITIONER

## 2019-10-04 ENCOUNTER — TELEPHONE (OUTPATIENT)
Dept: INTERNAL MEDICINE CLINIC | Age: 84
End: 2019-10-04

## 2019-10-08 ENCOUNTER — NURSE ONLY (OUTPATIENT)
Dept: RHEUMATOLOGY | Age: 84
End: 2019-10-08
Payer: MEDICARE

## 2019-10-08 DIAGNOSIS — M81.0 AGE-RELATED OSTEOPOROSIS WITHOUT CURRENT PATHOLOGICAL FRACTURE: Primary | ICD-10-CM

## 2019-10-08 PROCEDURE — 96372 THER/PROPH/DIAG INJ SC/IM: CPT | Performed by: INTERNAL MEDICINE

## 2019-10-17 ENCOUNTER — OFFICE VISIT (OUTPATIENT)
Dept: INTERNAL MEDICINE CLINIC | Age: 84
End: 2019-10-17
Payer: MEDICARE

## 2019-10-17 VITALS
SYSTOLIC BLOOD PRESSURE: 126 MMHG | HEIGHT: 65 IN | DIASTOLIC BLOOD PRESSURE: 82 MMHG | HEART RATE: 80 BPM | BODY MASS INDEX: 30.92 KG/M2 | WEIGHT: 185.6 LBS

## 2019-10-17 DIAGNOSIS — R41.3 MEMORY DEFICIT: ICD-10-CM

## 2019-10-17 DIAGNOSIS — F03.90 DEMENTIA WITHOUT BEHAVIORAL DISTURBANCE, UNSPECIFIED DEMENTIA TYPE: ICD-10-CM

## 2019-10-17 DIAGNOSIS — R60.0 BILATERAL LEG EDEMA: Primary | ICD-10-CM

## 2019-10-17 DIAGNOSIS — R60.0 BILATERAL LEG EDEMA: ICD-10-CM

## 2019-10-17 DIAGNOSIS — L30.9 DERMATITIS: ICD-10-CM

## 2019-10-17 LAB
ANION GAP SERPL CALCULATED.3IONS-SCNC: 14 MMOL/L (ref 3–16)
BUN BLDV-MCNC: 17 MG/DL (ref 7–20)
CALCIUM SERPL-MCNC: 9.2 MG/DL (ref 8.3–10.6)
CHLORIDE BLD-SCNC: 101 MMOL/L (ref 99–110)
CO2: 29 MMOL/L (ref 21–32)
CREAT SERPL-MCNC: 0.9 MG/DL (ref 0.6–1.2)
GFR AFRICAN AMERICAN: >60
GFR NON-AFRICAN AMERICAN: 59
GLUCOSE BLD-MCNC: 92 MG/DL (ref 70–99)
POTASSIUM SERPL-SCNC: 4.3 MMOL/L (ref 3.5–5.1)
SODIUM BLD-SCNC: 144 MMOL/L (ref 136–145)

## 2019-10-17 PROCEDURE — 1090F PRES/ABSN URINE INCON ASSESS: CPT | Performed by: INTERNAL MEDICINE

## 2019-10-17 PROCEDURE — 1123F ACP DISCUSS/DSCN MKR DOCD: CPT | Performed by: INTERNAL MEDICINE

## 2019-10-17 PROCEDURE — 1036F TOBACCO NON-USER: CPT | Performed by: INTERNAL MEDICINE

## 2019-10-17 PROCEDURE — 99213 OFFICE O/P EST LOW 20 MIN: CPT | Performed by: INTERNAL MEDICINE

## 2019-10-17 PROCEDURE — 4040F PNEUMOC VAC/ADMIN/RCVD: CPT | Performed by: INTERNAL MEDICINE

## 2019-10-17 PROCEDURE — G8427 DOCREV CUR MEDS BY ELIG CLIN: HCPCS | Performed by: INTERNAL MEDICINE

## 2019-10-17 PROCEDURE — G8417 CALC BMI ABV UP PARAM F/U: HCPCS | Performed by: INTERNAL MEDICINE

## 2019-10-17 PROCEDURE — G8484 FLU IMMUNIZE NO ADMIN: HCPCS | Performed by: INTERNAL MEDICINE

## 2019-10-17 RX ORDER — CLOBETASOL PROPIONATE 0.5 MG/G
OINTMENT TOPICAL
Qty: 45 G | Refills: 0 | Status: SHIPPED | OUTPATIENT
Start: 2019-10-17 | End: 2019-11-13 | Stop reason: ALTCHOICE

## 2019-10-17 RX ORDER — DONEPEZIL HYDROCHLORIDE 5 MG/1
5 TABLET, FILM COATED ORAL NIGHTLY
Qty: 90 TABLET | Refills: 1 | Status: SHIPPED | OUTPATIENT
Start: 2019-10-17 | End: 2019-11-13 | Stop reason: HOSPADM

## 2019-10-17 RX ORDER — MEMANTINE HYDROCHLORIDE 5 MG/1
TABLET ORAL
Qty: 180 TABLET | Refills: 0 | Status: SHIPPED | OUTPATIENT
Start: 2019-10-17 | End: 2020-01-27 | Stop reason: SDUPTHER

## 2019-10-17 ASSESSMENT — ENCOUNTER SYMPTOMS
NAUSEA: 0
ABDOMINAL PAIN: 0
SHORTNESS OF BREATH: 0
VOMITING: 0
WHEEZING: 0

## 2019-10-28 ENCOUNTER — HOSPITAL ENCOUNTER (OUTPATIENT)
Dept: PHYSICAL THERAPY | Age: 84
Setting detail: THERAPIES SERIES
Discharge: HOME | End: 2019-10-28
Payer: MEDICARE

## 2019-10-28 PROCEDURE — 97116 GAIT TRAINING THERAPY: CPT

## 2019-10-28 PROCEDURE — 97161 PT EVAL LOW COMPLEX 20 MIN: CPT

## 2019-10-28 PROCEDURE — 97530 THERAPEUTIC ACTIVITIES: CPT

## 2019-11-01 ENCOUNTER — HOSPITAL ENCOUNTER (OUTPATIENT)
Dept: PHYSICAL THERAPY | Age: 84
Setting detail: THERAPIES SERIES
Discharge: HOME | End: 2019-11-01
Payer: MEDICARE

## 2019-11-01 PROCEDURE — 97110 THERAPEUTIC EXERCISES: CPT

## 2019-11-03 DIAGNOSIS — M79.7 FIBROMYALGIA: Primary | ICD-10-CM

## 2019-11-04 ENCOUNTER — HOSPITAL ENCOUNTER (OUTPATIENT)
Dept: PHYSICAL THERAPY | Age: 84
Setting detail: THERAPIES SERIES
Discharge: HOME | End: 2019-11-04
Payer: MEDICARE

## 2019-11-04 PROCEDURE — 97110 THERAPEUTIC EXERCISES: CPT

## 2019-11-04 RX ORDER — DULOXETIN HYDROCHLORIDE 30 MG/1
CAPSULE, DELAYED RELEASE ORAL
Qty: 30 CAPSULE | Refills: 2 | Status: SHIPPED | OUTPATIENT
Start: 2019-11-04 | End: 2020-01-13 | Stop reason: SDUPTHER

## 2019-11-07 ENCOUNTER — NURSE ONLY (OUTPATIENT)
Dept: RHEUMATOLOGY | Age: 84
End: 2019-11-07
Payer: MEDICARE

## 2019-11-07 DIAGNOSIS — M81.0 AGE-RELATED OSTEOPOROSIS WITHOUT CURRENT PATHOLOGICAL FRACTURE: Primary | ICD-10-CM

## 2019-11-07 PROCEDURE — 96372 THER/PROPH/DIAG INJ SC/IM: CPT | Performed by: INTERNAL MEDICINE

## 2019-11-08 ENCOUNTER — HOSPITAL ENCOUNTER (OUTPATIENT)
Dept: PHYSICAL THERAPY | Age: 84
Setting detail: THERAPIES SERIES
Discharge: HOME | End: 2019-11-08
Payer: MEDICARE

## 2019-11-08 PROCEDURE — 97110 THERAPEUTIC EXERCISES: CPT

## 2019-11-08 PROCEDURE — 97530 THERAPEUTIC ACTIVITIES: CPT

## 2019-11-08 PROCEDURE — 97112 NEUROMUSCULAR REEDUCATION: CPT

## 2019-11-11 ENCOUNTER — HOSPITAL ENCOUNTER (OUTPATIENT)
Dept: PHYSICAL THERAPY | Age: 84
Setting detail: THERAPIES SERIES
Discharge: HOME | End: 2019-11-11
Payer: MEDICARE

## 2019-11-13 ENCOUNTER — HOSPITAL ENCOUNTER (OUTPATIENT)
Dept: PHYSICAL THERAPY | Age: 84
Setting detail: THERAPIES SERIES
Discharge: HOME | End: 2019-11-13
Payer: MEDICARE

## 2019-11-13 ENCOUNTER — OFFICE VISIT (OUTPATIENT)
Dept: INTERNAL MEDICINE CLINIC | Age: 84
End: 2019-11-13
Payer: MEDICARE

## 2019-11-13 VITALS
HEART RATE: 66 BPM | BODY MASS INDEX: 30.92 KG/M2 | SYSTOLIC BLOOD PRESSURE: 124 MMHG | HEIGHT: 65 IN | WEIGHT: 185.6 LBS | DIASTOLIC BLOOD PRESSURE: 76 MMHG

## 2019-11-13 DIAGNOSIS — Z00.00 ROUTINE GENERAL MEDICAL EXAMINATION AT A HEALTH CARE FACILITY: ICD-10-CM

## 2019-11-13 DIAGNOSIS — Z23 NEED FOR INFLUENZA VACCINATION: ICD-10-CM

## 2019-11-13 DIAGNOSIS — Z13.31 POSITIVE DEPRESSION SCREENING: Primary | ICD-10-CM

## 2019-11-13 DIAGNOSIS — R41.3 MEMORY DEFICIT: ICD-10-CM

## 2019-11-13 PROCEDURE — 1123F ACP DISCUSS/DSCN MKR DOCD: CPT | Performed by: INTERNAL MEDICINE

## 2019-11-13 PROCEDURE — G8484 FLU IMMUNIZE NO ADMIN: HCPCS | Performed by: INTERNAL MEDICINE

## 2019-11-13 PROCEDURE — 97110 THERAPEUTIC EXERCISES: CPT

## 2019-11-13 PROCEDURE — 97530 THERAPEUTIC ACTIVITIES: CPT

## 2019-11-13 PROCEDURE — G0008 ADMIN INFLUENZA VIRUS VAC: HCPCS | Performed by: INTERNAL MEDICINE

## 2019-11-13 PROCEDURE — G0438 PPPS, INITIAL VISIT: HCPCS | Performed by: INTERNAL MEDICINE

## 2019-11-13 PROCEDURE — 90653 IIV ADJUVANT VACCINE IM: CPT | Performed by: INTERNAL MEDICINE

## 2019-11-13 PROCEDURE — G8431 POS CLIN DEPRES SCRN F/U DOC: HCPCS | Performed by: INTERNAL MEDICINE

## 2019-11-13 PROCEDURE — 4040F PNEUMOC VAC/ADMIN/RCVD: CPT | Performed by: INTERNAL MEDICINE

## 2019-11-13 RX ORDER — SERTRALINE HYDROCHLORIDE 25 MG/1
25 TABLET, FILM COATED ORAL DAILY
Qty: 30 TABLET | Refills: 3 | Status: SHIPPED | OUTPATIENT
Start: 2019-11-13 | End: 2020-03-03

## 2019-11-13 ASSESSMENT — PATIENT HEALTH QUESTIONNAIRE - PHQ9: SUM OF ALL RESPONSES TO PHQ QUESTIONS 1-9: 7

## 2019-11-14 ENCOUNTER — OFFICE VISIT (OUTPATIENT)
Dept: ORTHOPEDIC SURGERY | Age: 84
End: 2019-11-14
Payer: MEDICARE

## 2019-11-14 ENCOUNTER — TELEPHONE (OUTPATIENT)
Dept: INTERNAL MEDICINE CLINIC | Age: 84
End: 2019-11-14

## 2019-11-14 VITALS — WEIGHT: 185 LBS | HEIGHT: 65 IN | BODY MASS INDEX: 30.82 KG/M2 | RESPIRATION RATE: 16 BRPM

## 2019-11-14 DIAGNOSIS — S72.351A CLOSED DISPLACED COMMINUTED FRACTURE OF SHAFT OF RIGHT FEMUR, INITIAL ENCOUNTER (HCC): ICD-10-CM

## 2019-11-14 DIAGNOSIS — M17.12 PRIMARY OSTEOARTHRITIS OF LEFT KNEE: Primary | ICD-10-CM

## 2019-11-14 DIAGNOSIS — S72.142A CLOSED DISPLACED INTERTROCHANTERIC FRACTURE OF LEFT FEMUR, INITIAL ENCOUNTER (HCC): Chronic | ICD-10-CM

## 2019-11-14 DIAGNOSIS — M17.11 PRIMARY OSTEOARTHRITIS OF RIGHT KNEE: ICD-10-CM

## 2019-11-14 PROCEDURE — G8427 DOCREV CUR MEDS BY ELIG CLIN: HCPCS | Performed by: ORTHOPAEDIC SURGERY

## 2019-11-14 PROCEDURE — G8417 CALC BMI ABV UP PARAM F/U: HCPCS | Performed by: ORTHOPAEDIC SURGERY

## 2019-11-14 PROCEDURE — 1036F TOBACCO NON-USER: CPT | Performed by: ORTHOPAEDIC SURGERY

## 2019-11-14 PROCEDURE — 1123F ACP DISCUSS/DSCN MKR DOCD: CPT | Performed by: ORTHOPAEDIC SURGERY

## 2019-11-14 PROCEDURE — G8482 FLU IMMUNIZE ORDER/ADMIN: HCPCS | Performed by: ORTHOPAEDIC SURGERY

## 2019-11-14 PROCEDURE — 99214 OFFICE O/P EST MOD 30 MIN: CPT | Performed by: ORTHOPAEDIC SURGERY

## 2019-11-14 PROCEDURE — 99024 POSTOP FOLLOW-UP VISIT: CPT | Performed by: ORTHOPAEDIC SURGERY

## 2019-11-14 PROCEDURE — 1090F PRES/ABSN URINE INCON ASSESS: CPT | Performed by: ORTHOPAEDIC SURGERY

## 2019-11-14 PROCEDURE — 20610 DRAIN/INJ JOINT/BURSA W/O US: CPT | Performed by: ORTHOPAEDIC SURGERY

## 2019-11-14 PROCEDURE — 4040F PNEUMOC VAC/ADMIN/RCVD: CPT | Performed by: ORTHOPAEDIC SURGERY

## 2019-11-17 PROBLEM — M17.12 PRIMARY OSTEOARTHRITIS OF LEFT KNEE: Status: ACTIVE | Noted: 2019-11-17

## 2019-11-18 ENCOUNTER — HOSPITAL ENCOUNTER (OUTPATIENT)
Dept: PHYSICAL THERAPY | Age: 84
Setting detail: THERAPIES SERIES
Discharge: HOME | End: 2019-11-18
Payer: MEDICARE

## 2019-11-18 PROCEDURE — 97112 NEUROMUSCULAR REEDUCATION: CPT

## 2019-11-18 PROCEDURE — 97110 THERAPEUTIC EXERCISES: CPT

## 2019-11-20 ENCOUNTER — HOSPITAL ENCOUNTER (OUTPATIENT)
Dept: PHYSICAL THERAPY | Age: 84
Setting detail: THERAPIES SERIES
Discharge: HOME | End: 2019-11-20
Payer: MEDICARE

## 2019-11-20 PROCEDURE — 97112 NEUROMUSCULAR REEDUCATION: CPT

## 2019-11-20 PROCEDURE — 97110 THERAPEUTIC EXERCISES: CPT

## 2019-11-25 ENCOUNTER — HOSPITAL ENCOUNTER (OUTPATIENT)
Dept: PHYSICAL THERAPY | Age: 84
Setting detail: THERAPIES SERIES
Discharge: HOME | End: 2019-11-25
Payer: MEDICARE

## 2019-11-25 PROCEDURE — 97110 THERAPEUTIC EXERCISES: CPT

## 2019-11-25 PROCEDURE — 97530 THERAPEUTIC ACTIVITIES: CPT

## 2019-11-27 ENCOUNTER — APPOINTMENT (OUTPATIENT)
Dept: PHYSICAL THERAPY | Age: 84
End: 2019-11-27
Payer: MEDICARE

## 2019-12-09 ENCOUNTER — HOSPITAL ENCOUNTER (OUTPATIENT)
Dept: PHYSICAL THERAPY | Age: 84
Setting detail: THERAPIES SERIES
Discharge: HOME | End: 2019-12-09
Payer: MEDICARE

## 2019-12-09 PROCEDURE — 97110 THERAPEUTIC EXERCISES: CPT

## 2019-12-09 PROCEDURE — 97112 NEUROMUSCULAR REEDUCATION: CPT

## 2019-12-11 ENCOUNTER — OFFICE VISIT (OUTPATIENT)
Dept: RHEUMATOLOGY | Age: 84
End: 2019-12-11
Payer: MEDICARE

## 2019-12-11 VITALS
DIASTOLIC BLOOD PRESSURE: 78 MMHG | HEIGHT: 65 IN | SYSTOLIC BLOOD PRESSURE: 130 MMHG | WEIGHT: 180 LBS | HEART RATE: 80 BPM | BODY MASS INDEX: 29.99 KG/M2

## 2019-12-11 DIAGNOSIS — M81.0 AGE-RELATED OSTEOPOROSIS WITHOUT CURRENT PATHOLOGICAL FRACTURE: Primary | ICD-10-CM

## 2019-12-11 DIAGNOSIS — M17.11 PRIMARY OSTEOARTHRITIS OF RIGHT KNEE: ICD-10-CM

## 2019-12-11 PROCEDURE — 1090F PRES/ABSN URINE INCON ASSESS: CPT | Performed by: INTERNAL MEDICINE

## 2019-12-11 PROCEDURE — G8427 DOCREV CUR MEDS BY ELIG CLIN: HCPCS | Performed by: INTERNAL MEDICINE

## 2019-12-11 PROCEDURE — G8417 CALC BMI ABV UP PARAM F/U: HCPCS | Performed by: INTERNAL MEDICINE

## 2019-12-11 PROCEDURE — 96372 THER/PROPH/DIAG INJ SC/IM: CPT | Performed by: INTERNAL MEDICINE

## 2019-12-11 PROCEDURE — 4040F PNEUMOC VAC/ADMIN/RCVD: CPT | Performed by: INTERNAL MEDICINE

## 2019-12-11 PROCEDURE — G8482 FLU IMMUNIZE ORDER/ADMIN: HCPCS | Performed by: INTERNAL MEDICINE

## 2019-12-11 PROCEDURE — 1036F TOBACCO NON-USER: CPT | Performed by: INTERNAL MEDICINE

## 2019-12-11 PROCEDURE — 1123F ACP DISCUSS/DSCN MKR DOCD: CPT | Performed by: INTERNAL MEDICINE

## 2019-12-11 PROCEDURE — 99213 OFFICE O/P EST LOW 20 MIN: CPT | Performed by: INTERNAL MEDICINE

## 2019-12-12 ENCOUNTER — TELEPHONE (OUTPATIENT)
Dept: RHEUMATOLOGY | Age: 84
End: 2019-12-12

## 2019-12-12 NOTE — TELEPHONE ENCOUNTER
Jose A Arellano this pt gets Evenity monthly She has appt with Dr Eileen Sanders next month and would like to receive at that time ( its a Monday and I am not here) It is 2 sc injections if you have questions please ask Gurpreet Malin or Shadia Meng

## 2019-12-17 ENCOUNTER — TELEPHONE (OUTPATIENT)
Dept: INTERNAL MEDICINE CLINIC | Age: 84
End: 2019-12-17

## 2019-12-19 ENCOUNTER — TELEPHONE (OUTPATIENT)
Dept: INTERNAL MEDICINE CLINIC | Age: 84
End: 2019-12-19

## 2019-12-30 ENCOUNTER — HOSPITAL ENCOUNTER (OUTPATIENT)
Dept: PHYSICAL THERAPY | Age: 84
Setting detail: THERAPIES SERIES
Discharge: HOME | End: 2019-12-30
Payer: MEDICARE

## 2019-12-30 PROCEDURE — 97110 THERAPEUTIC EXERCISES: CPT

## 2019-12-30 PROCEDURE — 97530 THERAPEUTIC ACTIVITIES: CPT

## 2020-01-13 ENCOUNTER — OFFICE VISIT (OUTPATIENT)
Dept: INTERNAL MEDICINE CLINIC | Age: 85
End: 2020-01-13
Payer: MEDICARE

## 2020-01-13 VITALS
DIASTOLIC BLOOD PRESSURE: 80 MMHG | HEIGHT: 65 IN | HEART RATE: 90 BPM | WEIGHT: 181 LBS | SYSTOLIC BLOOD PRESSURE: 110 MMHG | BODY MASS INDEX: 30.16 KG/M2

## 2020-01-13 DIAGNOSIS — R43.2 DYSGEUSIA: ICD-10-CM

## 2020-01-13 LAB
A/G RATIO: 1.6 (ref 1.1–2.2)
ALBUMIN SERPL-MCNC: 4.1 G/DL (ref 3.4–5)
ALP BLD-CCNC: 118 U/L (ref 40–129)
ALT SERPL-CCNC: 9 U/L (ref 10–40)
ANION GAP SERPL CALCULATED.3IONS-SCNC: 19 MMOL/L (ref 3–16)
AST SERPL-CCNC: 14 U/L (ref 15–37)
BILIRUB SERPL-MCNC: 0.6 MG/DL (ref 0–1)
BUN BLDV-MCNC: 19 MG/DL (ref 7–20)
CALCIUM SERPL-MCNC: 9.8 MG/DL (ref 8.3–10.6)
CHLORIDE BLD-SCNC: 100 MMOL/L (ref 99–110)
CO2: 24 MMOL/L (ref 21–32)
CREAT SERPL-MCNC: 1.1 MG/DL (ref 0.6–1.2)
FOLATE: 8.23 NG/ML (ref 4.78–24.2)
GFR AFRICAN AMERICAN: 56
GFR NON-AFRICAN AMERICAN: 47
GLOBULIN: 2.6 G/DL
GLUCOSE BLD-MCNC: 92 MG/DL (ref 70–99)
HCT VFR BLD CALC: 44.6 % (ref 36–48)
HEMOGLOBIN: 14.8 G/DL (ref 12–16)
MCH RBC QN AUTO: 30.1 PG (ref 26–34)
MCHC RBC AUTO-ENTMCNC: 33.1 G/DL (ref 31–36)
MCV RBC AUTO: 91.1 FL (ref 80–100)
PDW BLD-RTO: 16 % (ref 12.4–15.4)
PLATELET # BLD: 277 K/UL (ref 135–450)
PMV BLD AUTO: 9.5 FL (ref 5–10.5)
POTASSIUM SERPL-SCNC: 5 MMOL/L (ref 3.5–5.1)
RBC # BLD: 4.9 M/UL (ref 4–5.2)
SEDIMENTATION RATE, ERYTHROCYTE: 19 MM/HR (ref 0–30)
SODIUM BLD-SCNC: 143 MMOL/L (ref 136–145)
TOTAL PROTEIN: 6.7 G/DL (ref 6.4–8.2)
VITAMIN B-12: 349 PG/ML (ref 211–911)
WBC # BLD: 7.4 K/UL (ref 4–11)

## 2020-01-13 PROCEDURE — 96372 THER/PROPH/DIAG INJ SC/IM: CPT | Performed by: INTERNAL MEDICINE

## 2020-01-13 PROCEDURE — G8482 FLU IMMUNIZE ORDER/ADMIN: HCPCS | Performed by: INTERNAL MEDICINE

## 2020-01-13 PROCEDURE — 99214 OFFICE O/P EST MOD 30 MIN: CPT | Performed by: INTERNAL MEDICINE

## 2020-01-13 PROCEDURE — 1036F TOBACCO NON-USER: CPT | Performed by: INTERNAL MEDICINE

## 2020-01-13 PROCEDURE — 1123F ACP DISCUSS/DSCN MKR DOCD: CPT | Performed by: INTERNAL MEDICINE

## 2020-01-13 PROCEDURE — 1090F PRES/ABSN URINE INCON ASSESS: CPT | Performed by: INTERNAL MEDICINE

## 2020-01-13 PROCEDURE — G8417 CALC BMI ABV UP PARAM F/U: HCPCS | Performed by: INTERNAL MEDICINE

## 2020-01-13 PROCEDURE — 4040F PNEUMOC VAC/ADMIN/RCVD: CPT | Performed by: INTERNAL MEDICINE

## 2020-01-13 PROCEDURE — G8427 DOCREV CUR MEDS BY ELIG CLIN: HCPCS | Performed by: INTERNAL MEDICINE

## 2020-01-13 RX ORDER — FUROSEMIDE 20 MG/1
20 TABLET ORAL DAILY
Qty: 60 TABLET | Refills: 3 | Status: SHIPPED | OUTPATIENT
Start: 2020-01-13 | End: 2020-04-09 | Stop reason: SDUPTHER

## 2020-01-13 RX ORDER — FLUTICASONE PROPIONATE 50 MCG
1 SPRAY, SUSPENSION (ML) NASAL DAILY
Qty: 1 BOTTLE | Refills: 0 | Status: SHIPPED | OUTPATIENT
Start: 2020-01-13 | End: 2020-02-11

## 2020-01-13 RX ORDER — DULOXETIN HYDROCHLORIDE 30 MG/1
CAPSULE, DELAYED RELEASE ORAL
Qty: 30 CAPSULE | Refills: 2 | Status: SHIPPED | OUTPATIENT
Start: 2020-01-13 | End: 2020-04-09 | Stop reason: SDUPTHER

## 2020-01-13 ASSESSMENT — ENCOUNTER SYMPTOMS
PHOTOPHOBIA: 0
NAUSEA: 0
VOMITING: 0
VOICE CHANGE: 0
TROUBLE SWALLOWING: 0
ABDOMINAL PAIN: 0
SORE THROAT: 0

## 2020-01-13 NOTE — PROGRESS NOTES
Smokeless tobacco: Never Used   Substance and Sexual Activity    Alcohol use: No    Drug use: No    Sexual activity: Not Currently     Partners: Male   Lifestyle    Physical activity:     Days per week: Not on file     Minutes per session: Not on file    Stress: Not on file   Relationships    Social connections:     Talks on phone: Not on file     Gets together: Not on file     Attends [employer] service: Not on file     Active member of club or organization: Not on file     Attends meetings of clubs or organizations: Not on file     Relationship status: Not on file    Intimate partner violence:     Fear of current or ex partner: Not on file     Emotionally abused: Not on file     Physically abused: Not on file     Forced sexual activity: Not on file   Other Topics Concern    Not on file   Social History Narrative    Not on file     Family History   Problem Relation Age of Onset    High Blood Pressure Mother     Cancer Father         lung cancer; metastic    Cancer Daughter 52        colon cancer    Cancer Brother     Cancer Brother     Diabetes Brother        Review of Systems   Constitutional: Negative for diaphoresis and unexpected weight change. HENT: Negative for mouth sores, sore throat, trouble swallowing and voice change. Eyes: Negative for photophobia and visual disturbance. Cardiovascular: Negative for chest pain and palpitations. Gastrointestinal: Negative for abdominal pain, nausea and vomiting. Neurological: Negative for dizziness and light-headedness.        OBJECTIVE:  Pulse Readings from Last 4 Encounters:   01/13/20 90   12/11/19 80   11/13/19 66   10/17/19 80     Wt Readings from Last 4 Encounters:   01/13/20 181 lb (82.1 kg)   12/11/19 180 lb (81.6 kg)   11/14/19 185 lb (83.9 kg)   11/13/19 185 lb 9.6 oz (84.2 kg)     BP Readings from Last 4 Encounters:   01/13/20 110/80   12/11/19 130/78   11/13/19 124/76   10/17/19 126/82     Physical Exam  Vitals signs and nursing note reviewed. Constitutional:       Appearance: Normal appearance. She is well-developed. HENT:      Mouth/Throat:      Mouth: Mucous membranes are moist.      Pharynx: No oropharyngeal exudate. Comments: It appears more than extra white coating on the tongue  Eyes:      General: No scleral icterus. Conjunctiva/sclera: Conjunctivae normal.   Cardiovascular:      Rate and Rhythm: Normal rate and regular rhythm. Pulses: Normal pulses. Heart sounds: Normal heart sounds. Pulmonary:      Effort: Pulmonary effort is normal.      Breath sounds: Normal breath sounds. Abdominal:      General: Bowel sounds are normal.      Palpations: Abdomen is soft. Neurological:      General: No focal deficit present. Mental Status: She is alert and oriented to person, place, and time.    Psychiatric:         Behavior: Behavior normal.         CBC:   Lab Results   Component Value Date    WBC 6.8 09/05/2019    HGB 11.3 09/05/2019    HCT 34.6 09/05/2019     09/05/2019     CMP:  Lab Results   Component Value Date     10/17/2019    K 4.3 10/17/2019    K 3.9 08/20/2019     10/17/2019    CO2 29 10/17/2019    ANIONGAP 14 10/17/2019    GLUCOSE 92 10/17/2019    BUN 17 10/17/2019    CREATININE 0.9 10/17/2019    GFRAA >60 10/17/2019    GFRAA >60 04/10/2013    CALCIUM 9.2 10/17/2019    PROT 6.5 09/20/2019    PROT 7.2 03/04/2013    LABALBU 3.6 09/20/2019    AGRATIO 1.1 08/20/2019    BILITOT 0.4 09/20/2019    ALKPHOS 199 09/20/2019    ALT 7 09/20/2019    AST 13 09/20/2019    GLOB 3.2 08/20/2019     URINALYSIS:  Lab Results   Component Value Date    GLUCOSEU Negative 09/20/2019    GLUCOSEU NEGATIVE 02/21/2012    KETUA Negative 09/20/2019    SPECGRAV 1.022 09/20/2019    BLOODU Negative 09/20/2019    PHUR 6.0 09/20/2019    PROTEINU Negative 09/20/2019    NITRU Negative 09/20/2019    LEUKOCYTESUR Negative 09/20/2019    LABMICR Not Indicated 09/20/2019    URINETYPE Not Specified 09/20/2019     HBA1C:   Lab Results   Component Value Date    LABA1C 5.4 02/04/2015    .3 02/04/2015     MICRO/ALB:   Lab Results   Component Value Date    LABMICR Not Indicated 09/20/2019    LABCREA 137.3 09/02/2015     LIPID:  Lab Results   Component Value Date    CHOL 226 06/21/2017    TRIG 111 06/21/2017    HDL 51 06/21/2017    HDL 42 05/23/2012    LDLCALC 153 06/21/2017    LABVLDL 22 06/21/2017     TSH:   Lab Results   Component Value Date    TSHREFLEX 3.47 02/22/2019       ASSESSMENT/PLAN:    Thiago Brito was seen today for other. Diagnoses and all orders for this visit:    Loss of perception for taste  -     nystatin (MYCOSTATIN) 289597 UNIT/ML suspension; Take 5 mLs by mouth 4 times daily for 10 days  -     fluticasone (FLONASE) 50 MCG/ACT nasal spray; 1 spray by Nasal route daily    Bilateral leg edema  -     furosemide (LASIX) 20 MG tablet; Take 1 tablet by mouth daily, advised to take maybe 3-4 times a week. Monitor side effect. Take potassium rich food. Fibromyalgia  Continue-     DULoxetine (CYMBALTA) 30 MG extended release capsule; TAKE ONE CAPSULE BY MOUTH DAILY    Dysgeusia of unclear etiology. We will try to exclude underlying treatable condition  -     CBC; Future  -     SEDIMENTATION RATE; Future  -     COMPREHENSIVE METABOLIC PANEL; Future  -     RENETTA; Future  -     VITAMIN B12 & FOLATE; Future  -     Anti SSA; Future  -     Anti SSB; Future  -     nystatin (MYCOSTATIN) 472042 UNIT/ML suspension;  Take 5 mLs by mouth 4 times daily for 10 days  -     fluticasone (FLONASE) 50 MCG/ACT nasal spray; 1 spray by Nasal route daily    Follow-up visit in 2 weeks to see medicine response for Dysguesia          Orders Placed This Encounter   Procedures    CBC     Standing Status:   Future     Number of Occurrences:   1     Standing Expiration Date:   1/13/2021    SEDIMENTATION RATE     Standing Status:   Future     Number of Occurrences:   1     Standing Expiration Date:   1/13/2021    COMPREHENSIVE METABOLIC PANEL Unintentional computerized transcription errors may be present.

## 2020-01-14 LAB
ANA INTERPRETATION: ABNORMAL
ANA TITER: ABNORMAL
ANTI-NUCLEAR ANTIBODY (ANA): POSITIVE
ANTI-SS-A IGG: 0.3 AI (ref 0–0.9)
ANTI-SS-B IGG: <0.2 AI (ref 0–0.9)

## 2020-01-27 ENCOUNTER — OFFICE VISIT (OUTPATIENT)
Dept: INTERNAL MEDICINE CLINIC | Age: 85
End: 2020-01-27
Payer: MEDICARE

## 2020-01-27 VITALS
DIASTOLIC BLOOD PRESSURE: 74 MMHG | SYSTOLIC BLOOD PRESSURE: 122 MMHG | HEART RATE: 80 BPM | WEIGHT: 183.4 LBS | BODY MASS INDEX: 30.52 KG/M2

## 2020-01-27 PROCEDURE — G8510 SCR DEP NEG, NO PLAN REQD: HCPCS | Performed by: INTERNAL MEDICINE

## 2020-01-27 PROCEDURE — 1036F TOBACCO NON-USER: CPT | Performed by: INTERNAL MEDICINE

## 2020-01-27 PROCEDURE — 1123F ACP DISCUSS/DSCN MKR DOCD: CPT | Performed by: INTERNAL MEDICINE

## 2020-01-27 PROCEDURE — 99213 OFFICE O/P EST LOW 20 MIN: CPT | Performed by: INTERNAL MEDICINE

## 2020-01-27 PROCEDURE — 4040F PNEUMOC VAC/ADMIN/RCVD: CPT | Performed by: INTERNAL MEDICINE

## 2020-01-27 PROCEDURE — G8427 DOCREV CUR MEDS BY ELIG CLIN: HCPCS | Performed by: INTERNAL MEDICINE

## 2020-01-27 PROCEDURE — G8482 FLU IMMUNIZE ORDER/ADMIN: HCPCS | Performed by: INTERNAL MEDICINE

## 2020-01-27 PROCEDURE — 1090F PRES/ABSN URINE INCON ASSESS: CPT | Performed by: INTERNAL MEDICINE

## 2020-01-27 PROCEDURE — G8417 CALC BMI ABV UP PARAM F/U: HCPCS | Performed by: INTERNAL MEDICINE

## 2020-01-27 RX ORDER — MEMANTINE HYDROCHLORIDE 5 MG/1
5 TABLET ORAL 2 TIMES DAILY
Qty: 60 TABLET | Refills: 5 | Status: SHIPPED | OUTPATIENT
Start: 2020-01-27 | End: 2020-04-29 | Stop reason: SDUPTHER

## 2020-01-27 ASSESSMENT — PATIENT HEALTH QUESTIONNAIRE - PHQ9
2. FEELING DOWN, DEPRESSED OR HOPELESS: 0
SUM OF ALL RESPONSES TO PHQ QUESTIONS 1-9: 0
SUM OF ALL RESPONSES TO PHQ9 QUESTIONS 1 & 2: 0
1. LITTLE INTEREST OR PLEASURE IN DOING THINGS: 0
SUM OF ALL RESPONSES TO PHQ QUESTIONS 1-9: 0

## 2020-01-27 ASSESSMENT — ENCOUNTER SYMPTOMS
PHOTOPHOBIA: 0
WHEEZING: 0
VOMITING: 0
SHORTNESS OF BREATH: 0
TROUBLE SWALLOWING: 0
NAUSEA: 0
ABDOMINAL PAIN: 0
VOICE CHANGE: 0

## 2020-01-27 NOTE — PROGRESS NOTES
type (HCC)  -     memantine (NAMENDA) 5 MG tablet; Take 1 tablet by mouth 2 times daily  -     [facility]          Orders Placed This Encounter   Procedures   Jairo Yuan MD, Neurology, [facility]     Referral Priority:   Routine     Referral Type:   Eval and Treat     Referral Reason:   Specialty Services Required     Referred to Provider:   Katey Lorenzana DO     Requested Specialty:   Neurology     Number of Visits Requested:   Carli Justin MD, Otolaryngology, [facility]     Referral Priority:   Routine     Referral Type:   Eval and Treat     Referral Reason:   Specialty Services Required     Referred to Provider:   Matthew Mcdaniel MD     Requested Specialty:   Otolaryngology     Number of Visits Requested:   1     Current Outpatient Medications   Medication Sig Dispense Refill    memantine (NAMENDA) 5 MG tablet Take 1 tablet by mouth 2 times daily 60 tablet 5    furosemide (LASIX) 20 MG tablet Take 1 tablet by mouth daily 60 tablet 3    DULoxetine (CYMBALTA) 30 MG extended release capsule TAKE ONE CAPSULE BY MOUTH DAILY 30 capsule 2    fluticasone (FLONASE) 50 MCG/ACT nasal spray 1 spray by Nasal route daily 1 Bottle 0    sertraline (ZOLOFT) 25 MG tablet Take 1 tablet by mouth daily 30 tablet 3    levothyroxine (SYNTHROID) 50 MCG tablet TAKE ONE TABLET BY MOUTH DAILY 30 tablet 4    CALTRATE 600+D PLUS MINERALS (CALTRATE) 600-800 MG-UNIT TABS tablet Take 1 tablet by mouth 2 times daily 180 tablet 1    aspirin 325 MG EC tablet Take 1 tablet by mouth daily 14 tablet 0    Cholecalciferol (VITAMIN D) 2000 units CAPS capsule Take 1 capsule by mouth daily        No current facility-administered medications for this visit. Return in about 3 months (around 4/27/2020). An After Visit Summary was printed and given to the patient. Documentation was done using voice recognition dragon software.   Every effort was made to ensure accuracy; however, inadvertent  Unintentional computerized transcription errors may be present.

## 2020-01-28 ENCOUNTER — TELEPHONE (OUTPATIENT)
Dept: INTERNAL MEDICINE CLINIC | Age: 85
End: 2020-01-28

## 2020-02-11 ENCOUNTER — OFFICE VISIT (OUTPATIENT)
Dept: ENT CLINIC | Age: 85
End: 2020-02-11
Payer: MEDICARE

## 2020-02-11 VITALS — OXYGEN SATURATION: 94 % | HEART RATE: 77 BPM | SYSTOLIC BLOOD PRESSURE: 138 MMHG | DIASTOLIC BLOOD PRESSURE: 76 MMHG

## 2020-02-11 PROCEDURE — G8427 DOCREV CUR MEDS BY ELIG CLIN: HCPCS | Performed by: OTOLARYNGOLOGY

## 2020-02-11 PROCEDURE — 4040F PNEUMOC VAC/ADMIN/RCVD: CPT | Performed by: OTOLARYNGOLOGY

## 2020-02-11 PROCEDURE — 99203 OFFICE O/P NEW LOW 30 MIN: CPT | Performed by: OTOLARYNGOLOGY

## 2020-02-11 PROCEDURE — 1090F PRES/ABSN URINE INCON ASSESS: CPT | Performed by: OTOLARYNGOLOGY

## 2020-02-11 PROCEDURE — G8482 FLU IMMUNIZE ORDER/ADMIN: HCPCS | Performed by: OTOLARYNGOLOGY

## 2020-02-11 PROCEDURE — 1123F ACP DISCUSS/DSCN MKR DOCD: CPT | Performed by: OTOLARYNGOLOGY

## 2020-02-11 PROCEDURE — G8417 CALC BMI ABV UP PARAM F/U: HCPCS | Performed by: OTOLARYNGOLOGY

## 2020-02-11 PROCEDURE — 1036F TOBACCO NON-USER: CPT | Performed by: OTOLARYNGOLOGY

## 2020-02-11 RX ORDER — CLOTRIMAZOLE 10 MG/1
10 LOZENGE ORAL; TOPICAL 3 TIMES DAILY
Qty: 30 TABLET | Refills: 1 | Status: SHIPPED | OUTPATIENT
Start: 2020-02-11 | End: 2020-02-21

## 2020-02-11 RX ORDER — OMEPRAZOLE 40 MG/1
40 CAPSULE, DELAYED RELEASE ORAL DAILY
Qty: 30 CAPSULE | Refills: 1 | Status: SHIPPED | OUTPATIENT
Start: 2020-02-11 | End: 2020-08-27

## 2020-02-11 ASSESSMENT — ENCOUNTER SYMPTOMS
TROUBLE SWALLOWING: 0
EYES NEGATIVE: 1
ALLERGIC/IMMUNOLOGIC NEGATIVE: 1
RESPIRATORY NEGATIVE: 1
SORE THROAT: 0

## 2020-02-11 NOTE — PROGRESS NOTES
SUBJECTIVE:    Chief Complaint   Patient presents with    Other     toung issue, can't taste anything, spicy. Cait Bustamante is a 80 y.o. female    Patient relates a 2-month history of absolute loss of sense of taste but with the sense of smell intact. No history of trauma or medications or exposure to anything unusual has been noted. He is on no medications likely to precipitate problems with taste. No fevers been noted. There is no history of diabetes or other significant condition of health. She does not smoke. Past Medical History:   Diagnosis Date    Acute renal failure ([address] 11-27-12    Arthritis     At risk for falling 08/28/2018    Score of 11/24 for Dynamic Gait Index    Frequent falls     Gastroesophageal reflux disease 1/22/2019    Movement disorder     osteoporosis    Nephrotic syndrome 12/28/2012    Thyroid disease       Past Surgical History:   Procedure Laterality Date    COLONOSCOPY      EXCISION OF FACIAL MASS      skin ca    EYE SURGERY      cataract removal; bilat    HIP FRACTURE SURGERY Left 8/21/2019    OPEN REDUCTION INTERNAL FIXATION OF INTERTROCHANTERIC FRACTURE OF LEFT HIPUSING GAMMA NAIL performed by Clinton Robison MD at [address] Right 05/27/2018    right gamma nail with cables    SKIN BIOPSY        Family History   Problem Relation Age of Onset    High Blood Pressure Mother     Cancer Father         lung cancer; metastic    Cancer Daughter 52        colon cancer    Cancer Brother     Cancer Brother     Diabetes Brother       Social History     Tobacco Use    Smoking status: Never Smoker    Smokeless tobacco: Never Used   Substance Use Topics    Alcohol use: No        Review of Systems:  Review of Systems   Constitutional: Negative. Negative for fatigue, fever and unexpected weight change. HENT: Negative. Negative for dental problem, drooling, ear discharge, ear pain, mouth sores, sore throat and trouble swallowing. Eyes: Negative. Respiratory: Negative. Cardiovascular: Negative. Endocrine: Negative. Skin: Negative. Allergic/Immunologic: Negative. Neurological: Negative. Hematological: Negative. Psychiatric/Behavioral: Negative. OBJECTIVE:  /76   Pulse 77   SpO2 94%   Physical Exam  Constitutional:       General: She is not in acute distress. Appearance: Normal appearance. She is normal weight. She is not ill-appearing or toxic-appearing. HENT:      Head: Normocephalic and atraumatic. Comments: Indirect laryngoscopy is unremarkable. Right Ear: Tympanic membrane, ear canal and external ear normal. There is no impacted cerumen. Left Ear: Tympanic membrane, ear canal and external ear normal. There is no impacted cerumen. Nose: Nose normal. No congestion or rhinorrhea. Mouth/Throat:      Mouth: Mucous membranes are moist.      Pharynx: Oropharynx is clear. Posterior oropharyngeal erythema present. No oropharyngeal exudate. Comments: Mild Tatian of the tongue is noted but it is not severe and no lesions are noted. Eyes:      Extraocular Movements: Extraocular movements intact. Conjunctiva/sclera: Conjunctivae normal.      Pupils: Pupils are equal, round, and reactive to light. Neck:      Musculoskeletal: Normal range of motion and neck supple. No neck rigidity or muscular tenderness. Cardiovascular:      Rate and Rhythm: Normal rate and regular rhythm. Pulmonary:      Effort: Pulmonary effort is normal.   Lymphadenopathy:      Cervical: No cervical adenopathy. Skin:     General: Skin is warm and dry. Neurological:      General: No focal deficit present. Mental Status: She is alert and oriented to person, place, and time. Mental status is at baseline. Psychiatric:         Mood and Affect: Mood normal.         Behavior: Behavior normal.         Thought Content:  Thought content normal.         Judgment: Judgment normal.

## 2020-02-18 ENCOUNTER — NURSE ONLY (OUTPATIENT)
Dept: RHEUMATOLOGY | Age: 85
End: 2020-02-18
Payer: MEDICARE

## 2020-02-18 ENCOUNTER — OFFICE VISIT (OUTPATIENT)
Dept: ORTHOPEDIC SURGERY | Age: 85
End: 2020-02-18
Payer: MEDICARE

## 2020-02-18 VITALS
HEIGHT: 67 IN | HEART RATE: 74 BPM | DIASTOLIC BLOOD PRESSURE: 78 MMHG | SYSTOLIC BLOOD PRESSURE: 131 MMHG | BODY MASS INDEX: 28.72 KG/M2 | WEIGHT: 183 LBS

## 2020-02-18 PROCEDURE — 1036F TOBACCO NON-USER: CPT | Performed by: ORTHOPAEDIC SURGERY

## 2020-02-18 PROCEDURE — 1090F PRES/ABSN URINE INCON ASSESS: CPT | Performed by: ORTHOPAEDIC SURGERY

## 2020-02-18 PROCEDURE — 96372 THER/PROPH/DIAG INJ SC/IM: CPT | Performed by: INTERNAL MEDICINE

## 2020-02-18 PROCEDURE — G8417 CALC BMI ABV UP PARAM F/U: HCPCS | Performed by: ORTHOPAEDIC SURGERY

## 2020-02-18 PROCEDURE — G8427 DOCREV CUR MEDS BY ELIG CLIN: HCPCS | Performed by: ORTHOPAEDIC SURGERY

## 2020-02-18 PROCEDURE — G8482 FLU IMMUNIZE ORDER/ADMIN: HCPCS | Performed by: ORTHOPAEDIC SURGERY

## 2020-02-18 PROCEDURE — 99214 OFFICE O/P EST MOD 30 MIN: CPT | Performed by: ORTHOPAEDIC SURGERY

## 2020-02-18 PROCEDURE — 4040F PNEUMOC VAC/ADMIN/RCVD: CPT | Performed by: ORTHOPAEDIC SURGERY

## 2020-02-18 PROCEDURE — 1123F ACP DISCUSS/DSCN MKR DOCD: CPT | Performed by: ORTHOPAEDIC SURGERY

## 2020-02-18 PROCEDURE — 20610 DRAIN/INJ JOINT/BURSA W/O US: CPT | Performed by: ORTHOPAEDIC SURGERY

## 2020-02-18 RX ORDER — LIDOCAINE HYDROCHLORIDE 10 MG/ML
40 INJECTION, SOLUTION INFILTRATION; PERINEURAL ONCE
Status: COMPLETED | OUTPATIENT
Start: 2020-02-18 | End: 2020-02-18

## 2020-02-18 RX ORDER — TRIAMCINOLONE ACETONIDE 40 MG/ML
40 INJECTION, SUSPENSION INTRA-ARTICULAR; INTRAMUSCULAR ONCE
Status: COMPLETED | OUTPATIENT
Start: 2020-02-18 | End: 2020-02-18

## 2020-02-18 RX ADMIN — TRIAMCINOLONE ACETONIDE 40 MG: 40 INJECTION, SUSPENSION INTRA-ARTICULAR; INTRAMUSCULAR at 11:36

## 2020-02-18 RX ADMIN — LIDOCAINE HYDROCHLORIDE 40 MG: 10 INJECTION, SOLUTION INFILTRATION; PERINEURAL at 11:34

## 2020-02-18 RX ADMIN — TRIAMCINOLONE ACETONIDE 40 MG: 40 INJECTION, SUSPENSION INTRA-ARTICULAR; INTRAMUSCULAR at 11:35

## 2020-02-18 RX ADMIN — LIDOCAINE HYDROCHLORIDE 40 MG: 10 INJECTION, SOLUTION INFILTRATION; PERINEURAL at 11:32

## 2020-02-18 NOTE — PROGRESS NOTES
DIAGNOSIS:    1-Left intertrochanteric femur displaced fracture, status post Gamma nail. 2-Bilateral knee pain/osteoarthritis    DATE OF SURGERY: 8/21/2019. HISTORY OF PRESENT ILLNESS: Yovany Rm 80 y.o.  female  who came in today for 6 months follow up postoperative visit. She was discharged from the hospital on 9/6/2019. The patient denies any significant pain in the left hip. Rates pain a 2/10 VAS mild aching, intermittent and are improving. Aggravating factors walking. Alleviating factors elevation and rest. She has been working on ROM and WBAT. She completed physical therapy with [facility]. She states that now both of her knees are bothering her since she has been more active with physical therapy. She has had this problem in the past and has had intermittent cortisone injections with improvement. Her left knee pain is worse than her right. Rates pain a 7/10 VAS in her knees. Pain is worse with walking and better with rest and elevation. She uses a walker to ambulate. Her most recent cortisone injections in bilateral knees on 11/14/2019 with good improvement until recent. She would like to repeat the injections. No numbness or tingling sensation. No fever or Chills.      Past Medical History:   Diagnosis Date    Acute renal failure ([facility] [address] 11-27-12    Arthritis     At risk for falling 08/28/2018    Score of 11/24 for Dynamic Gait Index    Frequent falls     Gastroesophageal reflux disease 1/22/2019    Movement disorder     osteoporosis    Nephrotic syndrome 12/28/2012    Thyroid disease        Past Surgical History:   Procedure Laterality Date    COLONOSCOPY      EXCISION OF FACIAL MASS      skin ca    EYE SURGERY      cataract removal; bilat    HIP FRACTURE SURGERY Left 8/21/2019    OPEN REDUCTION INTERNAL FIXATION OF INTERTROCHANTERIC FRACTURE OF LEFT HIPUSING GAMMA NAIL performed by Alvarado Roberts MD at [place] [address] Right 05/27/2018    right gamma nail with cables    SKIN BIOPSY         Social History     Socioeconomic History    Marital status:       Spouse name: Not on file    Number of children: Not on file    Years of education: Not on file    Highest education level: Not on file   Occupational History    Occupation: homeaker   Social Needs    Financial resource strain: Not on file    Food insecurity:     Worry: Not on file     Inability: Not on file    Transportation needs:     Medical: Not on file     Non-medical: Not on file   Tobacco Use    Smoking status: Never Smoker    Smokeless tobacco: Never Used   Substance and Sexual Activity    Alcohol use: No    Drug use: No    Sexual activity: Not Currently     Partners: Male   Lifestyle    Physical activity:     Days per week: Not on file     Minutes per session: Not on file    Stress: Not on file   Relationships    Social connections:     Talks on phone: Not on file     Gets together: Not on file     Attends Baptism service: Not on file     Active member of club or organization: Not on file     Attends meetings of clubs or organizations: Not on file     Relationship status: Not on file    Intimate partner violence:     Fear of current or ex partner: Not on file     Emotionally abused: Not on file     Physically abused: Not on file     Forced sexual activity: Not on file   Other Topics Concern    Not on file   Social History Narrative    Not on file       Family History   Problem Relation Age of Onset    High Blood Pressure Mother     Cancer Father         lung cancer; metastic    Cancer Daughter 52        colon cancer    Cancer Brother     Cancer Brother     Diabetes Brother        Current Outpatient Medications on File Prior to Visit   Medication Sig Dispense Refill    omeprazole (PRILOSEC) 40 MG delayed release capsule Take 1 capsule by mouth daily Take tablet one hour before evening meal 30 capsule 1    clotrimazole (MYCELEX) 10 MG daquan Take 1 tablet by mouth 3 times daily for 10 days 30 tablet 1    memantine (NAMENDA) 5 MG tablet Take 1 tablet by mouth 2 times daily (Patient taking differently: Take 5 mg by mouth 2 times daily Doing 10 in a morning 5 at a night.) 60 tablet 5    furosemide (LASIX) 20 MG tablet Take 1 tablet by mouth daily 60 tablet 3    DULoxetine (CYMBALTA) 30 MG extended release capsule TAKE ONE CAPSULE BY MOUTH DAILY 30 capsule 2    sertraline (ZOLOFT) 25 MG tablet Take 1 tablet by mouth daily 30 tablet 3    levothyroxine (SYNTHROID) 50 MCG tablet TAKE ONE TABLET BY MOUTH DAILY 30 tablet 4    CALTRATE 600+D PLUS MINERALS (CALTRATE) 600-800 MG-UNIT TABS tablet Take 1 tablet by mouth 2 times daily 180 tablet 1    aspirin 325 MG EC tablet Take 1 tablet by mouth daily 14 tablet 0    Cholecalciferol (VITAMIN D) 2000 units CAPS capsule Take 1 capsule by mouth daily        No current facility-administered medications on file prior to visit. Pertinent items are noted in HPI  Review of systems reviewed from Patient History Form dated on 10/3/2019 and available in the patient's chart under the Media tab. No change noted. PHYSICAL EXAMINATION:  Ms. Taiwo Ramires is a very pleasant 80 y.o.  female who presents today in no acute distress, awake, alert, and oriented. She is well dressed, nourished and  groomed. Patient with normal affect. Height is  5' 7\" (1.702 m), weight is 183 lb (83 kg), Body mass index is 28.66 kg/m². Resting respiratory rate is 16. She ambulates with a slow gait using a walker. The incision is healed well, left hip. No signs of any erythema or drainage. She has no pain with the active or passive range of motion of the left hip. She has intact sensation, distally, and is neurovascularly intact. BLE 2+ pitting edema, L>R. Calf is nontender. Negative Homans sign. She has moderate crepitus with range of motion bilateral knees. Bilateral knees are stable to valgus and varus stress.   She has full range of

## 2020-02-18 NOTE — PROGRESS NOTES
Pt came in for Evenity injections. She reported no issues with her previous injections.    [employer]  Lot# [id number]   NDC# 79445-842-44  Exp: 06/22  1 syringe each arm given at 10:20a

## 2020-03-16 ENCOUNTER — TELEPHONE (OUTPATIENT)
Dept: INTERNAL MEDICINE CLINIC | Age: 85
End: 2020-03-16

## 2020-03-16 RX ORDER — LEVOTHYROXINE SODIUM 0.05 MG/1
TABLET ORAL
Qty: 30 TABLET | Refills: 3 | Status: SHIPPED | OUTPATIENT
Start: 2020-03-16 | End: 2020-06-18 | Stop reason: SDUPTHER

## 2020-03-16 NOTE — TELEPHONE ENCOUNTER
Pt daughter calling to find out if really necessary to bring pt this Thurs for the prolia shot---concerned about pt age and exposure to anything---please call the pt. Daughter Siomara---Thanks.

## 2020-03-19 ENCOUNTER — NURSE ONLY (OUTPATIENT)
Dept: RHEUMATOLOGY | Age: 85
End: 2020-03-19
Payer: MEDICARE

## 2020-03-19 PROCEDURE — 96372 THER/PROPH/DIAG INJ SC/IM: CPT | Performed by: INTERNAL MEDICINE

## 2020-04-09 ENCOUNTER — TELEPHONE (OUTPATIENT)
Dept: INTERNAL MEDICINE CLINIC | Age: 85
End: 2020-04-09

## 2020-04-09 DIAGNOSIS — R60.0 BILATERAL LEG EDEMA: ICD-10-CM

## 2020-04-09 DIAGNOSIS — M79.7 FIBROMYALGIA: ICD-10-CM

## 2020-04-09 RX ORDER — FUROSEMIDE 20 MG/1
20 TABLET ORAL DAILY
Qty: 60 TABLET | Refills: 5 | Status: SHIPPED | OUTPATIENT
Start: 2020-04-09 | End: 2020-08-27

## 2020-04-09 RX ORDER — DULOXETIN HYDROCHLORIDE 30 MG/1
CAPSULE, DELAYED RELEASE ORAL
Qty: 30 CAPSULE | Refills: 5 | Status: SHIPPED | OUTPATIENT
Start: 2020-04-09 | End: 2020-08-27 | Stop reason: SDUPTHER

## 2020-04-20 ENCOUNTER — NURSE ONLY (OUTPATIENT)
Dept: RHEUMATOLOGY | Age: 85
End: 2020-04-20
Payer: MEDICARE

## 2020-04-20 PROCEDURE — 96372 THER/PROPH/DIAG INJ SC/IM: CPT | Performed by: INTERNAL MEDICINE

## 2020-04-20 NOTE — PROGRESS NOTES
Pt came in for Evenity injections. She reported no issues with her previous injections. [employer]  Lot# [id number]   NDC# 69545-888-78  Exp: 09/22     1 syringe given SQ in each arm. 11:10A     Pt was instructed to call to make her next injection appt.

## 2020-04-29 ENCOUNTER — VIRTUAL VISIT (OUTPATIENT)
Dept: INTERNAL MEDICINE CLINIC | Age: 85
End: 2020-04-29
Payer: MEDICARE

## 2020-04-29 VITALS
HEART RATE: 96 BPM | BODY MASS INDEX: 29.05 KG/M2 | DIASTOLIC BLOOD PRESSURE: 76 MMHG | SYSTOLIC BLOOD PRESSURE: 102 MMHG | WEIGHT: 185.5 LBS

## 2020-04-29 DIAGNOSIS — R60.0 BILATERAL LEG EDEMA: ICD-10-CM

## 2020-04-29 DIAGNOSIS — E03.9 HYPOTHYROIDISM, UNSPECIFIED TYPE: ICD-10-CM

## 2020-04-29 LAB
ANION GAP SERPL CALCULATED.3IONS-SCNC: 12 MMOL/L (ref 3–16)
BUN BLDV-MCNC: 18 MG/DL (ref 7–20)
CALCIUM SERPL-MCNC: 9.6 MG/DL (ref 8.3–10.6)
CHLORIDE BLD-SCNC: 99 MMOL/L (ref 99–110)
CO2: 28 MMOL/L (ref 21–32)
CREAT SERPL-MCNC: 1.2 MG/DL (ref 0.6–1.2)
GFR AFRICAN AMERICAN: 51
GFR NON-AFRICAN AMERICAN: 42
GLUCOSE BLD-MCNC: 85 MG/DL (ref 70–99)
POTASSIUM SERPL-SCNC: 4.2 MMOL/L (ref 3.5–5.1)
SODIUM BLD-SCNC: 139 MMOL/L (ref 136–145)
TSH REFLEX: 3.97 UIU/ML (ref 0.27–4.2)

## 2020-04-29 PROCEDURE — 1036F TOBACCO NON-USER: CPT | Performed by: INTERNAL MEDICINE

## 2020-04-29 PROCEDURE — 1090F PRES/ABSN URINE INCON ASSESS: CPT | Performed by: INTERNAL MEDICINE

## 2020-04-29 PROCEDURE — 4040F PNEUMOC VAC/ADMIN/RCVD: CPT | Performed by: INTERNAL MEDICINE

## 2020-04-29 PROCEDURE — 99214 OFFICE O/P EST MOD 30 MIN: CPT | Performed by: INTERNAL MEDICINE

## 2020-04-29 PROCEDURE — 1123F ACP DISCUSS/DSCN MKR DOCD: CPT | Performed by: INTERNAL MEDICINE

## 2020-04-29 PROCEDURE — G8427 DOCREV CUR MEDS BY ELIG CLIN: HCPCS | Performed by: INTERNAL MEDICINE

## 2020-04-29 PROCEDURE — G8417 CALC BMI ABV UP PARAM F/U: HCPCS | Performed by: INTERNAL MEDICINE

## 2020-04-29 RX ORDER — MEMANTINE HYDROCHLORIDE 5 MG/1
5 TABLET ORAL 2 TIMES DAILY
Qty: 60 TABLET | Refills: 0 | Status: SHIPPED | OUTPATIENT
Start: 2020-04-29 | End: 2020-05-29 | Stop reason: SDUPTHER

## 2020-04-29 ASSESSMENT — ENCOUNTER SYMPTOMS
VOICE CHANGE: 0
TROUBLE SWALLOWING: 0
NAUSEA: 0
PHOTOPHOBIA: 0
SHORTNESS OF BREATH: 0
WHEEZING: 0
ABDOMINAL PAIN: 0
VOMITING: 0

## 2020-04-29 NOTE — PROGRESS NOTES
Pressure Mother     Cancer Father         lung cancer; metastic    Cancer Daughter 52        colon cancer    Cancer Brother     Cancer Brother     Diabetes Brother        PHYSICAL EXAMINATION:  [ INSTRUCTIONS:  \"[x]\" Indicates a positive item  \"[]\" Indicates a negative item  -- DELETE ALL ITEMS NOT EXAMINED]  Vital Signs: (As obtained by patient/caregiver or practitioner observation)  Vitals:    04/29/20 1147 04/29/20 1207   BP: 102/76    Pulse: 96    Weight:  185 lb 8 oz (84.1 kg)     Wt Readings from Last 3 Encounters:   04/29/20 185 lb 8 oz (84.1 kg)   02/18/20 183 lb (83 kg)   01/27/20 183 lb 6.4 oz (83.2 kg)             Constitutional: [x] Appears well-developed and well-nourished [x] No apparent distress      [] Abnormal-   Mental status  [] Alert and awake  [] Oriented to person/place/time []Able to follow commands      Eyes:  EOM    [x]  Normal  [] Abnormal-  Sclera  [x]  Normal  [] Abnormal -         Discharge []  None visible  [] Abnormal -    HENT:   [] Normocephalic, atraumatic. [] Abnormal   [] Mouth/Throat: Mucous membranes are moist.     External Ears [x] Normal  [] Abnormal-     Neck: [x] No visualized mass     Pulmonary/Chest: [x] Respiratory effort normal.  [x] No visualized signs of difficulty breathing or respiratory distress        [] Abnormal-      Musculoskeletal:   [] Normal gait with no signs of ataxia         [] Normal range of motion of neck        [] Abnormal-       Neurological:        [x] No Facial Asymmetry (Cranial nerve 7 motor function) (limited exam to video visit)          [] No gaze palsy        [] Abnormal-         Skin:        [x] No significant exanthematous lesions or discoloration noted on facial skin         [] Abnormal-            Psychiatric:       [x] Normal Affect [] No Hallucinations        [] Abnormal-     Other pertinent observable physical exam findings-     ASSESSMENT/PLAN:  1. Hypothyroidism, unspecified type  Continue current levothyroxine.   May need dose homes.    --Pat Perry MD on 4/29/2020 at 12:14 PM    An electronic signature was used to authenticate this note.

## 2020-05-05 ENCOUNTER — TELEPHONE (OUTPATIENT)
Dept: INTERNAL MEDICINE CLINIC | Age: 85
End: 2020-05-05

## 2020-05-08 RX ORDER — SERTRALINE HYDROCHLORIDE 25 MG/1
25 TABLET, FILM COATED ORAL DAILY
Qty: 30 TABLET | Refills: 3 | Status: SHIPPED | OUTPATIENT
Start: 2020-05-08 | End: 2020-08-27 | Stop reason: SDUPTHER

## 2020-05-15 ENCOUNTER — TELEPHONE (OUTPATIENT)
Dept: INTERNAL MEDICINE CLINIC | Age: 85
End: 2020-05-15

## 2020-05-15 NOTE — TELEPHONE ENCOUNTER
[facility] received a call from:    Name of Caller: Pt's daughter Matthew Reyna    Relationship to patient: daughter    Organization name: n/a    Best contact number: [phone]    Reason for call: Called to say that her mother has memory problems and does not remember anything for 2 minutes. She would like her mother's number removed completely from her file so the daughter is always the one called.    Number to remove: [phone]

## 2020-05-21 ENCOUNTER — NURSE ONLY (OUTPATIENT)
Dept: RHEUMATOLOGY | Age: 85
End: 2020-05-21
Payer: MEDICARE

## 2020-05-21 ENCOUNTER — OFFICE VISIT (OUTPATIENT)
Dept: INTERNAL MEDICINE CLINIC | Age: 85
End: 2020-05-21
Payer: MEDICARE

## 2020-05-21 VITALS — DIASTOLIC BLOOD PRESSURE: 78 MMHG | HEART RATE: 72 BPM | TEMPERATURE: 98.1 F | SYSTOLIC BLOOD PRESSURE: 132 MMHG

## 2020-05-21 VITALS — TEMPERATURE: 97.8 F

## 2020-05-21 DIAGNOSIS — L30.9 DERMATITIS: ICD-10-CM

## 2020-05-21 DIAGNOSIS — L03.113 CELLULITIS OF RIGHT UPPER EXTREMITY: ICD-10-CM

## 2020-05-21 DIAGNOSIS — R21 RASH: ICD-10-CM

## 2020-05-21 LAB
ALBUMIN SERPL-MCNC: 3.7 G/DL (ref 3.4–5)
ALP BLD-CCNC: 99 U/L (ref 40–129)
ALT SERPL-CCNC: 9 U/L (ref 10–40)
AMORPHOUS: ABNORMAL /HPF
ANION GAP SERPL CALCULATED.3IONS-SCNC: 10 MMOL/L (ref 3–16)
AST SERPL-CCNC: 15 U/L (ref 15–37)
BILIRUB SERPL-MCNC: 0.7 MG/DL (ref 0–1)
BILIRUBIN DIRECT: <0.2 MG/DL (ref 0–0.3)
BILIRUBIN URINE: ABNORMAL
BILIRUBIN, INDIRECT: ABNORMAL MG/DL (ref 0–1)
BLOOD, URINE: NEGATIVE
BUN BLDV-MCNC: 16 MG/DL (ref 7–20)
CALCIUM SERPL-MCNC: 9.3 MG/DL (ref 8.3–10.6)
CHLORIDE BLD-SCNC: 102 MMOL/L (ref 99–110)
CLARITY: ABNORMAL
CO2: 30 MMOL/L (ref 21–32)
COLOR: ABNORMAL
CREAT SERPL-MCNC: 1 MG/DL (ref 0.6–1.2)
CRYSTALS, UA: ABNORMAL /HPF
EPITHELIAL CELLS, UA: 3 /HPF (ref 0–5)
GFR AFRICAN AMERICAN: >60
GFR NON-AFRICAN AMERICAN: 52
GLUCOSE BLD-MCNC: 90 MG/DL (ref 70–99)
GLUCOSE URINE: NEGATIVE MG/DL
HCT VFR BLD CALC: 46.6 % (ref 36–48)
HEMOGLOBIN: 15.1 G/DL (ref 12–16)
HYALINE CASTS: 9 /LPF (ref 0–8)
KETONES, URINE: ABNORMAL MG/DL
LEUKOCYTE ESTERASE, URINE: ABNORMAL
MCH RBC QN AUTO: 30.7 PG (ref 26–34)
MCHC RBC AUTO-ENTMCNC: 32.4 G/DL (ref 31–36)
MCV RBC AUTO: 94.8 FL (ref 80–100)
MICROSCOPIC EXAMINATION: YES
NITRITE, URINE: NEGATIVE
PDW BLD-RTO: 13.5 % (ref 12.4–15.4)
PH UA: 6 (ref 5–8)
PLATELET # BLD: 288 K/UL (ref 135–450)
PMV BLD AUTO: 9.1 FL (ref 5–10.5)
POTASSIUM SERPL-SCNC: 4.8 MMOL/L (ref 3.5–5.1)
PROTEIN UA: ABNORMAL MG/DL
RBC # BLD: 4.92 M/UL (ref 4–5.2)
RBC UA: ABNORMAL /HPF (ref 0–4)
SODIUM BLD-SCNC: 142 MMOL/L (ref 136–145)
SPECIFIC GRAVITY UA: 1.02 (ref 1–1.03)
TOTAL PROTEIN: 6.4 G/DL (ref 6.4–8.2)
URINE TYPE: ABNORMAL
UROBILINOGEN, URINE: 1 E.U./DL
WBC # BLD: 7.9 K/UL (ref 4–11)
WBC UA: 3 /HPF (ref 0–5)

## 2020-05-21 PROCEDURE — 99213 OFFICE O/P EST LOW 20 MIN: CPT | Performed by: INTERNAL MEDICINE

## 2020-05-21 PROCEDURE — 1123F ACP DISCUSS/DSCN MKR DOCD: CPT | Performed by: INTERNAL MEDICINE

## 2020-05-21 PROCEDURE — 1036F TOBACCO NON-USER: CPT | Performed by: INTERNAL MEDICINE

## 2020-05-21 PROCEDURE — 4040F PNEUMOC VAC/ADMIN/RCVD: CPT | Performed by: INTERNAL MEDICINE

## 2020-05-21 PROCEDURE — 96372 THER/PROPH/DIAG INJ SC/IM: CPT | Performed by: INTERNAL MEDICINE

## 2020-05-21 PROCEDURE — 1090F PRES/ABSN URINE INCON ASSESS: CPT | Performed by: INTERNAL MEDICINE

## 2020-05-21 PROCEDURE — G8417 CALC BMI ABV UP PARAM F/U: HCPCS | Performed by: INTERNAL MEDICINE

## 2020-05-21 PROCEDURE — G8427 DOCREV CUR MEDS BY ELIG CLIN: HCPCS | Performed by: INTERNAL MEDICINE

## 2020-05-21 RX ORDER — CEPHALEXIN 500 MG/1
500 CAPSULE ORAL 3 TIMES DAILY
Qty: 30 CAPSULE | Refills: 0 | Status: SHIPPED | OUTPATIENT
Start: 2020-05-21 | End: 2020-08-27 | Stop reason: ALTCHOICE

## 2020-05-21 RX ORDER — CETIRIZINE HYDROCHLORIDE 10 MG/1
10 TABLET ORAL DAILY
Qty: 30 TABLET | Refills: 0 | Status: SHIPPED | OUTPATIENT
Start: 2020-05-21 | End: 2020-05-29 | Stop reason: SDUPTHER

## 2020-05-21 RX ORDER — PREDNISONE 20 MG/1
20 TABLET ORAL 2 TIMES DAILY
Qty: 14 TABLET | Refills: 0 | Status: SHIPPED | OUTPATIENT
Start: 2020-05-21 | End: 2020-05-28

## 2020-05-21 ASSESSMENT — ENCOUNTER SYMPTOMS
TROUBLE SWALLOWING: 0
SHORTNESS OF BREATH: 0
VOICE CHANGE: 0
WHEEZING: 0

## 2020-05-21 NOTE — PROGRESS NOTES
Ernesto Villagomez  4/13/1930  female  80 y.o. SUBJECTIVE:       Chief Complaint   Patient presents with    Bleeding/Bruising     open wound on right upper arm    Fall     yesterday. fell on carpet.  Rash     rash / itching around eye. taking benadryl. eye itsself is ok. HPI:  Same-day visit. Patient has been complaining of increasing itchiness burning pain and rash affecting mostly the right upper extremity and periorbital area. Symptoms started about 5 days ago. She tried over-the-counter Benadryl without any significant improvement. Patient denies any other systemic symptoms. She denies fever, chills, nausea, vomiting, cough, shortness of breath, muscle pain,    History of recurrent fall and femur fracture. Patient has been persistently declining to get home health care as well as living with her daughter or other assisted care. She denies any neck pain, back pain, hip pain, knee pain. She use walker for mobilization. Past Medical History:   Diagnosis Date    Acute renal failure ([facility] [address] 11-27-12    Arthritis     At risk for falling 08/28/2018    Score of 11/24 for Dynamic Gait Index    Frequent falls     Gastroesophageal reflux disease 1/22/2019    Movement disorder     osteoporosis    Nephrotic syndrome 12/28/2012    Thyroid disease      Past Surgical History:   Procedure Laterality Date    COLONOSCOPY      EXCISION OF FACIAL MASS      skin ca    EYE SURGERY      cataract removal; bilat    HIP FRACTURE SURGERY Left 8/21/2019    OPEN REDUCTION INTERNAL FIXATION OF INTERTROCHANTERIC FRACTURE OF LEFT HIPUSING GAMMA NAIL performed by Katerine Flores MD at [address] Right 05/27/2018    right gamma nail with cables    SKIN BIOPSY       Social History     Socioeconomic History    Marital status:       Spouse name: Not on file    Number of children: Not on file    Years of education: Not on file    Highest education level: Not on file   Occupational Negative 09/20/2019    LEUKOCYTESUR Negative 09/20/2019    LABMICR Not Indicated 09/20/2019    URINETYPE Not Specified 09/20/2019     HBA1C:   Lab Results   Component Value Date    LABA1C 5.4 02/04/2015    .3 02/04/2015     MICRO/ALB:   Lab Results   Component Value Date    LABMICR Not Indicated 09/20/2019    LABCREA 137.3 09/02/2015     LIPID:  Lab Results   Component Value Date    CHOL 226 06/21/2017    TRIG 111 06/21/2017    HDL 51 06/21/2017    HDL 42 05/23/2012    LDLCALC 153 06/21/2017    LABVLDL 22 06/21/2017     TSH:   Lab Results   Component Value Date    TSHREFLEX 3.97 04/29/2020         ASSESSMENT/PLAN:    1. Dermatitis    2. Cellulitis of right upper extremity    3. Rash      Patient have no other symptoms dermatomyositis or polymyositis. 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 She has evaluated by rheumatologist in the past.    Patient has been declining to get assistance about moving with her daughter.   History of recurrent fall      Orders Placed This Encounter   Procedures    CBC     Standing Status:   Future     Standing Expiration Date:   5/21/2021    BASIC METABOLIC PANEL     Standing Status:   Future     Standing Expiration Date:   5/21/2021    HEPATIC FUNCTION PANEL     Standing Status:   Future     Standing Expiration Date:   5/21/2021    Urinalysis     Standing Status:   Future     Standing Expiration Date:   5/21/2021     Current Outpatient Medications   Medication Sig Dispense Refill    predniSONE (DELTASONE) 20 MG tablet Take 1 tablet by mouth 2 times daily for 7 days 14 tablet 0    cephALEXin (KEFLEX) 500 MG capsule Take 1 capsule by mouth 3 times daily 30 capsule 0    cetirizine (ZYRTEC ALLERGY) 10 MG tablet Take 1 tablet by mouth daily 30 tablet 0    Ascorbic Acid 500 MG CHEW Take 1 tablet by mouth daily 30 tablet 0    sertraline (ZOLOFT) 25 MG tablet Take 1 tablet by mouth daily 30 tablet 3    memantine (NAMENDA) 5 MG tablet Take 1 tablet by mouth 2 times daily 60 tablet 0    cyanocobalamin ([employer] VITAMIN B12) 1000 MCG tablet Take 1 tablet by mouth daily 30 tablet 3    DULoxetine (CYMBALTA) 30 MG extended release capsule TAKE ONE CAPSULE BY MOUTH DAILY 30 capsule 5    furosemide (LASIX) 20 MG tablet- not taking for quite sometimes Take 1 tablet by mouth daily 60 tablet 5    levothyroxine (SYNTHROID) 50 MCG tablet TAKE ONE TABLET BY MOUTH DAILY 30 tablet 3    omeprazole (PRILOSEC) 40 MG delayed release capsule Take 1 capsule by mouth daily Take tablet one hour before evening meal 30 capsule 1    CALTRATE 600+D PLUS MINERALS (CALTRATE) 600-800 MG-UNIT TABS tablet Take 1 tablet by mouth 2 times daily 180 tablet 1    aspirin 325 MG EC tablet Take 1 tablet by mouth daily 14 tablet 0    Cholecalciferol (VITAMIN D) 2000 units CAPS capsule Take 1 capsule by mouth daily        No current facility-administered medications for this visit. Return in about 1 week (around 5/28/2020). An After Visit Summary was printed and given to the patient. Documentation was done using voice recognition dragon software. Every effort was made to ensure accuracy; however, inadvertent  Unintentional computerized transcription errors may be present.

## 2020-05-28 ENCOUNTER — OFFICE VISIT (OUTPATIENT)
Dept: INTERNAL MEDICINE CLINIC | Age: 85
End: 2020-05-28
Payer: MEDICARE

## 2020-05-28 VITALS
SYSTOLIC BLOOD PRESSURE: 136 MMHG | HEART RATE: 90 BPM | DIASTOLIC BLOOD PRESSURE: 88 MMHG | WEIGHT: 186 LBS | HEIGHT: 67 IN | BODY MASS INDEX: 29.19 KG/M2

## 2020-05-28 PROCEDURE — 1123F ACP DISCUSS/DSCN MKR DOCD: CPT | Performed by: INTERNAL MEDICINE

## 2020-05-28 PROCEDURE — 1036F TOBACCO NON-USER: CPT | Performed by: INTERNAL MEDICINE

## 2020-05-28 PROCEDURE — 4040F PNEUMOC VAC/ADMIN/RCVD: CPT | Performed by: INTERNAL MEDICINE

## 2020-05-28 PROCEDURE — 99213 OFFICE O/P EST LOW 20 MIN: CPT | Performed by: INTERNAL MEDICINE

## 2020-05-28 PROCEDURE — G8427 DOCREV CUR MEDS BY ELIG CLIN: HCPCS | Performed by: INTERNAL MEDICINE

## 2020-05-28 PROCEDURE — 1090F PRES/ABSN URINE INCON ASSESS: CPT | Performed by: INTERNAL MEDICINE

## 2020-05-28 PROCEDURE — G8417 CALC BMI ABV UP PARAM F/U: HCPCS | Performed by: INTERNAL MEDICINE

## 2020-05-28 ASSESSMENT — ENCOUNTER SYMPTOMS
VOMITING: 0
SHORTNESS OF BREATH: 0
WHEEZING: 0
NAUSEA: 0

## 2020-05-28 NOTE — PROGRESS NOTES
Subjective:      Chief Complaint   Patient presents with    Other     1 week f/u  itching less, arm healing       Patient ID: Bella Dominguez is a 80 y.o. female. HPI  Follow-up visit for cellulitis and dermatitis. Patient denies fever chills nausea vomiting systemic symptoms. Redness, swelling affecting the arms has been getting better. She denies any other systemic symptoms. The rash around her eyes has been resolved. She denies any significant joint pain. Review of Systems   Constitutional: Negative for diaphoresis and unexpected weight change. Respiratory: Negative for shortness of breath and wheezing. Cardiovascular: Negative for chest pain and palpitations. Gastrointestinal: Negative for nausea and vomiting. Hematological: Negative for adenopathy. Does not bruise/bleed easily. Past Medical History:   Diagnosis Date    Acute renal failure ([facility] [address] 11-27-12    Arthritis     At risk for falling 08/28/2018    Score of 11/24 for Dynamic Gait Index    Frequent falls     Gastroesophageal reflux disease 1/22/2019    Movement disorder     osteoporosis    Nephrotic syndrome 12/28/2012    Thyroid disease      Allergies   Allergen Reactions    Metoprolol Hives     Severe rash and itching    Amlodipine Itching     Itching    Sulfa Antibiotics Nausea And Vomiting    Adhesive Tape Other (See Comments)     Skin tears very easily       Objective:   Physical Exam  Vitals signs and nursing note reviewed. Constitutional:       Appearance: Normal appearance. Cardiovascular:      Rate and Rhythm: Normal rate and regular rhythm. Pulses: Normal pulses. Heart sounds: Normal heart sounds. Pulmonary:      Effort: Pulmonary effort is normal.      Breath sounds: Normal breath sounds. Abdominal:      General: Bowel sounds are normal.      Palpations: Abdomen is soft.    Skin:     Comments: Examination of the right upper extremity  Several areas of excoriation with minimal redness-improved

## 2020-05-29 RX ORDER — MEMANTINE HYDROCHLORIDE 10 MG/1
10 TABLET ORAL 2 TIMES DAILY
Qty: 60 TABLET | Refills: 2 | Status: SHIPPED | OUTPATIENT
Start: 2020-05-29 | End: 2020-08-27 | Stop reason: SDUPTHER

## 2020-05-29 RX ORDER — CETIRIZINE HYDROCHLORIDE 10 MG/1
10 TABLET ORAL DAILY
Qty: 30 TABLET | Refills: 2 | Status: SHIPPED | OUTPATIENT
Start: 2020-05-29 | End: 2020-08-27 | Stop reason: SDUPTHER

## 2020-06-18 ENCOUNTER — TELEPHONE (OUTPATIENT)
Dept: INTERNAL MEDICINE CLINIC | Age: 85
End: 2020-06-18

## 2020-06-18 DIAGNOSIS — E03.9 HYPOTHYROIDISM, UNSPECIFIED TYPE: ICD-10-CM

## 2020-06-18 RX ORDER — LEVOTHYROXINE SODIUM 0.05 MG/1
TABLET ORAL
Qty: 45 TABLET | Refills: 0 | Status: SHIPPED | OUTPATIENT
Start: 2020-06-18 | End: 2020-08-27 | Stop reason: SDUPTHER

## 2020-06-24 ENCOUNTER — OFFICE VISIT (OUTPATIENT)
Dept: RHEUMATOLOGY | Age: 85
End: 2020-06-24
Payer: MEDICARE

## 2020-06-24 ENCOUNTER — TELEPHONE (OUTPATIENT)
Dept: RHEUMATOLOGY | Age: 85
End: 2020-06-24

## 2020-06-24 VITALS
DIASTOLIC BLOOD PRESSURE: 80 MMHG | HEART RATE: 80 BPM | SYSTOLIC BLOOD PRESSURE: 122 MMHG | WEIGHT: 183 LBS | TEMPERATURE: 97.9 F | HEIGHT: 67 IN | BODY MASS INDEX: 28.72 KG/M2

## 2020-06-24 PROCEDURE — 96372 THER/PROPH/DIAG INJ SC/IM: CPT | Performed by: INTERNAL MEDICINE

## 2020-06-24 PROCEDURE — G8417 CALC BMI ABV UP PARAM F/U: HCPCS | Performed by: INTERNAL MEDICINE

## 2020-06-24 PROCEDURE — 4040F PNEUMOC VAC/ADMIN/RCVD: CPT | Performed by: INTERNAL MEDICINE

## 2020-06-24 PROCEDURE — 1090F PRES/ABSN URINE INCON ASSESS: CPT | Performed by: INTERNAL MEDICINE

## 2020-06-24 PROCEDURE — 99213 OFFICE O/P EST LOW 20 MIN: CPT | Performed by: INTERNAL MEDICINE

## 2020-06-24 PROCEDURE — 1123F ACP DISCUSS/DSCN MKR DOCD: CPT | Performed by: INTERNAL MEDICINE

## 2020-06-24 PROCEDURE — 1036F TOBACCO NON-USER: CPT | Performed by: INTERNAL MEDICINE

## 2020-06-24 PROCEDURE — G8427 DOCREV CUR MEDS BY ELIG CLIN: HCPCS | Performed by: INTERNAL MEDICINE

## 2020-06-24 NOTE — PROGRESS NOTES
Pt received Evenity 210mg/2.3ml  SC upper left and right arms. [employer], lot [id number], exp 9/22.  Tolerated well

## 2020-07-24 ENCOUNTER — NURSE ONLY (OUTPATIENT)
Dept: RHEUMATOLOGY | Age: 85
End: 2020-07-24
Payer: MEDICARE

## 2020-07-24 PROCEDURE — 96372 THER/PROPH/DIAG INJ SC/IM: CPT | Performed by: INTERNAL MEDICINE

## 2020-07-24 NOTE — PROGRESS NOTES
Pt here for Evenity injections Travel screeening done per registration   Temp 98.1  Pt had to be reminded multiple times to please wear mask        [employer] Evenity 105mg sc each arm  Lot# [id number]  NDC# [id number]  Exp: 11/22     1 syringe given SQ in each arm. 11:10A     daughter was instructed to call to make her next injection appt.   Tolerated well

## 2020-07-28 ENCOUNTER — TELEPHONE (OUTPATIENT)
Dept: INTERNAL MEDICINE CLINIC | Age: 85
End: 2020-07-28

## 2020-07-28 NOTE — TELEPHONE ENCOUNTER
----- Message from Kamryn Garcia sent at 7/28/2020  2:30 PM EDT -----  Subject: Message to Provider    QUESTIONS  Information for Provider? Pt is having very itchy and red eyes and would   like a RX sent to pharmacy  ---------------------------------------------------------------------------  --------------  [address]  What is the best way for the office to contact you? OK to leave message on   voicemail  Preferred Call Back Phone Number? [phone]  ---------------------------------------------------------------------------  --------------  SCRIPT ANSWERS  Relationship to Patient? Other  Representative Name? Grceia Canas   Is the Representative on the appropriate HIPAA document in Epic?  Yes

## 2020-07-29 ENCOUNTER — TELEPHONE (OUTPATIENT)
Dept: RHEUMATOLOGY | Age: 85
End: 2020-07-29

## 2020-07-29 NOTE — TELEPHONE ENCOUNTER
Patient's daughter calling to move appt from 10/27 to 10/26. I did not see any openings. Please advise.

## 2020-08-18 ENCOUNTER — OFFICE VISIT (OUTPATIENT)
Dept: ORTHOPEDIC SURGERY | Age: 85
End: 2020-08-18
Payer: MEDICARE

## 2020-08-18 VITALS — BODY MASS INDEX: 28.72 KG/M2 | WEIGHT: 183 LBS | HEIGHT: 67 IN | TEMPERATURE: 97.3 F

## 2020-08-18 PROCEDURE — G8417 CALC BMI ABV UP PARAM F/U: HCPCS | Performed by: ORTHOPAEDIC SURGERY

## 2020-08-18 PROCEDURE — 4040F PNEUMOC VAC/ADMIN/RCVD: CPT | Performed by: ORTHOPAEDIC SURGERY

## 2020-08-18 PROCEDURE — 1123F ACP DISCUSS/DSCN MKR DOCD: CPT | Performed by: ORTHOPAEDIC SURGERY

## 2020-08-18 PROCEDURE — G8427 DOCREV CUR MEDS BY ELIG CLIN: HCPCS | Performed by: ORTHOPAEDIC SURGERY

## 2020-08-18 PROCEDURE — 1090F PRES/ABSN URINE INCON ASSESS: CPT | Performed by: ORTHOPAEDIC SURGERY

## 2020-08-18 PROCEDURE — 99214 OFFICE O/P EST MOD 30 MIN: CPT | Performed by: ORTHOPAEDIC SURGERY

## 2020-08-18 PROCEDURE — 20610 DRAIN/INJ JOINT/BURSA W/O US: CPT | Performed by: ORTHOPAEDIC SURGERY

## 2020-08-18 PROCEDURE — 1036F TOBACCO NON-USER: CPT | Performed by: ORTHOPAEDIC SURGERY

## 2020-08-18 RX ORDER — LIDOCAINE HYDROCHLORIDE 10 MG/ML
4 INJECTION, SOLUTION INFILTRATION; PERINEURAL ONCE
Status: COMPLETED | OUTPATIENT
Start: 2020-08-18 | End: 2020-08-18

## 2020-08-18 RX ORDER — TRIAMCINOLONE ACETONIDE 40 MG/ML
40 INJECTION, SUSPENSION INTRA-ARTICULAR; INTRAMUSCULAR ONCE
Status: COMPLETED | OUTPATIENT
Start: 2020-08-18 | End: 2020-08-18

## 2020-08-18 RX ADMIN — LIDOCAINE HYDROCHLORIDE 4 ML: 10 INJECTION, SOLUTION INFILTRATION; PERINEURAL at 11:30

## 2020-08-18 RX ADMIN — TRIAMCINOLONE ACETONIDE 40 MG: 40 INJECTION, SUSPENSION INTRA-ARTICULAR; INTRAMUSCULAR at 11:32

## 2020-08-18 NOTE — PROGRESS NOTES
DIAGNOSIS:    1-Left intertrochanteric femur displaced fracture, status post Gamma nail. 2-Bilateral knee pain/osteoarthritis  3-Right knee contusion with deep abrasion. DATE OF SURGERY: 8/21/2019. HISTORY OF PRESENT ILLNESS: Lonny Gonsalez 80 y.o.  female  who came in today for 1 year follow up postoperative visit for her hip and for recurrent bilateral knee pain. The patient denies any significant pain in the left hip and is doing well with that. She has been working on ROM and WBAT. She completed physical therapy with improvement. She states she did have a new fall last week where she hit the bed frame and has a large bruise on her right shoulder and a wound on her right foot knee laterally. Her left knee pain is worse than her right. Rates pain a 7/10 VAS in her knees. Pain is worse with walking and better with rest and elevation. She uses a walker to ambulate. Her most recent cortisone injections in bilateral knees on 2/18/2020 with good improvement until recent. She would like to repeat the injections. No numbness or tingling sensation. No fever or Chills. Past Medical History:   Diagnosis Date    Acute renal failure ([facility] [address] 11-27-12    Arthritis     At risk for falling 08/28/2018    Score of 11/24 for Dynamic Gait Index    Frequent falls     Gastroesophageal reflux disease 1/22/2019    Movement disorder     osteoporosis    Nephrotic syndrome 12/28/2012    Thyroid disease        Past Surgical History:   Procedure Laterality Date    COLONOSCOPY      EXCISION OF FACIAL MASS      skin ca    EYE SURGERY      cataract removal; bilat    HIP FRACTURE SURGERY Left 8/21/2019    OPEN REDUCTION INTERNAL FIXATION OF INTERTROCHANTERIC FRACTURE OF LEFT HIPUSING GAMMA NAIL performed by Sandra Gudino MD at [address] [place] [address] Right 05/27/2018    right gamma nail with cables    SKIN BIOPSY         Social History     Socioeconomic History    Marital status:   Spouse name: Not on file    Number of children: Not on file    Years of education: Not on file    Highest education level: Not on file   Occupational History    Occupation: homeaker   Social Needs    Financial resource strain: Not on file    Food insecurity     Worry: Not on file     Inability: Not on file   [employer] needs     Medical: Not on file     Non-medical: Not on file   Tobacco Use    Smoking status: Never Smoker    Smokeless tobacco: Never Used   Substance and Sexual Activity    Alcohol use: No    Drug use: No    Sexual activity: Not Currently     Partners: Male   Lifestyle    Physical activity     Days per week: Not on file     Minutes per session: Not on file    Stress: Not on file   Relationships    Social connections     Talks on phone: Not on file     Gets together: Not on file     Attends [employer] service: Not on file     Active member of club or organization: Not on file     Attends meetings of clubs or organizations: Not on file     Relationship status: Not on file    Intimate partner violence     Fear of current or ex partner: Not on file     Emotionally abused: Not on file     Physically abused: Not on file     Forced sexual activity: Not on file   Other Topics Concern    Not on file   Social History Narrative    Not on file       Family History   Problem Relation Age of Onset    High Blood Pressure Mother     Cancer Father         lung cancer; metastic    Cancer Daughter 52        colon cancer    Cancer Brother     Cancer Brother     Diabetes Brother        Current Outpatient Medications on File Prior to Visit   Medication Sig Dispense Refill    levothyroxine (SYNTHROID) 50 MCG tablet 1 tab qd 45 tablet 0    memantine (NAMENDA) 10 MG tablet Take 1 tablet by mouth 2 times daily 60 tablet 2    cetirizine (ZYRTEC ALLERGY) 10 MG tablet Take 1 tablet by mouth daily 30 tablet 2    Ascorbic Acid 500 MG CHEW Take 1 tablet by mouth daily 30 tablet 2    cephALEXin (KEFLEX) 500 MG capsule Take 1 capsule by mouth 3 times daily 30 capsule 0    sertraline (ZOLOFT) 25 MG tablet Take 1 tablet by mouth daily 30 tablet 3    cyanocobalamin ([employer] VITAMIN B12) 1000 MCG tablet Take 1 tablet by mouth daily 30 tablet 3    DULoxetine (CYMBALTA) 30 MG extended release capsule TAKE ONE CAPSULE BY MOUTH DAILY 30 capsule 5    furosemide (LASIX) 20 MG tablet Take 1 tablet by mouth daily 60 tablet 5    omeprazole (PRILOSEC) 40 MG delayed release capsule Take 1 capsule by mouth daily Take tablet one hour before evening meal (Patient not taking: Reported on 5/28/2020) 30 capsule 1    CALTRATE 600+D PLUS MINERALS (CALTRATE) 600-800 MG-UNIT TABS tablet Take 1 tablet by mouth 2 times daily 180 tablet 1    aspirin 325 MG EC tablet Take 1 tablet by mouth daily 14 tablet 0    Cholecalciferol (VITAMIN D) 2000 units CAPS capsule Take 1 capsule by mouth daily        No current facility-administered medications on file prior to visit. Pertinent items are noted in HPI  Review of systems reviewed from Patient History Form dated on 10/3/2019 and available in the patient's chart under the Media tab. No change noted. PHYSICAL EXAMINATION:  Ms. Kamila Thompson is a very pleasant 80 y.o.  female who presents today in no acute distress, awake, alert, and oriented. She is well dressed, nourished and  groomed. Patient with normal affect. Height is  5' 7\" (1.702 m), weight is 183 lb (83 kg), Body mass index is 28.66 kg/m². Resting respiratory rate is 16. She ambulates with a slow gait using a walker. The incision is healed well, left hip. No signs of any erythema or drainage. She has no pain with the active or passive range of motion of the left hip. She has intact sensation, distally, and is neurovascularly intact. BLE 2+ pitting edema, L>R. Calf is nontender. Negative Homans sign. She has moderate crepitus with range of motion bilateral knees.   Bilateral knees are stable to valgus and varus stress. She has full range of motion bilateral knees. No pain with range of motion. There is a new wound that is scabbed with a large ecchymotic area right lateral knee. IMAGING:  Two views left hip and femur, and AP pelvis taken today in the office showed anatomic alignment of the intertrochanteric fracture, Gamma nail in good position, no loosening, or hardware failure. IMPRESSION:    1-Left Gamma nail intertrochanteric femur comminuted fracture and doing very well. 2-Bilateral knee DJD  3-Right knee contusion with deep abrasion. PLAN:  I have told the patient to work on ROM that was given to her by PT, WBAT as well as strengthening exercises with physical therapy. Compression hose to help with the swelling. She can go back to normal activity with no restrictions. She will be seen PRN. I told the patient that it is not unusual to have some achy pain and swelling for up to a year or longerafter a fracture. For the knees we instructed her to work on range of motion and strengthening exercises with physical therapy. Discussed that she should work on quadricep strengthening exercises. Dry dressing as needed for the right knee wound. Discussed with the patient that she would benefit from left knee cortisone injections and she agreed to proceed. We will hold off on the right knee cortisone injection due to her wound. PROCEDURE:    With the patient's permission, and under sterile condition, the left knee was prepared and draped with alcohol and injected with a mixture of 1 mL Kenalog 40mg and 4 mL of 1% lidocaine through the medial parapatellar port area. The patient tolerated the procedure well. A band-aid was applied and the patient was advised to ice the knee for 15-20 minutes to relieve any injection site related pain. She reported a good improvement immediatly after the injection.     As this patient has demonstrated risk factors for osteoporosis, such as age greater than [de-identified] years and evidence of a fracture, I have referred the patient back to the primary care physician for evaluation for osteoporosis, including consideration for DEXA scanning, if this is felt to be clinically indicated. The patient is advised to contact the primary care physician to follow-up for further evaluation.        Rina Ridley MD

## 2020-08-25 ENCOUNTER — TELEPHONE (OUTPATIENT)
Dept: INTERNAL MEDICINE CLINIC | Age: 85
End: 2020-08-25

## 2020-08-25 NOTE — TELEPHONE ENCOUNTER
Called daughter to offer appt for tomorrow   She said the bleeding stopped and pt has an appt thurs for both Dr Yenifer Ivory and Rheumatology    She will see how pt is in am and call at 8:30 if she needs an appt   SUMIT for Tai

## 2020-08-25 NOTE — TELEPHONE ENCOUNTER
----- Message from Leelee Garcia sent at 8/25/2020  4:37 PM EDT -----  Subject: Message to Provider    QUESTIONS  Information for Provider? patient's daughter María Boogie called. Patient fell   today and they did have the life squad come out. Pt is scheduled to see   Dr. Anthony Farmer on 8/27   would like an urgent visit on 8/26 if at all possible.   ---------------------------------------------------------------------------  --------------  [address]  What is the best way for the office to contact you? OK to leave message on   voicemail  Preferred Call Back Phone Number? [phone]  ---------------------------------------------------------------------------  --------------  SCRIPT ANSWERS  Relationship to Patient? Other  Representative Name? María Boogie   daughter   Is the Representative on the appropriate HIPAA document in Epic?  Yes

## 2020-08-26 NOTE — TELEPHONE ENCOUNTER
Yes--spoke to dtr --she is using pressure dressing--squad told her she did not need stitches--just steri strips.

## 2020-08-26 NOTE — TELEPHONE ENCOUNTER
Please make sure patient have a pressure dressing  If active bleeding she can not wait until tomorrow

## 2020-08-26 NOTE — TELEPHONE ENCOUNTER
Pt's daughter María Boogie called requesting to speak with you states they called the squad yesterday  and they wrapped the area but it is now bleeding and doesn't know if pt should come to office today to be checked or wait until tomorrow when she has her apt. .  Daughter states its about a 3 inch long open wound area   Squad personnel said he thought it was not necessary to go to the ER

## 2020-08-27 ENCOUNTER — OFFICE VISIT (OUTPATIENT)
Dept: INTERNAL MEDICINE CLINIC | Age: 85
End: 2020-08-27
Payer: MEDICARE

## 2020-08-27 ENCOUNTER — NURSE ONLY (OUTPATIENT)
Dept: RHEUMATOLOGY | Age: 85
End: 2020-08-27
Payer: MEDICARE

## 2020-08-27 VITALS
BODY MASS INDEX: 29.29 KG/M2 | WEIGHT: 187 LBS | HEART RATE: 96 BPM | DIASTOLIC BLOOD PRESSURE: 78 MMHG | TEMPERATURE: 98.4 F | SYSTOLIC BLOOD PRESSURE: 104 MMHG

## 2020-08-27 PROCEDURE — 96372 THER/PROPH/DIAG INJ SC/IM: CPT | Performed by: INTERNAL MEDICINE

## 2020-08-27 PROCEDURE — 1036F TOBACCO NON-USER: CPT | Performed by: INTERNAL MEDICINE

## 2020-08-27 PROCEDURE — G8427 DOCREV CUR MEDS BY ELIG CLIN: HCPCS | Performed by: INTERNAL MEDICINE

## 2020-08-27 PROCEDURE — 99213 OFFICE O/P EST LOW 20 MIN: CPT | Performed by: INTERNAL MEDICINE

## 2020-08-27 PROCEDURE — G8417 CALC BMI ABV UP PARAM F/U: HCPCS | Performed by: INTERNAL MEDICINE

## 2020-08-27 PROCEDURE — 4040F PNEUMOC VAC/ADMIN/RCVD: CPT | Performed by: INTERNAL MEDICINE

## 2020-08-27 PROCEDURE — 1123F ACP DISCUSS/DSCN MKR DOCD: CPT | Performed by: INTERNAL MEDICINE

## 2020-08-27 PROCEDURE — 1090F PRES/ABSN URINE INCON ASSESS: CPT | Performed by: INTERNAL MEDICINE

## 2020-08-27 RX ORDER — CAL/D3/MAG11/ZINC/COP/MANG/BOR 600 MG-800
1 TABLET ORAL 2 TIMES DAILY
Qty: 180 TABLET | Refills: 1 | Status: SHIPPED | OUTPATIENT
Start: 2020-08-27

## 2020-08-27 RX ORDER — CEPHALEXIN 500 MG/1
500 CAPSULE ORAL 3 TIMES DAILY
Qty: 21 CAPSULE | Refills: 0 | Status: SHIPPED | OUTPATIENT
Start: 2020-08-27 | End: 2020-09-02 | Stop reason: SDUPTHER

## 2020-08-27 RX ORDER — SERTRALINE HYDROCHLORIDE 25 MG/1
25 TABLET, FILM COATED ORAL DAILY
Qty: 90 TABLET | Refills: 1 | Status: SHIPPED | OUTPATIENT
Start: 2020-08-27 | End: 2021-03-08

## 2020-08-27 RX ORDER — DULOXETIN HYDROCHLORIDE 30 MG/1
CAPSULE, DELAYED RELEASE ORAL
Qty: 90 CAPSULE | Refills: 1 | Status: SHIPPED | OUTPATIENT
Start: 2020-08-27 | End: 2021-03-08

## 2020-08-27 RX ORDER — MEMANTINE HYDROCHLORIDE 10 MG/1
10 TABLET ORAL 2 TIMES DAILY
Qty: 180 TABLET | Refills: 2 | Status: SHIPPED | OUTPATIENT
Start: 2020-08-27 | End: 2020-12-10 | Stop reason: SINTOL

## 2020-08-27 RX ORDER — CETIRIZINE HYDROCHLORIDE 10 MG/1
10 TABLET ORAL DAILY
Qty: 90 TABLET | Refills: 1 | Status: SHIPPED | OUTPATIENT
Start: 2020-08-27 | End: 2020-09-02

## 2020-08-27 RX ORDER — CYPROHEPTADINE HYDROCHLORIDE 4 MG/1
4 TABLET ORAL 3 TIMES DAILY PRN
Qty: 30 TABLET | Refills: 0 | Status: SHIPPED | OUTPATIENT
Start: 2020-08-27 | End: 2020-09-02 | Stop reason: SDUPTHER

## 2020-08-27 RX ORDER — LEVOTHYROXINE SODIUM 0.05 MG/1
TABLET ORAL
Qty: 45 TABLET | Refills: 0 | Status: SHIPPED | OUTPATIENT
Start: 2020-08-27 | End: 2020-09-28

## 2020-08-27 RX ORDER — CYPROHEPTADINE HYDROCHLORIDE 4 MG/1
4 TABLET ORAL 3 TIMES DAILY PRN
Qty: 30 TABLET | Refills: 0 | Status: SHIPPED | OUTPATIENT
Start: 2020-08-27 | End: 2020-08-27 | Stop reason: SDUPTHER

## 2020-08-27 RX ORDER — CEPHALEXIN 500 MG/1
500 CAPSULE ORAL 3 TIMES DAILY
Qty: 21 CAPSULE | Refills: 0 | Status: SHIPPED | OUTPATIENT
Start: 2020-08-27 | End: 2020-08-27 | Stop reason: SDUPTHER

## 2020-08-27 ASSESSMENT — ENCOUNTER SYMPTOMS
WHEEZING: 0
SHORTNESS OF BREATH: 0

## 2020-08-27 NOTE — PROGRESS NOTES
Author Cynthia  4/13/1930  female  80 y.o. SUBJECTIVE:       Chief Complaint   Patient presents with    Wound Check     3 inch skin tear on arm       HPI:  Status post accidental fall while picking up something from the floor about 3 days ago sustained laceration to the right forearm. Patient was initially evaluated by EMS. Patient continued to get occasional bleeding from the wound at the right forearm associated with itching. She denies any loss of consciousness, headache, nausea vomiting or any other systemic symptoms  Patient denies any exertional chest pain, dyspnea, palpitations, syncope, orthopnea, edema or paroxysmal nocturnal dyspnea. The patient denies any symptoms of neurological impairment or TIA's; no amaurosis, diplopia, dysphasia, or unilateral disturbance of motor or sensory function. He is up-to-date on tetanus immunization    Past Medical History:   Diagnosis Date    Acute renal failure ([facility] [address] 11-27-12    Arthritis     At risk for falling 08/28/2018    Score of 11/24 for Dynamic Gait Index    Frequent falls     Gastroesophageal reflux disease 1/22/2019    Movement disorder     osteoporosis    Nephrotic syndrome 12/28/2012    Thyroid disease      Past Surgical History:   Procedure Laterality Date    COLONOSCOPY      EXCISION OF FACIAL MASS      skin ca    EYE SURGERY      cataract removal; bilat    HIP FRACTURE SURGERY Left 8/21/2019    OPEN REDUCTION INTERNAL FIXATION OF INTERTROCHANTERIC FRACTURE OF LEFT HIPUSING GAMMA NAIL performed by Jaleesa Lynn MD at [place] [address] Right 05/27/2018    right gamma nail with cables    SKIN BIOPSY       Social History     Socioeconomic History    Marital status:       Spouse name: None    Number of children: None    Years of education: None    Highest education level: None   Occupational History    Occupation: homeaker   Social Needs    Financial resource strain: None    Food insecurity     Worry: None Inability: None    Transportation needs     Medical: None     Non-medical: None   Tobacco Use    Smoking status: Never Smoker    Smokeless tobacco: Never Used   Substance and Sexual Activity    Alcohol use: No    Drug use: No    Sexual activity: Not Currently     Partners: Male   Lifestyle    Physical activity     Days per week: None     Minutes per session: None    Stress: None   Relationships    Social connections     Talks on phone: None     Gets together: None     Attends Confucianist service: None     Active member of club or organization: None     Attends meetings of clubs or organizations: None     Relationship status: None    Intimate partner violence     Fear of current or ex partner: None     Emotionally abused: None     Physically abused: None     Forced sexual activity: None   Other Topics Concern    None   Social History Narrative    None     Family History   Problem Relation Age of Onset    High Blood Pressure Mother     Cancer Father         lung cancer; metastic    Cancer Daughter 52        colon cancer    Cancer Brother     Cancer Brother     Diabetes Brother        Review of Systems   Constitutional: Negative for diaphoresis and unexpected weight change. Respiratory: Negative for shortness of breath and wheezing. Cardiovascular: Negative for chest pain and palpitations. Neurological: Negative for tremors, syncope, speech difficulty, light-headedness, numbness and headaches. OBJECTIVE:  Pulse Readings from Last 4 Encounters:   08/27/20 96   06/24/20 80   05/28/20 90   05/21/20 72     Wt Readings from Last 4 Encounters:   08/27/20 187 lb (84.8 kg)   08/18/20 183 lb (83 kg)   06/24/20 183 lb (83 kg)   05/28/20 186 lb (84.4 kg)     BP Readings from Last 4 Encounters:   08/27/20 104/78   06/24/20 122/80   05/28/20 136/88   05/21/20 132/78     Physical Exam  Vitals signs and nursing note reviewed. Cardiovascular:      Pulses: Normal pulses.       Heart sounds: Normal heart sounds. Pulmonary:      Effort: Pulmonary effort is normal.      Breath sounds: Normal breath sounds. Skin:     Comments: Doing since long  horizontal superficial to avulsion laceration at the right forearm extensor surface. No active bleeding at this time. Slight yellowish drainage           CBC:   Lab Results   Component Value Date    WBC 7.9 05/21/2020    HGB 15.1 05/21/2020    HCT 46.6 05/21/2020     05/21/2020     CMP:  Lab Results   Component Value Date     05/21/2020    K 4.8 05/21/2020    K 3.9 08/20/2019     05/21/2020    CO2 30 05/21/2020    ANIONGAP 10 05/21/2020    GLUCOSE 90 05/21/2020    BUN 16 05/21/2020    CREATININE 1.0 05/21/2020    GFRAA >60 05/21/2020    GFRAA >60 04/10/2013    CALCIUM 9.3 05/21/2020    PROT 6.4 05/21/2020    PROT 7.2 03/04/2013    LABALBU 3.7 05/21/2020    AGRATIO 1.6 01/13/2020    BILITOT 0.7 05/21/2020    ALKPHOS 99 05/21/2020    ALT 9 05/21/2020    AST 15 05/21/2020    GLOB 2.6 01/13/2020     URINALYSIS:  Lab Results   Component Value Date    GLUCOSEU Negative 05/21/2020    GLUCOSEU NEGATIVE 02/21/2012    KETUA TRACE 05/21/2020    SPECGRAV 1.023 05/21/2020    BLOODU Negative 05/21/2020    PHUR 6.0 05/21/2020    PROTEINU TRACE 05/21/2020    NITRU Negative 05/21/2020    LEUKOCYTESUR TRACE 05/21/2020    LABMICR YES 05/21/2020    URINETYPE Voided 05/21/2020     HBA1C:   Lab Results   Component Value Date    LABA1C 5.4 02/04/2015    .3 02/04/2015     MICRO/ALB:   Lab Results   Component Value Date    LABMICR YES 05/21/2020    LABCREA 137.3 09/02/2015     LIPID:  Lab Results   Component Value Date    CHOL 226 06/21/2017    TRIG 111 06/21/2017    HDL 51 06/21/2017    HDL 42 05/23/2012    LDLCALC 153 06/21/2017    LABVLDL 22 06/21/2017     TSH:   Lab Results   Component Value Date    TSHREFLEX 3.97 04/29/2020         ASSESSMENT/PLAN:  Assessment/Plan:  Tam Enriquez was seen today for wound check.     Diagnoses and all orders for this visit:    Laceration of right forearm, initial encounter  -     cyproheptadine (PERIACTIN) 4 MG tablet; Take 1 tablet by mouth 3 times daily as needed (itching)  -     cephALEXin (KEFLEX) 500 MG capsule; Take 1 capsule by mouth 3 times daily for 7 days  After cleaning with iodine. Laceration margin is approximated with sterile strip. Wound is covered with Tegaderm  Itching  -     cyproheptadine (PERIACTIN) 4 MG tablet; Take 1 tablet by mouth 3 times daily as needed (itching)    Follow-up visit in 5 days  General wound care information    No orders of the defined types were placed in this encounter. Current Outpatient Medications   Medication Sig Dispense Refill    romosozumab-aqqg (EVENITY) 105 MG/1. 17ML SOSY injection Inject 210 mg into the skin every 30 days 8-      triamcinolone (KENALOG) 0.1 % ointment Apply topically 2 times daily Apply topically 2 times daily.       cyproheptadine (PERIACTIN) 4 MG tablet Take 1 tablet by mouth 3 times daily as needed (itching) 30 tablet 0    cephALEXin (KEFLEX) 500 MG capsule Take 1 capsule by mouth 3 times daily for 7 days 21 capsule 0    levothyroxine (SYNTHROID) 50 MCG tablet 1 tab qd 45 tablet 0    memantine (NAMENDA) 10 MG tablet Take 1 tablet by mouth 2 times daily 60 tablet 2    cetirizine (ZYRTEC ALLERGY) 10 MG tablet Take 1 tablet by mouth daily 30 tablet 2    Ascorbic Acid 500 MG CHEW Take 1 tablet by mouth daily 30 tablet 2    sertraline (ZOLOFT) 25 MG tablet Take 1 tablet by mouth daily 30 tablet 3    cyanocobalamin ([employer]S VITAMIN B12) 1000 MCG tablet Take 1 tablet by mouth daily 30 tablet 3    DULoxetine (CYMBALTA) 30 MG extended release capsule TAKE ONE CAPSULE BY MOUTH DAILY 30 capsule 5    CALTRATE 600+D PLUS MINERALS (CALTRATE) 600-800 MG-UNIT TABS tablet Take 1 tablet by mouth 2 times daily 180 tablet 1    aspirin 325 MG EC tablet Take 1 tablet by mouth daily 14 tablet 0    Cholecalciferol (VITAMIN D) 2000 units CAPS capsule Take 1 capsule by mouth daily No current facility-administered medications for this visit. Return in about 5 days (around 9/1/2020) for wound check. An After Visit Summary was printed and given to the patient. Documentation was done using voice recognition dragon software. Every effort was made to ensure accuracy; however, inadvertent  Unintentional computerized transcription errors may be present.

## 2020-08-27 NOTE — PROGRESS NOTES
Pt temp 98.4  Daughter 98.7    Pt here for Evenity injections Travel screeening done per registration              [employer] Evenity 105mg sc each arm  Lot# [id number]  NDC# 44586-711-36  Exp: 02/23   1 syringe given SQ in each arm.    Tolerated well Per daughter pt has fallen dew times since starting med bit no fx's

## 2020-08-31 ENCOUNTER — TELEPHONE (OUTPATIENT)
Dept: INTERNAL MEDICINE CLINIC | Age: 85
End: 2020-08-31

## 2020-09-02 ENCOUNTER — OFFICE VISIT (OUTPATIENT)
Dept: INTERNAL MEDICINE CLINIC | Age: 85
End: 2020-09-02
Payer: MEDICARE

## 2020-09-02 VITALS
DIASTOLIC BLOOD PRESSURE: 78 MMHG | SYSTOLIC BLOOD PRESSURE: 126 MMHG | BODY MASS INDEX: 27.57 KG/M2 | HEART RATE: 96 BPM | TEMPERATURE: 99.2 F | WEIGHT: 176 LBS

## 2020-09-02 PROCEDURE — 99213 OFFICE O/P EST LOW 20 MIN: CPT | Performed by: INTERNAL MEDICINE

## 2020-09-02 PROCEDURE — 1123F ACP DISCUSS/DSCN MKR DOCD: CPT | Performed by: INTERNAL MEDICINE

## 2020-09-02 PROCEDURE — 1036F TOBACCO NON-USER: CPT | Performed by: INTERNAL MEDICINE

## 2020-09-02 PROCEDURE — 1090F PRES/ABSN URINE INCON ASSESS: CPT | Performed by: INTERNAL MEDICINE

## 2020-09-02 PROCEDURE — 4040F PNEUMOC VAC/ADMIN/RCVD: CPT | Performed by: INTERNAL MEDICINE

## 2020-09-02 PROCEDURE — G8427 DOCREV CUR MEDS BY ELIG CLIN: HCPCS | Performed by: INTERNAL MEDICINE

## 2020-09-02 PROCEDURE — G8417 CALC BMI ABV UP PARAM F/U: HCPCS | Performed by: INTERNAL MEDICINE

## 2020-09-02 RX ORDER — CYPROHEPTADINE HYDROCHLORIDE 4 MG/1
4 TABLET ORAL 3 TIMES DAILY PRN
Qty: 30 TABLET | Refills: 0 | Status: SHIPPED | OUTPATIENT
Start: 2020-09-02 | End: 2020-12-04

## 2020-09-02 RX ORDER — CEPHALEXIN 500 MG/1
500 CAPSULE ORAL 3 TIMES DAILY
Qty: 21 CAPSULE | Refills: 0 | Status: SHIPPED | OUTPATIENT
Start: 2020-09-02 | End: 2020-09-09

## 2020-09-02 ASSESSMENT — ENCOUNTER SYMPTOMS
WHEEZING: 0
SHORTNESS OF BREATH: 0

## 2020-09-02 NOTE — PROGRESS NOTES
Subjective:      Chief Complaint   Patient presents with    Wound Check       Patient ID: Diana Mireles is a 80 y.o. female. HPI  Follow-up visit of right forearm laceration. Patient and daughter denies any fever, chills, purulent drainage . It appears wound is healing. Presently her itching improved significantly with Periactin. They are asking refill of Periactin. While on Periactin she need to hold zyrtec. Review of Systems   Constitutional: Negative for diaphoresis and fatigue. Respiratory: Negative for shortness of breath and wheezing. Cardiovascular: Negative for palpitations. Neurological: Negative for dizziness and light-headedness. Vitals:    09/02/20 1402   BP: 126/78   Site: Left Upper Arm   Position: Sitting   Cuff Size: Large Adult   Pulse: 96   Temp: 99.2 °F (37.3 °C)   Weight: 176 lb (79.8 kg)       Objective:   Physical Exam  Constitutional:       Appearance: Normal appearance. Cardiovascular:      Rate and Rhythm: Normal rate and regular rhythm. Pulses: Normal pulses. Heart sounds: Normal heart sounds. Pulmonary:      Effort: Pulmonary effort is normal.      Breath sounds: Normal breath sounds. Skin:     Comments: Right forearm laceration  Wound margins appear more approximated than last visit. No active yellow drainage. No local tenderness. No local crepitation. No evidence of expanding cellulitis   Neurological:      Mental Status: She is alert. Miguel Angel Mane was seen today for wound check. Diagnoses and all orders for this visit:    Laceration of right forearm, initial encounter  Extended-     cephALEXin (KEFLEX) 500 MG capsule; Take 1 capsule by mouth 3 times daily for 7 days  Extended use of-     cyproheptadine (PERIACTIN) 4 MG tablet; Take 1 tablet by mouth 3 times daily as needed (itching)  We will discontinue ceftriaxone for now while on Periactin  Itching  -     cyproheptadine (PERIACTIN) 4 MG tablet;  Take 1 tablet by mouth 3 times daily as needed (itching)    Follow-up visit in 3 weeks. If wound healed completely may cancel appointment.   May schedule 3 months appointment for chronic problems    Nichole Ramos MD

## 2020-09-08 ENCOUNTER — TELEPHONE (OUTPATIENT)
Dept: INTERNAL MEDICINE CLINIC | Age: 85
End: 2020-09-08

## 2020-09-08 NOTE — TELEPHONE ENCOUNTER
Spoke with the patient's daughter she will need to check her calendar because all injection takes will change if she modifies the September injections date

## 2020-09-08 NOTE — TELEPHONE ENCOUNTER
Patient's daughter, Alfonzo  is calling regarding Evenity. She states patient received injection 08/27/20. She wanted to schedule for September. Patient has an appointment with Dr Mike Guillermo on 09/23/20. She asked if this would be too soon to get the injection.

## 2020-09-22 ENCOUNTER — HOSPITAL ENCOUNTER (INPATIENT)
Age: 85
LOS: 3 days | Discharge: HOME | DRG: 441 | End: 2020-09-25
Attending: EMERGENCY MEDICINE | Admitting: INTERNAL MEDICINE
Payer: MEDICARE

## 2020-09-22 ENCOUNTER — APPOINTMENT (OUTPATIENT)
Dept: CT IMAGING | Age: 85
DRG: 441 | End: 2020-09-22
Payer: MEDICARE

## 2020-09-22 ENCOUNTER — APPOINTMENT (OUTPATIENT)
Dept: GENERAL RADIOLOGY | Age: 85
DRG: 441 | End: 2020-09-22
Payer: MEDICARE

## 2020-09-22 ENCOUNTER — NURSE TRIAGE (OUTPATIENT)
Dept: OTHER | Facility: CLINIC | Age: 85
End: 2020-09-22

## 2020-09-22 LAB
ALBUMIN SERPL-MCNC: 4 G/DL (ref 3.4–5)
ALP BLD-CCNC: 109 U/L (ref 40–129)
ALT SERPL-CCNC: 11 U/L (ref 10–40)
ANION GAP SERPL CALCULATED.3IONS-SCNC: 11 MMOL/L (ref 3–16)
AST SERPL-CCNC: 18 U/L (ref 15–37)
BASOPHILS ABSOLUTE: 0.1 K/UL (ref 0–0.2)
BASOPHILS RELATIVE PERCENT: 0.7 %
BILIRUB SERPL-MCNC: 0.4 MG/DL (ref 0–1)
BILIRUBIN DIRECT: <0.2 MG/DL (ref 0–0.3)
BILIRUBIN URINE: NEGATIVE
BILIRUBIN, INDIRECT: NORMAL MG/DL (ref 0–1)
BLOOD, URINE: NEGATIVE
BUN BLDV-MCNC: 32 MG/DL (ref 7–20)
CALCIUM SERPL-MCNC: 9.7 MG/DL (ref 8.3–10.6)
CHLORIDE BLD-SCNC: 104 MMOL/L (ref 99–110)
CLARITY: ABNORMAL
CO2: 27 MMOL/L (ref 21–32)
COLOR: YELLOW
CREAT SERPL-MCNC: 1.4 MG/DL (ref 0.6–1.2)
CRYSTALS, UA: ABNORMAL /HPF
EOSINOPHILS ABSOLUTE: 0.3 K/UL (ref 0–0.6)
EOSINOPHILS RELATIVE PERCENT: 3.7 %
EPITHELIAL CELLS, UA: 4 /HPF (ref 0–5)
GFR AFRICAN AMERICAN: 43
GFR NON-AFRICAN AMERICAN: 35
GLUCOSE BLD-MCNC: 102 MG/DL (ref 70–99)
GLUCOSE URINE: NEGATIVE MG/DL
HCT VFR BLD CALC: 46.4 % (ref 36–48)
HEMOGLOBIN: 15.4 G/DL (ref 12–16)
HYALINE CASTS: 11 /LPF (ref 0–8)
KETONES, URINE: NEGATIVE MG/DL
LEUKOCYTE ESTERASE, URINE: ABNORMAL
LYMPHOCYTES ABSOLUTE: 1.8 K/UL (ref 1–5.1)
LYMPHOCYTES RELATIVE PERCENT: 23 %
MAGNESIUM: 2.6 MG/DL (ref 1.8–2.4)
MCH RBC QN AUTO: 31.2 PG (ref 26–34)
MCHC RBC AUTO-ENTMCNC: 33.3 G/DL (ref 31–36)
MCV RBC AUTO: 93.7 FL (ref 80–100)
MICROSCOPIC EXAMINATION: YES
MONOCYTES ABSOLUTE: 0.8 K/UL (ref 0–1.3)
MONOCYTES RELATIVE PERCENT: 10.6 %
NEUTROPHILS ABSOLUTE: 4.8 K/UL (ref 1.7–7.7)
NEUTROPHILS RELATIVE PERCENT: 62 %
NITRITE, URINE: NEGATIVE
PDW BLD-RTO: 13.9 % (ref 12.4–15.4)
PH UA: 5.5 (ref 5–8)
PLATELET # BLD: 242 K/UL (ref 135–450)
PMV BLD AUTO: 8.9 FL (ref 5–10.5)
POTASSIUM SERPL-SCNC: 4.8 MMOL/L (ref 3.5–5.1)
PROTEIN UA: NEGATIVE MG/DL
RBC # BLD: 4.95 M/UL (ref 4–5.2)
RBC UA: 4 /HPF (ref 0–4)
SODIUM BLD-SCNC: 142 MMOL/L (ref 136–145)
SPECIFIC GRAVITY UA: 1.03 (ref 1–1.03)
TOTAL CK: 42 U/L (ref 26–192)
TOTAL PROTEIN: 7.4 G/DL (ref 6.4–8.2)
TROPONIN: <0.01 NG/ML
URINE REFLEX TO CULTURE: YES
URINE TYPE: ABNORMAL
UROBILINOGEN, URINE: 1 E.U./DL
WBC # BLD: 7.8 K/UL (ref 4–11)
WBC UA: 10 /HPF (ref 0–5)

## 2020-09-22 PROCEDURE — 80048 BASIC METABOLIC PNL TOTAL CA: CPT

## 2020-09-22 PROCEDURE — 71045 X-RAY EXAM CHEST 1 VIEW: CPT

## 2020-09-22 PROCEDURE — 81001 URINALYSIS AUTO W/SCOPE: CPT

## 2020-09-22 PROCEDURE — 85025 COMPLETE CBC W/AUTO DIFF WBC: CPT

## 2020-09-22 PROCEDURE — 87086 URINE CULTURE/COLONY COUNT: CPT

## 2020-09-22 PROCEDURE — 2580000003 HC RX 258: Performed by: PHYSICIAN ASSISTANT

## 2020-09-22 PROCEDURE — 70450 CT HEAD/BRAIN W/O DYE: CPT

## 2020-09-22 PROCEDURE — 84443 ASSAY THYROID STIM HORMONE: CPT

## 2020-09-22 PROCEDURE — 83735 ASSAY OF MAGNESIUM: CPT

## 2020-09-22 PROCEDURE — 6360000002 HC RX W HCPCS: Performed by: PHYSICIAN ASSISTANT

## 2020-09-22 PROCEDURE — 94760 N-INVAS EAR/PLS OXIMETRY 1: CPT

## 2020-09-22 PROCEDURE — 96360 HYDRATION IV INFUSION INIT: CPT

## 2020-09-22 PROCEDURE — 36415 COLL VENOUS BLD VENIPUNCTURE: CPT

## 2020-09-22 PROCEDURE — 93005 ELECTROCARDIOGRAM TRACING: CPT | Performed by: EMERGENCY MEDICINE

## 2020-09-22 PROCEDURE — 6370000000 HC RX 637 (ALT 250 FOR IP): Performed by: PHYSICIAN ASSISTANT

## 2020-09-22 PROCEDURE — 1200000000 HC SEMI PRIVATE

## 2020-09-22 PROCEDURE — 99285 EMERGENCY DEPT VISIT HI MDM: CPT

## 2020-09-22 PROCEDURE — 84484 ASSAY OF TROPONIN QUANT: CPT

## 2020-09-22 PROCEDURE — 80076 HEPATIC FUNCTION PANEL: CPT

## 2020-09-22 PROCEDURE — 82550 ASSAY OF CK (CPK): CPT

## 2020-09-22 PROCEDURE — 84439 ASSAY OF FREE THYROXINE: CPT

## 2020-09-22 RX ORDER — MEMANTINE HYDROCHLORIDE 5 MG/1
10 TABLET ORAL 2 TIMES DAILY
Status: DISCONTINUED | OUTPATIENT
Start: 2020-09-22 | End: 2020-09-25 | Stop reason: HOSPADM

## 2020-09-22 RX ORDER — SODIUM CHLORIDE 0.9 % (FLUSH) 0.9 %
10 SYRINGE (ML) INJECTION EVERY 12 HOURS SCHEDULED
Status: DISCONTINUED | OUTPATIENT
Start: 2020-09-22 | End: 2020-09-25 | Stop reason: HOSPADM

## 2020-09-22 RX ORDER — LACTOBACILLUS RHAMNOSUS GG 10B CELL
1 CAPSULE ORAL 2 TIMES DAILY WITH MEALS
Status: DISCONTINUED | OUTPATIENT
Start: 2020-09-23 | End: 2020-09-25 | Stop reason: HOSPADM

## 2020-09-22 RX ORDER — SODIUM CHLORIDE 0.9 % (FLUSH) 0.9 %
10 SYRINGE (ML) INJECTION PRN
Status: DISCONTINUED | OUTPATIENT
Start: 2020-09-22 | End: 2020-09-25 | Stop reason: HOSPADM

## 2020-09-22 RX ORDER — SODIUM CHLORIDE 9 MG/ML
INJECTION, SOLUTION INTRAVENOUS CONTINUOUS
Status: DISCONTINUED | OUTPATIENT
Start: 2020-09-22 | End: 2020-09-24

## 2020-09-22 RX ORDER — POLYETHYLENE GLYCOL 3350 17 G/17G
17 POWDER, FOR SOLUTION ORAL DAILY PRN
Status: DISCONTINUED | OUTPATIENT
Start: 2020-09-22 | End: 2020-09-25 | Stop reason: HOSPADM

## 2020-09-22 RX ORDER — DULOXETIN HYDROCHLORIDE 30 MG/1
30 CAPSULE, DELAYED RELEASE ORAL DAILY
Status: DISCONTINUED | OUTPATIENT
Start: 2020-09-23 | End: 2020-09-25 | Stop reason: HOSPADM

## 2020-09-22 RX ORDER — FAMOTIDINE 20 MG/1
10 TABLET, FILM COATED ORAL DAILY
Status: DISCONTINUED | OUTPATIENT
Start: 2020-09-23 | End: 2020-09-25 | Stop reason: HOSPADM

## 2020-09-22 RX ORDER — 0.9 % SODIUM CHLORIDE 0.9 %
500 INTRAVENOUS SOLUTION INTRAVENOUS ONCE
Status: COMPLETED | OUTPATIENT
Start: 2020-09-22 | End: 2020-09-22

## 2020-09-22 RX ORDER — LEVOTHYROXINE SODIUM 0.03 MG/1
50 TABLET ORAL DAILY
Status: DISCONTINUED | OUTPATIENT
Start: 2020-09-23 | End: 2020-09-25 | Stop reason: HOSPADM

## 2020-09-22 RX ORDER — ACETAMINOPHEN 325 MG/1
650 TABLET ORAL EVERY 6 HOURS PRN
Status: DISCONTINUED | OUTPATIENT
Start: 2020-09-22 | End: 2020-09-25 | Stop reason: HOSPADM

## 2020-09-22 RX ORDER — ONDANSETRON 2 MG/ML
4 INJECTION INTRAMUSCULAR; INTRAVENOUS EVERY 6 HOURS PRN
Status: DISCONTINUED | OUTPATIENT
Start: 2020-09-22 | End: 2020-09-25 | Stop reason: HOSPADM

## 2020-09-22 RX ORDER — HEPARIN SODIUM 5000 [USP'U]/ML
5000 INJECTION, SOLUTION INTRAVENOUS; SUBCUTANEOUS EVERY 8 HOURS SCHEDULED
Status: DISCONTINUED | OUTPATIENT
Start: 2020-09-22 | End: 2020-09-25 | Stop reason: HOSPADM

## 2020-09-22 RX ADMIN — Medication 10 ML: at 23:34

## 2020-09-22 RX ADMIN — SODIUM CHLORIDE: 9 INJECTION, SOLUTION INTRAVENOUS at 23:29

## 2020-09-22 RX ADMIN — MEMANTINE 10 MG: 5 TABLET ORAL at 23:29

## 2020-09-22 RX ADMIN — Medication 1 G: at 23:35

## 2020-09-22 RX ADMIN — HEPARIN SODIUM 5000 UNITS: 5000 INJECTION INTRAVENOUS; SUBCUTANEOUS at 23:34

## 2020-09-22 RX ADMIN — ASPIRIN 325 MG: 325 TABLET, COATED ORAL at 23:29

## 2020-09-22 RX ADMIN — SODIUM CHLORIDE 500 ML: 9 INJECTION, SOLUTION INTRAVENOUS at 18:44

## 2020-09-22 ASSESSMENT — ENCOUNTER SYMPTOMS
ABDOMINAL PAIN: 0
COLOR CHANGE: 0
DIARRHEA: 0
BACK PAIN: 0
COUGH: 0
SHORTNESS OF BREATH: 0
NAUSEA: 0
RESPIRATORY NEGATIVE: 1
VOMITING: 0
CONSTIPATION: 0

## 2020-09-22 ASSESSMENT — PAIN SCALES - GENERAL
PAINLEVEL_OUTOF10: 0
PAINLEVEL_OUTOF10: 0

## 2020-09-22 NOTE — TELEPHONE ENCOUNTER
Reason for Disposition   Very strange or paranoid behavior    Answer Assessment - Initial Assessment Questions  1. LEVEL OF CONSCIOUSNESS: \"How is he (she, the patient) acting right now? \" (e.g., alert-oriented, confused, lethargic, stuporous, comatose)      Thought she saw a wasp on curtain and though there was a luau in bathroom and large flowers on ceiling  2. ONSET: \"When did the confusion start? \"  (minutes, hours, days)      9/22  3. PATTERN \"Does this come and go, or has it been constant since it started? \"  \"Is it present now? \"      Constant today  4. ALCOHOL or DRUGS: \"Has he been drinking alcohol or taking any drugs? \"       No  5. NARCOTIC MEDICATIONS: \"Has he been receiving any narcotic medications? \" (e.g., morphine, Vicodin)      No  6. CAUSE: \"What do you think is causing the confusion? \"       unsure  7. OTHER SYMPTOMS: Rozina Kamara there any other symptoms? \" (e.g., difficulty breathing, headache, fever, weakness)      no    Protocols used: CONFUSION - DELIRIUM-ADULT-[place]    Caller provided care advice and instructed to call back with worsening symptoms.

## 2020-09-22 NOTE — ED PROVIDER NOTES
Constitutional: Positive for activity change, appetite change and fatigue. Negative for chills, diaphoresis and fever. Respiratory: Negative. Negative for cough and shortness of breath. Cardiovascular: Negative. Negative for chest pain, palpitations and leg swelling. Gastrointestinal: Negative for abdominal pain, constipation, diarrhea, nausea and vomiting. Genitourinary: Negative for decreased urine volume, difficulty urinating, dysuria, flank pain, frequency, hematuria and urgency. Musculoskeletal: Negative for arthralgias, back pain, myalgias, neck pain and neck stiffness. Skin: Negative for color change, pallor, rash and wound. Neurological: Negative for dizziness, tremors, seizures, syncope, facial asymmetry, speech difficulty, weakness, light-headedness, numbness and headaches. Psychiatric/Behavioral: Positive for confusion, hallucinations and sleep disturbance. Negative for agitation, behavioral problems, decreased concentration, dysphoric mood, self-injury and suicidal ideas. The patient is not nervous/anxious and is not hyperactive. Positives and Pertinent negatives as per HPI. Except as noted above in the ROS, all other systems were reviewed and negative.        PAST MEDICAL HISTORY     Past Medical History:   Diagnosis Date    Acute renal failure ([facility] [address] 11-27-12    Arthritis     At risk for falling 08/28/2018    Score of 11/24 for Dynamic Gait Index    Frequent falls     Gastroesophageal reflux disease 1/22/2019    Movement disorder     osteoporosis    Nephrotic syndrome 12/28/2012    Thyroid disease          SURGICAL HISTORY     Past Surgical History:   Procedure Laterality Date    COLONOSCOPY      EXCISION OF FACIAL MASS      skin ca    EYE SURGERY      cataract removal; bilat    HIP FRACTURE SURGERY Left 8/21/2019    OPEN REDUCTION INTERNAL FIXATION OF INTERTROCHANTERIC FRACTURE OF LEFT HIPUSING GAMMA NAIL performed by Alexx Ma MD at [address] [place] [address] SURGICAL HISTORY Right 05/27/2018    right gamma nail with cables    SKIN BIOPSY           CURRENTMEDICATIONS       Previous Medications    ASCORBIC ACID 500 MG CHEW    Take 1 tablet by mouth daily    ASPIRIN 325 MG EC TABLET    Take 1 tablet by mouth daily    CALTRATE 600+D PLUS MINERALS (CALTRATE) 600-800 MG-UNIT TABS TABLET    Take 1 tablet by mouth 2 times daily    CHOLECALCIFEROL (VITAMIN D) 2000 UNITS CAPS CAPSULE    Take 1 capsule by mouth daily     CYANOCOBALAMIN ([employer] VITAMIN B12) 1000 MCG TABLET    Take 1 tablet by mouth daily    CYPROHEPTADINE (PERIACTIN) 4 MG TABLET    Take 1 tablet by mouth 3 times daily as needed (itching)    DULOXETINE (CYMBALTA) 30 MG EXTENDED RELEASE CAPSULE    TAKE ONE CAPSULE BY MOUTH DAILY    LEVOTHYROXINE (SYNTHROID) 50 MCG TABLET    1 tab qd    MEMANTINE (NAMENDA) 10 MG TABLET    Take 1 tablet by mouth 2 times daily    ROMOSOZUMAB-AQQG (EVENITY) 105 MG/1. 17ML SOSY INJECTION    Inject 210 mg into the skin every 30 days 8-    SERTRALINE (ZOLOFT) 25 MG TABLET    Take 1 tablet by mouth daily    TRIAMCINOLONE (KENALOG) 0.1 % OINTMENT    Apply topically 2 times daily Apply topically 2 times daily. ALLERGIES     Metoprolol;  Amlodipine; Sulfa antibiotics; and Adhesive tape    FAMILYHISTORY       Family History   Problem Relation Age of Onset    High Blood Pressure Mother     Cancer Father         lung cancer; metastic    Cancer Daughter 52        colon cancer    Cancer Brother     Cancer Brother     Diabetes Brother           SOCIAL HISTORY       Social History     Tobacco Use    Smoking status: Never Smoker    Smokeless tobacco: Never Used   Substance Use Topics    Alcohol use: No    Drug use: No       SCREENINGS             PHYSICAL EXAM    (up to 7 for level 4, 8 or more for level 5)     ED Triage Vitals [09/22/20 1509]   BP Temp Temp Source Pulse Resp SpO2 Height Weight   107/73 98.2 °F (36.8 °C) Oral 90 16 92 % 5' 5\" (1.651 m) 187 lb (84.8 kg) Physical Exam  Constitutional:       General: She is not in acute distress. Appearance: Normal appearance. She is well-developed. She is not ill-appearing, toxic-appearing or diaphoretic. HENT:      Head: Normocephalic and atraumatic. Comments: Atraumatic. No raccoon eyes or matthew sign. Right Ear: External ear normal.      Left Ear: External ear normal.      Mouth/Throat:      Mouth: Mucous membranes are moist.      Pharynx: No oropharyngeal exudate or posterior oropharyngeal erythema. Eyes:      General:         Right eye: No discharge. Left eye: No discharge. Extraocular Movements: Extraocular movements intact. Conjunctiva/sclera: Conjunctivae normal.      Pupils: Pupils are equal, round, and reactive to light. Neck:      Musculoskeletal: Normal range of motion and neck supple. No neck rigidity or muscular tenderness. Cardiovascular:      Rate and Rhythm: Normal rate and regular rhythm. Pulses: Normal pulses. Heart sounds: Normal heart sounds. No murmur. No friction rub. No gallop. Comments: 2+ radial pulses bilaterally. No pedal edema. No calf tenderness. No JVD. Pulmonary:      Effort: Pulmonary effort is normal. No respiratory distress. Breath sounds: Normal breath sounds. No stridor. No wheezing, rhonchi or rales. Chest:      Chest wall: No tenderness. Abdominal:      General: Abdomen is flat. Bowel sounds are normal. There is no distension. Palpations: Abdomen is soft. There is no mass. Tenderness: There is no abdominal tenderness. There is no right CVA tenderness, left CVA tenderness, guarding or rebound. Hernia: No hernia is present. Musculoskeletal: Normal range of motion. Lymphadenopathy:      Cervical: No cervical adenopathy. Skin:     General: Skin is warm and dry. Coloration: Skin is not pale. Findings: No erythema or rash. Neurological:      General: No focal deficit present.       Mental 11 (*)     WBC, UA 10 (*)     All other components within normal limits    Narrative:     Performed at:  [facility]  [address], [address]   Phone [phone]   CULTURE, URINE   CBC WITH AUTO DIFFERENTIAL    Narrative:     Performed at:  [facility] [address],  [place], [address]   Phone [phone]   TROPONIN    Narrative:     Performed at:  [facility] [address] [facility], [address]   Phone [phone]   HEPATIC FUNCTION PANEL    Narrative:     Performed at:  [facility] [address],  [place], [address]   Phone [phone]   CK    Narrative:     Performed at:  [facility] [address], [address]   Phone [phone]       All other labs were within normal range or not returned as of this dictation. EKG: All EKG's are interpreted by the Emergency Department Physician in the absence of a cardiologist.  Please see their note for interpretation of EKG. RADIOLOGY:   Non-plain film images such as CT, Ultrasound and MRI are read by the radiologist. Plain radiographic images are visualized and preliminarily interpreted by the ED Provider with the below findings:        Interpretation per the Radiologist below, if available at the time of this note:    XR CHEST PORTABLE   Final Result   No acute cardiopulmonary disease. CT HEAD WO CONTRAST   Final Result   No acute intracranial abnormality. Diffuse atrophic changes with findings suggesting chronic microvascular   ischemia           No results found.         PROCEDURES   Unless otherwise noted below, none     Procedures    CRITICAL CARE TIME   N/A    CONSULTS:  IP CONSULT TO HOSPITALIST      EMERGENCY DEPARTMENT COURSE and DIFFERENTIAL DIAGNOSIS/MDM:   Vitals:    Vitals:    09/22/20 1930 09/22/20 1933 09/22/20 2045 09/22/20 2215 BP:  123/69 134/68    Pulse: 69 71 78 82   Resp: 17 11 18 20   Temp:       TempSrc:       SpO2: 95% 95% 94%    Weight:       Height:           Patient was given the following medications:  Medications   cefTRIAXone (ROCEPHIN) 1 g in sterile water 10 mL IV syringe (has no administration in time range)   0.9 % sodium chloride bolus (0 mLs Intravenous Stopped 9/22/20 1952)           Patient is a 63-year-old female who presents to the ED with complaint of weakness, fatigue, decreased oral intake and hallucinations per report. Patient does have a history of dementia. Patient alert and oriented to person and place only at this time. Patient apparently in hallucinations per family. Also sleeping multiple hours a day decreased oral intake. Altered mental status per report. Urinalysis showed 10 white blood cells concerning for potential UTI. Given Rocephin here in the ED. Given small bolus of fluid. BMP showed creatinine of 1.4 with GFR of 35 and BUN 32. Slightly increased from previous documented results concerning for acute kidney injury. CBC showed normal white count, hemoglobin and platelets. Troponin normal.  Hepatic function unremarkable. CK normal.  Magnesium 2.6. Chest x-ray unremarkable. CT of the head showed no acute abnormality. EKG interpreted by attending. Given history and physical examination suffering from altered mental status with fatigue, hallucinations decreased oral intake. Suffering from acute kidney injury which may potentially be due to decreased oral intake. Concern for acute cystitis as well. Case discussed with hospitalist for admission who graciously agreed to accept patient for admission at this time. FINAL IMPRESSION      1. Hallucination    2. Other fatigue    3. SHARAD (acute kidney injury) ([facility] [address]    4.  Acute cystitis without hematuria          DISPOSITION/PLAN   DISPOSITION Admitted 09/22/2020 08:32:59 PM      PATIENT REFERREDTO:  No follow-up provider specified.     DISCHARGE MEDICATIONS:  New Prescriptions    No medications on file       DISCONTINUED MEDICATIONS:  Discontinued Medications    No medications on file              (Please note that portions of this note were completed with a voice recognition program.  Efforts were made to edit the dictations but occasionally words are mis-transcribed.)    NISHANT Parks (electronically signed)          NISHANT Childs  09/22/20 1592

## 2020-09-23 PROBLEM — N17.9 AKI (ACUTE KIDNEY INJURY) (HCC): Status: ACTIVE | Noted: 2020-09-23

## 2020-09-23 PROBLEM — G93.41 ACUTE METABOLIC ENCEPHALOPATHY: Status: ACTIVE | Noted: 2020-09-23

## 2020-09-23 PROBLEM — N39.0 UTI (URINARY TRACT INFECTION): Status: ACTIVE | Noted: 2020-09-23

## 2020-09-23 LAB
AMMONIA: 59 UMOL/L (ref 11–51)
ANION GAP SERPL CALCULATED.3IONS-SCNC: 9 MMOL/L (ref 3–16)
BUN BLDV-MCNC: 28 MG/DL (ref 7–20)
CALCIUM SERPL-MCNC: 8.5 MG/DL (ref 8.3–10.6)
CHLORIDE BLD-SCNC: 108 MMOL/L (ref 99–110)
CO2: 24 MMOL/L (ref 21–32)
CREAT SERPL-MCNC: 1.1 MG/DL (ref 0.6–1.2)
EKG ATRIAL RATE: 87 BPM
EKG DIAGNOSIS: NORMAL
EKG P AXIS: 36 DEGREES
EKG P-R INTERVAL: 136 MS
EKG Q-T INTERVAL: 398 MS
EKG QRS DURATION: 124 MS
EKG QTC CALCULATION (BAZETT): 478 MS
EKG R AXIS: -10 DEGREES
EKG T AXIS: 11 DEGREES
EKG VENTRICULAR RATE: 87 BPM
FOLATE: 8.72 NG/ML (ref 4.78–24.2)
GFR AFRICAN AMERICAN: 56
GFR NON-AFRICAN AMERICAN: 47
GLUCOSE BLD-MCNC: 98 MG/DL (ref 70–99)
HCT VFR BLD CALC: 40.8 % (ref 36–48)
HEMOGLOBIN: 13.4 G/DL (ref 12–16)
MAGNESIUM: 2.2 MG/DL (ref 1.8–2.4)
MCH RBC QN AUTO: 30.3 PG (ref 26–34)
MCHC RBC AUTO-ENTMCNC: 32.9 G/DL (ref 31–36)
MCV RBC AUTO: 92.2 FL (ref 80–100)
PDW BLD-RTO: 13.8 % (ref 12.4–15.4)
PLATELET # BLD: 203 K/UL (ref 135–450)
PMV BLD AUTO: 9.4 FL (ref 5–10.5)
POTASSIUM SERPL-SCNC: 4.2 MMOL/L (ref 3.5–5.1)
RBC # BLD: 4.42 M/UL (ref 4–5.2)
SODIUM BLD-SCNC: 141 MMOL/L (ref 136–145)
T4 FREE: 1.4 NG/DL (ref 0.9–1.8)
TSH REFLEX: 4.43 UIU/ML (ref 0.27–4.2)
URINE CULTURE, ROUTINE: NORMAL
VITAMIN B-12: >2000 PG/ML (ref 211–911)
WBC # BLD: 7.3 K/UL (ref 4–11)

## 2020-09-23 PROCEDURE — 82607 VITAMIN B-12: CPT

## 2020-09-23 PROCEDURE — 6370000000 HC RX 637 (ALT 250 FOR IP): Performed by: PHYSICIAN ASSISTANT

## 2020-09-23 PROCEDURE — 6370000000 HC RX 637 (ALT 250 FOR IP): Performed by: INTERNAL MEDICINE

## 2020-09-23 PROCEDURE — 1200000000 HC SEMI PRIVATE

## 2020-09-23 PROCEDURE — 36415 COLL VENOUS BLD VENIPUNCTURE: CPT

## 2020-09-23 PROCEDURE — 82140 ASSAY OF AMMONIA: CPT

## 2020-09-23 PROCEDURE — 93010 ELECTROCARDIOGRAM REPORT: CPT | Performed by: INTERNAL MEDICINE

## 2020-09-23 PROCEDURE — 6360000002 HC RX W HCPCS: Performed by: PHYSICIAN ASSISTANT

## 2020-09-23 PROCEDURE — 2580000003 HC RX 258: Performed by: PHYSICIAN ASSISTANT

## 2020-09-23 PROCEDURE — 85027 COMPLETE CBC AUTOMATED: CPT

## 2020-09-23 PROCEDURE — 80048 BASIC METABOLIC PNL TOTAL CA: CPT

## 2020-09-23 PROCEDURE — 94760 N-INVAS EAR/PLS OXIMETRY 1: CPT

## 2020-09-23 PROCEDURE — 92526 ORAL FUNCTION THERAPY: CPT

## 2020-09-23 PROCEDURE — 83735 ASSAY OF MAGNESIUM: CPT

## 2020-09-23 PROCEDURE — 82746 ASSAY OF FOLIC ACID SERUM: CPT

## 2020-09-23 PROCEDURE — 92610 EVALUATE SWALLOWING FUNCTION: CPT

## 2020-09-23 RX ORDER — LACTULOSE 10 G/15ML
20 SOLUTION ORAL 2 TIMES DAILY
Status: DISCONTINUED | OUTPATIENT
Start: 2020-09-23 | End: 2020-09-25 | Stop reason: HOSPADM

## 2020-09-23 RX ORDER — QUETIAPINE FUMARATE 25 MG/1
12.5 TABLET, FILM COATED ORAL NIGHTLY
Status: DISCONTINUED | OUTPATIENT
Start: 2020-09-23 | End: 2020-09-25 | Stop reason: HOSPADM

## 2020-09-23 RX ADMIN — LACTULOSE 20 G: 20 SOLUTION ORAL at 20:36

## 2020-09-23 RX ADMIN — ACETAMINOPHEN 650 MG: 325 TABLET ORAL at 19:34

## 2020-09-23 RX ADMIN — SERTRALINE HYDROCHLORIDE 25 MG: 50 TABLET ORAL at 08:57

## 2020-09-23 RX ADMIN — Medication 1 G: at 23:09

## 2020-09-23 RX ADMIN — DULOXETINE HYDROCHLORIDE 30 MG: 30 CAPSULE, DELAYED RELEASE ORAL at 08:57

## 2020-09-23 RX ADMIN — MEMANTINE 10 MG: 5 TABLET ORAL at 20:36

## 2020-09-23 RX ADMIN — LACTULOSE 20 G: 20 SOLUTION ORAL at 11:10

## 2020-09-23 RX ADMIN — FAMOTIDINE 10 MG: 20 TABLET ORAL at 08:58

## 2020-09-23 RX ADMIN — HEPARIN SODIUM 5000 UNITS: 5000 INJECTION INTRAVENOUS; SUBCUTANEOUS at 14:14

## 2020-09-23 RX ADMIN — MEMANTINE 10 MG: 5 TABLET ORAL at 08:58

## 2020-09-23 RX ADMIN — LEVOTHYROXINE SODIUM 50 MCG: 0.03 TABLET ORAL at 06:02

## 2020-09-23 RX ADMIN — Medication 1 CAPSULE: at 08:57

## 2020-09-23 RX ADMIN — ASPIRIN 325 MG: 325 TABLET, COATED ORAL at 20:36

## 2020-09-23 RX ADMIN — Medication 1 CAPSULE: at 18:17

## 2020-09-23 RX ADMIN — HEPARIN SODIUM 5000 UNITS: 5000 INJECTION INTRAVENOUS; SUBCUTANEOUS at 20:37

## 2020-09-23 RX ADMIN — QUETIAPINE FUMARATE 12.5 MG: 25 TABLET ORAL at 20:37

## 2020-09-23 RX ADMIN — SODIUM CHLORIDE: 9 INJECTION, SOLUTION INTRAVENOUS at 11:42

## 2020-09-23 RX ADMIN — HEPARIN SODIUM 5000 UNITS: 5000 INJECTION INTRAVENOUS; SUBCUTANEOUS at 06:02

## 2020-09-23 ASSESSMENT — PAIN SCALES - GENERAL
PAINLEVEL_OUTOF10: 10
PAINLEVEL_OUTOF10: 9

## 2020-09-23 ASSESSMENT — PAIN DESCRIPTION - LOCATION
LOCATION: GENERALIZED
LOCATION: GENERALIZED

## 2020-09-23 ASSESSMENT — PAIN DESCRIPTION - PAIN TYPE
TYPE: ACUTE PAIN
TYPE: ACUTE PAIN

## 2020-09-23 NOTE — PROGRESS NOTES
Speech Language Pathology  CLINICAL BEDSIDE SWALLOWING EVALUATION  Speech Therapy Department    Patient Name:  Mando Dickson  :  1930  Pain level:Pt denies pain at this time  Medical Diagnosis:   UTI (urinary tract infection) [N39.0]     HPI:Patient is a 80 y.o.  female admitted with UTI (urinary tract infection) [N39.0] who is seen in consult for elevated ammonia. H/o dementia. She is conversant but forgetful at baseline per her daughter. She was hallucinating at home so was brought to the ED. Dx with metabolic encephalopathy, UTI, and SHARAD. Ammonia was checked and was elevated at 59. No h/o cirrhosis. No h/o alcohol abuse. No GI complaints such as abdominal pain, N/V, melena, hematochezia. Speech Therapy/Clinical Swallow Evaluation order received. Pt is currently alert and verbally responsive but demonstrates reduced short term recall which is consistent with her baseline function. Per nurse and Pt report, Pt verbalizes sensation of something \"stuck\" in her throat as well as occasional coughing with liquids prior to this admission. Per chart review, Pt received speech therapy cognitive treatment on the ARU at this facility from 19-19 (see notes). Pt's daughter reports that although cognitive deficits continue to be noted, they do not appear significantly declined from her prior baseline function    Treatment Diagnosis: Mild Oropharyngeal Dysphagia    Impressions: Pt demonstrates mild reduced oral motor strength and ROM impacting bolus control and A-P propulsion with all textures. Clinical symptoms of premature bolus loss to pharynx, mild delayed swallow initiation and intermittent delayed pharyngeal clearing noted with all textures. Intermittent delayed throat clears due to suspected laryngeal penetration, noted with thin liquids via straw. No overt signs of aspiration noted with thin liquids via cup or with any other textures in limited amounts.  However, precautions

## 2020-09-23 NOTE — PLAN OF CARE
Problem: Falls - Risk of:  Goal: Will remain free from falls  Description: Will remain free from falls  Outcome: Ongoing  Goal: Absence of physical injury  Description: Absence of physical injury  Outcome: Ongoing     Problem: Pain:  Description: Pain management should include both nonpharmacologic and pharmacologic interventions.   Goal: Pain level will decrease  Description: Pain level will decrease  Outcome: Ongoing  Goal: Control of acute pain  Description: Control of acute pain  Outcome: Ongoing  Goal: Control of chronic pain  Description: Control of chronic pain  Outcome: Ongoing     Problem: Confusion - Acute:  Goal: Absence of continued neurological deterioration signs and symptoms  Description: Absence of continued neurological deterioration signs and symptoms  Outcome: Ongoing  Goal: Mental status will be restored to baseline  Description: Mental status will be restored to baseline  Outcome: Ongoing     Problem: Discharge Planning:  Goal: Ability to perform activities of daily living will improve  Description: Ability to perform activities of daily living will improve  Outcome: Ongoing  Goal: Participates in care planning  Description: Participates in care planning  Outcome: Ongoing     Problem: Injury - Risk of, Physical Injury:  Goal: Will remain free from falls  Description: Will remain free from falls  Outcome: Ongoing  Goal: Absence of physical injury  Description: Absence of physical injury  Outcome: Ongoing     Problem: Sensory Perception - Impaired:  Goal: Demonstrations of improved sensory functioning will increase  Description: Demonstrations of improved sensory functioning will increase  Outcome: Ongoing  Goal: Decrease in sensory misperception frequency  Description: Decrease in sensory misperception frequency  Outcome: Ongoing  Goal: Able to refrain from responding to false sensory perceptions  Description: Able to refrain from responding to false sensory perceptions  Outcome: Ongoing  Goal: Demonstrates accurate environmental perceptions  Description: Demonstrates accurate environmental perceptions  Outcome: Ongoing  Goal: Able to distinguish between reality-based and nonreality-based thinking  Description: Able to distinguish between reality-based and nonreality-based thinking  Outcome: Ongoing  Goal: Able to interrupt nonreality-based thinking  Description: Able to interrupt nonreality-based thinking  Outcome: Ongoing     Problem: Sleep Pattern Disturbance:  Goal: Appears well-rested  Description: Appears well-rested  Outcome: Ongoing

## 2020-09-23 NOTE — ED PROVIDER NOTES
This patient was seen by the Mid-Level Provider. I have seen and examined the patient, agree with the workup, evaluation, management and diagnosis. Care plan has been discussed. My assessment reveals a 80-year-old female who presents with increased confusion and hallucinations. This is a 5year-old female with a history of dementia who presents with the above complaints she is had all day long. Radiology results:    XR CHEST PORTABLE   Final Result   No acute cardiopulmonary disease. CT HEAD WO CONTRAST   Final Result   No acute intracranial abnormality. Diffuse atrophic changes with findings suggesting chronic microvascular   ischemia               LABS:    Labs Reviewed   BASIC METABOLIC PANEL - Abnormal; Notable for the following components:       Result Value    Glucose 102 (*)     BUN 32 (*)     CREATININE 1.4 (*)     GFR Non- 35 (*)     GFR  37 (*)     All other components within normal limits    Narrative:     Performed at:  [facility] [address] [facility]  [place], [address]   Phone [phone]   URINE RT REFLEX TO CULTURE - Abnormal; Notable for the following components:    Clarity, UA CLOUDY (*)     Leukocyte Esterase, Urine SMALL (*)     All other components within normal limits    Narrative:     Performed at:  [facility] [address] [facility]  [place], [address]   Phone [phone]   MAGNESIUM - Abnormal; Notable for the following components:    Magnesium 2.60 (*)     All other components within normal limits    Narrative:     Performed at:  [facility] [address] [facility]  [place], [address]   Phone [phone]   MICROSCOPIC URINALYSIS - Abnormal; Notable for the following components:    Crystals, UA 1+ Ca.  Oxalate (*)     Hyaline Casts, UA 11 (*)     WBC, UA 10 (*)     All other components within normal limits    Narrative:     Performed at:  [facility]  [address], [address]   Phone [phone]   CULTURE, URINE   CBC WITH AUTO DIFFERENTIAL    Narrative:     Performed at:  [facility]  [address],  [place], [address]   Phone [phone]   TROPONIN    Narrative:     Performed at:  [facility]  [address], [address]   Phone [phone]   HEPATIC FUNCTION PANEL    Narrative:     Performed at:  [facility]  [address],  [place], [address]   Phone [phone]   CK    Narrative:     Performed at:  [facility]  [address] [place],  [place], [address]   Phone [phone]           EKG:    Sinus rhythm at a rate of 87 beats a minute with no acute ST elevations or depressions or pathologic Q waves. Exam:    No focal neurological abnormalities. She was alert to person but not place or time. Abdomen was soft and benign. Medical decision making: This is a 24-year-old female presents with increased confusion. Her work-up is consistent with a urinary tract infection and acute kidney injury. The patient be treated with IV antibiotics and admitted for further care and work-up. FINAL IMPRESSION:    1. Hallucination    2. Other fatigue    3. SHARAD (acute kidney injury) ([facility] [address]    4.  Acute cystitis without hematuria            Silvestre Diggs MD  09/22/20 2008

## 2020-09-23 NOTE — CARE COORDINATION
Discharge Planning Assessment  Readmission risk score 13%  RN discharge planner met with patient/ (and family member) to discuss reason for admission, current living situation, and potential needs at the time of discharge    Demographics/Insurance verified Yes lives with daughter    Current type of dwelling: single level home    Patient from ECF/SW confirmed with: n/a    Living arrangements: recently moved in with daughter in a single level home, has a wheelchair, walker, cane, and med minder    Level of function/Support: requires reminders for management but is independent. Daughter is with patient 24/7    PCP: Alistair    Last Visit to PCP: in past week    DME: wheelchair, walker, cane, med minder    Active with any community resources/agencies/skilled home care: not at present    Medication compliance issues: daughter supervises medication administration    Financial issues that could impact healthcare: not reported      Tentative discharge plan: home with daughter    Discussed and provided facilities of choice if transition to a skilled nursing facility is required at the time of discharge  Not indicated    Discussed with patient and/or family that on the day of discharge home tentative time of discharge will be between 10 AM and noon.     Transportation at the time of discharge: daughter    Gaetano Jovana, BSN, CCM, RN  [facility]  [phone]

## 2020-09-23 NOTE — PROGRESS NOTES
Patient called this nurse to room at 0610 tearful, stating that there was a man staring at her from the hallway. I reassured patient that no one was in the hallway. Patient the requested that I check behind curtains in the room, in the bedside commode, and under the bed to verify that no one was there. Patient visually relaxed once this nurse physically checked each location and reassured patient that no one was present. Patient stated that she felt that the visions were not real, but that they appeared very real to her. Stayed with patient for a few more moments, turned on all the lights in the room and reassured patient I would return to check in on her.     Electronically signed by Carmenza Taylor RN on 9/23/2020 at 6:36 AM

## 2020-09-23 NOTE — H&P
[facility] Medicine History & Physical      Patient Name: Warren Allan    : 1930    PCP: Luis M Shine MD    Date of Service:  Patient seen and examined on 2020     Chief Complaint:  Hallucinations    History Of Present Illness:    Warren Allan is a 80 y.o. female who presented to ED with complaint of hallucinations. History is supplemented by family at bedside. Patient reports that when she woke up this morning she was having hallucinations. Patient reports that she currently sees birds on the floor in front of her. Patient is alert and oriented x3 and is aware that she is hallucinating. Patient lives with her daughter who is at bedside. Daughter reports that patient has been sleeping approximately 20 hours a day for the last several days. Patient does have a history of dementia but the increased fatigue and hallucinations are unusual for her. Patient has also had decreased oral intake. She denies fever, chills, dizziness, chest pain, shortness of breath, cough, edema, abdominal pain, nausea, vomiting, diarrhea, constipation, pain with urination. Work-up initiated in ED. CT head negative for acute process. CXR negative for acute process. UA concerning for UTI. No leukocytosis or anemia. Creatinine 1.4, slightly above patient's baseline. No significant electrolyte abnormalities noted. EKG shows NSR without evidence of acute ischemia or infarction. Troponin negative, proBNP 42.    Past Medical History:    Patient  has a past medical history of Acute renal failure ([address] Arthritis, At risk for falling, Frequent falls, Gastroesophageal reflux disease, Movement disorder, Nephrotic syndrome, and Thyroid disease. Past Surgical History:    Patient  has a past surgical history that includes Excision of Facial Mass; skin biopsy; Colonoscopy; eye surgery; other surgical history (Right, 2018); and Hip fracture surgery (Left, 2019).     Medications Prior to Admission:      Prior to Admission medications    Medication Sig Start Date End Date Taking? Authorizing Provider   cyproheptadine (PERIACTIN) 4 MG tablet Take 1 tablet by mouth 3 times daily as needed (itching) 9/2/20  Yes MILLICENT Bradshaw MD   Ascorbic Acid 500 MG CHEW Take 1 tablet by mouth daily 8/31/20  Yes Lenard Black MD   romosozumab-aqqg (EVENITY) 105 MG/1. 17ML SOSY injection Inject 210 mg into the skin every 30 days 8-   Yes Historical Provider, MD   levothyroxine (SYNTHROID) 50 MCG tablet 1 tab qd 8/27/20  Yes Lenard Black MD   memantine (NAMENDA) 10 MG tablet Take 1 tablet by mouth 2 times daily 8/27/20 11/25/20 Yes Lenard Black MD   sertraline (ZOLOFT) 25 MG tablet Take 1 tablet by mouth daily 8/27/20  Yes Lenard Black MD   cyanocobalamin ([employer] VITAMIN B12) 1000 MCG tablet Take 1 tablet by mouth daily 8/27/20  Yes Lenard Black MD   DULoxetine (CYMBALTA) 30 MG extended release capsule TAKE ONE CAPSULE BY MOUTH DAILY 8/27/20  Yes Lenard Black MD   Caltrate 600+D Plus Minerals (CALTRATE) 600-800 MG-UNIT TABS tablet Take 1 tablet by mouth 2 times daily 8/27/20  Yes Luis Alberto Sainz MD   aspirin 325 MG EC tablet Take 1 tablet by mouth daily 9/5/19  Yes Calvin Justice MD   Cholecalciferol (VITAMIN D) 2000 units CAPS capsule Take 1 capsule by mouth daily    Yes Historical Provider, MD   triamcinolone (KENALOG) 0.1 % ointment Apply topically 2 times daily Apply topically 2 times daily. Historical Provider, MD       Allergies:  Metoprolol; Amlodipine; Sulfa antibiotics; and Adhesive tape    Social History:      TOBACCO:   reports that she has never smoked. She has never used smokeless tobacco.  ETOH:   reports no history of alcohol use. DRUGS:  reports no history of drug use.     Family History:      Reviewed in detail positive as follows:        Problem Relation Age of Onset    High Blood Pressure Mother     Cancer Father         lung cancer; metastic    Cancer Daughter 52 colon cancer    Cancer Brother     Cancer Brother     Diabetes Brother        REVIEW OF SYSTEMS:   Pertinent positives as noted in the HPI. All other systems reviewed and negative. PHYSICAL EXAM PERFORMED:    /69   Pulse 71   Temp 98.2 °F (36.8 °C) (Oral)   Resp 11   Ht 5' 5\" (1.651 m)   Wt 187 lb (84.8 kg)   SpO2 95%   BMI 31.12 kg/m²     General appearance:  Awake, alert, no apparent distress  HEENT:  Normocephalic, atraumatic without obvious deformity. PERRL. EOM intact. Conjunctivae/corneas clear. Neck: Supple, with full range of motion. No JVD. Trachea midline. Respiratory:  Clear to auscultation bilaterally without rales, wheezes, or rhonchi. Normal respiratory effort. Cardiovascular:  Regular rate and rhythm without murmurs, rubs or gallops. Abdomen: Soft, NT, ND, without rebound or guarding. Normal bowel sounds. Extremities:  No clubbing, cyanosis, or edema bilaterally. Full range of motion without deformity. +2 palpable pulses, equal bilaterally. Capillary refill brisk,< 3 seconds   Skin: No rashes or lesions. Warm/dry. Neurologic:  Neurovascularly intact without any focal sensory/motor deficits. Cranial nerves: II-XII intact, grossly non-focal. Alert and oriented x 3. Normal speech. Psychiatric:  Hallucinating. Normal insight. Labs:   CBC   Recent Labs     09/22/20  1530   WBC 7.8   HGB 15.4   HCT 46.4           RENAL  Recent Labs     09/22/20  1530      K 4.8      CO2 27   BUN 32*   CREATININE 1.4*       LFTS  Recent Labs     09/22/20  1530   AST 18   ALT 11   BILIDIR <0.2   BILITOT 0.4   ALKPHOS 109       COAG  No results for input(s): INR in the last 72 hours.     CARDIAC ENZYMES  Recent Labs     09/22/20  1530   TROPONINI <0.01       LIPIDS  Cholesterol, Total   Date/Time Value Ref Range Status   06/21/2017 12:40  (H) 0 - 199 mg/dL Final     Triglycerides   Date/Time Value Ref Range Status   06/21/2017 12:40  0 - 150 mg/dL Final HDL   Date/Time Value Ref Range Status   06/21/2017 12:40 PM 51 40 - 60 mg/dL Final   05/23/2012 01:17 PM 42 40 - 60 mg/dl Final     LDL Calculated   Date/Time Value Ref Range Status   06/21/2017 12:40  (H) <100 mg/dL Final         Radiology:     XR CHEST PORTABLE   Final Result   No acute cardiopulmonary disease. CT HEAD WO CONTRAST   Final Result   No acute intracranial abnormality. Diffuse atrophic changes with findings suggesting chronic microvascular   ischemia             EKG:   Read by ED physician in the absence of a cardiologist:  \"Sinus rhythm at a rate of 87 beats a minute with no acute ST elevations or depressions or pathologic Q waves. \"      ASSESSMENT/PLAN:    Urinary tract infection  Urine culture sent. Previous urine culture results reviewed  Rocephin started in the ED, will continue  Gentle IVF    Acute metabolic encephalopathy   In the setting of dementia  Likely due to acute illness  CT head negative for acute process  Check TSH  Will monitor and provide supportive care. Elevated creatinine   Does not meet criteria for SHARAD  Cr 1.4, baseline 1.0-1.2  Suspect due to dehydration  IVF  Monitor for electrolyte abnormalities    Hypothyroidism  Continue home synthroid  Check TSH      DVT prophylaxis: SQ heparin  GI prophylaxis: Pepcid  Probiotic if on abx: Yes    Diet: DIET GENERAL;  Code Status: Full Code    Consults:  IP CONSULT TO HOSPITALIST    Disposition: Admit to Inpatient   ELOS: Greater than two midnights due to medical therapy     Roseanna Suresh PA-C    Thank you Alecia Birmingham MD for the opportunity to be involved in this patient's care. If you have any questions or concerns please feel free to contact me at [phone].

## 2020-09-23 NOTE — PROGRESS NOTES
obese  Eyes: Sclera clear. Pupils equal.  ENT: Moist oral mucosa. Trachea midline, no adenopathy. Cardiovascular: Regular rhythm, normal S1, S2. No murmur. No edema in lower extremities  Respiratory: Not using accessory muscles. Good inspiratory effort. Clear to auscultation bilaterally, no wheeze or crackles. GI: Abdomen soft, no tenderness, not distended, normal bowel sounds  Musculoskeletal: No cyanosis in digits, neck supple  Neurology: Grossly intact. No speech or motor deficits  Psych: Normal affect. Alert and oriented in time, place and person  Skin: Warm, dry, normal turgor  Extremity exam shows brisk capillary refill. Peripheral pulses are palpable in lower extremities     Labs and Tests:  CBC:   Recent Labs     09/22/20  1530 09/23/20  0744   WBC 7.8 7.3   HGB 15.4 13.4    203     BMP:    Recent Labs     09/22/20  1530 09/23/20  0744    141   K 4.8 4.2    108   CO2 27 24   BUN 32* 28*   CREATININE 1.4* 1.1   GLUCOSE 102* 98     Hepatic:   Recent Labs     09/22/20  1530   AST 18   ALT 11   BILITOT 0.4   ALKPHOS 109     XR CHEST PORTABLE   Final Result   No acute cardiopulmonary disease. CT HEAD WO CONTRAST   Final Result   No acute intracranial abnormality. Diffuse atrophic changes with findings suggesting chronic microvascular   ischemia         MRI BRAIN WO CONTRAST    (Results Pending)       Problem List  Principal Problem:    Acute metabolic encephalopathy  Active Problems:    Hypothyroid    UTI (urinary tract infection)    SHARAD (acute kidney injury) ([facility] [address]  Resolved Problems:    * No resolved hospital problems. *       Assessment & Plan:   1. Cont IVF  2. Cont Rocephin for UTI  3. Start Seroquel for hallucinations/sundowning  4. Avoid neurosedating meds  5. Check MRI Brain w/o contrast to r/o stroke  6. Check Ammonia  7. GI consult for elevated ammonia  8. Lactulose bid  9.  PT/OT eval      IV Access: Peripheral  Ponce: No  Diet: DIET GENERAL;  Code:Full Code  DVT PPX Lovenox  Disposition Home with home PT/OT    Discussed with patient, nursing and CM. Hopefully home by weekend.       Sue Goldsmith MD   9/23/2020 10:29 AM

## 2020-09-23 NOTE — ACP (ADVANCE CARE PLANNING)
Advanced Care Planning Note. Purpose of Encounter: Advanced care planning in light of acute metabolic encephalopathy  Parties In Attendance: Patient  Decisional Capacity: Yes  Subjective: Patient with hallucinations  Objective: Cr 1.1  Goals of Care Determination: Patient wants limited support (NO CPR, no vent, no HD, no trach, no PEG, no surgery)  Plan:  IVF, IV Abx, GI consult, MRI Brain  Code Status: DNR CCA   Time spent on Advanced care Plannin minutes  Advanced Care Planning Documents: Completed advanced directives on chart, daughter is the POA.     Sandra Childers MD  2020 10:32 AM

## 2020-09-23 NOTE — ED NOTES
Pt given sandwich water and jello to eat. Pt alert and orientated, daughter at the bedside, no s/s of distress.       Danyelle Angel RN  09/22/20 9510

## 2020-09-23 NOTE — ED NOTES
Pt resting in bed, with lights off denies any needs at this time. Updated pt on poc.       Christopher Rosado RN  09/22/20 1586

## 2020-09-23 NOTE — CONSULTS
Gastroenterology Consult Note      Patient: Reji Clay  : 1930  Acct#:      Date:  2020    Subjective:       History of Present Illness  Patient is a 80 y.o.  female admitted with UTI (urinary tract infection) [N39.0] who is seen in consult for elevated ammonia. H/o dementia. She is conversant but forgetful at baseline per her daughter. She was hallucinating at home so was brought to the ED. Dx with metabolic encephalopathy, UTI, and SHARAD. Ammonia was checked and was elevated at 59. No h/o cirrhosis. No h/o alcohol abuse. No GI complaints such as abdominal pain, N/V, melena, hematochezia. Past Medical History:   Diagnosis Date    Acute renal failure ([facility] [address] 12    Arthritis     At risk for falling 2018    Score of  for Dynamic Gait Index    Frequent falls     Gastroesophageal reflux disease 2019    Movement disorder     osteoporosis    Nephrotic syndrome 2012    Thyroid disease       Past Surgical History:   Procedure Laterality Date    COLONOSCOPY      EXCISION OF FACIAL MASS      skin ca    EYE SURGERY      cataract removal; bilat    HIP FRACTURE SURGERY Left 2019    OPEN REDUCTION INTERNAL FIXATION OF INTERTROCHANTERIC FRACTURE OF LEFT HIPUSING GAMMA NAIL performed by Lawrence Matute MD at [address] [place] [address] Right 2018    right gamma nail with cables    SKIN BIOPSY        Past Endoscopic History    Admission Meds  No current facility-administered medications on file prior to encounter. Current Outpatient Medications on File Prior to Encounter   Medication Sig Dispense Refill    cyproheptadine (PERIACTIN) 4 MG tablet Take 1 tablet by mouth 3 times daily as needed (itching) 30 tablet 0    Ascorbic Acid 500 MG CHEW Take 1 tablet by mouth daily 90 tablet 1    romosozumab-aqqg (EVENITY) 105 MG/1. 17ML SOSY injection Inject 210 mg into the skin every 30 days 2020      levothyroxine (SYNTHROID) 50 MCG tablet 1 tab qd 45 tablet 0    memantine (NAMENDA) 10 MG tablet Take 1 tablet by mouth 2 times daily 180 tablet 2    sertraline (ZOLOFT) 25 MG tablet Take 1 tablet by mouth daily 90 tablet 1    cyanocobalamin ([employer]S VITAMIN B12) 1000 MCG tablet Take 1 tablet by mouth daily 90 tablet 1    DULoxetine (CYMBALTA) 30 MG extended release capsule TAKE ONE CAPSULE BY MOUTH DAILY 90 capsule 1    Caltrate 600+D Plus Minerals (CALTRATE) 600-800 MG-UNIT TABS tablet Take 1 tablet by mouth 2 times daily 180 tablet 1    aspirin 325 MG EC tablet Take 1 tablet by mouth daily 14 tablet 0    Cholecalciferol (VITAMIN D) 2000 units CAPS capsule Take 1 capsule by mouth daily       triamcinolone (KENALOG) 0.1 % ointment Apply topically 2 times daily Apply topically 2 times daily. Allergies  Allergies   Allergen Reactions    Metoprolol Hives     Severe rash and itching    Amlodipine Itching     Itching    Sulfa Antibiotics Nausea And Vomiting    Adhesive Tape Other (See Comments)     Skin tears very easily      Social   Social History     Tobacco Use    Smoking status: Never Smoker    Smokeless tobacco: Never Used   Substance Use Topics    Alcohol use: No        Family History   Problem Relation Age of Onset    High Blood Pressure Mother     Cancer Father         lung cancer; metastic    Cancer Daughter 52        colon cancer    Cancer Brother     Cancer Brother     Diabetes Brother         Review of Systems  Review of systems not obtained due to patient factors. Physical Exam  Blood pressure 121/78, pulse 78, temperature 98.1 °F (36.7 °C), temperature source Oral, resp. rate 16, height 5' 5\" (1.651 m), weight 187 lb 3.2 oz (84.9 kg), SpO2 93 %, not currently breastfeeding.     General appearance: alert, cooperative, no distress, appears stated age  Eyes: Anicteric  Head: Normocephalic, without obvious abnormality  Lungs: clear to auscultation bilaterally, Normal Effort  Heart: regular rate and rhythm, normal S1 and S2, no murmurs or rubs  Abdomen: soft, non-tender. Bowel sounds normal. No masses,  no organomegaly. Extremities: atraumatic, no cyanosis or edema  Skin: warm and dry, no jaundice  Neuro: Grossly intact, A& oriented to person. No asterixis. Musculoskeletal: 5/5  strength BUE      Data Review:    Recent Labs     09/22/20  1530 09/23/20  0744   WBC 7.8 7.3   HGB 15.4 13.4   HCT 46.4 40.8   MCV 93.7 92.2    203     Recent Labs     09/22/20  1530 09/23/20  0744    141   K 4.8 4.2    108   CO2 27 24   BUN 32* 28*   CREATININE 1.4* 1.1     Recent Labs     09/22/20  1530   AST 18   ALT 11   BILIDIR <0.2   BILITOT 0.4   ALKPHOS 109     No results for input(s): LIPASE, AMYLASE in the last 72 hours. No results for input(s): PROTIME, INR in the last 72 hours. No results for input(s): PTT in the last 72 hours. No results for input(s): OCCULTBLD in the last 72 hours. Assessment:     Principal Problem:    Acute metabolic encephalopathy  Active Problems:    Hypothyroid    UTI (urinary tract infection)    SHARAD (acute kidney injury) ([facility] [address]  Resolved Problems:    * No resolved hospital problems. *    Elevated ammonia - no h/o cirrhosis. Liver function tests and liver enzymes are normal. Normal plts. Will image liver. Other causes of hyperammonemia include meds (I don't see any offending meds), urea cycle disorder (typically presents in infancy), and proteus infections. She does have UTI. Await culture. UTI    Metabolic encephalopathy     Recommendations:   - US of liver  - on lactulose   - f/u urine cx  - antibiotics for UTI    Discussed with Dr. Mami Cosme, PA-C  [facility]  I have personally performed a face to face diagnostic evaluation on this patient. I have interviewed and examined the patient and I agree with the findings and recommended plan of care. In summary, my findings and plan are the following:  As above, elderly woman with new delerium found to have UTI and mildly elevated NH3 level. She had visual hallucinations last pm, some better today. Denies pain, alcohol hx or other risk factors for liver disease. UTI likely cause of this picture, but will US liver to r/o obvious cirrhosis and would catharse with lactulose -> titrate dose to 2 - 3 loose BM/day.     Pretty Ulrich MD  [address]  9/23/2020

## 2020-09-23 NOTE — PROGRESS NOTES
Shift assessment complete see flowsheets, meds per orders see eMAR. Patient alert and oriented x4, bilateral weakness to lower extremities. Per patient, she has been having vivid hallucinations for a few days, but is aware that what she is seeing is not real.  Patient reported to this nurse that she was seeing images that were not there. Patient has remained calm throughout shift, and is using call light appropriately for assistance. Patient resting in bed with eyes closed at this time. The care plan and education has been reviewed and mutually agreed upon with the patient. Patient remains free from falls. All fall precautions in place. Yellow blanket at bedside, yellow bracelet on patient. SAFE sign on door. Bed and chair alarms being used. Bed in lowest position. Will monitor.      Electronically signed by Valentin Amador RN on 9/23/2020 at 5:07 AM

## 2020-09-23 NOTE — ED NOTES
Report called to [facility] ANGÉLICA FINK.       James Matamoros RN  09/22/20 6681

## 2020-09-23 NOTE — PLAN OF CARE
Problem: Falls - Risk of:  Goal: Will remain free from falls  Description: Will remain free from falls  Outcome: Ongoing  Goal: Absence of physical injury  Description: Absence of physical injury  Outcome: Ongoing     Problem: Pain:  Goal: Pain level will decrease  Description: Pain level will decrease  Outcome: Ongoing  Goal: Control of acute pain  Description: Control of acute pain  9/23/2020 1921 by Oliver Diez RN  Outcome: Ongoing  9/23/2020 0943 by Carlos Schrader RN  Outcome: Ongoing  Goal: Control of chronic pain  Description: Control of chronic pain  Outcome: Ongoing     Problem: Confusion - Acute:  Goal: Absence of continued neurological deterioration signs and symptoms  Description: Absence of continued neurological deterioration signs and symptoms  Outcome: Ongoing  Goal: Mental status will be restored to baseline  Description: Mental status will be restored to baseline  Outcome: Ongoing     Problem: Discharge Planning:  Goal: Ability to perform activities of daily living will improve  Description: Ability to perform activities of daily living will improve  Outcome: Ongoing  Goal: Participates in care planning  Description: Participates in care planning  Outcome: Ongoing     Problem: Injury - Risk of, Physical Injury:  Goal: Will remain free from falls  Description: Will remain free from falls  Outcome: Ongoing  Goal: Absence of physical injury  Description: Absence of physical injury  Outcome: Ongoing     Problem: Sensory Perception - Impaired:  Goal: Demonstrations of improved sensory functioning will increase  Description: Demonstrations of improved sensory functioning will increase  Outcome: Ongoing  Goal: Decrease in sensory misperception frequency  Description: Decrease in sensory misperception frequency  Outcome: Ongoing  Goal: Able to refrain from responding to false sensory perceptions  Description: Able to refrain from responding to false sensory perceptions  Outcome: Ongoing  Goal: Demonstrates accurate environmental perceptions  Description: Demonstrates accurate environmental perceptions  Outcome: Ongoing  Goal: Able to distinguish between reality-based and nonreality-based thinking  Description: Able to distinguish between reality-based and nonreality-based thinking  Outcome: Ongoing  Goal: Able to interrupt nonreality-based thinking  Description: Able to interrupt nonreality-based thinking  Outcome: Ongoing     Problem: Sleep Pattern Disturbance:  Goal: Appears well-rested  Description: Appears well-rested  Outcome: Ongoing

## 2020-09-24 ENCOUNTER — APPOINTMENT (OUTPATIENT)
Dept: MRI IMAGING | Age: 85
DRG: 441 | End: 2020-09-24
Payer: MEDICARE

## 2020-09-24 ENCOUNTER — APPOINTMENT (OUTPATIENT)
Dept: ULTRASOUND IMAGING | Age: 85
DRG: 441 | End: 2020-09-24
Payer: MEDICARE

## 2020-09-24 LAB
AMMONIA: 23 UMOL/L (ref 11–51)
ANION GAP SERPL CALCULATED.3IONS-SCNC: 6 MMOL/L (ref 3–16)
BASOPHILS ABSOLUTE: 0 K/UL (ref 0–0.2)
BASOPHILS RELATIVE PERCENT: 0.6 %
BUN BLDV-MCNC: 27 MG/DL (ref 7–20)
CALCIUM SERPL-MCNC: 8.4 MG/DL (ref 8.3–10.6)
CHLORIDE BLD-SCNC: 115 MMOL/L (ref 99–110)
CO2: 27 MMOL/L (ref 21–32)
CREAT SERPL-MCNC: 1 MG/DL (ref 0.6–1.2)
EOSINOPHILS ABSOLUTE: 0.4 K/UL (ref 0–0.6)
EOSINOPHILS RELATIVE PERCENT: 6.2 %
GFR AFRICAN AMERICAN: >60
GFR NON-AFRICAN AMERICAN: 52
GLUCOSE BLD-MCNC: 110 MG/DL (ref 70–99)
HCT VFR BLD CALC: 40.2 % (ref 36–48)
HEMOGLOBIN: 13 G/DL (ref 12–16)
LYMPHOCYTES ABSOLUTE: 1.9 K/UL (ref 1–5.1)
LYMPHOCYTES RELATIVE PERCENT: 32.3 %
MCH RBC QN AUTO: 30.2 PG (ref 26–34)
MCHC RBC AUTO-ENTMCNC: 32.3 G/DL (ref 31–36)
MCV RBC AUTO: 93.4 FL (ref 80–100)
MONOCYTES ABSOLUTE: 0.8 K/UL (ref 0–1.3)
MONOCYTES RELATIVE PERCENT: 14 %
NEUTROPHILS ABSOLUTE: 2.8 K/UL (ref 1.7–7.7)
NEUTROPHILS RELATIVE PERCENT: 46.9 %
PDW BLD-RTO: 14.6 % (ref 12.4–15.4)
PLATELET # BLD: 191 K/UL (ref 135–450)
PMV BLD AUTO: 9.2 FL (ref 5–10.5)
POTASSIUM SERPL-SCNC: 4.2 MMOL/L (ref 3.5–5.1)
RBC # BLD: 4.3 M/UL (ref 4–5.2)
SODIUM BLD-SCNC: 148 MMOL/L (ref 136–145)
WBC # BLD: 6 K/UL (ref 4–11)

## 2020-09-24 PROCEDURE — 2580000003 HC RX 258: Performed by: PHYSICIAN ASSISTANT

## 2020-09-24 PROCEDURE — 1200000000 HC SEMI PRIVATE

## 2020-09-24 PROCEDURE — 6370000000 HC RX 637 (ALT 250 FOR IP): Performed by: INTERNAL MEDICINE

## 2020-09-24 PROCEDURE — 97530 THERAPEUTIC ACTIVITIES: CPT

## 2020-09-24 PROCEDURE — 97165 OT EVAL LOW COMPLEX 30 MIN: CPT

## 2020-09-24 PROCEDURE — 6360000002 HC RX W HCPCS: Performed by: PHYSICIAN ASSISTANT

## 2020-09-24 PROCEDURE — 85025 COMPLETE CBC W/AUTO DIFF WBC: CPT

## 2020-09-24 PROCEDURE — 94760 N-INVAS EAR/PLS OXIMETRY 1: CPT

## 2020-09-24 PROCEDURE — 97116 GAIT TRAINING THERAPY: CPT

## 2020-09-24 PROCEDURE — 97161 PT EVAL LOW COMPLEX 20 MIN: CPT

## 2020-09-24 PROCEDURE — 80048 BASIC METABOLIC PNL TOTAL CA: CPT

## 2020-09-24 PROCEDURE — 82140 ASSAY OF AMMONIA: CPT

## 2020-09-24 PROCEDURE — 76705 ECHO EXAM OF ABDOMEN: CPT

## 2020-09-24 PROCEDURE — 70551 MRI BRAIN STEM W/O DYE: CPT

## 2020-09-24 PROCEDURE — 6360000002 HC RX W HCPCS: Performed by: INTERNAL MEDICINE

## 2020-09-24 PROCEDURE — 6370000000 HC RX 637 (ALT 250 FOR IP): Performed by: PHYSICIAN ASSISTANT

## 2020-09-24 PROCEDURE — 36415 COLL VENOUS BLD VENIPUNCTURE: CPT

## 2020-09-24 PROCEDURE — 2580000003 HC RX 258: Performed by: INTERNAL MEDICINE

## 2020-09-24 RX ORDER — DEXTROSE MONOHYDRATE 50 MG/ML
INJECTION, SOLUTION INTRAVENOUS CONTINUOUS
Status: DISCONTINUED | OUTPATIENT
Start: 2020-09-24 | End: 2020-09-25 | Stop reason: HOSPADM

## 2020-09-24 RX ADMIN — DEXTROSE MONOHYDRATE: 50 INJECTION, SOLUTION INTRAVENOUS at 12:24

## 2020-09-24 RX ADMIN — QUETIAPINE FUMARATE 12.5 MG: 25 TABLET ORAL at 21:11

## 2020-09-24 RX ADMIN — ASPIRIN 325 MG: 325 TABLET, COATED ORAL at 21:11

## 2020-09-24 RX ADMIN — Medication 1 G: at 23:10

## 2020-09-24 RX ADMIN — MEMANTINE 10 MG: 5 TABLET ORAL at 21:10

## 2020-09-24 RX ADMIN — HEPARIN SODIUM 5000 UNITS: 5000 INJECTION INTRAVENOUS; SUBCUTANEOUS at 21:12

## 2020-09-24 RX ADMIN — SODIUM CHLORIDE: 9 INJECTION, SOLUTION INTRAVENOUS at 00:33

## 2020-09-24 RX ADMIN — MEMANTINE 10 MG: 5 TABLET ORAL at 09:01

## 2020-09-24 RX ADMIN — LACTULOSE 20 G: 20 SOLUTION ORAL at 09:01

## 2020-09-24 RX ADMIN — SERTRALINE HYDROCHLORIDE 25 MG: 50 TABLET ORAL at 09:01

## 2020-09-24 RX ADMIN — DULOXETINE HYDROCHLORIDE 30 MG: 30 CAPSULE, DELAYED RELEASE ORAL at 09:01

## 2020-09-24 RX ADMIN — Medication 1 CAPSULE: at 09:01

## 2020-09-24 RX ADMIN — Medication 10 ML: at 09:01

## 2020-09-24 RX ADMIN — Medication 1 CAPSULE: at 16:56

## 2020-09-24 RX ADMIN — FAMOTIDINE 10 MG: 20 TABLET ORAL at 09:01

## 2020-09-24 RX ADMIN — HEPARIN SODIUM 5000 UNITS: 5000 INJECTION INTRAVENOUS; SUBCUTANEOUS at 14:44

## 2020-09-24 RX ADMIN — HEPARIN SODIUM 5000 UNITS: 5000 INJECTION INTRAVENOUS; SUBCUTANEOUS at 05:33

## 2020-09-24 ASSESSMENT — PAIN SCALES - GENERAL
PAINLEVEL_OUTOF10: 0

## 2020-09-24 NOTE — PLAN OF CARE
perceptions  Outcome: Ongoing  Goal: Able to distinguish between reality-based and nonreality-based thinking  Description: Able to distinguish between reality-based and nonreality-based thinking  Outcome: Ongoing  Goal: Able to interrupt nonreality-based thinking  Description: Able to interrupt nonreality-based thinking  Outcome: Ongoing     Problem: Sleep Pattern Disturbance:  Goal: Appears well-rested  Description: Appears well-rested  Outcome: Ongoing

## 2020-09-24 NOTE — PLAN OF CARE
Problem: Falls - Risk of:  Goal: Will remain free from falls  Description: Will remain free from falls  9/24/2020 1910 by Martin Tarango RN  Outcome: Ongoing  9/24/2020 1140 by Le Ellis RN  Outcome: Ongoing  Goal: Absence of physical injury  Description: Absence of physical injury  9/24/2020 1910 by Martin Tarango RN  Outcome: Ongoing  9/24/2020 1140 by Le Ellis RN  Outcome: Ongoing     Problem: Pain:  Goal: Pain level will decrease  Description: Pain level will decrease  9/24/2020 1910 by Martin Tarango RN  Outcome: Ongoing  9/24/2020 1140 by Le Ellis RN  Outcome: Ongoing  Goal: Control of acute pain  Description: Control of acute pain  9/24/2020 1910 by Martin Tarango RN  Outcome: Ongoing  9/24/2020 1140 by Le Ellis RN  Outcome: Ongoing  Goal: Control of chronic pain  Description: Control of chronic pain  9/24/2020 1910 by Martin Tarango RN  Outcome: Ongoing  9/24/2020 1140 by Le Ellis RN  Outcome: Ongoing     Problem: Confusion - Acute:  Goal: Absence of continued neurological deterioration signs and symptoms  Description: Absence of continued neurological deterioration signs and symptoms  9/24/2020 1910 by Martin Tarango RN  Outcome: Ongoing  9/24/2020 1140 by Le Ellis RN  Outcome: Ongoing  Goal: Mental status will be restored to baseline  Description: Mental status will be restored to baseline  9/24/2020 1910 by Martin Tarango RN  Outcome: Ongoing  9/24/2020 1140 by Le Ellis RN  Outcome: Ongoing     Problem: Discharge Planning:  Goal: Ability to perform activities of daily living will improve  Description: Ability to perform activities of daily living will improve  9/24/2020 1910 by Martin Tarango RN  Outcome: Ongoing  9/24/2020 1140 by Le Ellis RN  Outcome: Ongoing  Goal: Participates in care planning  Description: Participates in care planning  9/24/2020 1910 by Martin Tarango RN  Outcome: Ongoing  9/24/2020 1140 by Le Ellis RN  Outcome: Ongoing

## 2020-09-24 NOTE — PROGRESS NOTES
[facility]  Gastroenterology Progress Note    Amanda Miller is a 80 y.o. female patient. Principal Problem:    Acute metabolic encephalopathy  Active Problems:    Hypothyroid    UTI (urinary tract infection)    SHARAD (acute kidney injury) ([address]  Resolved Problems:    * No resolved hospital problems. *      SUBJECTIVE:  Had hallucinations last night but is ok this morning. No GI complaints. Had a good BM yesterday. ROS:  No fever, chills  No chest pain, palpitations  No SOB, cough  Gastrointestinal ROS: no abdominal pain, N/V    Physical    VITALS:  BP (!) 159/108   Pulse 80   Temp 97.7 °F (36.5 °C) (Oral)   Resp 16   Ht 5' 5\" (1.651 m)   Wt 187 lb 3.2 oz (84.9 kg)   SpO2 97%   BMI 31.15 kg/m²   TEMPERATURE:  Current - Temp: 97.7 °F (36.5 °C); Max - Temp  Av.7 °F (36.5 °C)  Min: 97.4 °F (36.3 °C)  Max: 98.1 °F (36.7 °C)    NAD  Regular rate   Lungs CTA Bilaterally  Abdomen soft, ND, NT,  Bowel sounds normal.    Data    Data Review:    Recent Labs     20  1530 20  0744 20  0621   WBC 7.8 7.3 6.0   HGB 15.4 13.4 13.0   HCT 46.4 40.8 40.2   MCV 93.7 92.2 93.4    203 191     Recent Labs     20  1530 20  0744 20  0621    141 148*   K 4.8 4.2 4.2    108 115*   CO2 27 24 27   BUN 32* 28* 27*   CREATININE 1.4* 1.1 1.0     Recent Labs     20  1530   AST 18   ALT 11   BILIDIR <0.2   BILITOT 0.4   ALKPHOS 109     No results for input(s): LIPASE, AMYLASE in the last 72 hours. No results for input(s): PROTIME, INR in the last 72 hours. No results for input(s): PTT in the last 72 hours. ASSESSMENT :    Elevated ammonia - ammonia was mildly elevated at 59 and is now normal at 23 today. no h/o cirrhosis. Liver function tests and liver enzymes are normal. Normal plts. Will image liver. consider proteus infections.  UA concerning for UTI but cx negative.      Metabolic encephalopathy     PLAN :  - US of liver  - on lactulose      Discussed with  Dereje Plunkett PA-C  [address]  I have personally performed a face to face diagnostic evaluation on this patient. I have interviewed and examined the patient and I agree with the findings and recommended plan of care. In summary, my findings and plan are the following: As above, NH3 now normal.  US shows maybe mild cirrhotic changes vs age. Would continue lactulose and titrate to 2 - 3 loose BM/day.     Ced Zamorano MD  [address]  9/24/2020

## 2020-09-24 NOTE — PROGRESS NOTES
Physical Therapy    Facility/Department: [address] ORTHO/NEURO NURSING  Initial Assessment    NAME: Anand Butler  : 1930  MRN: [id number]    Date of Service: 2020    Discharge Recommendations:  Anand Butler scored a 17 on the AM-PAC short mobility form. Current research shows that an AM-PAC score of 18 or greater is typically associated with a discharge to the patient's home setting. Based on the patient's AM-PAC score and their current functional mobility deficits, it is recommended that the patient have 2-3 sessions per week of Physical Therapy at d/c to increase the patient's independence. At this time, this patient demonstrates the endurance and safety to discharge home with home health therapy services and a follow up treatment frequency of 2-3x/wk. Please see assessment section for further patient specific details. If patient discharges prior to next session this note will serve as a discharge summary. Please see below for the latest assessment towards goals.    HOME HEALTH CARE: LEVEL 3 SAFETY     - Initial home health evaluation to occur within 24-48 hours, in patient home   - Therapy evaluations in home within 24-48 hours of discharge; including DME and home safety   - Frontload therapy 5 days, then 3x a week   - Therapy to evaluate if patient has [address] needs for personal care   -  evaluation within 24-48 hours, includes evaluation of resources and insurance to determine [place], [place], LTC, and Medicaid options     S Level 3, 24 hour supervision or assist, Home with Home health PT, Home with assist PRN   PT Equipment Recommendations  Equipment Needed: No(Has RW at home)    Assessment   Body structures, Functions, Activity limitations: Decreased safe awareness;Decreased endurance;Decreased strength;Decreased functional mobility   Assessment: Pt is not currently at baseline function due to decreased functional mobility during transfers and ambulation, decreased BLE strength, and decrased endurance. During ambulation, pt showed some dragging of the feet and strength deficits that should be addressed in continued therapy. She should also be reminded with VC how to appropriately position herself with her RW during ambulation for safety. Treatment Diagnosis: Decreased mobility with transfers and ambulation, decreased BLE strength, decreased safety awareness with RW utilization, decreased endurance  Prognosis: Good  Decision Making: Low Complexity  PT Education: PT Role;Plan of Care  Patient Education: Pt educated on DC plan of care, verbalized understanding  Barriers to Learning: none  REQUIRES PT FOLLOW UP: Yes  Activity Tolerance  Activity Tolerance: Patient limited by endurance  Activity Tolerance: Pt was limited in today's evaluation due to decreased endurance and tolerance for ambulatory activity. She showed some SOB towards the end of ambulation, but SPO2 was measured at 96% when initially sitting after walking. Patient Diagnosis(es): The primary encounter diagnosis was Hallucination. Diagnoses of Other fatigue, SHARAD (acute kidney injury) ([address] and Acute cystitis without hematuria were also pertinent to this visit. has a past medical history of Acute renal failure ([address] Arthritis, At risk for falling, Frequent falls, Gastroesophageal reflux disease, Movement disorder, Nephrotic syndrome, and Thyroid disease. has a past surgical history that includes Excision of Facial Mass; skin biopsy; Colonoscopy; eye surgery; other surgical history (Right, 05/27/2018); and Hip fracture surgery (Left, 8/21/2019). Restrictions  Restrictions/Precautions  Restrictions/Precautions: Fall Risk(high fall risk)  Position Activity Restriction  Other position/activity restrictions: 80 y.o. female with past medical history of frequent falls, dementia, nephrotic syndrome, thyroid disease and GERD who presents the ED with complaint of altered mental status.   Family member states for the past several days patient is been sleeping approximately 20 hours a day. States today she woke up and was having hallucinations. Apparently was seen in animals on the counter and fish in the floor. Family member became concerned and brought patient to the ED for further evaluation and treatment. Family member states patient does have history of dementia and states he been sleeping more over the past couple days. States is also had decreased oral intake. Vision/Hearing        Subjective  General  Chart Reviewed: Yes  Patient assessed for rehabilitation services?: Yes  Family / Caregiver Present: No  Diagnosis: UTI  Follows Commands: Within Functional Limits  General Comment  Comments: Pt was initially found laying in bed awake  Subjective  Subjective: Pt reports that she is doing and feeling much better today. She feels more like her usual self and is very happy about that. She is still reporting hallucinations such as people coming in to put up flowery wallpaper in her room, but she understands that this is not true. She does also report that she does not see distance very well and should probably go have it checked by her eye doctor.   Pain Screening  Patient Currently in Pain: Denies  Vital Signs  Patient Currently in Pain: Denies     Orientation  Orientation  Overall Orientation Status: Within Functional Limits  Social/Functional History  Social/Functional History  Lives With: Daughter(Lives with daughter)  Type of Home: House  Home Layout: One level  Home Access: Stairs to enter with rails  Entrance Stairs - Number of Steps: 2 SHERON  Entrance Stairs - Rails: Both  Bathroom Shower/Tub: Tub/Shower unit  Bathroom Toilet: Bedside commode  Bathroom Equipment: Tub transfer bench, Hand-held shower, Grab bars in shower, Grab bars around toilet  Bathroom Accessibility: Accessible  Home Equipment: Rolling walker, Julito Gary, 4 wheeled walker, Alert [employer] Help From: Family(Daughter helps

## 2020-09-24 NOTE — PROGRESS NOTES
Speech Language Pathology  Attempt  Marquis Holcomb   4/13/1930   [id number]     Attempted to see pt for follow-up dysphagia tx. Pt currently NPO for procedure today. Will re-attempt as tx schedule allows and pt is able to participate. Thank you,    Venus REY CCC-SLP S.P. [id number]  Speech-Language Pathologist

## 2020-09-24 NOTE — PROGRESS NOTES
Shift assessment completed. Medications given per MAR. Tylenol for pain. Patient up to bathroom using walker with contact guard assistance. Patient confused with hallucination. Camera and bed alarm on. Fall precaution in place. The care plan and education has been reviewed.

## 2020-09-24 NOTE — ACP (ADVANCE CARE PLANNING)
Advanced Care Planning Note. Purpose of Encounter: Advanced care planning in light of acute metabolic encephalopathy  Parties In Attendance: Patient, daughter  Decisional Capacity: Yes  Subjective: Patient with less hallucinations  Objective: Cr 1.0  Goals of Care Determination: Patient wants limited support (NO CPR, no vent, no HD, no trach, no PEG, no surgery)  Plan:  IVF, IV Abx, GI consult, MRI Brain, RUQ US, PT/OT eval  Code Status: DNR CCA   Time spent on Advanced care Plannin minutes  Advanced Care Planning Documents: Completed advanced directives on chart, daughter is the POA.     Kendall Bo MD  2020 2:22 PM

## 2020-09-24 NOTE — PROGRESS NOTES
UTI  Prognosis: Good  Decision Making: Low Complexity  Assistance / Modification: RW  OT Education: OT Role;Plan of Care;Transfer Training;Energy Conservation  Patient Education: Evaluation, OT role, plan of care, transfer training, energy conservation, discharge recommendations - patient verbalised understanding  Barriers to Learning: Cognition  REQUIRES OT FOLLOW UP: Yes  Activity Tolerance  Activity Tolerance: Patient Tolerated treatment well;Patient limited by fatigue  Activity Tolerance: Patient tolerated treatment well, patient with some SOB following functional mobility  Safety Devices  Safety Devices in place: Yes  Type of devices: All fall risk precautions in place; Bed alarm in place;Call light within reach; Chair alarm in place;Gait belt;Patient at risk for falls; Left in chair;Nurse notified  Restraints  Initially in place: No           Patient Diagnosis(es): The primary encounter diagnosis was Hallucination. Diagnoses of Other fatigue, SHARAD (acute kidney injury) ([facility] and Acute cystitis without hematuria were also pertinent to this visit. has a past medical history of Acute renal failure ([facility] Arthritis, At risk for falling, Frequent falls, Gastroesophageal reflux disease, Movement disorder, Nephrotic syndrome, and Thyroid disease. has a past surgical history that includes Excision of Facial Mass; skin biopsy; Colonoscopy; eye surgery; other surgical history (Right, 05/27/2018); and Hip fracture surgery (Left, 8/21/2019). Treatment Diagnosis: Acute metabolic encephalopathy and UTI      Restrictions  Restrictions/Precautions  Restrictions/Precautions: Fall Risk(high fall risk)  Position Activity Restriction  Other position/activity restrictions: 80 y.o. female with past medical history of frequent falls, dementia, nephrotic syndrome, thyroid disease and GERD who presents the ED with complaint of altered mental status.   Family member states for the past several days patient is been sleeping approximately 20 hours a day. States today she woke up and was having hallucinations. Apparently was seen in animals on the counter and fish in the floor. Family member became concerned and brought patient to the ED for further evaluation and treatment. Family member states patient does have history of dementia and states he been sleeping more over the past couple days. States is also had decreased oral intake.     Subjective   General  Chart Reviewed: Yes  Patient assessed for rehabilitation services?: Yes  Family / Caregiver Present: No  Subjective  Subjective: Patient supine in bed upon arrival, agreeable to therapy  Patient Currently in Pain: Denies  Pain Assessment  Pain Assessment: 0-10  Pain Level: 0  Patient's Stated Pain Goal: No pain  Vital Signs  Patient Currently in Pain: Denies  Social/Functional History  Social/Functional History  Lives With: Daughter(Lives with daughter)  Type of Home: House  Home Layout: One level  Home Access: Stairs to enter with rails  Entrance Stairs - Number of Steps: 2 SHERON  Entrance Stairs - Rails: Both  Bathroom Shower/Tub: Tub/Shower unit  Bathroom Toilet: Bedside commode  Bathroom Equipment: Tub transfer bench, Hand-held shower, Grab bars in shower, Grab bars around toilet  Bathroom Accessibility: Accessible  Home Equipment: Rolling walker, Loretta Johnathan, 4 wheeled walker, Alert [employer] Help From: Family(Daughter helps as needed)  ADL Assistance: Independent  Homemaking Assistance: Needs assistance  Homemaking Responsibilities: No  Ambulation Assistance: Independent  Transfer Assistance: Independent  Active : No  Patient's  Info: Daughter  Occupation: Retired  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  Leisure & Hobbies: Baking  Additional Comments: Patient reports a lot of falls,~12 falls       Objective   Vision: Impaired  Vision Exceptions: Wears glasses for reading  Hearing: Within functional limits    Orientation  Overall Orientation Status: Within Sensation Status: WFL        LUE AROM (degrees)  LUE AROM : WFL  LUE General AROM: Patient with artritis in LUE, patient reported it has limited her ROM for awhile ~95 degrees shoulder flexion  Left Hand AROM (degrees)  Left Hand AROM: WFL  RUE AROM (degrees)  RUE AROM : WFL  RUE General AROM: Patient with artritis in RUE, patient reported it has limited her ROM for awhile ~95 degrees shoulder flexion  Right Hand AROM (degrees)  Right Hand AROM: WFL  LUE Strength  Gross LUE Strength: [facility]  L Hand General: 5/5  LUE Strength Comment: Patient with MP and IP stuck in flexion following  strength, patient then instructed to complete thumb opposition on LUE to fix finger flexion  RUE Strength  Gross RUE Strength: [facility]  R Hand General: 5/5     Hand Dominance  Hand Dominance: Right     Plan   Plan  Times per week: 3-5x/wk  Times per day: Daily  Current Treatment Recommendations: Strengthening, Stair training, Balance Training, Functional Mobility Training, Safety Education & Training, Self-Care / ADL, Endurance Training    -PAC Score   -[facility] Inpatient Daily Activity Raw Score: 20 (09/24/20 1430)  AM-PAC Inpatient ADL T-Scale Score : 42.03 (09/24/20 1430)  ADL Inpatient CMS 0-100% Score: 38.32 (09/24/20 1430)  ADL Inpatient CMS G-Code Modifier : Lorraine Tomas (09/24/20 1430)    Goals  Short term goals  Time Frame for Short term goals: STG=Discharge  Short term goal 1: Patient will complete functional transfers with sup  Short term goal 2: Patient will complete functional mobility with mod I  Short term goal 3: Patient will complete toileting tasks with SBA  Short term goal 4: Patient will complete UB/LB ADLs with SBA  Long term goals  Time Frame for Long term goals : LTG=STG  Patient Goals   Patient goals : Patient wants to return home       Therapy Time   Individual Concurrent Group Co-treatment   Time In 1319         Time Out 1359         Minutes 40             Timed Code Treatment Minutes:  25  Total Treatment Minutes:  40 Marianne Davidson, PARIS Kinney, OTR/L [id number] (I have reviewed and agree with the above documentation.)

## 2020-09-24 NOTE — PROGRESS NOTES
[facility] PROGRESS NOTE    9/24/2020 2:19 PM        Name: Wes Denson . Admitted: 9/22/2020  Primary Care Provider: Rivas Wall MD (Tel: [phone])    Brief Course:  81 yo F with dementia, depression came to ER with AMS. Admitted as inpatient for acute metabolic encephalopathy, SHARAD and UTI. Ammonia elevated. GI consulted. MRI Brain ordered. RUQ US with cirrhotic changes. CC: AMS    Subjective:  . Patient has less hallucinations. No SI. No CP, SOB, HA or fevers. No abdominal pain.     Reviewed interval ancillary notes    Current Medications  dextrose 5 % solution, Continuous  QUEtiapine (SEROQUEL) tablet 12.5 mg, Nightly  lactulose (CHRONULAC) 10 GM/15ML solution 20 g, BID  aspirin EC tablet 325 mg, Nightly  DULoxetine (CYMBALTA) extended release capsule 30 mg, Daily  levothyroxine (SYNTHROID) tablet 50 mcg, Daily  memantine (NAMENDA) tablet 10 mg, BID  sertraline (ZOLOFT) tablet 25 mg, Daily  sodium chloride flush 0.9 % injection 10 mL, 2 times per day  sodium chloride flush 0.9 % injection 10 mL, PRN  acetaminophen (TYLENOL) tablet 650 mg, Q6H PRN  polyethylene glycol (GLYCOLAX) packet 17 g, Daily PRN  ondansetron (ZOFRAN) injection 4 mg, Q6H PRN  heparin (porcine) injection 5,000 Units, 3 times per day  cefTRIAXone (ROCEPHIN) 1 g in sterile water 10 mL IV syringe, Q24H  lactobacillus (CULTURELLE) capsule 1 capsule, BID WC  famotidine (PEPCID) tablet 10 mg, Daily        Objective:  BP (!) 128/92   Pulse 81   Temp 97.2 °F (36.2 °C) (Oral)   Resp 16   Ht 5' 5\" (1.651 m)   Wt 187 lb 3.2 oz (84.9 kg)   SpO2 93%   BMI 31.15 kg/m²     Intake/Output Summary (Last 24 hours) at 9/24/2020 1419  Last data filed at 9/24/2020 1226  Gross per 24 hour   Intake 1323 ml   Output 250 ml   Net 1073 ml      Wt Readings from Last 3 Encounters:   09/22/20 187 lb 3.2 oz (84.9 kg)   09/02/20 176 lb (79.8 kg)   08/27/20 187 lb (84.8 kg)       General appearance:  Appears comfortable, obese  Eyes: Sclera clear. Pupils equal.  ENT: Moist oral mucosa. Trachea midline, no adenopathy. Cardiovascular: Regular rhythm, normal S1, S2. No murmur. No edema in lower extremities  Respiratory: Not using accessory muscles. Good inspiratory effort. Clear to auscultation bilaterally, no wheeze or crackles. GI: Abdomen soft, no tenderness, not distended, normal bowel sounds  Musculoskeletal: No cyanosis in digits, neck supple  Neurology: Grossly intact. No speech or motor deficits  Psych: Normal affect. Alert and oriented in time, place and person  Skin: Warm, dry, normal turgor  Extremity exam shows brisk capillary refill. Peripheral pulses are palpable in lower extremities     Labs and Tests:  CBC:   Recent Labs     09/22/20  1530 09/23/20  0744 09/24/20  0621   WBC 7.8 7.3 6.0   HGB 15.4 13.4 13.0    203 191     BMP:    Recent Labs     09/22/20  1530 09/23/20  0744 09/24/20  0621    141 148*   K 4.8 4.2 4.2    108 115*   CO2 27 24 27   BUN 32* 28* 27*   CREATININE 1.4* 1.1 1.0   GLUCOSE 102* 98 110*     Hepatic:   Recent Labs     09/22/20  1530   AST 18   ALT 11   BILITOT 0.4   ALKPHOS 109     US LIVER   Final Result   Somewhat nodular contour of the liver and slight coarse echogenicity of the   liver which can be seen in cirrhosis. No discrete lesion. XR CHEST PORTABLE   Final Result   No acute cardiopulmonary disease. CT HEAD WO CONTRAST   Final Result   No acute intracranial abnormality. Diffuse atrophic changes with findings suggesting chronic microvascular   ischemia         MRI BRAIN WO CONTRAST    (Results Pending)       Problem List  Principal Problem:    Acute metabolic encephalopathy  Active Problems:    Hypothyroid    UTI (urinary tract infection)    SHARAD (acute kidney injury) ([address]  Resolved Problems:    * No resolved hospital problems. *       Assessment & Plan:   1.  Change IVF to D5W for hypernatremia  2. Cont Rocephin for UTI for 3 days total (Day 3/3 today)  3. Cont Seroquel for hallucinations/sundowning  4. Avoid neurosedating meds  5. Await MRI Brain w/o contrast to r/o stroke  6. Repeat Ammonia improved  7. GI consult for cirrhosis/hepatic encephalopathy appreciated  8. Lactulose bid  9. PT/OT eval  10. Cont Namenda      IV Access: Peripheral  Ponce: No  Diet: DIET GENERAL;  Code:DNR-CCA  DVT PPX Lovenox  Disposition Home with home PT/OT    Discussed with patient, Angely Tovar (GI PA), nursing and CM. D/w daughter. Hopefully home tomorrow if MRI completed.       Syliva Gowers, MD   9/24/2020 2:19 PM

## 2020-09-25 VITALS
HEART RATE: 77 BPM | HEIGHT: 65 IN | BODY MASS INDEX: 31.19 KG/M2 | DIASTOLIC BLOOD PRESSURE: 68 MMHG | SYSTOLIC BLOOD PRESSURE: 112 MMHG | TEMPERATURE: 97.9 F | OXYGEN SATURATION: 93 % | RESPIRATION RATE: 16 BRPM | WEIGHT: 187.2 LBS

## 2020-09-25 PROBLEM — K76.82 HEPATIC ENCEPHALOPATHY: Status: ACTIVE | Noted: 2020-09-25

## 2020-09-25 PROBLEM — F03.918 DEMENTIA WITH BEHAVIORAL PROBLEM: Status: ACTIVE | Noted: 2020-09-25

## 2020-09-25 PROBLEM — K74.60 CIRRHOSIS (HCC): Status: ACTIVE | Noted: 2020-09-25

## 2020-09-25 LAB
ANION GAP SERPL CALCULATED.3IONS-SCNC: 8 MMOL/L (ref 3–16)
BASOPHILS ABSOLUTE: 0 K/UL (ref 0–0.2)
BASOPHILS RELATIVE PERCENT: 0.6 %
BUN BLDV-MCNC: 20 MG/DL (ref 7–20)
CALCIUM SERPL-MCNC: 8.5 MG/DL (ref 8.3–10.6)
CHLORIDE BLD-SCNC: 110 MMOL/L (ref 99–110)
CO2: 24 MMOL/L (ref 21–32)
CREAT SERPL-MCNC: 0.9 MG/DL (ref 0.6–1.2)
EOSINOPHILS ABSOLUTE: 0.4 K/UL (ref 0–0.6)
EOSINOPHILS RELATIVE PERCENT: 6.7 %
GFR AFRICAN AMERICAN: >60
GFR NON-AFRICAN AMERICAN: 59
GLUCOSE BLD-MCNC: 105 MG/DL (ref 70–99)
HCT VFR BLD CALC: 41.2 % (ref 36–48)
HEMOGLOBIN: 13.3 G/DL (ref 12–16)
LYMPHOCYTES ABSOLUTE: 1.7 K/UL (ref 1–5.1)
LYMPHOCYTES RELATIVE PERCENT: 27.5 %
MCH RBC QN AUTO: 30.4 PG (ref 26–34)
MCHC RBC AUTO-ENTMCNC: 32.2 G/DL (ref 31–36)
MCV RBC AUTO: 94.5 FL (ref 80–100)
MONOCYTES ABSOLUTE: 0.7 K/UL (ref 0–1.3)
MONOCYTES RELATIVE PERCENT: 12 %
NEUTROPHILS ABSOLUTE: 3.3 K/UL (ref 1.7–7.7)
NEUTROPHILS RELATIVE PERCENT: 53.2 %
PDW BLD-RTO: 14.4 % (ref 12.4–15.4)
PLATELET # BLD: 169 K/UL (ref 135–450)
PMV BLD AUTO: 9 FL (ref 5–10.5)
POTASSIUM SERPL-SCNC: 4.3 MMOL/L (ref 3.5–5.1)
RBC # BLD: 4.36 M/UL (ref 4–5.2)
SODIUM BLD-SCNC: 142 MMOL/L (ref 136–145)
WBC # BLD: 6.2 K/UL (ref 4–11)

## 2020-09-25 PROCEDURE — 36415 COLL VENOUS BLD VENIPUNCTURE: CPT

## 2020-09-25 PROCEDURE — 92526 ORAL FUNCTION THERAPY: CPT

## 2020-09-25 PROCEDURE — 6370000000 HC RX 637 (ALT 250 FOR IP): Performed by: PHYSICIAN ASSISTANT

## 2020-09-25 PROCEDURE — 6360000002 HC RX W HCPCS: Performed by: PHYSICIAN ASSISTANT

## 2020-09-25 PROCEDURE — 80048 BASIC METABOLIC PNL TOTAL CA: CPT

## 2020-09-25 PROCEDURE — 85025 COMPLETE CBC W/AUTO DIFF WBC: CPT

## 2020-09-25 PROCEDURE — 2580000003 HC RX 258: Performed by: INTERNAL MEDICINE

## 2020-09-25 RX ORDER — DONEPEZIL HYDROCHLORIDE 5 MG/1
5 TABLET, FILM COATED ORAL NIGHTLY
Qty: 30 TABLET | Refills: 0 | Status: SHIPPED | OUTPATIENT
Start: 2020-09-25 | End: 2020-12-10 | Stop reason: SINTOL

## 2020-09-25 RX ORDER — LACTULOSE 10 G/15ML
20 SOLUTION ORAL 2 TIMES DAILY
Qty: 1800 ML | Refills: 5 | Status: SHIPPED | OUTPATIENT
Start: 2020-09-25 | End: 2021-03-24

## 2020-09-25 RX ORDER — QUETIAPINE FUMARATE 25 MG/1
12.5 TABLET, FILM COATED ORAL NIGHTLY
Qty: 30 TABLET | Refills: 0 | Status: SHIPPED | OUTPATIENT
Start: 2020-09-25 | End: 2020-12-28 | Stop reason: SDUPTHER

## 2020-09-25 RX ADMIN — MEMANTINE 10 MG: 5 TABLET ORAL at 09:46

## 2020-09-25 RX ADMIN — HEPARIN SODIUM 5000 UNITS: 5000 INJECTION INTRAVENOUS; SUBCUTANEOUS at 05:33

## 2020-09-25 RX ADMIN — DULOXETINE HYDROCHLORIDE 30 MG: 30 CAPSULE, DELAYED RELEASE ORAL at 09:46

## 2020-09-25 RX ADMIN — SERTRALINE HYDROCHLORIDE 25 MG: 50 TABLET ORAL at 09:46

## 2020-09-25 RX ADMIN — LEVOTHYROXINE SODIUM 50 MCG: 0.03 TABLET ORAL at 05:33

## 2020-09-25 RX ADMIN — FAMOTIDINE 10 MG: 20 TABLET ORAL at 09:46

## 2020-09-25 RX ADMIN — Medication 1 CAPSULE: at 09:46

## 2020-09-25 RX ADMIN — DEXTROSE MONOHYDRATE: 50 INJECTION, SOLUTION INTRAVENOUS at 01:49

## 2020-09-25 ASSESSMENT — PAIN SCALES - GENERAL: PAINLEVEL_OUTOF10: 0

## 2020-09-25 NOTE — PROGRESS NOTES
[facility] home care referral. Spoke with pt and re: home care plan of care/services. Agreeable. Demographic's verified. Will follow for home care.

## 2020-09-25 NOTE — PROGRESS NOTES
Went over d/c instructions with pt and her daughter, and they understand instructions pt ready to be d/c. Prescriptions sent to the pt's pharmacy.

## 2020-09-25 NOTE — PROGRESS NOTES
Shift assessment and AM vitals complete, VSS. AM meds given. Pt doing well, should go home today with home care. MRI normal, no sign of stroke. Ultrasound of liver did show some cirrhosis, but no lesions. Will see what the plan is for today. The care plan and education has been reviewed and mutually agreed upon with the patient.

## 2020-09-25 NOTE — CARE COORDINATION
CM RN spoke to patient and daughter regarding [address]. There given [address] list with quality ratings and state they have used [address] in the past and would like to use them again. Voicemail left with Eli  with [facility]. Patient will need signed BHANU for discharge with [address]. *Case management will continue to follow progress and update discharge plan as needed.     MICHAEL RomanN, RN  [phone]

## 2020-09-25 NOTE — DISCHARGE SUMMARY
Hospital Medicine Discharge Summary    Patient: Marlon Pugh     Gender: female  : 1930   Age: 80 y.o. MRN: [id number]    Admitting Physician: Shelby Kelley MD  Discharge Physician: Magali Matthew MD     Code Status: DNR-CCA     Admit Date: 2020   Discharge Date:   20    Disposition:  Home    Discharge Diagnoses: Active Hospital Problems    Diagnosis Date Noted    Hepatic encephalopathy ([facility] [K72.90] 2020    Cirrhosis ([facility] [K74.60] 2020    Dementia with behavioral problem ([facility] [F03.91] 2020    UTI (urinary tract infection) [N39.0] 2020    Acute metabolic encephalopathy [S74.95] 2020    SHARAD (acute kidney injury) ([facility] [N17.9] 2020    Hypothyroid [E03.9] 2012       Follow-up appointments:  one week    Outpatient to do list: F/U with PCP and GI    Condition at Discharge:  Stable    Hospital Course:    79 yo F with dementia, depression came to ER with AMS. Admitted as inpatient for acute metabolic encephalopathy, SHARAD and UTI. Ammonia elevated. GI consulted. MRI Brain acutely negative for stroke. RUQ US with cirrhotic changes. Started on Lactulose for hepatic encephalopathy. Less hallucinations. Started on Seroquel and Aricept for dementia with behavior changes. Arranged for home PT/OT/VNS/HHA per CM/SW.   Will f/u with PCP and GI.       Discharge Medications:   Current Discharge Medication List      START taking these medications    Details   QUEtiapine (SEROQUEL) 25 MG tablet Take 0.5 tablets by mouth nightly  Qty: 30 tablet, Refills: 0      lactulose (CHRONULAC) 10 GM/15ML solution Take 30 mLs by mouth 2 times daily  Qty: 1800 mL, Refills: 5      donepezil (ARICEPT) 5 MG tablet Take 1 tablet by mouth nightly  Qty: 30 tablet, Refills: 0           Current Discharge Medication List        Current Discharge Medication List      CONTINUE these medications which have NOT CHANGED    Details   cyproheptadine (PERIACTIN) 4 MG tablet Take 1 tablet by mouth 3 times daily as needed (itching)  Qty: 30 tablet, Refills: 0    Associated Diagnoses: Laceration of right forearm, initial encounter; Itching      Ascorbic Acid 500 MG CHEW Take 1 tablet by mouth daily  Qty: 90 tablet, Refills: 1    Comments: 2nd escribe  Associated Diagnoses: Dermatitis      romosozumab-aqqg (EVENITY) 105 MG/1. 17ML SOSY injection Inject 210 mg into the skin every 30 days 8-      levothyroxine (SYNTHROID) 50 MCG tablet 1 tab qd  Qty: 45 tablet, Refills: 0    Comments: Quantity change to sync up med refills. Associated Diagnoses: Hypothyroidism, unspecified type      memantine (NAMENDA) 10 MG tablet Take 1 tablet by mouth 2 times daily  Qty: 180 tablet, Refills: 2    Associated Diagnoses: Memory deficit; Dementia without behavioral disturbance, unspecified dementia type ([facility])      sertraline (ZOLOFT) 25 MG tablet Take 1 tablet by mouth daily  Qty: 90 tablet, Refills: 1      cyanocobalamin ([employer] VITAMIN B12) 1000 MCG tablet Take 1 tablet by mouth daily  Qty: 90 tablet, Refills: 1    Associated Diagnoses: Dementia without behavioral disturbance, unspecified dementia type ([facility])      DULoxetine (CYMBALTA) 30 MG extended release capsule TAKE ONE CAPSULE BY MOUTH DAILY  Qty: 90 capsule, Refills: 1    Associated Diagnoses: Fibromyalgia      Caltrate 600+D Plus Minerals (CALTRATE) 600-800 MG-UNIT TABS tablet Take 1 tablet by mouth 2 times daily  Qty: 180 tablet, Refills: 1    Associated Diagnoses: Age-related osteoporosis with current pathological fracture with routine healing, subsequent encounter      aspirin 325 MG EC tablet Take 1 tablet by mouth daily  Qty: 14 tablet, Refills: 0    Associated Diagnoses: Closed fracture of left hip, initial encounter ([facility])      Cholecalciferol (VITAMIN D) 2000 units CAPS capsule Take 1 capsule by mouth daily       triamcinolone (KENALOG) 0.1 % ointment Apply topically 2 times daily Apply topically 2 times daily.            Current Discharge Medication List            Discharge Exam:    /75   Pulse 77   Temp 97.6 °F (36.4 °C) (Oral)   Resp 16   Ht 5' 5\" (1.651 m)   Wt 187 lb 3.2 oz (84.9 kg)   SpO2 92%   BMI 31.15 kg/m²   General appearance:  Appears comfortable, obese  Eyes: Sclera clear. Pupils equal.  ENT: Moist oral mucosa. Trachea midline, no adenopathy. Cardiovascular: Regular rhythm, normal S1, S2. No murmur. No edema in lower extremities  Respiratory: Not using accessory muscles. Good inspiratory effort. Clear to auscultation bilaterally, no wheeze or crackles. GI: Abdomen soft, no tenderness, not distended, normal bowel sounds  Musculoskeletal: No cyanosis in digits, neck supple  Neurology: Grossly intact. No speech or motor deficits  Psych: Normal affect. Alert and oriented in time, place and person  Skin: Warm, dry, normal turgor  Extremity exam shows brisk capillary refill. Peripheral pulses are palpable in lower extremities     Labs:  For convenience and continuity at follow-up the following most recent labs are provided:    Lab Results   Component Value Date    WBC 6.2 09/25/2020    HGB 13.3 09/25/2020    HCT 41.2 09/25/2020    MCV 94.5 09/25/2020     09/25/2020     09/25/2020    K 4.3 09/25/2020    K 3.9 08/20/2019     09/25/2020    CO2 24 09/25/2020    BUN 20 09/25/2020    CREATININE 0.9 09/25/2020    CALCIUM 8.5 09/25/2020    PHOS 3.1 09/02/2015    ALKPHOS 109 09/22/2020    ALT 11 09/22/2020    AST 18 09/22/2020    BILITOT 0.4 09/22/2020    BILIDIR <0.2 09/22/2020    LABALBU 4.0 09/22/2020    LDLCALC 153 06/21/2017    TRIG 111 06/21/2017     Lab Results   Component Value Date    INR 1.01 08/20/2019    INR 0.89 12/31/2012    INR 0.94 12/28/2012       Radiology:  Ct Head Wo Contrast    Result Date: 9/22/2020  EXAMINATION: CT OF THE HEAD WITHOUT CONTRAST  9/22/2020 5:41 pm TECHNIQUE: CT of the head was performed without the administration of intravenous contrast. Dose modulation, iterative reconstruction, ORDERING SYSTEM PROVIDED HISTORY: ams TECHNOLOGIST PROVIDED HISTORY: Reason for exam:->ams Reason for Exam: ams/hallucinations Acuity: Unknown Type of Exam: Ongoing FINDINGS: The cardiac silhouette is normal.  Thoracic aortic calcification is present. Hilar contours are normal.  No focal airspace disease or pleural effusion is seen. No acute cardiopulmonary disease. Mri Brain Wo Contrast    Result Date: 9/24/2020  EXAMINATION: MRI OF THE BRAIN WITHOUT CONTRAST  9/24/2020 3:48 pm TECHNIQUE: Multiplanar multisequence MRI of the brain was performed without the administration of intravenous contrast. COMPARISON: CT head 09/22/2020 HISTORY: ORDERING SYSTEM PROVIDED HISTORY: AMS TECHNOLOGIST PROVIDED HISTORY: Reason for exam:->AMS Reason for Exam: PT BEING EVALUATED FOR ALTERED MENTAL STATUS  2 DAYS AGO AND SOME BLURRED VISION Acuity: Acute Type of Exam: Initial Additional signs and symptoms: PT BEING EVALUATED FOR ALTERED MENTAL STATUS 2 DAYS AGO AND SOME BLURRED VISION Relevant Medical/Surgical History: PT BEING EVALUATED FOR ALTERED MENTAL STATUS  2 DAYS AGO AND SOME BLURRED VISION FINDINGS: INTRACRANIAL STRUCTURES/VENTRICLES: There is no acute infarct. No mass effect or midline shift. No evidence of an acute intracranial hemorrhage. Moderate generalized involutional changes brain parenchyma identified with prominence of ventricles sulci. Mild-to-moderate periventricular subcortical T2 prolongation suggestive chronic small ischemic disease. Mild patchy T2 prolongation involving the jean midbrain suggestive chronic microvascular disease. The sellar/suprasellar regions appear unremarkable. The normal signal voids within the major intracranial vessels appear maintained. ORBITS: The visualized portion of the orbits demonstrate no acute abnormality. SINUSES: The visualized paranasal sinuses and mastoid air cells are well aerated.  BONES/SOFT TISSUES: The bone marrow signal intensity appears normal. The soft tissues demonstrate no acute abnormality. Moderate generalized involutional changes and mild-to-moderate chronic small vessel ischemic disease. No acute stroke, midline shift or mass effect. The patient was seen and examined on day of discharge and this discharge summary is in conjunction with any daily progress note from day of discharge. Time Spent on discharge is 45 minutes  in the examination, evaluation, counseling and review of medications and discharge plan. Note that more than 30 minutes was spent in preparing discharge papers, discussing discharge with patient, medication review, etc.       Signed:    Penny Lopez MD   9/25/2020      Thank you Arnulfo Diehl MD for the opportunity to be involved in this patient's care.  If you have any questions or concerns please feel free to contact me at [address] [place] [address]

## 2020-09-25 NOTE — PLAN OF CARE
Problem: Falls - Risk of:  Goal: Will remain free from falls  Description: Will remain free from falls  Outcome: Completed  Goal: Absence of physical injury  Description: Absence of physical injury  Outcome: Completed     Problem: Pain:  Goal: Pain level will decrease  Description: Pain level will decrease  Outcome: Completed  Goal: Control of acute pain  Description: Control of acute pain  Outcome: Completed  Goal: Control of chronic pain  Description: Control of chronic pain  Outcome: Completed     Problem: Confusion - Acute:  Goal: Absence of continued neurological deterioration signs and symptoms  Description: Absence of continued neurological deterioration signs and symptoms  Outcome: Completed  Goal: Mental status will be restored to baseline  Description: Mental status will be restored to baseline  Outcome: Completed     Problem: Discharge Planning:  Goal: Ability to perform activities of daily living will improve  Description: Ability to perform activities of daily living will improve  Outcome: Completed  Goal: Participates in care planning  Description: Participates in care planning  Outcome: Completed     Problem: Injury - Risk of, Physical Injury:  Goal: Will remain free from falls  Description: Will remain free from falls  Outcome: Completed  Goal: Absence of physical injury  Description: Absence of physical injury  Outcome: Completed     Problem: Sensory Perception - Impaired:  Goal: Demonstrations of improved sensory functioning will increase  Description: Demonstrations of improved sensory functioning will increase  Outcome: Completed  Goal: Decrease in sensory misperception frequency  Description: Decrease in sensory misperception frequency  Outcome: Completed  Goal: Able to refrain from responding to false sensory perceptions  Description: Able to refrain from responding to false sensory perceptions  Outcome: Completed  Goal: Demonstrates accurate environmental perceptions  Description: Demonstrates accurate environmental perceptions  Outcome: Completed  Goal: Able to distinguish between reality-based and nonreality-based thinking  Description: Able to distinguish between reality-based and nonreality-based thinking  Outcome: Completed  Goal: Able to interrupt nonreality-based thinking  Description: Able to interrupt nonreality-based thinking  Outcome: Completed     Problem: Sleep Pattern Disturbance:  Goal: Appears well-rested  Description: Appears well-rested  Outcome: Completed

## 2020-09-25 NOTE — DISCHARGE INSTR - COC
Continuity of Care Form    Patient Name: Rayna Ac   :  1930  MRN:  [id number]    Admit date:  2020  Discharge date:  ***    Code Status Order: DNR-CCA   Advance Directives:   [facility] Documentation       Date/Time Healthcare Directive Type of Healthcare Directive Copy in [address] Agent's Name Healthcare Agent's Phone Number    [phone]  No, patient does not have an advance directive for healthcare treatment -- -- -- -- --            Admitting Physician:  Tammie Morel MD  PCP: Andrey Cervantes MD    Discharging Nurse: [facility] Unit/Room#: 0UH-1709/0832-85  Discharging Unit Phone Number: ***    Emergency Contact:   Extended Emergency Contact Information  Primary Emergency Contact: Eloisa Rudolph W. [address] Phone: [phone]  Relation: Child  Secondary Emergency Contact: [facility] Phone: [phone]  Relation: Grandchild    Past Surgical History:  Past Surgical History:   Procedure Laterality Date    COLONOSCOPY      EXCISION OF FACIAL MASS      skin ca    EYE SURGERY      cataract removal; bilat    HIP FRACTURE SURGERY Left 2019    OPEN REDUCTION INTERNAL FIXATION OF INTERTROCHANTERIC FRACTURE OF LEFT HIPUSING GAMMA NAIL performed by Nadira Rodriguez MD at [place] [address] Right 2018    right gamma nail with cables    SKIN BIOPSY         Immunization History:   Immunization History   Administered Date(s) Administered    Influenza, Triv, inactivated, subunit, adjuvanted, IM (Fluad 65 yrs and older) 2019    Pneumococcal Conjugate 13-valent (Weautxe39) 2018    Pneumococcal Polysaccharide (Eslmrkxuj13) 2019    Td, unspecified formulation 2014    Tdap (Boostrix, Adacel) 10/18/2018       Active Problems:  Patient Active Problem List   Diagnosis Code    Nephrotic syndrome N04.9    Edema R60.9    Hypothyroid E03.9    Multiple joint pain M25.50    Age-related osteoporosis without current pathological fracture M81.0    Closed fracture of right femur ([facility]) S72.91XA    Closed displaced comminuted fracture of shaft of right femur ([facility] S72.351A    Primary osteoarthritis of right knee M17.11    Gastroesophageal reflux disease K21.9    False positive syphilis serology R76.8    Memory deficit R41.3    Closed displaced intertrochanteric fracture of left femur ([facility] S72.142A    Closed fracture of left femur ([facility]) S72. 80XA    Primary osteoarthritis of left knee M17.12    UTI (urinary tract infection) N39.0    Acute metabolic encephalopathy P58.89    SHARAD (acute kidney injury) ([facility] N17.9       Isolation/Infection:   Isolation            No Isolation          Patient Infection Status       None to display            Nurse Assessment:  Last Vital Signs: /75   Pulse 77   Temp 97.6 °F (36.4 °C) (Oral)   Resp 16   Ht 5' 5\" (1.651 m)   Wt 187 lb 3.2 oz (84.9 kg)   SpO2 92%   BMI 31.15 kg/m²     Last documented pain score (0-10 scale): Pain Level: 0  Last Weight:   Wt Readings from Last 1 Encounters:   09/22/20 187 lb 3.2 oz (84.9 kg)     Mental Status:  Alert with some confusion r/t dementia      IV Access:  - None    Nursing Mobility/ADLs:  Walking   Assisted  Transfer  Assisted  Bathing  Assisted  Dressing  Assisted  Toileting  Assisted  Feeding  Independent  Med Admin  Assisted  Med Delivery   whole one at a time or with applesauce as tolerated. No drinking straws. Wound Care Documentation and Therapy:  Wound 08/21/19 Pretibial Left;Proximal abrasion from fall (Active)   Number of days: 400        Elimination:  Continence:   · Bowel:  Yes  · Bladder: Yes  Urinary Catheter: None   Colostomy/Ileostomy/Ileal Conduit: No       Date of Last BM: 09/24/2020    Intake/Output Summary (Last 24 hours) at 9/25/2020 1105  Last data filed at 9/25/2020 0532  Gross per 24 hour   Intake 1871 ml   Output --   Net 1871 ml I/O last 3 completed shifts: In: Belkis Bearden [P.O.:240; I.V.:1631]  Out: -     Safety Concerns:     Sundowners [place] and At Risk for Falls    Impairments/Disabilities:      None    Nutrition Therapy:  Current Nutrition Therapy:   - Oral Diet:  General no straws      Routes of Feeding: Oral  Liquids: Thin Liquids  Daily Fluid Restriction: no  Last Modified Barium Swallow with Video (Video Swallowing Test): not done    Treatments at the Time of Hospital Discharge:   Respiratory Treatments: none    Oxygen Therapy:  is not on home oxygen therapy. Ventilator:    - No ventilator support    Rehab Therapies: SN,PT,OT. HHA  Weight Bearing Status/Restrictions: No weight bearing restirctions  Other Medical Equipment (for information only, NOT a DME order):  walker  Other Treatments: HOME HEALTH CARE: LEVEL 3 SAFETY        -Initial home health evaluation to occur within 24-48 hours, in patient home    -Home health agency to establish plan of care for patient over 60 day period    -Medication Reconciliation    -PT/OT/Speech evaluations in home within 24-48 hours of discharge; including  -DME and home safety    -Frontload therapy 5 days, then 3x a week    -OT to evaluate if patient has [address] needs for personal care    - evaluation within 24-48 hours, includes evaluation of resources   and insurance to determine [place], [place], LTC, and Medicaid options    -PCP Visit scheduled within three to seven days of discharge         Patient's personal belongings (please select all that are sent with patient):  Meri    RN SIGNATURE:  Electronically signed by Alli Strickland RN on 9/25/20 at 2:30 PM EDT    CASE MANAGEMENT/SOCIAL WORK SECTION    Inpatient Status Date: ***    Readmission Risk Assessment Score:  Readmission Risk              Risk of Unplanned Readmission:        12           Discharging to Facility/ Agency   FACILITY:     [address] [place] [address]:   [address] [facility] [address] [place]   PHONE:        [phone]  · FAX:              [phone]      Dialysis Facility (if applicable)   · Name:  · Address:  · Dialysis Schedule:  · Phone:  · Fax:    / signature: {Esignature:[id number]:::0}    PHYSICIAN SECTION    Prognosis: Good    Condition at Discharge: Stable    Rehab Potential (if transferring to Rehab): Good    Recommended Labs or Other Treatments After Discharge:     Physician Certification: I certify the above information and transfer of Lindsay Cisneros  is necessary for the continuing treatment of the diagnosis listed and that she requires Home Care for less 30 days.      Update Admission H&P: No change in H&P    PHYSICIAN SIGNATURE:  Electronically signed by Kendall Bo MD on 9/25/20 at 11:05 AM EDT

## 2020-09-25 NOTE — PROGRESS NOTES
diet/liquid level. (d/c goal, not indicated, 9/25/2020)    Plan: Will discharge from Dysphagia treatment due to meeting goals and tolerating diet. Patient/Family Education:Education given to the Pt, daughter, and nurse, who verbalized understanding    Discharge Recommendations:  Pt does not require further Speech Therapy for Dysphagia services. Timed Code Treatment: 0 minutes    Total Treatment Time: 10 minutes    If patient discharges prior to next session this note will serve as a discharge summary. Signature:    JAYLAN Bello  Speech-Language Pathologist

## 2020-09-26 ENCOUNTER — CARE COORDINATION (OUTPATIENT)
Dept: CASE MANAGEMENT | Age: 85
End: 2020-09-26

## 2020-09-26 PROCEDURE — 1111F DSCHRG MED/CURRENT MED MERGE: CPT | Performed by: INTERNAL MEDICINE

## 2020-09-26 NOTE — CARE COORDINATION
[facility] Transitions Initial Follow Up Call    Call within 2 business days of discharge: Yes    Patient: Yary Rosenberg Patient : 1930   MRN: [id number]  Reason for Admission: Cirrhosis, hepatic Encephalopathy  Discharge Date: 20 RARS: Readmission Risk Score: 15      Last Discharge [address] [address]       Complaint Diagnosis Description Type Department Provider    20 Hallucinations Hallucination . .. ED to Hosp-Admission (Discharged) (ADMITTED) Anita Tipton MD; [facility] .. Spoke with: [id number]: F    Non-face-to-face services provided:  Obtained and reviewed discharge summary and/or continuity of care documents    Care Transitions 24 Hour Call    Do you have any ongoing symptoms?:  No  Do you have a copy of your discharge instructions?:  Yes  Do you have all of your prescriptions and are they filled?:  Yes  Have you been contacted by a [facility] Pharmacist?:  No  Have you scheduled your follow up appointment?:  Yes  How are you going to get to your appointment?:  Car - family or friend to transport  Were you discharged with any Home Care or Post Acute Services:  No  Post Acute Services:  Home Health  Do you feel like you have everything you need to keep you well at home?:  Yes  Care Transitions Interventions         Follow Up: Daughter reports that patient is doing well, denies any questions or concerns at this time. Discussed discharge instructions and reviewed medications, 1111F completed. [facility] has contacted patient and patient will follow up with PCP on Monday. CTN will continue with outreach follow up calls.     Future Appointments   Date Time Provider Marco Barreto   2020 11:40 AM MILLICENT Spencer MD [facility]   10/28/2020  1:45 PM Amanda Leo MD [facility]       Kylah Trujillo, DANAE

## 2020-09-28 ENCOUNTER — OFFICE VISIT (OUTPATIENT)
Dept: INTERNAL MEDICINE CLINIC | Age: 85
End: 2020-09-28
Payer: MEDICARE

## 2020-09-28 VITALS
SYSTOLIC BLOOD PRESSURE: 122 MMHG | DIASTOLIC BLOOD PRESSURE: 78 MMHG | BODY MASS INDEX: 31.49 KG/M2 | WEIGHT: 189 LBS | HEIGHT: 65 IN | TEMPERATURE: 98.1 F | HEART RATE: 84 BPM

## 2020-09-28 PROCEDURE — G0008 ADMIN INFLUENZA VIRUS VAC: HCPCS | Performed by: INTERNAL MEDICINE

## 2020-09-28 PROCEDURE — 1111F DSCHRG MED/CURRENT MED MERGE: CPT | Performed by: INTERNAL MEDICINE

## 2020-09-28 PROCEDURE — 96372 THER/PROPH/DIAG INJ SC/IM: CPT | Performed by: INTERNAL MEDICINE

## 2020-09-28 PROCEDURE — 90694 VACC AIIV4 NO PRSRV 0.5ML IM: CPT | Performed by: INTERNAL MEDICINE

## 2020-09-28 PROCEDURE — 99495 TRANSJ CARE MGMT MOD F2F 14D: CPT | Performed by: INTERNAL MEDICINE

## 2020-09-28 RX ORDER — LEVOTHYROXINE SODIUM 0.05 MG/1
TABLET ORAL
Qty: 30 TABLET | Refills: 0 | Status: SHIPPED | OUTPATIENT
Start: 2020-09-28 | End: 2020-10-23

## 2020-09-28 NOTE — PROGRESS NOTES
Post-Discharge Transitional Care Management Services or Hospital Follow Up      Nikojon Lockhart   YOB: 1930    Date of Office Visit:  9/28/2020  Date of Hospital Admission: 9/22/20  Date of Hospital Discharge: 9/25/20  Risk of hospital readmission (high >=14%.  Medium >=10%) :Readmission Risk Score: 15      Care management risk score Rising risk (score 2-5) and Complex Care (Scores >=6): 1     Non face to face  following discharge, date last encounter closed (first attempt may have been earlier): 9/26/2020  2:11 PM    Call initiated 2 business days of discharge: Yes    Patient Active Problem List   Diagnosis    Nephrotic syndrome    Edema    Hypothyroid    Multiple joint pain    Age-related osteoporosis without current pathological fracture    Closed fracture of right femur ([address]    Closed displaced comminuted fracture of shaft of right femur ([address]    Primary osteoarthritis of right knee    Gastroesophageal reflux disease    False positive syphilis serology    Memory deficit    Closed displaced intertrochanteric fracture of left femur ([address]    Closed fracture of left femur ([address]    Primary osteoarthritis of left knee    UTI (urinary tract infection)    Acute metabolic encephalopathy    SHARAD (acute kidney injury) ([address]    Hepatic encephalopathy ([address]    Cirrhosis ([address]    Dementia with behavioral problem (HCC)       Allergies   Allergen Reactions    Metoprolol Hives     Severe rash and itching    Amlodipine Itching     Itching    Sulfa Antibiotics Nausea And Vomiting    Adhesive Tape Other (See Comments)     Skin tears very easily       Medications listed as ordered at the time of discharge from hospital   [facility] Medication Instructions MAGGIE:    Printed on:09/28/20 1218   Medication Information                      Ascorbic Acid 500 MG CHEW  Take 1 tablet by mouth daily             aspirin 325 MG EC tablet  Take 1 tablet by mouth daily             Caltrate 600+D Plus Minerals (CALTRATE) 600-800 MG-UNIT TABS tablet  Take 1 tablet by mouth 2 times daily             Cholecalciferol (VITAMIN D) 2000 units CAPS capsule  Take 1 capsule by mouth daily              cyanocobalamin ([employer] VITAMIN B12) 1000 MCG tablet  Take 1 tablet by mouth daily             cyproheptadine (PERIACTIN) 4 MG tablet  Take 1 tablet by mouth 3 times daily as needed (itching)             donepezil (ARICEPT) 5 MG tablet  Take 1 tablet by mouth nightly             DULoxetine (CYMBALTA) 30 MG extended release capsule  TAKE ONE CAPSULE BY MOUTH DAILY             lactulose (CHRONULAC) 10 GM/15ML solution  Take 30 mLs by mouth 2 times daily             levothyroxine (SYNTHROID) 50 MCG tablet  TAKE ONE TABLET BY MOUTH DAILY             memantine (NAMENDA) 10 MG tablet  Take 1 tablet by mouth 2 times daily             QUEtiapine (SEROQUEL) 25 MG tablet  Take 0.5 tablets by mouth nightly             romosozumab-aqqg (EVENITY) 105 MG/1. 17ML SOSY injection  Inject 210 mg into the skin every 30 days 8-             sertraline (ZOLOFT) 25 MG tablet  Take 1 tablet by mouth daily             triamcinolone (KENALOG) 0.1 % ointment  Apply topically 2 times daily Apply topically 2 times daily.                    Medications marked \"taking\" at this time  Outpatient Medications Marked as Taking for the 9/28/20 encounter (Office Visit) with Avinash Ayala MD   Medication Sig Dispense Refill    levothyroxine (SYNTHROID) 50 MCG tablet TAKE ONE TABLET BY MOUTH DAILY 30 tablet 0    QUEtiapine (SEROQUEL) 25 MG tablet Take 0.5 tablets by mouth nightly 30 tablet 0    lactulose (CHRONULAC) 10 GM/15ML solution Take 30 mLs by mouth 2 times daily 1800 mL 5    donepezil (ARICEPT) 5 MG tablet Take 1 tablet by mouth nightly 30 tablet 0    cyproheptadine (PERIACTIN) 4 MG tablet Take 1 tablet by mouth 3 times daily as needed (itching) 30 tablet 0    Ascorbic Acid 500 MG CHEW Take 1 tablet by mouth daily 90 tablet 1    romosozumab-aqqg (EVENITY) 105 MG/1. 17ML SOSY injection Inject 210 mg into the skin every 30 days 8-      triamcinolone (KENALOG) 0.1 % ointment Apply topically 2 times daily Apply topically 2 times daily.  memantine (NAMENDA) 10 MG tablet Take 1 tablet by mouth 2 times daily 180 tablet 2    sertraline (ZOLOFT) 25 MG tablet Take 1 tablet by mouth daily 90 tablet 1    cyanocobalamin ([employer] VITAMIN B12) 1000 MCG tablet Take 1 tablet by mouth daily 90 tablet 1    DULoxetine (CYMBALTA) 30 MG extended release capsule TAKE ONE CAPSULE BY MOUTH DAILY 90 capsule 1    Caltrate 600+D Plus Minerals (CALTRATE) 600-800 MG-UNIT TABS tablet Take 1 tablet by mouth 2 times daily 180 tablet 1    aspirin 325 MG EC tablet Take 1 tablet by mouth daily 14 tablet 0    Cholecalciferol (VITAMIN D) 2000 units CAPS capsule Take 1 capsule by mouth daily           Medications patient taking as of now reconciled against medications ordered at time of hospital discharge: Yes    Chief Complaint   Patient presents with    Follow-Up from Hospital     [facility] for hallucinations       History of Present illness - Follow up of Hospital diagnosis(es): Hallucination, metabolic incompatibility, dehydration, UTI  Inpatient course: Discharge summary reviewed- see chart. Interval history/Current status: Since hospital discharge patient did not have any further episode of hallucination. She is tolerating both Aricept as well as Namenda. She denies any urinary symptoms. Patient denies chest pain palpitation dizziness. He was found to have incidental finding of possible cirrhosis of liver. Patient and family not considering to continue to follow-up with gastroenterologist.  Patient denies any black his stool, abdominal pain swelling. A comprehensive review of systems was negative except for what was noted in the HPI.   Patient denies any exertional chest pain, dyspnea, palpitations, syncope, orthopnea, edema or paroxysmal nocturnal dyspnea. The patient denies cough, chest pain, dyspnea, wheezing or hemoptysis. The patient denies abdominal or flank pain, anorexia, nausea or vomiting, dysphagia, change in bowel habits or black or bloody stools or weight loss. History of chronic dermatitis and itching. Currently stable with cetirizine every day. No longer taking Periactin  Vitals:    09/28/20 1133   BP: 122/78   Pulse: 84   Temp: 98.1 °F (36.7 °C)   TempSrc: Temporal   Weight: 189 lb (85.7 kg)   Height: 5' 5\" (1.651 m)     Body mass index is 31.45 kg/m². Wt Readings from Last 3 Encounters:   09/28/20 189 lb (85.7 kg)   09/22/20 187 lb 3.2 oz (84.9 kg)   09/02/20 176 lb (79.8 kg)     BP Readings from Last 3 Encounters:   09/28/20 122/78   09/25/20 112/68   09/02/20 126/78        Physical Exam:  General Appearance: alert and oriented to person, place and time, well developed and well- nourished, in no acute distress  Skin: warm and dry, stable eczema-like skin lesion at both forearms. Head: normocephalic and atraumatic  Eyes: pupils equal, round, and reactive to light, extraocular eye movements intact, conjunctivae normal    Neck: supple and non-tender without mass, no thyromegaly or thyroid nodules, no cervical lymphadenopathy  Pulmonary/Chest: clear to auscultation bilaterally- no wheezes, rales or rhonchi, normal air movement, no respiratory distress  Cardiovascular: normal rate, regular rhythm, normal S1 and S2, no murmurs, rubs, clicks, or gallops, distal pulses intact, no carotid bruits  Abdomen: soft, non-tender, non-distended, normal bowel sounds, no masses or organomegaly  Extremities: no cyanosis, clubbing or edema. Patient has been using walker  Musculoskeletal: normal range of motion, no joint swelling, deformity or tenderness  Neurologic: reflexes normal and symmetric, no cranial nerve deficit, speech normal    Assessment/Plan:  1. Hospital discharge follow-up  Stable. 2. Renal impairment  Encourage hydration.   Renal function is back to normal.   - MO DISCHARGE MEDS RECONCILED W/ CURRENT OUTPATIENT MED LIST    3. Late onset Alzheimer's disease with behavioral disturbance ([facility]  We will continue current dose of Aricept as well as Namenda. - MO DISCHARGE MEDS RECONCILED W/ CURRENT OUTPATIENT MED LIST    4. Hallucinations  Responded well with low-dose Seroquel.  - MO DISCHARGE MEDS RECONCILED W/ CURRENT OUTPATIENT MED LIST        Medical Decision Making: moderate complexity  An After Visit Summary was printed and given to the patient. Documentation was done using voice recognition dragon software. Every effort was made to ensure accuracy; however, inadvertent  Unintentional computerized transcription errors may be present.

## 2020-09-30 ENCOUNTER — CARE COORDINATION (OUTPATIENT)
Dept: CASE MANAGEMENT | Age: 85
End: 2020-09-30

## 2020-09-30 NOTE — CARE COORDINATION
[facility] Transitions Follow Up Call    2020    Patient: Tanisha Daniels  Patient : 1930   MRN: [id number]  Reason for Admission:   Discharge Date: 20 RARS: Readmission Risk Score: 13         Spoke with: pt's daughter    Care Transitions Subsequent and Final Call    Subsequent and Final Calls  Do you have any ongoing symptoms?:  No  Have your medications changed?:  No  Do you have any questions related to your medications?:  No  Do you currently have any active services?:  Yes  Are you currently active with any services?:  Home Health  Do you have any needs or concerns that I can assist you with?:  No  Identified Barriers:  None  Care Transitions Interventions  No Identified Needs  Other Interventions:          Pt's daughter states pt is doing well, no issues or concerns, just seems to be sleeping a lot.  Agreed to more CTC f/u calls      Follow Up  Future Appointments   Date Time Provider Marco Barreto   10/28/2020  1:45 PM Kavon Myers MD Counts include 234 beds at the [facility]   2020 11:00 AM MILLICENT Seymour MD [facility]       Harpreet Hernandes RN

## 2020-10-02 ENCOUNTER — TELEPHONE (OUTPATIENT)
Dept: INTERNAL MEDICINE CLINIC | Age: 85
End: 2020-10-02

## 2020-10-07 ENCOUNTER — CARE COORDINATION (OUTPATIENT)
Dept: CASE MANAGEMENT | Age: 85
End: 2020-10-07

## 2020-10-07 NOTE — CARE COORDINATION
[facility] Transitions Follow Up Call    10/7/2020    Patient: Rayna Ac  Patient : 1930   MRN: [id number]  Reason for Admission:   Discharge Date: 20 RARS: Readmission Risk Score: 13         Spoke with: pt's daughter    Care Transitions Subsequent and Final Call    Subsequent and Final Calls  Do you have any ongoing symptoms?:  No  Have your medications changed?:  No  Do you have any questions related to your medications?:  No  Do you currently have any active services?:  Yes  Are you currently active with any services?:  Home Health  Do you have any needs or concerns that I can assist you with?:  No  Identified Barriers:  None  Care Transitions Interventions  No Identified Needs  Other Interventions:          Pt's daughter states pt is doing well, no issues or concerns. [facility] [place], [facility]. nurse and therapies were both out today.  Agreed to more CTC f/u calls      Follow Up  Future Appointments   Date Time Provider Marco Barreto   10/28/2020  1:45 PM Adelaide Adam MD [facility]   2020 11:00 AM MILLICENT Mao MD [facility]       Mary Adame RN

## 2020-10-14 ENCOUNTER — CARE COORDINATION (OUTPATIENT)
Dept: CASE MANAGEMENT | Age: 85
End: 2020-10-14

## 2020-10-14 NOTE — CARE COORDINATION
[facility] Transitions Follow Up Call    10/14/2020    Patient: Stephanie Hernadez  Patient : 1930   MRN: [id number]  Reason for Admission:   Discharge Date: 20 RARS: Readmission Risk Score: 13         Spoke with: pt's daughter    Care Transitions Subsequent and Final Call    Subsequent and Final Calls  Do you have any ongoing symptoms?:  No  Have your medications changed?:  No  Do you have any questions related to your medications?:  No  Do you currently have any active services?:  Yes  Are you currently active with any services?:  Home Health  Do you have any needs or concerns that I can assist you with?:  No  Identified Barriers:  None  Care Transitions Interventions  No Identified Needs  Other Interventions:          Daughter states pt is doing well, no issues or concerns. [facility]. nursing and therapy continue to come ou.  Daughter feels mother is doing well and since [facility] is still coming out she politely declines and further CTC f/u calls      Follow Up  Future Appointments   Date Time Provider Marco Barreto   10/28/2020  1:45 PM Mendoza Askew MD [facility]   2020 11:00 AM [facility]       Durga Villagomez, RN

## 2020-10-23 PROBLEM — N39.0 UTI (URINARY TRACT INFECTION): Status: RESOLVED | Noted: 2020-09-23 | Resolved: 2020-10-23

## 2020-10-23 RX ORDER — LEVOTHYROXINE SODIUM 0.05 MG/1
TABLET ORAL
Qty: 30 TABLET | Refills: 0 | Status: SHIPPED | OUTPATIENT
Start: 2020-10-23 | End: 2020-11-30

## 2020-10-28 ENCOUNTER — OFFICE VISIT (OUTPATIENT)
Dept: RHEUMATOLOGY | Age: 85
End: 2020-10-28
Payer: MEDICARE

## 2020-10-28 VITALS
BODY MASS INDEX: 30.82 KG/M2 | HEART RATE: 80 BPM | TEMPERATURE: 96.6 F | HEIGHT: 65 IN | WEIGHT: 185 LBS | SYSTOLIC BLOOD PRESSURE: 124 MMHG | DIASTOLIC BLOOD PRESSURE: 72 MMHG

## 2020-10-28 PROCEDURE — 4040F PNEUMOC VAC/ADMIN/RCVD: CPT | Performed by: INTERNAL MEDICINE

## 2020-10-28 PROCEDURE — G8427 DOCREV CUR MEDS BY ELIG CLIN: HCPCS | Performed by: INTERNAL MEDICINE

## 2020-10-28 PROCEDURE — G8484 FLU IMMUNIZE NO ADMIN: HCPCS | Performed by: INTERNAL MEDICINE

## 2020-10-28 PROCEDURE — 1036F TOBACCO NON-USER: CPT | Performed by: INTERNAL MEDICINE

## 2020-10-28 PROCEDURE — 1123F ACP DISCUSS/DSCN MKR DOCD: CPT | Performed by: INTERNAL MEDICINE

## 2020-10-28 PROCEDURE — 1090F PRES/ABSN URINE INCON ASSESS: CPT | Performed by: INTERNAL MEDICINE

## 2020-10-28 PROCEDURE — G8417 CALC BMI ABV UP PARAM F/U: HCPCS | Performed by: INTERNAL MEDICINE

## 2020-10-28 PROCEDURE — 99213 OFFICE O/P EST LOW 20 MIN: CPT | Performed by: INTERNAL MEDICINE

## 2020-10-28 RX ORDER — ALENDRONATE SODIUM 70 MG/1
70 TABLET ORAL
Qty: 4 TABLET | Refills: 3 | Status: SHIPPED | OUTPATIENT
Start: 2020-10-28 | End: 2021-03-08

## 2020-10-28 NOTE — PROGRESS NOTES
Subjective:      Patient ID: Marychuy Marti is a 80 y.o. female. HPI  The patient returns for follow-up of osteoporosis. She is completed 12 months of Evenity and she has elected to go on Fosamax 70 mg once a week to maintain her bone mass. Review of Systems  Renal function adequate  Objective:   Physical Exam /72   Pulse 80   Temp 96.6 °F (35.9 °C)   Ht 5' 5\" (1.651 m)   Wt 185 lb (83.9 kg)   BMI 30.79 kg/m²     Alert female no acute distress. No vertebral body tenderness  Assessment:      Osteoporosis      Plan:      The patient will get a DEXA scan and begin Fosamax 70 mg weekly. I will see her back in 6 months time. She will call the office after the DEXA scan to discuss the results.         Pavel Lind MD

## 2020-10-30 ENCOUNTER — TELEPHONE (OUTPATIENT)
Dept: INTERNAL MEDICINE CLINIC | Age: 85
End: 2020-10-30

## 2020-10-30 NOTE — TELEPHONE ENCOUNTER
Sherrie Blackburn with [facility] states patient is discharged from PT today. She also reports that patient had a fall yesterday. Patient's knee buckled on her and she fell in the bathroom. Patient has bruise hand and denied any other injuries.   Patient declined need for follow up and need for PT.

## 2020-10-30 NOTE — PROGRESS NOTES
Evenity injections x2, bilateral deltoid given per Dr. Mercy Bosworth orders.    Lot #[id number] exp 09/21 [id number]

## 2020-11-02 ENCOUNTER — HOSPITAL ENCOUNTER (OUTPATIENT)
Dept: GENERAL RADIOLOGY | Age: 85
Discharge: HOME | End: 2020-11-02
Payer: MEDICARE

## 2020-11-02 PROCEDURE — 77080 DXA BONE DENSITY AXIAL: CPT

## 2020-11-16 ENCOUNTER — TELEPHONE (OUTPATIENT)
Dept: INTERNAL MEDICINE CLINIC | Age: 85
End: 2020-11-16

## 2020-11-16 NOTE — TELEPHONE ENCOUNTER
Cameron Chun called states on 11/13 pt was dischared from [facility] will all goals met

## 2020-12-10 ENCOUNTER — TELEPHONE (OUTPATIENT)
Dept: INTERNAL MEDICINE CLINIC | Age: 85
End: 2020-12-10

## 2020-12-10 NOTE — TELEPHONE ENCOUNTER
Started hallucinating today. She only drinks about 4 oz of liquid a day. Thinks she may be dehydrated. She sleeps about 20 hours a day.

## 2020-12-10 NOTE — TELEPHONE ENCOUNTER
Spoke to dtr. visuall hallucinations. Not frightened. Will force fluids--sweet/salty, hot/cold, jello, ice cream/sherbet. appt made for 12-16. Will let us know if any other symptoms present themselves.

## 2020-12-11 ENCOUNTER — APPOINTMENT (OUTPATIENT)
Dept: GENERAL RADIOLOGY | Age: 85
DRG: 641 | End: 2020-12-11
Payer: MEDICARE

## 2020-12-11 ENCOUNTER — APPOINTMENT (OUTPATIENT)
Dept: CT IMAGING | Age: 85
DRG: 641 | End: 2020-12-11
Payer: MEDICARE

## 2020-12-11 ENCOUNTER — HOSPITAL ENCOUNTER (INPATIENT)
Age: 85
LOS: 1 days | Discharge: NURSING, REHAB OR LONG-TERM CARE | DRG: 641 | End: 2020-12-15
Attending: STUDENT IN AN ORGANIZED HEALTH CARE EDUCATION/TRAINING PROGRAM | Admitting: INTERNAL MEDICINE
Payer: MEDICARE

## 2020-12-11 LAB
ANION GAP SERPL CALCULATED.3IONS-SCNC: 11 MMOL/L (ref 3–16)
BASOPHILS ABSOLUTE: 0.1 K/UL (ref 0–0.2)
BASOPHILS RELATIVE PERCENT: 0.8 %
BUN BLDV-MCNC: 23 MG/DL (ref 7–20)
CALCIUM SERPL-MCNC: 9.6 MG/DL (ref 8.3–10.6)
CHLORIDE BLD-SCNC: 105 MMOL/L (ref 99–110)
CO2: 25 MMOL/L (ref 21–32)
CREAT SERPL-MCNC: 1.3 MG/DL (ref 0.6–1.2)
EOSINOPHILS ABSOLUTE: 0.4 K/UL (ref 0–0.6)
EOSINOPHILS RELATIVE PERCENT: 5.2 %
GFR AFRICAN AMERICAN: 46
GFR NON-AFRICAN AMERICAN: 38
GLUCOSE BLD-MCNC: 116 MG/DL (ref 70–99)
HCT VFR BLD CALC: 46.1 % (ref 36–48)
HEMOGLOBIN: 15.1 G/DL (ref 12–16)
INR BLD: 0.97 (ref 0.86–1.14)
LACTIC ACID, SEPSIS: 2.2 MMOL/L (ref 0.4–1.9)
LYMPHOCYTES ABSOLUTE: 1.8 K/UL (ref 1–5.1)
LYMPHOCYTES RELATIVE PERCENT: 22.7 %
MCH RBC QN AUTO: 31.1 PG (ref 26–34)
MCHC RBC AUTO-ENTMCNC: 32.8 G/DL (ref 31–36)
MCV RBC AUTO: 95 FL (ref 80–100)
MONOCYTES ABSOLUTE: 0.9 K/UL (ref 0–1.3)
MONOCYTES RELATIVE PERCENT: 11.2 %
NEUTROPHILS ABSOLUTE: 4.8 K/UL (ref 1.7–7.7)
NEUTROPHILS RELATIVE PERCENT: 60.1 %
PDW BLD-RTO: 14.4 % (ref 12.4–15.4)
PLATELET # BLD: 204 K/UL (ref 135–450)
PMV BLD AUTO: 9.1 FL (ref 5–10.5)
POTASSIUM SERPL-SCNC: 4.8 MMOL/L (ref 3.5–5.1)
PROTHROMBIN TIME: 11.2 SEC (ref 10–13.2)
RBC # BLD: 4.86 M/UL (ref 4–5.2)
SODIUM BLD-SCNC: 141 MMOL/L (ref 136–145)
WBC # BLD: 8 K/UL (ref 4–11)

## 2020-12-11 PROCEDURE — 71045 X-RAY EXAM CHEST 1 VIEW: CPT

## 2020-12-11 PROCEDURE — G0480 DRUG TEST DEF 1-7 CLASSES: HCPCS

## 2020-12-11 PROCEDURE — 99283 EMERGENCY DEPT VISIT LOW MDM: CPT

## 2020-12-11 PROCEDURE — 70450 CT HEAD/BRAIN W/O DYE: CPT

## 2020-12-11 PROCEDURE — 85025 COMPLETE CBC W/AUTO DIFF WBC: CPT

## 2020-12-11 PROCEDURE — 85610 PROTHROMBIN TIME: CPT

## 2020-12-11 PROCEDURE — 80076 HEPATIC FUNCTION PANEL: CPT

## 2020-12-11 PROCEDURE — 83605 ASSAY OF LACTIC ACID: CPT

## 2020-12-11 PROCEDURE — 80048 BASIC METABOLIC PNL TOTAL CA: CPT
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
## 2020-12-12 LAB
ACETAMINOPHEN LEVEL: <5 UG/ML (ref 10–30)
ALBUMIN SERPL-MCNC: 3.8 G/DL (ref 3.4–5)
ALP BLD-CCNC: 91 U/L (ref 40–129)
ALT SERPL-CCNC: 12 U/L (ref 10–40)
AMMONIA: 26 UMOL/L (ref 11–51)
AMPHETAMINE SCREEN, URINE: NORMAL
APTT: 30.1 SEC (ref 24.2–36.2)
AST SERPL-CCNC: 22 U/L (ref 15–37)
BARBITURATE SCREEN URINE: NORMAL
BENZODIAZEPINE SCREEN, URINE: NORMAL
BILIRUB SERPL-MCNC: 0.5 MG/DL (ref 0–1)
BILIRUBIN DIRECT: <0.2 MG/DL (ref 0–0.3)
BILIRUBIN URINE: NEGATIVE
BILIRUBIN, INDIRECT: NORMAL MG/DL (ref 0–1)
BLOOD, URINE: NEGATIVE
CANNABINOID SCREEN URINE: NORMAL
CLARITY: ABNORMAL
COCAINE METABOLITE SCREEN URINE: NORMAL
COLOR: YELLOW
COMMENT UA: NORMAL
EPITHELIAL CELLS, UA: 3 /HPF (ref 0–5)
ETHANOL: NORMAL MG/DL (ref 0–0.08)
GLUCOSE URINE: NEGATIVE MG/DL
HYALINE CASTS: 3 /LPF (ref 0–8)
INR BLD: 0.95 (ref 0.86–1.14)
KETONES, URINE: NEGATIVE MG/DL
LACTIC ACID: 2 MMOL/L (ref 0.4–2)
LEUKOCYTE ESTERASE, URINE: ABNORMAL
Lab: NORMAL
METHADONE SCREEN, URINE: NORMAL
MICROSCOPIC EXAMINATION: YES
NITRITE, URINE: NEGATIVE
OPIATE SCREEN URINE: NORMAL
OXYCODONE URINE: NORMAL
PH UA: 5
PH UA: 6 (ref 5–8)
PHENCYCLIDINE SCREEN URINE: NORMAL
PROPOXYPHENE SCREEN: NORMAL
PROTEIN UA: NEGATIVE MG/DL
PROTHROMBIN TIME: 11 SEC (ref 10–13.2)
RBC UA: NORMAL /HPF (ref 0–4)
SALICYLATE, SERUM: <0.3 MG/DL (ref 15–30)
SPECIFIC GRAVITY UA: 1.02 (ref 1–1.03)
TOTAL PROTEIN: 7.2 G/DL (ref 6.4–8.2)
URINE REFLEX TO CULTURE: ABNORMAL
URINE TYPE: ABNORMAL
UROBILINOGEN, URINE: 0.2 E.U./DL
WBC UA: 3 /HPF (ref 0–5)

## 2020-12-12 PROCEDURE — 80307 DRUG TEST PRSMV CHEM ANLYZR: CPT

## 2020-12-12 PROCEDURE — 2580000003 HC RX 258: Performed by: INTERNAL MEDICINE

## 2020-12-12 PROCEDURE — 6370000000 HC RX 637 (ALT 250 FOR IP): Performed by: INTERNAL MEDICINE

## 2020-12-12 PROCEDURE — 36415 COLL VENOUS BLD VENIPUNCTURE: CPT

## 2020-12-12 PROCEDURE — 82140 ASSAY OF AMMONIA: CPT

## 2020-12-12 PROCEDURE — G0378 HOSPITAL OBSERVATION PER HR: HCPCS

## 2020-12-12 PROCEDURE — 83605 ASSAY OF LACTIC ACID: CPT

## 2020-12-12 PROCEDURE — 85610 PROTHROMBIN TIME: CPT

## 2020-12-12 PROCEDURE — 6360000002 HC RX W HCPCS: Performed by: INTERNAL MEDICINE

## 2020-12-12 PROCEDURE — 81001 URINALYSIS AUTO W/SCOPE: CPT

## 2020-12-12 PROCEDURE — 85730 THROMBOPLASTIN TIME PARTIAL: CPT

## 2020-12-12 PROCEDURE — 96372 THER/PROPH/DIAG INJ SC/IM: CPT

## 2020-12-12 RX ORDER — POLYETHYLENE GLYCOL 3350 17 G/17G
17 POWDER, FOR SOLUTION ORAL DAILY PRN
Status: DISCONTINUED | OUTPATIENT
Start: 2020-12-12 | End: 2020-12-15 | Stop reason: HOSPADM

## 2020-12-12 RX ORDER — LACTULOSE 10 G/15ML
20 SOLUTION ORAL 2 TIMES DAILY
Status: DISCONTINUED | OUTPATIENT
Start: 2020-12-12 | End: 2020-12-15 | Stop reason: HOSPADM

## 2020-12-12 RX ORDER — VITAMIN B COMPLEX
50 TABLET ORAL DAILY
Status: DISCONTINUED | OUTPATIENT
Start: 2020-12-12 | End: 2020-12-15 | Stop reason: HOSPADM

## 2020-12-12 RX ORDER — ONDANSETRON 2 MG/ML
4 INJECTION INTRAMUSCULAR; INTRAVENOUS EVERY 6 HOURS PRN
Status: DISCONTINUED | OUTPATIENT
Start: 2020-12-12 | End: 2020-12-15 | Stop reason: HOSPADM

## 2020-12-12 RX ORDER — DOCUSATE SODIUM 100 MG/1
100 CAPSULE, LIQUID FILLED ORAL DAILY
Status: DISCONTINUED | OUTPATIENT
Start: 2020-12-12 | End: 2020-12-15 | Stop reason: HOSPADM

## 2020-12-12 RX ORDER — LEVOTHYROXINE SODIUM 0.03 MG/1
50 TABLET ORAL DAILY
Status: DISCONTINUED | OUTPATIENT
Start: 2020-12-12 | End: 2020-12-15 | Stop reason: HOSPADM

## 2020-12-12 RX ORDER — MAGNESIUM SULFATE IN WATER 40 MG/ML
2 INJECTION, SOLUTION INTRAVENOUS PRN
Status: DISCONTINUED | OUTPATIENT
Start: 2020-12-12 | End: 2020-12-15 | Stop reason: HOSPADM

## 2020-12-12 RX ORDER — DULOXETIN HYDROCHLORIDE 30 MG/1
30 CAPSULE, DELAYED RELEASE ORAL DAILY
Status: DISCONTINUED | OUTPATIENT
Start: 2020-12-12 | End: 2020-12-15 | Stop reason: HOSPADM

## 2020-12-12 RX ORDER — POTASSIUM CHLORIDE 7.45 MG/ML
10 INJECTION INTRAVENOUS PRN
Status: DISCONTINUED | OUTPATIENT
Start: 2020-12-12 | End: 2020-12-15 | Stop reason: HOSPADM

## 2020-12-12 RX ORDER — PROMETHAZINE HYDROCHLORIDE 25 MG/1
12.5 TABLET ORAL EVERY 6 HOURS PRN
Status: DISCONTINUED | OUTPATIENT
Start: 2020-12-12 | End: 2020-12-15 | Stop reason: HOSPADM

## 2020-12-12 RX ORDER — ACETAMINOPHEN 325 MG/1
650 TABLET ORAL EVERY 4 HOURS PRN
Status: DISCONTINUED | OUTPATIENT
Start: 2020-12-12 | End: 2020-12-15 | Stop reason: HOSPADM

## 2020-12-12 RX ORDER — SODIUM CHLORIDE 0.9 % (FLUSH) 0.9 %
10 SYRINGE (ML) INJECTION EVERY 12 HOURS SCHEDULED
Status: DISCONTINUED | OUTPATIENT
Start: 2020-12-12 | End: 2020-12-15 | Stop reason: HOSPADM

## 2020-12-12 RX ORDER — QUETIAPINE FUMARATE 25 MG/1
25 TABLET, FILM COATED ORAL 2 TIMES DAILY
Status: DISCONTINUED | OUTPATIENT
Start: 2020-12-12 | End: 2020-12-12

## 2020-12-12 RX ORDER — SODIUM CHLORIDE 0.9 % (FLUSH) 0.9 %
10 SYRINGE (ML) INJECTION PRN
Status: DISCONTINUED | OUTPATIENT
Start: 2020-12-12 | End: 2020-12-15 | Stop reason: HOSPADM

## 2020-12-12 RX ORDER — SODIUM CHLORIDE 9 MG/ML
INJECTION, SOLUTION INTRAVENOUS CONTINUOUS
Status: DISCONTINUED | OUTPATIENT
Start: 2020-12-12 | End: 2020-12-13

## 2020-12-12 RX ADMIN — LACTULOSE 20 G: 20 SOLUTION ORAL at 21:31

## 2020-12-12 RX ADMIN — Medication 10 ML: at 21:32

## 2020-12-12 RX ADMIN — DULOXETINE HYDROCHLORIDE 30 MG: 30 CAPSULE, DELAYED RELEASE ORAL at 11:06

## 2020-12-12 RX ADMIN — DOCUSATE SODIUM 100 MG: 100 CAPSULE ORAL at 11:06

## 2020-12-12 RX ADMIN — LEVOTHYROXINE SODIUM 50 MCG: 0.03 TABLET ORAL at 10:43

## 2020-12-12 RX ADMIN — ENOXAPARIN SODIUM 40 MG: 40 INJECTION SUBCUTANEOUS at 10:43

## 2020-12-12 RX ADMIN — SERTRALINE HYDROCHLORIDE 12.5 MG: 50 TABLET ORAL at 11:06

## 2020-12-12 RX ADMIN — SODIUM CHLORIDE: 9 INJECTION, SOLUTION INTRAVENOUS at 11:09

## 2020-12-12 RX ADMIN — LACTULOSE 20 G: 20 SOLUTION ORAL at 11:06

## 2020-12-12 RX ADMIN — Medication 2000 UNITS: at 11:06

## 2020-12-12 RX ADMIN — SERTRALINE HYDROCHLORIDE 12.5 MG: 50 TABLET ORAL at 21:32

## 2020-12-12 RX ADMIN — ASPIRIN 325 MG: 325 TABLET, COATED ORAL at 10:43

## 2020-12-12 ASSESSMENT — ENCOUNTER SYMPTOMS
CHEST TIGHTNESS: 0
ABDOMINAL PAIN: 0
NAUSEA: 0
VOMITING: 0
DIARRHEA: 0
SHORTNESS OF BREATH: 0

## 2020-12-12 NOTE — ED PROVIDER NOTES
MidLevel Attestation   I havepersonally performed and/or participated in the history, exam and medical decision making and agree with all pertinent clinical information. I have also reviewed and agree with the past medical, family and social historyunless otherwise noted. I have personally performed a face to face diagnostic evaluation onthis patient. I have reviewed the mid-levels findings and agree. In brief, Bryson Costa is a 80 y.o. female that presented to the emergency department with   Chief Complaint   Patient presents with    Hallucinations     pt reporting that she thinks that she is dehyrated and now having horrible hallucinations, falling alot as well. 2x today. \"Am I green? \"    . CONSTITUTIONAL: Well appearing, in no acute distress but  elderly  EYES: PERRL, No injection, discharge or scleral icterus. NECK: Normal ROM, NO LAD and Moist mucous membranes. CARDIOVASCULAR: Regular rate and rhythm. No murmurs or gallop. PULMONARY/CHEST: Airway patent. No retractions. Breath sounds clear with good air entry bilaterally. ABDOMEN: Soft, Non-distended and non-tender, without guarding or rebound. SKIN: Acyanotic, warm, dry   MUSCULOSKELETAL: No swelling, tenderness or deformity but unable to ambulate  NEUROLOGICAL: Awake. Pulses intact. Grossly nonfocal     61-year-old female who lives at home with a daughter presenting this evening with complaints of unable to walk and has had 2 falls today as well as hallucinating. Exam patient appears nontoxic in no acute distress but unable to ambulate. Labs obtained so far unremarkable. UA is pending. CT of the head and chest x-ray showed no acute findings. Despite, normal work-up, I am recommending the patient be admitted for ambulatory dysfunction and possible placement to rehab. Family is agreeable with this plan especially because patient is unsafe at home with 2 falls in the past 24 hours. Leukocyte Esterase, Urine SMALL (A) Negative    Microscopic Examination YES     Urine Type NotGiven     Urine Reflex to Culture Not Indicated    Ammonia   Result Value Ref Range    Ammonia 26 11 - 51 umol/L   Protime-INR   Result Value Ref Range    Protime 11.0 10.0 - 13.2 sec    INR 0.95 0.86 - 1.14   APTT   Result Value Ref Range    aPTT 30.1 24.2 - 36.2 sec   Ethanol   Result Value Ref Range    Ethanol Lvl None Detected mg/dL   Salicylate   Result Value Ref Range    Salicylate, Serum <7.2 (L) 15.0 - 30.0 mg/dL   Hepatic Function Panel   Result Value Ref Range    Total Protein 7.2 6.4 - 8.2 g/dL    Alb 3.8 3.4 - 5.0 g/dL    Alkaline Phosphatase 91 40 - 129 U/L    ALT 12 10 - 40 U/L    AST 22 15 - 37 U/L    Total Bilirubin 0.5 0.0 - 1.0 mg/dL    Bilirubin, Direct <0.2 0.0 - 0.3 mg/dL    Bilirubin, Indirect see below 0.0 - 1.0 mg/dL   Acetaminophen Level   Result Value Ref Range    Acetaminophen Level <5 (L) 10 - 30 ug/mL   Urine Drug Screen   Result Value Ref Range    Amphetamine Screen, Urine Neg Negative <1000ng/mL    Barbiturate Screen, Ur Neg Negative <200 ng/mL    Benzodiazepine Screen, Urine Neg Negative <200 ng/mL    Cannabinoid Scrn, Ur Neg Negative <50 ng/mL    Cocaine Metabolite Screen, Urine Neg Negative <300 ng/mL    Opiate Scrn, Ur Neg Negative <300 ng/mL    PCP Screen, Urine Neg Negative <25 ng/mL    Methadone Screen, Urine Neg Negative <300 ng/mL    Propoxyphene Scrn, Ur Neg Negative <300 ng/mL    Oxycodone Urine Neg Negative <100 ng/ml    pH, UA 5.0     Drug Screen Comment: see below    Microscopic Urinalysis   Result Value Ref Range    RBC, UA 0-2 0 - 4 /HPF    Urinalysis Comments see below     Hyaline Casts, UA 3 0 - 8 /LPF    WBC, UA 3 0 - 5 /HPF    Epithelial Cells, UA 3 0 - 5 /HPF   Lactic acid, plasma   Result Value Ref Range    Lactic Acid 2.0 0.4 - 2.0 mmol/L     No results found.     ED Medication Orders (From admission, onward)    Start Ordered     Status Ordering Provider Last MAR action: Given - by Salvador Jt on 12/13/20 at [address], [facility] K    12/12/20 0933 12/12/20 0933  sodium chloride flush 0.9 % injection 10 mL  PRN      Acknowledged NORM MORRIS    12/12/20 0933 12/12/20 0933  promethazine (PHENERGAN) tablet 12.5 mg  EVERY 6 HOURS PRN      Acknowledged Mady Valle [facility] K    12/12/20 0933 12/12/20 0933  ondansetron (ZOFRAN) injection 4 mg  EVERY 6 HOURS PRN      Acknowledged Mady Valle [facility] K    12/12/20 0933 12/12/20 0933  potassium chloride 10 mEq/100 mL IVPB (Peripheral Line)  PRN      Acknowledged Eric Vega K    12/12/20 0933 12/12/20 0933  magnesium sulfate 2 g in 50 mL IVPB premix  PRN      Acknowledged NORM MORRIS          Final Impression      1. Hallucinations    2. Ambulatory dysfunction        DISPOSITION         This record is transcribed utilizing voice recognition technology. There are inherent limitations in this technology. In addition, there may be limitations in editing of this report. If there are any discrepancies, please contact me directly.     Javi Lentz MD   12/13/2020         Nsehniimitch Roberson MD  12/13/20 [id number]

## 2020-12-12 NOTE — ED NOTES
Report attempted RN no available extension left RN to call back.       Endy Galicia RN  12/12/20 1215

## 2020-12-12 NOTE — ED NOTES
Patient assisted to bedside commode, urine sample obtained and sent to lab. Patient updated on plan of care that her room upstairs is now ready. Patient verbalized understanding. Report to be called to receiving RN.             Trevor Rivera RN  12/12/20 3750

## 2020-12-12 NOTE — ED PROVIDER NOTES
[address]        Pt Name: Nimesh Garcia  MRN: [id number]  [place] 4/13/1930  Date of evaluation: 12/11/2020  Provider: PINEDA Penn - WALLY  PCP: Alecia Birmingham MD     I have seen and evaluated this patient with my supervising physician [id number] Alin Lennon       Chief Complaint   Patient presents with    Hallucinations     pt reporting that she thinks that she is dehyrated and now having horrible hallucinations, falling alot as well. 2x today. \"Am I green? \"        HISTORY OF PRESENT ILLNESS   (Location, Timing/Onset, Context/Setting, Quality, Duration, Modifying Factors, Severity, Associated Signs and Symptoms)  Note limiting factors. Nimesh Garcia is a 80 y.o. female 80 year that presents to the ER today from home with concern of hallucinations. States that she has not been eating or drinking well over the past several weeks and is sleeping 20+ hours per day. States that she gets up and then goes back to bed. Reports increasing weakness and is having difficulty walking. She reports that she is unable to stand and has fallen twice today already. patient reports that she has been hallucinating and knows that what she sees is not real.  She reports that she sees 2 families that are looking in her window while she is eating dinner, one black family and one white family. States that a young man that she does not recognize even brings a plate to gather food. She is here tonight alone due to the pandemic- her daughter did bring her to the hospital.  I called and spoke with Hanna Jensen, patient's daughter, she does endorse what patient told me tonight. She also reports that she and her  had a difficult time getting the paitent into the car because her legs will not hold her weight. States that she is not safe to come home tonight because she has fallen twice today. Denies any headache, fever, lightheadedness, dizziness, visual disturbances. No chest pain or pressure. No neck or back pain. No shortness of breath, cough, or congestion. No abdominal pain, nausea, vomiting, diarrhea, constipation, or dysuria. No rash. Nursing Notes were all reviewed and agreed with or any disagreements were addressed in the HPI. REVIEW OF SYSTEMS    (2-9 systems for level 4, 10 or more for level 5)     Review of Systems   Constitutional: Negative for activity change, chills and fever. Respiratory: Negative for chest tightness and shortness of breath. Cardiovascular: Negative for chest pain. Gastrointestinal: Negative for abdominal pain, diarrhea, nausea and vomiting. Genitourinary: Negative for dysuria. Psychiatric/Behavioral: Positive for confusion and hallucinations. All other systems reviewed and are negative. Positives and Pertinent negatives as per HPI. Except as noted above in the ROS, all other systems were reviewed and negative.        PAST MEDICAL HISTORY     Past Medical History:   Diagnosis Date    Acute renal failure ([address] 11-27-12    Arthritis     At risk for falling 08/28/2018    Score of 11/24 for Dynamic Gait Index    Cirrhosis ([address] 9/25/2020    Dementia with behavioral problem ([address] 9/25/2020    Frequent falls     Gastroesophageal reflux disease 1/22/2019    Movement disorder     osteoporosis    Nephrotic syndrome 12/28/2012    Thyroid disease          SURGICAL HISTORY     Past Surgical History:   Procedure Laterality Date    COLONOSCOPY      EXCISION OF FACIAL MASS      skin ca    EYE SURGERY      cataract removal; bilat    HIP FRACTURE SURGERY Left 8/21/2019    OPEN REDUCTION INTERNAL FIXATION OF INTERTROCHANTERIC FRACTURE OF LEFT HIPUSING GAMMA NAIL performed by Wan Patel MD at [address] Right 05/27/2018    right gamma nail with cables   [address] Tobacco Use    Smoking status: Never Smoker    Smokeless tobacco: Never Used   Substance Use Topics    Alcohol use: No    Drug use: No       SCREENINGS    [place] Coma Scale  Eye Opening: Spontaneous  Best Verbal Response: Oriented  Best Motor Response: Obeys commands  Trudy Coma Scale Score: 15        PHYSICAL EXAM    (up to 7 for level 4, 8 or more for level 5)     ED Triage Vitals   BP Temp Temp Source Pulse Resp SpO2 Height Weight   12/11/20 2043 12/11/20 2043 12/12/20 0810 12/11/20 2043 12/11/20 2043 12/11/20 2043 12/11/20 2043 12/11/20 2043   136/85 98.1 °F (36.7 °C) Oral 95 16 93 % 5' 5\" (1.651 m) 187 lb (84.8 kg)       Physical Exam  Vitals signs and nursing note reviewed. Constitutional:       Appearance: She is well-developed. She is not diaphoretic. HENT:      Head: Normocephalic and atraumatic. Right Ear: External ear normal.      Left Ear: External ear normal.   Eyes:      General:         Right eye: No discharge. Left eye: No discharge. Neck:      Musculoskeletal: Normal range of motion and neck supple. Vascular: No JVD. Cardiovascular:      Rate and Rhythm: Normal rate and regular rhythm. Pulmonary:      Effort: Pulmonary effort is normal. No respiratory distress. Abdominal:      Palpations: Abdomen is soft. Tenderness: There is no abdominal tenderness. Musculoskeletal: Normal range of motion. Skin:     General: Skin is warm and dry. Coloration: Skin is not pale. Neurological:      Mental Status: She is alert and oriented to person, place, and time. Psychiatric:         Attention and Perception: She perceives visual hallucinations.          Behavior: Behavior normal.         DIAGNOSTIC RESULTS   LABS:    Labs Reviewed   BASIC METABOLIC PANEL - Abnormal; Notable for the following components:       Result Value    Glucose 116 (*)     BUN 23 (*)     CREATININE 1.3 (*)     GFR Non- 38 (*)     GFR  46 (*) All other components within normal limits    Narrative:     Performed at:  [facility] [address]   Phone [phone]   LACTATE, SEPSIS - Abnormal; Notable for the following components:    Lactic Acid, Sepsis 2.2 (*)     All other components within normal limits    Narrative:     Performed at:  [facility] [address] [employer]   Phone [phone]   URINE RT REFLEX TO CULTURE - Abnormal; Notable for the following components:    Clarity, UA CLOUDY (*)     Leukocyte Esterase, Urine SMALL (*)     All other components within normal limits    Narrative:     Performed at:  [facility] [address] [employer]   Phone [phone]   SALICYLATE LEVEL - Abnormal; Notable for the following components:    Salicylate, Serum <5.2 (*)     All other components within normal limits    Narrative:     Performed at:  [facility] [address]   Phone [phone]   ACETAMINOPHEN LEVEL - Abnormal; Notable for the following components:    Acetaminophen Level <5 (*)     All other components within normal limits    Narrative:     Performed at:  [facility] [address]   Phone [phone]   RENAL FUNCTION PANEL - Abnormal; Notable for the following components:    BUN 22 (*)     GFR Non- 47 (*)     GFR  56 (*)     All other components within normal limits    Narrative:     Performed at:  [facility] [address]   Phone [phone]   CBC WITH AUTO DIFFERENTIAL    Narrative:     Performed at:  [facility] [address]   Phone [phone]   PROTIME-INR    Narrative:     Performed at: [facility]  [address],  [place], [address]   Phone [phone]   AMMONIA    Narrative:     Performed at:  [facility]  [address], [address]   Phone [phone]   PROTIME-INR    Narrative:     Performed at:  [facility]  [facility],  [place], [address]   Phone [phone]   APTT    Narrative:     Performed at:  [facility]  [facility],  [place], [address]   Phone [phone]   ETHANOL    Narrative:     Performed at:  [facility]  [facility],  [place], [email or web address]   Phone [phone]   HEPATIC FUNCTION PANEL    Narrative:     Performed at:  [facility]  [address],  [place], [email or web address]   Phone [phone]   URINE DRUG SCREEN    Narrative:     Performed at:  [facility]  [facility], [address]   Phone [phone]   MICROSCOPIC URINALYSIS    Narrative:     Performed at:  [facility]  [address], [address]   Phone [phone]   LACTIC ACID, PLASMA    Narrative:     Performed at:  [facility]  [address],  [place], [email or web address]   Phone [phone]   MAGNESIUM    Narrative:     Performed at:  [facility]  [facility], [address]   Phone [phone]   HEPATIC FUNCTION PANEL    Narrative:     Performed at:  [facility]  [facility],  [place], [address]   Phone [phone]    Narrative:     Simón Guajardo  [employer] tel. [phone],  Rejected cbcwd notification to Souleymane Austin, Lab on lab collect board, 12/13/2020  08:49, by SAHARA  Performed at:  [facility] [address],  [place], [address]   Phone [phone]   CBC WITH AUTO DIFFERENTIAL    Narrative:     Collection has been rescheduled by [facility] at 12/13/2020 11:39 Reason:   With PT   Performed at:  [facility]  [address],  [place], [address]   Phone [phone], SURVEILLANCE (ASYMPTOMATIC/NO EXPOSURE, OR TEST OF CURE)    Narrative:     Performed at:  [facility]  [address] [facility] [address] [facility] [address]   Phone [phone]       All other labs were within normal range or not returned as of this dictation. EKG: All EKG's are interpreted by the Emergency Department Physician in the absence of a cardiologist.  Please see their note for interpretation of EKG. RADIOLOGY:   Non-plain film images such as CT, Ultrasound and MRI are read by the radiologist. Plain radiographic images are visualized and preliminarily interpreted by the ED Provider with the below findings:        Interpretation per the Radiologist below, if available at the time of this note:    XR FOOT RIGHT (2 VIEWS)   Final Result   Findings concerning for nondisplaced 1st digit fracture. CT HEAD WO CONTRAST   Final Result   No acute intracranial abnormality. XR CHEST PORTABLE   Final Result   No acute process.            Ct Head Wo Contrast    Result Date: 12/12/2020 Briefly, this is a 80year old female that presents to the emergency department for confusion. Patient and family report that she has been sleeping more than 20 hours/day and that she is seeing 2 families peak in her window at night when she is eating dinner. Patient actually reports that she sees her grandson in the room although she knows it is not real.      She is very pleasant, story was confirmed with patient's daughter. Labs and full work-up were completed. Unfortunately results were not available prior to the end of my shift but we had planned on admitting this patient for altered mental status. Family states that she is unable to stand because she is likely deconditioned from sleeping so much over the past 3 weeks. Family and patient are agreeable that she should go to rehab after discharge from hospital.    FINAL IMPRESSION      1. Hallucinations    2. Ambulatory dysfunction          DISPOSITION/PLAN   DISPOSITION Admitted 12/12/2020 07:10:21 AM      PATIENT REFERREDTO:  [facility] [address]  [address]  [address]          DISCHARGE MEDICATIONS:  Discharge Medication List as of 12/15/2020  4:31 PM          DISCONTINUED MEDICATIONS:  Discharge Medication List as of 12/15/2020  4:31 PM      STOP taking these medications       romosozumab-aqqg (EVENITY) 105 MG/1. 17ML SOSY injection Comments:   Reason for Stopping:                      (Please note that portions of this note were completed with a voice recognition program.  Efforts were made to edit the dictations but occasionally words are mis-transcribed.)    PINEDA Daley CNP (electronically signed)            PINEDA Daley CNP  12/12/20 933 [facility] APRN - CNP  12/16/20 [id number]

## 2020-12-12 NOTE — ED NOTES
Report called to [facility]. no further questions will send patient to floor.       Joey Fortune RN  12/12/20 7621

## 2020-12-12 NOTE — CARE COORDINATION
Discharge Planning Assessment  Rn/SW discharge planner met w/patient/family member to discuss reason for admission, current living situation, and potential needs at the time of discharge. Demographics/Insurance verified Yes    Current type of dwelling: single level home    Living arrangements: moved in w/daughter    Level of function/support:daughter assists w/meds    PCP:Alistair    Last Visit to PCP: 09/2020-may be sooner, pt stated she didn't have a PCP    DME: wheelchair, walker, cane     Active with any community resources/agencies/skilled home care:Pt stated she has life alert-likely not through [facility]    Medication compliance issues:daughter assists    Financial issues that could impact healthcare:no issues    Transportation at time of d/c (Discussed w/pt/family that on the day of discharge home, tentative time of discharge will be between 10am and noon): TBD    Discussed and provided facilities of choice if transition to a skilled nursing facility is required a the time of discharge-Pt is in OBS-home w/home care vs ARU. Tentative discharge plan: Met w/pt to determine any d/c needs. Pt wavered on answering appropriately vs not (stated she didn't have a PCP-seeing people in the corner). Will reach out to daughter once PT/OT evals completed. Pt would be self pay at SNF for OBS status. Pt stated she has had [facility] in the past. Sw following for dc planning and support.     Electronically signed by DORA Lord  [phone]

## 2020-12-12 NOTE — H&P
Hospital Medicine History and Physical    12/12/2020    Date of Admission: 12/12/2020    Date of Service: Pt seen/examined on 12/12/2020 and admitted to observation. Assessment/plan:  1. Fall at home (multiple falls x2 in 1 day). Could be secondary to generalized weakness due to advanced age. So far, no evidence of infectious process that could be contributing. Maintain on fall precautions. Therapy consult to assist with evaluation. Continue vitamin D.  2. Possible dehydration. Continue gentle intravenous fluid overnight. Monitor fluid status closely. 3. SHARAD on CKD stage III, mild. Continue gentle intravenous fluid overnight. Recheck creatinine in the morning. 4. Lactic acidosis. Likely secondary to dehydration. Continue intravenous fluid. Recheck lactic acid level. Low suspicion for infection. 5. Hallucination. Patient does have underlying history of dementia. She is on Seroquel 12.5 mg nightly; will change to 12.5 mg twice daily. No evidence of infectious process. Monitor. 6. Other comorbidities: CKD stage III with baseline creatinine of 0.9-1.1, gastroesophageal flux disease, history of frequent falls, history of dementia with behavioral disturbance, obesity with BMI of 31 kg/m². 7. Disposition. Consult has been initiated to  to assist with evaluation for possible placement. Activities: Up with assist  Prophylaxis: Subcutaneous Lovenox  Code status: Full code    ==========================================================  Chief complaint:  Chief Complaint   Patient presents with    Hallucinations     pt reporting that she thinks that she is dehyrated and now having horrible hallucinations, falling alot as well. 2x today. \"Am I green? \"        History of Presenting Illness: This is a pleasant 80 y.o. female with history of gastroesophageal flux disease, dementia, obesity with BMI of 31 kg/m², CKD stage III, who was brought to the emergency room for evaluation of possible confusion, admitting the patient has been hallucinating recently. On my evaluation, patient, although has dementia, reports hallucination but unable to clarify what kind. Surprisingly, she was able to tell me where she is, what year it is, who the current president is. She denies cough or fever or chills or urinary symptoms. Outside of this, she laughs intermittently and unable to provide reliable HPI. It appears the family member brought patient in for evaluation for possible dehydration as she had fallen twice on 12/11/2020. Work-up in the emergency room were otherwise unremarkable; ammonia level was normal, CThead was unremarkable. Creatinine was slightly elevated at 1.3 (baseline 0.9-1.1). Past Medical History:      Diagnosis Date    Acute renal failure ([address] 11-27-12    Arthritis     At risk for falling 08/28/2018    Score of 11/24 for Dynamic Gait Index    Cirrhosis ([address] 9/25/2020    Dementia with behavioral problem ([address] 9/25/2020    Frequent falls     Gastroesophageal reflux disease 1/22/2019    Movement disorder     osteoporosis    Nephrotic syndrome 12/28/2012    Thyroid disease        Past Surgical History:      Procedure Laterality Date    COLONOSCOPY      EXCISION OF FACIAL MASS      skin ca    EYE SURGERY      cataract removal; bilat    HIP FRACTURE SURGERY Left 8/21/2019    OPEN REDUCTION INTERNAL FIXATION OF INTERTROCHANTERIC FRACTURE OF LEFT HIPUSING GAMMA NAIL performed by Triny Worthy MD at [address] Right 05/27/2018    right gamma nail with cables    SKIN BIOPSY         Medications (prior to admission):  Prior to Admission medications    Medication Sig Start Date End Date Taking?  Authorizing Provider cyproheptadine (PERIACTIN) 4 MG tablet TAKE ONE TABLET BY MOUTH THREE TIMES A DAY AS NEEDED FOR ITCHING 12/4/20   M Devan Sainz MD   levothyroxine (SYNTHROID) 50 MCG tablet TAKE ONE TABLET BY MOUTH DAILY 11/30/20   M Maddie Guevara MD   alendronate (FOSAMAX) 70 MG tablet Take 1 tablet by mouth every 7 days 10/28/20   Jimbo Mejias MD   QUEtiapine (SEROQUEL) 25 MG tablet Take 0.5 tablets by mouth nightly 9/25/20   Khanh Benítez MD   lactulose (CHRONULAC) 10 GM/15ML solution Take 30 mLs by mouth 2 times daily 9/25/20 3/24/21  Khanh Benítez MD   Ascorbic Acid 500 MG CHEW Take 1 tablet by mouth daily 8/31/20   Alli Mccoy MD   romosozumab-aqqg (EVENITY) 105 MG/1. 17ML SOSY injection Inject 210 mg into the skin every 30 days 8-    Historical Provider, MD   triamcinolone (KENALOG) 0.1 % ointment Apply topically 2 times daily Apply topically 2 times daily. Historical Provider, MD   sertraline (ZOLOFT) 25 MG tablet Take 1 tablet by mouth daily 8/27/20   Liz Sainz MD   cyanocobalamin ([employer] VITAMIN B12) 1000 MCG tablet Take 1 tablet by mouth daily 8/27/20   Liz Sainz MD   DULoxetine (CYMBALTA) 30 MG extended release capsule TAKE ONE CAPSULE BY MOUTH DAILY 8/27/20   Alli Mccoy MD   Caltrate 600+D Plus Minerals (CALTRATE) 600-800 MG-UNIT TABS tablet Take 1 tablet by mouth 2 times daily 8/27/20   Liz Sainz MD   aspirin 325 MG EC tablet Take 1 tablet by mouth daily 9/5/19   Marah Marin MD   Cholecalciferol (VITAMIN D) 2000 units CAPS capsule Take 1 capsule by mouth daily     Historical Provider, MD       Allergy(ies):  Metoprolol, Amlodipine, Sulfa antibiotics, and Adhesive tape    Social History:  TOBACCO:  reports that she has never smoked. She has never used smokeless tobacco.  ETOH:  reports no history of alcohol use.     Family History:      Problem Relation Age of Onset    High Blood Pressure Mother     Cancer Father         lung cancer; metastic    Cancer Daughter 52 colon cancer    Cancer Brother     Cancer Brother     Diabetes Brother        Review of Systems:  Pertinent positives are listed in HPI. At least 10-point ROS reviewed and were negative. Vitals and physical examination:  /85   Pulse 95   Temp 98.1 °F (36.7 °C)   Resp 16   Ht 5' 5\" (1.651 m)   Wt 187 lb (84.8 kg)   SpO2 93%   BMI 31.12 kg/m²   Gen/overall appearance: Not in acute distress. Alert. Oriented X3 (after multiple trials, able to state correct year as well). Head: Normocephalic, atraumatic  Eyes: EOMI, good acuity  ENT: Oral mucosa moist  Neck: No JVD, thyromegaly  CVS: Nml S1S2, no MRG, RRR  Pulm: Clear bilaterally. No crackles/wheezes  Gastrointestinal: Soft, NT/ND, +BS  Musculoskeletal: No edema. Warm  Neuro: No focal deficit. Moves extremity spontaneously. Psychiatry: Appropriate affect. Not agitated. Skin: Warm, dry with normal turgor.  No rash  Capillary refill: Brisk,< 3 seconds   Peripheral Pulses: +2 palpable, equal bilaterally       Labs/imaging/EKG:  CBC:   Recent Labs     12/11/20 2057   WBC 8.0   HGB 15.1        BMP:    Recent Labs     12/11/20 2057      K 4.8      CO2 25   BUN 23*   CREATININE 1.3*   GLUCOSE 116*     Hepatic:   Recent Labs     12/11/20 2057   AST 22   ALT 12   BILITOT 0.5   ALKPHOS 91       Ct Head Wo Contrast    Result Date: 12/12/2020 EXAMINATION: CT OF THE HEAD WITHOUT CONTRAST  12/11/2020 11:34 pm TECHNIQUE: CT of the head was performed without the administration of intravenous contrast. Dose modulation, iterative reconstruction, and/or weight based adjustment of the mA/kV was utilized to reduce the radiation dose to as low as reasonably achievable. COMPARISON: None. HISTORY: ORDERING SYSTEM PROVIDED HISTORY: hallucinations TECHNOLOGIST PROVIDED HISTORY: If patient is on cardiac monitor and/or pulse ox, they may be taken off cardiac monitor and pulse ox, left on O2 if currently on. All monitors reattached when patient returns to room. Has a \"code stroke\" or \"stroke alert\" been called? ->No Reason for exam:->hallucinations Reason for Exam: Hallucinations (pt reporting that she thinks that she is dehyrated and now having horrible hallucinations, falling alot as well. 2x today. \"Am I green? \" ) Acuity: Acute Type of Exam: Initial FINDINGS: BRAIN/VENTRICLES: There is no acute intracranial hemorrhage, mass effect or midline shift. No abnormal extra-axial fluid collection. The gray-white differentiation is maintained without evidence of an acute infarct. There is prominence of the ventricles and sulci due to global parenchymal volume loss. There are nonspecific areas of hypoattenuation within the periventricular and subcortical white matter, which likely represent chronic microvascular ischemic change. ORBITS: The visualized portion of the orbits demonstrate no acute abnormality. SINUSES: The visualized paranasal sinuses and mastoid air cells demonstrate no acute abnormality. SOFT TISSUES/SKULL: No acute abnormality of the visualized skull or soft tissues. No acute intracranial abnormality.      Xr Chest Portable    Result Date: 12/11/2020 EXAMINATION: ONE XRAY VIEW OF THE CHEST 12/11/2020 9:15 pm COMPARISON: Chest x-ray from 09/22/2020 HISTORY: ORDERING SYSTEM PROVIDED HISTORY: fall TECHNOLOGIST PROVIDED HISTORY: Reason for exam:->fall Reason for Exam: fall Acuity: Unknown Type of Exam: Unknown FINDINGS: The lungs are without acute focal process. There is no effusion or pneumothorax. The cardiomediastinal silhouette is stable. The osseous structures are stable. No acute process.          Thank you Beto Kamara MD for the opportunity to be involved in this patient's care.    -----------------------------  Zhane Lopez MD  [facility]

## 2020-12-12 NOTE — PROGRESS NOTES
1026  Admission and assessment complete. Patient answering questions appropriately. She reports hearing and seeing people in her room, but she says she knows they are not really there. Breakfast ordered. 2:34 PM  Patient assisted to the bedside commode. She voided 50 cc of urine. Back to bed. Max assist of two. Used stedy to get patient back in bed. Bladder scanned for 165. IVF running. Will continue to monitor.

## 2020-12-12 NOTE — ED NOTES
Rounded on patient,  Patient resting in bed with both eyes closed. Call light within reach will recheck on patient.       Meenu Sparrow RN  12/12/20 1740

## 2020-12-12 NOTE — PROGRESS NOTES
Pt unsteady on feet. She needed two people to help get on CT table. She came in wheelchair from waiting room, but brought back to room 23 in stretcher.

## 2020-12-13 ENCOUNTER — APPOINTMENT (OUTPATIENT)
Dept: GENERAL RADIOLOGY | Age: 85
DRG: 641 | End: 2020-12-13
Payer: MEDICARE

## 2020-12-13 LAB
ALBUMIN SERPL-MCNC: 3.5 G/DL (ref 3.4–5)
ALP BLD-CCNC: 80 U/L (ref 40–129)
ALT SERPL-CCNC: 10 U/L (ref 10–40)
ANION GAP SERPL CALCULATED.3IONS-SCNC: 7 MMOL/L (ref 3–16)
AST SERPL-CCNC: 16 U/L (ref 15–37)
BASOPHILS ABSOLUTE: 0 K/UL (ref 0–0.2)
BASOPHILS RELATIVE PERCENT: 0.7 %
BILIRUB SERPL-MCNC: 0.5 MG/DL (ref 0–1)
BILIRUBIN DIRECT: <0.2 MG/DL (ref 0–0.3)
BILIRUBIN, INDIRECT: NORMAL MG/DL (ref 0–1)
BUN BLDV-MCNC: 22 MG/DL (ref 7–20)
CALCIUM SERPL-MCNC: 8.7 MG/DL (ref 8.3–10.6)
CHLORIDE BLD-SCNC: 107 MMOL/L (ref 99–110)
CO2: 29 MMOL/L (ref 21–32)
CREAT SERPL-MCNC: 1.1 MG/DL (ref 0.6–1.2)
EOSINOPHILS ABSOLUTE: 0.4 K/UL (ref 0–0.6)
EOSINOPHILS RELATIVE PERCENT: 6.3 %
GFR AFRICAN AMERICAN: 56
GFR NON-AFRICAN AMERICAN: 47
GLUCOSE BLD-MCNC: 93 MG/DL (ref 70–99)
HCT VFR BLD CALC: 39.8 % (ref 36–48)
HEMOGLOBIN: 13 G/DL (ref 12–16)
LYMPHOCYTES ABSOLUTE: 2 K/UL (ref 1–5.1)
LYMPHOCYTES RELATIVE PERCENT: 29 %
MAGNESIUM: 2.1 MG/DL (ref 1.8–2.4)
MCH RBC QN AUTO: 31.1 PG (ref 26–34)
MCHC RBC AUTO-ENTMCNC: 32.7 G/DL (ref 31–36)
MCV RBC AUTO: 95.2 FL (ref 80–100)
MONOCYTES ABSOLUTE: 0.8 K/UL (ref 0–1.3)
MONOCYTES RELATIVE PERCENT: 11.4 %
NEUTROPHILS ABSOLUTE: 3.6 K/UL (ref 1.7–7.7)
NEUTROPHILS RELATIVE PERCENT: 52.6 %
PDW BLD-RTO: 14.8 % (ref 12.4–15.4)
PHOSPHORUS: 2.9 MG/DL (ref 2.5–4.9)
PLATELET # BLD: 193 K/UL (ref 135–450)
PMV BLD AUTO: 8.9 FL (ref 5–10.5)
POTASSIUM SERPL-SCNC: 4.2 MMOL/L (ref 3.5–5.1)
RBC # BLD: 4.18 M/UL (ref 4–5.2)
REASON FOR REJECTION: NORMAL
REJECTED TEST: NORMAL
SODIUM BLD-SCNC: 143 MMOL/L (ref 136–145)
TOTAL PROTEIN: 6.4 G/DL (ref 6.4–8.2)
WBC # BLD: 6.8 K/UL (ref 4–11)

## 2020-12-13 PROCEDURE — 6370000000 HC RX 637 (ALT 250 FOR IP): Performed by: INTERNAL MEDICINE

## 2020-12-13 PROCEDURE — 97166 OT EVAL MOD COMPLEX 45 MIN: CPT

## 2020-12-13 PROCEDURE — 80069 RENAL FUNCTION PANEL: CPT

## 2020-12-13 PROCEDURE — 73620 X-RAY EXAM OF FOOT: CPT

## 2020-12-13 PROCEDURE — 97530 THERAPEUTIC ACTIVITIES: CPT

## 2020-12-13 PROCEDURE — 85025 COMPLETE CBC W/AUTO DIFF WBC: CPT

## 2020-12-13 PROCEDURE — 83735 ASSAY OF MAGNESIUM: CPT

## 2020-12-13 PROCEDURE — 36415 COLL VENOUS BLD VENIPUNCTURE: CPT

## 2020-12-13 PROCEDURE — G0378 HOSPITAL OBSERVATION PER HR: HCPCS

## 2020-12-13 PROCEDURE — 96372 THER/PROPH/DIAG INJ SC/IM: CPT

## 2020-12-13 PROCEDURE — 6360000002 HC RX W HCPCS: Performed by: INTERNAL MEDICINE

## 2020-12-13 PROCEDURE — 80076 HEPATIC FUNCTION PANEL: CPT

## 2020-12-13 PROCEDURE — 97162 PT EVAL MOD COMPLEX 30 MIN: CPT

## 2020-12-13 PROCEDURE — 2580000003 HC RX 258: Performed by: INTERNAL MEDICINE

## 2020-12-13 RX ORDER — QUETIAPINE FUMARATE 25 MG/1
12.5 TABLET, FILM COATED ORAL ONCE
Status: COMPLETED | OUTPATIENT
Start: 2020-12-13 | End: 2020-12-13

## 2020-12-13 RX ORDER — QUETIAPINE FUMARATE 25 MG/1
25 TABLET, FILM COATED ORAL 2 TIMES DAILY
Status: DISCONTINUED | OUTPATIENT
Start: 2020-12-13 | End: 2020-12-15 | Stop reason: HOSPADM

## 2020-12-13 RX ADMIN — DULOXETINE HYDROCHLORIDE 30 MG: 30 CAPSULE, DELAYED RELEASE ORAL at 10:15

## 2020-12-13 RX ADMIN — LACTULOSE 20 G: 20 SOLUTION ORAL at 19:58

## 2020-12-13 RX ADMIN — LACTULOSE 20 G: 20 SOLUTION ORAL at 10:16

## 2020-12-13 RX ADMIN — DOCUSATE SODIUM 100 MG: 100 CAPSULE ORAL at 10:15

## 2020-12-13 RX ADMIN — SODIUM CHLORIDE: 9 INJECTION, SOLUTION INTRAVENOUS at 01:15

## 2020-12-13 RX ADMIN — LEVOTHYROXINE SODIUM 50 MCG: 0.03 TABLET ORAL at 05:03

## 2020-12-13 RX ADMIN — SERTRALINE HYDROCHLORIDE 12.5 MG: 50 TABLET ORAL at 10:16

## 2020-12-13 RX ADMIN — Medication 2000 UNITS: at 10:16

## 2020-12-13 RX ADMIN — QUETIAPINE FUMARATE 12.5 MG: 25 TABLET ORAL at 14:07

## 2020-12-13 RX ADMIN — ENOXAPARIN SODIUM 40 MG: 40 INJECTION SUBCUTANEOUS at 10:16

## 2020-12-13 RX ADMIN — ASPIRIN 325 MG: 325 TABLET, COATED ORAL at 10:16

## 2020-12-13 RX ADMIN — QUETIAPINE FUMARATE 25 MG: 25 TABLET ORAL at 19:59

## 2020-12-13 NOTE — PROGRESS NOTES
Occupational Therapy   Occupational Therapy Initial Assessment  Date: 2020   Patient Name: Meir Alcantara  MRN: [id number]     : 1930    Date of Service: 2020    Discharge Recommendations: Meir Alcantara scored a  on the AM-PAC ADL Inpatient form. Current research shows that an AM-PAC score of 17 or less is typically not associated with a discharge to the patient's home setting. Based on the patient's AM-PAC score and their current ADL deficits, it is recommended that the patient have 5-7 sessions per week of Occupational Therapy at d/c to increase the patient's independence. At this time, this patient demonstrates the endurance, and/or tolerance for 3 hours of therapy each day, with a treatment frequency of 5-7x/wk. Please see assessment section for further patient specific details. If patient discharges prior to next session this note will serve as a discharge summary. Please see below for the latest assessment towards goals. OT Equipment Recommendations  Equipment Needed: No    Assessment   Performance deficits / Impairments: Decreased functional mobility ; Decreased ADL status; Decreased ROM; Decreased cognition;Decreased endurance;Decreased balance  Assessment: Pt is not at baseline level of function and will benefit from continued OT to address the above limitations. Treatment Diagnosis: Decreased functional mobility, ADL status, ROM, balance, activity tolerance, cognition related to falls  Prognosis: Fair  Decision Making: Medium Complexity  OT Education: OT Role;Plan of Care;Transfer Training;Orientation  Patient Education: d/c recommendations - pt not independent in learning, requires reinforcement  Barriers to Learning: cognition  REQUIRES OT FOLLOW UP: Yes  Activity Tolerance  Activity Tolerance: Patient limited by fatigue  Activity Tolerance: Pt requires moderate encouragement to participate in functional tasks. Safety Devices  Safety Devices in place:  Yes Type of devices: All fall risk precautions in place; Left in chair;Call light within reach;Nurse notified; Chair alarm in place; Patient at risk for falls           Patient Diagnosis(es): The primary encounter diagnosis was Hallucinations. A diagnosis of Ambulatory dysfunction was also pertinent to this visit. has a past medical history of Acute renal failure ([facility] [address] Arthritis, At risk for falling, Cirrhosis ([facility] [address] Dementia with behavioral problem ([facility] [address] Frequent falls, Gastroesophageal reflux disease, Movement disorder, Nephrotic syndrome, and Thyroid disease. has a past surgical history that includes Excision of Facial Mass; skin biopsy; Colonoscopy; eye surgery; other surgical history (Right, 05/27/2018); and Hip fracture surgery (Left, 8/21/2019). Treatment Diagnosis: Decreased functional mobility, ADL status, ROM, balance, activity tolerance, cognition related to falls      Restrictions  Restrictions/Precautions  Restrictions/Precautions: Fall Risk(high fall risk)  Required Braces or Orthoses?: No  Position Activity Restriction  Other position/activity restrictions: 27-year-old female who lives at home with a daughter presenting this evening with complaints of unable to walk and has had 2 falls today as well as hallucinating. Subjective   General  Chart Reviewed: Yes  Additional Pertinent Hx: dementia, bilat hip fx sx  Family / Caregiver Present: No  Diagnosis: falls  Subjective  Subjective: Pt seated in recliner upon arrival, agreeable to evaluation. Pt disoriented and talking about being at a sports banquet. Pt pleasantly confused.   Patient Currently in Pain: Denies  Vital Signs  Patient Currently in Pain: Denies  Social/Functional History  Social/Functional History  Lives With: Daughter(provides 24 hour supervision)  Type of Home: House  Home Layout: One level(states family converted garage to mother-in-law suite with bathroom and she has a few steps with rails to enter) Home Access: Stairs to enter without rails, Stairs to enter with rails  Entrance Stairs - Number of Steps: 2 SHERON with B rail  Entrance Stairs - Rails: Both  Bathroom Shower/Tub: Tub/Shower unit  Bathroom Toilet: Standard(states slight raised)  Bathroom Equipment: Tub transfer bench  Home Equipment: Rolling walker, [employer], 4 wheeled walker, Sock aid, Reacher, Lift chair  ADL Assistance: Independent(states daughter always present with she showers)  Homemaking Responsibilities: No(daughter performs)  Ambulation Assistance: Independent(with RW)  Transfer Assistance: Independent  Active : No  Patient's  Info: Daughter  Additional Comments: difficulty identifying how many falls she had in last 6 months  - at least 2 per chart review.        Objective        Orientation  Overall Orientation Status: Within Functional Limits(pt confused throughout evaluation, able to state where she was, situation, and time when asked orientation questions)  Observation/Palpation  Posture: Good  Balance  Sitting Balance: Supervision(unsupported seated in recliner)  Standing Balance: Dependent/Total(min A of 2 progressing to CGA of 1)  Standing Balance  Time: x1 min, x15 sec  Activity: static stance at RW  Comment: UB tremulous, limited activity tolerance  Functional Mobility  Functional Mobility Comments: unsafe to attempt this date  ADL  Additional Comments: ADLs not addressed, pt declined  Tone RUE  RUE Tone: Normotonic  Tone LUE  LUE Tone: Normotonic  Coordination  Movements Are Fluid And Coordinated: Yes     Bed mobility  Comment: KRISHNA - pt in recliner upon arrival and returned to recliner at end of evaluation  Transfers  Sit to stand: Dependent/Total(mod A of 2 first sit>stand, max A of 2 second sit>stand)  Stand to sit: Dependent/Total(mod A of 2, max A of 2)  Transfer Comments: extended rest break in between sit>stands  Vision - Basic Assessment  Prior Vision: Wears glasses only for reading Patient Visual Report: No visual complaint reported. Cognition  Overall Cognitive Status: Exceptions  Arousal/Alertness: Appropriate responses to stimuli  Following Commands: Follows one step commands with repetition; Follows one step commands with increased time  Attention Span: Attends with cues to redirect  Memory: Decreased recall of recent events;Decreased short term memory;Decreased recall of biographical Information  Safety Judgement: Decreased awareness of need for assistance;Decreased awareness of need for safety  Problem Solving: Decreased awareness of errors  Insights: Decreased awareness of deficits  Initiation: Requires cues for some  Sequencing: Requires cues for some        Sensation  Overall Sensation Status: WFL        LUE AROM (degrees)  LUE AROM : Exceptions  LUE General AROM: proximal ROM limited by arthritis, distally appeared WFL  L Shoulder Flexion 0-180: 0-~70 degrees  RUE AROM (degrees)  RUE AROM : Exceptions  RUE General AROM: distally WFL  R Shoulder Flexion 0-180: very minimal AROM, limited by arthritis  LUE Strength  LUE Strength Comment: distally 4/5  RUE Strength  RUE Strength Comment: distally 4/5                   Plan   Plan  Times per week: 3-5x  Times per day: Daily  Current Treatment Recommendations: ROM, Functional Mobility Training, Safety Education & Training, Self-Care / ADL, Endurance Training, Cognitive Reorientation, Equipment Evaluation, Education, & procurement, Patient/Caregiver Education & Training             AM-PAC Score        AM-[facility] Inpatient Daily Activity Raw Score: 12 (12/13/20 1239)  AM-PAC Inpatient ADL T-Scale Score : 30.6 (12/13/20 1239)  ADL Inpatient CMS 0-100% Score: 66.57 (12/13/20 1239)  ADL Inpatient CMS G-Code Modifier : CL (12/13/20 [facility])    Goals  Short term goals  Time Frame for Short term goals: Discharge  Short term goal 1: Pt will complete functional transfers with mod A. Short term goal 2: Pt will complete functional mobility with mod A. Short term goal 3: Pt will complete UB ADLs with min A. Short term goal 4: Pt will complete LB ADLs with max A. Short term goal 5: Pt will tolerate standing 3-5 min for a functional task.   Long term goals  Time Frame for Long term goals : STG=LTG       Therapy Time   Individual Concurrent Group Co-treatment   Time In 1108         Time Out 1146         Minutes 38                Timed Code Treatment Minutes:   0 (billing split with PT d/t pt being in observation status)    Total Treatment Minutes:  [address], [address], [place] [id number]

## 2020-12-13 NOTE — PROGRESS NOTES
Hospital Medicine Progress Note     Date:  12/13/2020    PCP: Sanaz Nicole MD (Tel: [phone])    Date of Admission: 12/11/2020    Chief complaint:   Chief Complaint   Patient presents with    Hallucinations     pt reporting that she thinks that she is dehyrated and now having horrible hallucinations, falling alot as well. 2x today. \"Am I green? \"        Brief hospital course: 80year-old with history of dementia admitted for increased hallucination, fall x2 at home. 1. Fall at home (multiple falls x2 in 1 day). Could be secondary to generalized weakness due to advanced age. So far, no evidence of infectious process that could be contributing. Maintain on fall precautions. Therapy consult to assist with evaluation. Continue vitamin D.  1. Daughter is interested in possible rehab unit placement at discharge. Consult initiated to ARU. 2. Dehydration, mild. Patient was on intravenous fluid, discontinued on 12/13/2020. Continue fluid intake. Overall, appears well-hydrated at this time. 3. SHARAD on CKD stage III, mild. Much improved with intravenous fluid. Continue fluid intake. 4. Lactic acidosis. Likely secondary to dehydration. Lactic acidosis resolved with intravenous fluid. No suspicion for infection. 5. Right second toe bruise. Daughter is concerned about possible fracture. X-rayright foot ordered. 6. Hallucination. Patient does have underlying history of dementia. Increased Seroquel from 12.5 mg twice daily to 25 mg twice daily. So far, no evidence of infectious process. 7. Other comorbidities: CKD stage III with baseline creatinine of 0.9-1.1, gastroesophageal flux disease, history of frequent falls, history of dementia with behavioral disturbance, obesity with BMI of 31 kg/m². 8. Disposition. Consult initiated to ARU per family request.  Discussed with daughter over the phone.       Diet: DIET GENERAL;    Code status: Full Code   ----------  Subjective Still has ongoing hallucination, states not as bad as prior to coming in.    Objective  Physical exam:  Vitals: /79   Pulse 83   Temp 97.7 °F (36.5 °C) (Oral)   Resp 16   Ht 5' 5\" (1.651 m)   Wt 187 lb (84.8 kg)   SpO2 92%   BMI 31.12 kg/m²   Gen/overall appearance: Not in acute distress. Alert. Mostly oriented, able to tell me the name of current president, where she is, why she is here and current year. Head: Normocephalic, atraumatic  Eyes: EOMI, good acuity  ENT: Oral mucosa moist  Neck: No JVD, thyromegaly  CVS: Nml S1S2, no MRG, RRR  Pulm: Clear bilaterally. No crackles/wheezes  Gastrointestinal: Soft, NT/ND, +BS  Musculoskeletal: No edema. Warm  Neuro: No focal deficit. Moves extremity spontaneously. Psychiatry: Appropriate affect. Not agitated. Skin: Warm, dry with normal turgor. No rash  Capillary refill: Brisk,< 3 seconds   Peripheral Pulses: +2 palpable, equal bilaterally      24HR INTAKE/OUTPUT:      Intake/Output Summary (Last 24 hours) at 12/13/2020 1304  Last data filed at 12/13/2020 0459  Gross per 24 hour   Intake 500 ml   Output 300 ml   Net 200 ml     I/O last 3 completed shifts: In: 500 [P.O.:240; I.V.:260]  Out: 300 [Urine:300]  No intake/output data recorded.   Meds:    QUEtiapine  12.5 mg Oral Once    [START ON 12/14/2020] sertraline  25 mg Oral Daily    QUEtiapine  25 mg Oral BID    aspirin  325 mg Oral Daily    DULoxetine  30 mg Oral Daily    levothyroxine  50 mcg Oral Daily    sodium chloride flush  10 mL Intravenous 2 times per day    enoxaparin  40 mg Subcutaneous Daily    docusate sodium  100 mg Oral Daily    Vitamin D  50 mcg Oral Daily    lactulose  20 g Oral BID     Infusions:   PRN Meds: sodium chloride flush, promethazine **OR** ondansetron, potassium chloride, magnesium sulfate, acetaminophen, polyethylene glycol    Labs/imaging:  CBC:   Recent Labs     12/11/20 2057   WBC 8.0   HGB 15.1        BMP:    Recent Labs     12/11/20 2057 12/13/20

## 2020-12-13 NOTE — PROGRESS NOTES
Physical Therapy    Facility/Department: [address] ORTHO/NEURO NURSING  Initial Assessment    NAME: Nick David  : 1930  MRN: [id number]    Date of Service: 2020    Discharge Recommendations:  Nick David scored a 9/24 on the AM-PAC short mobility form. Current research shows that an AM-PAC score of 17 or less is typically not associated with a discharge to the patient's home setting. Based on the patient's AM-PAC score and their current functional mobility deficits, it is recommended that the patient have 5-7 sessions per week of Physical Therapy at d/c to increase the patient's independence. At this time, this patient demonstrates the endurance, and/or tolerance for 3 hours of therapy each day, with a treatment frequency of 5-7x/wk. Please see assessment section for further patient specific details. If patient discharges prior to next session this note will serve as a discharge summary. Please see below for the latest assessment towards goals. 5-7 sessions per week   PT Equipment Recommendations  Equipment Needed: No    Assessment   Body structures, Functions, Activity limitations: Decreased functional mobility ; Decreased safe awareness;Decreased balance;Decreased endurance  Assessment: Patient not at baseline function and would benefit from skilled PT to address above deficits and facilitate return to baseline functin  Treatment Diagnosis: decreased functional mobility, impaired gait, weakness  Prognosis: Good  Decision Making: Medium Complexity  Clinical Presentation: evolving  PT Education: Goals;Plan of Care;PT Role  Patient Education: d/c recommendations - verbalized understanding  Barriers to Learning: cognitive  REQUIRES PT FOLLOW UP: Yes  Activity Tolerance  Activity Tolerance: Patient limited by fatigue       Patient Diagnosis(es): The primary encounter diagnosis was Hallucinations. A diagnosis of Ambulatory dysfunction was also pertinent to this visit. has a past medical history of Acute renal failure ([facility] [address] Arthritis, At risk for falling, Cirrhosis ([address] Dementia with behavioral problem ([address] Frequent falls, Gastroesophageal reflux disease, Movement disorder, Nephrotic syndrome, and Thyroid disease. has a past surgical history that includes Excision of Facial Mass; skin biopsy; Colonoscopy; eye surgery; other surgical history (Right, 05/27/2018); and Hip fracture surgery (Left, 8/21/2019). Restrictions  Restrictions/Precautions  Restrictions/Precautions: Fall Risk(high fall risk)  Required Braces or Orthoses?: No  Position Activity Restriction  Other position/activity restrictions: 80-year-old female who lives at home with a daughter presenting this evening with complaints of unable to walk and has had 2 falls today as well as hallucinating. Vision/Hearing  Vision: Impaired  Vision Exceptions: Wears glasses for reading  Hearing: Exceptions to [facility]  Hearing Exceptions: Hard of hearing/hearing concerns     Subjective  General  Chart Reviewed: Yes  Family / Caregiver Present: No  Diagnosis: fall  Follows Commands: Within Functional Limits  General Comment  Comments: seated in bedside chair upon arrival. Tangential conversation noted. Difficulty answering questions, for example when asked who she lived with she repsonded with \"I voted for Biden\"  Subjective  Subjective: States \"I am really confused\" Talking about a sports conference yesterday, but oriented.  Denied pain  Pain Screening  Patient Currently in Pain: Denies  Vital Signs  Patient Currently in Pain: Denies       Orientation  Orientation  Overall Orientation Status: Within Functional Limits(confused on date)  Social/Functional History  Social/Functional History  Lives With: Daughter(provides 24 hour supervision)  Type of Home: House  Home Layout: One level(states family converted garage to mother-in-law suite with bathroom and she has a few steps with rails to enter) Home Access: Stairs to enter without rails, Stairs to enter with rails  Entrance Stairs - Number of Steps: 2 SHERON with B rail  Entrance Stairs - Rails: Both  Bathroom Shower/Tub: Tub/Shower unit  Bathroom Toilet: Standard(states slight raised)  Bathroom Equipment: Tub transfer bench  Home Equipment: Rolling walker, [employer], 4 wheeled walker, Sock aid, Reacher, Lift chair  ADL Assistance: Independent(states daughter always present with she showers)  Homemaking Responsibilities: No(daughter performs)  Ambulation Assistance: Independent(with RW)  Transfer Assistance: Independent  Active : No  Patient's  Info: Daughter  Additional Comments: difficulty identifying how many falls she had in last 6 months  - at least 2 per chart review. Objective     Observation/Palpation  Observation: generalized tremors noted with all activity    AROM RLE (degrees)  RLE AROM: WFL  AROM LLE (degrees)  LLE AROM : WFL  Strength RLE  Strength RLE: WFL(grossly 4/5, hip flexion 3+/5)  Strength LLE  Strength LLE: WFL(grossly 4/5, hip flexion 3+/5)     Sensation  Overall Sensation Status: WFL     Transfers  Sit to Stand: 2 Person Assistance(mod A of 2 from chair x 1st trial, max A of 2 from chair x 2nd trial)  Stand to sit: Moderate Assistance  Ambulation  Ambulation?: No(unable to advance LEs)     Balance  Sitting - Static: Fair  Sitting - Dynamic: Fair(able to stand at walker x 1-2 minutes for first trial and less than 1 minute. Initially min A for posterior lean, progressed to CGA)        Plan   Plan  Times per week: 3-5  Times per day: Daily  Current Treatment Recommendations: Strengthening, Transfer Training, Functional Mobility Training, Balance Training, Gait Training, Safety Education & Training, Patient/Caregiver Education & Training  Safety Devices  Type of devices:  All fall risk precautions in place, Call light within reach, Chair alarm in place, Nurse notified, Left in chair, Patient at risk for falls Restraints  Initially in place: No      AM-PAC Score  AM-PAC Inpatient Mobility Raw Score : 9 (12/13/20 1257)  AM-PAC Inpatient T-Scale Score : 30.55 (12/13/20 1257)  Mobility Inpatient CMS 0-100% Score: 81.38 (12/13/20 1257)  Mobility Inpatient CMS G-Code Modifier : CM (12/13/20 1257)          Goals  Short term goals  Time Frame for Short term goals: To be met prior to discharge  Short term goal 1: bed mobility with min A  Short term goal 2: Sit to/from stand with mod A  Short term goal 3: Ambulate 22' with RW and CGA  Patient Goals   Patient goals : unable to state       Therapy Time   Individual Concurrent Group Co-treatment   Time In 1108         Time Out 1146         Minutes 38         Timed Code Treatment Minutes: 15 Minutes     Units billed shared with OT due to observation status.      Kay Kidney, PT    Thanks, Kay Kidney, PT, DPT [id number]

## 2020-12-13 NOTE — PROGRESS NOTES
0730 Pt resting in bed, no s/s of distress. Shift handoff completed with Rere at bedside. 1100 Pt up in chair tolerated well. Fall precautions in place. Pt remains oriented to self and situation, but frequently makes incomprehensible statements. 65 Daughter at bedside, concerned about 2nd toe on right foot from fall at home. Toe appears bruised. Dr. Escobar Bravo notified see new orders. 1656: Dr. Escobar Bravo updated on xray results see new orders.

## 2020-12-14 ENCOUNTER — TELEPHONE (OUTPATIENT)
Dept: INTERNAL MEDICINE CLINIC | Age: 85
End: 2020-12-14

## 2020-12-14 PROCEDURE — 99220 PR INITIAL OBSERVATION CARE/DAY 70 MINUTES: CPT | Performed by: NURSE PRACTITIONER

## 2020-12-14 PROCEDURE — 6370000000 HC RX 637 (ALT 250 FOR IP): Performed by: INTERNAL MEDICINE

## 2020-12-14 PROCEDURE — 2580000003 HC RX 258

## 2020-12-14 PROCEDURE — 6360000002 HC RX W HCPCS: Performed by: NURSE PRACTITIONER

## 2020-12-14 PROCEDURE — G0378 HOSPITAL OBSERVATION PER HR: HCPCS

## 2020-12-14 PROCEDURE — 6360000002 HC RX W HCPCS: Performed by: INTERNAL MEDICINE

## 2020-12-14 PROCEDURE — 96372 THER/PROPH/DIAG INJ SC/IM: CPT

## 2020-12-14 PROCEDURE — 99234 HOSP IP/OBS SM DT SF/LOW 45: CPT | Performed by: ORTHOPAEDIC SURGERY

## 2020-12-14 RX ORDER — ZIPRASIDONE MESYLATE 20 MG/ML
10 INJECTION, POWDER, LYOPHILIZED, FOR SOLUTION INTRAMUSCULAR ONCE
Status: COMPLETED | OUTPATIENT
Start: 2020-12-14 | End: 2020-12-14

## 2020-12-14 RX ADMIN — ZIPRASIDONE MESYLATE 10 MG: 20 INJECTION, POWDER, LYOPHILIZED, FOR SOLUTION INTRAMUSCULAR at 02:13

## 2020-12-14 RX ADMIN — LEVOTHYROXINE SODIUM 50 MCG: 0.03 TABLET ORAL at 05:47

## 2020-12-14 RX ADMIN — ENOXAPARIN SODIUM 40 MG: 40 INJECTION SUBCUTANEOUS at 11:06

## 2020-12-14 RX ADMIN — LACTULOSE 20 G: 20 SOLUTION ORAL at 21:33

## 2020-12-14 RX ADMIN — QUETIAPINE FUMARATE 25 MG: 25 TABLET ORAL at 21:33

## 2020-12-14 RX ADMIN — WATER: 1 INJECTION INTRAMUSCULAR; INTRAVENOUS; SUBCUTANEOUS at 02:30

## 2020-12-14 NOTE — PROGRESS NOTES
[facility] PROGRESS NOTE    12/14/2020 11:52 AM        Name: Lucas Spence . Admitted: 12/11/2020  Primary Care Provider: Sanaz Nicole MD (Tel: [phone])                        Subjective:  . No acute events overnight. Resting well. Pain control. Diet ok. Labs reviewed  Denies any chest pain sob.      Reviewed interval ancillary notes    Current Medications      sertraline (ZOLOFT) tablet 25 mg, Daily      QUEtiapine (SEROQUEL) tablet 25 mg, BID      aspirin EC tablet 325 mg, Daily      DULoxetine (CYMBALTA) extended release capsule 30 mg, Daily      levothyroxine (SYNTHROID) tablet 50 mcg, Daily      sodium chloride flush 0.9 % injection 10 mL, 2 times per day      sodium chloride flush 0.9 % injection 10 mL, PRN      enoxaparin (LOVENOX) injection 40 mg, Daily      promethazine (PHENERGAN) tablet 12.5 mg, Q6H PRN    Or      ondansetron (ZOFRAN) injection 4 mg, Q6H PRN      potassium chloride 10 mEq/100 mL IVPB (Peripheral Line), PRN      magnesium sulfate 2 g in 50 mL IVPB premix, PRN      acetaminophen (TYLENOL) tablet 650 mg, Q4H PRN      docusate sodium (COLACE) capsule 100 mg, Daily      polyethylene glycol (GLYCOLAX) packet 17 g, Daily PRN      Vitamin D (CHOLECALCIFEROL) tablet 2,000 Units, Daily      lactulose (CHRONULAC) 10 GM/15ML solution 20 g, BID        Objective:  /62   Pulse 69   Temp 97.2 °F (36.2 °C) (Axillary)   Resp 16   Ht 5' 5\" (1.651 m)   Wt 187 lb (84.8 kg)   SpO2 97%   BMI 31.12 kg/m²     Intake/Output Summary (Last 24 hours) at 12/14/2020 1152  Last data filed at 12/14/2020 0400  Gross per 24 hour   Intake 980 ml   Output 2050 ml   Net -1070 ml      Wt Readings from Last 3 Encounters:   12/11/20 187 lb (84.8 kg)   10/28/20 185 lb (83.9 kg)   09/28/20 189 lb (85.7 kg) General appearance:  Appears comfortable  Eyes: Sclera clear. Pupils equal.  ENT: Moist oral mucosa. Trachea midline, no adenopathy. Cardiovascular: Regular rhythm, normal S1, S2. No murmur. No edema in lower extremities  Respiratory: Not using accessory muscles. Good inspiratory effort. Clear to auscultation bilaterally, no wheeze or crackles. GI: Abdomen soft, no tenderness, not distended, normal bowel sounds  Musculoskeletal: No cyanosis in digits, neck supple  Neurology: CN 2-12 grossly intact. No speech or motor deficits  Psych: Normal affect. Alert and oriented in time, place and person  Skin: Warm, dry, normal turgor    Labs and Tests:  CBC:   Recent Labs     12/11/20 2057 12/13/20 2057   WBC 8.0 6.8   HGB 15.1 13.0    193     BMP:    Recent Labs     12/11/20 2057 12/13/20  0712    143   K 4.8 4.2    107   CO2 25 29   BUN 23* 22*   CREATININE 1.3* 1.1   GLUCOSE 116* 93     Hepatic:   Recent Labs     12/11/20 2057 12/13/20  0712   AST 22 16   ALT 12 10   BILITOT 0.5 0.5   ALKPHOS 91 80       Discussed care with family and patient             Spent 30  minutes with patient and family at bedside and on unit reviewing medical records and labs, spent greater than 50% time counseling patient and family on diagnosis and plan   Problem List  Active Problems:    Falls, initial encounter  Resolved Problems:    * No resolved hospital problems. *       Assessment & Plan:   1. Fall  -Likely multifactorial secondary to deconditioning feels no infectious process  -Therapy evaluated the patient recommended inpatient rehab but looks like patient would not qualify inpatient rehab would likely need skilled nursing    Dehydration resolved with IV fluids    Acute on chronic kidney disease resolved with IV fluids    Right second toe bruise  -Likely secondary to fall no signs of infection.   Her fracture            Diet: DIET GENERAL;  Code:Full Code  DVT PPX kacie Ortega MD

## 2020-12-14 NOTE — TELEPHONE ENCOUNTER
At [facility] are adjusting her meds--(was only on seroquel short term--never got another script). Back on.  dtr hoping for her to be at [facility] rehab, then return home.

## 2020-12-14 NOTE — CONSULTS
[facility] Orthopedic Surgery  Consult Note    Patient: Leonarda Talbot  Admit Date: 12/11/2020  Requesting Physician: Grary Khan MD  Room: [address] [place], [place] [id number]    Chief complaint: RIGHT foot/toe pain    HPI: Leonarda Talbot is a 80 y.o. female who presented to [facility] ER on 12/11 with hallucinations. Her daughter tells me she fell and struck her right foot/toes about 5 days ago. She has not been limping or complaining of pain though. She does have dementia and is a poor historian. Required geodon overnight for agitation. No pain described in the foot/toes. Denies new numbness/tingling. Imaging review of the right foot on 12/13/20 was read by radiology as a possible nondisplaced fracture of the 1st digit, Upon my review, this is NOT very convincing for a true fracture. No fracture malalignment of the 2nd digits or others. Ambulates with walker at baseline. Medical History:  Past Medical History:   Diagnosis Date    Acute renal failure ([address] 11-27-12    Arthritis     At risk for falling 08/28/2018    Score of 11/24 for Dynamic Gait Index    Cirrhosis ([address] 9/25/2020    Dementia with behavioral problem ([address] 9/25/2020    Frequent falls     Gastroesophageal reflux disease 1/22/2019    Movement disorder     osteoporosis    Nephrotic syndrome 12/28/2012    Thyroid disease      Past Surgical History:   Procedure Laterality Date    COLONOSCOPY      EXCISION OF FACIAL MASS      skin ca    EYE SURGERY      cataract removal; bilat    HIP FRACTURE SURGERY Left 8/21/2019    OPEN REDUCTION INTERNAL FIXATION OF INTERTROCHANTERIC FRACTURE OF LEFT HIPUSING GAMMA NAIL performed by Rachelle Gongora MD at [address] [place] [address] Right 05/27/2018    right gamma nail with cables    SKIN BIOPSY         Social History:    reports that she has never smoked.  She has never used smokeless tobacco.    Family History:        Problem Relation Age of Onset    High Blood Pressure Mother * No resolved hospital problems. *      RECOMMENDATIONS:  Imaging reviewed with Dr. Radha Madden; Especially given the patient's unremarkable exam, this is not likely a true fracture. When she ambulates with therapy, if she were to favor her foot/toes at all, could consider ambulating with a standard post-op shoe, but otherwise no need to immobilize this. - WBAT right leg  - PT/OT for ambulation.   - Daughter updated of imaging and plan. - Dispo: will likely need SNF    Patient denies tobacco use. I have reviewed imaging and plan with Dr. Howard Peters. PINEDA Kent-CNP  12/14/2020  10:13 AM                                                  Orthopaedic Surgery Attending Note      I have personally seen and examined Reji Clay. I have fully participated in the care of this patient. I have reviewed and agree with all pertinent clinical information including history, physical exam, diagnostic testing and the plan by Heather Kelly CNP unless otherwise noted. Briefly, Ms. Reji Clay is a 80 y.o. female who presented to [facility] ER with hallucinations. Xrays of right foot obtained and read as concerning for nondisplaced right 1st toe fracture. She provides no significant history. /62   Pulse 69   Temp 97.2 °F (36.2 °C) (Axillary)   Resp 16   Ht 5' 5\" (1.651 m)   Wt 187 lb (84.8 kg)   SpO2 97%   BMI 31.12 kg/m²   Patient is no distress. No tenderness to palpation of right 1st toe. Mild ecchymosis, no tenderness 2nd toe. Assessment:  Right foot contusion  Dementia    Plan:  --As noted in plan above, consider postop shoe if difficulty ambulating related to right foot. Otherwise, nothing needed. --WBAT right leg.  --PT/OT. Iman Ramirez.  Radha Madden MD  Orthopaedic Surgery and Sports Medicine  Electronically signed by Tim Newberry MD on 12/14/2020 at 12:47 PM

## 2020-12-14 NOTE — PROGRESS NOTES
Pt up tot he commode having trouble following commands, is distracted by hallucination of people that are not in the room, talking to people that are not present, tried to reorient pt telling her no one else is in the room, pt very distracted looking off in the distance talking and not making sense, fall precautions in place, camera in the room, assessment complete, lungs clear, BS active, pulses intact, brushing to [place], pt reports having a fall a few days ago, pt incontinent of urine, assisted to the BSC< pt is weak and unsteady, will continue monitoring.   Dago Cat

## 2020-12-14 NOTE — H&P
Per Chart Review:Patient is an appropriate ARU candidate however due to our Covid status and bed limitations, I would suggest she transition to a non-Covid SNF for continued therapies. SW spoke with patient's daughter. Daughter agreeable with this plan. Patient's daughter requesting that patient be referred to facilities in the [place], [place] area. SW referred patient to [facility] Owusu. THALIA/AVINASH will continue to follow progress and update patient's discharge plan as needed.     Electronically signed by DORA Guerra on 12/14/2020 at 12:11 PM

## 2020-12-14 NOTE — TELEPHONE ENCOUNTER
Pt's daughter called, has questions about medications. States pt is in hospital and doctor was discussing changing doses of QUEtiapine (SEROQUEL) tablet 25 mg and donepezil (ARICEPT) 5 MG tablet. Pt's daughter said she has not taken medication since she had finished them a few months ago (they were prescribed by dr in hospital at the time). Would like to discuss medications and if she was missing any. Please call.

## 2020-12-14 NOTE — PROGRESS NOTES
Pt trying to get OOB, tried to redirect pt, pt hitting staff with call light and tele monitor, removed monitor and call light from pt reach, changed pt brief, pt not cooperating, pt very panicky and reporting people and objects that are not present, tried to reorient pt, pt angry, message sent to MD, new order for Geodon, will give med and continue monitoring pt, fall precautions in place.   aRvi Davis

## 2020-12-14 NOTE — DISCHARGE INSTR - COC
Continuity of Care Form    Patient Name: Lindsay Cisneros   :  1930  MRN:  [id number]    Admit date:  2020  Discharge date: 12/15/20    Code Status Order: Full Code   Advance Directives:   [facility] Documentation       Date/Time Healthcare Directive Type of Healthcare Directive Copy in [address] Agent's Name Healthcare Agent's Phone Number    [phone]  Yes, patient has an advance directive for healthcare treatment  Durable power of  for health care  No, copy requested from family  Adult Children  Quinn Ahmadi  [phone]            Admitting Physician:  Macie Barton MD  PCP: Yusef Gudino MD    Discharging Nurse: [facility] Unit/Room#: [id number]/1171-28  [facility] Phone Number: [phone]    Emergency Contact:   Extended Emergency Contact Information  Primary Emergency Contact: [place], [facility] [facility] [address] Phone: [phone]  Mobile Phone: [phone]  Relation: Child  Secondary Emergency Contact: Snow Goodman  [place] Phone: [phone]  Relation: Grandchild    Past Surgical History:  Past Surgical History:   Procedure Laterality Date    COLONOSCOPY      EXCISION OF FACIAL MASS      skin ca    EYE SURGERY      cataract removal; bilat    HIP FRACTURE SURGERY Left 2019    OPEN REDUCTION INTERNAL FIXATION OF INTERTROCHANTERIC FRACTURE OF LEFT HIPUSING GAMMA NAIL performed by Lamin Rosenthal MD at [facility] Right 2018    right gamma nail with cables    SKIN BIOPSY         Immunization History:   Immunization History   Administered Date(s) Administered    Influenza, Quadv, adjuvanted, 65 yrs +, IM, PF (Fluad) 2020    Influenza, Triv, inactivated, subunit, adjuvanted, IM (Fluad 65 yrs and older) 2019    Pneumococcal Conjugate 13-valent (Wilhemenia Sensing) 2018  Pneumococcal Polysaccharide ([id number]) 02/22/2019    Td, unspecified formulation 06/25/2014    Tdap (Boostrix, Adacel) 10/18/2018       Active Problems:  Patient Active Problem List   Diagnosis Code    Nephrotic syndrome N04.9    Edema R60.9    Hypothyroid E03.9    Multiple joint pain M25.50    Age-related osteoporosis without current pathological fracture M81.0    Closed fracture of right femur ([facility] S72.91XA    Closed displaced comminuted fracture of shaft of right femur ([facility] S72.351A    Primary osteoarthritis of right knee M17.11    Gastroesophageal reflux disease K21.9    False positive syphilis serology R76.8    Memory deficit R41.3    Closed displaced intertrochanteric fracture of left femur ([facility] S72.142A    Closed fracture of left femur ([facility]) S72. 80XA    Primary osteoarthritis of left knee M17.12    Acute metabolic encephalopathy Y81.23    SHARAD (acute kidney injury) ([facility] N17.9    Hepatic encephalopathy ([facility]) K72.90    Cirrhosis ([facility] K74.60    Dementia with behavioral problem ([facility] F03.91    Falls, initial encounter W19. [place]       Isolation/Infection:   Isolation            No Isolation          Patient Infection Status       None to display            Nurse Assessment:  Last Vital Signs: /62   Pulse 69   Temp 97.2 °F (36.2 °C) (Axillary)   Resp 16   Ht 5' 5\" (1.651 m)   Wt 187 lb (84.8 kg)   SpO2 97%   BMI 31.12 kg/m²     Last documented pain score (0-10 scale):    Last Weight:   Wt Readings from Last 1 Encounters:   12/11/20 187 lb (84.8 kg)     Mental Status:  oriented, alert and confusion at times    IV Access:  - None    Nursing Mobility/ADLs:  Walking   Assisted  Transfer  Assisted  Bathing  Assisted  Dressing  Assisted  Toileting  Assisted  Feeding  Independent  Med Admin  Independent  Med Delivery   whole    Wound Care Documentation and Therapy:  Wound 08/21/19 Pretibial Left;Proximal abrasion from fall (Active)   Number of days: 480        Elimination: Continence:   · Bowel: Yes  · Bladder: Yes  Urinary Catheter: None   Colostomy/Ileostomy/Ileal Conduit: No       Date of Last BM: 12/14/2020    Intake/Output Summary (Last 24 hours) at 12/14/2020 1335  Last data filed at 12/14/2020 0400  Gross per 24 hour   Intake 980 ml   Output 2050 ml   Net -1070 ml     I/O last 3 completed shifts: In: 980 [P.O.:480; I.V.:500]  Out: 2050 [Urine:2050]    Safety Concerns:     History of Falls (last 30 days) and At Risk for Falls    Impairments/Disabilities:      None    Nutrition Therapy:  Current Nutrition Therapy:   - Oral Diet:  General    Routes of Feeding: Oral  Liquids: Thin Liquids  Daily Fluid Restriction: no  Last Modified Barium Swallow with Video (Video Swallowing Test): not done    Treatments at the Time of Hospital Discharge:   Respiratory Treatments: none  Oxygen Therapy:  is on oxygen at 2 L/min per nasal cannula.   Ventilator:    - No ventilator support    Rehab Therapies: Physical Therapy and Occupational Therapy  Weight Bearing Status/Restrictions: No weight bearing restirctions  Other Medical Equipment (for information only, NOT a DME order):  cane and walker  Other Treatments: none    Patient's personal belongings (please select all that are sent with patient):  Dentures upper and lower and glasses    RN SIGNATURE:  Electronically signed by Lavelle Shipley RN on 12/15/20 at 2:50 PM EST    CASE MANAGEMENT/SOCIAL WORK SECTION    Inpatient Status Date: 12/12/20    Readmission Risk Assessment Score:  Readmission Risk              Risk of Unplanned Readmission:        0         Discharging to Facility/ Agency   · Name:  [facility]  · Address:  · Phone: [phone]  · Fax: [phone]    / signature:Electronically signed by DORA Price on 12/15/2020 at 2:26 PM      PHYSICIAN SECTION    Prognosis: Good    Condition at Discharge: Stable    Rehab Potential (if transferring to Rehab): Good Recommended Labs or Other Treatments After Discharge:     Physician Certification: I certify the above information and transfer of Lucas Spence  is necessary for the continuing treatment of the diagnosis listed and that she requires Kennedy Rigo for greater 30 days.      Update Admission H&P: No change in H&P    PHYSICIAN SIGNATURE:  Electronically signed by Rehana Albert MD on 12/14/20 at 1:35 PM EST

## 2020-12-14 NOTE — CONSULTS
Patient: Amanda Miller  [id number]  Date: 12/14/2020      Chief Complaint: Recurrent falls    History of Present Illness/Hospital Course:  69-year-old female with a history of dementia who was admitted on 12/12 with an increase in falls and worsening cognition. Initial work-up has been overall unremarkable. Therapy evaluated and suggested she continue in an inpatient setting prior to returning home. Patient is currently Covid negative    Prior Level of Function:  Independent with ambulation but required assistance with some ADLs and IADLs    Current Level of Function:  Total assist (no ambulation attempted)     has a past medical history of Acute renal failure ([facility] Arthritis, At risk for falling, Cirrhosis ([facility] Dementia with behavioral problem ([facility] Frequent falls, Gastroesophageal reflux disease, Movement disorder, Nephrotic syndrome, and Thyroid disease. has a past surgical history that includes Excision of Facial Mass; skin biopsy; Colonoscopy; eye surgery; other surgical history (Right, 05/27/2018); and Hip fracture surgery (Left, 8/21/2019). reports that she has never smoked. She has never used smokeless tobacco. She reports that she does not drink alcohol or use drugs. family history includes Cancer in her brother, brother, and father; Cancer (age of onset: 52) in her daughter; Diabetes in her brother; High Blood Pressure in her mother.       REVIEW OF SYSTEMS:   Unable to be performed secondary to cognition    Physical Examination:  Vitals:   Patient Vitals for the past 24 hrs:   BP Temp Temp src Pulse Resp SpO2   12/14/20 1000 116/62 97.2 °F (36.2 °C) Axillary 69 16 97 %   12/14/20 0459 114/71 96.3 °F (35.7 °C) Axillary 82 16 95 %   12/14/20 0345 (!) 158/82 97.6 °F (36.4 °C) Axillary 85 18 95 %   12/14/20 0215 137/86 97.8 °F (36.6 °C) Axillary 77 18 95 %   12/13/20 2000 (!) 146/88 97.3 °F (36.3 °C) Oral 82 18 92 %   12/13/20 1745 139/74 97.2 °F (36.2 °C) Oral 82 16 92 % 12/13/20 1303 128/79 97.7 °F (36.5 °C) Oral 83 16 92 %     Psych: Stable mood, normal affect. Const: No distress  Eyes: Conjunctiva noninjected, no icterus noted; pupils equal, round. HENT: Atraumatic, normocephalic; Oral mucosa moist  Neck: Trachea midline, neck supple. No thyromegaly noted. CV: No audible murmurs  Resp: No increased WOB, no audible wheezing   GI: Nondistended   Neuro: Somnolent but arousable, oriented to person, decreased interaction due to fatigue  Skin: No visible abnormalities  MSK: No joint abnormalities noted. Ext: No significant edema appreciated. No varicosities. Lab Results   Component Value Date    WBC 6.8 12/13/2020    HGB 13.0 12/13/2020    HCT 39.8 12/13/2020    MCV 95.2 12/13/2020     12/13/2020     Lab Results   Component Value Date    INR 0.95 12/12/2020    INR 0.97 12/11/2020    INR 1.01 08/20/2019    PROTIME 11.0 12/12/2020    PROTIME 11.2 12/11/2020    PROTIME 11.5 08/20/2019     Lab Results   Component Value Date    CREATININE 1.1 12/13/2020    BUN 22 (H) 12/13/2020     12/13/2020    K 4.2 12/13/2020     12/13/2020    CO2 29 12/13/2020     Lab Results   Component Value Date    ALT 10 12/13/2020    AST 16 12/13/2020    ALKPHOS 80 12/13/2020    BILITOT 0.5 12/13/2020     Most recent imaging studies revealed   EXAMINATION:   CT OF THE HEAD WITHOUT CONTRAST  12/11/2020 11:34 pm       TECHNIQUE:   CT of the head was performed without the administration of intravenous   contrast. Dose modulation, iterative reconstruction, and/or weight based   adjustment of the mA/kV was utilized to reduce the radiation dose to as low   as reasonably achievable.       COMPARISON:   None.       HISTORY:   ORDERING SYSTEM PROVIDED HISTORY: hallucinations   TECHNOLOGIST PROVIDED HISTORY:   If patient is on cardiac monitor and/or pulse ox, they may be taken off   cardiac monitor and pulse ox, left on O2 if currently on.  All monitors reattached when patient returns to room. Has a \"code stroke\" or \"stroke alert\" been called? ->No   Reason for exam:->hallucinations   Reason for Exam: Hallucinations (pt reporting that she thinks that she is   dehyrated and now having horrible hallucinations, falling alot as well. 2x   today. \"Am I green? \" )   Acuity: Acute   Type of Exam: Initial       FINDINGS:   BRAIN/VENTRICLES: There is no acute intracranial hemorrhage, mass effect or   midline shift. No abnormal extra-axial fluid collection.  The gray-white   differentiation is maintained without evidence of an acute infarct. There is   prominence of the ventricles and sulci due to global parenchymal volume loss. There are nonspecific areas of hypoattenuation within the periventricular and   subcortical white matter, which likely represent chronic microvascular   ischemic change.       ORBITS: The visualized portion of the orbits demonstrate no acute abnormality.       SINUSES: The visualized paranasal sinuses and mastoid air cells demonstrate   no acute abnormality.       SOFT TISSUES/SKULL: No acute abnormality of the visualized skull or soft   tissues.           Impression   No acute intracranial abnormality. The above laboratory data have been reviewed. The above imaging data have been reviewed. The above medical testing have been reviewed. Body mass index is 31.12 kg/m². Assessment and Plan:  Recurrent falls: PT/OT  Dementia  Hallucinations  SHARAD on CKD    Dispo: Under normal circumstances, patient will be an appropriate ARU candidate however due to our Covid status and bed limitations, I would suggest she transition to a non-Covid SNF for continued therapies. Liaison discussed this with the daughter who was in agreement with this plan. Case management notified. We will sign off. Thank you for the consultation.     Ernesto Mayorga MD 12/14/2020, 10:51 AM * This document was created using dictation software. While all precautions were taken to ensure accuracy, errors may have occurred. Please disregard any typographical errors.

## 2020-12-15 VITALS
DIASTOLIC BLOOD PRESSURE: 60 MMHG | SYSTOLIC BLOOD PRESSURE: 110 MMHG | TEMPERATURE: 98 F | WEIGHT: 187 LBS | HEIGHT: 65 IN | OXYGEN SATURATION: 95 % | RESPIRATION RATE: 16 BRPM | BODY MASS INDEX: 31.16 KG/M2 | HEART RATE: 78 BPM

## 2020-12-15 LAB — SARS-COV-2, NAAT: NOT DETECTED

## 2020-12-15 PROCEDURE — G0378 HOSPITAL OBSERVATION PER HR: HCPCS

## 2020-12-15 PROCEDURE — 97530 THERAPEUTIC ACTIVITIES: CPT

## 2020-12-15 PROCEDURE — 1200000000 HC SEMI PRIVATE

## 2020-12-15 PROCEDURE — 6360000002 HC RX W HCPCS: Performed by: INTERNAL MEDICINE

## 2020-12-15 PROCEDURE — 97535 SELF CARE MNGMENT TRAINING: CPT

## 2020-12-15 PROCEDURE — 6370000000 HC RX 637 (ALT 250 FOR IP): Performed by: INTERNAL MEDICINE

## 2020-12-15 PROCEDURE — 1220000000 HC SEMI PRIVATE OB R&B

## 2020-12-15 PROCEDURE — 96372 THER/PROPH/DIAG INJ SC/IM: CPT

## 2020-12-15 PROCEDURE — U0002 COVID-19 LAB TEST NON-CDC: HCPCS

## 2020-12-15 PROCEDURE — 2580000003 HC RX 258: Performed by: INTERNAL MEDICINE

## 2020-12-15 PROCEDURE — 97116 GAIT TRAINING THERAPY: CPT

## 2020-12-15 RX ADMIN — Medication 10 ML: at 08:32

## 2020-12-15 RX ADMIN — SERTRALINE HYDROCHLORIDE 25 MG: 50 TABLET ORAL at 08:32

## 2020-12-15 RX ADMIN — ENOXAPARIN SODIUM 40 MG: 40 INJECTION SUBCUTANEOUS at 08:31

## 2020-12-15 RX ADMIN — LEVOTHYROXINE SODIUM 50 MCG: 0.03 TABLET ORAL at 07:28

## 2020-12-15 RX ADMIN — QUETIAPINE FUMARATE 25 MG: 25 TABLET ORAL at 08:32

## 2020-12-15 RX ADMIN — DULOXETINE HYDROCHLORIDE 30 MG: 30 CAPSULE, DELAYED RELEASE ORAL at 08:32

## 2020-12-15 RX ADMIN — ASPIRIN 325 MG: 325 TABLET, COATED ORAL at 08:32

## 2020-12-15 RX ADMIN — DOCUSATE SODIUM 100 MG: 100 CAPSULE ORAL at 08:31

## 2020-12-15 RX ADMIN — LACTULOSE 20 G: 20 SOLUTION ORAL at 08:31

## 2020-12-15 RX ADMIN — Medication 2000 UNITS: at 08:32

## 2020-12-15 ASSESSMENT — PAIN SCALES - GENERAL: PAINLEVEL_OUTOF10: 0

## 2020-12-15 NOTE — PLAN OF CARE
Problem: Falls - Risk of:  Goal: Will remain free from falls  Description: Will remain free from falls  12/15/2020 1131 by Chin Moreira RN  Outcome: Ongoing  12/15/2020 0206 by Gabriele Topete RN  Outcome: Ongoing  Goal: Absence of physical injury  Description: Absence of physical injury  12/15/2020 1131 by Chin Moreira RN  Outcome: Ongoing  12/15/2020 0206 by Gabriele Topete RN  Outcome: Ongoing     Problem: Skin Integrity:  Goal: Will show no infection signs and symptoms  Description: Will show no infection signs and symptoms  12/15/2020 1131 by Chin Moreira RN  Outcome: Ongoing  12/15/2020 0206 by Gabriele Topete RN  Outcome: Ongoing  Goal: Absence of new skin breakdown  Description: Absence of new skin breakdown  12/15/2020 1131 by Chin Moreira RN  Outcome: Ongoing  12/15/2020 0206 by Gabriele Topete RN  Outcome: Ongoing

## 2020-12-15 NOTE — CARE COORDINATION
DISCHARGE SUMMARY     DATE OF DISCHARGE: 12/15/20    DISCHARGE DESTINATION: [facility]    Level of Care: Skilled    Report Number: [phone]    Fax Number:  [phone]    Precert Obtained: N/A    Hens Completed: yes    PASARR: N/A    Notified: RN, Family and Facility/Agency-facility and daughter [facility] aware     Prescriptions Faxed:N/A    TRANSPORTATION: [place] [address] Name: [address] [place] [address] up Time: 4-430PM    Phone Number: [phone]    Electronically signed by DORA Suarez on 12/15/2020 at 2:46 PM

## 2020-12-15 NOTE — PLAN OF CARE
Problem: Falls - Risk of:  Goal: Will remain free from falls  Description: Will remain free from falls  12/15/2020 1447 by Geovani Garcia RN  Outcome: Completed  12/15/2020 1131 by Geovani Garcia RN  Outcome: Ongoing  12/15/2020 0206 by Panda Brandon RN  Outcome: Ongoing  Goal: Absence of physical injury  Description: Absence of physical injury  12/15/2020 1447 by Geovani Garcia RN  Outcome: Completed  12/15/2020 1131 by Geovani Garcia RN  Outcome: Ongoing  12/15/2020 0206 by Panda Brandon RN  Outcome: Ongoing     Problem: Skin Integrity:  Goal: Will show no infection signs and symptoms  Description: Will show no infection signs and symptoms  12/15/2020 1447 by Geovani Garcia RN  Outcome: Completed  12/15/2020 1131 by Geovani Garcia RN  Outcome: Ongoing  12/15/2020 0206 by Panda Brandon RN  Outcome: Ongoing  Goal: Absence of new skin breakdown  Description: Absence of new skin breakdown  12/15/2020 1447 by Geovani Garcia RN  Outcome: Completed  12/15/2020 1131 by Geovani Garcia RN  Outcome: Ongoing  12/15/2020 0206 by Panda Brandon RN  Outcome: Ongoing

## 2020-12-15 NOTE — PROGRESS NOTES
2129: Assessment complete, VSS, pt more alert and not having hallucinations tonight, assisted to the BR, pt had BM, brushed teeth and hair, used stedy to return to the chair, pt knee \"givng\" out\" on her, changed band-aid to L shin, small skin tear with caro-wound purple bruising noted, fall precautions in place, call light and belongings in reach, will continue monitoring. 2230: pt daughter called, updated on pt status, transferred call to pt.     2330: pt assisted back to bed, still unsteady, fall precautions in place, VSS, O2 reapplied 2L, encouraged deep breathing.

## 2020-12-15 NOTE — PROGRESS NOTES
Occupational Therapy  Facility/Department: [address] ORTHO/NEURO NURSING  Daily Treatment Note  NAME: Noe Burns  : 1930  MRN: [id number]    Date of Service: 12/15/2020    Discharge Recommendations:      Noe Burns scored a 18/24 on the AM-PAC ADL Inpatient form. Current research shows that an AM-PAC score of 17 or less is typically not associated with a discharge to the patient's home setting. Based on the patient's AM-PAC score and their current ADL deficits, it is recommended that the patient have 3-5 sessions per week of Occupational Therapy at d/c to increase the patient's independence. Please see assessment section for further patient specific details. If patient discharges prior to next session this note will serve as a discharge summary. Please see below for the latest assessment towards goals. OT Equipment Recommendations  Equipment Needed: No    Assessment   Performance deficits / Impairments: Decreased functional mobility ; Decreased ADL status; Decreased ROM; Decreased cognition;Decreased endurance;Decreased balance  Assessment: Pt is not at baseline level of function and will benefit from continued OT to address the above limitations. Treatment Diagnosis: Decreased functional mobility, ADL status, ROM, balance, activity tolerance, cognition related to falls  Prognosis: Fair  Decision Making: Medium Complexity  OT Education: OT Role;Plan of Care;Transfer Training;Orientation;Precautions; ADL Adaptive Strategies; Energy Conservation  Patient Education: Pt verbalized understanding-would benefit from continued reinforcement for carryover  REQUIRES OT FOLLOW UP: Yes  Activity Tolerance  Activity Tolerance: Patient Tolerated treatment well  Safety Devices  Safety Devices in place: Yes  Type of devices: All fall risk precautions in place;Call light within reach; Chair alarm in place;Gait belt;Patient at risk for falls; Left in chair;Nurse notified Patient Diagnosis(es): The primary encounter diagnosis was Hallucinations. A diagnosis of Ambulatory dysfunction was also pertinent to this visit. has a past medical history of Acute renal failure ([facility] Arthritis, At risk for falling, Cirrhosis ([facility] [facility] Dementia with behavioral problem ([facility] [facility] Frequent falls, Gastroesophageal reflux disease, Movement disorder, Nephrotic syndrome, and Thyroid disease. has a past surgical history that includes Excision of Facial Mass; skin biopsy; Colonoscopy; eye surgery; other surgical history (Right, 05/27/2018); and Hip fracture surgery (Left, 8/21/2019). Restrictions  Restrictions/Precautions  Restrictions/Precautions: Fall Risk(high fall risk)  Required Braces or Orthoses?: No  Position Activity Restriction  Other position/activity restrictions: 28-year-old female who lives at home with a daughter presenting this evening with complaints of unable to walk and has had 2 falls today as well as hallucinating. Subjective   General  Chart Reviewed: Yes  Additional Pertinent Hx: dementia, bilat hip fx sx  Response to previous treatment: Patient with no complaints from previous session  Family / Caregiver Present: No  Diagnosis: falls  Subjective  Subjective: Pt seated in recliner chair. Pleasant and agreeable to therapy session.   Vital Signs  Patient Currently in Pain: Denies   Orientation  Orientation  Overall Orientation Status: Within Functional Limits(able to state month and date, unable to state year)  Objective    ADL  Grooming: Setup;Contact guard assistance(in stance at sink to perform oral care/combing hair seated-1 seated rest break requiring in between grooming tasks)  UE Bathing: None  LE Bathing: None  UE Dressing: Setup;Minimal assistance(donning gown as back)  LE Dressing: Setup;Stand by assistance;Contact guard assistance(pt demo'd ability to doff/denise B socks, pt threaded B LEs into pants seated, CGA to perform LB clothing management over hips once in stance) AM-PAC Inpatient Daily Activity Raw Score: 18 (12/15/20 1507)  AM-PAC Inpatient ADL T-Scale Score : 38.66 (12/15/20 1507)  ADL Inpatient CMS 0-100% Score: 46.65 (12/15/20 1507)  ADL Inpatient CMS G-Code Modifier : CK (12/15/20 1507)    Goals  Short term goals  Time Frame for Short term goals: Discharge  Short term goal 1: Pt will complete functional transfers with mod A. -CGA goal met 12/15  Short term goal 2: Pt will complete functional mobility with mod A. -CGA goal met 12/15  Short term goal 3: Pt will complete UB ADLs with min A.-goal met 12/15  Short term goal 4: Pt will complete LB ADLs with max A.-set up/SBA-CGA 12/15  Short term goal 5: Pt will tolerate standing 3-5 min for a functional task.-goal met 12/15  Long term goals  Time Frame for Long term goals : STG=LTG       Therapy Time   Individual Concurrent Group Co-treatment   Time In       1415   Time Out       1458   Minutes       43      Timed Code Treatment Minutes:  43 Minutes  Total Treatment Minutes:  Kirk Paz, [address]    I have reviewed and am in agreement with this treatment session.     Claus Aguilar, [facility] OTR/L [id number]

## 2020-12-15 NOTE — CARE COORDINATION
Received call from Sulma Rider at [facility]. She stated they would need OT to re-evaluate the pt for grooming, toileting, and lower body dressing. Discussed w/therapy teams who stated they may be able to see pt this afternoon. Spoke w/daughter regarding dc plan and which facility was a first selection. Daughter stated [facility]. Reached out again to [place] to discuss and determine if pt would be an ideal candidate-otherwise can pursue [place] who stated they can accept. Awaiting return call from [place] regarding bed avail and if pt is candidate.   Electronically signed by DORA Moses on 12/15/2020 at 12:30 PM

## 2020-12-15 NOTE — PROGRESS NOTES
[facility] Pt with all belongings at time of dc. VSS. PIV removed and tele removed.

## 2020-12-15 NOTE — PROGRESS NOTES
Physical Therapy  Facility/Department: [address] ORTHO/NEURO NURSING  Daily Treatment Note  NAME: Warren Allan  : 1930  MRN: [id number]    Date of Service: 12/15/2020    Discharge Recommendations:  Warren Allan scored a 18/24 on the AM-PAC short mobility form. Current research shows that an AM-PAC score of 17 or less is typically not associated with a discharge to the patient's home setting. Based on the patient's AM-PAC score and their current functional mobility deficits, it is recommended that the patient have 5-7 sessions per week of Physical Therapy at d/c to increase the patient's independence. At this time, this patient demonstrates the endurance, and/or tolerance for 3 hours of therapy each day, with a treatment frequency of 5-7x/wk. Please see assessment section for further patient specific details. If patient discharges prior to next session this note will serve as a discharge summary. Please see below for the latest assessment towards goals. 5-7 sessions per week   PT Equipment Recommendations  Equipment Needed: No    Assessment   Body structures, Functions, Activity limitations: Decreased functional mobility ; Decreased safe awareness;Decreased balance;Decreased endurance  Assessment: Pt continues to have some cognitive deficits (likely closer to baseline) and functional mobility has improved. Decreased safety with use of RW and L knee buckling with crepitus. Continued skilled PT to promote return to PLOF.   Treatment Diagnosis: decreased functional mobility, impaired gait, weakness  Prognosis: Good  PT Education: Goals;Plan of Care;PT Role  Patient Education: d/c recommendations - verbalized understanding  Barriers to Learning: cognitive  REQUIRES PT FOLLOW UP: Yes  Activity Tolerance  Activity Tolerance: Patient Tolerated treatment well Patient Diagnosis(es): The primary encounter diagnosis was Hallucinations. A diagnosis of Ambulatory dysfunction was also pertinent to this visit. has a past medical history of Acute renal failure ([facility] [address] Arthritis, At risk for falling, Cirrhosis ([facility] [address] Dementia with behavioral problem ([facility] [address] Frequent falls, Gastroesophageal reflux disease, Movement disorder, Nephrotic syndrome, and Thyroid disease. has a past surgical history that includes Excision of Facial Mass; skin biopsy; Colonoscopy; eye surgery; other surgical history (Right, 05/27/2018); and Hip fracture surgery (Left, 8/21/2019). Restrictions  Restrictions/Precautions  Restrictions/Precautions: Fall Risk(high fall risk)  Required Braces or Orthoses?: No  Position Activity Restriction  Other position/activity restrictions: 80-year-old female who lives at home with a daughter presenting this evening with complaints of unable to walk and has had 2 falls today as well as hallucinating.   Subjective   General  Chart Reviewed: Yes  Family / Caregiver Present: No  Subjective  Subjective: pleasantly confused, agreeable to therapy session, denied pain  General Comment  Comments: sitting in chair at arrival          Orientation     Cognition      Objective   Bed mobility  Comment: not observed  Transfers  Sit to Stand: Contact guard assistance(vc for hand placement)  Stand to sit: Contact guard assistance  Ambulation  Ambulation?: Yes  Ambulation 1  Surface: level tile  Device: Rolling Walker  Assistance: Contact guard assistance;Minimal assistance  Quality of Gait: L knee crepitus with 1 episode of buckling  Gait Deviations: Slow Teresa;Decreased step length;Decreased step height  Distance: 75 ft x 2  Comments: Constant vc for safe positioning in walker especially in turns  Stairs/Curb  Stairs?: Yes  Stairs  # Steps : 4  Stairs Height: 6\"  Rails: Right ascending  Assistance: Contact guard assistance  Comment: step-to pattern, vc for sequencing on descent

## 2020-12-16 ENCOUNTER — TELEPHONE (OUTPATIENT)
Dept: INTERNAL MEDICINE CLINIC | Age: 85
End: 2020-12-16

## 2020-12-16 NOTE — TELEPHONE ENCOUNTER
Please take medication prescribed by the facility physician for now. Once she get discharged, need med reconciliation.

## 2020-12-16 NOTE — TELEPHONE ENCOUNTER
dtr expressed understanding. Pt is back on her seroquel. No longer halluccinating. She was just admitted yesterday. Will come home when able to stand and walk unassisted.

## 2020-12-16 NOTE — DISCHARGE SUMMARY
[facility] DISCHARGE SUMMARY    Patient Demographics    Patient. Anand Butler  Date of Birth. 4/13/1930  MRN. [id number]     Primary care provider. Jamar Moreno MD  (Tel: [phone])    Admit date: 12/11/2020    Discharge date (blank if same as Note Date): 12/15/2020  Note Date: 12/16/2020     Reason for Hospitalization. Chief Complaint   Patient presents with    Hallucinations     pt reporting that she thinks that she is dehyrated and now having horrible hallucinations, falling alot as well. 2x today. \"Am I green? \"            [facility] Course. Medical and deconditioning with dehydration  -Fall mechanical  -Acute kidney injury. Patient was evaluated fall secondary to dehydration renal failure. Patient was eval by PT OT initially recommended for rehab but did not qualify patient was discharged to skilled nursing facility. All his acute problems are resolved    Consults. IP CONSULT TO HOSPITALIST  IP CONSULT TO SOCIAL WORK  IP CONSULT TO PHYSICAL MEDICINE REHAB  IP CONSULT TO ORTHOPEDIC SURGERY    Physical examination on discharge day. /60   Pulse 78   Temp 98 °F (36.7 °C) (Oral)   Resp 16   Ht 5' 5\" (1.651 m)   Wt 187 lb (84.8 kg)   SpO2 95%   BMI 31.12 kg/m²   General appearance. Alert. Looks comfortable. HEENT. Sclera clear. Moist mucus membranes. Cardiovascular. Regular rate and rhythm, normal S1, S2. No murmur. Respiratory. Not using accessory muscles. Clear to auscultation bilaterally, no wheeze. Gastrointestinal. Abdomen soft, non-tender, not distended, normal bowel sounds  Neurology. Facial symmetry. No speech deficits. Moving all extremities equally. Extremities. No edema in lower extremities. Skin. Warm, dry, normal turgor    Condition at time of discharge stable     Medication instructions provided to patient at discharge.      Medication List CONTINUE taking these medications    alendronate 70 MG tablet  Commonly known as: Fosamax  Take 1 tablet by mouth every 7 days     Ascorbic Acid 500 MG Chew  Take 1 tablet by mouth daily     aspirin 325 MG EC tablet  Take 1 tablet by mouth daily     Caltrate 600+D Plus Minerals 600-800 MG-UNIT Tabs tablet  Take 1 tablet by mouth 2 times daily     cyanocobalamin 1000 MCG tablet  Commonly known as: [employer] VITAMIN B12  Take 1 tablet by mouth daily     cyproheptadine 4 MG tablet  Commonly known as: PERIACTIN  TAKE ONE TABLET BY MOUTH THREE TIMES A DAY AS NEEDED FOR ITCHING     DULoxetine 30 MG extended release capsule  Commonly known as: CYMBALTA  TAKE ONE CAPSULE BY MOUTH DAILY     lactulose 10 GM/15ML solution  Commonly known as: CHRONULAC  Take 30 mLs by mouth 2 times daily     levothyroxine 50 MCG tablet  Commonly known as: SYNTHROID  TAKE ONE TABLET BY MOUTH DAILY     QUEtiapine 25 MG tablet  Commonly known as: SEROQUEL  Take 0.5 tablets by mouth nightly     sertraline 25 MG tablet  Commonly known as: Zoloft  Take 1 tablet by mouth daily     triamcinolone 0.1 % ointment  Commonly known as: KENALOG     vitamin D 50 MCG (2000 UT) Caps capsule        STOP taking these medications    romosozumab-aqqg 105 MG/1. 17ML Sosy injection  Commonly known as: EVENITY            Discharge recommendations given to patient. Follow Up. pcp in 1 week   Disposition. home  Activity. activity as tolerated  Diet: No diet orders on file      Spent 45  minutes in discharge process.     Signed:  Wanda Winkler MD     12/16/2020 6:02 PM

## 2020-12-28 ENCOUNTER — TELEPHONE (OUTPATIENT)
Dept: INTERNAL MEDICINE CLINIC | Age: 85
End: 2020-12-28

## 2020-12-28 RX ORDER — QUETIAPINE FUMARATE 25 MG/1
12.5 TABLET, FILM COATED ORAL NIGHTLY
Qty: 45 TABLET | Refills: 0 | Status: SHIPPED | OUTPATIENT
Start: 2020-12-28 | End: 2021-03-08

## 2020-12-28 NOTE — TELEPHONE ENCOUNTER
[facility] Trans[facility] Initial Follow Up Call    Call within 2 business days of discharge:yes  patient: Marlon Pugh Patient : 1930 MRN: [id number]        RARS: Readmission Risk Score: 20       Spoke with: Diann De Santiago daughter. Pt fell on -.   Tear on rt lower arm   DSD in place    Discharge department/facility: [facility]    Non-face-to-face services provided:  [address] [place] [address]    Follow Up  Future Appointments   Date Time Provider Marco Barreto   2021 11:00 AM MILLICENT Harvey MD [facility]   2021 11:15 AM Caitlin Lundberg MD [facility]       Elena Gamble RN

## 2020-12-28 NOTE — TELEPHONE ENCOUNTER
Elisha from [employer] called. She is asking if the Md would be willing to sign home care orders. She will be getting skilled, pt and ot. She was discharged from the hospital on 12/24.

## 2020-12-29 ENCOUNTER — CARE COORDINATION (OUTPATIENT)
Dept: CASE MANAGEMENT | Age: 85
End: 2020-12-29

## 2020-12-29 ENCOUNTER — VIRTUAL VISIT (OUTPATIENT)
Dept: INTERNAL MEDICINE CLINIC | Age: 85
End: 2020-12-29
Payer: MEDICARE

## 2020-12-29 PROCEDURE — 99495 TRANSJ CARE MGMT MOD F2F 14D: CPT | Performed by: INTERNAL MEDICINE

## 2020-12-29 PROCEDURE — 1111F DSCHRG MED/CURRENT MED MERGE: CPT | Performed by: INTERNAL MEDICINE

## 2020-12-29 RX ORDER — CEPHALEXIN 500 MG/1
500 CAPSULE ORAL 3 TIMES DAILY
Qty: 21 CAPSULE | Refills: 0 | Status: SHIPPED | OUTPATIENT
Start: 2020-12-29 | End: 2021-01-05

## 2020-12-29 RX ORDER — MEMANTINE HYDROCHLORIDE 5 MG/1
5 TABLET ORAL DAILY
Qty: 30 TABLET | Refills: 0 | Status: SHIPPED | OUTPATIENT
Start: 2020-12-29 | End: 2021-01-06

## 2020-12-29 NOTE — PROGRESS NOTES
Post-Discharge Transitional Care Management Services or Hospital Follow Up  Virtual visit    Marychuy Marti   YOB: 1930    Date of Office Visit:  12/29/2020  Date of Hospital Admission: 12/11/20  Date of Hospital Discharge: 12/15/20  Risk of hospital readmission (high >=14%.  Medium >=10%) :Readmission Risk Score: 20  Date of admission to [facility] on 12/15  Date of discharge from [facility] on 12/23  Care management risk score Rising risk (score 2-5) and Complex Care (Scores >=6): 5     Non face to face  following discharge, date last encounter closed (first attempt may have been earlier): 12/29/2020  1:31 PM    Call initiated 2 business days of discharge: Yes    Patient Active Problem List   Diagnosis    Nephrotic syndrome    Edema    Hypothyroid    Multiple joint pain    Age-related osteoporosis without current pathological fracture    Closed fracture of right femur ([address]    Closed displaced comminuted fracture of shaft of right femur ([address]    Primary osteoarthritis of right knee    Gastroesophageal reflux disease    False positive syphilis serology    Memory deficit    Closed displaced intertrochanteric fracture of left femur ([address]    Closed fracture of left femur ([address]    Primary osteoarthritis of left knee    Acute metabolic encephalopathy    SHARAD (acute kidney injury) ([address]    Hepatic encephalopathy ([address]    Cirrhosis ([address]    Dementia with behavioral problem ([address]    Falls, initial encounter       Allergies   Allergen Reactions    Metoprolol Hives     Severe rash and itching    Amlodipine Itching     Itching    Sulfa Antibiotics Nausea And Vomiting    Adhesive Tape Other (See Comments)     Skin tears very easily       Medications listed as ordered at the time of discharge from hospital   Rafael Mancini   [place] Medication Instructions MAGGIE:    Printed on:12/29/20 8027   Medication Information                      alendronate (FOSAMAX) 70 MG tablet Take 1 tablet by mouth every 7 days             Ascorbic Acid 500 MG CHEW  Take 1 tablet by mouth daily             aspirin 325 MG EC tablet  Take 1 tablet by mouth daily             Caltrate 600+D Plus Minerals (CALTRATE) 600-800 MG-UNIT TABS tablet  Take 1 tablet by mouth 2 times daily             cephALEXin (KEFLEX) 500 MG capsule  Take 1 capsule by mouth 3 times daily for 7 days             Cholecalciferol (VITAMIN D) 2000 units CAPS capsule  Take 1 capsule by mouth daily              cyanocobalamin ([employer]S VITAMIN B12) 1000 MCG tablet  Take 1 tablet by mouth daily             cyproheptadine (PERIACTIN) 4 MG tablet  TAKE ONE TABLET BY MOUTH THREE TIMES A DAY AS NEEDED FOR ITCHING             DULoxetine (CYMBALTA) 30 MG extended release capsule  TAKE ONE CAPSULE BY MOUTH DAILY             lactulose (CHRONULAC) 10 GM/15ML solution  Take 30 mLs by mouth 2 times daily             levothyroxine (SYNTHROID) 50 MCG tablet  TAKE ONE TABLET BY MOUTH DAILY             memantine (NAMENDA) 5 MG tablet  Take 1 tablet by mouth daily             QUEtiapine (SEROQUEL) 25 MG tablet  Take 0.5 tablets by mouth nightly             sertraline (ZOLOFT) 25 MG tablet  Take 1 tablet by mouth daily             triamcinolone (KENALOG) 0.1 % ointment  Apply topically 2 times daily Apply topically 2 times daily.                    Medications marked \"taking\" at this time  Outpatient Medications Marked as Taking for the 12/29/20 encounter (Virtual Visit) with Sina Thornton MD   Medication Sig Dispense Refill    cephALEXin (KEFLEX) 500 MG capsule Take 1 capsule by mouth 3 times daily for 7 days 21 capsule 0    memantine (NAMENDA) 5 MG tablet Take 1 tablet by mouth daily 30 tablet 0        Medications patient taking as of now reconciled against medications ordered at time of hospital discharge: Yes    Chief Complaint   Patient presents with   [email or web address] from [facility] History of Present illness - Follow up of Hospital diagnosis(es): Hallucination, altered mental status, renal impairment    Inpatient course: Discharge summary reviewed- see chart. Interval history/Current status: Patient was initially discharged to skilled nursing facility she then came back home. She is taking care of by her daughter. Currently taking Seroquel 12.5 mg/day. She is no longer having any hallucination. Patient is also getting home care. 2 days ago while she was going through stairways she slipped and fell injuring her right forearm and sustained a superficial laceration. The area currently have some drainage. Denies fever chills deformity of the right forearm. Patient denies any exertional chest pain, dyspnea, palpitations, syncope, orthopnea, edema or paroxysmal nocturnal dyspnea. The patient denies cough, chest pain, dyspnea, wheezing or hemoptysis. A comprehensive review of systems was negative except for what was noted in the HPI. There were no vitals filed for this visit. There is no height or weight on file to calculate BMI. Wt Readings from Last 3 Encounters:   12/11/20 187 lb (84.8 kg)   10/28/20 185 lb (83.9 kg)   09/28/20 189 lb (85.7 kg)     BP Readings from Last 3 Encounters:   12/15/20 110/60   10/28/20 124/72   09/28/20 122/78        Physical Exam:   Limited physical exam as this is a virtual visit. General Appearance: alert and oriented to person, place and time, well developed and well- nourished, in no acute distress  Visual inspection of the right proximal forearm, there appears superficial laceration. No purulent drainage noted. Patient can able to move the right wrist and elbow without any difficulty. No facial asymmetry noted. Does not appears to be in any distress. Hospital discharge report is reviewed. Assessment/Plan:  1.  Hospital discharge follow-up  Resolved hallucination.  - IN DISCHARGE MEDS RECONCILED W/ CURRENT OUTPATIENT MED LIST 2. Laceration of right forearm, initial encounter and is status post fall. Home safety tips given. Local wound care. She is getting home care service at this time. - cephALEXin (KEFLEX) 500 MG capsule; Take 1 capsule by mouth 3 times daily for 7 days  Dispense: 21 capsule; Refill: 0    3. Memory deficit  In the past patient could not tolerate Aricept. For some reason patient discontinued Namenda. Will start with lower dose again. - memantine (NAMENDA) 5 MG tablet; Take 1 tablet by mouth daily  Dispense: 30 tablet; Refill: 0    4. Dementia without behavioral disturbance, unspecified dementia type (HCC)    - memantine (NAMENDA) 5 MG tablet; Take 1 tablet by mouth daily  Dispense: 30 tablet; Refill: 0        Medical Decision Making: moderate complexity     She will continue will keep her regular appointment in a week. Will consider renal function check. Will consider increasing Namenda dose    An After Visit Summary was printed and given to the patient. Documentation was done using voice recognition dragon software. Every effort was made to ensure accuracy; however, inadvertent  Unintentional computerized transcription errors may be present. Noe Burns is a 80 y.o. female being evaluated by a Virtual Visit (video visit) encounter to address concerns as mentioned above. A caregiver was present when appropriate. Due to this being a TeleHealth encounter (During [facility] public health emergency), evaluation of the following organ systems was limited: Vitals/Constitutional/EENT/Resp/CV/GI//MS/Neuro/Skin/Heme-Lymph-Imm. Pursuant to the emergency declaration under the [address] [place] [address] and the [employer] and [employer] Act, this Virtual Visit was conducted with patient's (and/or legal guardian's) consent, to reduce the patient's risk of exposure to COVID-19 and provide necessary medical care. The patient (and/or legal guardian) has also been advised to contact this office for worsening conditions or problems, and seek emergency medical treatment and/or call 911 if deemed necessary. Patient identification was verified at the start of the visit: Yes    Total time spent for this encounter: Not billed by time    Services were provided through a video synchronous discussion virtually to substitute for in-person clinic visit. Patient and provider were located at their individual homes. --Alli Mccoy MD on 12/29/2020 at 6:50 PM    An electronic signature was used to authenticate this note.

## 2020-12-29 NOTE — CARE COORDINATION
[facility] Initial Follow Up [facility]    Call within 2 business days of discharge: Yes    Patient: Jorge Palacios Patient : 1930   MRN: <[id number]>  Reason for Admission: hallucination/ falls  Discharge Date: 12/15/20 RARS: Readmission Risk Score: 20      Last Discharge [address] [id number]       Complaint Diagnosis Description Type Department Provider    20 Hallucinations Hallucinations . .. ED to Hosp-Admission (Discharged) (ADMITTED) Blanco Yuen MD; Kavon Stone Acute Care Course:  Pt to [facility] from  to 12/15 with hallucination and fall and found to have SHARAD, dehydration and had stopped seroquel abruptly. Pt then to [facility] from 12/15 to  with d/c home with [facility]    Sig Hx:   Edema, Dementia, dehydration, hepatic encephalopathy, falls, HTN,     DME: walker    Conversation:  Spoke with Toyin Rodriguez after 2 ids. Pt doing well and happy to be home. Vv with PCP and he reviewed meds and started an antibiotic for a laceration on arm from a fall on . She ate something today and was sick to the stomach. [employer] nurse coming today. She is now sleeping well. They are keeping her up on purpose. She is taking the lactulose for the excess ammonia. Her appetite is normal. She snacks all day as opposed to eating 3 meals. Using her walker. On occasion she forgets and that is a fall risk. No questions with medications. Follow up plan:  Check on progress      Challenges to be reviewed by the provider   Additional needs identified to be addressed with provider No  none    Discussed COVID-19 related testing which was not done at this time. Test results were not done. Patient informed of results, if available? No         Method of communication with provider : none    Advance Care Planning:   Does patient have an Advance Directive:  reviewed and current. Was this a readmission?  No  Patient stated reason for admission: hallucination  Patients top appointment(s):     Plan for follow-up call in 10-14 days based on severity of symptoms and risk factors. Plan for next call: self management-make sure ADLS met  CTN provided contact information for future needs.           Care Transitions 24 Hour Call    Do you have any ongoing symptoms?: No  Do you have all of your prescriptions and are they filled?: Yes  Have you scheduled your follow up appointment?: Yes  How are you going to get to your appointment?: Car - family or friend to transport  Were you discharged with any Home Care or Post Acute Services: Yes  Post Acute Services: Home Health (Comment: [place])  Do you feel like you have everything you need to keep you well at home?: Yes  Care Transitions Interventions         Follow Up  Future Appointments   Date Time Provider Marco Barreto   1/6/2021 11:00 AM [facility]   4/28/2021 11:15 AM Caitlin Lundberg MD [facility]       MICHAEL ParraN, RN   [address] [facility] Transition Nurse  [phone]

## 2021-01-06 ENCOUNTER — OFFICE VISIT (OUTPATIENT)
Dept: INTERNAL MEDICINE CLINIC | Age: 86
End: 2021-01-06
Payer: MEDICARE

## 2021-01-06 VITALS
BODY MASS INDEX: 31.16 KG/M2 | TEMPERATURE: 97.3 F | WEIGHT: 187 LBS | HEART RATE: 72 BPM | HEIGHT: 65 IN | SYSTOLIC BLOOD PRESSURE: 120 MMHG | DIASTOLIC BLOOD PRESSURE: 84 MMHG

## 2021-01-06 DIAGNOSIS — G89.29 CHRONIC PAIN OF BOTH KNEES: ICD-10-CM

## 2021-01-06 DIAGNOSIS — E03.9 HYPOTHYROIDISM, UNSPECIFIED TYPE: Primary | ICD-10-CM

## 2021-01-06 DIAGNOSIS — M25.561 CHRONIC PAIN OF BOTH KNEES: ICD-10-CM

## 2021-01-06 DIAGNOSIS — E03.9 HYPOTHYROIDISM, UNSPECIFIED TYPE: ICD-10-CM

## 2021-01-06 DIAGNOSIS — R41.3 MEMORY DEFICIT: ICD-10-CM

## 2021-01-06 DIAGNOSIS — M25.562 CHRONIC PAIN OF BOTH KNEES: ICD-10-CM

## 2021-01-06 DIAGNOSIS — N28.9 RENAL IMPAIRMENT: ICD-10-CM

## 2021-01-06 DIAGNOSIS — Z91.81 AT HIGH RISK FOR FALLS: ICD-10-CM

## 2021-01-06 DIAGNOSIS — F03.90 DEMENTIA WITHOUT BEHAVIORAL DISTURBANCE, UNSPECIFIED DEMENTIA TYPE: ICD-10-CM

## 2021-01-06 DIAGNOSIS — G30.1 LATE ONSET ALZHEIMER'S DISEASE WITH BEHAVIORAL DISTURBANCE (HCC): ICD-10-CM

## 2021-01-06 DIAGNOSIS — F02.818 LATE ONSET ALZHEIMER'S DISEASE WITH BEHAVIORAL DISTURBANCE (HCC): ICD-10-CM

## 2021-01-06 DIAGNOSIS — R35.0 URINE FREQUENCY: ICD-10-CM

## 2021-01-06 LAB
ANION GAP SERPL CALCULATED.3IONS-SCNC: 11 MMOL/L (ref 3–16)
BILIRUBIN URINE: NEGATIVE
BLOOD, URINE: NEGATIVE
BUN BLDV-MCNC: 22 MG/DL (ref 7–20)
CALCIUM SERPL-MCNC: 9.8 MG/DL (ref 8.3–10.6)
CHLORIDE BLD-SCNC: 102 MMOL/L (ref 99–110)
CLARITY: ABNORMAL
CO2: 29 MMOL/L (ref 21–32)
COLOR: ABNORMAL
CREAT SERPL-MCNC: 1.1 MG/DL (ref 0.6–1.2)
GFR AFRICAN AMERICAN: 56
GFR NON-AFRICAN AMERICAN: 47
GLUCOSE BLD-MCNC: 78 MG/DL (ref 70–99)
GLUCOSE URINE: NEGATIVE MG/DL
KETONES, URINE: ABNORMAL MG/DL
LEUKOCYTE ESTERASE, URINE: NEGATIVE
MICROSCOPIC EXAMINATION: YES
NITRITE, URINE: NEGATIVE
PH UA: 6.5 (ref 5–8)
POTASSIUM SERPL-SCNC: 4.8 MMOL/L (ref 3.5–5.1)
PROTEIN UA: ABNORMAL MG/DL
SODIUM BLD-SCNC: 142 MMOL/L (ref 136–145)
SPECIFIC GRAVITY UA: 1.02 (ref 1–1.03)
TSH REFLEX: 3.51 UIU/ML (ref 0.27–4.2)
URINE TYPE: ABNORMAL
UROBILINOGEN, URINE: 1 E.U./DL

## 2021-01-06 PROCEDURE — 1111F DSCHRG MED/CURRENT MED MERGE: CPT | Performed by: INTERNAL MEDICINE

## 2021-01-06 PROCEDURE — 1036F TOBACCO NON-USER: CPT | Performed by: INTERNAL MEDICINE

## 2021-01-06 PROCEDURE — G8427 DOCREV CUR MEDS BY ELIG CLIN: HCPCS | Performed by: INTERNAL MEDICINE

## 2021-01-06 PROCEDURE — 1090F PRES/ABSN URINE INCON ASSESS: CPT | Performed by: INTERNAL MEDICINE

## 2021-01-06 PROCEDURE — G8417 CALC BMI ABV UP PARAM F/U: HCPCS | Performed by: INTERNAL MEDICINE

## 2021-01-06 PROCEDURE — G8484 FLU IMMUNIZE NO ADMIN: HCPCS | Performed by: INTERNAL MEDICINE

## 2021-01-06 PROCEDURE — 99213 OFFICE O/P EST LOW 20 MIN: CPT | Performed by: INTERNAL MEDICINE

## 2021-01-06 PROCEDURE — 1123F ACP DISCUSS/DSCN MKR DOCD: CPT | Performed by: INTERNAL MEDICINE

## 2021-01-06 PROCEDURE — 4040F PNEUMOC VAC/ADMIN/RCVD: CPT | Performed by: INTERNAL MEDICINE

## 2021-01-06 RX ORDER — ACETAMINOPHEN 500 MG
500 TABLET ORAL EVERY 8 HOURS PRN
Qty: 120 TABLET | Refills: 0 | Status: SHIPPED | OUTPATIENT
Start: 2021-01-06

## 2021-01-06 RX ORDER — MEMANTINE HYDROCHLORIDE 5 MG/1
5 TABLET ORAL 2 TIMES DAILY
Qty: 180 TABLET | Refills: 1 | Status: SHIPPED | OUTPATIENT
Start: 2021-01-06 | End: 2021-02-08 | Stop reason: SDUPTHER

## 2021-01-06 ASSESSMENT — PATIENT HEALTH QUESTIONNAIRE - PHQ9
SUM OF ALL RESPONSES TO PHQ QUESTIONS 1-9: 0
SUM OF ALL RESPONSES TO PHQ QUESTIONS 1-9: 0

## 2021-01-06 NOTE — PROGRESS NOTES
Kamran Machado  4/13/1930  female  80 y.o. SUBJECTIVE:       Chief Complaint   Patient presents with    Hallucinations     none since being back on seroquel    Gait Problem     ambulating with walker with wheels       HPI:  Follow-up visit. Patient has been tolerating Namenda once daily. Her right arm open wound healing significantly. She continues to get home care as well as physical therapy occupational therapy and nurse visit once a week. Other than increased urinary frequency she have no other concerning symptoms. She denies any further hallucinations or bad dreams. Past Medical History:   Diagnosis Date    Acute renal failure ([address] 11-27-12    Arthritis     At risk for falling 08/28/2018    Score of 11/24 for Dynamic Gait Index    Cirrhosis ([facility] 9/25/2020    Dementia with behavioral problem ([facility] [address] [facility] 9/25/2020    Frequent falls     Gastroesophageal reflux disease 1/22/2019    Movement disorder     osteoporosis    Nephrotic syndrome 12/28/2012    Thyroid disease      Past Surgical History:   Procedure Laterality Date    COLONOSCOPY      EXCISION OF FACIAL MASS      skin ca    EYE SURGERY      cataract removal; bilat    HIP FRACTURE SURGERY Left 8/21/2019    OPEN REDUCTION INTERNAL FIXATION OF INTERTROCHANTERIC FRACTURE OF LEFT HIPUSING GAMMA NAIL performed by Anabel Milton MD at [address] [address]. Right 05/27/2018    right gamma nail with cables    SKIN BIOPSY       Social History     Socioeconomic History    Marital status:       Spouse name: None    Number of children: None    Years of education: None    Highest education level: None   Occupational History    Occupation: homeaker   Social Needs    Financial resource strain: None    Food insecurity     Worry: None     Inability: None    Transportation needs     Medical: None     Non-medical: None   Tobacco Use    Smoking status: Never Smoker    Smokeless tobacco: Never Used Substance and Sexual Activity    Alcohol use: No    Drug use: No    Sexual activity: Not Currently     Partners: Male   Lifestyle    Physical activity     Days per week: None     Minutes per session: None    Stress: None   Relationships    Social connections     Talks on phone: None     Gets together: None     Attends Jehovah's witness service: None     Active member of club or organization: None     Attends meetings of clubs or organizations: None     Relationship status: None    Intimate partner violence     Fear of current or ex partner: None     Emotionally abused: None     Physically abused: None     Forced sexual activity: None   Other Topics Concern    None   Social History Narrative    None     Family History   Problem Relation Age of Onset    High Blood Pressure Mother     Cancer Father         lung cancer; metastic    Cancer Daughter 52        colon cancer    Cancer Brother     Cancer Brother     Diabetes Brother        Review of Systems    OBJECTIVE:  Pulse Readings from Last 4 Encounters:   01/06/21 72   12/15/20 78   10/28/20 80   09/28/20 84     Wt Readings from Last 4 Encounters:   01/06/21 187 lb (84.8 kg)   12/11/20 187 lb (84.8 kg)   10/28/20 185 lb (83.9 kg)   09/28/20 189 lb (85.7 kg)     BP Readings from Last 4 Encounters:   01/06/21 120/84   12/15/20 110/60   10/28/20 124/72   09/28/20 122/78     Physical Exam  Cardiovascular:      Rate and Rhythm: Normal rate and regular rhythm. Pulses: Normal pulses. Heart sounds: Normal heart sounds. Pulmonary:      Effort: Pulmonary effort is normal.   Abdominal:      Tenderness: There is no right CVA tenderness or left CVA tenderness.    Skin:     Comments: Dressing at the right  Arms, does not appear to be soaked         CBC:   Lab Results   Component Value Date    WBC 6.8 12/13/2020    HGB 13.0 12/13/2020    HCT 39.8 12/13/2020     12/13/2020     CMP:  Lab Results   Component Value Date     12/13/2020    K 4.2 12/13/2020  aspirin 325 MG EC tablet Take 1 tablet by mouth daily 14 tablet 0    Cholecalciferol (VITAMIN D) 2000 units CAPS capsule Take 1 capsule by mouth daily       triamcinolone (KENALOG) 0.1 % ointment Apply topically 2 times daily as needed Apply topically 2 times daily. No current facility-administered medications for this visit. Return in about 3 months (around 4/6/2021) for dementia. On the basis of positive falls risk screening, assessment and plan is as follows: home safety tips provided, referral to physical therapy provided for strength and balance training. also doing OT  An After Visit Summary was printed and given to the patient. Documentation was done using voice recognition dragon software. Every effort was made to ensure accuracy; however, inadvertent  Unintentional computerized transcription errors may be present.

## 2021-01-07 LAB
CRYSTALS, UA: ABNORMAL /HPF
EPITHELIAL CELLS, UA: 7 /HPF (ref 0–5)
HYALINE CASTS: ABNORMAL /LPF (ref 0–2)
MUCUS: ABNORMAL /LPF
RBC UA: 2 /HPF (ref 0–4)
WBC UA: 4 /HPF (ref 0–5)

## 2021-01-11 ENCOUNTER — VIRTUAL VISIT (OUTPATIENT)
Dept: INTERNAL MEDICINE CLINIC | Age: 86
End: 2021-01-11
Payer: MEDICARE

## 2021-01-11 ENCOUNTER — TELEPHONE (OUTPATIENT)
Dept: INTERNAL MEDICINE CLINIC | Age: 86
End: 2021-01-11

## 2021-01-11 DIAGNOSIS — Z00.00 ROUTINE GENERAL MEDICAL EXAMINATION AT A HEALTH CARE FACILITY: Primary | ICD-10-CM

## 2021-01-11 PROCEDURE — G8484 FLU IMMUNIZE NO ADMIN: HCPCS | Performed by: INTERNAL MEDICINE

## 2021-01-11 PROCEDURE — G0439 PPPS, SUBSEQ VISIT: HCPCS | Performed by: INTERNAL MEDICINE

## 2021-01-11 PROCEDURE — 4040F PNEUMOC VAC/ADMIN/RCVD: CPT | Performed by: INTERNAL MEDICINE

## 2021-01-11 PROCEDURE — 1123F ACP DISCUSS/DSCN MKR DOCD: CPT | Performed by: INTERNAL MEDICINE

## 2021-01-11 ASSESSMENT — PATIENT HEALTH QUESTIONNAIRE - PHQ9
SUM OF ALL RESPONSES TO PHQ QUESTIONS 1-9: 0
1. LITTLE INTEREST OR PLEASURE IN DOING THINGS: 0

## 2021-01-11 ASSESSMENT — LIFESTYLE VARIABLES: HOW OFTEN DO YOU HAVE A DRINK CONTAINING ALCOHOL: 0

## 2021-01-11 NOTE — PATIENT INSTRUCTIONS
Personalized Preventive Plan for Lorena Alonso - 1/11/2021  Medicare offers a range of preventive health benefits. Some of the tests and screenings are paid in full while other may be subject to a deductible, co-insurance, and/or copay. Some of these benefits include a comprehensive review of your medical history including lifestyle, illnesses that may run in your family, and various assessments and screenings as appropriate. After reviewing your medical record and screening and assessments performed today your provider may have ordered immunizations, labs, imaging, and/or referrals for you. A list of these orders (if applicable) as well as your Preventive Care list are included within your After Visit Summary for your review. Other Preventive Recommendations:    · A preventive eye exam performed by an eye specialist is recommended every 1-2 years to screen for glaucoma; cataracts, macular degeneration, and other eye disorders. · A preventive dental visit is recommended every 6 months. · Try to get at least 150 minutes of exercise per week or 10,000 steps per day on a pedometer . · Order or download the FREE \"Exercise & Physical Activity: Your Everyday Guide\" from [employer]. Call 1-788.930.6306 or search [employer] online. · You need 0184-4558 mg of calcium and 5737-9973 IU of vitamin D per day. It is possible to meet your calcium requirement with diet alone, but a vitamin D supplement is usually necessary to meet this goal.  · When exposed to the sun, use a sunscreen that protects against both UVA and UVB radiation with an SPF of 30 or greater. Reapply every 2 to 3 hours or after sweating, drying off with a towel, or swimming. · Always wear a seat belt when traveling in a car. Always wear a helmet when riding a bicycle or motorcycle.     Keep Your Memory Maryanne Sterling Many factors can affect your ability to remembera hectic lifestyle, aging, stress, chronic disease, and certain medicines. But, there are steps you can take to sharpen your mind and help preserve your memory. Challenge Your Brain   Regularly challenging your mind may help keeps it in top shape. Good mental exercises include:   Crossword puzzlesUse a dictionary if you need it; you will learn more that way. Brainteasers Try some! Crafts, such as wood working and sewing   Hobbies, such as gardening and building model airplanes   SocializingVisit old friends or join groups to meet new ones. Reading   Learning a new language   Taking a class, whether it be art history or thee chi   TravelingExperience the food, history, and culture of your destination   Learning to use a computer   Going to museums, the theater, or thought-provoking movies   Changing things in your daily life, such as reversing your pattern in the grocery store or brushing your teeth using your nondominant hand   Use Memory Aids   There is no need to remember every detail on your own. These memory aids can help:   Calendars and day planners   Electronic organizers to store all sorts of helpful informationThese devices can \"beep\" to remind you of appointments. A book of days to record birthdays, anniversaries, and other occasions that occur on the same date every year   Detailed \"to-do\" lists and strategically placed sticky notes   Quick \"study\" sessionsBefore a gathering, review who will be there so their names will be fresh in your mind. Establish routinesFor example, keep your keys, wallet, and umbrella in the same place all the time or take medicine with your 8:00 AM glass of juice   Live a Healthy Life   Many actions that will keep your body strong will do the same for your mind.  For example:   Talk to Your Doctor About Herbs and Supplements Malnutrition and vitamin deficiencies can impair your mental function. For example, vitamin B12 deficiency can cause a range of symptoms, including confusion. But, what if your nutritional needs are being met? Can herbs and supplements still offer a benefit? Researchers have investigated a range of natural remedies, such as ginkgo , ginseng , and the supplement phosphatidylserine (PS). So far, though, the evidence is inconsistent as to whether these products can improve memory or thinking. If you are interested in taking herbs and supplements, talk to your doctor first because they may interact with other medicines that you are taking. Exercise Regularly    Among the many benefits of regular exercise are increased blood flow to the brain and decreased risk of certain diseases that can interfere with memory function. One study found that even moderate exercise has a beneficial effect. Examples of \"moderate\" exercise include:   Playing 18 holes of golf once a week, without a cart   Playing tennis twice a week   Walking one mile per day   Manage Stress    It can be tough to remember what is important when your mind is cluttered. Make time for relaxation. Choose activities that calm you down, and make it routine. Manage Chronic Conditions    Side effects of high blood pressure , diabetes, and heart disease can interfere with mental function. Many of the lifestyle steps discussed here can help manage these conditions. Strive to eat a healthy diet, exercise regularly, get stress under control, and follow your doctor's advice for your condition. Minimize Medications    Talk to your doctor about the medicines that you take. Some may be unnecessary. Also, healthy lifestyle habits may lower the need for certain drugs.      Last Reviewed: April 2010 Steve Shipley MD   Updated: 4/13/2010   ·     Keeping Home a [facility] As we get older, changes in balance, gait, strength, vision, hearing, and cognition make even the most youthful senior more prone to accidents. Falls are one of the leading health risks for older people. This increased risk of falling is related to:   Aging process (eg, decreased muscle strength, slowed reflexes)   Higher incidence of chronic health problems (eg, arthritis, diabetes) that may limit mobility, agility or sensory awareness   Side effects of medicine (eg, dizziness, blurred vision)especially medicines like prescription pain medicines and drugs used to treat mental health conditions   Depending on the brittleness of your bones, the consequences of a fall can be serious and long lasting. Home Life   Research by the Association of A[employer] [facility]) shows that some home accidents among older adults can be prevented by making simple lifestyle changes and basic modifications and repairs to the home environment. Here are some lifestyle changes that experts recommend:   Have your hearing and vision checked regularly. Be sure to wear prescription glasses that are right for you. Speak to your doctor or pharmacist about the possible side effects of your medicines. A number of medicines can cause dizziness. If you have problems with sleep, talk to your doctor. Limit your intake of alcohol. If necessary, use a cane or walker to help maintain your balance. Wear supportive, rubber-soled shoes, even at home. If you live in a region that gets wintry weather, you may want to put special cleats on your shoes to prevent you from slipping on the snow and ice. Exercise regularly to help maintain muscle tone, agility, and balance. Always hold the banister when going up or down stairs. Also, use  bars when getting in or out of the bath or shower, or using the toilet. To avoid dizziness, get up slowly from a lying down position. Sit up first, dangling your legs for a minute or two before rising to a standing position. Overall Home Safety Check   According to the Consumer Product Safety Commision's \"Older Consumer Home Safety Checklist,\" it is important to check for potential hazards in each room. And remember, proper lighting is an essential factor in home safety. If you cannot see clearly, you are more likely to fall. Important questions to ask yourself include:   Are lamp, electric, extension, and telephone cords placed out of the flow of traffic and maintained in good condition? Have frayed cords been replaced? Are all small rugs and runners slip resistant? If not, you can secure them to the floor with a special double-sided carpet tape. Are smoke detectors properly locatedone on every floor of your home and one outside of every sleeping area? Are they in good working order? Are batteries replaced at least once a year? Do you have a well-maintained carbon monoxide detector outside every sleeping are in your home? Does your furniture layout leave plenty of space to maneuver between and around chairs, tables, beds, and sofas? Are hallways, stairs and passages between rooms well lit? Can you reach a lamp without getting out of bed? Are floor surfaces well maintained? Shag rugs, high-pile carpeting, tile floors, and polished wood floors can be particularly slippery. Stairs should always have handrails and be carpeted or fitted with a non-skid tread. Is your telephone easily reachable. Is the cord safely tucked away? Room by Room   According to the Association of Aging, bathrooms and italo are the two most potentially hazardous rooms in your home. In the Kitchen    Be sure your stove is in proper working order and always make sure burners and the oven are off before you go out or go to sleep. Keep pots on the back burners, turn handles away from the front of the stove, and keep stove clean and free of grease build-up. Kitchen ventilation systems and range exhausts should be working properly. Keep flammable objects such as towels and pot holders away from the cooking area except when in use. Make sure kitchen curtains are tied back. Move cords and appliances away from the sink and hot surfaces. If extension cords are needed, install wiring guides so they do not hang over the sink, range, or working areas. Look for coffee pots, kettles and toaster ovens with automatic shut-offs. Keep a mop handy in the kitchen so you can wipe up spills instantly. You should also have a small fire extinguisher. Arrange your kitchen with frequently used items on lower shelves to avoid the need to stand on a stepstool to reach them. Make sure countertops are well-lit to avoid injuries while cutting and preparing food. In the Bathroom    Use a non-slip mat or decals in the tub and shower, since wet, soapy tile or porcelain surfaces are extremely slippery. Make sure bathroom rugs are non-skid or tape them firmly to the floor. Bathtubs should have at least one, preferably two, grab bars, firmly attached to structural supports in the wall. (Do not use built-in soap holders or glass shower doors as grab bars.)    Tub seats fitted with non-slip material on the legs allow you to wash sitting down. For people with limited mobility, bathtub transfer benches allow you to slide safely into the tub. Raised toilet seats and toilet safety rails are helpful for those with knee or hip problems. In the [facility]    Make sure you use a nightlight and that the area around your bed is clear of potential obstacles. Be careful with electric blankets and never go to sleep with a heating pad, which can cause serious burns even if on a low setting. Baby boomers entering the sarkar years will continue to see the development of new products to help older adults live safely and independently in spite of age-related changes. Making Life More Livable  , by Novella Kayser, lists over 1,000 products for \"living well in the mature years,\" such as bathing and mobility aids, household security devices, ergonomically designed knives and peelers, and faucet valves and knobs for temperature control. Medical supply stores and organizations are good sources of information about products that improve your quality of life and insure your safety. Last Reviewed: November 2009 Hubert Sevilla MD   Updated: 3/7/2011     ·   Personalized Preventive Plan for Brian Ro - 1/11/2021  Medicare offers a range of preventive health benefits. Some of the tests and screenings are paid in full while other may be subject to a deductible, co-insurance, and/or copay. Some of these benefits include a comprehensive review of your medical history including lifestyle, illnesses that may run in your family, and various assessments and screenings as appropriate. After reviewing your medical record and screening and assessments performed today your provider may have ordered immunizations, labs, imaging, and/or referrals for you. A list of these orders (if applicable) as well as your Preventive Care list are included within your After Visit Summary for your review. Other Preventive Recommendations:    A preventive eye exam performed by an eye specialist is recommended every 1-2 years to screen for glaucoma; cataracts, macular degeneration, and other eye disorders. A preventive dental visit is recommended every 6 months. Try to get at least 150 minutes of exercise per week or 10,000 steps per day on a pedometer . Order or download the FREE \"Exercise & Physical Activity: Your Everyday Guide\" from [employer]. Call 5-727.291.7558 or search [employer] online. You need 2690-9821 mg of calcium and 4101-9336 IU of vitamin D per day. It is possible to meet your calcium requirement with diet alone, but a vitamin D supplement is usually necessary to meet this goal.  When exposed to the sun, use a sunscreen that protects against both UVA and UVB radiation with an SPF of 30 or greater. Reapply every 2 to 3 hours or after sweating, drying off with a towel, or swimming. Always wear a seat belt when traveling in a car. Always wear a helmet when riding a bicycle or motorcycle.

## 2021-01-11 NOTE — PROGRESS NOTES
Medicare Annual Wellness Visit  Name: Rick Ortega Date: 2021   MRN: [id number] Sex: Female   Age: 80 y.o. Ethnicity: Non-/Non    : 1930 Race: Sarah Shepherd is here for Medicare AWV    Screenings for behavioral, psychosocial and functional/safety risks, and cognitive dysfunction are all negative except as indicated below. These results, as well as other patient data from the [address] form, are documented in Flowsheets linked to this Encounter. Allergies   Allergen Reactions    Metoprolol Hives     Severe rash and itching    Amlodipine Itching     Itching    Sulfa Antibiotics Nausea And Vomiting    Adhesive Tape Other (See Comments)     Skin tears very easily         Prior to Visit Medications    Medication Sig Taking? Authorizing Provider   memantine (NAMENDA) 5 MG tablet Take 1 tablet by mouth 2 times daily  Anjel Pacheco MD   acetaminophen (TYLENOL) 500 MG tablet Take 1 tablet by mouth every 8 hours as needed for Pain  Selena Sainz MD   QUEtiapine (SEROQUEL) 25 MG tablet Take 0.5 tablets by mouth nightly  Anjel Pacheco MD   cyproheptadine (PERIACTIN) 4 MG tablet TAKE ONE TABLET BY MOUTH THREE TIMES A DAY AS NEEDED FOR ITCHING  MILLICENT Sainz MD   levothyroxine (SYNTHROID) 50 MCG tablet TAKE ONE TABLET BY MOUTH DAILY  MILLICENT Sainz MD   alendronate (FOSAMAX) 70 MG tablet Take 1 tablet by mouth every 7 days  Louise Capellan MD   lactulose (CHRONULAC) 10 GM/15ML solution Take 30 mLs by mouth 2 times daily  Dwight Guy MD   Ascorbic Acid 500 MG CHEW Take 1 tablet by mouth daily  MILLICENT Fonseca MD   triamcinolone (KENALOG) 0.1 % ointment Apply topically 2 times daily as needed Apply topically 2 times daily.    Historical Provider, MD   sertraline (ZOLOFT) 25 MG tablet Take 1 tablet by mouth daily  Anjel Pacheco MD   cyanocobalamin ([employer] VITAMIN B12) 1000 MCG tablet Take 1 tablet by mouth daily  Anjel Pacheco MD DULoxetine (CYMBALTA) 30 MG extended release capsule TAKE ONE CAPSULE BY MOUTH DAILY  MILLICENT Sainz MD   Caltrate 600+D Plus Minerals (CALTRATE) 600-800 MG-UNIT TABS tablet Take 1 tablet by mouth 2 times daily  Lito Moreira MD   aspirin 325 MG EC tablet Take 1 tablet by mouth daily  Lester Mcintosh MD   Cholecalciferol (VITAMIN D) 2000 units CAPS capsule Take 1 capsule by mouth daily   Historical Provider, MD         Past Medical History:   Diagnosis Date    Acute renal failure ([facility] [address] 11-27-12    Arthritis     At risk for falling 08/28/2018    Score of 11/24 for Dynamic Gait Index    Cirrhosis ([facility] [address] 9/25/2020    Dementia with behavioral problem ([facility] [address] 9/25/2020    Frequent falls     Gastroesophageal reflux disease 1/22/2019    Movement disorder     osteoporosis    Nephrotic syndrome 12/28/2012    Thyroid disease        Past Surgical History:   Procedure Laterality Date    COLONOSCOPY      EXCISION OF FACIAL MASS      skin ca    EYE SURGERY      cataract removal; bilat    HIP FRACTURE SURGERY Left 8/21/2019    OPEN REDUCTION INTERNAL FIXATION OF INTERTROCHANTERIC FRACTURE OF LEFT HIPUSING GAMMA NAIL performed by Mateo Hanson MD at [place] [address] Right 05/27/2018    right gamma nail with cables    SKIN BIOPSY           Family History   Problem Relation Age of Onset    High Blood Pressure Mother     Cancer Father         lung cancer; metastic    Cancer Daughter 52        colon cancer    Cancer Brother     Cancer Brother     Diabetes Brother        CareTeam (Including outside providers/suppliers regularly involved in providing care):   Patient Care Team:  Lito Moreira MD as PCP - General (Internal Medicine)  Lito Moreira MD as PCP - [facility] Empaneled Provider  Ras Lucio MD as Consulting Physician (Rheumatology)  Mateo Hanson MD as Surgeon (Orthopedic Surgery)  Jairo Combs RN as Care Transitions Nurse    Wt Readings from Last 3 Encounters: Do you or your family notice any trouble with your hearing?: (!) Yes  Do you have difficulty driving, watching TV, or doing any of your daily activities because of your eyesight?: No  Have you had an eye exam within the past year?: (!) No  Hearing/Vision Interventions:  · Hearing concerns; office appt scheduled for cleaning 1/27  · Patient encouraged to schedule eye exam.      ADL:  ADLs  In the past 7 days, did you need help from others to perform any of the following everyday activities? Eating, dressing, grooming, bathing, toileting, or walking/balance?: (!) Dressing, Bathing  In the past 7 days, did you need help from others to take care of any of the following? Laundry, housekeeping, banking/finances, shopping, telephone use, food preparation, transportation, or taking medications?: [employer], Housekeeping, Banking/Finances, Shopping, Food Preparation, Transportation, Taking Medications  ADL Interventions:  · Patient declines any further evaluation/treatment for this issue; daughter assists with ADLs.  Active with [facility]    Personalized Preventive Plan   Current Health Maintenance Status  Immunization History   Administered Date(s) Administered    Influenza, Quadv, adjuvanted, 65 yrs +, IM, PF (Fluad) 09/28/2020    Influenza, Triv, inactivated, subunit, adjuvanted, IM (Fluad 65 yrs and older) 11/13/2019    Pneumococcal Conjugate 13-valent ([id number]) 04/24/2018    Pneumococcal Polysaccharide ([id number]) 02/22/2019    Td, unspecified formulation 06/25/2014    Tdap (Boostrix, Adacel) 10/18/2018        Health Maintenance   Topic Date Due    Hepatitis A vaccine (1 of 2 - Risk 2-dose series) [birth year]/1931    Hepatitis B vaccine (1 of 3 - Risk 3-dose series) 04/13/1949    Shingles Vaccine (1 of 2) 04/13/1980    TSH testing  01/06/2022    Annual Wellness Visit (AWV)  01/12/2022    DTaP/Tdap/Td vaccine (2 - Td) 10/18/2028    Flu vaccine  Completed  Pneumococcal 65+ yrs at Risk Vaccine  Completed    Hib vaccine  Aged Out    Meningococcal (ACWY) vaccine  Aged Out     Recommendations for HOSTING Due: see orders and patient instructions/AVS.  . Recommended screening schedule for the next 5-10 years is provided to the patient in written form: see Patient Instructions/AVS.    Tracie TAYLOR RN, 1/11/2021, performed the documented evaluation under the direct supervision of the attending physician. Hosea Hendrickson is a 80 y.o. female being evaluated by a Virtual Visit (phone) encounter to address concerns as mentioned above. A caregiver was present when appropriate. Due to this being a TeleHealth encounter (During CYNID-40 public health emergency), evaluation of the following organ systems was limited: Vitals/Constitutional/EENT/Resp/CV/GI//MS/Neuro/Skin/Heme-Lymph-Imm. Pursuant to the emergency declaration under the [address] [place] [address], [address] [place] [address] and the [employer] and [employer] Act, this Virtual Visit was conducted with patient's (and/or legal guardian's) consent, to reduce the patient's risk of exposure to COVID-19 and provide necessary medical care. The patient (and/or legal guardian) has also been advised to contact this office for worsening conditions or problems, and seek emergency medical treatment and/or call 911 if deemed necessary. Patient identification was verified at the start of the visit: Yes    Total time spent for this encounter: 20 minutes    Services were provided through a video synchronous discussion virtually to substitute for in-person clinic visit. Patient and provider were located at their individual homes. --Bobby Marie MD on 1/14/2021 at 3:55 PM    An electronic signature was used to authenticate this note. This encounter was performed under myBobby MDs, direct supervision, 1/11/2021.

## 2021-01-12 ENCOUNTER — CARE COORDINATION (OUTPATIENT)
Dept: CASE MANAGEMENT | Age: 86
End: 2021-01-12

## 2021-01-12 ENCOUNTER — TELEPHONE (OUTPATIENT)
Dept: INTERNAL MEDICINE CLINIC | Age: 86
End: 2021-01-12

## 2021-01-12 NOTE — TELEPHONE ENCOUNTER
Maria Fernanda from [employer] calling to report that patient fell this morning. No injuries. Patient doing OK.

## 2021-01-27 ENCOUNTER — OFFICE VISIT (OUTPATIENT)
Dept: INTERNAL MEDICINE CLINIC | Age: 86
End: 2021-01-27
Payer: MEDICARE

## 2021-01-27 VITALS
HEART RATE: 84 BPM | DIASTOLIC BLOOD PRESSURE: 86 MMHG | WEIGHT: 189 LBS | BODY MASS INDEX: 31.49 KG/M2 | HEIGHT: 65 IN | SYSTOLIC BLOOD PRESSURE: 120 MMHG

## 2021-01-27 DIAGNOSIS — H61.22 IMPACTED CERUMEN OF LEFT EAR: Primary | ICD-10-CM

## 2021-01-27 DIAGNOSIS — H61.21 IMPACTED CERUMEN OF RIGHT EAR: ICD-10-CM

## 2021-01-27 PROCEDURE — G8484 FLU IMMUNIZE NO ADMIN: HCPCS | Performed by: INTERNAL MEDICINE

## 2021-01-27 PROCEDURE — 1090F PRES/ABSN URINE INCON ASSESS: CPT | Performed by: INTERNAL MEDICINE

## 2021-01-27 PROCEDURE — 4040F PNEUMOC VAC/ADMIN/RCVD: CPT | Performed by: INTERNAL MEDICINE

## 2021-01-27 PROCEDURE — 1036F TOBACCO NON-USER: CPT | Performed by: INTERNAL MEDICINE

## 2021-01-27 PROCEDURE — 69210 REMOVE IMPACTED EAR WAX UNI: CPT | Performed by: INTERNAL MEDICINE

## 2021-01-27 PROCEDURE — G8417 CALC BMI ABV UP PARAM F/U: HCPCS | Performed by: INTERNAL MEDICINE

## 2021-01-27 PROCEDURE — 99212 OFFICE O/P EST SF 10 MIN: CPT | Performed by: INTERNAL MEDICINE

## 2021-01-27 PROCEDURE — G8428 CUR MEDS NOT DOCUMENT: HCPCS | Performed by: INTERNAL MEDICINE

## 2021-01-27 PROCEDURE — 1123F ACP DISCUSS/DSCN MKR DOCD: CPT | Performed by: INTERNAL MEDICINE

## 2021-01-27 NOTE — PROGRESS NOTES
Deejay Ramos (:  1930) is a 80 y.o. female,Established patient, here for evaluation of the following chief complaint(s):  Hearing Problem and Cerumen Impaction    Ear Cerumen Removal Procedure Note    Indication: ear cerumen impaction and unable to visualize tympanic membrane    Procedure: After placing the patient's head in the appropriate position, the patient's both ear canal was irrigated with the appropriate solution until all cerumen was removed and the ear canal was clear. At this point, the procedure was complete. The patient tolerated the procedure well. Complications: None        ASSESSMENT/PLAN:  1. Impacted cerumen of left ear  -     [address] - [place] REMOVE IMPACTED EAR WAX  2. Impacted cerumen of right ear      Schedule follow-up appointment as advised in April  SUBJECTIVE/OBJECTIVE:  HPI  Patient has been complaining of decreasing hearing as well as pressure in left/right ear for the last couple of months. Review of Systems    Physical Exam  Constitutional:       Appearance: Normal appearance. HENT:      Right Ear: There is impacted cerumen. Left Ear: There is impacted cerumen. Ears:      Comments: Unable to visualize ear canals  Eyes:      Conjunctiva/sclera: Conjunctivae normal.   Cardiovascular:      Rate and Rhythm: Normal rate and regular rhythm. Pulses: Normal pulses. Heart sounds: Normal heart sounds. Neurological:      Mental Status: She is alert. An electronic signature was used to authenticate this note.     --Lewis Brunner MD

## 2021-02-08 ENCOUNTER — TELEPHONE (OUTPATIENT)
Dept: INTERNAL MEDICINE CLINIC | Age: 86
End: 2021-02-08

## 2021-02-08 DIAGNOSIS — F03.90 DEMENTIA WITHOUT BEHAVIORAL DISTURBANCE, UNSPECIFIED DEMENTIA TYPE: ICD-10-CM

## 2021-02-08 DIAGNOSIS — R41.3 MEMORY DEFICIT: ICD-10-CM

## 2021-02-08 RX ORDER — MEMANTINE HYDROCHLORIDE 10 MG/1
5 TABLET ORAL 2 TIMES DAILY
Qty: 90 TABLET | Refills: 1 | Status: SHIPPED | OUTPATIENT
Start: 2021-02-08 | End: 2021-05-24 | Stop reason: ALTCHOICE

## 2021-02-08 NOTE — TELEPHONE ENCOUNTER
Patient's daughter calling to get memantine 5 mg changed to the 10 mg. It is cheaper for the 10 mg. She can cut them in half.

## 2021-02-22 ENCOUNTER — TELEPHONE (OUTPATIENT)
Dept: RHEUMATOLOGY | Age: 86
End: 2021-02-22

## 2021-03-07 ENCOUNTER — TELEPHONE (OUTPATIENT)
Dept: INTERNAL MEDICINE CLINIC | Age: 86
End: 2021-03-07

## 2021-03-07 DIAGNOSIS — M81.0 AGE-RELATED OSTEOPOROSIS WITHOUT CURRENT PATHOLOGICAL FRACTURE: ICD-10-CM

## 2021-03-07 DIAGNOSIS — F03.90 DEMENTIA WITHOUT BEHAVIORAL DISTURBANCE, UNSPECIFIED DEMENTIA TYPE: ICD-10-CM

## 2021-03-07 DIAGNOSIS — M79.7 FIBROMYALGIA: ICD-10-CM

## 2021-03-08 RX ORDER — QUETIAPINE FUMARATE 25 MG/1
TABLET, FILM COATED ORAL
Qty: 45 TABLET | Refills: 3 | Status: SHIPPED | OUTPATIENT
Start: 2021-03-08 | End: 2021-05-24

## 2021-03-08 RX ORDER — ALENDRONATE SODIUM 70 MG/1
TABLET ORAL
Qty: 4 TABLET | Refills: 1 | Status: SHIPPED | OUTPATIENT
Start: 2021-03-08 | End: 2021-05-06

## 2021-03-08 RX ORDER — LANOLIN ALCOHOL/MO/W.PET/CERES
CREAM (GRAM) TOPICAL
Qty: 90 TABLET | Refills: 3 | Status: SHIPPED | OUTPATIENT
Start: 2021-03-08 | End: 2022-10-19

## 2021-03-08 RX ORDER — ASCORBIC ACID 500 MG
TABLET ORAL
Qty: 90 TABLET | Refills: 3 | Status: SHIPPED | OUTPATIENT
Start: 2021-03-08 | End: 2022-10-19

## 2021-03-08 RX ORDER — SERTRALINE HYDROCHLORIDE 25 MG/1
TABLET, FILM COATED ORAL
Qty: 90 TABLET | Refills: 1 | Status: SHIPPED | OUTPATIENT
Start: 2021-03-08 | End: 2021-09-02

## 2021-03-08 RX ORDER — DULOXETIN HYDROCHLORIDE 30 MG/1
CAPSULE, DELAYED RELEASE ORAL
Qty: 90 CAPSULE | Refills: 1 | Status: SHIPPED | OUTPATIENT
Start: 2021-03-08 | End: 2022-03-09 | Stop reason: SDUPTHER

## 2021-03-16 ENCOUNTER — OFFICE VISIT (OUTPATIENT)
Dept: ORTHOPEDIC SURGERY | Age: 86
End: 2021-03-16
Payer: MEDICARE

## 2021-03-16 VITALS
SYSTOLIC BLOOD PRESSURE: 125 MMHG | WEIGHT: 189 LBS | HEIGHT: 65 IN | HEART RATE: 81 BPM | DIASTOLIC BLOOD PRESSURE: 79 MMHG | TEMPERATURE: 97.3 F | BODY MASS INDEX: 31.49 KG/M2

## 2021-03-16 DIAGNOSIS — M17.11 PRIMARY OSTEOARTHRITIS OF RIGHT KNEE: ICD-10-CM

## 2021-03-16 DIAGNOSIS — M17.12 PRIMARY OSTEOARTHRITIS OF LEFT KNEE: Primary | ICD-10-CM

## 2021-03-16 PROCEDURE — 1036F TOBACCO NON-USER: CPT | Performed by: ORTHOPAEDIC SURGERY

## 2021-03-16 PROCEDURE — 99214 OFFICE O/P EST MOD 30 MIN: CPT | Performed by: ORTHOPAEDIC SURGERY

## 2021-03-16 PROCEDURE — G8427 DOCREV CUR MEDS BY ELIG CLIN: HCPCS | Performed by: ORTHOPAEDIC SURGERY

## 2021-03-16 PROCEDURE — 4040F PNEUMOC VAC/ADMIN/RCVD: CPT | Performed by: ORTHOPAEDIC SURGERY

## 2021-03-16 PROCEDURE — G8484 FLU IMMUNIZE NO ADMIN: HCPCS | Performed by: ORTHOPAEDIC SURGERY

## 2021-03-16 PROCEDURE — 20610 DRAIN/INJ JOINT/BURSA W/O US: CPT | Performed by: ORTHOPAEDIC SURGERY

## 2021-03-16 PROCEDURE — 1123F ACP DISCUSS/DSCN MKR DOCD: CPT | Performed by: ORTHOPAEDIC SURGERY

## 2021-03-16 PROCEDURE — 1090F PRES/ABSN URINE INCON ASSESS: CPT | Performed by: ORTHOPAEDIC SURGERY

## 2021-03-16 PROCEDURE — G8417 CALC BMI ABV UP PARAM F/U: HCPCS | Performed by: ORTHOPAEDIC SURGERY

## 2021-03-16 RX ORDER — LIDOCAINE HYDROCHLORIDE 10 MG/ML
4 INJECTION, SOLUTION INFILTRATION; PERINEURAL ONCE
Status: COMPLETED | OUTPATIENT
Start: 2021-03-16 | End: 2021-03-16

## 2021-03-16 RX ORDER — TRIAMCINOLONE ACETONIDE 40 MG/ML
40 INJECTION, SUSPENSION INTRA-ARTICULAR; INTRAMUSCULAR ONCE
Status: COMPLETED | OUTPATIENT
Start: 2021-03-16 | End: 2021-03-16

## 2021-03-16 RX ADMIN — TRIAMCINOLONE ACETONIDE 40 MG: 40 INJECTION, SUSPENSION INTRA-ARTICULAR; INTRAMUSCULAR at 15:46

## 2021-03-16 RX ADMIN — TRIAMCINOLONE ACETONIDE 40 MG: 40 INJECTION, SUSPENSION INTRA-ARTICULAR; INTRAMUSCULAR at 15:45

## 2021-03-16 RX ADMIN — LIDOCAINE HYDROCHLORIDE 4 ML: 10 INJECTION, SOLUTION INFILTRATION; PERINEURAL at 15:45

## 2021-03-16 RX ADMIN — LIDOCAINE HYDROCHLORIDE 4 ML: 10 INJECTION, SOLUTION INFILTRATION; PERINEURAL at 15:44

## 2021-03-17 NOTE — PROGRESS NOTES
file    Highest education level: Not on file   Occupational History    Occupation: denise   Social Needs    Financial resource strain: Not on file    Food insecurity     Worry: Not on file     Inability: Not on file   [employer] needs     Medical: Not on file     Non-medical: Not on file   Tobacco Use    Smoking status: Never Smoker    Smokeless tobacco: Never Used   Substance and Sexual Activity    Alcohol use: No    Drug use: No    Sexual activity: Not Currently     Partners: Male   Lifestyle    Physical activity     Days per week: Not on file     Minutes per session: Not on file    Stress: Not on file   Relationships    Social connections     Talks on phone: Not on file     Gets together: Not on file     Attends Protestant service: Not on file     Active member of club or organization: Not on file     Attends meetings of clubs or organizations: Not on file     Relationship status: Not on file    Intimate partner violence     Fear of current or ex partner: Not on file     Emotionally abused: Not on file     Physically abused: Not on file     Forced sexual activity: Not on file   Other Topics Concern    Not on file   Social History Narrative    Not on file       Family History   Problem Relation Age of Onset    High Blood Pressure Mother     Cancer Father         lung cancer; metastic    Cancer Daughter 52        colon cancer    Cancer Brother     Cancer Brother     Diabetes Brother        Current Outpatient Medications on File Prior to Visit   Medication Sig Dispense Refill    ascorbic acid (VITAMIN C) 500 MG tablet TAKE ONE TABLET BY MOUTH DAILY 90 tablet 3    alendronate (FOSAMAX) 70 MG tablet TAKE 1 TABLET BY MOUTH ONCE WEEKLY ON AN EMPTY STOMACH BEFORE BREAKFAST.  REMAIN UPRIGHT FOR 30 MINUTES & TAKE WITH 8 OUNCES OF WATER 4 tablet 1    DULoxetine (CYMBALTA) 30 MG extended release capsule TAKE ONE CAPSULE BY MOUTH DAILY 90 capsule 1    vitamin B-12 (CYANOCOBALAMIN) 1000 MCG tablet TAKE ONE TABLET BY MOUTH DAILY 90 tablet 3    QUEtiapine (SEROQUEL) 25 MG tablet TAKE 1/2 TABLET BY MOUTH ONCE NIGHTLY 45 tablet 3    sertraline (ZOLOFT) 25 MG tablet TAKE ONE TABLET BY MOUTH DAILY 90 tablet 1    cyproheptadine (PERIACTIN) 4 MG tablet TAKE ONE TABLET BY MOUTH THREE TIMES A DAY AS NEEDED FOR ITCHING 60 tablet 1    memantine (NAMENDA) 10 MG tablet Take 0.5 tablets by mouth 2 times daily 90 tablet 1    acetaminophen (TYLENOL) 500 MG tablet Take 1 tablet by mouth every 8 hours as needed for Pain 120 tablet 0    levothyroxine (SYNTHROID) 50 MCG tablet TAKE ONE TABLET BY MOUTH DAILY 30 tablet 5    lactulose (CHRONULAC) 10 GM/15ML solution Take 30 mLs by mouth 2 times daily 1800 mL 5    Ascorbic Acid 500 MG CHEW Take 1 tablet by mouth daily 90 tablet 1    triamcinolone (KENALOG) 0.1 % ointment Apply topically 2 times daily as needed Apply topically 2 times daily.  Caltrate 600+D Plus Minerals (CALTRATE) 600-800 MG-UNIT TABS tablet Take 1 tablet by mouth 2 times daily 180 tablet 1    aspirin 325 MG EC tablet Take 1 tablet by mouth daily 14 tablet 0    Cholecalciferol (VITAMIN D) 2000 units CAPS capsule Take 1 capsule by mouth daily        No current facility-administered medications on file prior to visit. Pertinent items are noted in HPI  Review of systems reviewed from Patient History Form dated on 3/16/2021and available in the patient's chart under the Media tab. No change noted. PHYSICAL EXAMINATION:  Ms. Ruddy Ordonez is a very pleasant 80 y.o.  female who presents today in no acute distress, awake, alert, and oriented. She is well dressed, nourished and  groomed. Patient with normal affect. Height is  5' 5\" (1.651 m), weight is 189 lb (85.7 kg), Body mass index is 31.45 kg/m². Resting respiratory rate is 16. She ambulates with a slow gait using a walker. The incision is healed well, left hip. No signs of any erythema or drainage.   She has no pain with the active or passive range of motion of the left hip. She has intact sensation, distally, and is neurovascularly intact. BLE 2+ pitting edema, L>R. Calf is nontender. Negative Homans sign. She has moderate crepitus with range of motion bilateral knees. Bilateral knees are stable to valgus and varus stress. She has full range of motion bilateral knees. No pain with range of motion. The wound with a large ecchymotic area right lateral knee is now healed well. IMAGING:  Two views left hip and femur, and AP pelvis taken 8/18/2020 in the office showed anatomic alignment of the intertrochanteric fracture, Gamma nail in good position, no loosening, or hardware failure. IMPRESSION:    1-Left Gamma nail intertrochanteric femur comminuted fracture and doing very well. 2-Bilateral knee DJD  3-Right knee contusion with deep abrasion, healed. PLAN:  I have told the patient to work on ROM that was given to her by PT, WBAT as well as strengthening exercises with physical therapy. Compression hose to help with the swelling. She can go back to normal activity with no restrictions. She will be seen PRN. I told the patient that it is not unusual to have some achy pain and swelling for up to a year or longerafter a fracture. For the knees we instructed her to work on range of motion and strengthening exercises with physical therapy. Discussed that she should work on quadricep strengthening exercises. Dry dressing as needed for the right knee wound. Discussed with the patient that she would benefit from bilateral knee cortisone injections and she agreed to proceed. PROCEDURE:    With the patient's permission, and under sterile condition, the bilateral knee was prepared and draped with alcohol and injected each with a mixture of 1 mL Kenalog 40mg and 4 mL of 1% lidocaine through the medial parapatellar port area. The patient tolerated the procedure well.    A band-aid was applied and the patient was advised to ice the knee for 15-20 minutes to relieve any injection site related pain. She reported a good improvement immediatly after the injection. As this patient has demonstrated risk factors for osteoporosis, such as age greater than [de-identified] years and evidence of a fracture, I have referred the patient back to the primary care physician for evaluation for osteoporosis, including consideration for DEXA scanning, if this is felt to be clinically indicated. The patient is advised to contact the primary care physician to follow-up for further evaluation.        Jimena Potts MD

## 2021-04-13 RX ORDER — LACTULOSE 10 G/15ML
20 SOLUTION ORAL EVERY EVENING
Qty: 900 ML | Refills: 1 | Status: SHIPPED | OUTPATIENT
Start: 2021-04-13 | End: 2021-04-14 | Stop reason: SDUPTHER

## 2021-04-14 RX ORDER — LACTULOSE 10 G/15ML
20 SOLUTION ORAL 2 TIMES DAILY
Qty: 1892 ML | Refills: 5 | Status: SHIPPED | OUTPATIENT
Start: 2021-04-14 | End: 2021-11-26

## 2021-04-29 ENCOUNTER — TELEPHONE (OUTPATIENT)
Dept: INTERNAL MEDICINE CLINIC | Age: 86
End: 2021-04-29

## 2021-05-04 ENCOUNTER — TELEPHONE (OUTPATIENT)
Dept: INTERNAL MEDICINE CLINIC | Age: 86
End: 2021-05-04

## 2021-05-04 DIAGNOSIS — M81.0 AGE-RELATED OSTEOPOROSIS WITHOUT CURRENT PATHOLOGICAL FRACTURE: ICD-10-CM

## 2021-05-04 NOTE — TELEPHONE ENCOUNTER
Patient wants to know if she still has to see  Dr. Cash Foster because she is finished with taking Evenity shots, and now is back taking Fosamax 70 MG with Dr. Lindsay Zayas.

## 2021-05-04 NOTE — TELEPHONE ENCOUNTER
Dr Agustín Valdez advises pt can have Dr Redman Solo prescribe the fosamax. No f/u appointment need with Dr Agustín Valdez - appnt in June canceled.

## 2021-05-05 NOTE — TELEPHONE ENCOUNTER
Will forward to Dr Orin Joel, pt will be getting prescription for fosamax from Dr Orin Joel now. Dr Dash Rasmussen advised pt that there is no need for her to schedule f/u with him.

## 2021-05-06 RX ORDER — ALENDRONATE SODIUM 70 MG/1
TABLET ORAL
Qty: 4 TABLET | Refills: 0 | Status: SHIPPED | OUTPATIENT
Start: 2021-05-06 | End: 2021-06-02

## 2021-05-24 ENCOUNTER — OFFICE VISIT (OUTPATIENT)
Dept: INTERNAL MEDICINE CLINIC | Age: 86
End: 2021-05-24
Payer: MEDICARE

## 2021-05-24 VITALS
HEART RATE: 78 BPM | HEIGHT: 65 IN | BODY MASS INDEX: 31.82 KG/M2 | SYSTOLIC BLOOD PRESSURE: 124 MMHG | WEIGHT: 191 LBS | DIASTOLIC BLOOD PRESSURE: 80 MMHG

## 2021-05-24 DIAGNOSIS — R35.0 URINARY FREQUENCY: ICD-10-CM

## 2021-05-24 DIAGNOSIS — K76.82 HEPATIC ENCEPHALOPATHY: ICD-10-CM

## 2021-05-24 DIAGNOSIS — R39.15 URINARY URGENCY: ICD-10-CM

## 2021-05-24 DIAGNOSIS — F02.818 LATE ONSET ALZHEIMER'S DISEASE WITH BEHAVIORAL DISTURBANCE (HCC): Primary | ICD-10-CM

## 2021-05-24 DIAGNOSIS — G30.1 LATE ONSET ALZHEIMER'S DISEASE WITH BEHAVIORAL DISTURBANCE (HCC): Primary | ICD-10-CM

## 2021-05-24 DIAGNOSIS — N39.41 URGE INCONTINENCE: ICD-10-CM

## 2021-05-24 LAB
BILIRUBIN URINE: NEGATIVE
BLOOD, URINE: NEGATIVE
CLARITY: ABNORMAL
COLOR: YELLOW
GLUCOSE URINE: NEGATIVE MG/DL
KETONES, URINE: ABNORMAL MG/DL
LEUKOCYTE ESTERASE, URINE: ABNORMAL
MICROSCOPIC EXAMINATION: YES
NITRITE, URINE: NEGATIVE
PH UA: 6.5 (ref 5–8)
PROTEIN UA: NEGATIVE MG/DL
SPECIFIC GRAVITY UA: 1.02 (ref 1–1.03)
URINE TYPE: ABNORMAL
UROBILINOGEN, URINE: 0.2 E.U./DL

## 2021-05-24 PROCEDURE — 99214 OFFICE O/P EST MOD 30 MIN: CPT | Performed by: INTERNAL MEDICINE

## 2021-05-24 PROCEDURE — G8417 CALC BMI ABV UP PARAM F/U: HCPCS | Performed by: INTERNAL MEDICINE

## 2021-05-24 PROCEDURE — 1036F TOBACCO NON-USER: CPT | Performed by: INTERNAL MEDICINE

## 2021-05-24 PROCEDURE — 1123F ACP DISCUSS/DSCN MKR DOCD: CPT | Performed by: INTERNAL MEDICINE

## 2021-05-24 PROCEDURE — 4040F PNEUMOC VAC/ADMIN/RCVD: CPT | Performed by: INTERNAL MEDICINE

## 2021-05-24 PROCEDURE — G8427 DOCREV CUR MEDS BY ELIG CLIN: HCPCS | Performed by: INTERNAL MEDICINE

## 2021-05-24 PROCEDURE — 1090F PRES/ABSN URINE INCON ASSESS: CPT | Performed by: INTERNAL MEDICINE

## 2021-05-24 PROCEDURE — 0509F URINE INCON PLAN DOCD: CPT | Performed by: INTERNAL MEDICINE

## 2021-05-24 RX ORDER — SOLIFENACIN SUCCINATE 5 MG/1
5 TABLET, FILM COATED ORAL DAILY
Qty: 30 TABLET | Refills: 0 | Status: SHIPPED | OUTPATIENT
Start: 2021-05-24 | End: 2021-07-16 | Stop reason: SDUPTHER

## 2021-05-24 RX ORDER — QUETIAPINE FUMARATE 25 MG/1
12.5 TABLET, FILM COATED ORAL 2 TIMES DAILY
Qty: 30 TABLET | Refills: 2 | Status: SHIPPED | OUTPATIENT
Start: 2021-05-24 | End: 2022-03-09 | Stop reason: SDUPTHER

## 2021-05-24 ASSESSMENT — ENCOUNTER SYMPTOMS
WHEEZING: 0
SHORTNESS OF BREATH: 0
NAUSEA: 0
VOMITING: 0
ABDOMINAL PAIN: 0

## 2021-05-25 LAB
BACTERIA: ABNORMAL /HPF
COMMENT UA: ABNORMAL
EPITHELIAL CELLS, UA: ABNORMAL /HPF (ref 0–5)
WBC UA: ABNORMAL /HPF (ref 0–5)

## 2021-05-25 RX ORDER — NITROFURANTOIN 25; 75 MG/1; MG/1
100 CAPSULE ORAL 2 TIMES DAILY
Qty: 14 CAPSULE | Refills: 0 | Status: SHIPPED | OUTPATIENT
Start: 2021-05-25 | End: 2021-06-01

## 2021-05-31 DIAGNOSIS — M81.0 AGE-RELATED OSTEOPOROSIS WITHOUT CURRENT PATHOLOGICAL FRACTURE: ICD-10-CM

## 2021-06-01 ENCOUNTER — TELEPHONE (OUTPATIENT)
Dept: INTERNAL MEDICINE CLINIC | Age: 86
End: 2021-06-01

## 2021-06-01 NOTE — TELEPHONE ENCOUNTER
Pt's daughter calling because she received a letter for jury duty. She needs a letter faxed to the court. The letter has to state that she is the sole caregiver of her mom. With her juror number [phone] and it faxed to [phone]. Please advise.

## 2021-06-02 ENCOUNTER — TELEPHONE (OUTPATIENT)
Dept: INTERNAL MEDICINE CLINIC | Age: 86
End: 2021-06-02

## 2021-06-02 ENCOUNTER — APPOINTMENT (OUTPATIENT)
Dept: GENERAL RADIOLOGY | Age: 86
End: 2021-06-02
Payer: MEDICARE

## 2021-06-02 ENCOUNTER — HOSPITAL ENCOUNTER (OUTPATIENT)
Age: 86
Setting detail: OBSERVATION
Discharge: NURSING, REHAB OR LONG-TERM CARE | End: 2021-06-03
Attending: STUDENT IN AN ORGANIZED HEALTH CARE EDUCATION/TRAINING PROGRAM | Admitting: INTERNAL MEDICINE
Payer: MEDICARE

## 2021-06-02 ENCOUNTER — APPOINTMENT (OUTPATIENT)
Dept: CT IMAGING | Age: 86
End: 2021-06-02
Payer: MEDICARE

## 2021-06-02 DIAGNOSIS — R29.6 FREQUENT FALLS: Primary | ICD-10-CM

## 2021-06-02 LAB
AMMONIA: 37 UMOL/L (ref 11–51)
ANION GAP SERPL CALCULATED.3IONS-SCNC: 9 MMOL/L (ref 3–16)
BASOPHILS ABSOLUTE: 0.1 K/UL (ref 0–0.2)
BASOPHILS RELATIVE PERCENT: 0.9 %
BILIRUBIN URINE: NEGATIVE
BLOOD, URINE: NEGATIVE
BUN BLDV-MCNC: 14 MG/DL (ref 7–20)
CALCIUM SERPL-MCNC: 9.7 MG/DL (ref 8.3–10.6)
CHLORIDE BLD-SCNC: 103 MMOL/L (ref 99–110)
CLARITY: CLEAR
CO2: 28 MMOL/L (ref 21–32)
COLOR: YELLOW
CREAT SERPL-MCNC: 1 MG/DL (ref 0.6–1.2)
EOSINOPHILS ABSOLUTE: 0.5 K/UL (ref 0–0.6)
EOSINOPHILS RELATIVE PERCENT: 7.2 %
GFR AFRICAN AMERICAN: >60
GFR NON-AFRICAN AMERICAN: 52
GLUCOSE BLD-MCNC: 100 MG/DL (ref 70–99)
GLUCOSE URINE: NEGATIVE MG/DL
HCT VFR BLD CALC: 43.5 % (ref 36–48)
HEMOGLOBIN: 14.3 G/DL (ref 12–16)
KETONES, URINE: ABNORMAL MG/DL
LEUKOCYTE ESTERASE, URINE: NEGATIVE
LYMPHOCYTES ABSOLUTE: 1.4 K/UL (ref 1–5.1)
LYMPHOCYTES RELATIVE PERCENT: 18.6 %
MCH RBC QN AUTO: 31.6 PG (ref 26–34)
MCHC RBC AUTO-ENTMCNC: 32.8 G/DL (ref 31–36)
MCV RBC AUTO: 96.4 FL (ref 80–100)
MICROSCOPIC EXAMINATION: ABNORMAL
MONOCYTES ABSOLUTE: 1 K/UL (ref 0–1.3)
MONOCYTES RELATIVE PERCENT: 13.7 %
NEUTROPHILS ABSOLUTE: 4.4 K/UL (ref 1.7–7.7)
NEUTROPHILS RELATIVE PERCENT: 59.6 %
NITRITE, URINE: NEGATIVE
PDW BLD-RTO: 14.3 % (ref 12.4–15.4)
PH UA: 6.5 (ref 5–8)
PLATELET # BLD: 207 K/UL (ref 135–450)
PMV BLD AUTO: 8.5 FL (ref 5–10.5)
POTASSIUM REFLEX MAGNESIUM: 4.1 MMOL/L (ref 3.5–5.1)
PRO-BNP: 146 PG/ML (ref 0–449)
PROTEIN UA: NEGATIVE MG/DL
RBC # BLD: 4.52 M/UL (ref 4–5.2)
REASON FOR REJECTION: NORMAL
REJECTED TEST: NORMAL
SODIUM BLD-SCNC: 140 MMOL/L (ref 136–145)
SPECIFIC GRAVITY UA: 1.02 (ref 1–1.03)
TROPONIN: <0.01 NG/ML
URINE TYPE: ABNORMAL
UROBILINOGEN, URINE: 1 E.U./DL
WBC # BLD: 7.4 K/UL (ref 4–11)

## 2021-06-02 PROCEDURE — 72125 CT NECK SPINE W/O DYE: CPT

## 2021-06-02 PROCEDURE — 81003 URINALYSIS AUTO W/O SCOPE: CPT

## 2021-06-02 PROCEDURE — 99285 EMERGENCY DEPT VISIT HI MDM: CPT

## 2021-06-02 PROCEDURE — 85025 COMPLETE CBC W/AUTO DIFF WBC: CPT

## 2021-06-02 PROCEDURE — G0378 HOSPITAL OBSERVATION PER HR: HCPCS

## 2021-06-02 PROCEDURE — 70450 CT HEAD/BRAIN W/O DYE: CPT

## 2021-06-02 PROCEDURE — 71045 X-RAY EXAM CHEST 1 VIEW: CPT

## 2021-06-02 PROCEDURE — 94760 N-INVAS EAR/PLS OXIMETRY 1: CPT

## 2021-06-02 PROCEDURE — 83880 ASSAY OF NATRIURETIC PEPTIDE: CPT

## 2021-06-02 PROCEDURE — 6370000000 HC RX 637 (ALT 250 FOR IP): Performed by: STUDENT IN AN ORGANIZED HEALTH CARE EDUCATION/TRAINING PROGRAM

## 2021-06-02 PROCEDURE — 93005 ELECTROCARDIOGRAM TRACING: CPT

## 2021-06-02 PROCEDURE — 82140 ASSAY OF AMMONIA: CPT

## 2021-06-02 PROCEDURE — 84484 ASSAY OF TROPONIN QUANT: CPT

## 2021-06-02 PROCEDURE — 80048 BASIC METABOLIC PNL TOTAL CA: CPT

## 2021-06-02 RX ORDER — POLYETHYLENE GLYCOL 3350 17 G/17G
17 POWDER, FOR SOLUTION ORAL DAILY PRN
Status: DISCONTINUED | OUTPATIENT
Start: 2021-06-02 | End: 2021-06-03 | Stop reason: HOSPADM

## 2021-06-02 RX ORDER — LACTULOSE 10 G/15ML
20 SOLUTION ORAL 2 TIMES DAILY
Status: DISCONTINUED | OUTPATIENT
Start: 2021-06-02 | End: 2021-06-03 | Stop reason: HOSPADM

## 2021-06-02 RX ORDER — DULOXETIN HYDROCHLORIDE 30 MG/1
30 CAPSULE, DELAYED RELEASE ORAL DAILY
Status: DISCONTINUED | OUTPATIENT
Start: 2021-06-03 | End: 2021-06-03 | Stop reason: HOSPADM

## 2021-06-02 RX ORDER — LEVOTHYROXINE SODIUM 0.03 MG/1
50 TABLET ORAL DAILY
Status: DISCONTINUED | OUTPATIENT
Start: 2021-06-03 | End: 2021-06-03 | Stop reason: HOSPADM

## 2021-06-02 RX ORDER — QUETIAPINE FUMARATE 25 MG/1
12.5 TABLET, FILM COATED ORAL 2 TIMES DAILY
Status: DISCONTINUED | OUTPATIENT
Start: 2021-06-02 | End: 2021-06-02 | Stop reason: SDUPTHER

## 2021-06-02 RX ORDER — SERTRALINE HYDROCHLORIDE 25 MG/1
25 TABLET, FILM COATED ORAL DAILY
Status: DISCONTINUED | OUTPATIENT
Start: 2021-06-03 | End: 2021-06-03 | Stop reason: HOSPADM

## 2021-06-02 RX ORDER — ONDANSETRON 2 MG/ML
4 INJECTION INTRAMUSCULAR; INTRAVENOUS EVERY 6 HOURS PRN
Status: DISCONTINUED | OUTPATIENT
Start: 2021-06-02 | End: 2021-06-03 | Stop reason: HOSPADM

## 2021-06-02 RX ORDER — LANOLIN ALCOHOL/MO/W.PET/CERES
1000 CREAM (GRAM) TOPICAL DAILY
Status: DISCONTINUED | OUTPATIENT
Start: 2021-06-03 | End: 2021-06-03 | Stop reason: HOSPADM

## 2021-06-02 RX ORDER — SODIUM CHLORIDE 9 MG/ML
25 INJECTION, SOLUTION INTRAVENOUS PRN
Status: DISCONTINUED | OUTPATIENT
Start: 2021-06-02 | End: 2021-06-03 | Stop reason: HOSPADM

## 2021-06-02 RX ORDER — SODIUM CHLORIDE 0.9 % (FLUSH) 0.9 %
5-40 SYRINGE (ML) INJECTION EVERY 12 HOURS SCHEDULED
Status: DISCONTINUED | OUTPATIENT
Start: 2021-06-02 | End: 2021-06-03 | Stop reason: HOSPADM

## 2021-06-02 RX ORDER — ONDANSETRON 4 MG/1
4 TABLET, ORALLY DISINTEGRATING ORAL EVERY 8 HOURS PRN
Status: DISCONTINUED | OUTPATIENT
Start: 2021-06-02 | End: 2021-06-03 | Stop reason: HOSPADM

## 2021-06-02 RX ORDER — VITAMIN B COMPLEX
2000 TABLET ORAL DAILY
Status: DISCONTINUED | OUTPATIENT
Start: 2021-06-03 | End: 2021-06-03 | Stop reason: HOSPADM

## 2021-06-02 RX ORDER — ALENDRONATE SODIUM 70 MG/1
TABLET ORAL
Qty: 4 TABLET | Refills: 0 | Status: SHIPPED | OUTPATIENT
Start: 2021-06-02 | End: 2021-07-12 | Stop reason: SDUPTHER

## 2021-06-02 RX ORDER — ACETAMINOPHEN 325 MG/1
650 TABLET ORAL EVERY 6 HOURS PRN
Status: DISCONTINUED | OUTPATIENT
Start: 2021-06-02 | End: 2021-06-03 | Stop reason: HOSPADM

## 2021-06-02 RX ORDER — ACETAMINOPHEN 650 MG/1
650 SUPPOSITORY RECTAL EVERY 6 HOURS PRN
Status: DISCONTINUED | OUTPATIENT
Start: 2021-06-02 | End: 2021-06-03 | Stop reason: HOSPADM

## 2021-06-02 RX ORDER — SODIUM CHLORIDE 0.9 % (FLUSH) 0.9 %
5-40 SYRINGE (ML) INJECTION PRN
Status: DISCONTINUED | OUTPATIENT
Start: 2021-06-02 | End: 2021-06-03 | Stop reason: HOSPADM

## 2021-06-02 RX ORDER — OYSTER SHELL CALCIUM WITH VITAMIN D 500; 200 MG/1; [IU]/1
1 TABLET, FILM COATED ORAL 2 TIMES DAILY
Status: DISCONTINUED | OUTPATIENT
Start: 2021-06-02 | End: 2021-06-03 | Stop reason: HOSPADM

## 2021-06-02 RX ORDER — QUETIAPINE FUMARATE 25 MG/1
12.5 TABLET, FILM COATED ORAL 2 TIMES DAILY
Status: DISCONTINUED | OUTPATIENT
Start: 2021-06-03 | End: 2021-06-03 | Stop reason: HOSPADM

## 2021-06-02 RX ORDER — ASPIRIN 81 MG/1
81 TABLET ORAL DAILY
Status: DISCONTINUED | OUTPATIENT
Start: 2021-06-03 | End: 2021-06-03 | Stop reason: HOSPADM

## 2021-06-02 RX ADMIN — QUETIAPINE FUMARATE 12.5 MG: 25 TABLET ORAL at 21:30

## 2021-06-02 ASSESSMENT — PAIN DESCRIPTION - LOCATION: LOCATION: BREAST

## 2021-06-02 ASSESSMENT — PAIN SCALES - GENERAL
PAINLEVEL_OUTOF10: 7
PAINLEVEL_OUTOF10: 8

## 2021-06-02 ASSESSMENT — PAIN DESCRIPTION - DESCRIPTORS: DESCRIPTORS: SORE

## 2021-06-02 ASSESSMENT — ENCOUNTER SYMPTOMS: ROS SKIN COMMENTS: BRUISE

## 2021-06-02 ASSESSMENT — PAIN DESCRIPTION - PAIN TYPE: TYPE: ACUTE PAIN

## 2021-06-02 ASSESSMENT — PAIN DESCRIPTION - ORIENTATION: ORIENTATION: LEFT

## 2021-06-02 ASSESSMENT — PAIN DESCRIPTION - FREQUENCY: FREQUENCY: CONTINUOUS

## 2021-06-02 NOTE — TELEPHONE ENCOUNTER
Patient's daughter, Ruston calling. She states patient fell twice yesterday. Patient has dementia and her daughter states she is having hallucinations. She is having ambulance take patient to [facility] emergency department this afternoon for evaluation.

## 2021-06-02 NOTE — ED NOTES
Lab requesting new CBC tube. Straight stuck in right hand on first attempt. Tolerated well. Specimen sent to lab. Ewa Moya, , at bedside.      Linette Capone RN  06/02/21 0275

## 2021-06-02 NOTE — CARE COORDINATION
SW consulted to placement needs for pt who came to ED today with 3 falls in the last 24 hours. Pt lives with dtr but needing extra care at this time. Spoke with pt and dtr who are both agreeable to placement. Dtr reported pt has been at [facility] [address] before. Stated pt would prefer our ARU as first choice. SW made referrals to Almas Villalpando in [facility]. Also faxed referral to Imani Knight at [facility]. If admitted, pt will need PT/OT evaluations ordered.     Electronically signed by Gilford River, MSW, MARLON on 6/2/2021 at 4:58 PM

## 2021-06-02 NOTE — ED NOTES
Pt states she doesn't know why she is falling at home. States she doesn't know when O2 was applied to her. States she doesn't use it at home. Asked her if she has rugs at home, She states \"No\".      Ike Joseph RN  06/02/21 [id number]

## 2021-06-02 NOTE — ED NOTES
Pt Daughter Alexx Shearer states, \"Mom doesn't use O2 at home but they seem to put it on her at the hospital.\" Pt is on 1L of Oxygen her SpO2 88%-92% with Pt being reminded to breath deeper. Pt C/O increased pain to the LUQ over ribs. Daughter Doretha Bojorquez) states, \"she feel and hit her ribs this time. \"      Stacy Hammond RN  06/02/21 2842

## 2021-06-02 NOTE — ED NOTES
Assisted Pt to bedside commode. Pt is unsteady and was unable at this time to stand up straight but was able to stand to pivot. C/O LUQ pain over ribs still. RN reviewed Chest Xray. Would recommend X2 assist with walker.        Jennifer Calero, DANAE  06/02/21 [id number]

## 2021-06-02 NOTE — ED NOTES
Gave Pt water no cough present tolerated fluids well. Pt started talking after drinking fluids and O2 dropped to 82%. Reminded Pt to take a few deep breaths and SpO2 came back up quickly to 92% on 1L of oxygen.       Annemarie Garcia RN  06/02/21 [id number]

## 2021-06-02 NOTE — TELEPHONE ENCOUNTER
Patient's daughter advised. She is in the process of getting patient dressed now and will then call the squad.

## 2021-06-02 NOTE — ED NOTES
Bed: 26  Expected date:   Expected time:   Means of arrival:   Comments:  Lashanda Mcfarlane, RN  06/02/21 9335

## 2021-06-02 NOTE — ED PROVIDER NOTES
reviewed. REVIEW OF SYSTEMS    (2-9 systems for level 4, 10 or more for level 5)     Review of Systems   Unable to perform ROS: Dementia   Skin: Negative for rash and wound. Bruise   Neurological: Negative for seizures and syncope. Psychiatric/Behavioral: Positive for confusion. Negative for hallucinations and suicidal ideas. Except as noted above the remainder of the review of systems was reviewed and negative. PAST MEDICAL HISTORY     Past Medical History:   Diagnosis Date    Acute renal failure ([facility] 11-27-12    Arthritis     At risk for falling 08/28/2018    Score of 11/24 for Dynamic Gait Index    Cirrhosis ([facility] 9/25/2020    Dementia with behavioral problem ([facility] 9/25/2020    Frequent falls     Gastroesophageal reflux disease 1/22/2019    Movement disorder     osteoporosis    Nephrotic syndrome 12/28/2012    Thyroid disease          SURGICAL HISTORY       Past Surgical History:   Procedure Laterality Date    COLONOSCOPY      EXCISION OF FACIAL MASS      skin ca    EYE SURGERY      cataract removal; bilat    HIP FRACTURE SURGERY Left 8/21/2019    OPEN REDUCTION INTERNAL FIXATION OF INTERTROCHANTERIC FRACTURE OF LEFT HIPUSING GAMMA NAIL performed by Domenico Ambriz MD at [place] [address] Right 05/27/2018    right gamma nail with cables    SKIN BIOPSY           CURRENT MEDICATIONS       Previous Medications    ACETAMINOPHEN (TYLENOL) 500 MG TABLET    Take 1 tablet by mouth every 8 hours as needed for Pain    ALENDRONATE (FOSAMAX) 70 MG TABLET    TAKE 1 TABLET BY MOUTH ONCE WEEKLY ON AN EMPTY STOMACH BEFORE BREAKFAST.  REMAIN UPRIGHT FOR 30 MINUTES & TAKE WITH 8 OUNCES OF WATER    ASCORBIC ACID (VITAMIN C) 500 MG TABLET    TAKE ONE TABLET BY MOUTH DAILY    ASPIRIN 325 MG EC TABLET    Take 1 tablet by mouth daily    CALTRATE 600+D PLUS MINERALS (CALTRATE) 600-800 MG-UNIT TABS TABLET    Take 1 tablet by mouth 2 times daily    CHOLECALCIFEROL (VITAMIN D) 2000 UNITS CAPS CAPSULE    Take 1 capsule by mouth daily     DULOXETINE (CYMBALTA) 30 MG EXTENDED RELEASE CAPSULE    TAKE ONE CAPSULE BY MOUTH DAILY    LACTULOSE (CHRONULAC) 10 GM/15ML SOLUTION    Take 30 mLs by mouth 2 times daily    LEVOTHYROXINE (SYNTHROID) 50 MCG TABLET    TAKE ONE TABLET BY MOUTH DAILY    QUETIAPINE (SEROQUEL) 25 MG TABLET    Take 0.5 tablets by mouth 2 times daily    SERTRALINE (ZOLOFT) 25 MG TABLET    TAKE ONE TABLET BY MOUTH DAILY    SOLIFENACIN (VESICARE) 5 MG TABLET    Take 1 tablet by mouth daily    VITAMIN B-12 (CYANOCOBALAMIN) 1000 MCG TABLET    TAKE ONE TABLET BY MOUTH DAILY       ALLERGIES     Metoprolol, Amlodipine, Sulfa antibiotics, and Adhesive tape    FAMILY HISTORY       Family History   Problem Relation Age of Onset    High Blood Pressure Mother     Cancer Father         lung cancer; metastic    Cancer Daughter 52        colon cancer    Cancer Brother     Cancer Brother     Diabetes Brother           SOCIAL HISTORY       Social History     Socioeconomic History    Marital status:      Spouse name: Not on file    Number of children: Not on file    Years of education: Not on file    Highest education level: Not on file   Occupational History    Occupation: [employer]   Tobacco Use    Smoking status: Never Smoker    Smokeless tobacco: Never Used   Vaping Use    Vaping Use: Never used   Substance and Sexual Activity    Alcohol use: No    Drug use: No    Sexual activity: Not Currently     Partners: Male   Other Topics Concern    Not on file   Social History Narrative    Not on file     Social Determinants of Health     Financial Resource Strain:     Difficulty of Paying Living Expenses:    Food Insecurity:     Worried About Running Out of Food in the Last Year:     [address] in the Last Year:    Transportation Needs:     Lack of Transportation (Medical):      Lack of Transportation (Non-Medical):    Physical Activity:     Days of Exercise per Week:     Minutes of Exercise per Session:    Stress:     Feeling of Stress :    Social Connections:     Frequency of Communication with Friends and Family:     Frequency of Social Gatherings with Friends and Family:     Attends Hindu Services:     Active Member of Clubs or Organizations:     Attends Club or Organization Meetings:     Marital Status:    Intimate Partner Violence:     Fear of Current or Ex-Partner:     Emotionally Abused:     Physically Abused:     Sexually Abused:        SCREENINGS        [place] Coma Scale  Eye Opening: Spontaneous  Best Verbal Response: Confused  Best Motor Response: Obeys commands  [place] Coma Scale Score: 14               PHYSICAL EXAM    (up to 7 for level 4, 8 or more for level 5)     ED Triage Vitals   BP Temp Temp src Pulse Resp SpO2 Height Weight   -- -- -- -- -- -- -- --       Physical Exam  GENERAL: Well-appearing  HEAD: Atraumatic, normocephalic without edema, discoloration or evidence of trauma. Facial bones without deformities or tenderness. EYES: PERRL. No scleral icterus or conjunctival injection. Extraocular muscles intact without nystagmus or diplopia. No proptosis or enophthalmos. NOSE: No discharge, tenderness, laxity. No nasal septal hematoma. MOUTH: No malocclusion or trismus. Moist mucus membranes without blood. Posterior pharynx without erythema or exudate. NECK: No midline C-spine tenderness or step-off. CV: Regular rate and rhythm, Normal s1 and s2. No murmurs, rubs, or gallops. PV: Radial pulses 2+ bilaterally and symmetric. Dorsalis pedis pulses 2+ bilaterally and symmetric. 2+ capillary refill. No extremity edema. CHEST: +1 cm ecchymosis to the left lower anterior rib wall. Chest symmetric with respirations. No chest wall tenderness. No crepitus. No step offs. Lungs are clear to auscultation bilaterally. No rales, rhonchi, wheezing or stridor. ABDOMEN: No ecchymosis or abrasions. Soft, nondistended, nontender. Bowel tones normoactive.  No masses or organomegaly. BACK: No abrasions, skin openings, or ecchymosis. Spine without bony tenderness, no step offs. MSK: No gross deformities or discolorations or lesions. Tolerates full range of motion of extremities without tenderness. NEURO: Alert and oriented to person, place, and situation. 5 out of 5 strength with bilateral /push/pull. No drift in lower extremities. DIAGNOSTIC RESULTS       RADIOLOGY:   Non-plain film images such as CT, Ultrasound and MRI are read by the radiologist. Plain radiographic images are visualized and preliminarily interpreted by the emergency physician with the below findings:      Interpretation per the Radiologist below, if available at the time of this note:    XR CHEST PORTABLE   Final Result   No acute radiographic abnormality. CT Head WO Contrast   Final Result   No acute intracranial abnormality. Diffuse atrophic changes with findings suggesting chronic microvascular   ischemia         CT Cervical Spine WO Contrast   Final Result   No acute abnormality of the cervical spine.                LABS:  Labs Reviewed   BASIC METABOLIC PANEL W/ REFLEX TO MG FOR LOW K - Abnormal; Notable for the following components:       Result Value    Glucose 100 (*)     GFR Non- 52 (*)     All other components within normal limits    Narrative:     Performed at:  [facility] [address], [facility], [address] [place]   Phone [phone]   URINALYSIS - Abnormal; Notable for the following components:    Ketones, Urine TRACE (*)     All other components within normal limits    Narrative:     Performed at:  [facility] [address], [facility], [address] [place]   Phone [phone]   TROPONIN    Narrative:     Performed at:  [facility] [address], [facility], [address] [place]   Phone [phone]   BRAIN NATRIURETIC PEPTIDE    Narrative:     Performed at:  [id number] [facility]  [address],  [place], [address]   Phone [phone]   AMMONIA    Narrative:     Performed at:  [facility]  [address],  [place], [address]   Phone [phone]    Narrative:     Nancy Chilel [id number],  Rejected Test Name/Called to:CBC/ RN Ivonne Ta, 06/02/2021 15:36, by  Scarlet Duenas  Performed at:  [facility]  [address],  [place], [address]   Phone [phone]   CBC WITH AUTO DIFFERENTIAL    Narrative:     Performed at:  [facility]  [address],  [place], [address]   Phone [phone]       All other labs were within normal range or not returned as of this dictation. EMERGENCY DEPARTMENT COURSE and DIFFERENTIAL DIAGNOSIS/MDM:   Vitals:    Vitals:    06/02/21 1630 06/02/21 1700 06/02/21 1715 06/02/21 1730   BP: (!) 147/82 (!) 147/94  132/76   Pulse: 75 81 81 82   Resp: 23 15 24 (!) 35   Temp:       TempSrc:       SpO2: 92% 92% 93% 94%   Weight:       Height:         Medications - No data to display  Patient is 77-year-old female with history of dementia and hepatic encephalopathy presenting to the emergency room for frequent falls at home. I discussed this case with her daughter who states that the patient does not know how to use her walker correctly and has dementia which inhibits her learning to use it. She has fallen 3 times in the last 24 hours. No syncopal episode preceding this. On my exam she is comfortable and hemodynamically stable. She follows commands and is oriented to person, place and situation but not to time. Trauma exam as above once the above imaging not showing any sign of acute rib fracture, ICH or C-spine fracture dislocation. No UTI. No leukocytosis. No clinically significant electrolyte arrangement or SHARAD. History of hepatic encephalopathy but no new level is normal today.   At this time I believe she is medically cleared. I discussed options with the daughter specifically home health care versus home PT OT versus inpatient PT OT. The daughter does not feel comfortable taking the patient home today due to frequent falls and is requesting placement. SW is working on placement at time of sign out to Dr. Mary Lou Benavides 1900. PROCEDURES:  Unless otherwise noted below, none     Procedures      FINAL IMPRESSION      1. Frequent falls          DISPOSITION/PLAN   DISPOSITION        PATIENT REFERRED TO:  No follow-up provider specified. DISCHARGE MEDICATIONS:      New Prescriptions    No medications on file       Controlled Substances Monitoring:    If the patient was prescribed a controlled substance today, the PDMP was reviewed as documented below. No flowsheet data found.     (Please note that portions of this note were completed with a voice recognition program.  Efforts were made to edit the dictations but occasionally words are mis-transcribed.)    Marlene Irby MD (electronically signed)  Attending Emergency Physician         Megan Ford MD  06/02/21 6044

## 2021-06-02 NOTE — TELEPHONE ENCOUNTER
I recommend daughter take her to the ER now. ER provider will be able to review the medical record in chart.

## 2021-06-03 VITALS
WEIGHT: 182.98 LBS | SYSTOLIC BLOOD PRESSURE: 130 MMHG | DIASTOLIC BLOOD PRESSURE: 88 MMHG | RESPIRATION RATE: 18 BRPM | BODY MASS INDEX: 30.49 KG/M2 | HEIGHT: 65 IN | OXYGEN SATURATION: 95 % | TEMPERATURE: 98 F | HEART RATE: 82 BPM

## 2021-06-03 LAB
ANION GAP SERPL CALCULATED.3IONS-SCNC: 7 MMOL/L (ref 3–16)
BASOPHILS ABSOLUTE: 0 K/UL (ref 0–0.2)
BASOPHILS RELATIVE PERCENT: 0.7 %
BUN BLDV-MCNC: 19 MG/DL (ref 7–20)
CALCIUM SERPL-MCNC: 9.1 MG/DL (ref 8.3–10.6)
CHLORIDE BLD-SCNC: 106 MMOL/L (ref 99–110)
CO2: 29 MMOL/L (ref 21–32)
CREAT SERPL-MCNC: 1 MG/DL (ref 0.6–1.2)
EKG ATRIAL RATE: 74 BPM
EKG DIAGNOSIS: NORMAL
EKG P AXIS: 51 DEGREES
EKG P-R INTERVAL: 160 MS
EKG Q-T INTERVAL: 424 MS
EKG QRS DURATION: 126 MS
EKG QTC CALCULATION (BAZETT): 470 MS
EKG R AXIS: -6 DEGREES
EKG T AXIS: 18 DEGREES
EKG VENTRICULAR RATE: 74 BPM
EOSINOPHILS ABSOLUTE: 0.6 K/UL (ref 0–0.6)
EOSINOPHILS RELATIVE PERCENT: 9.4 %
GFR AFRICAN AMERICAN: >60
GFR NON-AFRICAN AMERICAN: 52
GLUCOSE BLD-MCNC: 113 MG/DL (ref 70–99)
HCT VFR BLD CALC: 43.2 % (ref 36–48)
HEMOGLOBIN: 14.2 G/DL (ref 12–16)
LYMPHOCYTES ABSOLUTE: 1.4 K/UL (ref 1–5.1)
LYMPHOCYTES RELATIVE PERCENT: 21.3 %
MCH RBC QN AUTO: 31.7 PG (ref 26–34)
MCHC RBC AUTO-ENTMCNC: 33 G/DL (ref 31–36)
MCV RBC AUTO: 96.2 FL (ref 80–100)
MONOCYTES ABSOLUTE: 1 K/UL (ref 0–1.3)
MONOCYTES RELATIVE PERCENT: 15.3 %
NEUTROPHILS ABSOLUTE: 3.6 K/UL (ref 1.7–7.7)
NEUTROPHILS RELATIVE PERCENT: 53.3 %
PDW BLD-RTO: 14.4 % (ref 12.4–15.4)
PLATELET # BLD: 204 K/UL (ref 135–450)
PMV BLD AUTO: 8.5 FL (ref 5–10.5)
POTASSIUM REFLEX MAGNESIUM: 4.1 MMOL/L (ref 3.5–5.1)
RBC # BLD: 4.49 M/UL (ref 4–5.2)
SARS-COV-2, NAAT: NOT DETECTED
SODIUM BLD-SCNC: 142 MMOL/L (ref 136–145)
WBC # BLD: 6.7 K/UL (ref 4–11)

## 2021-06-03 PROCEDURE — 6360000002 HC RX W HCPCS: Performed by: INTERNAL MEDICINE

## 2021-06-03 PROCEDURE — 80048 BASIC METABOLIC PNL TOTAL CA: CPT

## 2021-06-03 PROCEDURE — G0378 HOSPITAL OBSERVATION PER HR: HCPCS

## 2021-06-03 PROCEDURE — 97166 OT EVAL MOD COMPLEX 45 MIN: CPT

## 2021-06-03 PROCEDURE — 97162 PT EVAL MOD COMPLEX 30 MIN: CPT

## 2021-06-03 PROCEDURE — 36415 COLL VENOUS BLD VENIPUNCTURE: CPT

## 2021-06-03 PROCEDURE — 85025 COMPLETE CBC W/AUTO DIFF WBC: CPT

## 2021-06-03 PROCEDURE — 2700000000 HC OXYGEN THERAPY PER DAY

## 2021-06-03 PROCEDURE — 94760 N-INVAS EAR/PLS OXIMETRY 1: CPT

## 2021-06-03 PROCEDURE — 97535 SELF CARE MNGMENT TRAINING: CPT

## 2021-06-03 PROCEDURE — 6370000000 HC RX 637 (ALT 250 FOR IP): Performed by: INTERNAL MEDICINE

## 2021-06-03 PROCEDURE — 93010 ELECTROCARDIOGRAM REPORT: CPT | Performed by: INTERNAL MEDICINE

## 2021-06-03 PROCEDURE — 87635 SARS-COV-2 COVID-19 AMP PRB: CPT

## 2021-06-03 PROCEDURE — 96372 THER/PROPH/DIAG INJ SC/IM: CPT

## 2021-06-03 RX ADMIN — Medication 2000 UNITS: at 08:18

## 2021-06-03 RX ADMIN — LACTULOSE 20 G: 20 SOLUTION ORAL at 08:18

## 2021-06-03 RX ADMIN — LEVOTHYROXINE SODIUM 50 MCG: 0.03 TABLET ORAL at 08:18

## 2021-06-03 RX ADMIN — SERTRALINE 25 MG: 25 TABLET, FILM COATED ORAL at 08:19

## 2021-06-03 RX ADMIN — ASPIRIN 81 MG: 81 TABLET, COATED ORAL at 08:18

## 2021-06-03 RX ADMIN — DULOXETINE HYDROCHLORIDE 30 MG: 30 CAPSULE, DELAYED RELEASE ORAL at 08:18

## 2021-06-03 RX ADMIN — CALCIUM CARBONATE-VITAMIN D TAB 500 MG-200 UNIT 1 TABLET: 500-200 TAB at 08:18

## 2021-06-03 RX ADMIN — CALCIUM CARBONATE-VITAMIN D TAB 500 MG-200 UNIT 1 TABLET: 500-200 TAB at 00:38

## 2021-06-03 RX ADMIN — LACTULOSE 20 G: 20 SOLUTION ORAL at 00:38

## 2021-06-03 RX ADMIN — QUETIAPINE FUMARATE 12.5 MG: 25 TABLET ORAL at 08:18

## 2021-06-03 RX ADMIN — ENOXAPARIN SODIUM 40 MG: 40 INJECTION SUBCUTANEOUS at 08:19

## 2021-06-03 RX ADMIN — CYANOCOBALAMIN TAB 1000 MCG 1000 MCG: 1000 TAB at 08:19

## 2021-06-03 ASSESSMENT — PAIN SCALES - GENERAL
PAINLEVEL_OUTOF10: 0

## 2021-06-03 NOTE — PROGRESS NOTES
Occupational Therapy   Occupational Therapy Initial Assessment  Date: 6/3/2021   Patient Name: Christy Zamarripa  MRN: [id number]     : 1930    Date of Service: 6/3/2021    Discharge Recommendations:    Christy Zamarripa scored a 15/24 on the AM-PAC ADL Inpatient form. Current research shows that an AM-PAC score of 17 or less is typically not associated with a discharge to the patient's home setting. Based on the patient's AM-PAC score and their current ADL deficits, it is recommended that the patient have 5-7 sessions per week of Occupational Therapy at d/c to increase the patient's independence. At this time, this patient demonstrates the endurance, and/or tolerance for 3 hours of therapy each day, with a treatment frequency of 5-7x/wk. Please see assessment section for further patient specific details. If patient discharges prior to next session this note will serve as a discharge summary. Please see below for the latest assessment towards goals. OT Equipment Recommendations  Equipment Needed: No  Other: continue to assess    Assessment   Performance deficits / Impairments: Decreased functional mobility ; Decreased ADL status; Decreased safe awareness;Decreased cognition;Decreased endurance;Decreased strength;Decreased high-level IADLs;Decreased balance  Assessment: Pt is currently functioning below occupational baseline and demo the deficits listed above, pt would benefit from continued skilled OT services to address these deficits and increase independence, safety, and ease with all occupational pursuits  Treatment Diagnosis: Decreased ADL/IADL status, functional mobility, and functional transfers d/t Falls frequently  Prognosis: Good  Decision Making: Medium Complexity  OT Education: OT Role;Plan of Care;Transfer Training;Energy Conservation; ADL Adaptive Strategies  Patient Education: chanda d/c planning- pt verbalized understanding but will require reinforcement  Barriers to Learning: frequently  Subjective  Subjective: Pt supine in bed upon arrival, agreeable to OT/PT evaluation  Patient Currently in Pain: Denies  Pain Assessment  Pain Assessment: 0-10  Pain Level: 0  Patient's Stated Pain Goal: No pain  Vital Signs  Temp: 98 °F (36.7 °C)  Temp Source: Oral  Pulse: 82  Heart Rate Source: Monitor  Resp: 18  BP: 130/88  BP Location: Left upper arm  MAP (mmHg): 102  Patient Position: Sitting  Level of Consciousness: Alert (0)  MEWS Score: 1  Patient Currently in Pain: Denies  Oxygen Therapy  SpO2: 95 %  Pulse Oximeter Device Mode: Intermittent  Pulse Oximeter Device Location: Left;Finger  O2 Device: Nasal cannula  O2 Flow Rate (L/min): 2.5 L/min    Social/Functional History  Social/Functional History  Lives With: Daughter  Type of Home: House  Home Layout: One level  Home Access: Stairs to enter with rails  Entrance Stairs - Number of Steps: SHERON  Bathroom Shower/Tub: Walk-in shower  Bathroom Toilet: Standard  Bathroom Equipment: Grab bars in shower  Home Equipment: Rolling walker, Cane, 4 wheeled walker, [employer], Alert Button  ADL Assistance: Needs assistance  Homemaking Assistance: Needs assistance  Ambulation Assistance: Needs assistance  Transfer Assistance: Needs assistance  Active : No  Patient's  Info: Daughter  Occupation: Retired  Additional Comments: Pt is a questionable historian, unable to recall social function details. 3 recent falls prior to admit. Objective   Vision: Impaired  Hearing: Exceptions to [facility]  Hearing Exceptions: Hard of hearing/hearing concerns    Orientation  Overall Orientation Status: Impaired  Orientation Level: Oriented to place;Oriented to person;Disoriented to situation;Disoriented to time  Observation/Palpation  Posture: Fair  Observation: Pt has bruises all over bilateral LE's and hands.   Balance  Sitting Balance: Stand by assistance  Standing Balance: Contact guard assistance  Standing Balance  Time: 10-15 minutes  Activity: functional mobility/transfers, ADL completion  Comment: CGA-MIN(A) for steadying assist, use of RW, decreased standing tolerance  Functional Mobility  Functional - Mobility Device: Rolling Walker  Activity: To/from bathroom  Assist Level: Minimal assistance  Functional Mobility Comments: Pt ambulated to<>from bathroom with min(A) and frequent verbal/tactile cueing for safety and RW management. Decreased standing tolerance noted. Toilet Transfers  Toilet - Technique: Ambulating  Equipment Used: Standard toilet  Toilet Transfer: Minimal assistance  Toilet Transfers Comments: verbal cueing for technique and safety  ADL  Grooming: Setup;Contact guard assistance (to complete oral care in stance; however decreased standing tolerance so pt finished oral care while seated)  UE Bathing: Minimal assistance;Setup;Verbal cueing (min(A) for thoroughness)  LE Bathing: Moderate assistance;Setup  UE Dressing: Setup;Minimal assistance (gown change)  LE Dressing: Setup;Maximum assistance (to denise briefs)  Toileting: Moderate assistance;Setup (for clothing management and caro care)  Tone RUE  RUE Tone: Normotonic  Tone LUE  LUE Tone: Normotonic  Coordination  Movements Are Fluid And Coordinated: Yes  Coordination and Movement description: Resting tremors  Bed mobility  Supine to Sit: Stand by assistance  Scooting: Stand by assistance  Transfers  Sit to stand: Minimal assistance  Stand to sit: Minimal assistance  Transfer Comments: verbal cueing for safe hand placement  Cognition  Overall Cognitive Status: Exceptions  Arousal/Alertness: Delayed responses to stimuli  Following Commands: Follows one step commands with increased time; Follows one step commands with repetition  Attention Span: Appears intact  Memory: Decreased short term memory;Decreased long term memory  Safety Judgement: Decreased awareness of need for assistance  Problem Solving: Assistance required to generate solutions;Decreased awareness of errors;Assistance required to implement solutions;Assistance required to identify errors made  Insights: Fully aware of deficits  Initiation: Requires cues for some  Sequencing: Requires cues for some  Cognition Comment: hx of dementia  Perception  Overall Perceptual Status: WFL  Sensation  Overall Sensation Status: WFL  LUE AROM (degrees)  LUE AROM : WFL  Left Hand AROM (degrees)  Left Hand AROM: WFL  RUE AROM (degrees)  RUE AROM : WFL  Right Hand AROM (degrees)  Right Hand AROM: WFL          Plan   Plan  Times per week: 3-5x/wk  Times per day: Daily  Current Treatment Recommendations: Strengthening, Balance Training, Functional Mobility Training, Endurance Training, Patient/Caregiver Education & Training, Safety Education & Training, Self-Care / ADL, Equipment Evaluation, Education, & procurement, Cognitive Reorientation    G-Code     OutComes Score                                                  AM-PAC Score        AM-[facility] Inpatient Daily Activity Raw Score: 15 (06/03/21 1344)  AM-PAC Inpatient ADL T-Scale Score : 34.69 (06/03/21 1344)  ADL Inpatient CMS 0-100% Score: 56.46 (06/03/21 1344)  ADL Inpatient CMS G-Code Modifier : CK (06/03/21 1344)    Goals  Short term goals  Time Frame for Short term goals: d/c  Short term goal 1: Pt will complete functional transfer to ADL surface with SBA  Short term goal 2: Pt will complete functional mobility to<>from bathroom with SBA  Short term goal 3: Pt will complete UB ADLs with setup  Short term goal 4: Pt will complete LB ADLs with SBA  Short term goal 5: Pt will complete toileting with SBA  Long term goals  Time Frame for Long term goals : LTG=STG  Patient Goals   Patient goals : to return home       Therapy Time   Individual Concurrent Group Co-treatment   Time In 0838         Time Out 0924         Minutes 46         Timed Code Treatment Minutes: 15 Minutes     Time spent with pt shared with PT as pt is under observation status. As such, pt is being billed  2 units for evaluation.     Kristie Townsend [address], [address], [facility]

## 2021-06-03 NOTE — CARE COORDINATION
CM noted SW note and called Myah at [address] [place] [address] and she is following pt. Therapy evaluations are still pending at this time.      Chantelle Yeung, RN, BSN  [phone]

## 2021-06-03 NOTE — CONSULTS
Patient: Gato Ramos  [id number]  Date: 6/3/2021      Chief Complaint: Recurrent falls    History of Present Illness/Hospital Course:  80-year-old female with a history of cirrhosis, progressive dementia, hypothyroidism, and GERD who was admitted on 6/2 after family reported they were unable to care for her appropriately and she was having recurrent falls at home. She was evaluated by therapy and suggested to continue in an inpatient setting prior to returning home. Upon further discussion of case management with liaison family suggested that they did not feel they were able to care for her to prevent her injuries. Prior Level of Function:  Required assistance    Current Level of Function:  Min assist     has a past medical history of Acute renal failure ([facility] [address] Arthritis, At risk for falling, Cirrhosis ([facility] [address] Dementia with behavioral problem ([facility] [address] Frequent falls, Gastroesophageal reflux disease, Movement disorder, Nephrotic syndrome, and Thyroid disease. has a past surgical history that includes Excision of Facial Mass; skin biopsy; Colonoscopy; eye surgery; other surgical history (Right, 05/27/2018); and Hip fracture surgery (Left, 8/21/2019). reports that she has never smoked. She has never used smokeless tobacco. She reports that she does not drink alcohol and does not use drugs. family history includes Cancer in her brother, brother, and father; Cancer (age of onset: 52) in her daughter; Diabetes in her brother; High Blood Pressure in her mother. REVIEW OF SYSTEMS:   CONSTITUTIONAL: negative for fevers, chills, diaphoresis, appetite change, fatigue, night sweats and unexpected weight change. HEENT: negative for hearing loss, tinnitus, ear drainage, sinus pressure, nasal congestion, epistaxis and snoring. RESPIRATORY: Negative for hemoptysis, cough, sputum production. CARDIOVASCULAR: negative for chest pain, palpitations, exertional chest pressure/discomfort, edema, syncope. GASTROINTESTINAL: negative for nausea, vomiting, diarrhea, constipation, blood in stool and abdominal pain. GENITOURINARY: negative for frequency, dysuria, urinary incontinence, decreased urine volume, and hematuria. HEMATOLOGIC/LYMPHATIC: negative for easy bruising, bleeding and lymphadenopathy. ALLERGIC/IMMUNOLOGIC: negative for recurrent infections, angioedema, anaphylaxis and drug reactions. ENDOCRINE: negative for weight changes and diabetic symptoms including polyuria, polydipsia and polyphagia. MUSCULOSKELETAL: negative for pain, joint swelling, decreased range of motion and muscle weakness. NEUROLOGICAL: negative for headaches, slurred speech, unilateral weakness. PSYCHIATRIC/BEHAVIORAL: negative for hallucinations, behavioral problems, confusion and agitation. All pertinent positives are noted in the HPI. Physical Examination:  Vitals:   Patient Vitals for the past 24 hrs:   BP Temp Temp src Pulse Resp SpO2 Height Weight   06/03/21 1153 -- -- -- -- 18 91 % -- --   06/03/21 0800 131/75 98.1 °F (36.7 °C) Oral 74 18 91 % -- --   06/03/21 0454 123/76 97.6 °F (36.4 °C) Oral 78 18 90 % -- --   06/02/21 2359 -- -- -- -- 17 91 % -- --   06/02/21 2255 116/73 97.7 °F (36.5 °C) Oral 84 18 91 % -- 182 lb 15.7 oz (83 kg)   06/02/21 1900 99/61 -- -- 79 20 93 % -- --   06/02/21 1730 132/76 -- -- 82 (!) 35 94 % -- --   06/02/21 1715 -- -- -- 81 24 93 % -- --   06/02/21 1700 (!) 147/94 -- -- 81 15 92 % -- --   06/02/21 1630 (!) 147/82 -- -- 75 23 92 % -- --   06/02/21 1600 134/64 -- -- 78 28 94 % -- --   06/02/21 1545 -- -- -- 79 23 93 % -- --   06/02/21 1530 (!) 152/75 -- -- 81 29 91 % -- --   06/02/21 1501 (!) 154/87 -- -- 72 26 100 % -- --   06/02/21 1410 132/73 97.9 °F (36.6 °C) Oral 76 26 97 % 5' 5\" (1.651 m) 191 lb (86.6 kg)     Psych: Stable mood, normal judgement, normal affect. Const: No distress  Eyes: Conjunctiva noninjected, no icterus noted; pupils equal, round.    HENT: Atraumatic, normocephalic; Oral mucosa moist  Neck: Trachea midline, neck supple. No thyromegaly noted. CV: No audible murmurs  Resp: No increased WOB, no audible wheezing   GI: Nondistended   Neuro: Alert, oriented, appropriate. Higher level cognitive deficits present. Skin: No visible abnormalities  MSK: No joint abnormalities noted. Ext: No significant edema appreciated. No varicosities. Lab Results   Component Value Date    WBC 6.7 06/03/2021    HGB 14.2 06/03/2021    HCT 43.2 06/03/2021    MCV 96.2 06/03/2021     06/03/2021     Lab Results   Component Value Date    INR 0.95 12/12/2020    INR 0.97 12/11/2020    INR 1.01 08/20/2019    PROTIME 11.0 12/12/2020    PROTIME 11.2 12/11/2020    PROTIME 11.5 08/20/2019     Lab Results   Component Value Date    CREATININE 1.0 06/03/2021    BUN 19 06/03/2021     06/03/2021    K 4.1 06/03/2021     06/03/2021    CO2 29 06/03/2021     Lab Results   Component Value Date    ALT 10 12/13/2020    AST 16 12/13/2020    ALKPHOS 80 12/13/2020    BILITOT 0.5 12/13/2020       Most recent imaging studies revealed   EXAMINATION:   CT OF THE HEAD WITHOUT CONTRAST  6/2/2021 2:24 pm       TECHNIQUE:   CT of the head was performed without the administration of intravenous   contrast. Dose modulation, iterative reconstruction, and/or weight based   adjustment of the mA/kV was utilized to reduce the radiation dose to as low   as reasonably achievable.       COMPARISON:   12/11/2020       HISTORY:   ORDERING SYSTEM PROVIDED HISTORY: trauma   TECHNOLOGIST PROVIDED HISTORY:   Reason for exam:->trauma   Has a \"code stroke\" or \"stroke alert\" been called? ->No   Decision Support Exception - unselect if not a suspected or confirmed   emergency medical condition->Emergency Medical Condition (MA)   Reason for Exam: Fall (arrived via [facility] [facility]. Pt has had 3 falls in   past 24 hrs. Currently is being treated for UTI. Left rib pain.  Bruise noted   by left breast.)   Acuity: Unknown Type of Exam: Unknown       FINDINGS:   BRAIN/VENTRICLES: The ventricles and sulci are diffusely enlarged.  Low   attenuation is seen in the periventricular and subcortical white matter.  No   acute intracranial hemorrhage or acute infarct is identified.       ORBITS: The visualized portion of the orbits demonstrate no acute abnormality.       SINUSES: The visualized paranasal sinuses and mastoid air cells demonstrate   no acute abnormality.       SOFT TISSUES/SKULL:  No acute abnormality of the visualized skull or soft   tissues.           Impression   No acute intracranial abnormality.       Diffuse atrophic changes with findings suggesting chronic microvascular   ischemia         The above laboratory data have been reviewed. The above imaging data have been reviewed. The above medical testing have been reviewed. Body mass index is 30.45 kg/m². Assessment and Plan:  Recurrent falls: PT/OT  UTI: recently treated  Dementia: SLP, seroquel  Hypothyroidism  Cirrhosis    Dispo: Patient is an appropriate ARU candidate. Liason to discuss supervision with family. Therapy with concerns for home setting and lack of supervision per reported home situation. If needing AL or more long term options, would suggest SNF for transition. If able to have a safe home dispo, we would be able to accept. Liason confirmed with CM that more long term options are required. Agree with SNF dispo. Thank you for the consultation. Nancy Cat MD 6/3/2021, 12:03 PM     * This document was created using dictation software. While all precautions were taken to ensure accuracy, errors may have occurred. Please disregard any typographical errors.

## 2021-06-03 NOTE — PROGRESS NOTES
Pt admitted to unit @7448. This RN spoke with daughter and complete home medication list. Admission complete. Camera placed in room for safety, hx of frequent falls and dementia. Bed alarm engaged. Denies further needs. Call light within reach.

## 2021-06-03 NOTE — PROGRESS NOTES
4 Eyes Skin Assessment     NAME:  Criselda Roger  YOB: 1930  MEDICAL RECORD NUMBER:  [id number]    The patient is being assess for  Admission    I agree that 2 RN's have performed a thorough Head to Toe Skin Assessment on the patient. ALL assessment sites listed below have been assessed. Areas assessed by both nurses:    Head, Face, Ears, Shoulders, Back, Chest, Arms, Elbows, Hands, Sacrum. Buttock, Coccyx, Ischium and Legs. Feet and Heels        Does the Patient have a Wound?  No noted wound(s)       Alfredo Prevention initiated:  Yes   Wound Care Orders initiated:  NA    Pressure Injury (Stage 3,4, Unstageable, DTI, NWPT, and Complex wounds) if present place consult order under [de-identified] NA    New and Established Ostomies if present place consult order under : NA      Nurse 1 eSignature: Electronically signed by Kade Griffith RN on 6/3/21 at 12:54 AM EDT    **SHARE this note so that the co-signing nurse is able to place an eSignature**    Nurse 2 eSignature: Electronically signed by Brandon Alaniz RN on 6/3/21 at 2:01 AM EDT

## 2021-06-03 NOTE — DISCHARGE INSTR - COC
formulation 06/25/2014    Tdap (Boostrix, Adacel) 10/18/2018       Active Problems:  Patient Active Problem List   Diagnosis Code    Nephrotic syndrome N04.9    Edema R60.9    Hypothyroid E03.9    Multiple joint pain M25.50    Age-related osteoporosis without current pathological fracture M81.0    Closed fracture of right femur ([facility]) S72.91XA    Closed displaced comminuted fracture of shaft of right femur ([facility] [address] S72.351A    Primary osteoarthritis of right knee M17.11    Gastroesophageal reflux disease K21.9    False positive syphilis serology R76.8    Memory deficit R41.3    Closed displaced intertrochanteric fracture of left femur ([facility] [address] S72.142A    Closed fracture of left femur ([facility]) S72. 80XA    Primary osteoarthritis of left knee M17.12    Acute metabolic encephalopathy H58.72    SHARAD (acute kidney injury) ([facility] [address] N17.9    Hepatic encephalopathy ([facility]) K72.90    Cirrhosis ([facility] [address] K74.60    Dementia with behavioral problem ([facility] [address] F03.91    Falls, initial encounter W19. Wallie Pore Urge incontinence N39.41    Falls frequently R29.6       Isolation/Infection:   Isolation            No Isolation          Patient Infection Status       None to display            Nurse Assessment:  Last Vital Signs: /75   Pulse 74   Temp 98.1 °F (36.7 °C) (Oral)   Resp 18   Ht 5' 5\" (1.651 m)   Wt 182 lb 15.7 oz (83 kg)   SpO2 91%   BMI 30.45 kg/m²     Last documented pain score (0-10 scale): Pain Level: 0  Last Weight:   Wt Readings from Last 1 Encounters:   06/02/21 182 lb 15.7 oz (83 kg)     Mental Status:  disoriented    IV Access:  - None    Nursing Mobility/ADLs:  Walking   Assisted  Transfer  Assisted  Bathing  Assisted  Dressing  Assisted  Toileting  Assisted  Feeding  Assisted  Med Admin  Assisted  Med Delivery   whole    Wound Care Documentation and Therapy:  Wound 08/21/19 Pretibial Left;Proximal abrasion from fall (Active)   Number of days: 651        Elimination:  Continence:   · Bowel: Yes  · Bladder: Yes  Urinary Catheter: None   Colostomy/Ileostomy/Ileal Conduit: No       Date of Last BM: 6/2    Intake/Output Summary (Last 24 hours) at 6/3/2021 1105  Last data filed at 6/3/2021 0815  Gross per 24 hour   Intake 440 ml   Output 200 ml   Net 240 ml     I/O last 3 completed shifts: In: 240 [P.O.:240]  Out: 200 [Urine:200]    Safety Concerns: At Risk for Falls    Impairments/Disabilities:      Vision and Hearing    Nutrition Therapy:  Current Nutrition Therapy:   - Oral Diet:  General    Routes of Feeding: Oral  Liquids: Thin Liquids  Daily Fluid Restriction: no  Last Modified Barium Swallow with Video (Video Swallowing Test): not done    Treatments at the Time of Hospital Discharge:   Respiratory Treatments:   Oxygen Therapy:  is not on home oxygen therapy.   Ventilator:    - No ventilator support    Rehab Therapies: Physical Therapy and Occupational Therapy  Weight Bearing Status/Restrictions: No weight bearing restirctions  Other Medical Equipment (for information only, NOT a DME order):  walker  Other Treatments:     Patient's personal belongings (please select all that are sent with patient):  Glasses    RN SIGNATURE:  Electronically signed by Mariusz Chaney RN on 6/3/21 at 11:40 AM EDT    CASE MANAGEMENT/SOCIAL WORK SECTION    Inpatient Status Date: 6/2/2021    Readmission Risk Assessment Score:  Readmission Risk              Risk of Unplanned Readmission:  0           Discharging to Facility/ Agency   · Name: [facility]   · [address] [facility] [facility] [address]  · [id number]:[phone]  · [phone]:[phone]    Dialysis Facility (if applicable)   · Name:  · Address:  · Dialysis Schedule:  · Phone:  · Fax:    / signature: Jeanette Oakes RN, BSN  [phone]       PHYSICIAN SECTION    Prognosis: Good    Condition at Discharge: Stable    Rehab Potential (if transferring to Rehab): Good    Recommended Labs or Other Treatments After Discharge:     Physician Certification: I certify the above information and transfer of Christy Zamarripa  is necessary for the continuing treatment of the diagnosis listed and that she requires [place] for greater 30 days.      Update Admission H&P: No change in H&P    PHYSICIAN SIGNATURE:  Electronically signed by Ed Davis MD on 6/3/21 at 1:23 PM EDT

## 2021-06-03 NOTE — H&P
Hospital Medicine History & Physical      PCP: Seema Childs MD    Date of Admission: 6/2/2021    Date of Service: Pt seen/examined on 6/2/2021  and  Placed in Observation. Chief Complaint:    Chief Complaint   Patient presents with   [facility] Fall     arrived via [place]. Pt has had 3 falls in past 24 hrs. Currently is being treated for UTI. Left rib pain. Bruise noted by left breast.        History Of Present Illness: The patient is a 80 y.o. female with history of dementia with visual and auditory hallucination, cirrhosis, GERD, osteoporosis, hypothyroidism who presented with history of recurrent falls and 3 in the last 24 hours. She complains of pain in the lower left rib area. She has bruising over the shin over the last lower extremity. No head injuries. No dizziness, no palpitations, no cough or production, no fevers or chills. Of note she has been treated for urinary urgency and frequency by PCP currently on [facility].   Urinalysis unremarkable  EKG sinus rhythm  CT head and cervical spine CT unremarkable  Chest x-ray with clear lung fields      Past Medical History:        Diagnosis Date    Acute renal failure ([address] 11-27-12    Arthritis     At risk for falling 08/28/2018    Score of 11/24 for Dynamic Gait Index    Cirrhosis ([address] 9/25/2020    Dementia with behavioral problem ([address] 9/25/2020    Frequent falls     Gastroesophageal reflux disease 1/22/2019    Movement disorder     osteoporosis    Nephrotic syndrome 12/28/2012    Thyroid disease        Past Surgical History:        Procedure Laterality Date    COLONOSCOPY      EXCISION OF FACIAL MASS      skin ca    EYE SURGERY      cataract removal; bilat    HIP FRACTURE SURGERY Left 8/21/2019    OPEN REDUCTION INTERNAL FIXATION OF INTERTROCHANTERIC FRACTURE OF LEFT HIPUSING GAMMA NAIL [facility]    right gamma nail with cables    SKIN BIOPSY Medications Prior to Admission:    Prior to Admission medications    Medication Sig Start Date End Date Taking? Authorizing Provider   alendronate (FOSAMAX) 70 MG tablet TAKE 1 TABLET BY MOUTH ONCE WEEKLY ON AN EMPTY STOMACH BEFORE BREAKFAST. REMAIN UPRIGHT FOR 30 MINUTES & TAKE WITH 8 OUNCES OF WATER 6/2/21   Tahmina Sainz MD   QUEtiapine (SEROQUEL) 25 MG tablet Take 0.5 tablets by mouth 2 times daily 5/24/21   Tahmina Sainz MD   solifenacin (VESICARE) 5 MG tablet Take 1 tablet by mouth daily 5/24/21 8/22/21  Tahmina Sainz MD   lactulose (CHRONULAC) 10 GM/15ML solution Take 30 mLs by mouth 2 times daily 4/14/21   Tahmina Sainz MD   ascorbic acid (VITAMIN C) 500 MG tablet TAKE ONE TABLET BY MOUTH DAILY 3/8/21   Vielka Zabala MD   DULoxetine (CYMBALTA) 30 MG extended release capsule TAKE ONE CAPSULE BY MOUTH DAILY 3/8/21   M Miroslava Baer MD   vitamin B-12 (CYANOCOBALAMIN) 1000 MCG tablet TAKE ONE TABLET BY MOUTH DAILY 3/8/21   Vielka Zabala MD   sertraline (ZOLOFT) 25 MG tablet TAKE ONE TABLET BY MOUTH DAILY 3/8/21    Miroslava Baer MD   acetaminophen (TYLENOL) 500 MG tablet Take 1 tablet by mouth every 8 hours as needed for Pain 1/6/21   M Miroslava Baer MD   levothyroxine (SYNTHROID) 50 MCG tablet TAKE ONE TABLET BY MOUTH DAILY 11/30/20   M Miroslava Baer MD   Caltrate 600+D Plus Minerals (CALTRATE) 600-800 MG-UNIT TABS tablet Take 1 tablet by mouth 2 times daily 8/27/20   Tahmina Sainz MD   aspirin 325 MG EC tablet Take 1 tablet by mouth daily 9/5/19   Yvette Graves MD   Cholecalciferol (VITAMIN D) 2000 units CAPS capsule Take 1 capsule by mouth daily     Historical Provider, MD       Allergies:  Metoprolol, Amlodipine, Sulfa antibiotics, and Adhesive tape    Social History:  The patient currently lives at home with daughter    TOBACCO:   reports that she has never smoked. She has never used smokeless tobacco.  ETOH:   reports no history of alcohol use.       Family History: 72 hours. CARDIAC ENZYMES  Recent Labs     06/02/21  1454   TROPONINI <0.01       U/A:    Lab Results   Component Value Date    COLORU YELLOW 06/02/2021    WBCUA 0-2 05/24/2021    RBCUA 2 01/06/2021    MUCUS 2+ 01/06/2021    BACTERIA 3+ 05/24/2021    CLARITYU Clear 06/02/2021    SPECGRAV 1.019 06/02/2021    LEUKOCYTESUR Negative 06/02/2021    BLOODU Negative 06/02/2021    GLUCOSEU Negative 06/02/2021    GLUCOSEU NEGATIVE 02/21/2012    AMORPHOUS Rare 05/21/2020         Active Hospital Problems    Diagnosis Date Noted    Falls frequently [R29.6] 06/02/2021    Dementia with behavioral problem ([facility] [address] [F03.91] 09/25/2020    Cirrhosis ([facility] [address] [K74.60] 09/25/2020    Gastroesophageal reflux disease [K21.9] 01/22/2019    Hypothyroid [E03.9] 12/28/2012         ASSESSMENT/PLAN:   80 y.o. female with history of dementia with visual and auditory hallucination, cirrhosis, GERD, osteoporosis, hypothyroidism who presented with history of recurrent falls likely due to gait instability in the setting of advanced dementia. Family are keen for placement in view of rehab    Plan:  -We will monitor overnight  -Get PT OT evaluation in view of placement in view of rehab  -Social work was already consulted in the ER however patient could not be placed this evening and will continue seeking for placement tomorrow  -Daughter will prepare [employer] with an option for [facility]  -Continue chronic home medications      DVT Prophylaxis: Subcut enoxaparin  Diet: General diet  Code Status: Full code         Jeffy Arevalo MD    Thank you Oscar Madrigal MD for the opportunity to be involved in this patient's care. If you have any questions or concerns please feel free to contact me at [phone].

## 2021-06-03 NOTE — ED PROVIDER NOTES
Addendum: This is a 20-year-old female who presents after some falls. This is a 20-year-old demented female who apparently fell 3 times over the last 24 hours. The patient was initially evaluated by Dr. Marbin Echols, please see her dictation for further details. The patient's work-up today is unremarkable and normal. However, the patient is unable to use her walker at home and the family does not feel comfortable taking her back home. The patient will be admitted for PT OT and  has been involved. She is in stable condition. Her work-up was otherwise unremarkable and normal. The patient is currently hemodynamically stable and neurologically intact.      Dayron Hendrix MD  06/02/21 2942

## 2021-06-03 NOTE — ED NOTES
Report given to DANAE Hunter. No further questions at this time. Pt transported via bed with all belongings and on tele.       Ronel Bejarano RN  06/02/21 6254

## 2021-06-03 NOTE — PROGRESS NOTES
Physical Therapy    Facility/Department: [address] ONCOLOGY  Initial Assessment    NAME: Qian Alaniz  : 1930  MRN: [id number]    Date of Service: 6/3/2021    Discharge Recommendations: Qian Alaniz scored a 15/24 on the AM-PAC short mobility form. Current research shows that an AM-PAC score of 17 or less is typically not associated with a discharge to the patient's home setting. Based on the patient's AM-PAC score and their current functional mobility deficits, it is recommended that the patient have 5-7 sessions per week of Physical Therapy at d/c to increase the patient's independence. At this time, this patient demonstrates the endurance, and/or tolerance for 3 hours of therapy each day, with a treatment frequency of 5-7x/wk. Please see assessment section for further patient specific details. If patient discharges prior to next session this note will serve as a discharge summary. Please see below for the latest assessment towards goals. 24 hour supervision or assist, 5-7 sessions per week, Patient would benefit from continued therapy after discharge   PT Equipment Recommendations  Equipment Needed: No    Assessment   Body structures, Functions, Activity limitations: Decreased functional mobility ; Decreased endurance; Increased pain;Decreased balance;Decreased strength;Decreased posture  Assessment: Pt is a 80 y.o. female who arrived after daughter reports having 3 falls in 24 hours. Pt presents as SBA for bed mobility, min A for transfers and gait and requires increased time to complete tasks. Pt has poor recall of recent events/information and unable to give complete social function history this date. Pt would benefit from 24 hour supervision to increase pt safety and reduce risk for falls. Pt would also benefit from additional PT services to increase strength, balance, endurance and activity tolerance to improve functional mobility.   Treatment Diagnosis: Frequent falls  Prognosis: Fair  Decision Making: Medium Complexity  PT Education: Goals;Gait Training;General Safety; Functional Mobility Training;Transfer Training  Patient Education: Will need reinforcement  Barriers to Learning: Cognition/memory  REQUIRES PT FOLLOW UP: Yes  Activity Tolerance  Activity Tolerance: Patient limited by endurance; Patient limited by pain; Patient Tolerated treatment well  Activity Tolerance: Chronic LBP       Patient Diagnosis(es): The encounter diagnosis was Frequent falls. has a past medical history of Acute renal failure ([facility] [address] Arthritis, At risk for falling, Cirrhosis ([facility] [address] Dementia with behavioral problem ([facility] [address] Frequent falls, Gastroesophageal reflux disease, Movement disorder, Nephrotic syndrome, and Thyroid disease. has a past surgical history that includes Excision of Facial Mass; skin biopsy; Colonoscopy; eye surgery; other surgical history (Right, 05/27/2018); and Hip fracture surgery (Left, 8/21/2019). Restrictions  Restrictions/Precautions  Restrictions/Precautions: Fall Risk (High Fall Risk)  Required Braces or Orthoses?: No  Position Activity Restriction  Other position/activity restrictions: Pt admitted after daughter reports 3 falls in 24 hours. Vision/Hearing  Vision: Impaired  Vision Exceptions: Wears glasses at all times  Hearing: Exceptions to [facility]  Hearing Exceptions: Hard of hearing/hearing concerns     Subjective  General  Chart Reviewed: Yes  Patient assessed for rehabilitation services?: Yes  Response To Previous Treatment: Not applicable  Family / Caregiver Present: No  Follows Commands: Within Functional Limits  General Comment  Comments: Pt in bed eating breakfast, agreeable to PT/OT eval.  Subjective  Subjective: Pt reports no pain currently.   Pain Screening  Patient Currently in Pain: Denies  Vital Signs  Patient Currently in Pain: Denies       Orientation  Orientation  Overall Orientation Status: Impaired  Orientation Level: Disoriented to time;Disoriented to situation;Oriented to person;Oriented to place  Social/Functional History  Social/Functional History  Lives With: Daughter  Type of Home: House  Home Layout: One level  Home Access: Stairs to enter with rails  Entrance Stairs - Number of Steps: SHERON  Bathroom Shower/Tub: Walk-in shower  Bathroom Toilet: Standard  Bathroom Equipment: Grab bars in shower  Home Equipment: Rolling walker, Cane, 4 wheeled walker, [employer], Alert Button  ADL Assistance: Needs assistance  Homemaking Assistance: Needs assistance  Ambulation Assistance: Needs assistance  Transfer Assistance: Needs assistance  Active : No  Patient's  Info: Daughter  Occupation: Retired  Additional Comments: Pt is a questionable historian, unable to recall social function details. 3 recent falls prior to admit. Cognition   Cognition  Overall Cognitive Status: Exceptions  Arousal/Alertness: Delayed responses to stimuli  Following Commands: Follows one step commands with increased time; Follows one step commands with repetition  Attention Span: Appears intact  Memory: Decreased short term memory;Decreased long term memory  Safety Judgement: Decreased awareness of need for assistance  Problem Solving: Assistance required to generate solutions  Insights: Fully aware of deficits  Initiation: Requires cues for some  Sequencing: Requires cues for some    Objective     Observation/Palpation  Posture: Fair  Observation: Pt has bruises all over bilateral LE's and hands.     AROM RLE (degrees)  RLE AROM: WFL  AROM LLE (degrees)  LLE AROM : WFL  Strength RLE  Strength RLE: WFL  Strength LLE  Strength LLE: WFL  Tone RLE  RLE Tone: Normotonic  Tone LLE  LLE Tone: Normotonic  Motor Control  Gross Motor?: WFL  Sensation  Overall Sensation Status: WFL  Bed mobility  Supine to Sit: Stand by assistance (with increased time; HOB elevated)  Scooting: Stand by assistance (with max increased time)  Transfers  Sit to Stand: Minimal Assistance (to RW from EOB and commode)  Stand to sit: Contact guard assistance  Comment: Pt requires vc's for hand placement  Ambulation  Ambulation?: Yes  Ambulation 1  Surface: level tile  Device: Rolling Walker  Other Apparatus: O2  Quality of Gait: unsteady; fwd flexed at trunk  Gait Deviations: Slow Teresa;Decreased step length  Distance: 20ft  Comments: Pt reports being unsteady on her feet and has resting hand tremor while holding on to the RW. Pt requires verbal and tactile cues for walker management and to maintain safe distance from walker. Stairs/Curb  Stairs?: No     Balance  Posture: Fair  Sitting - Static: Fair;+  Sitting - Dynamic: Fair;+  Standing - Static: Fair;-  Standing - Dynamic: Fair;-  Comments: Pt is SBA for sitting balance at the EOB and commode; able to sit for ~8 minutes during grooming. Pt CGA to min A for standing balance at sink (uses sink for UE support) to maintain balance and decrease LBP. (~4 mins with seated rest break jail through)        Plan   Plan  Times per week: 3-5  Times per day: Daily  Current Treatment Recommendations: Strengthening, ROM, Balance Training, Endurance Training, Cognitive/Perceptual Training, Functional Mobility Training, Transfer Training, Gait Training, Stair training  Safety Devices  Type of devices: All fall risk precautions in place, Left in chair, Telesitter in use, Call light within reach, Chair alarm in place, Gait belt, Patient at risk for falls, Nurse notified  Restraints  Initially in place: No      AM-PAC Score  AM-PAC Inpatient Mobility Raw Score : 15 (06/03/21 1006)  AM-PAC Inpatient T-Scale Score : 39.45 (06/03/21 1006)  Mobility Inpatient CMS 0-100% Score: 57.7 (06/03/21 1006)  Mobility Inpatient CMS G-Code Modifier : CK (06/03/21 1006)          Goals  Short term goals  Time Frame for Short term goals: To be met at discharge.   Short term goal 1: Pt will complete bed mobility with mod I  Short term goal 2: Pt will complete transfers with SBA  Short term goal 3: Pt will maintain static/dynamic standing balance for 5 minutes with SBA  Short term goal 4: Pt will ambulate 150ft with LRAD and SBA  Short term goal 5: Pt will ascend/descend 4 steps with CGA       Therapy Time   Individual Concurrent Group Co-treatment   Time In 0838         Time Out 0924         Minutes 46         Timed Code Treatment Minutes: 31 Minutes      Sina Gustafson, SPT  PT providing direct supervision during session and assisting in making skilled judgements throughout session.   Bhupinder Ott PT, DPT, [id number]  Bhupinder Ott, PT

## 2021-06-03 NOTE — CARE COORDINATION
CM received vm from Imani Knight at [facility] stating pt should be fine to come and waiting on rest of therapy notes. CM called daughter and discussed d/c plan and that pt is not good ARU candidate and [facility] will be able to accept her and just waiting on therapy notes. CM also asked if pt has been vaccinated for Covid and daughter states pt received last vaccine 3/5/2021. CM left vm for Myah at [address] [place] [address] inquiring if pt would need a rapid covid and if pt is ok to transport to facility once OT notes are in.      Harry Mckeon, RN, BSN  [phone]

## 2021-06-03 NOTE — CARE COORDINATION
Discharge Plan:     Patient discharged to:[place]   SW/DC Planner faxed, [address] and AVS and negative Covid test faxed to:[phone]  Narcotic Prescriptions faxed were:n/a  RN: Janel Park  will call report to:     [phone]  Medical Transport with: [facility]  [phone]   time:6:00 pm   Family advised of discharge?:yes   HENS Submitted?:  PASSAR submitted   All discharge needs met per case management.     Debbie Simon RN, BSN  [phone]

## 2021-06-06 NOTE — DISCHARGE SUMMARY
these medications    acetaminophen 500 MG tablet  Commonly known as: TYLENOL  Take 1 tablet by mouth every 8 hours as needed for Pain     alendronate 70 MG tablet  Commonly known as: FOSAMAX  TAKE 1 TABLET BY MOUTH ONCE WEEKLY ON AN EMPTY STOMACH BEFORE BREAKFAST. REMAIN UPRIGHT FOR 30 MINUTES & TAKE WITH 8 OUNCES OF WATER     ascorbic acid 500 MG tablet  Commonly known as: VITAMIN C  TAKE ONE TABLET BY MOUTH DAILY     aspirin 325 MG EC tablet  Take 1 tablet by mouth daily     Caltrate 600+D Plus Minerals 600-800 MG-UNIT Tabs tablet  Take 1 tablet by mouth 2 times daily     DULoxetine 30 MG extended release capsule  Commonly known as: CYMBALTA  TAKE ONE CAPSULE BY MOUTH DAILY     lactulose 10 GM/15ML solution  Commonly known as: CHRONULAC  Take 30 mLs by mouth 2 times daily     levothyroxine 50 MCG tablet  Commonly known as: SYNTHROID  TAKE ONE TABLET BY MOUTH DAILY     QUEtiapine 25 MG tablet  Commonly known as: SEROQUEL  Take 0.5 tablets by mouth 2 times daily     sertraline 25 MG tablet  Commonly known as: ZOLOFT  TAKE ONE TABLET BY MOUTH DAILY     solifenacin 5 MG tablet  Commonly known as: VESICARE  Take 1 tablet by mouth daily     vitamin B-12 1000 MCG tablet  Commonly known as: CYANOCOBALAMIN  TAKE ONE TABLET BY MOUTH DAILY     vitamin D 50 MCG (2000 UT) Caps capsule            Discharge recommendations given to patient. Follow Up. pcp in 1 week   Disposition. Skilled nursing facility  Activity. activity as tolerated  Diet: No diet orders on file      Spent 45  minutes in discharge process.     Signed:  Olinda Santos MD     6/6/2021 4:10 PM

## 2021-06-07 ENCOUNTER — TELEPHONE (OUTPATIENT)
Dept: ORTHOPEDIC SURGERY | Age: 86
End: 2021-06-07

## 2021-06-07 NOTE — TELEPHONE ENCOUNTER
Appointment Request     Patient requesting earlier appointment: Yes  Appointment offered to patient: YES  Patient Contact Number: [phone]    PATIENT WAS SEEN AT [facility] ON Saturday June 5, 2021    RT HAND MIDDLE FINGER IS BROKE. MOIRA AHUJA STATED THAT SHE WILL NEED A PIN.     DR. LARES HAS DONE SURGERY ON HER IN THE PASS.     PATIENT REQUESTING DR. Juan Saenz    PATIENT HAS X-RAY S AND WILL BRING THEM TO VISIT    PLEASE CALL BACK DAUGHTER Teresa Jennings

## 2021-06-08 ENCOUNTER — OFFICE VISIT (OUTPATIENT)
Dept: ORTHOPEDIC SURGERY | Age: 86
End: 2021-06-08
Payer: MEDICARE

## 2021-06-08 ENCOUNTER — TELEPHONE (OUTPATIENT)
Dept: ORTHOPEDIC SURGERY | Age: 86
End: 2021-06-08

## 2021-06-08 VITALS — HEIGHT: 65 IN | WEIGHT: 182 LBS | BODY MASS INDEX: 30.32 KG/M2

## 2021-06-08 DIAGNOSIS — M17.12 PRIMARY OSTEOARTHRITIS OF LEFT KNEE: Primary | ICD-10-CM

## 2021-06-08 DIAGNOSIS — M17.11 PRIMARY OSTEOARTHRITIS OF RIGHT KNEE: ICD-10-CM

## 2021-06-08 DIAGNOSIS — S62.612A CLOSED DISPLACED FRACTURE OF PROXIMAL PHALANX OF RIGHT MIDDLE FINGER, INITIAL ENCOUNTER: ICD-10-CM

## 2021-06-08 PROCEDURE — 26720 TREAT FINGER FRACTURE EACH: CPT | Performed by: ORTHOPAEDIC SURGERY

## 2021-06-08 PROCEDURE — 1036F TOBACCO NON-USER: CPT | Performed by: ORTHOPAEDIC SURGERY

## 2021-06-08 PROCEDURE — 4040F PNEUMOC VAC/ADMIN/RCVD: CPT | Performed by: ORTHOPAEDIC SURGERY

## 2021-06-08 PROCEDURE — 1123F ACP DISCUSS/DSCN MKR DOCD: CPT | Performed by: ORTHOPAEDIC SURGERY

## 2021-06-08 PROCEDURE — 1090F PRES/ABSN URINE INCON ASSESS: CPT | Performed by: ORTHOPAEDIC SURGERY

## 2021-06-08 PROCEDURE — 99214 OFFICE O/P EST MOD 30 MIN: CPT | Performed by: ORTHOPAEDIC SURGERY

## 2021-06-08 PROCEDURE — G8417 CALC BMI ABV UP PARAM F/U: HCPCS | Performed by: ORTHOPAEDIC SURGERY

## 2021-06-08 PROCEDURE — 20610 DRAIN/INJ JOINT/BURSA W/O US: CPT | Performed by: ORTHOPAEDIC SURGERY

## 2021-06-08 PROCEDURE — G8427 DOCREV CUR MEDS BY ELIG CLIN: HCPCS | Performed by: ORTHOPAEDIC SURGERY

## 2021-06-08 RX ORDER — TRIAMCINOLONE ACETONIDE 40 MG/ML
40 INJECTION, SUSPENSION INTRA-ARTICULAR; INTRAMUSCULAR ONCE
Status: COMPLETED | OUTPATIENT
Start: 2021-06-08 | End: 2021-06-08

## 2021-06-08 RX ORDER — LIDOCAINE HYDROCHLORIDE 10 MG/ML
40 INJECTION, SOLUTION INFILTRATION; PERINEURAL ONCE
Status: COMPLETED | OUTPATIENT
Start: 2021-06-08 | End: 2021-06-08

## 2021-06-08 RX ADMIN — LIDOCAINE HYDROCHLORIDE 40 MG: 10 INJECTION, SOLUTION INFILTRATION; PERINEURAL at 13:34

## 2021-06-08 RX ADMIN — TRIAMCINOLONE ACETONIDE 40 MG: 40 INJECTION, SUSPENSION INTRA-ARTICULAR; INTRAMUSCULAR at 13:33

## 2021-06-08 NOTE — TELEPHONE ENCOUNTER
General Question     Subject: REQ ORDERS TO BE FAX OVER TO Frank Willis [phone] FOR WB STATUS/TAPE ON R HAND    Patient and /or Facility Request: [address] [facility] [facility] Number: [phone]

## 2021-06-08 NOTE — PROGRESS NOTES
CHIEF COMPLAINT:   1-Right hand pain/ long (middle) finger proximal phalanx fracture. 2-Bilateral knee pain/DJD    DATE OF INJURY: 6/5/2021    HISTORY:  Ms. Arlene Huang 80 y.o.  female right handed presents today for the first visit for evaluation of a right hand injury which occurred when she fell. She is complaining of long (middle) finger pain and swelling. This is better with elevation and worse with ROM. The pain is sharp and not radiating. She rates her pain a 9/10 VAS. She is also here with complaints of bilateral knee pain. This pain has been persistent. She rates her pain a 7/10 VAS. She had bilateral knee cortisone injections on 3/17/2021 and had good improvement until her recent fall. Pain is worse with walking and better with rest and elevation. She would like to repeat the injections. No other complaint. She was seen at [facility], was evaluated and had a close reduction, then splinted and asked to see orthopedics. Past Medical History:   Diagnosis Date    Acute renal failure ([address] 11-27-12    Arthritis     At risk for falling 08/28/2018    Score of 11/24 for Dynamic Gait Index    Cirrhosis ([facility] [address] 9/25/2020    Dementia with behavioral problem ([address] 9/25/2020    Frequent falls     Gastroesophageal reflux disease 1/22/2019    Movement disorder     osteoporosis    Nephrotic syndrome 12/28/2012    Thyroid disease        Past Surgical History:   Procedure Laterality Date    COLONOSCOPY      EXCISION OF FACIAL MASS      skin ca    EYE SURGERY      cataract removal; bilat    HIP FRACTURE SURGERY Left 8/21/2019    OPEN REDUCTION INTERNAL FIXATION OF INTERTROCHANTERIC FRACTURE OF LEFT HIPUSING GAMMA NAIL performed by Mallory Chaney MD at [place] [address] Right 05/27/2018    right gamma nail with cables    SKIN BIOPSY         Social History     Socioeconomic History    Marital status:       Spouse name: Not on file    Number of children: Not on file    Years of education: Not on file    Highest education level: Not on file   Occupational History    Occupation: homeaker   Tobacco Use    Smoking status: Never Smoker    Smokeless tobacco: Never Used   Vaping Use    Vaping Use: Never used   Substance and Sexual Activity    Alcohol use: No    Drug use: No    Sexual activity: Not Currently     Partners: Male   Other Topics Concern    Not on file   Social History Narrative    Not on file     Social Determinants of Health     Financial Resource Strain:     Difficulty of Paying Living Expenses:    Food Insecurity:     Worried About Running Out of Food in the Last Year:     [address] in the Last Year:    Transportation Needs:     Lack of Transportation (Medical):  Lack of Transportation (Non-Medical):    Physical Activity:     Days of Exercise per Week:     Minutes of Exercise per Session:    Stress:     Feeling of Stress :    Social Connections:     Frequency of Communication with Friends and Family:     Frequency of Social Gatherings with Friends and Family:     Attends Alevism Services:     Active Member of Clubs or Organizations:     Attends Club or Organization Meetings:     Marital Status:    Intimate Partner Violence:     Fear of Current or Ex-Partner:     Emotionally Abused:     Physically Abused:     Sexually Abused:        Family History   Problem Relation Age of Onset    High Blood Pressure Mother     Cancer Father         lung cancer; metastic    Cancer Daughter 52        colon cancer    Cancer Brother     Cancer Brother     Diabetes Brother        Current Outpatient Medications on File Prior to Visit   Medication Sig Dispense Refill    alendronate (FOSAMAX) 70 MG tablet TAKE 1 TABLET BY MOUTH ONCE WEEKLY ON AN EMPTY STOMACH BEFORE BREAKFAST.  REMAIN UPRIGHT FOR 30 MINUTES & TAKE WITH 8 OUNCES OF WATER 4 tablet 0    QUEtiapine (SEROQUEL) 25 MG tablet Take 0.5 tablets by mouth 2 times daily long (middle) finger. She has intact sensation and good radial pulses. She has significant tenderness on deep palpation over the proximal phalanx of the long (middle) finger right hand. There is no rotational deformity of the right long (middle) finger. She has no pain with the active or passive range of motion of the left hip. She has intact sensation, distally, and is neurovascularly intact. BLE 2+ pitting edema, L>R. Calf is nontender. Negative Homans sign. She has moderate crepitus with range of motion bilateral knees. Bilateral knees are stable to valgus and varus stress. She has full range of motion bilateral knees. No pain with range of motion. IMAGING: Rima Dutta were reviewed, dated 6/6/2021,  3 views of the right hand, and showed a long (middle) finger proximal phalanx minimally displaced fracture. IMPRESSION:   1-Right hand long (middle) finger proximal phalanx minimally displaced fracture. 2-bilateral knee DJD    PLAN:  I discussed that the overall alignment of this fracture is good and that we can try to treat this non-operatively with taping. We discussed the risk of nonunion and or malunion. She was instructed to rest, ice, and elevate. No heavy impact activities with right hand. We will see her  back in 6 weeks at which time we will get a new xray of the right hand. PROCEDURE:    With the patient's permission, the right second, third and fourth fingers were strapped and tapped. The patient tolerated the procedure well. For the knees: I discussed with her that she should work on range of motion and strengthening exercises of the quadriceps muscle. She was instructed to modify her activities. I believe she would benefit from bilateral knee cortisone injections increase she agreed to proceed.     PROCEDURE:    With the patient's permission, and under sterile condition, the bilateral knee was prepared and draped with alcohol and injected with a mixture of 1 mL Kenalog 40mg and 4 mL of 1% lidocaine through the medial parapatellar port area. The patient tolerated the procedure well. A band-aid was applied and the patient was advised to ice the knee for 15-20 minutes to relieve any injection site related pain. She reported a good improvement immediatly after the injection.       Yamileth Quezada MD

## 2021-06-10 PROBLEM — S62.612A CLOSED DISPLACED FRACTURE OF PROXIMAL PHALANX OF RIGHT MIDDLE FINGER: Status: ACTIVE | Noted: 2021-06-10

## 2021-06-22 ENCOUNTER — TELEPHONE (OUTPATIENT)
Dept: INTERNAL MEDICINE CLINIC | Age: 86
End: 2021-06-22

## 2021-06-22 DIAGNOSIS — R35.0 URINARY FREQUENCY: Primary | ICD-10-CM

## 2021-06-22 DIAGNOSIS — R39.15 URINARY URGENCY: ICD-10-CM

## 2021-06-22 NOTE — TELEPHONE ENCOUNTER
I will order urine test including culture. Recommend office visit after nursing home discharge /hospital discharge. If patient is still admitted to nursing home, patient Karyn Segovia can talk with physician who is taking care  there.

## 2021-06-22 NOTE — TELEPHONE ENCOUNTER
Returned call to patient's daughter. She reports patient has been hallucinating and complains of urinary frequency. She suspects this has been going on since the beginning of the month. Daughter is not at the nursing home with patient. Please advise.

## 2021-06-22 NOTE — TELEPHONE ENCOUNTER
----- Message from Meredith Pump sent at 6/21/2021  4:34 PM EDT -----  Subject: Message to Provider    QUESTIONS  Information for Provider? Patient is in [address] [place] [address] Street patient is set to   go home and is seeing things patient daughter is concered that she may   have a uti and wanting a call back   ---------------------------------------------------------------------------  --------------  [address]  What is the best way for the office to contact you? OK to leave message on   voicemail  Preferred Call Back Phone Number? [phone]  ---------------------------------------------------------------------------  --------------  SCRIPT ANSWERS  Relationship to Patient? Guardian  Representative Name? Tracy Santos  Is the Representative on the appropriate HIPAA document in Epic?  Yes

## 2021-06-22 NOTE — TELEPHONE ENCOUNTER
Spoke with Hailey Billings at [facility]. Orders have been faxed to [phone]. They will fax office the results.

## 2021-06-25 ENCOUNTER — CARE COORDINATION (OUTPATIENT)
Dept: CASE MANAGEMENT | Age: 86
End: 2021-06-25

## 2021-06-25 ENCOUNTER — TELEPHONE (OUTPATIENT)
Dept: INTERNAL MEDICINE CLINIC | Age: 86
End: 2021-06-25
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
## 2021-06-25 PROCEDURE — 1111F DSCHRG MED/CURRENT MED MERGE: CPT | Performed by: INTERNAL MEDICINE

## 2021-06-25 NOTE — TELEPHONE ENCOUNTER
----- Message from Otoniel Thorntonerson sent at 6/25/2021  1:58 PM EDT -----  Subject: Results Request    QUESTIONS  Which lab or imaging result is the patient calling about? URINALYSIS  Which provider ordered the test? [facility]   At what location was the test performed? Date the test was performed? 2021-06-22  Additional Information for Provider? Pt's daughter is not sure if the   testing has been completed or not.   ---------------------------------------------------------------------------  --------------  CALL BACK INFO  What is the best way for the office to contact you? OK to leave message on   voicemail  Preferred Call Back Phone Number?  [phone]

## 2021-06-25 NOTE — CARE COORDINATION
[facility] Transitions Initial Follow Up Call    Call within 2 business days of discharge: Yes    Patient: Eric Limon Patient : 1930   MRN: <[id number]>  Reason for Admission: Falls  Discharge Date: 6/3/21 RARS: Readmission Risk Score: 20      Last Discharge [address] [address]       Complaint Diagnosis Description Type Department Provider    21 Fall Frequent falls ED to Hosp-Admission (Discharged) (ADMITTED) EZRA 5T Reynaldo Fuentes MD; Christelle Greene V... Acute Care Course: Patient admitted to [facility] for frequent falls from -6/3. She discharged to [facility] from 6/3. She had a ED visit for a fall on . She suffered a closed fx proximal phalanx middie finger. She discharged back to [facility] until discharge to home on . Sig Hx: Falls, Dementia with behavioral problems, Cirrhosis, GERD, Hypothyroidism    DME: Walker    Conversation: Attempted to contact patient for a transitions of care contact call. Unable to reach patient/family. Caller left a Hipaa compliant message with contact information for a return call. Daughter/caregiver Maxwell Armendariz returned call before close of note. After 2 identifiers [facility] states patient is doing okay so far. States she has been home for one night. Patient lives with [facility]. [facility] reports no recent falls have been reported. Denies sob, no more than normal arthritic knee pain, cough, congestion, fever, chills, n/v or bowel concerns. [facility] states there was supposed to be a Urine sample collected and sent out before discharge from snf. Per review of chart orders given. [facility] confirmed office called her informing her of orders sent on 21. [facility] states appetite okay. Getting patient to drink fluids is a challenge. They keep water near by and remind patient it is time to take a drink. Medications reviewed. Patient is able to participate in bathing and dressing self with supervision.  She ambulates using a walker. [facility] declined. States patient does not cooperate. Patient has been vaccinated for Harpal Birkenhead states she will call the office to schedule PCP appointment and check results. Follow up plan: Check urine results and patient progress    Non-face-to-face services provided:      Care Transitions 24 Hour Call    Do you have all of your prescriptions and are they filled?: Yes  Post Acute Services: Home Health (Comment: Jenn)  Care Transitions Interventions         Follow Up  Future Appointments   Date Time Provider Marco Barreto   2021  1:40 PM MILLICENT Jimenez MD [facility] [place] - [place]   2021  1:00 PM Dann Manning MD [facility]   Transitions of Care Initial Call    Was this an external facility discharge? No     Challenges to be reviewed by the provider   Additional needs identified to be addressed with provider: No  none             Method of communication with provider : none      Advance Care Planning:   Does patient have an Advance Directive:  reviewed and current. Was this a readmission? No  Patient stated reason for admission: Falls  Patients top risk factors for readmission: medical condition-age/co-mobidities    Care Transition Nurse (CTN) contacted the family by telephone to perform post hospital discharge assessment. Verified name and  with family as identifiers. Provided introduction to self, and explanation of the CTN role. CTN reviewed discharge instructions, medical action plan and red flags with family who verbalized understanding. Family given an opportunity to ask questions and does not have any further questions or concerns at this time. Were discharge instructions available to patient? Yes. Reviewed appropriate site of care based on symptoms and resources available to patient including: PCP and When to call 911. The family agrees to contact the PCP office for questions related to their healthcare.      Medication reconciliation was performed with patient, who verbalizes understanding of administration of home medications. Advised obtaining a 90-day supply of all daily and as-needed medications. Covid Risk Education     Educated patient about risk for severe COVID-19 due to risk factors according to CDC guidelines. CTN reviewed discharge instructions, medical action plan and red flag symptoms with the family who verbalized understanding. Discussed COVID vaccination status: Yes. Education provided on COVID-19 vaccination as appropriate. Discussed exposure protocols and quarantine with CDC Guidelines. Family was given an opportunity to verbalize any questions and concerns and agrees to contact CTN or health care provider for questions related to their healthcare. Reviewed and educated family on any new and changed medications related to discharge diagnosis. Was patient discharged with a pulse oximeter? No Discussed and confirmed pulse oximeter discharge instructions and when to notify provider or seek emergency care. CTN provided contact information. Plan for follow-up call in 3-5 days based on severity of symptoms and risk factors.         Inocencia Mireles RN

## 2021-06-29 ENCOUNTER — CARE COORDINATION (OUTPATIENT)
Dept: CASE MANAGEMENT | Age: 86
End: 2021-06-29

## 2021-06-29 NOTE — CARE COORDINATION
[facility] Transitions Follow Up Call    2021    Patient: Trev Davis  Patient : 1930   MRN: <[id number]>  Reason for Admission:   Falls  Discharge Date: 6/3/21 RARS: Readmission Risk Score: 20    Acute Care Course:  Patient admitted to [facility] for frequent falls from -6/3. She discharged to [facility] from 6/3. She had a ED visit for a fall on . She suffered a closed fx proximal phalanx middie finger. She discharged back to [facility] until discharge to home on . Sig Hx:   Falls, Dementia with behavioral problems, Cirrhosis, GERD, Hypothyroidism     DME:   Walker     Conversation:  Attempted to contact patient for follow up transition call. Left voicemail message to return call. Contact information provided. Will continue to follow up. Follow up plan:  Urine results and patient proress      Care Transitions Subsequent and Final Call    Subsequent and Final Calls  Are you currently active with any services?: Home Health  Care Transitions Interventions  Other Interventions:            Follow Up  Future Appointments   Date Time Provider Marco Barreto   2021  1:40 PM MILLICENT Miller MD [facility] [place]   2021  1:00 PM Jorge Luis Zavala MD [facility] GUNJ[facility] Chowdary Junior, LPN

## 2021-07-01 ENCOUNTER — CARE COORDINATION (OUTPATIENT)
Dept: CASE MANAGEMENT | Age: 86
End: 2021-07-01

## 2021-07-01 NOTE — CARE COORDINATION
[facility] Transitions Follow Up Call    2021    Patient: Rayna Ac  Patient : 1930   MRN: <[id number]>  Reason for Admission: Falls  Discharge Date: 6/3/21 RARS: Readmission Risk Score: 20    Attempted to contact patient for a final follow up. Unable to reach patient or family. Caller left a Hipaa compliant message including contact information. Daughter Kodi returned the call. She states patient did not have a urinalysis. States the nursing home said they did not receive orders. States the PCP said told he it was sent. She states patient is not showing s/s of a UTI. States patient does not have c/o urgency, pain, burning, itching or foul odor. States patient hallucinates when she has a UTI. No hallucinations. [facility] declined. Kodi states they did not prove to be productive. States patient toilets self, bath and dresses self. Today she made her bed. States patient was irritated first couple of days after discharge to home because [place] requires patient to eat, bath and take medications. Patient has adjusted. No other needs to meet. No further outreach. Spoke with: Rach[facility] Transitions Subsequent and Final Call    Subsequent and Final Calls  Do you have any ongoing symptoms?: No  Do you have any questions related to your medications?: No  Do you currently have any active services?: No  Are you currently active with any services?: Home Health  Do you have any needs or concerns that I can assist you with?: No  Care Transitions Interventions  Other Interventions:            Follow Up  Future Appointments   Date Time Provider Marco Barreto   2021  1:40 PM MILLICENT Mao MD [facility] [place]   2021  1:00 PM Nadira Rodriguez MD [facility] GUNJAN Mckeon RN

## 2021-07-05 ENCOUNTER — APPOINTMENT (OUTPATIENT)
Dept: GENERAL RADIOLOGY | Age: 86
End: 2021-07-05
Payer: MEDICARE

## 2021-07-05 ENCOUNTER — HOSPITAL ENCOUNTER (EMERGENCY)
Age: 86
Discharge: HOME | End: 2021-07-05
Payer: MEDICARE

## 2021-07-05 VITALS
BODY MASS INDEX: 31.16 KG/M2 | WEIGHT: 187 LBS | SYSTOLIC BLOOD PRESSURE: 124 MMHG | OXYGEN SATURATION: 90 % | RESPIRATION RATE: 18 BRPM | HEIGHT: 65 IN | HEART RATE: 87 BPM | TEMPERATURE: 98 F | DIASTOLIC BLOOD PRESSURE: 73 MMHG

## 2021-07-05 DIAGNOSIS — R29.6 FREQUENT FALLS: Primary | ICD-10-CM

## 2021-07-05 DIAGNOSIS — F03.90 DEMENTIA WITHOUT BEHAVIORAL DISTURBANCE, UNSPECIFIED DEMENTIA TYPE: ICD-10-CM

## 2021-07-05 DIAGNOSIS — S41.111A SKIN TEAR OF RIGHT UPPER ARM WITHOUT COMPLICATION, INITIAL ENCOUNTER: ICD-10-CM

## 2021-07-05 PROCEDURE — 99283 EMERGENCY DEPT VISIT LOW MDM: CPT

## 2021-07-05 PROCEDURE — 6370000000 HC RX 637 (ALT 250 FOR IP): Performed by: GENERAL ACUTE CARE HOSPITAL

## 2021-07-05 PROCEDURE — 72220 X-RAY EXAM SACRUM TAILBONE: CPT

## 2021-07-05 PROCEDURE — 12004 RPR S/N/AX/GEN/TRK7.6-12.5CM: CPT

## 2021-07-05 PROCEDURE — 72170 X-RAY EXAM OF PELVIS: CPT

## 2021-07-05 RX ORDER — ACETAMINOPHEN 500 MG
1000 TABLET ORAL ONCE
Status: COMPLETED | OUTPATIENT
Start: 2021-07-05 | End: 2021-07-05

## 2021-07-05 RX ORDER — BACITRACIN ZINC AND POLYMYXIN B SULFATE 500; 10000 [USP'U]/G; [USP'U]/G
OINTMENT OPHTHALMIC
Status: DISCONTINUED
Start: 2021-07-05 | End: 2021-07-05 | Stop reason: HOSPADM

## 2021-07-05 RX ORDER — BACITRACIN, NEOMYCIN, POLYMYXIN B 400; 3.5; 5 [USP'U]/G; MG/G; [USP'U]/G
OINTMENT TOPICAL ONCE
Status: DISCONTINUED | OUTPATIENT
Start: 2021-07-05 | End: 2021-07-05 | Stop reason: HOSPADM

## 2021-07-05 RX ORDER — BACITRACIN ZINC AND POLYMYXIN B SULFATE 500; 1000 [USP'U]/G; [USP'U]/G
OINTMENT TOPICAL
Status: DISCONTINUED
Start: 2021-07-05 | End: 2021-07-05 | Stop reason: HOSPADM

## 2021-07-05 RX ADMIN — ACETAMINOPHEN 1000 MG: 500 TABLET ORAL at 09:17

## 2021-07-05 ASSESSMENT — PAIN DESCRIPTION - LOCATION: LOCATION: BUTTOCKS

## 2021-07-05 ASSESSMENT — PAIN SCALES - GENERAL: PAINLEVEL_OUTOF10: 5

## 2021-07-05 ASSESSMENT — PAIN DESCRIPTION - PAIN TYPE: TYPE: ACUTE PAIN

## 2021-07-05 NOTE — ED PROVIDER NOTES
[facility]        Pt Name: Mary Ellen Hansen  MRN: [id number]  [place] 4/13/1930  Date of evaluation: 7/5/2021  Provider: PINEDA Everett - CNP  PCP: Carlitos Arzola MD  Note Started: 10:55 AM EDT       THU. I have evaluated this patient. My supervising physician was available for consultation. CHIEF COMPLAINT       Chief Complaint   Patient presents with   Yadi Kub     brought in via dtr d/t a fall with laceration to right upper arm       HISTORY OF PRESENT ILLNESS   (Location, Timing/Onset, Context/Setting, Quality, Duration, Modifying Factors, Severity, Associated Signs and Symptoms)  Note limiting factors. Chief Complaint: Fall, right upper arm skin tear    Mary Ellen Hansen is a 80 y.o. female who presents to the emergency department today with for evaluation after a mechanical fall in which she sustained a right arm laceration. Patient states that she lost her balance and fell into the door. She did not hit her head or lose consciousness. She denies use of any blood thinners. Patient states that she attempted to catch herself and cut her right arm. Patient is supposed to use a walker to ambulate but admits that she does not always do so. Patient states that she fell a few days ago and is having pain to her tailbone. Patient denies headache, dizziness, or blurred vision. She denies having any neck pain or extremity numbness or tingling. She denies chest pain or shortness of breath. She states that her tetanus is up-to-date. There has been no recent fever, chills, or other in symptoms    Nursing Notes were all reviewed and agreed with or any disagreements were addressed in the HPI. REVIEW OF SYSTEMS    (2-9 systems for level 4, 10 or more for level 5)     Review of Systems   Constitutional: Negative for chills and fever. HENT: Negative for congestion and sore throat. Eyes: Negative for visual disturbance. Respiratory: Negative for chest tightness and shortness of breath. Cardiovascular: Negative for chest pain, palpitations and leg swelling. Gastrointestinal: Negative for abdominal pain, diarrhea and vomiting. Endocrine: Negative for polydipsia and polyuria. Genitourinary: Negative for difficulty urinating and dysuria. Musculoskeletal: Positive for gait problem. Negative for arthralgias, back pain, joint swelling, myalgias, neck pain and neck stiffness. Tailbone pain   Skin: Positive for wound. Negative for rash. Allergic/Immunologic: Negative for immunocompromised state. Neurological: Negative for dizziness, weakness and light-headedness. Hematological: Bruises/bleeds easily. Psychiatric/Behavioral: Negative for suicidal ideas. Positives and Pertinent negatives as per HPI. Except as noted above in the ROS, all other systems were reviewed and negative.        PAST MEDICAL HISTORY     Past Medical History:   Diagnosis Date    Acute renal failure ([address] 11-27-12    Arthritis     At risk for falling 08/28/2018    Score of 11/24 for Dynamic Gait Index    Cirrhosis [address] [address] 9/25/2020    Dementia with behavioral problem ([facility] [address] 9/25/2020    Frequent falls     Gastroesophageal reflux disease 1/22/2019    Movement disorder     osteoporosis    Nephrotic syndrome 12/28/2012    Thyroid disease          SURGICAL HISTORY     Past Surgical History:   Procedure Laterality Date    COLONOSCOPY      EXCISION OF FACIAL MASS      skin ca    EYE SURGERY      cataract removal; bilat    HIP FRACTURE SURGERY Left 8/21/2019    OPEN REDUCTION INTERNAL FIXATION OF INTERTROCHANTERIC FRACTURE OF LEFT HIPUSING GAMMA NAIL performed by Alexx Ma MD at [address] [address] Right 05/27/2018    right gamma nail with cables   [address]       Discharge Medication List as of 7/5/2021 11:04 AM      CONTINUE these medications which have NOT CHANGED    Details alendronate (FOSAMAX) 70 MG tablet TAKE 1 TABLET BY MOUTH ONCE WEEKLY ON AN EMPTY STOMACH BEFORE BREAKFAST.  REMAIN UPRIGHT FOR 30 MINUTES & TAKE WITH 8 OUNCES OF WATER, Disp-4 tablet, R-0Normal      QUEtiapine (SEROQUEL) 25 MG tablet Take 0.5 tablets by mouth 2 times daily, Disp-30 tablet, R-2Normal      solifenacin (VESICARE) 5 MG tablet Take 1 tablet by mouth daily, Disp-30 tablet, R-0Normal      lactulose (CHRONULAC) 10 GM/15ML solution Take 30 mLs by mouth 2 times daily, Disp-1892 mL, R-5Normal      ascorbic acid (VITAMIN C) 500 MG tablet TAKE ONE TABLET BY MOUTH DAILY, Disp-90 tablet, R-3Normal      DULoxetine (CYMBALTA) 30 MG extended release capsule TAKE ONE CAPSULE BY MOUTH DAILY, Disp-90 capsule, R-1Normal      vitamin B-12 (CYANOCOBALAMIN) 1000 MCG tablet TAKE ONE TABLET BY MOUTH DAILY, Disp-90 tablet, R-3Normal      sertraline (ZOLOFT) 25 MG tablet TAKE ONE TABLET BY MOUTH DAILY, Disp-90 tablet, R-1Normal      acetaminophen (TYLENOL) 500 MG tablet Take 1 tablet by mouth every 8 hours as needed for Pain, Disp-120 tablet, R-0Normal      levothyroxine (SYNTHROID) 50 MCG tablet TAKE ONE TABLET BY MOUTH DAILY, Disp-30 tablet,R-5Normal      Caltrate 600+D Plus Minerals (CALTRATE) 600-800 MG-UNIT TABS tablet Take 1 tablet by mouth 2 times daily, Disp-180 tablet,R-1Normal      aspirin 325 MG EC tablet Take 1 tablet by mouth daily, Disp-14 tablet, R-0Print      Cholecalciferol (VITAMIN D) 2000 units CAPS capsule Take 1 capsule by mouth daily Historical Med               ALLERGIES     Metoprolol, Amlodipine, Sulfa antibiotics, and Adhesive tape    FAMILYHISTORY       Family History   Problem Relation Age of Onset    High Blood Pressure Mother     Cancer Father         lung cancer; metastic    Cancer Daughter 52        colon cancer    Cancer Brother     Cancer Brother     Diabetes Brother           SOCIAL HISTORY       Social History     Tobacco Use    Smoking status: Never Smoker    Smokeless tobacco: Never Used   Vaping Use    Vaping Use: Never used   Substance Use Topics    Alcohol use: No    Drug use: No       SCREENINGS             PHYSICAL EXAM    (up to 7 for level 4, 8 or more for level 5)     ED Triage Vitals   BP Temp Temp Source Pulse Resp SpO2 Height Weight   07/05/21 0854 07/05/21 0849 07/05/21 0849 07/05/21 0854 07/05/21 0854 07/05/21 0854 07/05/21 0854 07/05/21 0854   124/73 98 °F (36.7 °C) Oral 87 18 90 % 5' 5\" (1.651 m) 187 lb (84.8 kg)       Physical Exam  Vitals and nursing note reviewed. Constitutional:       General: She is not in acute distress. Appearance: Normal appearance. She is not ill-appearing, toxic-appearing or diaphoretic. HENT:      Head: Normocephalic and atraumatic. Right Ear: External ear normal.      Left Ear: External ear normal.      Nose: Nose normal.      Mouth/Throat:      Mouth: Mucous membranes are moist.   Eyes:      General:         Right eye: No discharge. Left eye: No discharge. Extraocular Movements: Extraocular movements intact. Cardiovascular:      Rate and Rhythm: Normal rate and regular rhythm. Pulses: Normal pulses. Heart sounds: Normal heart sounds. Pulmonary:      Effort: Pulmonary effort is normal. No respiratory distress. Breath sounds: Normal breath sounds. Abdominal:      Palpations: Abdomen is soft. Tenderness: There is no abdominal tenderness. Musculoskeletal:      Right upper arm: Laceration present. No swelling, edema, deformity, tenderness or bony tenderness. Arms:       Cervical back: Normal range of motion and neck supple. Skin:     General: Skin is warm and dry. Capillary Refill: Capillary refill takes less than 2 seconds. Neurological:      General: No focal deficit present. Mental Status: She is alert. Mental status is at baseline.       Comments: History of dementia, daughter states that this is patient's baseline   Psychiatric:         Mood and Affect: Mood normal. Behavior: Behavior normal.         Thought Content: Thought content normal.         Judgment: Judgment normal.         DIAGNOSTIC RESULTS   LABS:    Labs Reviewed - No data to display    When ordered only abnormal lab results are displayed. All other labs were within normal range or not returned as of this dictation. EKG: When ordered, EKG's are interpreted by the Emergency Department Physician in the absence of a cardiologist.  Please see their note for interpretation of EKG. RADIOLOGY:   Non-plain film images such as CT, Ultrasound and MRI are read by the radiologist. Plain radiographic images are visualized and preliminarily interpreted by the ED Provider with the below findings:        Interpretation per the Radiologist below, if available at the time of this note:    XR SACRUM COCCYX (MIN 2 VIEWS)   Final Result   1. No acute radiographic abnormality of the sacrum, coccyx, or osseous pelvis. 2. Chronic healed fracture deformities of the bilateral proximal femora,   stabilized by orthopedic hardware. No evidence of orthopedic hardware   complication. XR PELVIS (1-2 VIEWS)   Final Result   1. No acute radiographic abnormality of the sacrum, coccyx, or osseous pelvis. 2. Chronic healed fracture deformities of the bilateral proximal femora,   stabilized by orthopedic hardware. No evidence of orthopedic hardware   complication. XR PELVIS (1-2 VIEWS)    Result Date: 7/5/2021  EXAMINATION: ONE XRAY VIEW OF THE PELVIS; THREE XRAY VIEWS OF THE SACRUM/COCCYX 7/5/2021 9:17 am COMPARISON: 08/18/2020, 04/16/2019 HISTORY: ORDERING SYSTEM PROVIDED HISTORY: fall TECHNOLOGIST PROVIDED HISTORY: Reason for exam:->fall Reason for Exam: Fall. Pelvis pain. Acuity: Unknown Type of Exam: Unknown; ORDERING SYSTEM PROVIDED HISTORY: Injury TECHNOLOGIST PROVIDED HISTORY: Reason for exam:->Injury Reason for Exam: Fall. Pelvis pain.  Acuity: Unknown Type of Exam: Unknown FINDINGS: PELVIS: Two views of the pelvis were performed. There is no radiographic evidence of an acute fracture or dislocation. There are chronic healed fracture deformities of the bilateral proximal femora, both stabilized by orthopedic hardware. The orthopedic hardware is intact, without evidence of complication. The pelvic ring is intact. There is mild symmetric bilateral hip osteoarthritis, stable and unchanged. No destructive osseous lesion is seen. 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 There is degenerative change throughout the lumbar spine. SACRUM/COCCYX: Three views of the sacrum and coccyx were performed. There is no radiographic evidence of an acute fracture or dislocation. There is degenerative change of the bilateral SI joints. 1. No acute radiographic abnormality of the sacrum, coccyx, or osseous pelvis. 2. Chronic healed fracture deformities of the bilateral proximal femora, stabilized by orthopedic hardware. No evidence of orthopedic hardware complication. XR SACRUM COCCYX (MIN 2 VIEWS)    Result Date: 7/5/2021  EXAMINATION: ONE XRAY VIEW OF THE PELVIS; THREE XRAY VIEWS OF THE SACRUM/COCCYX 7/5/2021 9:17 am COMPARISON: 08/18/2020, 04/16/2019 HISTORY: ORDERING SYSTEM PROVIDED HISTORY: fall TECHNOLOGIST PROVIDED HISTORY: Reason for exam:->fall Reason for Exam: Fall. Pelvis pain. Acuity: Unknown Type of Exam: Unknown; ORDERING SYSTEM PROVIDED HISTORY: Injury TECHNOLOGIST PROVIDED HISTORY: Reason for exam:->Injury Reason for Exam: Fall. Pelvis pain. Acuity: Unknown Type of Exam: Unknown FINDINGS: PELVIS: Two views of the pelvis were performed. There is no radiographic evidence of an acute fracture or dislocation. There are chronic healed fracture deformities of the bilateral proximal femora, both stabilized by orthopedic hardware. The orthopedic hardware is intact, without evidence of complication. The pelvic ring is intact. There is mild symmetric bilateral hip osteoarthritis, stable and unchanged.   No destructive osseous lesion is seen. 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 There is degenerative change throughout the lumbar spine. SACRUM/COCCYX: Three views of the sacrum and coccyx were performed. There is no radiographic evidence of an acute fracture or dislocation. There is degenerative change of the bilateral SI joints. 1. No acute radiographic abnormality of the sacrum, coccyx, or osseous pelvis. 2. Chronic healed fracture deformities of the bilateral proximal femora, stabilized by orthopedic hardware. No evidence of orthopedic hardware complication. PROCEDURES   Unless otherwise noted below, none     Lac Repair    Date/Time: 7/8/2021 7:02 AM  Performed by: PINEDA Crain CNP  Authorized by: PINEDA Crain CNP     Consent:     Consent obtained:  Verbal    Consent given by:  Patient    Risks discussed:  Pain, poor cosmetic result and need for additional repair    Alternatives discussed:  No treatment and delayed treatment  Laceration details:     Location:  Shoulder/arm (large 8x6cm skin tear)    Shoulder/arm location:  R upper arm    Length (cm):  8  Repair type:     Repair type:  Simple  Exploration:     Hemostasis achieved with:  Direct pressure    Wound exploration: wound explored through full range of motion      Contaminated: no    Treatment:     Area cleansed with:  Hibiclens    Amount of cleaning:  Standard    Irrigation solution:  Sterile saline    Irrigation method:  Pressure wash  Skin repair:     Repair method: Mepitel dressings. Approximation:     Approximation:  Close  Post-procedure details:     Dressing:  Antibiotic ointment and sterile dressing    Patient tolerance of procedure: Tolerated well, no immediate complications  Comments:      Strict wound care instructions are provided.         CRITICAL CARE TIME   N/A    CONSULTS:  None      EMERGENCY DEPARTMENT COURSE and DIFFERENTIAL DIAGNOSIS/MDM:   Vitals:    Vitals:    07/05/21 0849 07/05/21 0854   BP:  124/73   Pulse:  87 Resp:  18   Temp: 98 °F (36.7 °C)    TempSrc: Oral    SpO2:  90%   Weight:  187 lb (84.8 kg)   Height:  5' 5\" (1.651 m)       Patient was given the following medications:  Medications   acetaminophen (TYLENOL) tablet 1,000 mg (1,000 mg Oral Given 7/5/21 0917)         Nursing notes reviewed. This is a 59-year-old female with history of dementia who presents to the emergency department today for evaluation of a right upper arm laceration which occurred just prior to arrival.  Daughter states that patient fell into a door. She did not hit her head or actually hit the ground. Physical exam complete. Patient is nontoxic, afebrile, normotensive. No signs or symptoms of acute distress noted. Skin tear repaired. Strict wound care instructions are provided. Patient encouraged to use her walker for ambulation 100% of the time. Daughter agrees to have patient reevaluated by the PCP for wound reevaluation within the next 5 days. She agrees return patient to nearest ED for high fever, incessant vomiting, severe pain, new or worsening symptoms. At this time there is no evidence of any life-threatening or emergent conditions requiring immediate intervention. Patient will be discharged with emphasis on close outpatient follow-up. FINAL IMPRESSION      1. Frequent falls    2. Dementia without behavioral disturbance, unspecified dementia type ([address]    3.  Skin tear of right upper arm without complication, initial encounter          DISPOSITION/PLAN   DISPOSITION Decision To Discharge 07/05/2021 10:59:10 AM      PATIENT REFERRED TO:  Ursula Bo MD  [address] [facility]  [phone]    In 1 week  For wound re-check      DISCHARGE MEDICATIONS:  Discharge Medication List as of 7/5/2021 11:04 AM          DISCONTINUED MEDICATIONS:  Discharge Medication List as of 7/5/2021 11:04 AM                 (Please note that portions of this note were completed with a voice recognition program.  Efforts were made to edit the dictations but occasionally words are mis-transcribed.)    PINEDA Bright CNP (electronically signed)           PINEDA Bright CNP  07/08/21 0261

## 2021-07-08 ASSESSMENT — ENCOUNTER SYMPTOMS
DIARRHEA: 0
BACK PAIN: 0
SHORTNESS OF BREATH: 0
VOMITING: 0
ABDOMINAL PAIN: 0
CHEST TIGHTNESS: 0
SORE THROAT: 0

## 2021-07-12 ENCOUNTER — TELEPHONE (OUTPATIENT)
Dept: INTERNAL MEDICINE CLINIC | Age: 86
End: 2021-07-12

## 2021-07-12 DIAGNOSIS — M81.0 AGE-RELATED OSTEOPOROSIS WITHOUT CURRENT PATHOLOGICAL FRACTURE: ICD-10-CM

## 2021-07-12 DIAGNOSIS — E03.9 HYPOTHYROIDISM, UNSPECIFIED TYPE: ICD-10-CM

## 2021-07-12 RX ORDER — LEVOTHYROXINE SODIUM 0.05 MG/1
TABLET ORAL
Qty: 30 TABLET | Refills: 2 | Status: SHIPPED | OUTPATIENT
Start: 2021-07-12 | End: 2021-09-29

## 2021-07-12 RX ORDER — ALENDRONATE SODIUM 70 MG/1
TABLET ORAL
Qty: 4 TABLET | Refills: 2 | Status: SHIPPED | OUTPATIENT
Start: 2021-07-12 | End: 2021-10-25

## 2021-07-12 NOTE — TELEPHONE ENCOUNTER
Patient's daughter requesting refills for alendronate (FOSAMAX) 70 MG tablet and levothyroxine (SYNTHROID) 50 MCG tablet. Needs to  tomorrow.     Last OV 5/24/21  Next OV 7/16/21    Please send to [facility] on [address]

## 2021-07-16 ENCOUNTER — OFFICE VISIT (OUTPATIENT)
Dept: INTERNAL MEDICINE CLINIC | Age: 86
End: 2021-07-16
Payer: MEDICARE

## 2021-07-16 VITALS
HEART RATE: 89 BPM | SYSTOLIC BLOOD PRESSURE: 134 MMHG | WEIGHT: 175.4 LBS | BODY MASS INDEX: 29.22 KG/M2 | DIASTOLIC BLOOD PRESSURE: 82 MMHG | HEIGHT: 65 IN

## 2021-07-16 DIAGNOSIS — T14.8XXA INFECTED WOUND: ICD-10-CM

## 2021-07-16 DIAGNOSIS — L08.9 INFECTED WOUND: ICD-10-CM

## 2021-07-16 DIAGNOSIS — L03.90 WOUND CELLULITIS: Primary | ICD-10-CM

## 2021-07-16 DIAGNOSIS — R39.15 URINARY URGENCY: ICD-10-CM

## 2021-07-16 PROCEDURE — 1123F ACP DISCUSS/DSCN MKR DOCD: CPT | Performed by: INTERNAL MEDICINE

## 2021-07-16 PROCEDURE — 99213 OFFICE O/P EST LOW 20 MIN: CPT | Performed by: INTERNAL MEDICINE

## 2021-07-16 PROCEDURE — G8427 DOCREV CUR MEDS BY ELIG CLIN: HCPCS | Performed by: INTERNAL MEDICINE

## 2021-07-16 PROCEDURE — G8417 CALC BMI ABV UP PARAM F/U: HCPCS | Performed by: INTERNAL MEDICINE

## 2021-07-16 PROCEDURE — 4040F PNEUMOC VAC/ADMIN/RCVD: CPT | Performed by: INTERNAL MEDICINE

## 2021-07-16 PROCEDURE — 1090F PRES/ABSN URINE INCON ASSESS: CPT | Performed by: INTERNAL MEDICINE

## 2021-07-16 PROCEDURE — 1036F TOBACCO NON-USER: CPT | Performed by: INTERNAL MEDICINE

## 2021-07-16 RX ORDER — CEPHALEXIN 500 MG/1
500 CAPSULE ORAL 3 TIMES DAILY
Qty: 21 CAPSULE | Refills: 0 | Status: SHIPPED | OUTPATIENT
Start: 2021-07-16 | End: 2022-10-19

## 2021-07-16 RX ORDER — SOLIFENACIN SUCCINATE 5 MG/1
5 TABLET, FILM COATED ORAL DAILY
Qty: 30 TABLET | Refills: 2 | Status: SHIPPED | OUTPATIENT
Start: 2021-07-16 | End: 2022-03-09 | Stop reason: SDUPTHER

## 2021-07-16 ASSESSMENT — ENCOUNTER SYMPTOMS
NAUSEA: 0
VOMITING: 0
PHOTOPHOBIA: 0
ABDOMINAL PAIN: 0
SHORTNESS OF BREATH: 0
WHEEZING: 0

## 2021-07-16 NOTE — PROGRESS NOTES
Wes Denson  4/13/1930  female  80 y.o. SUBJECTIVE:       Chief Complaint   Patient presents with    ED Follow-up       HPI:  Patient was recently seen in the emergency room because of [place] laceration affecting the right arm and forearm. Patient is here for follow-up visit. Patient daughter tried to change the dressing. However every time she tried to change, it has been causing bleeding. Moreover she also noticed some yellowish drainage today,    History of dementia. History of frequent fall. Patient's daughter still feel comfortable to take care of her at home. Patient continues to suffer from memory loss and occasional hallucination and delusion. History of recurrent UTI. Patient get occasional lower back pain. At present no fever chills    Past Medical History:   Diagnosis Date    Acute renal failure ([address] 11-27-12    Arthritis     At risk for falling 08/28/2018    Score of 11/24 for Dynamic Gait Index    Cirrhosis ([facility] 9/25/2020    Dementia with behavioral problem ([facility] 9/25/2020    Frequent falls     Gastroesophageal reflux disease 1/22/2019    Movement disorder     osteoporosis    Nephrotic syndrome 12/28/2012    Thyroid disease      Past Surgical History:   Procedure Laterality Date    COLONOSCOPY      EXCISION OF FACIAL MASS      skin ca    EYE SURGERY      cataract removal; bilat    HIP FRACTURE SURGERY Left 8/21/2019    OPEN REDUCTION INTERNAL FIXATION OF INTERTROCHANTERIC FRACTURE OF LEFT HIPUSING GAMMA NAIL performed by Suzanne Martinez MD at [address] [place] [address],[facility] Right 05/27/2018    right gamma nail with cables    SKIN BIOPSY       Social History     Socioeconomic History    Marital status:       Spouse name: None    Number of children: None    Years of education: None    Highest education level: None   Occupational History    Occupation: homeaker   Tobacco Use    Smoking status: Never Smoker    Smokeless tobacco: Never Used   Vaping Use 06/08/21 182 lb (82.6 kg)   06/02/21 182 lb 15.7 oz (83 kg)     BP Readings from Last 4 Encounters:   07/16/21 134/82   07/05/21 124/73   06/03/21 130/88   05/24/21 124/80     Physical Exam  Vitals and nursing note reviewed. Constitutional:       Appearance: She is not ill-appearing. Eyes:      Conjunctiva/sclera: Conjunctivae normal.   Cardiovascular:      Rate and Rhythm: Normal rate and regular rhythm. Pulses: Normal pulses. Pulmonary:      Effort: Pulmonary effort is normal.   Abdominal:      General: Bowel sounds are normal.   Musculoskeletal:      Right lower leg: No edema. Left lower leg: No edema. Skin:     Comments: Large area dressing with yellowish drainage from the top of the wound. Dressing materials appears tightly adherent to the underlying tissue. Neurological:      Mental Status: She is alert.          CBC:   Lab Results   Component Value Date    WBC 6.7 06/03/2021    HGB 14.2 06/03/2021    HCT 43.2 06/03/2021     06/03/2021     CMP:  Lab Results   Component Value Date     06/03/2021    K 4.1 06/03/2021     06/03/2021    CO2 29 06/03/2021    ANIONGAP 7 06/03/2021    GLUCOSE 113 06/03/2021    BUN 19 06/03/2021    CREATININE 1.0 06/03/2021    GFRAA >60 06/03/2021    GFRAA >60 04/10/2013    CALCIUM 9.1 06/03/2021    PROT 6.4 12/13/2020    PROT 7.2 03/04/2013    LABALBU 3.5 12/13/2020    AGRATIO 1.6 01/13/2020    BILITOT 0.5 12/13/2020    ALKPHOS 80 12/13/2020    ALT 10 12/13/2020    AST 16 12/13/2020    GLOB 2.6 01/13/2020     URINALYSIS:  Lab Results   Component Value Date    GLUCOSEU Negative 06/02/2021    GLUCOSEU NEGATIVE 02/21/2012    KETUA TRACE 06/02/2021    SPECGRAV 1.019 06/02/2021    BLOODU Negative 06/02/2021    PHUR 6.5 06/02/2021    PROTEINU Negative 06/02/2021    NITRU Negative 06/02/2021    LEUKOCYTESUR Negative 06/02/2021    LABMICR Not Indicated 06/02/2021    URINETYPE Voided 06/02/2021     HBA1C:   Lab Results   Component Value Date    LABA1C 5.4 02/04/2015    .3 02/04/2015     MICRO/ALB:   Lab Results   Component Value Date    LABMICR Not Indicated 06/02/2021    LABCREA 137.3 09/02/2015     LIPID:  Lab Results   Component Value Date    CHOL 226 06/21/2017    TRIG 111 06/21/2017    HDL 51 06/21/2017    HDL 42 05/23/2012    LDLCALC 153 06/21/2017    LABVLDL 22 06/21/2017     TSH:   Lab Results   Component Value Date    TSHREFLEX 3.51 01/06/2021       ASSESSMENT/PLAN:  Assessment/Plan:  Mary Danielle was seen today for ed follow-up. Diagnoses and all orders for this visit:    Wound cellulitis  -     Kartik Marcus MD, [address] [place] [address]  -     cephALEXin (KEFLEX) 500 MG capsule; Take 1 capsule by mouth 3 times daily    Urinary urgency  -     Urinalysis; Future  -     solifenacin (VESICARE) 5 MG tablet; Take 1 tablet by mouth daily    Infected wound  -     Kartik Marcus MD, [address] [place] [address]  -     cephALEXin (KEFLEX) 500 MG capsule; Take 1 capsule by mouth 3 times daily              Orders Placed This Encounter   Procedures    Urinalysis     Standing Status:   Future     Standing Expiration Date:   7/16/2022   Kartik Marcus MD, [address] [place] [address]     Referral Priority:   Routine     Referral Type:   Eval and Treat     Referral Reason:   Specialty Services Required     Referred to Provider:   Lindsey Aparicio MD     Requested Specialty:   General Surgery     Number of Visits Requested:   1     Current Outpatient Medications   Medication Sig Dispense Refill    solifenacin (VESICARE) 5 MG tablet Take 1 tablet by mouth daily 30 tablet 2    cephALEXin (KEFLEX) 500 MG capsule Take 1 capsule by mouth 3 times daily 21 capsule 0    levothyroxine (SYNTHROID) 50 MCG tablet TAKE ONE TABLET BY MOUTH DAILY 30 tablet 2    alendronate (FOSAMAX) 70 MG tablet TAKE 1 TABLET BY MOUTH ONCE WEEKLY ON AN EMPTY STOMACH BEFORE BREAKFAST.  REMAIN UPRIGHT FOR 30 MINUTES & TAKE WITH 8 OUNCES OF WATER 4

## 2021-07-20 ENCOUNTER — HOSPITAL ENCOUNTER (OUTPATIENT)
Dept: WOUND CARE | Age: 86
Discharge: HOME | End: 2021-07-20
Payer: MEDICARE

## 2021-07-20 VITALS
SYSTOLIC BLOOD PRESSURE: 113 MMHG | RESPIRATION RATE: 16 BRPM | DIASTOLIC BLOOD PRESSURE: 76 MMHG | TEMPERATURE: 97.1 F | HEART RATE: 88 BPM

## 2021-07-20 DIAGNOSIS — S41.102A OPEN WOUND OF ARM, LEFT, INITIAL ENCOUNTER: Primary | ICD-10-CM

## 2021-07-20 PROCEDURE — 99214 OFFICE O/P EST MOD 30 MIN: CPT | Performed by: SURGERY

## 2021-07-20 PROCEDURE — 99213 OFFICE O/P EST LOW 20 MIN: CPT

## 2021-07-20 RX ORDER — CLOBETASOL PROPIONATE 0.5 MG/G
OINTMENT TOPICAL ONCE
Status: CANCELLED | OUTPATIENT
Start: 2021-07-20 | End: 2021-07-20

## 2021-07-20 RX ORDER — GENTAMICIN SULFATE 1 MG/G
OINTMENT TOPICAL ONCE
Status: CANCELLED | OUTPATIENT
Start: 2021-07-20 | End: 2021-07-20

## 2021-07-20 RX ORDER — LIDOCAINE HYDROCHLORIDE 40 MG/ML
SOLUTION TOPICAL ONCE
Status: CANCELLED | OUTPATIENT
Start: 2021-07-20 | End: 2021-07-20

## 2021-07-20 RX ORDER — BACITRACIN ZINC AND POLYMYXIN B SULFATE 500; 1000 [USP'U]/G; [USP'U]/G
OINTMENT TOPICAL ONCE
Status: CANCELLED | OUTPATIENT
Start: 2021-07-20 | End: 2021-07-20

## 2021-07-20 RX ORDER — LIDOCAINE HYDROCHLORIDE 20 MG/ML
JELLY TOPICAL ONCE
Status: CANCELLED | OUTPATIENT
Start: 2021-07-20 | End: 2021-07-20

## 2021-07-20 RX ORDER — BACITRACIN, NEOMYCIN, POLYMYXIN B 400; 3.5; 5 [USP'U]/G; MG/G; [USP'U]/G
OINTMENT TOPICAL ONCE
Status: CANCELLED | OUTPATIENT
Start: 2021-07-20 | End: 2021-07-20

## 2021-07-20 RX ORDER — LIDOCAINE 50 MG/G
OINTMENT TOPICAL ONCE
Status: CANCELLED | OUTPATIENT
Start: 2021-07-20 | End: 2021-07-20

## 2021-07-20 RX ORDER — GINSENG 100 MG
CAPSULE ORAL ONCE
Status: CANCELLED | OUTPATIENT
Start: 2021-07-20 | End: 2021-07-20

## 2021-07-20 RX ORDER — BETAMETHASONE DIPROPIONATE 0.05 %
OINTMENT (GRAM) TOPICAL ONCE
Status: CANCELLED | OUTPATIENT
Start: 2021-07-20 | End: 2021-07-20

## 2021-07-20 RX ORDER — LIDOCAINE 40 MG/G
CREAM TOPICAL ONCE
Status: CANCELLED | OUTPATIENT
Start: 2021-07-20 | End: 2021-07-20

## 2021-07-20 RX ORDER — LIDOCAINE 40 MG/G
CREAM TOPICAL ONCE
Status: DISCONTINUED | OUTPATIENT
Start: 2021-07-20 | End: 2021-07-21 | Stop reason: HOSPADM

## 2021-07-20 RX ORDER — BACITRACIN ZINC AND POLYMYXIN B SULFATE 500; 1000 [USP'U]/G; [USP'U]/G
OINTMENT TOPICAL ONCE
Status: DISCONTINUED | OUTPATIENT
Start: 2021-07-20 | End: 2021-07-21 | Stop reason: HOSPADM

## 2021-07-20 NOTE — PROGRESS NOTES
Debbie Herr  Progress Note       Lucas Spence  AGE: 80 y.o. GENDER: female  : 1930  TODAY'S DATE:  2021    Subjective:     Chief Complaint   Patient presents with    Wound Check     right and left upper extremity         HISTORY of PRESENT ILLNESS HPI     Lucas Spence is a 80 y.o. female who presents today for wound evaluation.    History of Wound: Wounds of the bilateral upper extremities    Wound Pain:  mild  Severity:  3 / 10   Wound Type:  traumatic  Modifying Factors:  none  Associated Signs/Symptoms:  none        PAST MEDICAL HISTORY        Diagnosis Date    Acute renal failure ([address] 12    Arthritis     At risk for falling 2018    Score of  for Dynamic Gait Index    Cirrhosis ([facility] 2020    Dementia with behavioral problem ([facility] 2020    Frequent falls     Gastroesophageal reflux disease 2019    Movement disorder     osteoporosis    Nephrotic syndrome 2012    Thyroid disease        PAST SURGICAL HISTORY    Past Surgical History:   Procedure Laterality Date    COLONOSCOPY      EXCISION OF FACIAL MASS      skin ca    EYE SURGERY      cataract removal; bilat    HIP FRACTURE SURGERY Left 2019    OPEN REDUCTION INTERNAL FIXATION OF INTERTROCHANTERIC FRACTURE OF LEFT HIPUSING GAMMA NAIL performed by Rafael Tinoco MD at [address] [place] [address] Right 2018    right gamma nail with cables    SKIN BIOPSY         FAMILY HISTORY    Family History   Problem Relation Age of Onset    High Blood Pressure Mother     Cancer Father         lung cancer; metastic    Cancer Daughter 52        colon cancer    Cancer Brother     Cancer Brother     Diabetes Brother        SOCIAL HISTORY    Social History     Tobacco Use    Smoking status: Never Smoker    Smokeless tobacco: Never Used   Vaping Use    Vaping Use: Never used   Substance Use Topics    Alcohol use: No    Drug use: No       ALLERGIES    Allergies Allergen Reactions    Metoprolol Hives     Severe rash and itching    Amlodipine Itching     Itching    Sulfa Antibiotics Nausea And Vomiting    Adhesive Tape Other (See Comments)     Skin tears very easily       MEDICATIONS    Current Outpatient Medications on File Prior to Encounter   Medication Sig Dispense Refill    solifenacin (VESICARE) 5 MG tablet Take 1 tablet by mouth daily 30 tablet 2    cephALEXin (KEFLEX) 500 MG capsule Take 1 capsule by mouth 3 times daily 21 capsule 0    levothyroxine (SYNTHROID) 50 MCG tablet TAKE ONE TABLET BY MOUTH DAILY 30 tablet 2    alendronate (FOSAMAX) 70 MG tablet TAKE 1 TABLET BY MOUTH ONCE WEEKLY ON AN EMPTY STOMACH BEFORE BREAKFAST. REMAIN UPRIGHT FOR 30 MINUTES & TAKE WITH 8 OUNCES OF WATER 4 tablet 2    QUEtiapine (SEROQUEL) 25 MG tablet Take 0.5 tablets by mouth 2 times daily 30 tablet 2    lactulose (CHRONULAC) 10 GM/15ML solution Take 30 mLs by mouth 2 times daily 1892 mL 5    ascorbic acid (VITAMIN C) 500 MG tablet TAKE ONE TABLET BY MOUTH DAILY 90 tablet 3    DULoxetine (CYMBALTA) 30 MG extended release capsule TAKE ONE CAPSULE BY MOUTH DAILY 90 capsule 1    vitamin B-12 (CYANOCOBALAMIN) 1000 MCG tablet TAKE ONE TABLET BY MOUTH DAILY 90 tablet 3    sertraline (ZOLOFT) 25 MG tablet TAKE ONE TABLET BY MOUTH DAILY 90 tablet 1    acetaminophen (TYLENOL) 500 MG tablet Take 1 tablet by mouth every 8 hours as needed for Pain 120 tablet 0    Caltrate 600+D Plus Minerals (CALTRATE) 600-800 MG-UNIT TABS tablet Take 1 tablet by mouth 2 times daily 180 tablet 1    aspirin 325 MG EC tablet Take 1 tablet by mouth daily 14 tablet 0    Cholecalciferol (VITAMIN D) 2000 units CAPS capsule Take 1 capsule by mouth daily        No current facility-administered medications on file prior to encounter.        REVIEW OF SYSTEMS    Constitutional: negative  Eyes: negative  Ears, nose, mouth, throat, and face: negative  Gastrointestinal: negative  Behavioral/Psych: negative      Objective:      /76   Pulse 88   Temp 97.1 °F (36.2 °C) (Infrared)   Resp 16     PHYSICAL EXAM    General Appearance: alert and oriented to person, place and time, well-developed and well-nourished, in no acute distress  Head: normocephalic and atraumatic  Neck: neck supple and non tender without mass, no thyromegaly or thyroid nodules, no cervical lymphadenopathy   Abdomen: soft, non-tender, non-distended, normal bowel sounds, no masses or organomegaly  Clean superficial wound    Assessment:     Patient Active Problem List   Diagnosis    Nephrotic syndrome    Edema    Hypothyroid    Multiple joint pain    Age-related osteoporosis without current pathological fracture    Closed fracture of right femur (HCC)    Closed displaced comminuted fracture of shaft of right femur (HCC)    Primary osteoarthritis of right knee    Gastroesophageal reflux disease    False positive syphilis serology    Memory deficit    Closed displaced intertrochanteric fracture of left femur (HCC)    Closed fracture of left femur (HCC)    Primary osteoarthritis of left knee    Acute metabolic encephalopathy    [facility]) [facility]    Hepatic encephalopathy (HCC)    Cirrhosis ([facility]    Dementia with behavioral problem ([facility]    Falls, initial encounter    Urge incontinence    Falls frequently    Closed displaced fracture of proximal phalanx of right middle finger       Wound 08/21/19 Pretibial Left;Proximal abrasion from fall (Active)   Number of days: 698       Wound 07/20/21 Arm Lateral;Right #1 (Active)   Wound Image   07/20/21 0830   Wound Etiology Traumatic 07/20/21 0830   Wound Cleansed Cleansed with saline 07/20/21 0830   Wound Length (cm) 7.3 cm 07/20/21 0830   Wound Width (cm) 3.4 cm 07/20/21 0830   Wound Depth (cm) 0.1 cm 07/20/21 0830   Wound Surface Area (cm^2) 24.82 cm^2 07/20/21 0830   Wound Volume (cm^3) 2.482 cm^3 07/20/21 0830   Wound Assessment Bleeding;Pink/red 07/20/21 0830   Drainage Amount Moderate 07/20/21 0830   Drainage Description Serosanguinous 07/20/21 0830   Odor None 07/20/21 0830   Elizabeth-wound Assessment Fragile 07/20/21 0830   Margins Unattached edges 07/20/21 0830   Number of days: 0       Wound 07/20/21 Arm Lateral;Left #2 (Active)   Wound Image   07/20/21 0830   Wound Etiology Traumatic 07/20/21 0830   Wound Cleansed Cleansed with saline 07/20/21 0830   Wound Length (cm) 2.4 cm 07/20/21 0830   Wound Width (cm) 8 cm 07/20/21 0830   Wound Depth (cm) 0.1 cm 07/20/21 0830   Wound Surface Area (cm^2) 19.2 cm^2 07/20/21 0830   Wound Volume (cm^3) 1.92 cm^3 07/20/21 0830   Wound Assessment Bleeding;Pink/red 07/20/21 0830   Drainage Amount Moderate 07/20/21 0830   Drainage Description Serosanguinous 07/20/21 0830   Odor None 07/20/21 0830   Elizabeth-wound Assessment Excoriated 07/20/21 0830   Margins Unattached edges 07/20/21 0830   Number of days: 0       Wound 07/20/21 Arm Right;Posterior #3 (Active)   Wound Image   07/20/21 0830   Wound Etiology Traumatic 07/20/21 0830   Wound Cleansed Cleansed with saline 07/20/21 0830   Wound Length (cm) 0.7 cm 07/20/21 0830   Wound Width (cm) 3 cm 07/20/21 0830   Wound Depth (cm) 0.1 cm 07/20/21 0830   Wound Surface Area (cm^2) 2.1 cm^2 07/20/21 0830   Wound Volume (cm^3) 0.21 cm^3 07/20/21 0830   Wound Assessment Pink/red;Bleeding 07/20/21 0830   Drainage Amount Moderate 07/20/21 0830   Drainage Description Serosanguinous 07/20/21 0830   Odor None 07/20/21 0830   Elizabeth-wound Assessment Fragile 07/20/21 0830   Margins Unattached edges 07/20/21 0830   Number of days: 0       Wound 07/20/21 Arm Left;Posterior #4 (Active)   Wound Image   07/20/21 0830   Wound Etiology Traumatic 07/20/21 0830   Wound Cleansed Cleansed with saline 07/20/21 0830   Wound Length (cm) 4 cm 07/20/21 0830   Wound Width (cm) 5 cm 07/20/21 0830   Wound Depth (cm) 0.1 cm 07/20/21 0830   Wound Surface Area (cm^2) 20 cm^2 07/20/21 0830   Wound Volume (cm^3) 2 cm^3 07/20/21 0830   Wound Assessment Pink/red;Bleeding 07/20/21 0830   Drainage Amount Moderate 07/20/21 0830   Drainage Description Serosanguinous 07/20/21 0830   Odor None 07/20/21 0830   Elizabeth-wound Assessment Fragile 07/20/21 0830   Margins Unattached edges 07/20/21 0830   Number of days: [de-identified]    80-year-old female who is brought in today for evaluation of multiple traumatic wounds of the bilateral upper extremities. She sustained the wounds to the distal right upper arm and right hand about 2 weeks ago and then sustained new wounds to the left elbow region 3 to 4 days ago. All of the wounds are clean and superficial.  The wounds on the right hand have formed dry eschars. She was started on antibiotics per the emergency room for an infection of the right upper extremity. There is no current evidence of infection of the right upper arm. Plan: We will dress all of the wounds with antibiotic ointment/Adaptic/Kerlix except for the right hand wounds which we will leave open to air. We will use bilateral Tubigrips for control of swelling. Discontinue oral antibiotics as there is no current evidence of infection. Follow-up in the wound center in 2 weeks.       Treatment Plan          Written Patient Discharge Instructions Given            Electronically signed by Leann Fitzgerald MD on 7/20/2021 at 9:06 AM

## 2021-07-20 NOTE — PLAN OF CARE
Discharge instructions given. Patient verbalized understanding. Return to [address] [place] [address],[address] in 2 weeks.   Called/faxed orders to [facility] [place]

## 2021-07-20 NOTE — PROGRESS NOTES
[facility]:      [address] f: [phone] [phone] p: [phone] [email or web address]     Ordering Center: [address] [place] [address]  [phone]  Dept: [phone]   Fax# [phone]    Patient Information:      Meir Alcantara  [address] [place]. [place] [address]   [phone]   : 1930  AGE: 80 y.o. GENDER: female   TODAYS DATE:  2021    Insurance:      PRIMARY INSURANCE:  Plan: MEDICARE PART A AND B  Coverage: MEDICARE  Effective Date: 2015  Group Number: [unfilled]  Subscriber Number: [id number] - (Medicare)    Payor/Plan Subscr  Sex Relation Sub. Ins. ID Effective Group Num   1. [id number] 1930 Female Self [id number] 1/1/15                                    [address] [address]   2. [facility] 1930 [facility] Self [id number] 1/1/15                                    [address] [address]         Patient Wound Information:     Additional ICD-10 Codes: N59.545T, S41.112A    Patient Active Problem List   Diagnosis Code    Nephrotic syndrome N04.9    Edema R60.9    Hypothyroid E03.9    Multiple joint pain M25.50    Age-related osteoporosis without current pathological fracture M81.0    Closed fracture of right femur ([address] S72.91XA    Closed displaced comminuted fracture of shaft of right femur ([address] S72.351A    Primary osteoarthritis of right knee M17.11    Gastroesophageal reflux disease K21.9    False positive syphilis serology R76.8    Memory deficit R41.3    Closed displaced intertrochanteric fracture of left femur ([address] S72.142A    Closed fracture of left femur ([address] S72. 80XA    Primary osteoarthritis of left knee M17.12    Acute metabolic encephalopathy E17.78    SHARAD (acute kidney injury) ([address] N17.9    Hepatic encephalopathy (HCC) K72.90    Cirrhosis ([facility] K74.60    Dementia with behavioral problem (HCC) F03.91  Falls, initial encounter W19. XXXA    Urge incontinence N39.41    Falls frequently R29.6    Closed displaced fracture of proximal phalanx of right middle finger S62.612A       WOUNDS REQUIRING DRESSING SUPPLIES:     Wound 08/21/19 Pretibial Left;Proximal abrasion from fall (Active)   Number of days: 698       Wound 07/20/21 Arm Lateral;Right #1 (Active)   Wound Image   07/20/21 0830   Wound Etiology Traumatic 07/20/21 0830   Wound Cleansed Cleansed with saline 07/20/21 0830   Wound Length (cm) 7.3 cm 07/20/21 0830   Wound Width (cm) 3.4 cm 07/20/21 0830   Wound Depth (cm) 0.1 cm 07/20/21 0830   Wound Surface Area (cm^2) 24.82 cm^2 07/20/21 0830   Wound Volume (cm^3) 2.482 cm^3 07/20/21 0830   Wound Assessment Bleeding;Pink/red 07/20/21 0830   Drainage Amount Moderate 07/20/21 0830   Drainage Description Serosanguinous 07/20/21 0830   Odor None 07/20/21 0830   Elizabeth-wound Assessment Fragile 07/20/21 0830   Margins Unattached edges 07/20/21 0830   Number of days: 0       Wound 07/20/21 Arm Lateral;Left #2 (Active)   Wound Image   07/20/21 0830   Wound Etiology Traumatic 07/20/21 0830   Wound Cleansed Cleansed with saline 07/20/21 0830   Wound Length (cm) 2.4 cm 07/20/21 0830   Wound Width (cm) 8 cm 07/20/21 0830   Wound Depth (cm) 0.1 cm 07/20/21 0830   Wound Surface Area (cm^2) 19.2 cm^2 07/20/21 0830   Wound Volume (cm^3) 1.92 cm^3 07/20/21 0830   Wound Assessment Bleeding;Pink/red 07/20/21 0830   Drainage Amount Moderate 07/20/21 0830   Drainage Description Serosanguinous 07/20/21 0830   Odor None 07/20/21 0830   Elizabeth-wound Assessment Excoriated 07/20/21 0830   Margins Unattached edges 07/20/21 0830   Number of days: 0       Wound 07/20/21 Arm Right;Posterior #3 (Active)   Wound Image   07/20/21 0830   Wound Etiology Traumatic 07/20/21 0830   Wound Cleansed Cleansed with saline 07/20/21 0830   Wound Length (cm) 0.7 cm 07/20/21 0830   Wound Width (cm) 3 cm 07/20/21 0830   Wound Depth (cm) 0.1 cm 07/20/21 0830 Wound Surface Area (cm^2) 2.1 cm^2 07/20/21 0830   Wound Volume (cm^3) 0.21 cm^3 07/20/21 0830   Wound Assessment Pink/red;Bleeding 07/20/21 0830   Drainage Amount Moderate 07/20/21 0830   Drainage Description Serosanguinous 07/20/21 0830   Odor None 07/20/21 0830   Elizabeth-wound Assessment Fragile 07/20/21 0830   Margins Unattached edges 07/20/21 0830   Number of days: 0       Wound 07/20/21 Arm Left;Posterior #4 (Active)   Wound Image   07/20/21 0830   Wound Etiology Traumatic 07/20/21 0830   Wound Cleansed Cleansed with saline 07/20/21 0830   Wound Length (cm) 4 cm 07/20/21 0830   Wound Width (cm) 5 cm 07/20/21 0830   Wound Depth (cm) 0.1 cm 07/20/21 0830   Wound Surface Area (cm^2) 20 cm^2 07/20/21 0830   Wound Volume (cm^3) 2 cm^3 07/20/21 0830   Wound Assessment Pink/red;Bleeding 07/20/21 0830   Drainage Amount Moderate 07/20/21 0830   Drainage Description Serosanguinous 07/20/21 0830   Odor None 07/20/21 0830   Elizabeth-wound Assessment Fragile 07/20/21 0830   Margins Unattached edges 07/20/21 0830   Number of days: 0     Incision 08/21/19 Hip Left (Active)   Number of days: 688       Supplies Requested :      WOUND #: 1, 2, 3 and 4   PRIMARY DRESSING:    None   Cover and Secure with: Bulky roll gauze  Other Adaptic, non woven 4 x 4 gauze     FREQUENCY OF DRESSING CHANGES:  Every other day    Wound Thickness [x] Full   []Partial       Patient Wound(s) Debrided: [] Yes   [x] No     Debridement Date:    Debribement Type:     ADDITIONAL ITEMS:  [] Gloves Small  [x] Gloves Medium [] Gloves Large [] Gloves Kathrine Pare  [] Paper Tape 1\" [x] Paper Tape 2\" [] Paper Tape 3\"  [] Medipore Tape 3\"  [x] Saline  [] Skin Prep   [] Adhesive Remover   [] Cotton Tip Applicators  [] Tubular Stocking   [] Size E  [] Size G  [] Other:    Patient currently being seen by Home Health: [] Yes   [x] No    Duration for needed supplies:  [x]15  []30  []60  []90 Days    Provider Information:      PROVIDER'S NAME/NPI  Jody Perry MD NPI: [id number]   I give permission to coordinate the care for this patient

## 2021-07-22 ENCOUNTER — TELEPHONE (OUTPATIENT)
Dept: INTERNAL MEDICINE CLINIC | Age: 86
End: 2021-07-22

## 2021-07-22 DIAGNOSIS — F02.818 LATE ONSET ALZHEIMER'S DISEASE WITH BEHAVIORAL DISTURBANCE (HCC): ICD-10-CM

## 2021-07-22 DIAGNOSIS — R44.3 HALLUCINATION: ICD-10-CM

## 2021-07-22 DIAGNOSIS — G30.1 LATE ONSET ALZHEIMER'S DISEASE WITH BEHAVIORAL DISTURBANCE (HCC): ICD-10-CM

## 2021-07-22 DIAGNOSIS — F03.C0 ADVANCED DEMENTIA: Primary | ICD-10-CM

## 2021-07-22 NOTE — TELEPHONE ENCOUNTER
[facility] called to request a referral for hospice eval. Please fax referral, demographics, last ov note.    Fax # [phone]

## 2021-07-26 ENCOUNTER — TELEPHONE (OUTPATIENT)
Dept: INTERNAL MEDICINE CLINIC | Age: 86
End: 2021-07-26

## 2021-07-27 ENCOUNTER — TELEPHONE (OUTPATIENT)
Dept: INTERNAL MEDICINE CLINIC | Age: 86
End: 2021-07-27

## 2021-07-27 ENCOUNTER — HOSPITAL ENCOUNTER (OUTPATIENT)
Dept: WOUND CARE | Age: 86
Discharge: HOME | End: 2021-07-27
Payer: MEDICARE

## 2021-07-27 DIAGNOSIS — F03.C0 ADVANCED DEMENTIA: Primary | ICD-10-CM

## 2021-07-27 DIAGNOSIS — G30.1 LATE ONSET ALZHEIMER'S DISEASE WITH BEHAVIORAL DISTURBANCE (HCC): ICD-10-CM

## 2021-07-27 DIAGNOSIS — Z91.81 AT HIGH RISK FOR FALLS: ICD-10-CM

## 2021-07-27 DIAGNOSIS — R44.3 HALLUCINATION: ICD-10-CM

## 2021-07-27 DIAGNOSIS — S41.102A OPEN WOUND OF ARM, LEFT, INITIAL ENCOUNTER: Primary | ICD-10-CM

## 2021-07-27 DIAGNOSIS — F02.818 LATE ONSET ALZHEIMER'S DISEASE WITH BEHAVIORAL DISTURBANCE (HCC): ICD-10-CM

## 2021-07-27 PROCEDURE — 99212 OFFICE O/P EST SF 10 MIN: CPT | Performed by: SURGERY

## 2021-07-27 PROCEDURE — 99213 OFFICE O/P EST LOW 20 MIN: CPT

## 2021-07-27 RX ORDER — LIDOCAINE HYDROCHLORIDE 40 MG/ML
SOLUTION TOPICAL ONCE
OUTPATIENT
Start: 2021-07-27 | End: 2021-07-27

## 2021-07-27 RX ORDER — LIDOCAINE 40 MG/G
CREAM TOPICAL ONCE
Status: DISCONTINUED | OUTPATIENT
Start: 2021-07-27 | End: 2021-07-28 | Stop reason: HOSPADM

## 2021-07-27 RX ORDER — LIDOCAINE HYDROCHLORIDE 20 MG/ML
JELLY TOPICAL ONCE
OUTPATIENT
Start: 2021-07-27 | End: 2021-07-27

## 2021-07-27 RX ORDER — LIDOCAINE 50 MG/G
OINTMENT TOPICAL ONCE
OUTPATIENT
Start: 2021-07-27 | End: 2021-07-27

## 2021-07-27 RX ORDER — BACITRACIN ZINC AND POLYMYXIN B SULFATE 500; 1000 [USP'U]/G; [USP'U]/G
OINTMENT TOPICAL ONCE
OUTPATIENT
Start: 2021-07-27 | End: 2021-07-27

## 2021-07-27 RX ORDER — CLOBETASOL PROPIONATE 0.5 MG/G
OINTMENT TOPICAL ONCE
OUTPATIENT
Start: 2021-07-27 | End: 2021-07-27

## 2021-07-27 RX ORDER — BETAMETHASONE DIPROPIONATE 0.05 %
OINTMENT (GRAM) TOPICAL ONCE
OUTPATIENT
Start: 2021-07-27 | End: 2021-07-27

## 2021-07-27 RX ORDER — LIDOCAINE 40 MG/G
CREAM TOPICAL ONCE
Status: CANCELLED | OUTPATIENT
Start: 2021-07-27 | End: 2021-07-27

## 2021-07-27 RX ORDER — GENTAMICIN SULFATE 1 MG/G
OINTMENT TOPICAL ONCE
OUTPATIENT
Start: 2021-07-27 | End: 2021-07-27

## 2021-07-27 RX ORDER — GINSENG 100 MG
CAPSULE ORAL ONCE
OUTPATIENT
Start: 2021-07-27 | End: 2021-07-27

## 2021-07-27 RX ORDER — BACITRACIN, NEOMYCIN, POLYMYXIN B 400; 3.5; 5 [USP'U]/G; MG/G; [USP'U]/G
OINTMENT TOPICAL ONCE
OUTPATIENT
Start: 2021-07-27 | End: 2021-07-27

## 2021-07-27 RX ORDER — BACITRACIN ZINC AND POLYMYXIN B SULFATE 500; 1000 [USP'U]/G; [USP'U]/G
OINTMENT TOPICAL ONCE
Status: DISCONTINUED | OUTPATIENT
Start: 2021-07-27 | End: 2021-07-28 | Stop reason: HOSPADM

## 2021-07-27 NOTE — PROGRESS NOTES
Metoprolol Hives     Severe rash and itching    Amlodipine Itching     Itching    Sulfa Antibiotics Nausea And Vomiting    Adhesive Tape Other (See Comments)     Skin tears very easily       MEDICATIONS    Current Outpatient Medications on File Prior to Encounter   Medication Sig Dispense Refill    solifenacin (VESICARE) 5 MG tablet Take 1 tablet by mouth daily 30 tablet 2    cephALEXin (KEFLEX) 500 MG capsule Take 1 capsule by mouth 3 times daily 21 capsule 0    levothyroxine (SYNTHROID) 50 MCG tablet TAKE ONE TABLET BY MOUTH DAILY 30 tablet 2    alendronate (FOSAMAX) 70 MG tablet TAKE 1 TABLET BY MOUTH ONCE WEEKLY ON AN EMPTY STOMACH BEFORE BREAKFAST. REMAIN UPRIGHT FOR 30 MINUTES & TAKE WITH 8 OUNCES OF WATER 4 tablet 2    QUEtiapine (SEROQUEL) 25 MG tablet Take 0.5 tablets by mouth 2 times daily 30 tablet 2    lactulose (CHRONULAC) 10 GM/15ML solution Take 30 mLs by mouth 2 times daily 1892 mL 5    ascorbic acid (VITAMIN C) 500 MG tablet TAKE ONE TABLET BY MOUTH DAILY 90 tablet 3    DULoxetine (CYMBALTA) 30 MG extended release capsule TAKE ONE CAPSULE BY MOUTH DAILY 90 capsule 1    vitamin B-12 (CYANOCOBALAMIN) 1000 MCG tablet TAKE ONE TABLET BY MOUTH DAILY 90 tablet 3    sertraline (ZOLOFT) 25 MG tablet TAKE ONE TABLET BY MOUTH DAILY 90 tablet 1    acetaminophen (TYLENOL) 500 MG tablet Take 1 tablet by mouth every 8 hours as needed for Pain 120 tablet 0    Caltrate 600+D Plus Minerals (CALTRATE) 600-800 MG-UNIT TABS tablet Take 1 tablet by mouth 2 times daily 180 tablet 1    aspirin 325 MG EC tablet Take 1 tablet by mouth daily 14 tablet 0    Cholecalciferol (VITAMIN D) 2000 units CAPS capsule Take 1 capsule by mouth daily        No current facility-administered medications on file prior to encounter. REVIEW OF SYSTEMS    Constitutional: negative  Eyes: negative  Ears, nose, mouth, throat, and face: negative  Behavioral/Psych: negative      Objective:       There were no vitals taken for this visit.     PHYSICAL EXAM    General Appearance: alert and oriented to person, place and time, well-developed and well-nourished, in no acute distress  Head: normocephalic and atraumatic  Neck: neck supple and non tender without mass, no thyromegaly or thyroid nodules, no cervical lymphadenopathy   Abdomen: soft, non-tender, non-distended, normal bowel sounds, no masses or organomegaly  Wounds clean    Assessment:     Patient Active Problem List   Diagnosis    Nephrotic syndrome    Edema    Hypothyroid    Multiple joint pain    Age-related osteoporosis without current pathological fracture    Closed fracture of right femur ([address]    Closed displaced comminuted fracture of shaft of right femur ([address]    Primary osteoarthritis of right knee    Gastroesophageal reflux disease    False positive syphilis serology    Memory deficit    Closed displaced intertrochanteric fracture of left femur ([address]    Closed fracture of left femur (HCC)    Primary osteoarthritis of left knee    Acute metabolic encephalopathy    SHARAD (acute kidney injury) ([address]    Hepatic encephalopathy ([address]    Cirrhosis ([address]    D[facility] problem [facility]    Falls, initial encounter    Urge incontinence    Falls frequently    Closed displaced fracture of proximal phalanx of right middle finger    Open wound of arm, left, initial encounter       Wound 08/21/19 Pretibial Left;Proximal abrasion from fall (Active)   Number of days: 705       Wound 07/20/21 Arm Lateral;Right #1 (Active)   Wound Image   07/20/21 0830   Wound Etiology Traumatic 07/27/21 0907   Wound Cleansed Cleansed with saline 07/27/21 0907   Wound Length (cm) 7.4 cm 07/27/21 0907   Wound Width (cm) 1 cm 07/27/21 0907   Wound Depth (cm) 0.1 cm 07/27/21 0907   Wound Surface Area (cm^2) 7.4 cm^2 07/27/21 0907   Change in Wound Size % (l*w) 70.19 07/27/21 0907   Wound Volume (cm^3) 0.74 cm^3 07/27/21 0907   Wound Healing % 70 07/27/21 0907   Wound Assessment Granulation tissue 07/27/21 0907   Drainage Amount Small 07/27/21 0907   Drainage Description Serosanguinous 07/27/21 0907   Odor None 07/27/21 0907   Elizabeth-wound Assessment Intact 07/27/21 0907   Margins Defined edges; Attached edges 07/27/21 0907   Number of days: 7       Wound 07/20/21 Arm Lateral;Left #2 (Active)   Wound Image   07/20/21 0830   Wound Etiology Traumatic 07/27/21 0907   Wound Cleansed Cleansed with saline 07/27/21 0907   Wound Length (cm) 7 cm 07/27/21 0907   Wound Width (cm) 5 cm 07/27/21 0907   Wound Depth (cm) 0.1 cm 07/27/21 0907   Wound Surface Area (cm^2) 35 cm^2 07/27/21 0907   Change in Wound Size % (l*w) -82.29 07/27/21 0907   Wound Volume (cm^3) 3.5 cm^3 07/27/21 0907   Wound Healing % -82 07/27/21 0907   Wound Assessment Bleeding 07/27/21 0907   Drainage Amount Moderate 07/27/21 0907   Drainage Description Serosanguinous 07/27/21 0907   Odor None 07/27/21 0907   Elizabeth-wound Assessment Fragile 07/27/21 0907   Margins Attached edges; Undefined edges 07/27/21 0907   Number of days: 7       Wound 07/20/21 Arm Right;Posterior #3 (Active)   Wound Image   07/20/21 0830   Wound Etiology Traumatic 07/20/21 0830   Wound Cleansed Cleansed with saline 07/27/21 0907   Wound Length (cm) 0 cm 07/27/21 0907   Wound Width (cm) 0 cm 07/27/21 0907   Wound Depth (cm) 0 cm 07/27/21 0907   Wound Surface Area (cm^2) 0 cm^2 07/27/21 0907   Change in Wound Size % (l*w) 100 07/27/21 0907   Wound Volume (cm^3) 0 cm^3 07/27/21 0907   Wound Healing % 100 07/27/21 0907   Wound Assessment Epithelialization 07/27/21 0907   Drainage Amount Moderate 07/20/21 0830   Drainage Description Serosanguinous 07/20/21 0830   Odor None 07/20/21 0830   Elizabeth-wound Assessment Fragile 07/20/21 0830   Margins Unattached edges 07/20/21 0830   Number of days: 7       Wound 07/20/21 Arm Left;Posterior #4 (Active)   Wound Image   07/20/21 0830   Wound Etiology Traumatic 07/20/21 0830   Wound Cleansed Cleansed with saline 07/27/21 0907   Wound Length (cm) 1.2 cm 07/27/21 0907   Wound Width (cm) 4 cm 07/27/21 0907   Wound Depth (cm) 0.1 cm 07/27/21 0907   Wound Surface Area (cm^2) 4.8 cm^2 07/27/21 0907   Change in Wound Size % (l*w) 76 07/27/21 0907   Wound Volume (cm^3) 0.48 cm^3 07/27/21 0907   Wound Healing % 76 07/27/21 0907   Wound Assessment Bleeding;Devitalized tissue 07/27/21 0907   Drainage Amount Moderate 07/27/21 0907   Drainage Description Sanguinous 07/27/21 0907   Odor None 07/27/21 0907   Elizabeth-wound Assessment Fragile 07/27/21 0907   Margins Attached edges; Undefined edges 07/27/21 1006   Number of days: 9    80-year-old female with traumatic bilateral arm wounds. All of the wounds are progressing well and showing signs of healing. No debridement was required today. Plan:     Continue PSO and nonstick dressing. Follow-up in the wound center in 2 weeks.       Treatment Plan          Written Patient Discharge Instructions Given            Electronically signed by Kush Rodriges MD on 7/27/2021 at 9:25 AM

## 2021-07-27 NOTE — PLAN OF CARE
Discharge instructions given. Patient verbalized understanding. Return to [facility] in 2 weeks.

## 2021-08-10 ENCOUNTER — HOSPITAL ENCOUNTER (OUTPATIENT)
Dept: WOUND CARE | Age: 86
Discharge: HOME | End: 2021-08-10
Payer: MEDICARE

## 2021-08-10 DIAGNOSIS — S41.102A OPEN WOUND OF ARM, LEFT, INITIAL ENCOUNTER: Primary | ICD-10-CM

## 2021-08-10 PROCEDURE — 99213 OFFICE O/P EST LOW 20 MIN: CPT | Performed by: SURGERY

## 2021-08-10 PROCEDURE — 99213 OFFICE O/P EST LOW 20 MIN: CPT

## 2021-08-10 RX ORDER — BETAMETHASONE DIPROPIONATE 0.05 %
OINTMENT (GRAM) TOPICAL ONCE
OUTPATIENT
Start: 2021-08-10 | End: 2021-08-10

## 2021-08-10 RX ORDER — CLOBETASOL PROPIONATE 0.5 MG/G
OINTMENT TOPICAL ONCE
OUTPATIENT
Start: 2021-08-10 | End: 2021-08-10

## 2021-08-10 RX ORDER — LIDOCAINE 40 MG/G
CREAM TOPICAL ONCE
OUTPATIENT
Start: 2021-08-10 | End: 2021-08-10

## 2021-08-10 RX ORDER — LIDOCAINE HYDROCHLORIDE 20 MG/ML
JELLY TOPICAL ONCE
OUTPATIENT
Start: 2021-08-10 | End: 2021-08-10

## 2021-08-10 RX ORDER — LIDOCAINE 40 MG/G
CREAM TOPICAL ONCE
Status: DISCONTINUED | OUTPATIENT
Start: 2021-08-10 | End: 2021-08-11 | Stop reason: HOSPADM

## 2021-08-10 RX ORDER — GENTAMICIN SULFATE 1 MG/G
OINTMENT TOPICAL ONCE
OUTPATIENT
Start: 2021-08-10 | End: 2021-08-10

## 2021-08-10 RX ORDER — LIDOCAINE 50 MG/G
OINTMENT TOPICAL ONCE
OUTPATIENT
Start: 2021-08-10 | End: 2021-08-10

## 2021-08-10 RX ORDER — GINSENG 100 MG
CAPSULE ORAL ONCE
OUTPATIENT
Start: 2021-08-10 | End: 2021-08-10

## 2021-08-10 RX ORDER — BACITRACIN ZINC AND POLYMYXIN B SULFATE 500; 1000 [USP'U]/G; [USP'U]/G
OINTMENT TOPICAL ONCE
OUTPATIENT
Start: 2021-08-10 | End: 2021-08-10

## 2021-08-10 RX ORDER — LIDOCAINE HYDROCHLORIDE 40 MG/ML
SOLUTION TOPICAL ONCE
OUTPATIENT
Start: 2021-08-10 | End: 2021-08-10

## 2021-08-10 RX ORDER — BACITRACIN, NEOMYCIN, POLYMYXIN B 400; 3.5; 5 [USP'U]/G; MG/G; [USP'U]/G
OINTMENT TOPICAL ONCE
OUTPATIENT
Start: 2021-08-10 | End: 2021-08-10

## 2021-08-10 NOTE — PROGRESS NOTES
Debbie Herr  Progress Note       Noe Burns  AGE: 80 y.o. GENDER: female  : 1930  TODAY'S DATE:  8/10/2021    Subjective:     Chief Complaint   Patient presents with    Wound Check     Wounds Bilateral arms and right knee         HISTORY of PRESENT ILLNESS HPI     Noe Burns is a 80 y.o. female who presents today for wound evaluation. History of Wound: Traumatic arm wounds and new right patellar wound    Wound Pain:  mild  Severity:  2 / 10   Wound Type:  traumatic  Modifying Factors:  none  Associated Signs/Symptoms:  none        PAST MEDICAL HISTORY        Diagnosis Date    Acute renal failure ([address] 12    Arthritis     At risk for falling 2018    Score of  for Dynamic Gait Index    Cirrhosis ([facility] 2020    Dementia with behavioral problem ([facility] 2020    Frequent falls     Gastroesophageal reflux disease 2019    Movement disorder     osteoporosis    Nephrotic syndrome 2012    Thyroid disease        PAST SURGICAL HISTORY    Past Surgical History:   Procedure Laterality Date    COLONOSCOPY      EXCISION OF FACIAL MASS      skin ca    EYE SURGERY      cataract removal; bilat    HIP FRACTURE SURGERY Left 2019    OPEN REDUCTION INTERNAL FIXATION OF INTERTROCHANTERIC FRACTURE OF LEFT HIPUSING GAMMA NAIL performed by Meseret Patricio MD at [facility] Right 2018    right gamma nail with cables    SKIN BIOPSY         FAMILY HISTORY    Family History   Problem Relation Age of Onset    High Blood Pressure Mother     Cancer Father         lung cancer; metastic    Cancer Daughter 52        colon cancer    Cancer Brother     Cancer Brother     Diabetes Brother        SOCIAL HISTORY    Social History     Tobacco Use    Smoking status: Never Smoker    Smokeless tobacco: Never Used   Vaping Use    Vaping Use: Never used   Substance Use Topics    Alcohol use: No    Drug use:  No ALLERGIES    Allergies   Allergen Reactions    Metoprolol Hives     Severe rash and itching    Amlodipine Itching     Itching    Sulfa Antibiotics Nausea And Vomiting    Adhesive Tape Other (See Comments)     Skin tears very easily       MEDICATIONS    Current Outpatient Medications on File Prior to Encounter   Medication Sig Dispense Refill    solifenacin (VESICARE) 5 MG tablet Take 1 tablet by mouth daily 30 tablet 2    cephALEXin (KEFLEX) 500 MG capsule Take 1 capsule by mouth 3 times daily 21 capsule 0    levothyroxine (SYNTHROID) 50 MCG tablet TAKE ONE TABLET BY MOUTH DAILY 30 tablet 2    alendronate (FOSAMAX) 70 MG tablet TAKE 1 TABLET BY MOUTH ONCE WEEKLY ON AN EMPTY STOMACH BEFORE BREAKFAST. REMAIN UPRIGHT FOR 30 MINUTES & TAKE WITH 8 OUNCES OF WATER 4 tablet 2    QUEtiapine (SEROQUEL) 25 MG tablet Take 0.5 tablets by mouth 2 times daily 30 tablet 2    lactulose (CHRONULAC) 10 GM/15ML solution Take 30 mLs by mouth 2 times daily 1892 mL 5    ascorbic acid (VITAMIN C) 500 MG tablet TAKE ONE TABLET BY MOUTH DAILY 90 tablet 3    DULoxetine (CYMBALTA) 30 MG extended release capsule TAKE ONE CAPSULE BY MOUTH DAILY 90 capsule 1    vitamin B-12 (CYANOCOBALAMIN) 1000 MCG tablet TAKE ONE TABLET BY MOUTH DAILY 90 tablet 3    sertraline (ZOLOFT) 25 MG tablet TAKE ONE TABLET BY MOUTH DAILY 90 tablet 1    acetaminophen (TYLENOL) 500 MG tablet Take 1 tablet by mouth every 8 hours as needed for Pain 120 tablet 0    Caltrate 600+D Plus Minerals (CALTRATE) 600-800 MG-UNIT TABS tablet Take 1 tablet by mouth 2 times daily 180 tablet 1    aspirin 325 MG EC tablet Take 1 tablet by mouth daily 14 tablet 0    Cholecalciferol (VITAMIN D) 2000 units CAPS capsule Take 1 capsule by mouth daily        No current facility-administered medications on file prior to encounter.        REVIEW OF SYSTEMS    Constitutional: negative  Eyes: negative  Ears, nose, mouth, throat, and face: negative  Respiratory: negative  Behavioral/Psych: negative      Objective: There were no vitals taken for this visit. PHYSICAL EXAM    General Appearance: alert and oriented to person, place and time, well-developed and well-nourished, in no acute distress  Head: normocephalic and atraumatic  Neck: neck supple and non tender without mass, no thyromegaly or thyroid nodules, no cervical lymphadenopathy   Abdomen: soft, non-tender, non-distended, normal bowel sounds, no masses or organomegaly  Arm wounds healed  Superficial, clean wound of the right patellar region    Assessment:     Patient Active Problem List   Diagnosis    Nephrotic syndrome    Edema    Hypothyroid    Multiple joint pain    Age-related osteoporosis without current pathological fracture    Closed fracture of right femur (HCC)    Closed displaced comminuted fracture of shaft of right femur ([address]    Primary osteoarthritis of right knee    Gastroesophageal reflux disease    False positive syphilis serology    Memory deficit    Closed displaced intertrochanteric fracture of left femur ([address]    Closed fracture of left femur (HCC)    Primary osteoarthritis of left knee    Acute metabolic encephalopathy    SHARAD (acute kidney injury) ([address]    Hepatic encephalopathy (HCC)    Cirrhosis ([address]    Dementia with behavioral problem ([address]    Falls, initial encounter    Urge incontinence    Falls frequently    Closed displaced fracture of proximal phalanx of right middle finger    Open wound of arm, left, initial encounter       Wound 08/10/21 Knee Right #1 (Active)   Number of days: [de-identified]    80-year-old female who is seen in follow-up for traumatic wounds to the bilateral upper arms. All of the arm wounds have healed. She has a new superficial, clean right patellar wound which is traumatic. Plan:     Continue sleeves to the bilateral upper arms for 2 weeks as the patient has issues with scratching.   Continue PSO/Adaptic and a dry bandage to the right patellar region. Follow-up in the wound center in 2 weeks.     Treatment Plan          Written Patient Discharge Instructions Given            Electronically signed by Cristhian Quezada MD on 8/10/2021 at 10:54 AM

## 2021-08-10 NOTE — PLAN OF CARE
Discharge instructions given. Patient verbalized understanding. Return to [facility] in 2 weeks.

## 2021-08-30 ENCOUNTER — OFFICE VISIT (OUTPATIENT)
Dept: INTERNAL MEDICINE CLINIC | Age: 86
End: 2021-08-30
Payer: MEDICARE

## 2021-08-30 VITALS
HEIGHT: 65 IN | DIASTOLIC BLOOD PRESSURE: 76 MMHG | BODY MASS INDEX: 29.16 KG/M2 | TEMPERATURE: 97.7 F | WEIGHT: 175 LBS | SYSTOLIC BLOOD PRESSURE: 142 MMHG | OXYGEN SATURATION: 98 % | HEART RATE: 88 BPM

## 2021-08-30 DIAGNOSIS — F02.818 LATE ONSET ALZHEIMER'S DISEASE WITH BEHAVIORAL DISTURBANCE (HCC): Primary | ICD-10-CM

## 2021-08-30 DIAGNOSIS — E03.9 HYPOTHYROIDISM, UNSPECIFIED TYPE: ICD-10-CM

## 2021-08-30 DIAGNOSIS — F03.C0 ADVANCED DEMENTIA: ICD-10-CM

## 2021-08-30 DIAGNOSIS — G30.1 LATE ONSET ALZHEIMER'S DISEASE WITH BEHAVIORAL DISTURBANCE (HCC): Primary | ICD-10-CM

## 2021-08-30 DIAGNOSIS — R44.3 HALLUCINATION: ICD-10-CM

## 2021-08-30 DIAGNOSIS — M79.7 FIBROMYALGIA: ICD-10-CM

## 2021-08-30 PROCEDURE — 99213 OFFICE O/P EST LOW 20 MIN: CPT | Performed by: INTERNAL MEDICINE

## 2021-08-30 PROCEDURE — G8427 DOCREV CUR MEDS BY ELIG CLIN: HCPCS | Performed by: INTERNAL MEDICINE

## 2021-08-30 PROCEDURE — 4040F PNEUMOC VAC/ADMIN/RCVD: CPT | Performed by: INTERNAL MEDICINE

## 2021-08-30 PROCEDURE — G8417 CALC BMI ABV UP PARAM F/U: HCPCS | Performed by: INTERNAL MEDICINE

## 2021-08-30 PROCEDURE — 1123F ACP DISCUSS/DSCN MKR DOCD: CPT | Performed by: INTERNAL MEDICINE

## 2021-08-30 PROCEDURE — 1090F PRES/ABSN URINE INCON ASSESS: CPT | Performed by: INTERNAL MEDICINE

## 2021-08-30 PROCEDURE — 1036F TOBACCO NON-USER: CPT | Performed by: INTERNAL MEDICINE

## 2021-08-30 SDOH — ECONOMIC STABILITY: FOOD INSECURITY: WITHIN THE PAST 12 MONTHS, YOU WORRIED THAT YOUR FOOD WOULD RUN OUT BEFORE YOU GOT MONEY TO BUY MORE.: NEVER TRUE

## 2021-08-30 SDOH — ECONOMIC STABILITY: FOOD INSECURITY: WITHIN THE PAST 12 MONTHS, THE FOOD YOU BOUGHT JUST DIDN'T LAST AND YOU DIDN'T HAVE MONEY TO GET MORE.: NEVER TRUE

## 2021-08-30 ASSESSMENT — ENCOUNTER SYMPTOMS
VOMITING: 0
SHORTNESS OF BREATH: 0
ABDOMINAL PAIN: 0
WHEEZING: 0

## 2021-08-30 ASSESSMENT — SOCIAL DETERMINANTS OF HEALTH (SDOH): HOW HARD IS IT FOR YOU TO PAY FOR THE VERY BASICS LIKE FOOD, HOUSING, MEDICAL CARE, AND HEATING?: NOT HARD AT ALL

## 2021-08-30 NOTE — PROGRESS NOTES
Du Jurado  4/13/1930  female  80 y.o. SUBJECTIVE:       Chief Complaint   Patient presents with    3 Month Follow-Up       HPI:  Follow-up visit for chronic problems. According to patient daughter patient did not qualify for hospice care. No further falls since last visit. Patient has been staying with her daughter. Past Medical History:   Diagnosis Date    Acute renal failure ([address] 11-27-12    Arthritis     At risk for falling 08/28/2018    Score of 11/24 for Dynamic Gait Index    Cirrhosis ([facility] 9/25/2020    Dementia with behavioral problem ([facility] 9/25/2020    Frequent falls     Gastroesophageal reflux disease 1/22/2019    Movement disorder     osteoporosis    Nephrotic syndrome 12/28/2012    Thyroid disease      Past Surgical History:   Procedure Laterality Date    COLONOSCOPY      EXCISION OF FACIAL MASS      skin ca    EYE SURGERY      cataract removal; bilat    HIP FRACTURE SURGERY Left 8/21/2019    OPEN REDUCTION INTERNAL FIXATION OF INTERTROCHANTERIC FRACTURE OF LEFT HIPUSING GAMMA NAIL performed by Mari Downing MD at [place] [address] Right 05/27/2018    right gamma nail with cables    SKIN BIOPSY       Social History     Socioeconomic History    Marital status:       Spouse name: None    Number of children: None    Years of education: None    Highest education level: None   Occupational History    Occupation: homeaker   Tobacco Use    Smoking status: Never Smoker    Smokeless tobacco: Never Used   Vaping Use    Vaping Use: Never used   Substance and Sexual Activity    Alcohol use: No    Drug use: No    Sexual activity: Not Currently     Partners: Male   Other Topics Concern    None   Social History Narrative    None     Social Determinants of Health     Financial Resource Strain: Low Risk     Difficulty of Paying Living Expenses: Not hard at all   Food Insecurity: No Food Insecurity    Worried About [address] in the Last Year: Never true    Ran Out of Food in the Last Year: Never true   Transportation Needs:     Lack of Transportation (Medical):  Lack of Transportation (Non-Medical):    Physical Activity:     Days of Exercise per Week:     Minutes of Exercise per Session:    Stress:     Feeling of Stress :    Social Connections:     Frequency of Communication with Friends and Family:     Frequency of Social Gatherings with Friends and Family:     Attends [employer] Services:     Active Member of Clubs or Organizations:     Attends Club or Organization Meetings:     Marital Status:    Intimate Partner Violence:     Fear of Current or Ex-Partner:     Emotionally Abused:     Physically Abused:     Sexually Abused:      Family History   Problem Relation Age of Onset    High Blood Pressure Mother     Cancer Father         lung cancer; metastic    Cancer Daughter 52        colon cancer    Cancer Brother     Cancer Brother     Diabetes Brother        Review of Systems   Constitutional: Negative for diaphoresis and unexpected weight change. Respiratory: Negative for shortness of breath and wheezing. Cardiovascular: Negative for chest pain and palpitations. Gastrointestinal: Negative for abdominal pain and vomiting. Neurological: Negative for dizziness and light-headedness. OBJECTIVE:  Pulse Readings from Last 4 Encounters:   08/30/21 88   07/20/21 88   07/16/21 89   07/05/21 87     Wt Readings from Last 4 Encounters:   08/30/21 175 lb (79.4 kg)   07/16/21 175 lb 6.4 oz (79.6 kg)   07/05/21 187 lb (84.8 kg)   06/08/21 182 lb (82.6 kg)     BP Readings from Last 4 Encounters:   08/30/21 (!) 142/76   07/20/21 113/76   07/16/21 134/82   07/05/21 124/73     Physical Exam  Vitals and nursing note reviewed. Constitutional:       General: She is not in acute distress. Appearance: She is not ill-appearing. Cardiovascular:      Rate and Rhythm: Normal rate and regular rhythm. Pulses: Normal pulses. Heart sounds: Normal heart sounds. Pulmonary:      Effort: Pulmonary effort is normal.      Breath sounds: Normal breath sounds. Neurological:      General: No focal deficit present. Mental Status: She is alert. Mental status is at baseline. CBC:   Lab Results   Component Value Date    WBC 6.7 06/03/2021    HGB 14.2 06/03/2021    HCT 43.2 06/03/2021     06/03/2021     CMP:  Lab Results   Component Value Date     06/03/2021    K 4.1 06/03/2021     06/03/2021    CO2 29 06/03/2021    ANIONGAP 7 06/03/2021    GLUCOSE 113 06/03/2021    BUN 19 06/03/2021    CREATININE 1.0 06/03/2021    GFRAA >60 06/03/2021    GFRAA >60 04/10/2013    CALCIUM 9.1 06/03/2021    PROT 6.4 12/13/2020    PROT 7.2 03/04/2013    LABALBU 3.5 12/13/2020    AGRATIO 1.6 01/13/2020    BILITOT 0.5 12/13/2020    ALKPHOS 80 12/13/2020    ALT 10 12/13/2020    AST 16 12/13/2020    GLOB 2.6 01/13/2020     URINALYSIS:  Lab Results   Component Value Date    GLUCOSEU Negative 06/02/2021    GLUCOSEU NEGATIVE 02/21/2012    KETUA TRACE 06/02/2021    SPECGRAV 1.019 06/02/2021    BLOODU Negative 06/02/2021    PHUR 6.5 06/02/2021    PROTEINU Negative 06/02/2021    NITRU Negative 06/02/2021    LEUKOCYTESUR Negative 06/02/2021    LABMICR Not Indicated 06/02/2021    URINETYPE Voided 06/02/2021     HBA1C:   Lab Results   Component Value Date    LABA1C 5.4 02/04/2015    .3 02/04/2015     MICRO/ALB:   Lab Results   Component Value Date    LABMICR Not Indicated 06/02/2021    LABCREA 137.3 09/02/2015     LIPID:  Lab Results   Component Value Date    CHOL 226 06/21/2017    TRIG 111 06/21/2017    HDL 51 06/21/2017    HDL 42 05/23/2012    LDLCALC 153 06/21/2017    LABVLDL 22 06/21/2017     TSH:   Lab Results   Component Value Date    TSHREFLEX 3.51 01/06/2021         ASSESSMENT/PLAN:    Jamil Sarmiento was seen today for 3 month follow-up.     Diagnoses and all orders for this visit:    Late onset Alzheimer's disease with behavioral disturbance ([address]  Could not tolerate Aricept or Namenda   Advanced dementia ([facility]    Hallucination  Overall hallucination is much better. We will continue low-dose Seroquel for now. Hypothyroidism, unspecified type  Last TSH within the therapeutic range. Will need repeat TSH next visit. Fibromyalgia  Stable with current Cymbalta. No orders of the defined types were placed in this encounter. Current Outpatient Medications   Medication Sig Dispense Refill    solifenacin (VESICARE) 5 MG tablet Take 1 tablet by mouth daily 30 tablet 2    cephALEXin (KEFLEX) 500 MG capsule Take 1 capsule by mouth 3 times daily 21 capsule 0    levothyroxine (SYNTHROID) 50 MCG tablet TAKE ONE TABLET BY MOUTH DAILY 30 tablet 2    alendronate (FOSAMAX) 70 MG tablet TAKE 1 TABLET BY MOUTH ONCE WEEKLY ON AN EMPTY STOMACH BEFORE BREAKFAST. REMAIN UPRIGHT FOR 30 MINUTES & TAKE WITH 8 OUNCES OF WATER 4 tablet 2    QUEtiapine (SEROQUEL) 25 MG tablet Take 0.5 tablets by mouth 2 times daily 30 tablet 2    lactulose (CHRONULAC) 10 GM/15ML solution Take 30 mLs by mouth 2 times daily 1892 mL 5    ascorbic acid (VITAMIN C) 500 MG tablet TAKE ONE TABLET BY MOUTH DAILY 90 tablet 3    DULoxetine (CYMBALTA) 30 MG extended release capsule TAKE ONE CAPSULE BY MOUTH DAILY 90 capsule 1    vitamin B-12 (CYANOCOBALAMIN) 1000 MCG tablet TAKE ONE TABLET BY MOUTH DAILY 90 tablet 3    sertraline (ZOLOFT) 25 MG tablet TAKE ONE TABLET BY MOUTH DAILY 90 tablet 1    acetaminophen (TYLENOL) 500 MG tablet Take 1 tablet by mouth every 8 hours as needed for Pain 120 tablet 0    Caltrate 600+D Plus Minerals (CALTRATE) 600-800 MG-UNIT TABS tablet Take 1 tablet by mouth 2 times daily 180 tablet 1    aspirin 325 MG EC tablet Take 1 tablet by mouth daily 14 tablet 0    Cholecalciferol (VITAMIN D) 2000 units CAPS capsule Take 1 capsule by mouth daily        No current facility-administered medications for this visit.        Return in about 3 months (around 11/30/2021). An After Visit Summary was printed and given to the patient. Documentation was done using voice recognition dragon software. Every effort was made to ensure accuracy; however, inadvertent  Unintentional computerized transcription errors may be present.

## 2021-09-02 ENCOUNTER — TELEPHONE (OUTPATIENT)
Dept: ORTHOPEDIC SURGERY | Age: 86
End: 2021-09-02

## 2021-09-02 RX ORDER — SERTRALINE HYDROCHLORIDE 25 MG/1
TABLET, FILM COATED ORAL
Qty: 30 TABLET | Refills: 2 | Status: SHIPPED | OUTPATIENT
Start: 2021-09-02 | End: 2022-02-28

## 2021-09-02 NOTE — TELEPHONE ENCOUNTER
General Question     Subject: 6 MONTH APPT  Patient and /or Facility Request: [facility] IS APPT NECESSARY. Tino Sterling DAUGHTER DON'T LIKE TO TAKE HER OUT IF SHE DOESN'T HAVE TO.  [address] [place] [address] [address] [facility]  Contact Number: [phone]

## 2021-09-02 NOTE — TELEPHONE ENCOUNTER
Called and spoke with daughter. She states patient is doing fine and not complaining of any pain or concerns. Let her know it is understandable to cancel due to Covid-19 and she can always r/s if needed. She will keep appt one more week and make sure pt is still doing ok and then call to cancel if she is.

## 2021-09-28 DIAGNOSIS — E03.9 HYPOTHYROIDISM, UNSPECIFIED TYPE: ICD-10-CM

## 2021-09-29 RX ORDER — LEVOTHYROXINE SODIUM 0.05 MG/1
TABLET ORAL
Qty: 90 TABLET | Refills: 0 | Status: SHIPPED | OUTPATIENT
Start: 2021-09-29 | End: 2021-12-23 | Stop reason: SDUPTHER

## 2021-10-25 DIAGNOSIS — M81.0 AGE-RELATED OSTEOPOROSIS WITHOUT CURRENT PATHOLOGICAL FRACTURE: ICD-10-CM

## 2021-10-25 RX ORDER — ALENDRONATE SODIUM 70 MG/1
TABLET ORAL
Qty: 4 TABLET | Refills: 2 | Status: SHIPPED | OUTPATIENT
Start: 2021-10-25 | End: 2022-01-28

## 2021-11-26 RX ORDER — LACTULOSE 10 G/15ML
SOLUTION ORAL
Qty: 1892 ML | Refills: 1 | Status: SHIPPED | OUTPATIENT
Start: 2021-11-26 | End: 2022-03-09 | Stop reason: SDUPTHER

## 2021-12-23 ENCOUNTER — TELEPHONE (OUTPATIENT)
Dept: INTERNAL MEDICINE CLINIC | Age: 86
End: 2021-12-23

## 2021-12-23 DIAGNOSIS — E03.9 HYPOTHYROIDISM, UNSPECIFIED TYPE: ICD-10-CM

## 2021-12-23 RX ORDER — LEVOTHYROXINE SODIUM 0.05 MG/1
TABLET ORAL
Qty: 90 TABLET | Refills: 0 | Status: SHIPPED | OUTPATIENT
Start: 2021-12-23 | End: 2022-03-09 | Stop reason: SDUPTHER

## 2021-12-23 NOTE — TELEPHONE ENCOUNTER
Patient calling requesting refill of levothyroxine (SYNTHROID) 50 MCG tablet. She is requesting 30 day supply. Last written 09/29/21  Last OV 08/30/21  Next OV nothing scheduled  Last recommended OV 11/30/21     Please send to [address] on [facility] in [place]. **Please note this is a new pharmacy for patient**.

## 2022-02-15 NOTE — PROGRESS NOTES
Anesthesia Post Evaluation    Patient: Yolanda Atkinson    Procedure(s) Performed: Procedure(s) (LRB):  EGD (ESOPHAGOGASTRODUODENOSCOPY) (N/A)  COLONOSCOPY (N/A)    Final Anesthesia Type: general      Patient location during evaluation: PACU  Patient participation: Yes- Able to Participate  Level of consciousness: awake and alert and oriented  Pain management: adequate  Airway patency: patent    PONV status at discharge: No PONV  Anesthetic complications: no      Cardiovascular status: blood pressure returned to baseline and hemodynamically stable  Respiratory status: unassisted  Hydration status: euvolemic  Follow-up not needed.          Vitals Value Taken Time   /78 02/15/22 0844   Temp 36.3 °C (97.4 °F) 02/15/22 0824   Pulse 70 02/15/22 0844   Resp 18 02/15/22 0844   SpO2 99 % 02/15/22 0844         Event Time   Out of Recovery 09:05:33         Pain/Sarina Score: Sarina Score: 10 (2/15/2022  8:25 AM)         Occupational Therapy  Facility/Department: [facility] ACUTE REHAB UNIT  Daily Treatment Note  NAME: Erica Calero  : 1930  MRN: [id number]    Date of Service: 2019    Discharge Recommendations:  Home with assist PRN, Home with nursing aide, Home with Home health OT, S Level 3  OT Equipment Recommendations  Other: pt owns: tub transfer bench, has reacher and sock aid     Assessment   Performance deficits / Impairments: Decreased functional mobility ; Decreased ADL status; Decreased endurance;Decreased balance;Decreased high-level IADLs  Assessment: Pt is not at her baseline level of occupational function, based on the above deficits associated with L femur fx, s/p ORIF. Pt would benefit from continued skilled acute OT services to address these deficits. Demo's increased independence with transfers, mobility and LB dressing, continues to require max A with LB dressing at this time and limited distance in ambulation due to pain/fatigue   Treatment Diagnosis: Decreased ADL/IADL status, functional mobility, endurance, and balance associated with L femur fx, s/p ORIF  Prognosis: Good  History: Pt 79 yo, lives alone, family support, independent ADLs, IADLs, dtr brings meals at times, independent ambulation, 4 falls past 6 mos. PMH: R femur fx 1 yr ago, per pt, osteoporosis, acute renal failure, frequent falls  Exam: ADLs  Assistance / Modification: CGA functional mobility  REQUIRES OT FOLLOW UP: Yes  Activity Tolerance  Activity Tolerance: Patient Tolerated treatment well  Safety Devices  Safety Devices in place: Yes  Type of devices: Call light within reach; Chair alarm in place; Left in chair  Restraints  Initially in place: No         Patient Diagnosis(es): Femur fracture with ORIF      has a past medical history of Acute renal failure ([facility] [address] Arthritis, At risk for falling, Frequent falls, Gastroesophageal reflux disease, Movement disorder, Nephrotic syndrome, and Thyroid disease.    has a past surgical history that includes Excision of Facial Mass; skin biopsy; Colonoscopy; eye surgery; other surgical history (Right, 05/27/2018); and Hip fracture surgery (Left, 8/21/2019). Restrictions  Restrictions/Precautions  Restrictions/Precautions: Fall Risk(high fall risk )  Lower Extremity Weight Bearing Restrictions  Right Lower Extremity Weight Bearing: Weight Bearing As Tolerated  Position Activity Restriction  Other position/activity restrictions: Pt fell after losing their balance while going to the kitchen to get popcorn. Pt states they fell on their left hip. Has hx of falls, uses cane and walker. Denies taking bloodthinner   Subjective   General  Chart Reviewed: Yes  Patient assessed for rehabilitation services?: Yes  Response to previous treatment: Patient with no complaints from previous session  Family / Caregiver Present: No  Referring Practitioner: Dinora Hurst MD, for d/c planning  Diagnosis: L femur fx/ s/p ORIF  Subjective  Subjective: pt in bed on arrival - rates pain at 10/10 in R hip - pt states she just took pain medication and able to ambulate during session   General Comment  Comments: . Orientation  Orientation  Overall Orientation Status: Within Functional Limits  Objective    ADL  Grooming: Supervision(in stance at sink )  LE Dressing: Moderate assistance(min A to thread pants with reacher - max A to denise and tie shoes- able to pull over hips )        Functional Mobility  Functional - Mobility Device: Rolling Walker  Activity: To/from bathroom  Assist Level: Contact guard assistance  Functional Mobility Comments: ambulated to sink and brush teeth, then took seated rest break prior to ambulating to reclining chair    Bed mobility  Supine to Sit: Minimal assistance           Second Session: Pt seated in recliner upon entry, pleasant and agreeable to OT session. Pt reported unrated pain of R hip-pt declines pain medication at this time.  Pt ambulated to closet w/ RW and CGA, selected clothing from closet and ambulated to commode. Pt had a small BM, use of grab bar and weight shifting to complete pericare. Discussed installing grab bar at home so pt could use same technique (pt had stated that she has been experiencing difficulty w/ posterior pericare at home). Pt appeared agreeable to suggestion. Pt doffed LB clothing w/ reacher and SBA (assist to doff tensoshapes). Pt ambulated to shower chair, pt completed UB/LB bathing w/ Min A (assist for drying posterior pericare/legs), pt provided w/ long handled sponge for LE bathing. Pt donned shirt/bra w/ set up, donned pants w/ Max A (increased assist d/t pain/fatigue). This writer assisted w/ donning tensoshapes d/t BLE edema. Pt ambulated to chair at end of session, seated in recliner w/ legs elevated, needs within reach. Plan   Plan  Times per week: 75 minutes 5 days per week   Times per day: Daily  Specific instructions for Next Treatment: cotx  Current Treatment Recommendations: Endurance Training, Functional Mobility Training, Balance Training, Safety Education & Training, Equipment Evaluation, Education, & procurement, Self-Care / ADL, Patient/Caregiver Education & Training       Goals  Short term goals  Time Frame for Short term goals: 10 days   Short term goal 1: mod I functional transfer to toilet, supevision to tub bench   Short term goal 2: mod I UB ADLs -   Short term goal 3: min A LB ADLs with AE PRN   Short term goal 4: mod I toileting   Short term goal 5: SBA simple meal prep   Long term goals  Time Frame for Long term goals : LTG=STG       Therapy Time   Individual Concurrent Group Co-treatment   Time In 0830         Time Out 0900         Minutes 30         Timed Code Treatment Minutes: 30 Minutes   Total minutes 30 Jan Sumi Trammell OT   Rohith Loza OTR/L [id number], 8/28/2019, 10:08 AM      Second Session Therapy Time:   Individual Concurrent Group Co-treatment   Time In 1245         Time Out 1330         Minutes 45            Timed Code Treatment

## 2022-02-28 RX ORDER — SERTRALINE HYDROCHLORIDE 25 MG/1
TABLET, FILM COATED ORAL
Qty: 30 TABLET | Refills: 2 | Status: SHIPPED | OUTPATIENT
Start: 2022-02-28 | End: 2022-06-01

## 2022-03-09 ENCOUNTER — CARE COORDINATION (OUTPATIENT)
Dept: CARE COORDINATION | Age: 87
End: 2022-03-09

## 2022-03-09 ENCOUNTER — OFFICE VISIT (OUTPATIENT)
Dept: INTERNAL MEDICINE CLINIC | Age: 87
End: 2022-03-09
Payer: MEDICARE

## 2022-03-09 VITALS
HEIGHT: 65 IN | OXYGEN SATURATION: 97 % | WEIGHT: 170 LBS | DIASTOLIC BLOOD PRESSURE: 78 MMHG | BODY MASS INDEX: 28.32 KG/M2 | SYSTOLIC BLOOD PRESSURE: 119 MMHG | HEART RATE: 68 BPM

## 2022-03-09 DIAGNOSIS — Z87.19 HISTORY OF CIRRHOSIS OF LIVER: ICD-10-CM

## 2022-03-09 DIAGNOSIS — R93.2 ABNORMAL FINDINGS ON DIAGNOSTIC IMAGING OF LIVER AND BILIARY TRACT: ICD-10-CM

## 2022-03-09 DIAGNOSIS — K74.60 CIRRHOSIS OF LIVER WITHOUT ASCITES, UNSPECIFIED HEPATIC CIRRHOSIS TYPE (HCC): ICD-10-CM

## 2022-03-09 DIAGNOSIS — G30.1 LATE ONSET ALZHEIMER'S DISEASE WITH BEHAVIORAL DISTURBANCE (HCC): ICD-10-CM

## 2022-03-09 DIAGNOSIS — E03.9 HYPOTHYROIDISM, UNSPECIFIED TYPE: ICD-10-CM

## 2022-03-09 DIAGNOSIS — F02.818 LATE ONSET ALZHEIMER'S DISEASE WITH BEHAVIORAL DISTURBANCE (HCC): ICD-10-CM

## 2022-03-09 DIAGNOSIS — Z00.00 MEDICARE ANNUAL WELLNESS VISIT, SUBSEQUENT: Primary | ICD-10-CM

## 2022-03-09 DIAGNOSIS — R39.15 URINARY URGENCY: ICD-10-CM

## 2022-03-09 DIAGNOSIS — M79.7 FIBROMYALGIA: ICD-10-CM

## 2022-03-09 PROBLEM — K76.82 HEPATIC ENCEPHALOPATHY: Status: RESOLVED | Noted: 2020-09-25 | Resolved: 2022-03-09

## 2022-03-09 LAB
A/G RATIO: 1.3 (ref 1.1–2.2)
ALBUMIN SERPL-MCNC: 3.8 G/DL (ref 3.4–5)
ALP BLD-CCNC: 76 U/L (ref 40–129)
ALT SERPL-CCNC: 11 U/L (ref 10–40)
AMMONIA: 30 UMOL/L (ref 11–51)
ANION GAP SERPL CALCULATED.3IONS-SCNC: 15 MMOL/L (ref 3–16)
AST SERPL-CCNC: 21 U/L (ref 15–37)
BILIRUB SERPL-MCNC: 0.5 MG/DL (ref 0–1)
BUN BLDV-MCNC: 21 MG/DL (ref 7–20)
CALCIUM SERPL-MCNC: 9.3 MG/DL (ref 8.3–10.6)
CHLORIDE BLD-SCNC: 105 MMOL/L (ref 99–110)
CO2: 26 MMOL/L (ref 21–32)
CREAT SERPL-MCNC: 1.1 MG/DL (ref 0.6–1.2)
GFR AFRICAN AMERICAN: 56
GFR NON-AFRICAN AMERICAN: 46
GLUCOSE BLD-MCNC: 89 MG/DL (ref 70–99)
HCT VFR BLD CALC: 46.3 % (ref 36–48)
HEMOGLOBIN: 15.2 G/DL (ref 12–16)
MCH RBC QN AUTO: 31.6 PG (ref 26–34)
MCHC RBC AUTO-ENTMCNC: 32.9 G/DL (ref 31–36)
MCV RBC AUTO: 96.2 FL (ref 80–100)
PDW BLD-RTO: 13.8 % (ref 12.4–15.4)
PLATELET # BLD: 220 K/UL (ref 135–450)
PMV BLD AUTO: 9.4 FL (ref 5–10.5)
POTASSIUM SERPL-SCNC: 4.5 MMOL/L (ref 3.5–5.1)
RBC # BLD: 4.81 M/UL (ref 4–5.2)
SODIUM BLD-SCNC: 146 MMOL/L (ref 136–145)
TOTAL PROTEIN: 6.7 G/DL (ref 6.4–8.2)
TSH REFLEX: 3.93 UIU/ML (ref 0.27–4.2)
WBC # BLD: 6.6 K/UL (ref 4–11)

## 2022-03-09 PROCEDURE — 1123F ACP DISCUSS/DSCN MKR DOCD: CPT | Performed by: INTERNAL MEDICINE

## 2022-03-09 PROCEDURE — 4040F PNEUMOC VAC/ADMIN/RCVD: CPT | Performed by: INTERNAL MEDICINE

## 2022-03-09 PROCEDURE — G8484 FLU IMMUNIZE NO ADMIN: HCPCS | Performed by: INTERNAL MEDICINE

## 2022-03-09 PROCEDURE — G0439 PPPS, SUBSEQ VISIT: HCPCS | Performed by: INTERNAL MEDICINE

## 2022-03-09 PROCEDURE — 99214 OFFICE O/P EST MOD 30 MIN: CPT | Performed by: INTERNAL MEDICINE

## 2022-03-09 RX ORDER — SERTRALINE HYDROCHLORIDE 25 MG/1
25 TABLET, FILM COATED ORAL DAILY
Qty: 30 TABLET | Refills: 5 | Status: CANCELLED | OUTPATIENT
Start: 2022-03-09

## 2022-03-09 RX ORDER — LEVOTHYROXINE SODIUM 0.05 MG/1
TABLET ORAL
Qty: 30 TABLET | Refills: 5 | Status: SHIPPED | OUTPATIENT
Start: 2022-03-09 | End: 2022-10-03

## 2022-03-09 RX ORDER — DULOXETIN HYDROCHLORIDE 30 MG/1
CAPSULE, DELAYED RELEASE ORAL
Qty: 30 CAPSULE | Refills: 5 | Status: SHIPPED | OUTPATIENT
Start: 2022-03-09 | End: 2022-06-09 | Stop reason: SDUPTHER

## 2022-03-09 RX ORDER — LACTULOSE 20 G/30ML
30 SOLUTION ORAL 2 TIMES DAILY
Qty: 946 ML | Refills: 2 | Status: SHIPPED | OUTPATIENT
Start: 2022-03-09 | End: 2022-08-04

## 2022-03-09 RX ORDER — LACTULOSE 10 G/15ML
30 SOLUTION ORAL 2 TIMES DAILY
Qty: 1892 ML | Refills: 3 | Status: SHIPPED | OUTPATIENT
Start: 2022-03-09 | End: 2022-03-09

## 2022-03-09 RX ORDER — SOLIFENACIN SUCCINATE 5 MG/1
5 TABLET, FILM COATED ORAL DAILY
Qty: 30 TABLET | Refills: 5 | Status: SHIPPED | OUTPATIENT
Start: 2022-03-09 | End: 2022-06-09

## 2022-03-09 RX ORDER — QUETIAPINE FUMARATE 25 MG/1
12.5 TABLET, FILM COATED ORAL 2 TIMES DAILY
Qty: 30 TABLET | Refills: 5 | Status: SHIPPED | OUTPATIENT
Start: 2022-03-09 | End: 2022-06-09

## 2022-03-09 SDOH — ECONOMIC STABILITY: TRANSPORTATION INSECURITY
IN THE PAST 12 MONTHS, HAS LACK OF TRANSPORTATION KEPT YOU FROM MEETINGS, WORK, OR FROM GETTING THINGS NEEDED FOR DAILY LIVING?: NO

## 2022-03-09 SDOH — ECONOMIC STABILITY: HOUSING INSECURITY: IN THE LAST 12 MONTHS, HOW MANY PLACES HAVE YOU LIVED?: 1

## 2022-03-09 SDOH — ECONOMIC STABILITY: INCOME INSECURITY: IN THE LAST 12 MONTHS, WAS THERE A TIME WHEN YOU WERE NOT ABLE TO PAY THE MORTGAGE OR RENT ON TIME?: NO

## 2022-03-09 SDOH — ECONOMIC STABILITY: TRANSPORTATION INSECURITY
IN THE PAST 12 MONTHS, HAS THE LACK OF TRANSPORTATION KEPT YOU FROM MEDICAL APPOINTMENTS OR FROM GETTING MEDICATIONS?: NO

## 2022-03-09 SDOH — ECONOMIC STABILITY: HOUSING INSECURITY
IN THE LAST 12 MONTHS, WAS THERE A TIME WHEN YOU DID NOT HAVE A STEADY PLACE TO SLEEP OR SLEPT IN A SHELTER (INCLUDING NOW)?: NO

## 2022-03-09 ASSESSMENT — PATIENT HEALTH QUESTIONNAIRE - PHQ9
SUM OF ALL RESPONSES TO PHQ QUESTIONS 1-9: 0
1. LITTLE INTEREST OR PLEASURE IN DOING THINGS: 0
SUM OF ALL RESPONSES TO PHQ9 QUESTIONS 1 & 2: 0
SUM OF ALL RESPONSES TO PHQ QUESTIONS 1-9: 0
2. FEELING DOWN, DEPRESSED OR HOPELESS: 0
SUM OF ALL RESPONSES TO PHQ QUESTIONS 1-9: 0
SUM OF ALL RESPONSES TO PHQ QUESTIONS 1-9: 0

## 2022-03-09 ASSESSMENT — SOCIAL DETERMINANTS OF HEALTH (SDOH)
DO YOU BELONG TO ANY CLUBS OR ORGANIZATIONS SUCH AS CHURCH GROUPS UNIONS, FRATERNAL OR ATHLETIC GROUPS, OR SCHOOL GROUPS?: NO
IN A TYPICAL WEEK, HOW MANY TIMES DO YOU TALK ON THE PHONE WITH FAMILY, FRIENDS, OR NEIGHBORS?: MORE THAN THREE TIMES A WEEK
HOW OFTEN DO YOU GET TOGETHER WITH FRIENDS OR RELATIVES?: MORE THAN THREE TIMES A WEEK
HOW OFTEN DO YOU ATTEND CHURCH OR RELIGIOUS SERVICES?: NEVER
HOW OFTEN DO YOU ATTENT MEETINGS OF THE CLUB OR ORGANIZATION YOU BELONG TO?: NEVER

## 2022-03-09 ASSESSMENT — LIFESTYLE VARIABLES: HOW OFTEN DO YOU HAVE A DRINK CONTAINING ALCOHOL: NEVER

## 2022-03-09 NOTE — PATIENT INSTRUCTIONS
Personalized Preventive Plan for Deborah Mancini - 3/9/2022  Medicare offers a range of preventive health benefits. Some of the tests and screenings are paid in full while other may be subject to a deductible, co-insurance, and/or copay. Some of these benefits include a comprehensive review of your medical history including lifestyle, illnesses that may run in your family, and various assessments and screenings as appropriate. After reviewing your medical record and screening and assessments performed today your provider may have ordered immunizations, labs, imaging, and/or referrals for you. A list of these orders (if applicable) as well as your Preventive Care list are included within your After Visit Summary for your review. Other Preventive Recommendations:    · A preventive eye exam performed by an eye specialist is recommended every 1-2 years to screen for glaucoma; cataracts, macular degeneration, and other eye disorders. · A preventive dental visit is recommended every 6 months. · Try to get at least 150 minutes of exercise per week or 10,000 steps per day on a pedometer . · Order or download the FREE \"Exercise & Physical Activity: Your Everyday Guide\" from [employer]. Call 9-925.441.6985 or search [employer] online. · You need 2348-3742 mg of calcium and 8169-0123 IU of vitamin D per day. It is possible to meet your calcium requirement with diet alone, but a vitamin D supplement is usually necessary to meet this goal.  · When exposed to the sun, use a sunscreen that protects against both UVA and UVB radiation with an SPF of 30 or greater. Reapply every 2 to 3 hours or after sweating, drying off with a towel, or swimming. · Always wear a seat belt when traveling in a car. Always wear a helmet when riding a bicycle or motorcycle.

## 2022-03-09 NOTE — CARE COORDINATION
Ambulatory Care Coordination Note  3/9/2022  CM Risk Score: 1  Charlson 10 Year Mortality Risk Score: 100%     ACC: Tracie Ferrer, DANAE    Summary Note:     PCP referral for community resources. ACM outreached patients daughter Stevenson Kwon who is agreeable to cc enrollment. Patient is a 80 yr old with PMHx of advanced dementia and frequent falls. She requires 24 hr supervision. Patient lives with her daughter, Stevenson Kwon, and son-in-law in single family home. Stevenson Kwon is the primary caregiver and is interested in respite care. Her daughter and grandchildren live in [place] and she would like to visit them but cannot leave her home for an extended period of time due to her mother's cognitive status. She is not currently active with community resources. She has never been in contact with the [employer]. Stevenson Kwon has cameras setup t/o her home in the event she has to leave for a short period of time, such as going to the grocery. She is able to monitor her mother via these cameras. [facility]  will relieve her for short periods of time so she can run errands. She admits to feeling some caregiver strain but is not interested in placing her mother in a long term facility. Some dme is in place; raised toilet seat, walker, shower chair, life alert that alarms automatically in the event of a fall. Stevenson Kwon provides all the assistance with laundry, housekeeping, transportation, medications, showering, meals and finances. Stevenson Kwon states she does not mind assisting with all of her needs, but she would like relief so she can visit her daughter out of town. She has not had any falls in the last 3 months. Stevenson Kwon states this is because her mother does not go anywhere. She spends 20/24 hours in bed and the remainder in a chair. She rarely leaves her bedroom. Patient only receives social security income but it is unclear how much. She is able to afford her medications without issue.  Stevenson Kwon is agreeable to referral to [facility] and [employer] for information on community resources and respite care. Referral made by ACM. Requested f/u with daughter, Stevenson Kwon. pcp placed referral to [facility] states her mother was evaluated by hospice care in the past but was deemed inappropriate for enrollment. ACM provided Stevenson Kwon with contact information and encouraged her to reach out with questions or concerns. ACM will f/u at later date/time. PLAN:    F/u on referral to [facility] and [employer]  Falls      Ambulatory Care Coordination Assessment    Care Coordination Protocol  Program Enrollment: Complex Care  Referral from Primary Care Provider: Yes  Week 1 - Initial Assessment     Do you have all of your prescriptions and are they filled?: Yes  Barriers to medication adherence: None  Are you able to afford your medications?: Yes  How often do you have trouble taking your medications the way you have been told to take them?: I always take them as prescribed. Do you have Home O2 Therapy?: No      Ability to seek help/take action for Emergent Urgent situations i.e. fire, crime, inclement weather or health crisis. : Dependent  Ability to ambulate to restroom: Independent  Ability handle personal hygeine needs (bathing/dressing/grooming): Needs Assistance  Ability to manage Medications: Dependent  Ability to prepare Food Preparation: Dependent  Ability to maintain home (clean home, laundry): Dependent  Ability to drive and/or has transportation: Dependent  Ability to do shopping: Dependent  Ability to manage finances: Dependent  Is patient able to live independently?: No     Current Housing: Private Residence        Per the Fall Risk Screening, did the patient have 2 or more falls or 1 fall with injury in the past year?: Yes  How often do you think you are about to fall and you do NOT fall?  For example, you grab something to stabilize yourself or hold onto a wall/furniture?: Frequently  Use of a Mobility Aid: Yes (Comment: WALKER 3/9/22 KAREEM)  Difficulty walking/impaired gait: Yes  Issues with feet or shoes like numbness, edema, shoes not fitting: No  Changes in vision, poor vision or poor lighting in environment: No  Dizziness: No  Other Fall Risk: Yes  What other fall risks does the patient have?: DEMENTIA 3/9/22 KAREEM     Frequent urination at night?: Yes  Do you use rails/bars?: No  Do you have a non-slip tub mat?: Yes     Are you experiencing loss of meaning?: No  Are you experiencing loss of hope and peace?: No     Thinking about your patient's physical health needs, are there any symptoms or problems (risk indicators) you are unsure about that require further investigation?: No identified areas of uncertainly or problems already being investigated   Are the patients physical health problems impacting on their mental well-being?: Severe impact upon mental well-being and preventing engagement with usual activities   Are there any problems with your patients lifestyle behaviors (alcohol, drugs, diet, exercise) that are impacting on physical or mental well-being?: Severe impact on well-being with additional potential impact on others   Do you have any other concerns about your patients mental well-being?  How would you rate their severity and impact on the patient?: Severe problems impairing most daily functions   How do daily activities impact on the patient's well-being? (include current or anticipated unemployment, work, caregiving, access to transportation or other): Severe impact on poor mental well-being   How would you rate their social network (family, work, friends)?: Restricted participation with some degree of social isolation   How would you rate their financial resources (including ability to afford all required medical care)?: Financially secure, resources adequate, no identified problems   How wells does the patient now understand their health and well-being (symptoms, signs or risk factors) and what they need to do to manage their health?: Poor understanding with significant impact on ability to manage health   How well do you think your patient can engage in healthcare discussions? (Barriers include language, deafness, aphasia, alcohol or drug problems, learning difficulties, concentration): Serious difficulties in communication, with severe barriers   Do other services need to be involved to help this patient?: Other care/services not in place and required   Are current services involved with this patient well-coordinated? (Include coordination with other services you are now recommendation): Required care/services missing and/or fragmented   Suggested Interventions and Community Resources                  Prior to Admission medications    Medication Sig Start Date End Date Taking? Authorizing Provider   DULoxetine (CYMBALTA) 30 MG extended release capsule Take one capsule by mouth daily 3/9/22  Yes Ramsey Sainz MD   levothyroxine (SYNTHROID) 50 MCG tablet Take one tablet by mouth daily. 3/9/22  Yes Ilia Mandujano MD   lactulose 20 GM/30ML SOLN Take 30 mLs by mouth 2 times daily 3/9/22  Yes Ilia Mandujano MD   sertraline (ZOLOFT) 25 MG tablet TAKE ONE TABLET BY MOUTH DAILY 2/28/22  Yes Ilia Mandujano MD   alendronate (FOSAMAX) 70 MG tablet TAKE 1 TABLET BY MOUTH ONCE WEEKLY ON AN EMPTY STOMACH BEFORE BREAKFAST.  REMAIN UPRIGHT FOR 30 MINUTES & TAKE WITH 8 OUNCES OF WATER 1/28/22  Yes Ilia Mandujano MD   ascorbic acid (VITAMIN C) 500 MG tablet TAKE ONE TABLET BY MOUTH DAILY 3/8/21  Yes MILLICENT Sainz MD   vitamin B-12 (CYANOCOBALAMIN) 1000 MCG tablet TAKE ONE TABLET BY MOUTH DAILY 3/8/21  Yes Ilia Mandujano MD   Caltrate 600+D Plus Minerals (CALTRATE) 600-800 MG-UNIT TABS tablet Take 1 tablet by mouth 2 times daily 8/27/20  Yes Ramsey Sainz MD   QUEtiapine (SEROQUEL) 25 MG tablet Take 0.5 tablets by mouth 2 times daily  Patient not taking: Reported on 3/9/2022 3/9/22   Radha Clark MD   solifenacin (VESICARE) 5 MG tablet Take 1 tablet by mouth daily  Patient not taking: Reported on 3/9/2022 3/9/22 6/7/22  Radha Clark MD   cephALEXin (KEFLEX) 500 MG capsule Take 1 capsule by mouth 3 times daily  Patient not taking: Reported on 3/9/2022 7/16/21   Radha Clark MD   acetaminophen (TYLENOL) 500 MG tablet Take 1 tablet by mouth every 8 hours as needed for Pain 1/6/21   MILLICENT Scanlon MD   aspirin 325 MG EC tablet Take 1 tablet by mouth daily  Patient not taking: Reported on 3/9/2022 9/5/19   Sulma Moreno MD   Cholecalciferol (VITAMIN D) 2000 units CAPS capsule Take 1 capsule by mouth daily     Historical Provider, MD       Future Appointments   Date Time Provider Marco Barreto   3/16/2022  1:00 PM [facility] RM 1 [address]   6/9/2022  1:00 PM MILLICENT Scanlon MD [facility]      and   General Assessment    Do you have any symptoms that are causing concern?: No

## 2022-03-09 NOTE — LETTER
[facility]  [address] [address]  Phone: [phone]  Fax: [phone]    Mychal Diaz MD         March 9, 2022     Patient: Davis Sanchez   YOB: 1930   Date of Visit: 3/9/2022       To Whom It May Concern: It is my medical opinion that Davis Sanchez requires a disability parking placard for the following reasons:  She cannot walk 200 feet without stopping to rest.  Duration of need: permanent    If you have any questions or concerns, please don't hesitate to call.     Sincerely,    Electronically signed by Mychal Diaz MD on 3/9/2022 at 2:57 PM      Mychal Diaz MD

## 2022-03-09 NOTE — PROGRESS NOTES
Medicare Annual Wellness Visit    Balbina Hernández is here for Medicare [address] was seen today for medicare awv. Diagnoses and all orders for this visit:    Medicare annual wellness visit, subsequent    Fibromyalgia  Symptom has been stable. -     DULoxetine (CYMBALTA) 30 MG extended release capsule; Take one capsule by mouth daily    Hypothyroidism, unspecified type  -     levothyroxine (SYNTHROID) 50 MCG tablet; Take one tablet by mouth daily.  -     TSH with Reflex; Future  May need medication adjustment. Denies any worsening fatigue, weakness, change of bowel habit  Late onset Alzheimer's disease with behavioral disturbance (HCC)  Symptom has been stable. Patient was staying with her daughter.  -     QUEtiapine (SEROQUEL) 25 MG tablet; Take 0.5 tablets by mouth 2 times daily    Urinary urgency  Denies urinary burning, flank pain, abdominal pain. Current medicine has been helping for urinary urgency  -     solifenacin (VESICARE) 5 MG tablet; Take 1 tablet by mouth daily    History of cirrhosis of liver as well as hepatic encephalopathy  -     Discontinue: lactulose (CHRONULAC) 10 GM/15ML solution; Take 45 mLs by mouth 2 times daily  45 mL caused her diarrhea. Will reduce lactulose to 30 mL twice daily as needed  -     CBC; Future  -     Comprehensive Metabolic Panel; Future  -     Urinalysis; Future  -     AFP Tumor Marker; Future  -     US LIVER; Future  -     Ammonia; Future    Abnormal findings on diagnostic imaging of liver and biliary tract   -     AFP Tumor Marker; Future    Other orders  -     lactulose 20 GM/30ML SOLN; Take 30 mLs by mouth 2 times daily         Recommendations for Preventive Services Due: see orders and patient instructions/AVS.  Recommended screening schedule for the next 5-10 years is provided to the patient in written form: see Patient Instructions/AVS.     Return in 3 months (on 6/9/2022) for Medicare Annual Wellness Visit in 1 year.      Subjective The following acute and/or chronic problems were also addressed today:    Fibromyalgia hypothyroidism dementia, history of cirrhosis of liver and hepatic and colopathy    Patient's complete Health Risk Assessment and screening values have been reviewed and are found in Flowsheets. The following problems were reviewed today and where indicated follow up appointments were made and/or referrals ordered. Positive Risk Factor Screenings with Interventions:    Fall Risk:  Do you feel unsteady or are you worried about falling? : (!) yes  2 or more falls in past year?: (!) yes  Fall with injury in past year?: no     Fall Risk Interventions:    · Home safety tips provided    Cognitive:   Words recalled: 0 Words Recalled  Clock Drawing Test (CDT): (!) Abnormal  Total Score Interpretation: Abnormal Mini-Cog  Did the patient refuse to take the cognition test?: No    Cognitive Impairment Interventions:  · could not handle dementia medicine            Health Habits/Nutrition:     Physical Activity: Inactive    Days of Exercise per Week: 0 days    Minutes of Exercise per Session: 0 min     Have you lost any weight without trying in the past 3 months?: No  Body mass index: (!) 28.29  Have you seen the dentist within the past year?: (!) No    Health Habits/Nutrition Interventions:  · Dental exam overdue:  patient encouraged to make appointment with his/her dentist    Hearing/Vision:  Do you or your family notice any trouble with your hearing that hasn't been managed with hearing aids?: (!) Yes  Do you have difficulty driving, watching TV, or doing any of your daily activities because of your eyesight?: No  Have you had an eye exam within the past year?: (!) No  No exam data present    Hearing/Vision Interventions:  · Hearing concerns:  patient declines any further evaluation/treatment for hearing issues     ADLs:  In the past 7 days, did you need help from others to perform any of the following everyday activities: Eating, dressing, grooming, bathing, toileting, or walking/balance?: (!) Yes  Select all that apply: (!) Dressing,Walking/Balance,Bathing,Grooming  In the past 7 days, did you need help from others to take care of any of the following: Laundry, housekeeping, banking/finances, shopping, telephone use, food preparation, transportation, or taking medications?: (!) Yes  Select all that apply: (!) Laundry,Housekeeping,Banking/Finances,Shopping,Telephone Use,Food Preparation,Transportation,Taking Medications    ADL Interventions:  · Referred for Care Coordination          Objective   Vitals:    03/09/22 1434   BP: 119/78   Site: Left Upper Arm   Pulse: 68   SpO2: 97%   Weight: 170 lb (77.1 kg)   Height: 5' 5\" (1.651 m)      Body mass index is 28.29 kg/m². General Appearance: alert and oriented to person, place and time, well developed and well- nourished, in no acute distress  Skin: warm and dry, no rash or erythema  Head: normocephalic and atraumatic  ENT: increase wax at the left ear canal.    Neck: supple and non-tender without mass, no thyromegaly or thyroid nodules, no cervical lymphadenopathy  Pulmonary/Chest: clear to auscultation bilaterally- no wheezes, rales or rhonchi, normal air movement, no respiratory distress  Cardiovascular: normal rate, regular rhythm, normal S1 and S2, no murmurs, rubs, clicks, or gallops, distal pulses intact, no carotid bruits  Abdomen: soft, non-tender, non-distended, normal bowel sounds, no masses or organomegaly  Extremities: no cyanosis, mild leg edema  Patient has been using walker. Neurologic:     Allergies   Allergen Reactions    Metoprolol Hives     Severe rash and itching    Amlodipine Itching     Itching    Sulfa Antibiotics Nausea And Vomiting    Adhesive Tape Other (See Comments)     Skin tears very easily     Prior to Visit Medications    Medication Sig Taking?  Authorizing Provider   DULoxetine (CYMBALTA) 30 MG extended release capsule Take one capsule by mouth daily Yes Violette Patterson MD   levothyroxine (SYNTHROID) 50 MCG tablet Take one tablet by mouth daily. Yes Violette Patterson MD   QUEtiapine (SEROQUEL) 25 MG tablet Take 0.5 tablets by mouth 2 times daily Yes Violette Patterson MD   solifenacin (VESICARE) 5 MG tablet Take 1 tablet by mouth daily Yes Violette Patterson MD   lactulose 20 GM/30ML SOLN Take 30 mLs by mouth 2 times daily Yes Violette Patterson MD   sertraline (ZOLOFT) 25 MG tablet TAKE ONE TABLET BY MOUTH DAILY Yes Violette Patterson MD   alendronate (FOSAMAX) 70 MG tablet TAKE 1 TABLET BY MOUTH ONCE WEEKLY ON AN EMPTY STOMACH BEFORE BREAKFAST.  REMAIN UPRIGHT FOR 30 MINUTES & TAKE WITH 8 OUNCES OF WATER Yes MILLICENT Sainz MD   ascorbic acid (VITAMIN C) 500 MG tablet TAKE ONE TABLET BY MOUTH DAILY Yes MILLICENT Sainz MD   vitamin B-12 (CYANOCOBALAMIN) 1000 MCG tablet TAKE ONE TABLET BY MOUTH DAILY Yes Violette Patterson MD   acetaminophen (TYLENOL) 500 MG tablet Take 1 tablet by mouth every 8 hours as needed for Pain Yes Violette Patterson MD   Caltrate 600+D Plus Minerals (CALTRATE) 600-800 MG-UNIT TABS tablet Take 1 tablet by mouth 2 times daily Yes Violette Patterson MD   Cholecalciferol (VITAMIN D) 2000 units CAPS capsule Take 1 capsule by mouth daily  Yes Historical Provider, MD   cephALEXin (KEFLEX) 500 MG capsule Take 1 capsule by mouth 3 times daily  Patient not taking: Reported on 3/9/2022  Violette Patterson MD   aspirin 325 MG EC tablet Take 1 tablet by mouth daily  Patient not taking: Reported on 3/9/2022  Lenin Moses MD       [facility] (Including outside providers/suppliers regularly involved in providing care):   Patient Care Team:  Violette Patterson MD as PCP - General (Internal Medicine)  Violette Patterson MD as PCP - REHABILITATION [facility] Provider  Chaitanya Sanchez MD as Consulting Physician (Rheumatology)  Dash Rseendiz MD as Surgeon (Orthopedic Surgery)    Reviewed and updated this visit:  Tobacco  Allergies  Meds  Problems  Med Hx  Surg Hx  Soc Hx Fam Hx        An After Visit Summary was printed and given to the patient. Documentation was done using voice recognition dragon software. Every effort was made to ensure accuracy; however, inadvertent  Unintentional computerized transcription errors may be present.

## 2022-03-16 ENCOUNTER — HOSPITAL ENCOUNTER (OUTPATIENT)
Dept: ULTRASOUND IMAGING | Age: 87
Discharge: HOME | End: 2022-03-16
Payer: MEDICARE

## 2022-03-16 DIAGNOSIS — Z87.19 HISTORY OF CIRRHOSIS OF LIVER: ICD-10-CM

## 2022-03-16 PROCEDURE — 76705 ECHO EXAM OF ABDOMEN: CPT

## 2022-03-18 ENCOUNTER — TELEPHONE (OUTPATIENT)
Dept: INTERNAL MEDICINE CLINIC | Age: 87
End: 2022-03-18

## 2022-03-18 RX ORDER — LACTULOSE 20 G/30ML
30 SOLUTION ORAL DAILY
Qty: 946 ML | Refills: 2 | Status: CANCELLED | OUTPATIENT
Start: 2022-03-18

## 2022-03-18 NOTE — TELEPHONE ENCOUNTER
Pt was having diarrhea with 30 ml bid--she is using 30 ml qd. The ml correct for 30 day supply. She has not picked up the new script yet. She will call back if the insurance will pay differently for a 30 day supply vs a 15 day supply. Also using good rx.

## 2022-03-18 NOTE — TELEPHONE ENCOUNTER
Pt daughter Tere Crabtree is calling about the Lactulose script ---she is saying its only a 15 day supply---please check and get back to her please. Thanks.

## 2022-03-24 ENCOUNTER — TELEPHONE (OUTPATIENT)
Dept: INTERNAL MEDICINE CLINIC | Age: 87
End: 2022-03-24

## 2022-03-24 NOTE — TELEPHONE ENCOUNTER
I spoke to dtr. Pt has been increasing her fluids the last couple of days and has had processed lunch meat (usually limited sodium intake) She has slight increase over rt ankle area vs lt foot. No pain with dorsa flexion of foot. No CP, SOB. She will apply SEN hose (recently used for past hospitalization) aware to take off at HS. Will keep legs elevated when sitting in chair during day. Daughter will call with an update tomorrow and appt will be made if needed.

## 2022-03-25 ENCOUNTER — CARE COORDINATION (OUTPATIENT)
Dept: CARE COORDINATION | Age: 87
End: 2022-03-25

## 2022-03-25 ENCOUNTER — TELEPHONE (OUTPATIENT)
Dept: INTERNAL MEDICINE CLINIC | Age: 87
End: 2022-03-25

## 2022-03-25 NOTE — CARE COORDINATION
Ambulatory Care Coordination Note  3/25/2022  CM Risk Score: 1  Charlson 10 Year Mortality Risk Score: 100%     ACC: Tracie Ferrer, RN    Summary Note:     ACM f/u with [facility] regarding [facility] referral, [employer] and respite care. Patient enrolled with [employer]. She was evaluated by [facility] and qualifies for 7 days of inpatient respite care per year - covered at 100%. [facility] will also receive free legal advice from [employer] through [address] [place] [address]. They provide unlimited 30 minute sessions. [facility] has also received resources from [employer]. These resources provide education on how to deal with her mothers diagnosis. [facility] is thankful for the help and denies additional assistance. No further needs indicated. CC discharge per Siomara's request.            Care Coordination Interventions    Program Enrollment: Complex Care  Referral from Primary Care Provider: Yes  Suggested Interventions and Community Resources         Goals Addressed                 This Visit's Progress     Community Resource Goal        I will complete referral to community agency [facility] [employer] [facility] and [employer] for assistance. Barriers: overwhelmed by complexity of regimen  Plan for overcoming my barriers: I will work with [employer] regarding enrolling with [employer] and referral to [employer]. Confidence: 8/10  Anticipated Goal Completion Date: 4/25/22    3/25-patient enrolled with [employer]. Qualifies for 7 days of inpatient Respite Care per year. [facility] provides legal advice through [employer] - free 30 minutes sessions - unlimited calls. [facility] received resources through [employer]. [facility] denies additional needs. Prior to Admission medications    Medication Sig Start Date End Date Taking?  Authorizing Provider   DULoxetine (CYMBALTA) 30 MG extended release capsule Take one capsule by mouth daily 3/9/22   Marjorie Wheat MD   levothyroxine (SYNTHROID) 50 MCG tablet Take one tablet by mouth daily. 3/9/22   Adelaide Saleh MD   QUEtiapine (SEROQUEL) 25 MG tablet Take 0.5 tablets by mouth 2 times daily  Patient not taking: Reported on 3/9/2022 3/9/22   Adelaide Saleh MD   solifenacin (VESICARE) 5 MG tablet Take 1 tablet by mouth daily  Patient not taking: Reported on 3/9/2022 3/9/22 6/7/22  Adelaide Saleh MD   lactulose 20 GM/30ML SOLN Take 30 mLs by mouth 2 times daily 3/9/22   Gen Sainz MD   sertraline (ZOLOFT) 25 MG tablet TAKE ONE TABLET BY MOUTH DAILY 2/28/22   Gen Sainz MD   alendronate (FOSAMAX) 70 MG tablet TAKE 1 TABLET BY MOUTH ONCE WEEKLY ON AN EMPTY STOMACH BEFORE BREAKFAST.  REMAIN UPRIGHT FOR 30 MINUTES & TAKE WITH 8 OUNCES OF WATER 1/28/22   Gen Sainz MD   cephALEXin (KEFLEX) 500 MG capsule Take 1 capsule by mouth 3 times daily  Patient not taking: Reported on 3/9/2022 7/16/21   Adelaide Saleh MD   ascorbic acid (VITAMIN C) 500 MG tablet TAKE ONE TABLET BY MOUTH DAILY 3/8/21   Gen Sainz MD   vitamin B-12 (CYANOCOBALAMIN) 1000 MCG tablet TAKE ONE TABLET BY MOUTH DAILY 3/8/21   M Harleen Avery MD   acetaminophen (TYLENOL) 500 MG tablet Take 1 tablet by mouth every 8 hours as needed for Pain 1/6/21   Gen Sainz MD   Caltrate 600+D Plus Minerals (CALTRATE) 600-800 MG-UNIT TABS tablet Take 1 tablet by mouth 2 times daily 8/27/20   Gen Sainz MD   aspirin 325 MG EC tablet Take 1 tablet by mouth daily  Patient not taking: Reported on 3/9/2022 9/5/19   Cynthia Dias MD   Cholecalciferol (VITAMIN D) 2000 units CAPS capsule Take 1 capsule by mouth daily     Historical Provider, MD       Future Appointments   Date Time Provider Marco Barreto   6/9/2022  1:00 PM Adelaide Saleh MD [facility] - DYD      and   General Assessment

## 2022-04-11 DIAGNOSIS — M81.0 AGE-RELATED OSTEOPOROSIS WITHOUT CURRENT PATHOLOGICAL FRACTURE: ICD-10-CM

## 2022-04-11 RX ORDER — ALENDRONATE SODIUM 70 MG/1
TABLET ORAL
Qty: 4 TABLET | Refills: 1 | Status: SHIPPED | OUTPATIENT
Start: 2022-04-11 | End: 2022-06-13

## 2022-06-01 RX ORDER — SERTRALINE HYDROCHLORIDE 25 MG/1
TABLET, FILM COATED ORAL
Qty: 30 TABLET | Refills: 5 | Status: SHIPPED | OUTPATIENT
Start: 2022-06-01

## 2022-06-09 ENCOUNTER — OFFICE VISIT (OUTPATIENT)
Dept: INTERNAL MEDICINE CLINIC | Age: 87
End: 2022-06-09
Payer: MEDICARE

## 2022-06-09 VITALS
DIASTOLIC BLOOD PRESSURE: 70 MMHG | BODY MASS INDEX: 28.39 KG/M2 | HEART RATE: 70 BPM | SYSTOLIC BLOOD PRESSURE: 118 MMHG | WEIGHT: 170.6 LBS | OXYGEN SATURATION: 99 %

## 2022-06-09 DIAGNOSIS — Z23 NEED FOR HEPATITIS A AND B VACCINATION: ICD-10-CM

## 2022-06-09 DIAGNOSIS — E03.9 HYPOTHYROIDISM, UNSPECIFIED TYPE: Primary | ICD-10-CM

## 2022-06-09 DIAGNOSIS — K74.60 CIRRHOSIS OF LIVER WITHOUT ASCITES, UNSPECIFIED HEPATIC CIRRHOSIS TYPE (HCC): ICD-10-CM

## 2022-06-09 DIAGNOSIS — F03.90 DEMENTIA WITHOUT BEHAVIORAL DISTURBANCE, UNSPECIFIED DEMENTIA TYPE: ICD-10-CM

## 2022-06-09 DIAGNOSIS — M79.7 FIBROMYALGIA: ICD-10-CM

## 2022-06-09 PROCEDURE — 90471 IMMUNIZATION ADMIN: CPT | Performed by: INTERNAL MEDICINE

## 2022-06-09 PROCEDURE — 1090F PRES/ABSN URINE INCON ASSESS: CPT | Performed by: INTERNAL MEDICINE

## 2022-06-09 PROCEDURE — 1036F TOBACCO NON-USER: CPT | Performed by: INTERNAL MEDICINE

## 2022-06-09 PROCEDURE — 99214 OFFICE O/P EST MOD 30 MIN: CPT | Performed by: INTERNAL MEDICINE

## 2022-06-09 PROCEDURE — G8417 CALC BMI ABV UP PARAM F/U: HCPCS | Performed by: INTERNAL MEDICINE

## 2022-06-09 PROCEDURE — G8427 DOCREV CUR MEDS BY ELIG CLIN: HCPCS | Performed by: INTERNAL MEDICINE

## 2022-06-09 PROCEDURE — 90632 HEPA VACCINE ADULT IM: CPT | Performed by: INTERNAL MEDICINE

## 2022-06-09 PROCEDURE — 1123F ACP DISCUSS/DSCN MKR DOCD: CPT | Performed by: INTERNAL MEDICINE

## 2022-06-09 PROCEDURE — 90746 HEPB VACCINE 3 DOSE ADULT IM: CPT | Performed by: INTERNAL MEDICINE

## 2022-06-09 PROCEDURE — G0010 ADMIN HEPATITIS B VACCINE: HCPCS | Performed by: INTERNAL MEDICINE

## 2022-06-09 RX ORDER — DULOXETIN HYDROCHLORIDE 30 MG/1
CAPSULE, DELAYED RELEASE ORAL
Qty: 30 CAPSULE | Refills: 5 | Status: SHIPPED | OUTPATIENT
Start: 2022-06-09

## 2022-06-09 ASSESSMENT — ENCOUNTER SYMPTOMS
ABDOMINAL PAIN: 0
WHEEZING: 0
BACK PAIN: 1
TROUBLE SWALLOWING: 0
VOICE CHANGE: 0
NAUSEA: 0
VOMITING: 0
PHOTOPHOBIA: 0
SHORTNESS OF BREATH: 0

## 2022-06-09 NOTE — PROGRESS NOTES
Nimesh Garcia (:  1930) is a 80 y.o. female,Established patient, here for evaluation of the following chief complaint(s):  Follow-up (Chronic conditions) and Other (Pain to back and pain/burning sensation to both legs.)         ASSESSMENT/PLAN:  1. Hypothyroidism, unspecified type  No current levothyroxine on empty stomach. 2. Fibromyalgia  She will restart low-dose. -     DULoxetine (CYMBALTA) 30 MG extended release capsule; Take one capsule by mouth daily, Disp-30 capsule, R-5Normal  3. Dementia without behavioral disturbance, unspecified dementia type [facility])   Patient could not tolerate any medicine for dementia. No longer taking Seroquel. Previous episode of hallucination delusion has been resolved. 4. Cirrhosis of liver without ascites, unspecified hepatic cirrhosis type (HCC)  Avoid constipation. Continue lactulose. No episode of decompensation  -     Hep A, VAQTA, (age 23 yrs+), IM  -     Hep B, ENGERIX-B, (age 21 yrs+), IM, 1mL, 3-dose  5. Need for hepatitis A and B vaccination  -     Hep A, VAQTA, (age 23 yrs+), IM  -     Hep B, ENGERIX-B, (age 21 yrs+), IM, 1mL, 3-dose      Return in about 3 months (around 2022). Patient will come in 1 month to get second hep B. Hepatitis A and B vaccines in 6 months    Subjective   SUBJECTIVE/OBJECTIVE:  HPI  Follow-up visit for chronic problems. Patient has not been taking quite few medication. She no longer have hallucination or delusion. History of cirrhosis of liver. Patient denies constipation or any change of memory issues from baseline. No longer taking Seroquel. History of fibromyalgia. She continues to get pain in multiple body side. She is stopped taking duloxetine. Review of Systems   Constitutional: Negative for diaphoresis and unexpected weight change. HENT: Negative for trouble swallowing and voice change. Eyes: Negative for photophobia. Respiratory: Negative for shortness of breath and wheezing. Cardiovascular: Negative for chest pain and palpitations. Gastrointestinal: Negative for abdominal pain, nausea and vomiting. Genitourinary: Negative for difficulty urinating and frequency. Musculoskeletal: Positive for arthralgias and back pain. Neurological: Negative for dizziness. Vitals:    06/09/22 1300   BP: 118/70   Site: Left Upper Arm   Pulse: 70   SpO2: 99%   Weight: 170 lb 9.6 oz (77.4 kg)     Past Medical History:   Diagnosis Date    Acute renal failure ([facility] [address] 11-27-12    Arthritis     At risk for falling 08/28/2018    Score of 11/24 for Dynamic Gait Index    Cirrhosis ([facility] [address] 9/25/2020    Dementia with behavioral problem ([facility] [address] 9/25/2020    Frequent falls     Gastroesophageal reflux disease 1/22/2019    Movement disorder     osteoporosis    Nephrotic syndrome 12/28/2012    Thyroid disease        Objective   Physical Exam  Vitals reviewed. Constitutional:       Appearance: She is not ill-appearing. Eyes:      Conjunctiva/sclera: Conjunctivae normal.   Neck:      Vascular: No carotid bruit. Cardiovascular:      Rate and Rhythm: Normal rate and regular rhythm. Pulses: Normal pulses. Heart sounds: Normal heart sounds. Pulmonary:      Effort: Pulmonary effort is normal.      Breath sounds: Normal breath sounds. Abdominal:      General: Bowel sounds are normal.   Musculoskeletal:      Right lower leg: No edema. Left lower leg: No edema. Neurological:      Mental Status: She is alert. Mental status is at baseline. Comments: History of chronic dementia and disorientation and confusion. Patient does not know today's date. However appears very pleasant. Not in any distress. Psychiatric:         Mood and Affect: Mood normal.         Behavior: Behavior normal.          An After Visit Summary was printed and given to the patient. Documentation was done using voice recognition dragon software.   Every effort was made to ensure accuracy; however, inadvertent Unintentional computerized transcription errors may be present. An electronic signature was used to authenticate this note.     --Arnulfo Diehl MD

## 2022-06-13 DIAGNOSIS — M81.0 AGE-RELATED OSTEOPOROSIS WITHOUT CURRENT PATHOLOGICAL FRACTURE: ICD-10-CM

## 2022-06-13 RX ORDER — ALENDRONATE SODIUM 70 MG/1
TABLET ORAL
Qty: 4 TABLET | Refills: 5 | Status: SHIPPED | OUTPATIENT
Start: 2022-06-13

## 2022-07-11 ENCOUNTER — NURSE ONLY (OUTPATIENT)
Dept: INTERNAL MEDICINE CLINIC | Age: 87
End: 2022-07-11
Payer: MEDICARE

## 2022-07-11 PROCEDURE — G0010 ADMIN HEPATITIS B VACCINE: HCPCS | Performed by: INTERNAL MEDICINE

## 2022-07-11 PROCEDURE — 90746 HEPB VACCINE 3 DOSE ADULT IM: CPT | Performed by: INTERNAL MEDICINE

## 2022-07-30 ENCOUNTER — TELEPHONE (OUTPATIENT)
Dept: INTERNAL MEDICINE CLINIC | Age: 87
End: 2022-07-30

## 2022-07-30 DIAGNOSIS — U07.1 COVID-19: Primary | ICD-10-CM

## 2022-07-30 DIAGNOSIS — R54 ADVANCED AGE: ICD-10-CM

## 2022-08-02 ENCOUNTER — TELEPHONE (OUTPATIENT)
Dept: INTERNAL MEDICINE CLINIC | Age: 87
End: 2022-08-02

## 2022-08-02 NOTE — TELEPHONE ENCOUNTER
Called to check on her status--headache better --overall doing better.  Sleeping , but no more than usual. .  All 3 of family have been placed on paxlovid

## 2022-08-04 RX ORDER — LACTULOSE 10 G/15ML
SOLUTION ORAL
Qty: 946 ML | Refills: 1 | Status: SHIPPED | OUTPATIENT
Start: 2022-08-04 | End: 2022-10-17

## 2022-10-01 DIAGNOSIS — E03.9 HYPOTHYROIDISM, UNSPECIFIED TYPE: ICD-10-CM

## 2022-10-03 RX ORDER — LEVOTHYROXINE SODIUM 0.05 MG/1
TABLET ORAL
Qty: 30 TABLET | Refills: 5 | Status: SHIPPED | OUTPATIENT
Start: 2022-10-03 | End: 2022-10-20

## 2022-10-07 ENCOUNTER — TELEPHONE (OUTPATIENT)
Dept: INTERNAL MEDICINE CLINIC | Age: 87
End: 2022-10-07

## 2022-10-07 NOTE — TELEPHONE ENCOUNTER
----- Message from Chula Dick sent at 10/7/2022  3:28 PM EDT -----  Subject: Message to Provider    QUESTIONS  Information for Provider? Patients daughter would like to know if a   physical can be included on her appointment 10/19. As Asya Cifuentes will be   having knee surgery and Desi Mcdonnell needs a physical before entering the   nursing facility. Please call and let Asya Cifuentes know if this is possible.  ---------------------------------------------------------------------------  --------------  Zora Bosworth INFO  [phone]; OK to leave message on voicemail  ---------------------------------------------------------------------------  --------------  SCRIPT ANSWERS  Relationship to Patient? Other  Representative Name? Asya Cifuentes, daughter  Is the Representative on the appropriate HIPAA document in Epic?  Yes

## 2022-10-07 NOTE — TELEPHONE ENCOUNTER
----- Message from Viola Vazquez sent at 10/7/2022  3:28 PM EDT -----  Subject: Message to Provider    QUESTIONS  Information for Provider? Patients daughter would like to know if a   physical can be included on her appointment 10/19. As Al Nelson will be   having knee surgery and Stef Marshall needs a physical before entering the   nursing facility. Please call and let Al Nelson know if this is possible.  ---------------------------------------------------------------------------  --------------  Serena Gonzalez INFO  [phone]; OK to leave message on voicemail  ---------------------------------------------------------------------------  --------------  SCRIPT ANSWERS  Relationship to Patient? Other  Representative Name? Al Nelson, daughter  Is the Representative on the appropriate HIPAA document in Epic?  Yes

## 2022-10-17 RX ORDER — LACTULOSE 10 G/15ML
SOLUTION ORAL
Qty: 946 ML | Refills: 1 | Status: SHIPPED | OUTPATIENT
Start: 2022-10-17 | End: 2022-11-04

## 2022-10-19 ENCOUNTER — OFFICE VISIT (OUTPATIENT)
Dept: INTERNAL MEDICINE CLINIC | Age: 87
End: 2022-10-19
Payer: MEDICARE

## 2022-10-19 VITALS
HEART RATE: 81 BPM | WEIGHT: 167.6 LBS | BODY MASS INDEX: 27.92 KG/M2 | SYSTOLIC BLOOD PRESSURE: 120 MMHG | HEIGHT: 65 IN | DIASTOLIC BLOOD PRESSURE: 78 MMHG | OXYGEN SATURATION: 95 %

## 2022-10-19 DIAGNOSIS — K74.60 CIRRHOSIS OF LIVER WITHOUT ASCITES, UNSPECIFIED HEPATIC CIRRHOSIS TYPE (HCC): ICD-10-CM

## 2022-10-19 DIAGNOSIS — F02.C0 SEVERE LATE ONSET ALZHEIMER'S DEMENTIA WITHOUT BEHAVIORAL DISTURBANCE, PSYCHOTIC DISTURBANCE, MOOD DISTURBANCE, OR ANXIETY (HCC): ICD-10-CM

## 2022-10-19 DIAGNOSIS — G30.1 SEVERE LATE ONSET ALZHEIMER'S DEMENTIA WITHOUT BEHAVIORAL DISTURBANCE, PSYCHOTIC DISTURBANCE, MOOD DISTURBANCE, OR ANXIETY (HCC): ICD-10-CM

## 2022-10-19 DIAGNOSIS — F02.818 LATE ONSET ALZHEIMER'S DISEASE WITH BEHAVIORAL DISTURBANCE (HCC): Primary | ICD-10-CM

## 2022-10-19 DIAGNOSIS — G30.1 LATE ONSET ALZHEIMER'S DISEASE WITH BEHAVIORAL DISTURBANCE (HCC): Primary | ICD-10-CM

## 2022-10-19 DIAGNOSIS — E03.9 HYPOTHYROIDISM, UNSPECIFIED TYPE: ICD-10-CM

## 2022-10-19 DIAGNOSIS — Z23 NEED FOR INFLUENZA VACCINATION: ICD-10-CM

## 2022-10-19 DIAGNOSIS — K72.10 CHRONIC LIVER FAILURE WITHOUT HEPATIC COMA (HCC): ICD-10-CM

## 2022-10-19 LAB
ALBUMIN SERPL-MCNC: 3.9 G/DL (ref 3.4–5)
ALP BLD-CCNC: 76 U/L (ref 40–129)
ALT SERPL-CCNC: 13 U/L (ref 10–40)
AST SERPL-CCNC: 19 U/L (ref 15–37)
BASOPHILS ABSOLUTE: 0 K/UL (ref 0–0.2)
BASOPHILS RELATIVE PERCENT: 0.9 %
BILIRUB SERPL-MCNC: 0.6 MG/DL (ref 0–1)
BILIRUBIN DIRECT: <0.2 MG/DL (ref 0–0.3)
BILIRUBIN, INDIRECT: ABNORMAL MG/DL (ref 0–1)
EOSINOPHILS ABSOLUTE: 0.2 K/UL (ref 0–0.6)
EOSINOPHILS RELATIVE PERCENT: 3.4 %
FOLATE: 8.58 NG/ML (ref 4.78–24.2)
HCT VFR BLD CALC: 43.4 % (ref 36–48)
HEMOGLOBIN: 14.5 G/DL (ref 12–16)
INR BLD: 1.04 (ref 0.87–1.14)
LYMPHOCYTES ABSOLUTE: 1.1 K/UL (ref 1–5.1)
LYMPHOCYTES RELATIVE PERCENT: 20.8 %
MCH RBC QN AUTO: 31.9 PG (ref 26–34)
MCHC RBC AUTO-ENTMCNC: 33.5 G/DL (ref 31–36)
MCV RBC AUTO: 95.1 FL (ref 80–100)
MONOCYTES ABSOLUTE: 0.6 K/UL (ref 0–1.3)
MONOCYTES RELATIVE PERCENT: 11.4 %
NEUTROPHILS ABSOLUTE: 3.4 K/UL (ref 1.7–7.7)
NEUTROPHILS RELATIVE PERCENT: 63.5 %
PDW BLD-RTO: 13.8 % (ref 12.4–15.4)
PLATELET # BLD: 203 K/UL (ref 135–450)
PMV BLD AUTO: 9.7 FL (ref 5–10.5)
PROTHROMBIN TIME: 13.5 SEC (ref 11.7–14.5)
RBC # BLD: 4.56 M/UL (ref 4–5.2)
T4 FREE: 1.2 NG/DL (ref 0.9–1.8)
TOTAL PROTEIN: 6.2 G/DL (ref 6.4–8.2)
TSH REFLEX: 4.58 UIU/ML (ref 0.27–4.2)
VITAMIN B-12: 619 PG/ML (ref 211–911)
WBC # BLD: 5.4 K/UL (ref 4–11)

## 2022-10-19 PROCEDURE — 1090F PRES/ABSN URINE INCON ASSESS: CPT | Performed by: INTERNAL MEDICINE

## 2022-10-19 PROCEDURE — G8427 DOCREV CUR MEDS BY ELIG CLIN: HCPCS | Performed by: INTERNAL MEDICINE

## 2022-10-19 PROCEDURE — G8484 FLU IMMUNIZE NO ADMIN: HCPCS | Performed by: INTERNAL MEDICINE

## 2022-10-19 PROCEDURE — 90694 VACC AIIV4 NO PRSRV 0.5ML IM: CPT | Performed by: INTERNAL MEDICINE

## 2022-10-19 PROCEDURE — 1123F ACP DISCUSS/DSCN MKR DOCD: CPT | Performed by: INTERNAL MEDICINE

## 2022-10-19 PROCEDURE — 1036F TOBACCO NON-USER: CPT | Performed by: INTERNAL MEDICINE

## 2022-10-19 PROCEDURE — 99214 OFFICE O/P EST MOD 30 MIN: CPT | Performed by: INTERNAL MEDICINE

## 2022-10-19 PROCEDURE — G0008 ADMIN INFLUENZA VIRUS VAC: HCPCS | Performed by: INTERNAL MEDICINE

## 2022-10-19 PROCEDURE — G8417 CALC BMI ABV UP PARAM F/U: HCPCS | Performed by: INTERNAL MEDICINE

## 2022-10-19 ASSESSMENT — ENCOUNTER SYMPTOMS
VOICE CHANGE: 0
WHEEZING: 0
PHOTOPHOBIA: 0
ABDOMINAL PAIN: 0
TROUBLE SWALLOWING: 0
NAUSEA: 0
VOMITING: 0
SHORTNESS OF BREATH: 0

## 2022-10-19 NOTE — PROGRESS NOTES
Kelsie Mahmood  4/13/1930  female  80 y.o. SUBJECTIVE:       Chief Complaint   Patient presents with    Other     Need for h&p for respite stay x 1 week in November @ [address]    3 Month Follow-Up       HPI:    Need help for medication administrations. Need assistance for shower and rest room use. Past Medical History:   Diagnosis Date    Acute renal failure ([address] 11-27-12    Arthritis     At risk for falling 08/28/2018    Score of 11/24 for Dynamic Gait Index    Cirrhosis ([facility] 9/25/2020    Dementia with behavioral problem 9/25/2020    Frequent falls     Gastroesophageal reflux disease 1/22/2019    Movement disorder     osteoporosis    Nephrotic syndrome 12/28/2012    Thyroid disease      Past Surgical History:   Procedure Laterality Date    COLONOSCOPY      EXCISION OF FACIAL MASS      skin ca    EYE SURGERY      cataract removal; bilat    HIP FRACTURE SURGERY Left 8/21/2019    OPEN REDUCTION INTERNAL FIXATION OF INTERTROCHANTERIC FRACTURE OF LEFT HIPUSING GAMMA NAIL performed by Azeb Canales MD at [address] [facility] Right 05/27/2018    right gamma nail with cables    SKIN BIOPSY       Social History     Socioeconomic History    Marital status:      Spouse name: None    Number of children: None    Years of education: None    Highest education level: None   Occupational History    Occupation: homeaker   Tobacco Use    Smoking status: Never    Smokeless tobacco: Never   Vaping Use    Vaping Use: Never used   Substance and Sexual Activity    Alcohol use: No    Drug use: No    Sexual activity: Not Currently     Partners: Male     Social Determinants of Health     Transportation Needs: No Transportation Needs    Lack of Transportation (Medical): No    Lack of Transportation (Non-Medical):  No   Physical Activity: Inactive    Days of Exercise per Week: 0 days    Minutes of Exercise per Session: 0 min   Stress: No Stress Concern Present    Feeling of Stress : Not at all   Social Connections: Socially Isolated    Frequency of Communication with Friends and Family: More than three times a week    Frequency of Social Gatherings with Friends and Family: More than three times a week    Attends Congregation Services: Never    Active Member of Clubs or Organizations: No    Attends Club or Organization Meetings: Never    Marital Status:    Housing Stability: Low Risk     Unable to Pay for Housing in the Last Year: No    Number of Places Lived in the Last Year: 1    Unstable Housing in the Last Year: No     Family History   Problem Relation Age of Onset    High Blood Pressure Mother     Cancer Father         lung cancer; metastic    Cancer Daughter 52        colon cancer    Cancer Brother     Cancer Brother     Diabetes Brother        Review of Systems   Constitutional:  Negative for diaphoresis and unexpected weight change. HENT:  Negative for trouble swallowing and voice change. Eyes:  Negative for photophobia and visual disturbance. Respiratory:  Negative for shortness of breath and wheezing. Cardiovascular:  Negative for chest pain and palpitations. Gastrointestinal:  Negative for abdominal pain, nausea and vomiting. Neurological:  Negative for dizziness and light-headedness. Psychiatric/Behavioral:  Negative for hallucinations and suicidal ideas. OBJECTIVE:  Pulse Readings from Last 4 Encounters:   10/19/22 81   06/09/22 70   03/09/22 68   08/30/21 88     Wt Readings from Last 4 Encounters:   10/19/22 167 lb 9.6 oz (76 kg)   06/09/22 170 lb 9.6 oz (77.4 kg)   03/09/22 170 lb (77.1 kg)   08/30/21 175 lb (79.4 kg)     BP Readings from Last 4 Encounters:   10/19/22 120/78   06/09/22 118/70   03/09/22 119/78   08/30/21 (!) 142/76     Physical Exam  Vitals and nursing note reviewed. Constitutional:       Appearance: She is not ill-appearing. Comments: Advanced dementia. Patient could not recall current time and my name and also location.    Eyes: Conjunctiva/sclera: Conjunctivae normal.   Cardiovascular:      Rate and Rhythm: Normal rate and regular rhythm. Pulses: Normal pulses. Heart sounds: Normal heart sounds. Pulmonary:      Effort: Pulmonary effort is normal.      Breath sounds: Normal breath sounds. Abdominal:      General: Bowel sounds are normal.      Tenderness: There is no abdominal tenderness. There is no guarding. Musculoskeletal:      Right lower leg: No edema. Left lower leg: No edema. Neurological:      General: No focal deficit present. Mental Status: She is alert and oriented to person, place, and time.    Psychiatric:         Mood and Affect: Mood normal.         Behavior: Behavior normal.       CBC:   Lab Results   Component Value Date/Time    WBC 6.6 03/09/2022 03:19 PM    HGB 15.2 03/09/2022 03:19 PM    HCT 46.3 03/09/2022 03:19 PM     03/09/2022 03:19 PM     CMP:  Lab Results   Component Value Date/Time     03/09/2022 03:19 PM    K 4.5 03/09/2022 03:19 PM    K 4.1 06/03/2021 05:31 AM     03/09/2022 03:19 PM    CO2 26 03/09/2022 03:19 PM    ANIONGAP 15 03/09/2022 03:19 PM    GLUCOSE 89 03/09/2022 03:19 PM    BUN 21 03/09/2022 03:19 PM    CREATININE 1.1 03/09/2022 03:19 PM    GFRAA 56 03/09/2022 03:19 PM    GFRAA >60 04/10/2013 01:00 PM    CALCIUM 9.3 03/09/2022 03:19 PM    PROT 6.7 03/09/2022 03:19 PM    PROT 7.2 03/04/2013 06:50 PM    LABALBU 3.8 03/09/2022 03:19 PM    AGRATIO 1.3 03/09/2022 03:19 PM    BILITOT 0.5 03/09/2022 03:19 PM    ALKPHOS 76 03/09/2022 03:19 PM    ALT 11 03/09/2022 03:19 PM    AST 21 03/09/2022 03:19 PM    GLOB 2.6 01/13/2020 12:26 PM     URINALYSIS:  Lab Results   Component Value Date/Time    GLUCOSEU Negative 06/02/2021 02:54 PM    GLUCOSEU NEGATIVE 02/21/2012 12:32 PM    KETUA TRACE 06/02/2021 02:54 PM    SPECGRAV 1.019 06/02/2021 02:54 PM    BLOODU Negative 06/02/2021 02:54 PM    PHUR 6.5 06/02/2021 02:54 PM    PROTEINU Negative 06/02/2021 02:54 PM    NITRU Negative 06/02/2021 02:54 PM    LEUKOCYTESUR Negative 06/02/2021 02:54 PM    LABMICR Not Indicated 06/02/2021 02:54 PM    URINETYPE Voided 06/02/2021 02:54 PM     HBA1C:   Lab Results   Component Value Date/Time    LABA1C 5.4 02/04/2015 01:58 PM    .3 02/04/2015 01:58 PM     MICRO/ALB:   Lab Results   Component Value Date/Time    LABMICR Not Indicated 06/02/2021 02:54 PM    LABCREA 137.3 09/02/2015 02:04 PM     LIPID:  Lab Results   Component Value Date/Time    CHOL 226 06/21/2017 12:40 PM    TRIG 111 06/21/2017 12:40 PM    HDL 51 06/21/2017 12:40 PM    HDL 42 05/23/2012 01:17 PM    [address] 06/21/2017 12:40 PM    LABVLDL 22 06/21/2017 12:40 PM     TSH:   Lab Results   Component Value Date/Time    TSHREFLEX 3.93 03/09/2022 03:19 PM         ASSESSMENT/PLAN:  Follow-up visit for chronic problems. Patient with advanced dementia who has been staying with her daughter. History of cirrhosis of liver. Denies abdominal pain yellow urine. Taking lactulose and have about 2 bowel movement per day. History of hypothyroidism. Denies fatigue tiredness weakness    1. Late onset Alzheimer's disease with behavioral disturbance ([address]    2. Need for influenza vaccination    3. Cirrhosis of liver without ascites, unspecified hepatic cirrhosis type ([address]    4. Hypothyroidism, unspecified type   Continue current levothyroxine on empty stomach. 5. Severe late onset Alzheimer's dementia without behavioral disturbance, psychotic disturbance, mood disturbance, or anxiety ([address]    6. Chronic liver failure without hepatic coma [facility])    Patient will need multiple assistance during her stay  in [place].         Orders Placed This Encounter   Procedures    Influenza, FLUAD, (age 72 y+), IM, Preservative Free, 0.5 mL    AFP Tumor Marker     Standing Status:   Future     Standing Expiration Date:   10/19/2023    Vitamin B12 & Folate     Standing Status:   Future     Standing Expiration Date:   10/19/2023    TSH with Reflex Standing Status:   Future     Standing Expiration Date:   10/19/2023    Hepatic Function Panel     Standing Status:   Future     Standing Expiration Date:   10/19/2023    CBC with Auto Differential     Standing Status:   Future     Standing Expiration Date:   10/19/2023    Protime-INR     Standing Status:   Future     Standing Expiration Date:   10/19/2023     Order Specific Question:   Daily Coumadin Dose? Answer:   none    JOLENE Dang MD, Gastroenterology, [facility]     Referral Priority:   Routine     Referral Type:   Eval and Treat     Referral Reason:   Specialty Services Required     Referred to Provider:   Johana Ferrari MD     Requested Specialty:   Gastroenterology     Number of Visits Requested:   1     Current Outpatient Medications   Medication Sig Dispense Refill    lactulose (CHRONULAC) 10 GM/15ML solution TAKE 30 ML BY MOUTH TWICE A DAY (Patient taking differently: daily) 946 mL 1    levothyroxine (SYNTHROID) 50 MCG tablet TAKE ONE TABLET BY MOUTH DAILY 30 tablet 5    alendronate (FOSAMAX) 70 MG tablet TAKE 1 TABLET BY MOUTH ONCE WEEKLY ON AN EMPTY STOMACH BEFORE BREAKFAST. REMAIN UPRIGHT FOR 30 MINUTES AND TAKE WITH 8 OUNCES OF WATER 4 tablet 5    DULoxetine (CYMBALTA) 30 MG extended release capsule Take one capsule by mouth daily 30 capsule 5    sertraline (ZOLOFT) 25 MG tablet TAKE ONE TABLET BY MOUTH DAILY 30 tablet 5    acetaminophen (TYLENOL) 500 MG tablet Take 1 tablet by mouth every 8 hours as needed for Pain 120 tablet 0    Caltrate 600+D Plus Minerals (CALTRATE) 600-800 MG-UNIT TABS tablet Take 1 tablet by mouth 2 times daily 180 tablet 1     No current facility-administered medications for this visit. Return in about 4 months (around 2/19/2023). An After Visit Summary was printed and given to the patient. Documentation was done using voice recognition dragon software.   Every effort was made to ensure accuracy; however, inadvertent  Unintentional computerized transcription errors may be present.

## 2022-10-20 DIAGNOSIS — E03.9 HYPOTHYROIDISM, UNSPECIFIED TYPE: ICD-10-CM

## 2022-10-20 RX ORDER — LEVOTHYROXINE SODIUM 0.07 MG/1
75 TABLET ORAL DAILY
Qty: 30 TABLET | Refills: 5 | Status: SHIPPED | OUTPATIENT
Start: 2022-10-20

## 2022-10-20 SDOH — ECONOMIC STABILITY: FOOD INSECURITY: WITHIN THE PAST 12 MONTHS, YOU WORRIED THAT YOUR FOOD WOULD RUN OUT BEFORE YOU GOT MONEY TO BUY MORE.: NEVER TRUE

## 2022-10-20 SDOH — ECONOMIC STABILITY: FOOD INSECURITY: WITHIN THE PAST 12 MONTHS, THE FOOD YOU BOUGHT JUST DIDN'T LAST AND YOU DIDN'T HAVE MONEY TO GET MORE.: NEVER TRUE

## 2022-10-20 ASSESSMENT — SOCIAL DETERMINANTS OF HEALTH (SDOH): HOW HARD IS IT FOR YOU TO PAY FOR THE VERY BASICS LIKE FOOD, HOUSING, MEDICAL CARE, AND HEATING?: NOT HARD AT ALL

## 2022-10-20 NOTE — RESULT ENCOUNTER NOTE
Increase levothyroxine to 75 mcg/day.   No other change of medicine  Pending alpha-fetoprotein tumor marker

## 2022-10-21 LAB — AFP: 0.6 UG/L

## 2022-10-24 ENCOUNTER — TELEPHONE (OUTPATIENT)
Dept: ORTHOPEDIC SURGERY | Age: 87
End: 2022-10-24

## 2022-10-24 ENCOUNTER — TELEPHONE (OUTPATIENT)
Dept: INTERNAL MEDICINE CLINIC | Age: 87
End: 2022-10-24

## 2022-10-24 NOTE — TELEPHONE ENCOUNTER
Appointment Request     Patient requesting earlier appointment: Yes  Appointment offered to patient: N/A  Patient's Daughter: [phone]    OPNP R WRIST [facility] [address] [facility]

## 2022-10-24 NOTE — TELEPHONE ENCOUNTER
Pt daughter calling ---pt fell Saturday --took to get xrays and she broke a few bones in her right wrist---wanting to know with the meds pt is on what can she give the pt safely to help with the pain----please call Armaan Client at [phone]. Thanks.

## 2022-10-24 NOTE — TELEPHONE ENCOUNTER
Pt has impacted fx wrist--daughter expressed understanding--she is thinking about getting alarms for chair and bed--when she fell the life alert didn't go off.

## 2022-10-24 NOTE — TELEPHONE ENCOUNTER
Patient may take temporary ibuprofen 600 mg twice daily. Encourage hydration  I see she is going to see orthopedic tomorrow, orthopedic physician may take over if she needed any other stronger pain medicine.

## 2022-10-24 NOTE — TELEPHONE ENCOUNTER
Pt had hallucinating and fell in the bathroom--her daughter gave her seroquel and it helped with the hallucinations. The daughter thought insomnia.

## 2022-10-25 ENCOUNTER — TELEPHONE (OUTPATIENT)
Dept: ORTHOPEDIC SURGERY | Age: 87
End: 2022-10-25

## 2022-10-25 ENCOUNTER — TELEPHONE (OUTPATIENT)
Dept: INTERNAL MEDICINE CLINIC | Age: 87
End: 2022-10-25

## 2022-10-25 ENCOUNTER — OFFICE VISIT (OUTPATIENT)
Dept: ORTHOPEDIC SURGERY | Age: 87
End: 2022-10-25
Payer: MEDICARE

## 2022-10-25 VITALS — HEIGHT: 65 IN | WEIGHT: 167 LBS | BODY MASS INDEX: 27.82 KG/M2

## 2022-10-25 DIAGNOSIS — S52.531A CLOSED COLLES' FRACTURE OF RIGHT RADIUS, INITIAL ENCOUNTER: Primary | ICD-10-CM

## 2022-10-25 PROCEDURE — 1090F PRES/ABSN URINE INCON ASSESS: CPT | Performed by: ORTHOPAEDIC SURGERY

## 2022-10-25 PROCEDURE — G8417 CALC BMI ABV UP PARAM F/U: HCPCS | Performed by: ORTHOPAEDIC SURGERY

## 2022-10-25 PROCEDURE — 1123F ACP DISCUSS/DSCN MKR DOCD: CPT | Performed by: ORTHOPAEDIC SURGERY

## 2022-10-25 PROCEDURE — 99214 OFFICE O/P EST MOD 30 MIN: CPT | Performed by: ORTHOPAEDIC SURGERY

## 2022-10-25 PROCEDURE — G8427 DOCREV CUR MEDS BY ELIG CLIN: HCPCS | Performed by: ORTHOPAEDIC SURGERY

## 2022-10-25 PROCEDURE — 1036F TOBACCO NON-USER: CPT | Performed by: ORTHOPAEDIC SURGERY

## 2022-10-25 PROCEDURE — G8484 FLU IMMUNIZE NO ADMIN: HCPCS | Performed by: ORTHOPAEDIC SURGERY

## 2022-10-25 NOTE — PROGRESS NOTES
CHIEF COMPLAINT: Right wrist pain/ displaced distal radius fracture. DATE OF INJURY: 10/22/2022    HISTORY:  Ms. Peterson Longoria is a 80 y.o.  female right handed who presents today for evaluation of a right wrist injury. The patient reports that this injury occurred when she fell at home. She was first seen and evaluated in urgent care, when she was x-rayed and splinted, and asked to f/u with Orthopedics. The patient denies any other injuries. Rates pain a 5/10 VAS moderate, sharp, aching, throbbing, tender, constant and show no change. Movement makes the pain worse, the splint and resting makes the pain better. Alleviating factors rest. No numbness or tingling sensation. She is well-known to me for bilateral knee DJD, right hand middle finger proximal phalanx fracture, low inner troches femur fracture, status post gamma nail on 8/21/2019. Denies smoking. She is normally very active and lives with her daughter. She ambulates with a walker. Past Medical History:   Diagnosis Date    Acute renal failure ([address] 11-27-12    Arthritis     At risk for falling 08/28/2018    Score of 11/24 for Dynamic Gait Index    Cirrhosis ([address] 9/25/2020    Dementia with behavioral problem 9/25/2020    Frequent falls     Gastroesophageal reflux disease 1/22/2019    Movement disorder     osteoporosis    Nephrotic syndrome 12/28/2012    Thyroid disease        Past Surgical History:   Procedure Laterality Date    COLONOSCOPY      EXCISION OF FACIAL MASS      skin ca    EYE SURGERY      cataract removal; bilat    HIP FRACTURE SURGERY Left 8/21/2019    OPEN REDUCTION INTERNAL FIXATION OF INTERTROCHANTERIC FRACTURE OF LEFT HIPUSING GAMMA NAIL performed by Sylvia Paez MD at [address] Right 05/27/2018    right gamma nail with cables    SKIN BIOPSY         Social History     Socioeconomic History    Marital status:       Spouse name: Not on file    Number of children: Not on file    Years of education: Not on file    Highest education level: Not on file   Occupational History    Occupation: homeaker   Tobacco Use    Smoking status: Never    Smokeless tobacco: Never   Vaping Use    Vaping Use: Never used   Substance and Sexual Activity    Alcohol use: No    Drug use: No    Sexual activity: Not Currently     Partners: Male   Other Topics Concern    Not on file   Social History Narrative    Not on file     Social Determinants of Health     Financial Resource Strain: Low Risk     Difficulty of Paying Living Expenses: Not hard at all   Food Insecurity: No Food Insecurity    Worried About [address] in the Last Year: Never true    [facility] [place] in the Last Year: Never true   Transportation Needs: No Transportation Needs    Lack of Transportation (Medical): No    Lack of Transportation (Non-Medical): No   Physical Activity: Inactive    Days of Exercise per Week: 0 days    Minutes of Exercise per Session: 0 min   Stress: No Stress Concern Present    Feeling of Stress : Not at all   Social Connections: Socially Isolated    Frequency of Communication with Friends and Family: More than three times a week    Frequency of Social Gatherings with Friends and Family: More than three times a week    Attends Restorationist Services: Never    Active Member of Clubs or Organizations: No    Attends Club or Organization Meetings: Never    Marital Status:     Intimate Partner Violence: Not on file   Housing Stability: Low Risk     Unable to Pay for Housing in the Last Year: No    Number of Places Lived in the Last Year: 1    Unstable Housing in the Last Year: No       Family History   Problem Relation Age of Onset    High Blood Pressure Mother     Cancer Father         lung cancer; metastic    Cancer Daughter 52        colon cancer    Cancer Brother     Cancer Brother     Diabetes Brother        Current Outpatient Medications on File Prior to Visit   Medication Sig Dispense Refill    levothyroxine (SYNTHROID) 75 MCG tablet Take 1 tablet by mouth Daily 30 tablet 5    lactulose (CHRONULAC) 10 GM/15ML solution TAKE 30 ML BY MOUTH TWICE A DAY (Patient taking differently: daily) 946 mL 1    alendronate (FOSAMAX) 70 MG tablet TAKE 1 TABLET BY MOUTH ONCE WEEKLY ON AN EMPTY STOMACH BEFORE BREAKFAST. REMAIN UPRIGHT FOR 30 MINUTES AND TAKE WITH 8 OUNCES OF WATER 4 tablet 5    DULoxetine (CYMBALTA) 30 MG extended release capsule Take one capsule by mouth daily 30 capsule 5    sertraline (ZOLOFT) 25 MG tablet TAKE ONE TABLET BY MOUTH DAILY 30 tablet 5    acetaminophen (TYLENOL) 500 MG tablet Take 1 tablet by mouth every 8 hours as needed for Pain 120 tablet 0    Caltrate 600+D Plus Minerals (CALTRATE) 600-800 MG-UNIT TABS tablet Take 1 tablet by mouth 2 times daily 180 tablet 1     No current facility-administered medications on file prior to visit. Pertinent items are noted in HPI  Review of systems reviewed from Patient History Form and available in the patient's chart under the Media tab. PHYSICAL EXAMINATION:  Ms. Caesar Nash is a very pleasant 80 y.o.  female who presents today in no acute distress, awake, alert, and oriented. She is well dressed, nourished and  groomed. Patient with normal affect. Height is  5' 5\" (1.651 m), weight is 167 lb (75.8 kg), Body mass index is 27.79 kg/m². Resting respiratory rate is 16. On evaluation of her right upper extremity, there is minimal deformity. There is moderate swelling and moderate ecchymosis. She is tender to palpation over the distal radius, and otherwise nontender over the remainder of the extremity. Range of motion is decreased secondary to pain over the right wrist, but no mechanical block. The skin overlying the right wrist is intact without evidence of lesion, laceration or abrasion. Distal pulses are 2+ and symmetric bilaterally. Sensation is grossly intact to light touch and symmetric bilaterally.     IMAGING:  Xrays dated 10/23/2022 from urgent care, 3 views of right wrist were reviewed, and showed displaced distal radius fracture. IMPRESSION:  Right displaced distal radius fracture. PLAN:  I discussed with Esa Daniels the overall alignment of the fracture and treatment options including both surgical and non-surgical treatment, and that my recommendation is an open reduction and internal fixation given the amount of displacement and comminution of the fracture. I discussed the risks and benefits of surgery with the patient, including but not limited to infection, bleeding, pain, injury to nerves or blood vessels failure of the surgery and need for additional surgery. All the patient's questions were answered. We discussed an expected post-operative course. She  is understanding of this and wishes to proceed. Plan on surgery Thursday at [facility]. Rx given for a platform for her walker. As this patient has demonstrated risk factors for osteoporosis, such as age greater than [de-identified] years and evidence of a fracture, I have referred the patient back to the primary care physician for evaluation for osteoporosis, including consideration for DEXA scanning, if this is felt to be clinically indicated. The patient is advised to contact the primary care physician to follow-up for further evaluation.        Johana Cifuentes MD

## 2022-10-25 NOTE — TELEPHONE ENCOUNTER
Auth: NPR  Date: 10/27/22  Reference # None  Spoke with: None  Type of SX: Outpatient  Location: [facility]  CPT: 15272   DX: S52.501A  SX area:  Rt wrist  Insurance: Medicare A&B

## 2022-10-25 NOTE — LETTER
[facility]  [address] [facility]  [address] [place] [address] [address]  Phone: [phone]  Fax: [phone]     Azeb Canales MD           10/25/22       Kelsie Mahmood  4/13/1930       Order: arm platform attachment for walker   Diagnosis: S52.501A        Sincerely,           Azeb Canales MD

## 2022-10-26 ENCOUNTER — TELEPHONE (OUTPATIENT)
Dept: ORTHOPEDIC SURGERY | Age: 87
End: 2022-10-26

## 2022-10-26 ENCOUNTER — ANESTHESIA EVENT (OUTPATIENT)
Dept: OPERATING ROOM | Age: 87
End: 2022-10-26
Payer: MEDICARE

## 2022-10-26 NOTE — PROGRESS NOTES
Name_______________________________________Printed:____________________  Date and time of surgery_10/27/2022_______0715________________Arrival Time:____0600____________   1. The instructions given regarding when and if a patient needs to stop oral intake prior to surgery varies. Follow the specific instructions you were given                  _x__Nothing to eat or to drink after Midnight the night before.                   ____Carbo loading or ERAS instructions will be given to select patients-if you have been given those instructions -please do the following                           The evening before your surgery after dinner before midnight drink 40 ounces of gatorade. If you are diabetic use sugar free. The morning of surgery drink 40 ounces of water. This needs to be finished 3 hours prior to your surgery start time. 2. Take the following pills with a small sip of water on the morning of surgery_levothyroxine,  cymbalta  sertraline__________________________________________________                  Do not take blood pressure medications ending in pril or sartan the aleksandr prior to surgery or the morning of surgery_   3. Aspirin, Ibuprofen, Advil, Naproxen, Vitamin E and other Anti-inflammatory products and supplements should be stopped for 5 -7days before surgery or as directed by your physician. 4. Check with your Doctor regarding stopping Plavix, Coumadin,Eliquis, Lovenox,Effient,Pradaxa,Xarelto, Fragmin or other blood thinners and follow their instructions. 5. Do not smoke, and do not drink any alcoholic beverages 24 hours prior to surgery. This includes NA Beer. Refrain from the usage of any recreational drugs. 6. You may brush your teeth and gargle the morning of surgery. DO NOT SWALLOW WATER   7. You MUST make arrangements for a responsible adult to stay on site while you are here and take you home after your surgery. You will not be allowed to leave alone or drive yourself home.   It is strongly suggested someone stay with you the first 24 hrs. Your surgery will be cancelled if you do not have a ride home. 8. A parent/legal guardian must accompany a child scheduled for surgery and plan to stay at the hospital until the child is discharged. Please do not bring other children with you. 9. Please wear simple, loose fitting clothing to the hospital.  Dora Mojica not bring valuables (money, credit cards, checkbooks, etc.) Do not wear any makeup (including no eye makeup) or nail polish on your fingers or toes. 10. DO NOT wear any jewelry or piercings on day of surgery. All body piercing jewelry must be removed. 11. If you have _x__dentures, they will be removed before going to the OR; we will provide you a container. If you wear ___contact lenses or ___glasses, they will be removed; please bring a case for them. 12. Please see your family doctor/pediatrician for a history & physical and/or concerning medications. Bring any test results/reports from your physician's office. PCP__________________Phone___________H&P Appt. Date________             13 If you  have a Living Will and Durable Power of  for Healthcare, please bring in a copy. 15. Notify your Surgeon if you develop any illness between now and surgery  time, cough, cold, fever, sore throat, nausea, vomiting, etc.  Please notify your surgeon if you experience dizziness, shortness of breath or blurred vision between now & the time of your surgery             15. DO NOT shave your operative site 96 hours prior to surgery. For face & neck surgery, men may use an electric razor 48 hours prior to surgery. 16. Shower the night before or morning of surgery using an antibacterial soap or as you have been instructed. 17. To provide excellent care visitors will be limited to one in the room at any given time. 18.  Please bring picture ID and insurance card.              19. Visit our web site for additional information:  [email or web address]              20.During flu season no children under the age of 15 are permitted in the hospital for the safety of all patients. 21. If you take a long acting insulin in the evening only  take half of your usual  dose the night  before your procedure              22. If you use a c-pap please bring DOS if staying overnight,             23.For your convenience [facility] has a pharmacy on site to fill your prescriptions. 24. If you use oxygen and have a portable tank please bring it  with you the DOS             25. Bring a complete list of all your medications with name and dose include any supplements. 26. Other__________________________________________   *Please call pre admission testing if you any further questions   [facility]   [address]    [place] [id number]. Air  [phone]   [address] [place] [address]       VISITOR POLICY(subject to change)    Current policy is 2 visitors per patient. No children. Mask is  at the discretion of the facility. Visiting hours are 8a-8p. Overnight visitors will be at the discretion of the nurse. All policies subject to change. All above information reviewed with patient in person or by phone. Patient verbalizes understanding. All questions and concerns addressed.                                                                                                  Patient/Rep__patient__________________                                                                                                                                    PRE OP INSTRUCTIONS

## 2022-10-26 NOTE — TELEPHONE ENCOUNTER
General Question     Subject: CAST   Patient and /or Facility Request: Tahmina Zurita  Contact Number:  [phone]    PT REMOVES BANDAGES IN THE NIGHT AND NEEDS A CAST THAT SHE CANNOT TAKE OFF. ADV IF PT CAN BE CAST BEFORE SX TOMORROW. CONTACT PT AT NUMBER LISTED IF NEED TO ADVISE.

## 2022-10-26 NOTE — TELEPHONE ENCOUNTER
Spoke with patient's daughter.  advised a cast would not be needed and even if she removes the PO splint, her hardware should hold as long as she really isn't doing any major heavy impact activity to the wrist. Offered the option of a removable brace today, but it is very difficult for the patient to get out, especially for the number of appointments she's had lately.  said it is OK for her to go without the splint today.

## 2022-10-27 ENCOUNTER — APPOINTMENT (OUTPATIENT)
Dept: GENERAL RADIOLOGY | Age: 87
End: 2022-10-27
Attending: ORTHOPAEDIC SURGERY
Payer: MEDICARE

## 2022-10-27 ENCOUNTER — HOSPITAL ENCOUNTER (OUTPATIENT)
Age: 87
Setting detail: OUTPATIENT SURGERY
Discharge: HOME | End: 2022-10-27
Attending: ORTHOPAEDIC SURGERY | Admitting: ORTHOPAEDIC SURGERY
Payer: MEDICARE

## 2022-10-27 ENCOUNTER — ANESTHESIA (OUTPATIENT)
Dept: OPERATING ROOM | Age: 87
End: 2022-10-27
Payer: MEDICARE

## 2022-10-27 VITALS
RESPIRATION RATE: 18 BRPM | BODY MASS INDEX: 27.94 KG/M2 | DIASTOLIC BLOOD PRESSURE: 74 MMHG | TEMPERATURE: 97.5 F | HEART RATE: 80 BPM | WEIGHT: 167.7 LBS | SYSTOLIC BLOOD PRESSURE: 141 MMHG | OXYGEN SATURATION: 95 % | HEIGHT: 65 IN

## 2022-10-27 DIAGNOSIS — S52.571A OTHER CLOSED INTRA-ARTICULAR FRACTURE OF DISTAL END OF RIGHT RADIUS, INITIAL ENCOUNTER: Primary | ICD-10-CM

## 2022-10-27 PROBLEM — S52.501A CLOSED FRACTURE OF RIGHT DISTAL RADIUS: Status: ACTIVE | Noted: 2022-10-27

## 2022-10-27 PROCEDURE — 3600000004 HC SURGERY LEVEL 4 BASE: Performed by: ORTHOPAEDIC SURGERY

## 2022-10-27 PROCEDURE — 7100000011 HC PHASE II RECOVERY - ADDTL 15 MIN: Performed by: ORTHOPAEDIC SURGERY

## 2022-10-27 PROCEDURE — 2500000003 HC RX 250 WO HCPCS: Performed by: ORTHOPAEDIC SURGERY

## 2022-10-27 PROCEDURE — 73100 X-RAY EXAM OF WRIST: CPT

## 2022-10-27 PROCEDURE — 6360000002 HC RX W HCPCS: Performed by: ANESTHESIOLOGY

## 2022-10-27 PROCEDURE — 3600000014 HC SURGERY LEVEL 4 ADDTL 15MIN: Performed by: ORTHOPAEDIC SURGERY

## 2022-10-27 PROCEDURE — 6360000002 HC RX W HCPCS: Performed by: ORTHOPAEDIC SURGERY

## 2022-10-27 PROCEDURE — 2500000003 HC RX 250 WO HCPCS: Performed by: NURSE ANESTHETIST, CERTIFIED REGISTERED

## 2022-10-27 PROCEDURE — 3700000000 HC ANESTHESIA ATTENDED CARE: Performed by: ORTHOPAEDIC SURGERY

## 2022-10-27 PROCEDURE — 6370000000 HC RX 637 (ALT 250 FOR IP): Performed by: ANESTHESIOLOGY

## 2022-10-27 PROCEDURE — 7100000001 HC PACU RECOVERY - ADDTL 15 MIN: Performed by: ORTHOPAEDIC SURGERY

## 2022-10-27 PROCEDURE — A4217 STERILE WATER/SALINE, 500 ML: HCPCS | Performed by: ORTHOPAEDIC SURGERY

## 2022-10-27 PROCEDURE — C1713 ANCHOR/SCREW BN/BN,TIS/BN: HCPCS | Performed by: ORTHOPAEDIC SURGERY

## 2022-10-27 PROCEDURE — 7100000000 HC PACU RECOVERY - FIRST 15 MIN: Performed by: ORTHOPAEDIC SURGERY

## 2022-10-27 PROCEDURE — 7100000010 HC PHASE II RECOVERY - FIRST 15 MIN: Performed by: ORTHOPAEDIC SURGERY

## 2022-10-27 PROCEDURE — 6360000002 HC RX W HCPCS: Performed by: NURSE ANESTHETIST, CERTIFIED REGISTERED

## 2022-10-27 PROCEDURE — 2709999900 HC NON-CHARGEABLE SUPPLY: Performed by: ORTHOPAEDIC SURGERY

## 2022-10-27 PROCEDURE — 25608 OPTX DST RD XART FX/EPI SEP2: CPT | Performed by: NURSE PRACTITIONER

## 2022-10-27 PROCEDURE — 2720000010 HC SURG SUPPLY STERILE: Performed by: ORTHOPAEDIC SURGERY

## 2022-10-27 PROCEDURE — 2580000003 HC RX 258: Performed by: ORTHOPAEDIC SURGERY

## 2022-10-27 PROCEDURE — 2580000003 HC RX 258: Performed by: NURSE ANESTHETIST, CERTIFIED REGISTERED

## 2022-10-27 PROCEDURE — 25608 OPTX DST RD XART FX/EPI SEP2: CPT | Performed by: ORTHOPAEDIC SURGERY

## 2022-10-27 PROCEDURE — C1769 GUIDE WIRE: HCPCS | Performed by: ORTHOPAEDIC SURGERY

## 2022-10-27 PROCEDURE — 3700000001 HC ADD 15 MINUTES (ANESTHESIA): Performed by: ORTHOPAEDIC SURGERY

## 2022-10-27 DEVICE — LOCKING SCREW, FULLY THREADED,T8
Type: IMPLANTABLE DEVICE | Site: WRIST | Status: FUNCTIONAL
Brand: VARIAX

## 2022-10-27 DEVICE — VOLAR PLATE INTERMEDIATE RIGHT, X-SHORT
Type: IMPLANTABLE DEVICE | Site: WRIST | Status: FUNCTIONAL
Brand: VARIAX

## 2022-10-27 DEVICE — BONE SCREW, FULLY THREADED, T8
Type: IMPLANTABLE DEVICE | Site: WRIST | Status: FUNCTIONAL
Brand: VARIAX

## 2022-10-27 RX ORDER — FENTANYL CITRATE 50 UG/ML
25 INJECTION, SOLUTION INTRAMUSCULAR; INTRAVENOUS EVERY 5 MIN PRN
Status: DISCONTINUED | OUTPATIENT
Start: 2022-10-27 | End: 2022-10-27 | Stop reason: HOSPADM

## 2022-10-27 RX ORDER — HYDROMORPHONE HCL 110MG/55ML
0.5 PATIENT CONTROLLED ANALGESIA SYRINGE INTRAVENOUS EVERY 5 MIN PRN
Status: COMPLETED | OUTPATIENT
Start: 2022-10-27 | End: 2022-10-27

## 2022-10-27 RX ORDER — OXYCODONE HYDROCHLORIDE 5 MG/1
5 TABLET ORAL
Status: COMPLETED | OUTPATIENT
Start: 2022-10-27 | End: 2022-10-27

## 2022-10-27 RX ORDER — BUPIVACAINE HYDROCHLORIDE 5 MG/ML
INJECTION, SOLUTION EPIDURAL; INTRACAUDAL
Status: COMPLETED | OUTPATIENT
Start: 2022-10-27 | End: 2022-10-27

## 2022-10-27 RX ORDER — FENTANYL CITRATE 50 UG/ML
INJECTION, SOLUTION INTRAMUSCULAR; INTRAVENOUS PRN
Status: DISCONTINUED | OUTPATIENT
Start: 2022-10-27 | End: 2022-10-27 | Stop reason: SDUPTHER

## 2022-10-27 RX ORDER — DEXAMETHASONE SODIUM PHOSPHATE 4 MG/ML
INJECTION, SOLUTION INTRA-ARTICULAR; INTRALESIONAL; INTRAMUSCULAR; INTRAVENOUS; SOFT TISSUE PRN
Status: DISCONTINUED | OUTPATIENT
Start: 2022-10-27 | End: 2022-10-27 | Stop reason: SDUPTHER

## 2022-10-27 RX ORDER — HYDRALAZINE HYDROCHLORIDE 20 MG/ML
10 INJECTION INTRAMUSCULAR; INTRAVENOUS
Status: DISCONTINUED | OUTPATIENT
Start: 2022-10-27 | End: 2022-10-27 | Stop reason: HOSPADM

## 2022-10-27 RX ORDER — PROPOFOL 10 MG/ML
INJECTION, EMULSION INTRAVENOUS PRN
Status: DISCONTINUED | OUTPATIENT
Start: 2022-10-27 | End: 2022-10-27 | Stop reason: SDUPTHER

## 2022-10-27 RX ORDER — LIDOCAINE HYDROCHLORIDE 20 MG/ML
INJECTION, SOLUTION EPIDURAL; INFILTRATION; INTRACAUDAL; PERINEURAL PRN
Status: DISCONTINUED | OUTPATIENT
Start: 2022-10-27 | End: 2022-10-27 | Stop reason: SDUPTHER

## 2022-10-27 RX ORDER — ONDANSETRON 2 MG/ML
INJECTION INTRAMUSCULAR; INTRAVENOUS PRN
Status: DISCONTINUED | OUTPATIENT
Start: 2022-10-27 | End: 2022-10-27 | Stop reason: SDUPTHER

## 2022-10-27 RX ORDER — SODIUM CHLORIDE 9 MG/ML
INJECTION, SOLUTION INTRAVENOUS CONTINUOUS PRN
Status: DISCONTINUED | OUTPATIENT
Start: 2022-10-27 | End: 2022-10-27 | Stop reason: SDUPTHER

## 2022-10-27 RX ORDER — SODIUM CHLORIDE 9 MG/ML
INJECTION, SOLUTION INTRAVENOUS CONTINUOUS
Status: DISCONTINUED | OUTPATIENT
Start: 2022-10-27 | End: 2022-10-27 | Stop reason: HOSPADM

## 2022-10-27 RX ORDER — ACETAMINOPHEN 325 MG/1
TABLET ORAL
Status: DISCONTINUED
Start: 2022-10-27 | End: 2022-10-27 | Stop reason: HOSPADM

## 2022-10-27 RX ORDER — LIDOCAINE HYDROCHLORIDE 10 MG/ML
1 INJECTION, SOLUTION EPIDURAL; INFILTRATION; INTRACAUDAL; PERINEURAL
Status: DISCONTINUED | OUTPATIENT
Start: 2022-10-27 | End: 2022-10-27 | Stop reason: HOSPADM

## 2022-10-27 RX ORDER — FENTANYL CITRATE 50 UG/ML
INJECTION, SOLUTION INTRAMUSCULAR; INTRAVENOUS
Status: DISCONTINUED
Start: 2022-10-27 | End: 2022-10-27 | Stop reason: HOSPADM

## 2022-10-27 RX ORDER — ACETAMINOPHEN 325 MG/1
650 TABLET ORAL ONCE
Status: COMPLETED | OUTPATIENT
Start: 2022-10-27 | End: 2022-10-27

## 2022-10-27 RX ORDER — CEPHALEXIN 500 MG/1
500 CAPSULE ORAL 4 TIMES DAILY
Qty: 12 CAPSULE | Refills: 0 | Status: SHIPPED | OUTPATIENT
Start: 2022-10-27 | End: 2022-10-30

## 2022-10-27 RX ORDER — TRAMADOL HYDROCHLORIDE 50 MG/1
50 TABLET ORAL EVERY 6 HOURS PRN
Qty: 12 TABLET | Refills: 0 | Status: SHIPPED | OUTPATIENT
Start: 2022-10-27 | End: 2022-10-30

## 2022-10-27 RX ORDER — PROCHLORPERAZINE EDISYLATE 5 MG/ML
5 INJECTION INTRAMUSCULAR; INTRAVENOUS
Status: DISCONTINUED | OUTPATIENT
Start: 2022-10-27 | End: 2022-10-27 | Stop reason: HOSPADM

## 2022-10-27 RX ORDER — ONDANSETRON 2 MG/ML
4 INJECTION INTRAMUSCULAR; INTRAVENOUS
Status: DISCONTINUED | OUTPATIENT
Start: 2022-10-27 | End: 2022-10-27 | Stop reason: HOSPADM

## 2022-10-27 RX ORDER — EPHEDRINE SULFATE/0.9% NACL/PF 50 MG/5 ML
SYRINGE (ML) INTRAVENOUS PRN
Status: DISCONTINUED | OUTPATIENT
Start: 2022-10-27 | End: 2022-10-27 | Stop reason: SDUPTHER

## 2022-10-27 RX ADMIN — SODIUM CHLORIDE: 9 INJECTION, SOLUTION INTRAVENOUS at 06:56

## 2022-10-27 RX ADMIN — Medication 20 MG: at 07:15

## 2022-10-27 RX ADMIN — HYDROMORPHONE HYDROCHLORIDE 0.25 MG: 2 INJECTION, SOLUTION INTRAMUSCULAR; INTRAVENOUS; SUBCUTANEOUS at 08:13

## 2022-10-27 RX ADMIN — FENTANYL CITRATE 50 MCG: 50 INJECTION, SOLUTION INTRAMUSCULAR; INTRAVENOUS at 07:04

## 2022-10-27 RX ADMIN — HYDROMORPHONE HYDROCHLORIDE 0.25 MG: 2 INJECTION, SOLUTION INTRAMUSCULAR; INTRAVENOUS; SUBCUTANEOUS at 08:37

## 2022-10-27 RX ADMIN — ONDANSETRON 4 MG: 2 INJECTION INTRAMUSCULAR; INTRAVENOUS at 07:18

## 2022-10-27 RX ADMIN — LIDOCAINE HYDROCHLORIDE 100 MG: 20 INJECTION, SOLUTION EPIDURAL; INFILTRATION; INTRACAUDAL; PERINEURAL at 07:04

## 2022-10-27 RX ADMIN — FENTANYL CITRATE 50 MCG: 50 INJECTION, SOLUTION INTRAMUSCULAR; INTRAVENOUS at 07:35

## 2022-10-27 RX ADMIN — HYDROMORPHONE HYDROCHLORIDE 0.25 MG: 2 INJECTION, SOLUTION INTRAMUSCULAR; INTRAVENOUS; SUBCUTANEOUS at 08:29

## 2022-10-27 RX ADMIN — CEFAZOLIN 2000 MG: 2 INJECTION, POWDER, FOR SOLUTION INTRAMUSCULAR; INTRAVENOUS at 06:58

## 2022-10-27 RX ADMIN — Medication 20 MG: at 07:09

## 2022-10-27 RX ADMIN — DEXAMETHASONE SODIUM PHOSPHATE 8 MG: 4 INJECTION, SOLUTION INTRAMUSCULAR; INTRAVENOUS at 07:18

## 2022-10-27 RX ADMIN — SODIUM CHLORIDE: 9 INJECTION, SOLUTION INTRAVENOUS at 07:00

## 2022-10-27 RX ADMIN — HYDROMORPHONE HYDROCHLORIDE 0.25 MG: 2 INJECTION, SOLUTION INTRAMUSCULAR; INTRAVENOUS; SUBCUTANEOUS at 08:22

## 2022-10-27 RX ADMIN — PROPOFOL 150 MG: 10 INJECTION, EMULSION INTRAVENOUS at 07:04

## 2022-10-27 RX ADMIN — ACETAMINOPHEN 650 MG: 325 TABLET ORAL at 08:55

## 2022-10-27 RX ADMIN — FENTANYL CITRATE 25 MCG: 0.05 INJECTION, SOLUTION INTRAMUSCULAR; INTRAVENOUS at 08:47

## 2022-10-27 RX ADMIN — OXYCODONE 5 MG: 5 TABLET ORAL at 08:52

## 2022-10-27 ASSESSMENT — PAIN DESCRIPTION - ORIENTATION
ORIENTATION: RIGHT

## 2022-10-27 ASSESSMENT — PAIN SCALES - GENERAL
PAINLEVEL_OUTOF10: 10
PAINLEVEL_OUTOF10: 5
PAINLEVEL_OUTOF10: 9

## 2022-10-27 ASSESSMENT — PAIN DESCRIPTION - DESCRIPTORS
DESCRIPTORS: SHARP;DISCOMFORT
DESCRIPTORS: ACHING
DESCRIPTORS: DISCOMFORT;SHARP

## 2022-10-27 ASSESSMENT — PAIN DESCRIPTION - LOCATION
LOCATION: ARM

## 2022-10-27 ASSESSMENT — PAIN DESCRIPTION - FREQUENCY: FREQUENCY: CONTINUOUS

## 2022-10-27 ASSESSMENT — PAIN DESCRIPTION - PAIN TYPE: TYPE: ACUTE PAIN

## 2022-10-27 ASSESSMENT — ENCOUNTER SYMPTOMS: SHORTNESS OF BREATH: 0

## 2022-10-27 NOTE — PROGRESS NOTES
Pt awake and alert. Pt on RA, VSS. Daughter in the waiting room. Pt with c/o pain, denies nausea, tolerating PO. Skin warm RUE, able to wiggle fingers. Pt meets criteria to be discharged from phase 1.

## 2022-10-27 NOTE — H&P
Preoperative H&P Update    The patient's History and Physical in the medical record from 10/25/2022 was reviewed by me today. Past Medical History:   Diagnosis Date    Acute renal failure ([facility] 11-27-12    Arthritis     At risk for falling 08/28/2018    Score of 11/24 for Dynamic Gait Index    Cirrhosis ([facility] [address] 9/25/2020    Dementia with behavioral problem 9/25/2020    Frequent falls     Gastroesophageal reflux disease 1/22/2019    Movement disorder     osteoporosis    Nephrotic syndrome 12/28/2012    Thyroid disease      Past Surgical History:   Procedure Laterality Date    COLONOSCOPY      EXCISION OF FACIAL MASS      skin ca    EYE SURGERY      cataract removal; bilat    HIP FRACTURE SURGERY Left 8/21/2019    OPEN REDUCTION INTERNAL FIXATION OF INTERTROCHANTERIC FRACTURE OF LEFT HIPUSING GAMMA NAIL performed by Caprice Caldwell MD at [address] [place] [address] Right 05/27/2018    right gamma nail with cables    SKIN BIOPSY       No current facility-administered medications on file prior to encounter. Current Outpatient Medications on File Prior to Encounter   Medication Sig Dispense Refill    levothyroxine (SYNTHROID) 75 MCG tablet Take 1 tablet by mouth Daily 30 tablet 5    lactulose (CHRONULAC) 10 GM/15ML solution TAKE 30 ML BY MOUTH TWICE A DAY (Patient taking differently: daily) 946 mL 1    alendronate (FOSAMAX) 70 MG tablet TAKE 1 TABLET BY MOUTH ONCE WEEKLY ON AN EMPTY STOMACH BEFORE BREAKFAST.  REMAIN UPRIGHT FOR 30 MINUTES AND TAKE WITH 8 OUNCES OF WATER 4 tablet 5    DULoxetine (CYMBALTA) 30 MG extended release capsule Take one capsule by mouth daily 30 capsule 5    sertraline (ZOLOFT) 25 MG tablet TAKE ONE TABLET BY MOUTH DAILY 30 tablet 5    acetaminophen (TYLENOL) 500 MG tablet Take 1 tablet by mouth every 8 hours as needed for Pain 120 tablet 0    Caltrate 600+D Plus Minerals (CALTRATE) 600-800 MG-UNIT TABS tablet Take 1 tablet by mouth 2 times daily 180 tablet 1       Allergies Allergen Reactions    Metoprolol Hives     Severe rash and itching    Amlodipine Itching     Itching    Sulfa Antibiotics Nausea And Vomiting    Adhesive Tape Other (See Comments)     Skin tears very easily      I reviewed the HPI, medications, allergies, reason for surgery, diagnosis and treatment plan and there has been no change. The patient was evaluated by me today. Physical exam findings for this update include: There were no vitals filed for this visit. Airway is intact  Chest: chest clear, no wheezing, rales, normal symmetric air entry, no tachypnea, retractions or cyanosis  Heart: regular rate and rhythm ; heart sounds normal  Findings on exam of the body region where surgery is to be performed include:  Right wrist pain/ displaced distal radius fracture.     Electronically signed by Pb Coker MD on 10/27/2022 at 6:39 AM

## 2022-10-27 NOTE — DISCHARGE INSTRUCTIONS
Post op instruction:  1- D/C home  2- Dx Right wrist pain/ displaced distal radius fracture. 3- NWB right wrist  4- Elevation surgical site, with ice  5- Keep splint dry and clean  6- F/U in 2 weeks. Ramandeep Interiano MD, 10/27/2022        ORTHOPEDIC DISCHARGE INSTRUCTIONS    Follow your surgeons instructions. Make follow-up appointment. Observe operative area for signs of excessive bleeding such as a slow general ooze that saturates the dressing or bright red bleeding. In either case, apply pressure to the area and elevate if possible and call your surgeon right away. Observe the affected extremity for circulation or nerve impairment such as a change in color, numbness, tingling, coldness or increased pain. If any of these symptoms are present call your surgeon. Observe operative site for any signs of infection such as increased pain, redness, fever greater than 101 degrees, swelling, foul odor or drainage. Contact surgeon if any of these symptoms are present. If you become short of breath call your surgeon or go to the nearest emergency room. Remove dressing if directed by surgeon. Leave steristips or sutures or staples in place. You may loosen your ace wrap if it feels too tight, or if you have severe pain, or if it has swelling. Elevate extremity as directed by surgeon. You may shower when directed by surgeon. Use ice pack as directed by surgeon. Do not use heat. Avoid stress to suture line such as pulling, pushing or tugging. Use sling as instructed by surgeon. Take medications as ordered. Take pain medication with food. Do not drive or operate machinery while taking narcotics. Call your surgeon for any questions or problems. ANESTHESIA DISCHARGE INSTRUCTIONS    Wear your seatbelt home.   You are under the influence of drugs-do not drink alcohol,drive,operate machinery,or make any important decisions or sign any legal documentsfor 24 hours  A responsible adult needs to be with you for 24 hours. You may experience lightheadedness,dizziness,or sleepiness following surgery. Rest at home today- increase activity as tolerated. Progress slowly to a regular diet unless your physician has instructed you otherwise. Drink plenty of water. If nausea becomes a problem call your physician. Coughing,sore throat,and muscle aches are other side effects of anesthesia,and should improve with time. Do not drive,operate machinery while taking narcotics.

## 2022-10-27 NOTE — ANESTHESIA PRE PROCEDURE
Department of Anesthesiology  Preprocedure Note       Name:  Harpreet Leo   Age:  80 y.o.  :  1930                                          MRN:  [id number]         Date:  10/27/2022      Surgeon: Chey Mccray):  Loreto Aldridge MD    Procedure: OPEN REDUCTION INTERNAL FIXATION RIGHT DISTAL RADIUS-GIGI (Right: Wrist)    Medications prior to admission:   Prior to Admission medications    Medication Sig Start Date End Date Taking? Authorizing Provider   levothyroxine (SYNTHROID) 75 MCG tablet Take 1 tablet by mouth Daily 10/20/22   Rajan Parker MD   lactulose (CHRONULAC) 10 GM/15ML solution TAKE 30 ML BY MOUTH TWICE A DAY  Patient taking differently: daily 10/17/22   Trinidad Sainz MD   alendronate (FOSAMAX) 70 MG tablet TAKE 1 TABLET BY MOUTH ONCE WEEKLY ON AN EMPTY STOMACH BEFORE BREAKFAST. REMAIN UPRIGHT FOR 30 MINUTES AND TAKE WITH 8 OUNCES OF WATER 22   Trinidad Sainz MD   DULoxetine (CYMBALTA) 30 MG extended release capsule Take one capsule by mouth daily 22   Rajan Parker MD   sertraline (ZOLOFT) 25 MG tablet TAKE ONE TABLET BY MOUTH DAILY 22   Rajan Parker MD   acetaminophen (TYLENOL) 500 MG tablet Take 1 tablet by mouth every 8 hours as needed for Pain 21   Trinidad Sainz MD   Caltrate 600+D Plus Minerals (CALTRATE) 600-800 MG-UNIT TABS tablet Take 1 tablet by mouth 2 times daily 20   Rajan Parker MD       Current medications:    No current outpatient medications on file. No current facility-administered medications for this visit. Allergies:     Allergies   Allergen Reactions    Metoprolol Hives     Severe rash and itching    Amlodipine Itching     Itching    Sulfa Antibiotics Nausea And Vomiting    Adhesive Tape Other (See Comments)     Skin tears very easily       Problem List:    Patient Active Problem List   Diagnosis Code    Nephrotic syndrome N04.9    Edema R60.9    Hypothyroid E03.9    Multiple joint pain M25.50    Age-related osteoporosis without current pathological fracture M81.0    Closed fracture of right femur ([address] S72.91XA    Closed displaced comminuted fracture of shaft of right femur ([address] S72.351A    Primary osteoarthritis of right knee M17.11    Gastroesophageal reflux disease K21.9    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 80XA    Primary osteoarthritis of left knee M17.12    Acute metabolic encephalopathy Q06.68    SHARAD (acute kidney injury) ([address] N17.9    Cirrhosis ([address] K74.60    Dementia with behavioral problem F03.918    Falls, initial encounter W19. Carmen Wright Urge incontinence N39.41    Falls frequently R29.6    Closed displaced fracture of proximal phalanx of right middle finger S62.612A    Open wound of arm, left, initial encounter S41.102A    Chronic liver failure without hepatic coma (HCC) K72.10    Fracture, [place], right, closed S52.531A       Past Medical History:        Diagnosis Date    Acute renal failure ([address] 11-27-12    Arthritis     At risk for falling 08/28/2018    Score of 11/24 for Dynamic Gait Index    Cirrhosis ([address] 9/25/2020    Dementia with behavioral problem 9/25/2020    Frequent falls     Gastroesophageal reflux disease 1/22/2019    Movement disorder     osteoporosis    Nephrotic syndrome 12/28/2012    Thyroid disease        Past Surgical History:        Procedure Laterality Date    COLONOSCOPY      EXCISION OF FACIAL MASS      skin ca    EYE SURGERY      cataract removal; bilat    HIP FRACTURE SURGERY Left 8/21/2019    OPEN REDUCTION INTERNAL FIXATION OF INTERTROCHANTERIC FRACTURE OF LEFT HIPUSING GAMMA NAIL performed by Janie Villarreal MD at . [place] [address] Right 05/27/2018    right gamma nail with cables    SKIN BIOPSY         Social History:    Social History     Tobacco Use    Smoking status: Never    Smokeless tobacco: Never   Substance Use Topics    Alcohol use: No                                Counseling given: Not Answered      Vital Signs (Current): There were no vitals filed for this visit. BP Readings from Last 3 Encounters:   10/19/22 120/78   06/09/22 118/70   03/09/22 119/78       NPO Status:                                                                                 BMI:   Wt Readings from Last 3 Encounters:   10/27/22 167 lb 11.2 oz (76.1 kg)   10/25/22 167 lb (75.8 kg)   10/19/22 167 lb 9.6 oz (76 kg)     There is no height or weight on file to calculate BMI.    CBC:   Lab Results   Component Value Date/Time    WBC 5.4 10/19/2022 01:40 PM    RBC 4.56 10/19/2022 01:40 PM    HGB 14.5 10/19/2022 01:40 PM    HCT 43.4 10/19/2022 01:40 PM    MCV 95.1 10/19/2022 01:40 PM    RDW 13.8 10/19/2022 01:40 PM     10/19/2022 01:40 PM       CMP:   Lab Results   Component Value Date/Time     03/09/2022 03:19 PM    K 4.5 03/09/2022 03:19 PM    K 4.1 06/03/2021 05:31 AM     03/09/2022 03:19 PM    CO2 26 03/09/2022 03:19 PM    BUN 21 03/09/2022 03:19 PM    CREATININE 1.1 03/09/2022 03:19 PM    GFRAA 56 03/09/2022 03:19 PM    GFRAA >60 04/10/2013 01:00 PM    AGRATIO 1.3 03/09/2022 03:19 PM    LABGLOM 46 03/09/2022 03:19 PM    GLUCOSE 89 03/09/2022 03:19 PM    PROT 6.2 10/19/2022 01:40 PM    PROT 7.2 03/04/2013 06:50 PM    CALCIUM 9.3 03/09/2022 03:19 PM    BILITOT 0.6 10/19/2022 01:40 PM    ALKPHOS 76 10/19/2022 01:40 PM    AST 19 10/19/2022 01:40 PM    ALT 13 10/19/2022 01:40 PM       POC Tests: No results for input(s): POCGLU, POCNA, POCK, POCCL, POCBUN, POCHEMO, POCHCT in the last 72 hours.     Coags:   Lab Results   Component Value Date/Time    PROTIME 13.5 10/19/2022 01:40 PM    INR 1.04 10/19/2022 01:40 PM    APTT 30.1 12/12/2020 02:31 AM       HCG (If Applicable): No results found for: PREGTESTUR, PREGSERUM, HCG, HCGQUANT     ABGs: No results found for: PHART, PO2ART, CLI0TYG, EGO8SQE, BEART, L9PZOLTS     Type & Screen (If Applicable):  No results found for: LABABO, [address]    Anesthesia Evaluation  Patient summary reviewed and Nursing notes reviewed no history of anesthetic complications:   Airway: Mallampati: II  TM distance: >3 FB   Neck ROM: full  Mouth opening: > = 3 FB   Dental: normal exam   (+) upper dentures and lower dentures      Pulmonary: breath sounds clear to auscultation      (-) asthma and shortness of breath                           Cardiovascular:        (-) hypertension, CABG/stent, dysrhythmias and  angina      Rhythm: regular  Rate: normal                    Neuro/Psych:      (-) seizures, TIA and CVA            ROS comment: Uses walker GI/Hepatic/Renal:   (+) GERD:, liver disease:,          ROS comment: Patient denies acid reflux symptoms. Endo/Other:    (+) hypothyroidism: arthritis: OA., .    (-) diabetes mellitus               Abdominal:             Vascular:     - PVD. Other Findings:             Anesthesia Plan      general     ASA 3       Induction: intravenous. MIPS: Postoperative opioids intended and Prophylactic antiemetics administered. Anesthetic plan and risks discussed with patient. Use of blood products discussed with patient whom. Plan discussed with CRNA.                     Sharri Escalera MD   10/27/2022

## 2022-10-27 NOTE — BRIEF OP NOTE
Brief Postoperative Note      Patient: Helen Davies  YOB: 1930  MRN: [id number]    Date of Procedure: 10/27/2022    Pre-Op Diagnosis: S52.501A RIGHT DISTAL RADIUS FRACTURE    Post-Op Diagnosis: Same       Procedure(s):  OPEN REDUCTION INTERNAL FIXATION RIGHT DISTAL RADIUS-GIGI    Surgeon(s):  Mary Dejesus MD    Assistant: Valerie Langford CNP. Anesthesia: General    Estimated Blood Loss (mL): Minimal    Complications: None    Specimens:   * No specimens in log *    Implants:  Implant Name Type Inv. Item Serial No.  Lot No. LRB No. Used Action   VOLAR DR PLATE INTERMEDIATE RIGHT 10 HOLES EXTRASHORT - [id number]  VOLAR DR PLATE INTERMEDIATE RIGHT 10 HOLES EXTRASHORT  [employer] [facility]  Right 1 Implanted   SCREW BNE L12MM OD27MM ST GRICELDA FULL THRD T8 DR - [id number]  SCREW BNE L12MM OD27MM ST GRICELDA FULL THRD T8 [facility]  [facility]  Right 1 Implanted   SCREW BNE L16MM OD27MM ST GRICELDA FULL THRD T8 DRV - [id number]  SCREW BNE L16MM OD27MM ST GRICELDA FULL THRD T8 DR  [employer]-  Right 1 Implanted   SCREW BNE L18MM OD27MM ST GRICELDA FULL THRD T8 DRV - [id number]  SCREW BNE L18MM OD27MM ST GRICELDA FULL THRD T8 DR  [employer]-  Right 4 Implanted   SCREW BNE L20MM OD27MM ST GRICELDA FULL THRD T8 DRV - [id number]  SCREW BNE L20MM OD27MM ST GRICELDA FULL THRD T8 DR  [employer]-  Right 2 Implanted   SCREW BNE L14MM OD27MM ST FULL THRD T8 DRV - [id number]  SCREW BNE L14MM OD27MM ST FULL THRD T8 DR  [employer]-  Right 1 Implanted         Drains: * No LDAs found *    Findings: Same.     Electronically signed by Mary Dejesus MD on 10/27/2022 at 1:01 PM

## 2022-10-27 NOTE — PROGRESS NOTES
Discharge instructions reviewed with patient/daughter. All home medications have been reviewed, questions answered and patient verbalized understanding. Discharge instructions signed. Daughter states that the pt already has a sling. Pt dc'd per wheelchair. Patient discharged home with belongings. Daughter taking stable pt home.

## 2022-10-27 NOTE — PROGRESS NOTES
Pt arrived from OR to PACU bay 4. Report received from OR staff. Pt arouses to voice. Surgical dressing/splint in place to right arm. Pt on 4 L simple mask, NSR, VSS. Will continue to monitor.

## 2022-10-27 NOTE — ANESTHESIA POSTPROCEDURE EVALUATION
Department of Anesthesiology  Postprocedure Note    Patient: Linda Mercado  MRN: [id number]  YOB: 1930  Date of evaluation: 10/27/2022      Procedure Summary     Date: 10/27/22 Room / Location: [address]    Anesthesia Start: 0700 Anesthesia Stop: 9373    Procedure: OPEN REDUCTION INTERNAL FIXATION RIGHT DISTAL RADIUS-GIGI (Right: Wrist) Diagnosis:       Closed fracture of distal end of right radius, unspecified fracture morphology, initial encounter      (S52.501A RIGHT DISTAL RADIUS FRACTURE)    Surgeons: Angela Abdi MD Responsible Provider: Jess Bazzi MD    Anesthesia Type: general ASA Status: 3          Anesthesia Type: No value filed.     Gab Phase I: Gab Score: 8    Gab Phase II:        Anesthesia Post Evaluation    Patient location during evaluation: PACU  Patient participation: complete - patient participated  Level of consciousness: awake and alert  Airway patency: patent  Nausea & Vomiting: no nausea and no vomiting  Complications: no  Cardiovascular status: hemodynamically stable  Respiratory status: acceptable  Hydration status: stable  Multimodal analgesia pain management approach

## 2022-10-28 NOTE — OP NOTE
[facility]                     [address] [facility], [address] [facility]                                OPERATIVE REPORT    PATIENT NAME: Elvin Allen                    :        1930  MED REC NO:   [id number]                          ROOM:  ACCOUNT NO:   [de-identified]                           ADMIT DATE: 10/27/2022  PROVIDER:     Jane Real MD    DATE OF PROCEDURE:  10/27/2022    PRIMARY CARE PROVIDER:  TUNDE Ramos MD.    PREOPERATIVE DIAGNOSIS:  Right distal radius two-part intraarticular  displaced fracture. POSTOPERATIVE DIAGNOSIS:  Right distal radius two-part intraarticular  displaced fracture. OPERATION PERFORMED:  Open treatment of right distal radius two-part  intraarticular fracture with open reduction and internal fixation. SURGEON:  Jane Real MD.    ASSISTANT:  Patricia Warren CNP. ANESTHESIA:  General anesthesia. ESTIMATED BLOOD LOSS:  Minimal.    COMPLICATIONS:  None. TOURNIQUET:  Right upper arm, 250 mmHg. IMPLANTS USED:  Alise Titanium intermediate volar locking plate, a  total of seven distal 2.7 locking screws and two proximal screws. INDICATIONS:  This is a 41-year-old white female, still fairly active  for her age, who sustained a fall with right wrist injury. She is  right-hand dominant. She was found to have a displaced distal radius  fracture. All risks, benefits, and alternatives were discussed with the  patient and her daughter, and they agreed to proceed with the surgical  fixation. DESCRIPTION OF PROCEDURE:  The patient's right wrist was marked. She  received 2 gm of Ancef IV preoperatively. She was then brought to the  operating room and underwent general anesthesia. A well-padded  tourniquet was placed to the right upper arm. The right upper extremity  was then prepped and draped in a regular sterile routine fashion. A  time-out was called, confirming the patient name, site, and procedure.     An Esmarch was used for exsanguination. The tourniquet was inflated to  250 mmHg. A volar approach was performed. An incision was made over  the FCR tendon. The tendon sheath was opened. We incised the pronator  quadratus muscle with the cautery. We then exposed the fracture and  found it to be a two-part intraarticular fracture. We carefully were  able to reduce the fracture anatomically in place. While maintaining  the reduction, we put the plate in the appropriate position. We then  put one screw in the oblong hole and then we fine tuned the position of  the plate. We then reduced the fracture to the plate and locked it with a total of  seven distal 2.7 locking screws. We added one more locking screw in the  shaft. Overall, we were very satisfied with the anatomic reduction and  position of all of the screws. At this point, we let the tourniquet  down and hemostasis was secured. We irrigated the incision copiously  with normal saline. We closed the subcu with a 3-0 Vicryl and the skin  with a 4-0 Monocryl. Steri-Strips were then applied. Dressing was then  applied in the form of Xeroform, 4x4's, sterile Webril, and a volar  splint was applied. The patient tolerated the procedure well and was taken to the recovery  in stable condition. Mary Hunt CNP was 1st Assist given the nature of the procedure that needed advanced assistance. She assisted in all aspect of the procedure, including but limited to draping in a sterile fashion, exposure of surgical area, controlling bleeding, retracting and protecting important structures, achieve and maintain bone reduction and surgical wound closure with dressing application. POSTOPERATIVE PLAN:  The patient will be discharged home. She was  encouraged to start immediate range of motion of the fingers and then in  two weeks, we can start range of motion of the wrist, but  nonweightbearing for 6 weeks.         Faiza Durán MD    D: 10/27/2022 13:11:30 T: 10/27/2022 21:29:43     [email or web address]  Job#: [id number]     Doc#: [id number]    CC:  TUNDE Brumfield MD

## 2022-11-04 ENCOUNTER — TELEPHONE (OUTPATIENT)
Dept: INTERNAL MEDICINE CLINIC | Age: 87
End: 2022-11-04

## 2022-11-04 RX ORDER — LACTULOSE 10 G/15ML
20 SOLUTION ORAL
COMMUNITY
Start: 2022-11-04

## 2022-11-04 NOTE — TELEPHONE ENCOUNTER
Daughter called in regarding mother. She is going to [facility] and needs her latest physical information, medication list faxed to [phone] -Shaun Rodgers. Please call Yaw Sequeira call to confirm fax was sent at [phone]. Any questions please reach out to Yaw Sequeira at that number. She know its last minute but if they dont have it by Monday the contract cant be completed.

## 2022-11-04 NOTE — TELEPHONE ENCOUNTER
Pts daughter needs clarification on dosage of lactulose, its says twice a  day but was changed to Once a day. she is going to [facility] next week.

## 2022-11-08 ENCOUNTER — OFFICE VISIT (OUTPATIENT)
Dept: ORTHOPEDIC SURGERY | Age: 87
End: 2022-11-08
Payer: MEDICARE

## 2022-11-08 VITALS — HEIGHT: 65 IN | BODY MASS INDEX: 27.82 KG/M2 | WEIGHT: 167 LBS

## 2022-11-08 DIAGNOSIS — S52.531A CLOSED COLLES' FRACTURE OF RIGHT RADIUS, INITIAL ENCOUNTER: Primary | ICD-10-CM

## 2022-11-08 PROCEDURE — L3908 WHO COCK-UP NONMOLDE PRE OTS: HCPCS | Performed by: NURSE PRACTITIONER

## 2022-11-08 PROCEDURE — APPNB15 APP NON BILLABLE TIME 0-15 MINS: Performed by: NURSE PRACTITIONER

## 2022-11-08 PROCEDURE — 99024 POSTOP FOLLOW-UP VISIT: CPT | Performed by: NURSE PRACTITIONER

## 2022-11-08 NOTE — PROGRESS NOTES
DIAGNOSIS:  Right distal radius 2 parts intra articular displaced fracture, status post ORIF. DATE OF SURGERY: 10/27/2022. HISTORY OF PRESENT ILLNESS:  Ms. Beto Cuevas 80 y.o.  female right handed returns today  for 2 weeks postoperative visit. The patient denies any significant pain in the right wrist.  Rates pain a 5/10 VAS moderate, aching, throbbing, intermittent and are improving. Aggravating factors movement. Alleviating factors rest.No numbness or tingling sensation. Pain has been controlled with Tylenol and she only needed 2 pain pills since surgery. No fever or Chills. She  is in a splint. She normally lives with her daughter, but her daughter is going out of town for a few days, so she will be an ECF with respite care. Denies smoking. She normally ambulates with a walker and has a platform to use when ambulating. PHYSICAL EXAMINATION:  The incision is completely healed . No signs of any erythema or drainage. She  has no pain with the active or passive range of motion of the right wrist, but decrease ROM. She  has intact sensation, distally, and she  is neurovascularly intact. IMAGING:  Three views right wrist taken today in the office showed anatomic alignment of right distal radius, plate and screws in good position, no loosening. Right thumb CMC arthritis. IMPRESSION:  2 weeks out from right distal radius 2 parts intra-articular displaced fracture, ORIF and doing very well. PLAN:  I have told the patient to work on ROM, as well as strengthening exercises. A removable forearm brace applied. No heavy impact activities. The patient will come back for a follow up in 6 weeks. At that time, we will take 3 views of the right wrist. PT if needed then.     As this patient has demonstrated risk factors for osteoporosis, such as age greater than [de-identified] years and evidence of a fracture, I have referred the patient back to the primary care physician for evaluation for osteoporosis, including consideration for DEXA scanning, if this is felt to be clinically indicated. The patient is advised to contact the primary care physician to follow-up for further evaluation. Procedures    Angela Douglas Titan Wrist Long Forearm Brace     Patient was prescribed a Angela Douglas Titan Wrist and Forearm Brace. The right wrist will require stabilization / immobilization from this semi-rigid / rigid orthosis to improve their function. The orthosis will assist in protecting the affected area, provide functional support and facilitate healing. The patient was educated and fit by a healthcare professional with expert knowledge and specialization in brace application while under the direct supervision of the treating physician. Verbal and written instructions for the use of and application of this item were provided. They were instructed to contact the office immediately should the brace result in increased pain, decreased sensation, increased swelling or worsening of the condition.          Sruthi Lincoln, APRN - CNP

## 2022-11-10 ENCOUNTER — NURSE ONLY (OUTPATIENT)
Dept: INTERNAL MEDICINE CLINIC | Age: 87
End: 2022-11-10
Payer: MEDICARE

## 2022-11-10 DIAGNOSIS — Z23 NEED FOR HEPATITIS A AND B VACCINATION: Primary | ICD-10-CM

## 2022-11-10 PROCEDURE — 90632 HEPA VACCINE ADULT IM: CPT | Performed by: INTERNAL MEDICINE

## 2022-11-10 PROCEDURE — 90471 IMMUNIZATION ADMIN: CPT | Performed by: INTERNAL MEDICINE

## 2022-11-10 PROCEDURE — 90746 HEPB VACCINE 3 DOSE ADULT IM: CPT | Performed by: INTERNAL MEDICINE

## 2022-11-10 PROCEDURE — G0010 ADMIN HEPATITIS B VACCINE: HCPCS | Performed by: INTERNAL MEDICINE

## 2022-11-21 ENCOUNTER — CARE COORDINATION (OUTPATIENT)
Dept: CASE MANAGEMENT | Age: 87
End: 2022-11-21

## 2022-11-21 NOTE — CARE COORDINATION
[facility] Discharge Call    2022    Patient: Mack Lazar Patient : 1930   MRN: [id number]  Reason for Admission: R radius ORIF  Discharge Date: 10/27/22 RARS: No data recorded       Discharge Facility: [address] [place] [address] Care Course:   10/27 [place] for a ORIF of R radius  [place] from 11/10 to  with d/c to home      HFU made:   ortho f/u    Sig Hx:   Dementia, falls, cirrhosis of liver, depression     DME:     Conversation:   Left HIPPA compliant message regarding the nature of the call and a request for a return call with my contact information for daughter, Girishtariq Fer, BSN, [phone]  [facility] Transition Nurse       Asya Cifuentes called me back and after 2 IDs. Stated mom is ding really wll and is able to take brace off for the night. She has a walker that allows her to rest her arm. She is not in pain and has a good appetite. Lacutlose keeping constipation from being an issue. Follow up plan: Will close as this is a respite and not from the [facility] Transition      Do you have all of your prescriptions and are they filled?: Yes   Patient Reports: Daughter is picking up new ones today.          Do you have support at home?: Child   Patient DME: Shower chair, Brenda White, Other   Other Patient DME: TRIP[facility] Zoey Dee, [address] 3/9/22   Care Transitions Interventions         Future Appointments   Date Time Provider Marco Barreto   2022  1:15 PM PINEDA Oneil - CNP [facility]   2023  1:00 PM MILLICENT Franco   Adarsh Johnson, BSN, [facility] Transition Nurse  [phone]

## 2022-11-28 RX ORDER — SERTRALINE HYDROCHLORIDE 25 MG/1
TABLET, FILM COATED ORAL
Qty: 30 TABLET | Refills: 5 | Status: SHIPPED | OUTPATIENT
Start: 2022-11-28

## 2022-12-05 ENCOUNTER — HOSPITAL ENCOUNTER (EMERGENCY)
Age: 87
Discharge: HOME | End: 2022-12-05
Payer: MEDICARE

## 2022-12-05 ENCOUNTER — TELEPHONE (OUTPATIENT)
Dept: INTERNAL MEDICINE CLINIC | Age: 87
End: 2022-12-05

## 2022-12-05 ENCOUNTER — APPOINTMENT (OUTPATIENT)
Dept: CT IMAGING | Age: 87
End: 2022-12-05
Payer: MEDICARE

## 2022-12-05 ENCOUNTER — APPOINTMENT (OUTPATIENT)
Dept: GENERAL RADIOLOGY | Age: 87
End: 2022-12-05
Payer: MEDICARE

## 2022-12-05 VITALS
TEMPERATURE: 97.7 F | RESPIRATION RATE: 13 BRPM | DIASTOLIC BLOOD PRESSURE: 59 MMHG | OXYGEN SATURATION: 98 % | HEIGHT: 65 IN | SYSTOLIC BLOOD PRESSURE: 147 MMHG | BODY MASS INDEX: 27.82 KG/M2 | HEART RATE: 64 BPM | WEIGHT: 167 LBS

## 2022-12-05 DIAGNOSIS — R29.6 FREQUENT FALLS: Primary | ICD-10-CM

## 2022-12-05 DIAGNOSIS — S51.812A SKIN TEAR OF FOREARM WITHOUT COMPLICATION, LEFT, INITIAL ENCOUNTER: ICD-10-CM

## 2022-12-05 DIAGNOSIS — Z86.59 HISTORY OF DEMENTIA: ICD-10-CM

## 2022-12-05 LAB
A/G RATIO: 1 (ref 1.1–2.2)
ALBUMIN SERPL-MCNC: 3.3 G/DL (ref 3.4–5)
ALP BLD-CCNC: 109 U/L (ref 40–129)
ALT SERPL-CCNC: 11 U/L (ref 10–40)
ANION GAP SERPL CALCULATED.3IONS-SCNC: 10 MMOL/L (ref 3–16)
AST SERPL-CCNC: 18 U/L (ref 15–37)
BASOPHILS ABSOLUTE: 0.1 K/UL (ref 0–0.2)
BASOPHILS RELATIVE PERCENT: 1.2 %
BILIRUB SERPL-MCNC: 0.4 MG/DL (ref 0–1)
BILIRUBIN URINE: NEGATIVE
BLOOD, URINE: NEGATIVE
BUN BLDV-MCNC: 27 MG/DL (ref 7–20)
CALCIUM SERPL-MCNC: 9.4 MG/DL (ref 8.3–10.6)
CHLORIDE BLD-SCNC: 105 MMOL/L (ref 99–110)
CLARITY: CLEAR
CO2: 27 MMOL/L (ref 21–32)
COLOR: YELLOW
CREAT SERPL-MCNC: 0.9 MG/DL (ref 0.6–1.2)
EOSINOPHILS ABSOLUTE: 0.1 K/UL (ref 0–0.6)
EOSINOPHILS RELATIVE PERCENT: 1.2 %
GFR SERPL CREATININE-BSD FRML MDRD: 60 ML/MIN/{1.73_M2}
GLUCOSE BLD-MCNC: 85 MG/DL (ref 70–99)
GLUCOSE URINE: NEGATIVE MG/DL
HCT VFR BLD CALC: 42.5 % (ref 36–48)
HEMOGLOBIN: 14.1 G/DL (ref 12–16)
KETONES, URINE: ABNORMAL MG/DL
LEUKOCYTE ESTERASE, URINE: NEGATIVE
LYMPHOCYTES ABSOLUTE: 1.7 K/UL (ref 1–5.1)
LYMPHOCYTES RELATIVE PERCENT: 21.5 %
MCH RBC QN AUTO: 31.6 PG (ref 26–34)
MCHC RBC AUTO-ENTMCNC: 33.3 G/DL (ref 31–36)
MCV RBC AUTO: 94.8 FL (ref 80–100)
MICROSCOPIC EXAMINATION: ABNORMAL
MONOCYTES ABSOLUTE: 0.9 K/UL (ref 0–1.3)
MONOCYTES RELATIVE PERCENT: 10.8 %
NEUTROPHILS ABSOLUTE: 5.2 K/UL (ref 1.7–7.7)
NEUTROPHILS RELATIVE PERCENT: 65.3 %
NITRITE, URINE: NEGATIVE
PDW BLD-RTO: 13.4 % (ref 12.4–15.4)
PH UA: 6 (ref 5–8)
PLATELET # BLD: 217 K/UL (ref 135–450)
PMV BLD AUTO: 9.1 FL (ref 5–10.5)
POTASSIUM REFLEX MAGNESIUM: 4.4 MMOL/L (ref 3.5–5.1)
PROTEIN UA: NEGATIVE MG/DL
RAPID INFLUENZA  B AGN: NEGATIVE
RAPID INFLUENZA A AGN: NEGATIVE
RBC # BLD: 4.48 M/UL (ref 4–5.2)
SARS-COV-2, NAAT: NOT DETECTED
SODIUM BLD-SCNC: 142 MMOL/L (ref 136–145)
SPECIFIC GRAVITY UA: 1.02 (ref 1–1.03)
TOTAL PROTEIN: 6.5 G/DL (ref 6.4–8.2)
URINE REFLEX TO CULTURE: ABNORMAL
URINE TYPE: ABNORMAL
UROBILINOGEN, URINE: 1 E.U./DL
WBC # BLD: 7.9 K/UL (ref 4–11)

## 2022-12-05 PROCEDURE — 80053 COMPREHEN METABOLIC PANEL: CPT

## 2022-12-05 PROCEDURE — 6360000002 HC RX W HCPCS: Performed by: GENERAL ACUTE CARE HOSPITAL

## 2022-12-05 PROCEDURE — 81003 URINALYSIS AUTO W/O SCOPE: CPT

## 2022-12-05 PROCEDURE — 87804 INFLUENZA ASSAY W/OPTIC: CPT

## 2022-12-05 PROCEDURE — 72125 CT NECK SPINE W/O DYE: CPT

## 2022-12-05 PROCEDURE — 6370000000 HC RX 637 (ALT 250 FOR IP): Performed by: GENERAL ACUTE CARE HOSPITAL

## 2022-12-05 PROCEDURE — 73090 X-RAY EXAM OF FOREARM: CPT

## 2022-12-05 PROCEDURE — 71045 X-RAY EXAM CHEST 1 VIEW: CPT

## 2022-12-05 PROCEDURE — 70450 CT HEAD/BRAIN W/O DYE: CPT

## 2022-12-05 PROCEDURE — 99284 EMERGENCY DEPT VISIT MOD MDM: CPT

## 2022-12-05 PROCEDURE — 85025 COMPLETE CBC W/AUTO DIFF WBC: CPT

## 2022-12-05 PROCEDURE — 90471 IMMUNIZATION ADMIN: CPT | Performed by: GENERAL ACUTE CARE HOSPITAL

## 2022-12-05 PROCEDURE — 87635 SARS-COV-2 COVID-19 AMP PRB: CPT

## 2022-12-05 PROCEDURE — 90714 TD VACC NO PRESV 7 YRS+ IM: CPT | Performed by: GENERAL ACUTE CARE HOSPITAL

## 2022-12-05 RX ORDER — ACETAMINOPHEN 500 MG
1000 TABLET ORAL ONCE
Status: COMPLETED | OUTPATIENT
Start: 2022-12-05 | End: 2022-12-05

## 2022-12-05 RX ORDER — BACITRACIN ZINC AND POLYMYXIN B SULFATE 500; 1000 [USP'U]/G; [USP'U]/G
OINTMENT TOPICAL
Status: DISCONTINUED
Start: 2022-12-05 | End: 2022-12-05 | Stop reason: HOSPADM

## 2022-12-05 RX ADMIN — CLOSTRIDIUM TETANI TOXOID ANTIGEN (FORMALDEHYDE INACTIVATED) AND CORYNEBACTERIUM DIPHTHERIAE TOXOID ANTIGEN (FORMALDEHYDE INACTIVATED) 0.5 ML: 5; 2 INJECTION, SUSPENSION INTRAMUSCULAR at 10:39

## 2022-12-05 RX ADMIN — ACETAMINOPHEN 1000 MG: 500 TABLET ORAL at 10:37

## 2022-12-05 ASSESSMENT — LIFESTYLE VARIABLES: HOW OFTEN DO YOU HAVE A DRINK CONTAINING ALCOHOL: NEVER

## 2022-12-05 ASSESSMENT — PAIN - FUNCTIONAL ASSESSMENT: PAIN_FUNCTIONAL_ASSESSMENT: NONE - DENIES PAIN

## 2022-12-05 NOTE — ED PROVIDER NOTES
[facility]        Pt Name: Teofilo Aviles  MRN: [id number]  [place] 4/13/1930  Date of evaluation: 12/5/2022  Provider: PINEDA Paniagua CNP  PCP: Ludy Snyder MD  Note Started: 2:15 PM EST 12/5/22          THU. I have evaluated this patient. My supervising physician was available for consultation. CHIEF COMPLAINT       Chief Complaint   Patient presents with    Fall     From home via EMS cc fall this morning. Laceration to left arm, patient unsure about head strike (poor historian due to dementia). Patient states no pain at triage to any body part. HISTORY OF PRESENT ILLNESS   (Location, Timing/Onset, Context/Setting, Quality, Duration, Modifying Factors, Severity, Associated Signs and Symptoms)  Note limiting factors. Chief Complaint: Fall, left forearm laceration    Teofilo Aviles is a 80 y.o. female who presents a for evaluation of a left forearm laceration sustained after an alleged fall. Patient with history of dementia. She lives at home with her daughter. Daughter states that she heard patient call out and found patient on the floor. Patient reports following. It is unsure if patient hit her head or not. She is not anticoagulated. Patient ambulates with a walker and was using her walker at the time. Daughter states that patient does have history of frequent falls. Daughter states that patient seems to be acting like her \"usual self\". Patient was able to be assisted up and has been able to ambulate. She denies having any pain. There has been no recent travel or known sick contacts. There has been no fever, chills, or other symptoms. Nursing Notes were all reviewed and agreed with or any disagreements were addressed in the HPI. REVIEW OF SYSTEMS    (2-9 systems for level 4, 10 or more for level 5)     Review of Systems   Constitutional:  Negative for chills, fatigue and fever.    HENT:  Negative for congestion, sore throat and voice change. Eyes:  Negative for visual disturbance. Respiratory:  Negative for cough, chest tightness, shortness of breath and wheezing. Cardiovascular:  Negative for chest pain and palpitations. Gastrointestinal:  Negative for abdominal pain, nausea and vomiting. Endocrine: Negative for polydipsia and polyuria. Genitourinary:  Negative for difficulty urinating, dysuria and flank pain. Musculoskeletal:  Positive for gait problem. Negative for back pain, neck pain and neck stiffness. Skin:  Positive for wound. Negative for rash. Allergic/Immunologic: Negative for immunocompromised state. Neurological:  Negative for dizziness, weakness and light-headedness. Hematological:  Does not bruise/bleed easily. Psychiatric/Behavioral:  Positive for agitation, confusion and decreased concentration. Negative for sleep disturbance and suicidal ideas. History of dementia     Positives and Pertinent negatives as per HPI. Except as noted above in the ROS, all other systems were reviewed and negative.        PAST MEDICAL HISTORY     Past Medical History:   Diagnosis Date    Acute renal failure ([address] 11-27-12    Arthritis     At risk for falling 08/28/2018    Score of 11/24 for Dynamic Gait Index    Cirrhosis ([address] 9/25/2020    Dementia with behavioral problem 9/25/2020    Frequent falls     Gastroesophageal reflux disease 1/22/2019    Movement disorder     osteoporosis    Nephrotic syndrome 12/28/2012    Thyroid disease          SURGICAL HISTORY     Past Surgical History:   Procedure Laterality Date    COLONOSCOPY      EXCISION OF FACIAL MASS      skin ca    EYE SURGERY      cataract removal; bilat    FOREARM SURGERY Right 10/27/2022    OPEN REDUCTION INTERNAL FIXATION RIGHT DISTAL RADIUS-GIGI performed by Vlad Khan MD at [facility] Left 8/21/2019    OPEN REDUCTION INTERNAL FIXATION OF INTERTROCHANTERIC FRACTURE OF LEFT HIPUSING GAMMA NAIL performed by Andrew Thompson MD at [address] Right 05/27/2018    right gamma nail with cables    SKIN BIOPSY           CURRENTMEDICATIONS       Discharge Medication List as of 12/5/2022  4:14 PM        CONTINUE these medications which have NOT CHANGED    Details   sertraline (ZOLOFT) 25 MG tablet TAKE ONE TABLET BY MOUTH DAILY, Disp-30 tablet, R-5Normal      lactulose (CHRONULAC) 10 GM/15ML solution Take 30 mLs by mouthHistorical Med      levothyroxine (SYNTHROID) 75 MCG tablet Take 1 tablet by mouth Daily, Disp-30 tablet, R-5Normal      alendronate (FOSAMAX) 70 MG tablet TAKE 1 TABLET BY MOUTH ONCE WEEKLY ON AN EMPTY STOMACH BEFORE BREAKFAST.  REMAIN UPRIGHT FOR 30 MINUTES AND TAKE WITH 8 OUNCES OF WATER, Disp-4 tablet, R-5Normal      DULoxetine (CYMBALTA) 30 MG extended release capsule Take one capsule by mouth daily, Disp-30 capsule, R-5Normal      acetaminophen (TYLENOL) 500 MG tablet Take 1 tablet by mouth every 8 hours as needed for Pain, Disp-120 tablet, R-0Normal      Caltrate 600+D Plus Minerals (CALTRATE) 600-800 MG-UNIT TABS tablet Take 1 tablet by mouth 2 times daily, Disp-180 tablet,R-1Normal               ALLERGIES     Metoprolol, Amlodipine, Sulfa antibiotics, and Adhesive tape    FAMILYHISTORY       Family History   Problem Relation Age of Onset    High Blood Pressure Mother     Cancer Father         lung cancer; metastic    Cancer Daughter 52        colon cancer    Cancer Brother     Cancer Brother     Diabetes Brother           SOCIAL HISTORY       Social History     Tobacco Use    Smoking status: Never    Smokeless tobacco: Never   Vaping Use    Vaping Use: Never used   Substance Use Topics    Alcohol use: No    Drug use: No       SCREENINGS    Randolph Coma Scale  Eye Opening: Spontaneous  Best Verbal Response: Confused (Confusion at basline due to dementia)  Best Motor Response: Obeys commands  Trudy Coma Scale Score: 14        PHYSICAL EXAM    (up to 7 for level 4, 8 or more for level 5)     ED Triage Vitals [12/05/22 1004]   BP Temp Temp Source Heart Rate Resp SpO2 Height Weight   130/70 97.7 °F (36.5 °C) Oral 67 16 92 % 5' 5\" (1.651 m) 167 lb (75.8 kg)       Physical Exam  Vitals and nursing note reviewed. Constitutional:       General: She is not in acute distress. Appearance: Normal appearance. She is ill-appearing. HENT:      Head: Normocephalic and atraumatic. Right Ear: Tympanic membrane, ear canal and external ear normal.      Left Ear: Tympanic membrane, ear canal and external ear normal.      Nose: Nose normal. No congestion. Mouth/Throat:      Mouth: Mucous membranes are moist.      Pharynx: Oropharynx is clear. Eyes:      General:         Right eye: No discharge. Left eye: No discharge. Extraocular Movements: Extraocular movements intact. Conjunctiva/sclera: Conjunctivae normal.      Pupils: Pupils are equal, round, and reactive to light. Cardiovascular:      Rate and Rhythm: Normal rate and regular rhythm. Pulses: Normal pulses. Heart sounds: Normal heart sounds. Pulmonary:      Effort: Pulmonary effort is normal. No respiratory distress. Breath sounds: Normal breath sounds. Abdominal:      General: Bowel sounds are normal.      Palpations: Abdomen is soft. Tenderness: There is no abdominal tenderness. There is no right CVA tenderness or left CVA tenderness. Musculoskeletal:         General: Normal range of motion. Arms:       Cervical back: Normal range of motion and neck supple. No rigidity or tenderness. Right lower leg: No edema. Left lower leg: No edema. Comments: Approximate 7 cm irregular skin tear noted to the dorsum of the left forearm. There is no active bleeding. Left upper extremity range of motion intact. There is mild tenderness. Left upper extremity neurovascular status intact. Skin:     General: Skin is warm and dry.       Capillary Refill: Capillary refill takes less than 2 seconds. Neurological:      General: No focal deficit present. Mental Status: She is alert and oriented to person, place, and time. Psychiatric:         Mood and Affect: Mood normal.         Behavior: Behavior normal.         Thought Content: Thought content normal.         Judgment: Judgment normal.       DIAGNOSTIC RESULTS   LABS:    Labs Reviewed   COMPREHENSIVE METABOLIC PANEL W/ REFLEX TO MG FOR LOW K - Abnormal; Notable for the following components:       Result Value    BUN 27 (*)     Est, Glom Filt Rate 60 (*)     Albumin 3.3 (*)     Albumin/Globulin Ratio 1.0 (*)     All other components within normal limits   URINALYSIS WITH REFLEX TO CULTURE - Abnormal; Notable for the following components:    Ketones, Urine TRACE (*)     All other components within normal limits   RAPID INFLUENZA A/B ANTIGENS   COVID-19, RAPID   CBC WITH AUTO DIFFERENTIAL       When ordered only abnormal lab results are displayed. All other labs were within normal range or not returned as of this dictation. EKG: When ordered, EKG's are interpreted by the Emergency Department Physician in the absence of a cardiologist.  Please see their note for interpretation of EKG. RADIOLOGY:   Non-plain film images such as CT, Ultrasound and MRI are read by the radiologist. Plain radiographic images are visualized and preliminarily interpreted by the ED Provider with the below findings:        Interpretation per the Radiologist below, if available at the time of this note:    XR CHEST PORTABLE   Final Result   Unchanged mild chronic interstitial thickening in the lungs. Bibasilar   atelectasis. No pneumothorax. No displaced rib fractures         XR RADIUS ULNA LEFT (2 VIEWS)   Final Result   No acute radiographic abnormality. CT CERVICAL SPINE WO CONTRAST   Final Result   No acute abnormality of the cervical spine. Severe multilevel degenerative   changes.          CT HEAD WO CONTRAST   Final Result   No acute intracranial abnormality. XR RADIUS ULNA LEFT (2 VIEWS)    Result Date: 12/5/2022  EXAMINATION: TWO XRAY VIEWS OF THE LEFT FOREARM 12/5/2022 11:08 am COMPARISON: None. HISTORY: ORDERING SYSTEM PROVIDED HISTORY: fall/injury TECHNOLOGIST PROVIDED HISTORY: Reason for exam:->fall/injury Reason for Exam: fall/injury FINDINGS: No evidence of acute fracture or dislocation involving the left radius or ulna. No acute radiographic abnormality. CT HEAD WO CONTRAST    Result Date: 12/5/2022  EXAMINATION: CT OF THE HEAD WITHOUT CONTRAST  12/5/2022 10:40 am TECHNIQUE: CT of the head was performed without the administration of intravenous contrast. Automated exposure control, iterative reconstruction, and/or weight based adjustment of the mA/kV was utilized to reduce the radiation dose to as low as reasonably achievable. COMPARISON: 06/02/2021 HISTORY: ORDERING SYSTEM PROVIDED HISTORY: fall/dementia TECHNOLOGIST PROVIDED HISTORY: Reason for exam:->fall/dementia Has a \"code stroke\" or \"stroke alert\" been called? ->No Decision Support Exception - unselect if not a suspected or confirmed emergency medical condition->Emergency Medical Condition (MA) Reason for Exam: fall/dementia FINDINGS: BRAIN/VENTRICLES: There is no acute intracranial hemorrhage, mass effect or midline shift. No abnormal extra-axial fluid collection. The gray-white differentiation is maintained without evidence of an acute infarct. There is prominence of the ventricles and sulci due to global parenchymal volume loss. There are nonspecific areas of hypoattenuation within the periventricular and subcortical white matter, which likely represent chronic microvascular ischemic change. ORBITS: The visualized portion of the orbits demonstrate no acute abnormality. SINUSES: The visualized paranasal sinuses and mastoid air cells demonstrate no acute abnormality. SOFT TISSUES/SKULL: No acute abnormality of the visualized skull or soft tissues.      No acute intracranial abnormality. CT CERVICAL SPINE WO CONTRAST    Result Date: 12/5/2022  EXAMINATION: CT OF THE CERVICAL SPINE WITHOUT CONTRAST 12/5/2022 10:40 am TECHNIQUE: CT of the cervical spine was performed without the administration of intravenous contrast. Multiplanar reformatted images are provided for review. Automated exposure control, iterative reconstruction, and/or weight based adjustment of the mA/kV was utilized to reduce the radiation dose to as low as reasonably achievable. COMPARISON: 06/02/2021 HISTORY: ORDERING SYSTEM PROVIDED HISTORY: fall/dementia TECHNOLOGIST PROVIDED HISTORY: Reason for exam:->fall/dementia Decision Support Exception - unselect if not a suspected or confirmed emergency medical condition->Emergency Medical Condition (MA) Reason for Exam: fall/dementia FINDINGS: No acute fracture or dislocation involving cervical spine. Straightening of the cervical spine likely degenerative in nature. Severe multilevel degenerative changes throughout the cervical spine appear similar to previous exam.  No acute abnormality in the soft tissues of the neck. Upper lungs are clear. No acute abnormality of the cervical spine. Severe multilevel degenerative changes. XR CHEST PORTABLE    Result Date: 12/5/2022  EXAMINATION: ONE XRAY VIEW OF THE CHEST 12/5/2022 11:43 am COMPARISON: June 2, 2021 HISTORY: ORDERING SYSTEM PROVIDED HISTORY: frequent falls/cough TECHNOLOGIST PROVIDED HISTORY: Reason for exam:->frequent falls/cough Reason for Exam: Fall (From home via EMS cc fall this morning. Laceration to left arm, patient unsure about head strike (poor historian due to dementia). Patient states no pain at triage to any body part.) FINDINGS: Normal heart size. Mild interstitial prominence noted in the lungs similar to prior study, some of these changes are likely from chronic interstitial fibrosis. Minimal atelectasis in the lung bases. There is no pneumothorax.  Significant osteopenic changes and degenerative changes noted in the bony structures. .     Unchanged mild chronic interstitial thickening in the lungs. Bibasilar atelectasis. No pneumothorax. No displaced rib fractures           PROCEDURES   Unless otherwise noted below, none     Procedures    CRITICAL CARE TIME   none    CONSULTS:  None      EMERGENCY DEPARTMENT COURSE and DIFFERENTIAL DIAGNOSIS/MDM:   Vitals:    Vitals:    12/05/22 1156 12/05/22 1211 12/05/22 1226 12/05/22 1241   BP: (!) 140/65 138/78 (!) 141/76 (!) 147/59   Pulse: 66 64 64 64   Resp: 16 15 20 13   Temp:       TempSrc:       SpO2: 96% 92% 97% 98%   Weight:       Height:           Patient was given the following medications:  Medications   acetaminophen (TYLENOL) tablet 1,000 mg (1,000 mg Oral Given 12/5/22 1037)   tetanus & diphtheria toxoids (adult) 5-2 LFU injection 0.5 mL (0.5 mLs IntraMUSCular Given 12/5/22 1039)         Is this patient to be included in the SEP-1 Core Measure due to severe sepsis or septic shock? No   Exclusion criteria - the patient is NOT to be included for SEP-1 Core Measure due to: Infection is not suspected    Previous records reviewed in order to gain further information regarding patient's PMH as well as her HPI. Nursing notes reviewed. This is a 49-year-old female with history of advanced dementia who presents to the emergency department today for evaluation of a left forearm injury after sustaining an unwitnessed fall this morning. Physical exam complete. Patient arrives nontoxic, afebrile, mildly hypertensive. Patient is awake, alert, and pleasantly confused. She follows all commands. Patient denies having any pain at present. Her tetanus is updated. Patient is medicated with Tylenol. Given patient's history of dementia and unwitnessed fall advanced imaging is indicated. CT head and neck interpreted by radiologist are negative for acute findings.   Chest x-ray interpreted by radiologist and reviewed by myself is negative for acute findings. Left forearm x-rays interpreted by radiologist and reviewed by myself is also negative. Wound cleansed thoroughly with chlorhexidine solution. Antibiotic ointment, Mepitel dressing, and sterile dressing applied. At this time there is no evidence of any life-threatening or emergent conditions requiring immediate intervention. No evidence of sepsis, pneumonia, or other acute pathology. Patient will be discharged with emphasis on close outpatient follow-up. Strict wound care instructions are provided. Daughter agrees to have patient reevaluated by wound care center in the next 2 to 3 days. Daughter agrees return patient to nearest ED for high fever, incessant vomiting, severe pain, change in behavior, any other worsening symptoms. Patient is discharged home in stable condition. FINAL IMPRESSION      1. Frequent falls    2. History of dementia    3.  Skin tear of forearm without complication, left, initial encounter          DISPOSITION/PLAN   DISPOSITION Decision To Discharge 12/05/2022 02:17:14 PM      PATIENT REFERRED TO:  [facility],Ground Floor  [address]  In 2 days      Neisha Muro MD  [address]  [phone]    In 2 days      DISCHARGE MEDICATIONS:  Discharge Medication List as of 12/5/2022  4:14 PM          DISCONTINUED MEDICATIONS:  Discharge Medication List as of 12/5/2022  4:14 PM                 (Please note that portions of this note were completed with a voice recognition program.  Efforts were made to edit the dictations but occasionally words are mis-transcribed.)    PINEDA Moreland CNP (electronically signed)            PINEDA Moreland CNP  12/09/22 4781

## 2022-12-05 NOTE — TELEPHONE ENCOUNTER
Pt trudi calling---pt fell early this morning---had ripped skin off her arm---she had ambulance come and they are taking her to [facility] to be checked out. Thanks.

## 2022-12-05 NOTE — DISCHARGE INSTRUCTIONS
Evaluated by the primary care physician within 48 hours. Wound care referral provided. Patient should follow-up at [facility] wound care for skin tear management. Return patient to nearest ED for high fever, incessant vomiting, severe pain, change in behavior, any other worsening symptoms.

## 2022-12-08 ENCOUNTER — TELEPHONE (OUTPATIENT)
Dept: ORTHOPEDIC SURGERY | Age: 87
End: 2022-12-08

## 2022-12-08 DIAGNOSIS — M81.0 AGE-RELATED OSTEOPOROSIS WITHOUT CURRENT PATHOLOGICAL FRACTURE: ICD-10-CM

## 2022-12-08 NOTE — TELEPHONE ENCOUNTER
General Question     Subject: POST OP   Patient and /or Facility Request: James Funes  Contact Number: [phone]      THE PT'S DAUGHTER CALLED ASKING ABOUT THE POST OP APPT SCHEDULED FOR THE 27TH. SHE DIDN'T KNOW ABOUT IT, AND ASKED IF IT WAS NECESSARY TO COME IN FOR IT.   PLEASE CALL THE DAUGHTER ROSENDO BACK AT [address] PHONE #.

## 2022-12-08 NOTE — TELEPHONE ENCOUNTER
Returned call to her daughter. Explained that SMA likes to follow for atleast 90 days post surgically, and also that due to her age she likely is healing more slowly so another set of follow up xrays is recommended. Patient's daughter then stated her mother has fallen multiple times since the last visit with her brace off and she does rub the wrist at times. She is going to keep the appointment. May need to call back and reschedule due to her  needing to schedule a dental procedure to remove a few teeth.

## 2022-12-09 RX ORDER — ALENDRONATE SODIUM 70 MG/1
TABLET ORAL
Qty: 4 TABLET | Refills: 5 | Status: SHIPPED | OUTPATIENT
Start: 2022-12-09

## 2022-12-09 ASSESSMENT — ENCOUNTER SYMPTOMS
NAUSEA: 0
BACK PAIN: 0
ABDOMINAL PAIN: 0
SHORTNESS OF BREATH: 0
COUGH: 0
SORE THROAT: 0
VOMITING: 0
WHEEZING: 0
VOICE CHANGE: 0
CHEST TIGHTNESS: 0

## 2022-12-09 NOTE — TELEPHONE ENCOUNTER
Future Appointments    Encounter Information    Provider Department Appt Notes   2/20/2023 Colonel Andres MD [facility] Internal Medicine 4 months     Past Visits    Date Provider Specialty Visit Type Primary Dx   10/19/2022 Colonel Andres MD Internal Medicine Office Visit Late onset Alzheimer's disease with behavioral disturbance ([facility]

## 2022-12-19 ENCOUNTER — TELEPHONE (OUTPATIENT)
Dept: INTERNAL MEDICINE CLINIC | Age: 87
End: 2022-12-19

## 2022-12-19 DIAGNOSIS — G30.1 LATE ONSET ALZHEIMER'S DISEASE WITH BEHAVIORAL DISTURBANCE (HCC): ICD-10-CM

## 2022-12-19 DIAGNOSIS — F02.818 LATE ONSET ALZHEIMER'S DISEASE WITH BEHAVIORAL DISTURBANCE (HCC): ICD-10-CM

## 2022-12-19 RX ORDER — QUETIAPINE FUMARATE 25 MG/1
25 TABLET, FILM COATED ORAL 2 TIMES DAILY
Qty: 60 TABLET | Refills: 5 | Status: SHIPPED | OUTPATIENT
Start: 2022-12-19

## 2022-12-19 NOTE — PROGRESS NOTES
Increase Seroquel to 1 tablet twice daily. We can reassess in the office. Make sure no precipitating factors or infection causing increased hallucination/delusion.

## 2022-12-19 NOTE — PROGRESS NOTES
New script sent to reflect 1 bid. Radha Cinthya does not feel that there is any underlying issues. She will move up appt if needed.

## 2022-12-19 NOTE — TELEPHONE ENCOUNTER
Pt daughter Brayden Ye calling---the pt is taking Quetiapine --gives her 1/2 tablet in the morning and in the evening---last 4 or 5 days pt has had some wild stories about being murdered and all kinds of things--won't come out of her bedroom---sometimes refuses to eat--Daughter wondering  if her dose should be increased or what . Please call the daughter . Thanks.

## 2022-12-29 DIAGNOSIS — M79.7 FIBROMYALGIA: ICD-10-CM

## 2022-12-29 RX ORDER — LACTULOSE 10 G/15ML
SOLUTION ORAL
Qty: 946 ML | Refills: 1 | Status: SHIPPED | OUTPATIENT
Start: 2022-12-29

## 2022-12-29 RX ORDER — DULOXETIN HYDROCHLORIDE 30 MG/1
CAPSULE, DELAYED RELEASE ORAL
Qty: 30 CAPSULE | Refills: 5 | Status: SHIPPED | OUTPATIENT
Start: 2022-12-29

## 2022-12-29 NOTE — TELEPHONE ENCOUNTER
Future Appointments    Encounter Information    Provider Department Appt Notes   2/20/2023 Colonel Andres MD [facility] Internal Medicine 4 months     Past Visits    Date Provider Specialty Visit Type Primary Dx   10/19/2022 Colonel Andres MD Internal Medicine Office Visit Late onset Alzheimer's disease with behavioral disturbance ([facility] [address]

## 2022-12-29 NOTE — TELEPHONE ENCOUNTER
Future Appointments    Encounter Information    Provider Department Appt Notes   2/20/2023 Adore Payton MD [facility] Internal Medicine 4 months     Past Visits    Date Provider Specialty Visit Type Primary Dx   10/19/2022 Adore Payton MD Internal Medicine Office Visit Late onset Alzheimer's disease with behavioral disturbance ([facility] [address]

## 2023-01-05 ENCOUNTER — TELEPHONE (OUTPATIENT)
Dept: INTERNAL MEDICINE CLINIC | Age: 88
End: 2023-01-05

## 2023-01-05 DIAGNOSIS — R45.86 MOOD CHANGES: Primary | ICD-10-CM

## 2023-01-05 DIAGNOSIS — F02.818 LATE ONSET ALZHEIMER'S DISEASE WITH BEHAVIORAL DISTURBANCE (HCC): ICD-10-CM

## 2023-01-05 DIAGNOSIS — R41.0 CONFUSION: ICD-10-CM

## 2023-01-05 DIAGNOSIS — G30.1 LATE ONSET ALZHEIMER'S DISEASE WITH BEHAVIORAL DISTURBANCE (HCC): ICD-10-CM

## 2023-01-05 RX ORDER — QUETIAPINE FUMARATE 50 MG/1
50 TABLET, FILM COATED ORAL 2 TIMES DAILY
Qty: 60 TABLET | Refills: 3 | Status: SHIPPED | OUTPATIENT
Start: 2023-01-05

## 2023-01-05 NOTE — TELEPHONE ENCOUNTER
LVM for daughter, Jerry Vallejo with all info. Clarified with Ariana Greene: ok to increase Seroquel to 50 mg BID - new RX sent. Daughter asked to please call us back to let us know if she will be able to bring patient in to give urine speciman.

## 2023-01-05 NOTE — TELEPHONE ENCOUNTER
Daughter, Lauren Miranda calling in. Dr. Loni Herrera increased Seroquel to 25 mg BID on 12/19/22 d/t increased behaviors, confusion, agitation. Daughter reports she was doing well with the increase until about 4 days ago. Since then, patient is acting more confused, hallucinating, agitation (same symptoms as before the increase). Daughter reports there are no s/s UTI or any other concerns going on that she has noticed. Please advise - anything we can do for patient? She is aware that Dr. Loni Herrera is out of the office - we can add to the schedule sometime next week for assessment.

## 2023-01-05 NOTE — TELEPHONE ENCOUNTER
Obviously, I am at a disadvantage not knowing the patient. I'd recommended a UA to be sure not a cause for worsening. OK to order if they are agreeable. UA reflex to culture    Also, we can increase sertraline to higher dose if daughter feels it was helpful.

## 2023-01-05 NOTE — TELEPHONE ENCOUNTER
Daughter did call back and stated she will bring in patient tomorrow to the lab to give urine specimen.

## 2023-01-06 LAB
BACTERIA: ABNORMAL /HPF
BILIRUBIN URINE: NEGATIVE
BLOOD, URINE: NEGATIVE
CLARITY: ABNORMAL
COLOR: ABNORMAL
EPITHELIAL CELLS, UA: 16 /HPF (ref 0–5)
GLUCOSE URINE: NEGATIVE MG/DL
HYALINE CASTS: 10 /LPF (ref 0–8)
KETONES, URINE: ABNORMAL MG/DL
LEUKOCYTE ESTERASE, URINE: ABNORMAL
MICROSCOPIC EXAMINATION: YES
NITRITE, URINE: NEGATIVE
PH UA: 6 (ref 5–8)
PROTEIN UA: 30 MG/DL
RBC UA: 1 /HPF (ref 0–4)
SPECIFIC GRAVITY UA: 1.02 (ref 1–1.03)
URINE REFLEX TO CULTURE: ABNORMAL
URINE TYPE: ABNORMAL
UROBILINOGEN, URINE: 1 E.U./DL
WBC UA: 3 /HPF (ref 0–5)

## 2023-01-12 ENCOUNTER — OFFICE VISIT (OUTPATIENT)
Dept: ORTHOPEDIC SURGERY | Age: 88
End: 2023-01-12

## 2023-01-12 VITALS — HEIGHT: 65 IN | WEIGHT: 167 LBS | BODY MASS INDEX: 27.82 KG/M2

## 2023-01-12 DIAGNOSIS — S52.531A CLOSED COLLES' FRACTURE OF RIGHT RADIUS, INITIAL ENCOUNTER: Primary | ICD-10-CM

## 2023-01-13 NOTE — PROGRESS NOTES
DIAGNOSIS:  Right distal radius 2 parts intra articular displaced fracture, status post ORIF. DATE OF SURGERY: 10/27/2022. HISTORY OF PRESENT ILLNESS:  Ms. Ramonita Boateng 80 y.o.  female right handed returns today  for 8 weeks postoperative visit. The patient denies any significant pain in the right wrist.  Rates pain a 0/10 VAS and is doing much better. No numbness or tingling sensation. No fever or Chills. She  is in a brace. Denies smoking. She normally ambulates with a walker . PHYSICAL EXAMINATION:  The incision is completely healed . No signs of any erythema or drainage. She  has no pain with the active or passive range of motion of the right wrist, but decrease ROM. She  has intact sensation, distally, and she  is neurovascularly intact. IMAGING:  Three views right wrist taken today in the office showed anatomic alignment of right distal radius, plate and screws in good position, no loosening. Right thumb CMC arthritis. IMPRESSION:  8 weeks out from right distal radius 2 parts intra-articular displaced fracture, ORIF and doing very well. PLAN:  I have told the patient to work on ROM, as well as strengthening exercises. She would like to do it on her own. She can discontinue forearm brace. No heavy impact activities. The patient will come back for a follow up in 6 weeks. At that time, we will take 3 views of the right wrist.     As this patient has demonstrated risk factors for osteoporosis, such as age greater than [de-identified] years and evidence of a fracture, I have referred the patient back to the primary care physician for evaluation for osteoporosis, including consideration for DEXA scanning, if this is felt to be clinically indicated. The patient is advised to contact the primary care physician to follow-up for further evaluation.              Mario Zamudio, APRN - CNP

## 2023-01-17 ENCOUNTER — OFFICE VISIT (OUTPATIENT)
Dept: INTERNAL MEDICINE CLINIC | Age: 88
End: 2023-01-17
Payer: MEDICARE

## 2023-01-17 ENCOUNTER — HOSPITAL ENCOUNTER (OUTPATIENT)
Age: 88
Discharge: HOME | End: 2023-01-17
Payer: MEDICARE

## 2023-01-17 ENCOUNTER — HOSPITAL ENCOUNTER (OUTPATIENT)
Dept: GENERAL RADIOLOGY | Age: 88
Discharge: HOME | End: 2023-01-17
Payer: MEDICARE

## 2023-01-17 VITALS
BODY MASS INDEX: 27.49 KG/M2 | HEART RATE: 82 BPM | TEMPERATURE: 97.6 F | OXYGEN SATURATION: 96 % | DIASTOLIC BLOOD PRESSURE: 68 MMHG | HEIGHT: 65 IN | WEIGHT: 165 LBS | SYSTOLIC BLOOD PRESSURE: 122 MMHG

## 2023-01-17 DIAGNOSIS — M25.572 PAIN AND SWELLING OF LEFT ANKLE: ICD-10-CM

## 2023-01-17 DIAGNOSIS — M25.472 PAIN AND SWELLING OF LEFT ANKLE: ICD-10-CM

## 2023-01-17 DIAGNOSIS — M25.572 PAIN AND SWELLING OF LEFT ANKLE: Primary | ICD-10-CM

## 2023-01-17 DIAGNOSIS — M25.472 PAIN AND SWELLING OF LEFT ANKLE: Primary | ICD-10-CM

## 2023-01-17 LAB — URIC ACID, SERUM: 5 MG/DL (ref 2.6–6)

## 2023-01-17 PROCEDURE — 1123F ACP DISCUSS/DSCN MKR DOCD: CPT | Performed by: NURSE PRACTITIONER

## 2023-01-17 PROCEDURE — 73610 X-RAY EXAM OF ANKLE: CPT

## 2023-01-17 PROCEDURE — 99213 OFFICE O/P EST LOW 20 MIN: CPT | Performed by: NURSE PRACTITIONER

## 2023-01-17 PROCEDURE — 1036F TOBACCO NON-USER: CPT | Performed by: NURSE PRACTITIONER

## 2023-01-17 PROCEDURE — 73630 X-RAY EXAM OF FOOT: CPT

## 2023-01-17 PROCEDURE — G8427 DOCREV CUR MEDS BY ELIG CLIN: HCPCS | Performed by: NURSE PRACTITIONER

## 2023-01-17 PROCEDURE — 1090F PRES/ABSN URINE INCON ASSESS: CPT | Performed by: NURSE PRACTITIONER

## 2023-01-17 PROCEDURE — G8417 CALC BMI ABV UP PARAM F/U: HCPCS | Performed by: NURSE PRACTITIONER

## 2023-01-17 PROCEDURE — G8484 FLU IMMUNIZE NO ADMIN: HCPCS | Performed by: NURSE PRACTITIONER

## 2023-01-17 ASSESSMENT — PATIENT HEALTH QUESTIONNAIRE - PHQ9: DEPRESSION UNABLE TO ASSESS: URGENT/EMERGENT SITUATION

## 2023-01-17 ASSESSMENT — ENCOUNTER SYMPTOMS
RESPIRATORY NEGATIVE: 1
GASTROINTESTINAL NEGATIVE: 1

## 2023-01-17 NOTE — PROGRESS NOTES
SUBJECTIVE:    Patient ID: Lucas Spence is a 80 y.o. female. CC: Left foot and ankle pain and swelling    HPI: The patient presents to the office for an acute visit. She had left ankle/foot pain for 3 days. Swelling started yesterday. No known injury or trauma. No treatments at home. Current Outpatient Medications   Medication Sig Dispense Refill    QUEtiapine (SEROQUEL) 50 MG tablet Take 1 tablet by mouth 2 times daily 60 tablet 3    lactulose (CHRONULAC) 10 GM/15ML solution TAKE 30 ML BY MOUTH TWO TIMES A  mL 1    DULoxetine (CYMBALTA) 30 MG extended release capsule TAKE ONE CAPSULE BY MOUTH DAILY 30 capsule 5    alendronate (FOSAMAX) 70 MG tablet TAKE 1 TABLET BY MOUTH ONCE WEEKLY ON AN EMPTY STOMACH BEFORE BREAKFAST. REMAIN UPRIGHT FOR 30 MINUTES AND TAKE WITH 8 OUNCES OF WATER 4 tablet 5    sertraline (ZOLOFT) 25 MG tablet TAKE ONE TABLET BY MOUTH DAILY 30 tablet 5    levothyroxine (SYNTHROID) 75 MCG tablet Take 1 tablet by mouth Daily 30 tablet 5    Caltrate 600+D Plus Minerals (CALTRATE) 600-800 MG-UNIT TABS tablet Take 1 tablet by mouth 2 times daily 180 tablet 1    acetaminophen (TYLENOL) 500 MG tablet Take 1 tablet by mouth every 8 hours as needed for Pain (Patient not taking: Reported on 1/17/2023) 120 tablet 0     No current facility-administered medications for this visit. Review of Systems   Constitutional:  Negative for chills and fever. Respiratory: Negative. Gastrointestinal: Negative. Musculoskeletal:  Positive for arthralgias and joint swelling. Skin: Negative. Neurological: Negative. Psychiatric/Behavioral: Negative. OBJECTIVE:  Physical Exam  Constitutional:       Appearance: Normal appearance. HENT:      Head: Normocephalic and atraumatic. Musculoskeletal:      Left ankle: Swelling present. No ecchymosis. Tenderness present. Decreased range of motion. Comments: Swelling left ankle throughout.   There is tenderness of palpation anterior/superior aspect of ankle. There is joint warmth but no redness/erythema. Pain with ROM. Neurological:      General: No focal deficit present. Mental Status: She is alert and oriented to person, place, and time. /68   Pulse 82   Temp 97.6 °F (36.4 °C)   Ht 5' 5\" (1.651 m)   Wt 165 lb (74.8 kg)   SpO2 96%   BMI 27.46 kg/m²      PHQ Scores 3/9/2022 1/11/2021 1/6/2021 1/27/2020 11/13/2019 9/17/2019 8/20/2018   PHQ2 Score 0 0 0 0 4 0 4   PHQ9 Score 0 0 0 0 7 0 12     Interpretation of Total Score Depression Severity: 1-4 = Minimal depression, 5-9 = Mild depression, 10-14 = Moderate depression, 15-19 = Moderately severe depression, 20-27 =Severe depression      ASSESSMENT/PLAN:  Leo Holman was seen today for edema and pain. Diagnoses and all orders for this visit:    Pain and swelling of left ankle  - Acute onset left foot/ankle pain and swelling  - There is joint warmth though no erythema. No open lesions or sores  -  Denies falling but family not sure and patient has memory disorder  - Check XR for possible fracture, no bruising.  - Uric acid to r/o gout. - DVT seems less likely as not really any swelling above ankle process though doppler reasonable if not finding other cause  - Compression if tolerated, ice, Tylenol.   Additional tx pending XR/labs  -     XR FOOT LEFT (MIN 3 VIEWS)  -     XR ANKLE LEFT (MIN 3 VIEWS)  -     Uric Acid        Gaetano Fierro, APRN - CNP

## 2023-01-18 ENCOUNTER — TELEPHONE (OUTPATIENT)
Dept: INTERNAL MEDICINE CLINIC | Age: 88
End: 2023-01-18

## 2023-01-18 RX ORDER — LACTULOSE 10 G/15ML
30 SOLUTION ORAL EVERY MORNING
Qty: 946 ML | Refills: 1 | COMMUNITY
Start: 2023-01-18

## 2023-01-18 NOTE — TELEPHONE ENCOUNTER
Zaira Hernshaw having surgery on 2/9/23 and will be unable to care for her mother for at least 2-3 days. No driving for 3 weeks.

## 2023-01-18 NOTE — TELEPHONE ENCOUNTER
Pt seen by Leanne Hitchcock CNP for acute visit. Has AWV on 2/2/23. Dtr needing to set up for respite care at [facility] and will need assessment for this. Will email the office visit and patient summary to [place] her dtr.

## 2023-01-18 NOTE — TELEPHONE ENCOUNTER
This was in our appointment requests, not sure they wanted an appointment I think they just wanted to speak with Emily Leiva. Paz Cardoso [facility] Internal Medicine Practice Staff  Subject: Appointment Request     Reason for Call: Established Patient Appointment needed: Routine Medicare   AWV     QUESTIONS     Reason for appointment request? Available appointments did not meet   patient need       Additional Information for Provider? Patient's daughter would like a call   from Emily Leiva in regards to her mothers next plan of care. Patient would be   in a home call on 2/8/23 would like a call this week.  Patient's daughter   looking for 1/31/23 if possible.   ---------------------------------------------------------------------------   --------------   Vee ALCALA   [phone]; OK to leave message on voicemail   ---------------------------------------------------------------------------   --------------   SCRIPT ANSWERS   COVID Screen: Marcie Miller

## 2023-01-24 ENCOUNTER — APPOINTMENT (OUTPATIENT)
Dept: CT IMAGING | Age: 88
DRG: 543 | End: 2023-01-24
Payer: MEDICARE

## 2023-01-24 ENCOUNTER — TELEPHONE (OUTPATIENT)
Dept: INTERNAL MEDICINE CLINIC | Age: 88
End: 2023-01-24

## 2023-01-24 ENCOUNTER — HOSPITAL ENCOUNTER (INPATIENT)
Age: 88
LOS: 2 days | Discharge: NURSING, REHAB OR LONG-TERM CARE | DRG: 543 | End: 2023-01-26
Attending: EMERGENCY MEDICINE | Admitting: STUDENT IN AN ORGANIZED HEALTH CARE EDUCATION/TRAINING PROGRAM
Payer: MEDICARE

## 2023-01-24 ENCOUNTER — TELEPHONE (OUTPATIENT)
Dept: ORTHOPEDIC SURGERY | Age: 88
End: 2023-01-24

## 2023-01-24 ENCOUNTER — APPOINTMENT (OUTPATIENT)
Dept: MRI IMAGING | Age: 88
DRG: 543 | End: 2023-01-24
Payer: MEDICARE

## 2023-01-24 DIAGNOSIS — S32.415A CLOSED NONDISPLACED FRACTURE OF ANTERIOR WALL OF LEFT ACETABULUM, INITIAL ENCOUNTER (HCC): ICD-10-CM
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
## 2023-01-24 DIAGNOSIS — S32.592A CLOSED FRACTURE OF SINGLE RAMUS OF LEFT PUBIS, INITIAL ENCOUNTER (HCC): ICD-10-CM

## 2023-01-24 DIAGNOSIS — S72.002A CLOSED LEFT HIP FRACTURE, INITIAL ENCOUNTER (HCC): ICD-10-CM

## 2023-01-24 DIAGNOSIS — N17.9 ACUTE KIDNEY INJURY (HCC): ICD-10-CM

## 2023-01-24 DIAGNOSIS — S32.10XA CLOSED FRACTURE OF SACRUM, UNSPECIFIED FRACTURE MORPHOLOGY, INITIAL ENCOUNTER (HCC): ICD-10-CM

## 2023-01-24 PROBLEM — F03.90 DEMENTIA (HCC): Status: ACTIVE | Noted: 2020-09-25

## 2023-01-24 LAB
A/G RATIO: 1.1 (ref 1.1–2.2)
ALBUMIN SERPL-MCNC: 3.4 G/DL (ref 3.4–5)
ALP BLD-CCNC: 107 U/L (ref 40–129)
ALT SERPL-CCNC: 16 U/L (ref 10–40)
ANION GAP SERPL CALCULATED.3IONS-SCNC: 9 MMOL/L (ref 3–16)
AST SERPL-CCNC: 22 U/L (ref 15–37)
BASOPHILS ABSOLUTE: 0.1 K/UL (ref 0–0.2)
BASOPHILS RELATIVE PERCENT: 0.6 %
BILIRUB SERPL-MCNC: 0.4 MG/DL (ref 0–1)
BUN BLDV-MCNC: 31 MG/DL (ref 7–20)
CALCIUM SERPL-MCNC: 9.5 MG/DL (ref 8.3–10.6)
CHLORIDE BLD-SCNC: 106 MMOL/L (ref 99–110)
CO2: 28 MMOL/L (ref 21–32)
CREAT SERPL-MCNC: 1.3 MG/DL (ref 0.6–1.2)
EOSINOPHILS ABSOLUTE: 0.2 K/UL (ref 0–0.6)
EOSINOPHILS RELATIVE PERCENT: 1.7 %
GFR SERPL CREATININE-BSD FRML MDRD: 38 ML/MIN/{1.73_M2}
GLUCOSE BLD-MCNC: 125 MG/DL (ref 70–99)
HCT VFR BLD CALC: 42.9 % (ref 36–48)
HEMOGLOBIN: 14 G/DL (ref 12–16)
LYMPHOCYTES ABSOLUTE: 1.6 K/UL (ref 1–5.1)
LYMPHOCYTES RELATIVE PERCENT: 11.6 %
MCH RBC QN AUTO: 31 PG (ref 26–34)
MCHC RBC AUTO-ENTMCNC: 32.7 G/DL (ref 31–36)
MCV RBC AUTO: 94.6 FL (ref 80–100)
MONOCYTES ABSOLUTE: 1.2 K/UL (ref 0–1.3)
MONOCYTES RELATIVE PERCENT: 8.7 %
NEUTROPHILS ABSOLUTE: 11 K/UL (ref 1.7–7.7)
NEUTROPHILS RELATIVE PERCENT: 77.4 %
PDW BLD-RTO: 13.5 % (ref 12.4–15.4)
PLATELET # BLD: 237 K/UL (ref 135–450)
PMV BLD AUTO: 9.4 FL (ref 5–10.5)
POTASSIUM REFLEX MAGNESIUM: 5 MMOL/L (ref 3.5–5.1)
RBC # BLD: 4.53 M/UL (ref 4–5.2)
SODIUM BLD-SCNC: 143 MMOL/L (ref 136–145)
TOTAL PROTEIN: 6.6 G/DL (ref 6.4–8.2)
TSH REFLEX: 4.02 UIU/ML (ref 0.27–4.2)
URIC ACID, SERUM: 3.7 MG/DL (ref 2.6–6)
WBC # BLD: 14.2 K/UL (ref 4–11)

## 2023-01-24 PROCEDURE — 80053 COMPREHEN METABOLIC PANEL: CPT

## 2023-01-24 PROCEDURE — 6370000000 HC RX 637 (ALT 250 FOR IP): Performed by: NURSE PRACTITIONER

## 2023-01-24 PROCEDURE — 70450 CT HEAD/BRAIN W/O DYE: CPT

## 2023-01-24 PROCEDURE — 72131 CT LUMBAR SPINE W/O DYE: CPT

## 2023-01-24 PROCEDURE — 2580000003 HC RX 258: Performed by: STUDENT IN AN ORGANIZED HEALTH CARE EDUCATION/TRAINING PROGRAM

## 2023-01-24 PROCEDURE — 36415 COLL VENOUS BLD VENIPUNCTURE: CPT

## 2023-01-24 PROCEDURE — 72192 CT PELVIS W/O DYE: CPT

## 2023-01-24 PROCEDURE — 6360000002 HC RX W HCPCS: Performed by: STUDENT IN AN ORGANIZED HEALTH CARE EDUCATION/TRAINING PROGRAM

## 2023-01-24 PROCEDURE — 72125 CT NECK SPINE W/O DYE: CPT

## 2023-01-24 PROCEDURE — 2580000003 HC RX 258: Performed by: EMERGENCY MEDICINE

## 2023-01-24 PROCEDURE — 6370000000 HC RX 637 (ALT 250 FOR IP): Performed by: STUDENT IN AN ORGANIZED HEALTH CARE EDUCATION/TRAINING PROGRAM

## 2023-01-24 PROCEDURE — 84443 ASSAY THYROID STIM HORMONE: CPT

## 2023-01-24 PROCEDURE — 6370000000 HC RX 637 (ALT 250 FOR IP): Performed by: EMERGENCY MEDICINE

## 2023-01-24 PROCEDURE — 84550 ASSAY OF BLOOD/URIC ACID: CPT

## 2023-01-24 PROCEDURE — 85025 COMPLETE CBC W/AUTO DIFF WBC: CPT

## 2023-01-24 PROCEDURE — 99285 EMERGENCY DEPT VISIT HI MDM: CPT

## 2023-01-24 PROCEDURE — 70551 MRI BRAIN STEM W/O DYE: CPT

## 2023-01-24 PROCEDURE — 1200000000 HC SEMI PRIVATE

## 2023-01-24 RX ORDER — SODIUM CHLORIDE 0.9 % (FLUSH) 0.9 %
5-40 SYRINGE (ML) INJECTION PRN
Status: DISCONTINUED | OUTPATIENT
Start: 2023-01-24 | End: 2023-01-26 | Stop reason: HOSPADM

## 2023-01-24 RX ORDER — LACTULOSE 10 G/15ML
10 SOLUTION ORAL DAILY
Status: DISCONTINUED | OUTPATIENT
Start: 2023-01-24 | End: 2023-01-25

## 2023-01-24 RX ORDER — POTASSIUM CHLORIDE 7.45 MG/ML
10 INJECTION INTRAVENOUS PRN
Status: DISCONTINUED | OUTPATIENT
Start: 2023-01-24 | End: 2023-01-24

## 2023-01-24 RX ORDER — DULOXETIN HYDROCHLORIDE 20 MG/1
20 CAPSULE, DELAYED RELEASE ORAL DAILY
Status: DISCONTINUED | OUTPATIENT
Start: 2023-01-24 | End: 2023-01-26 | Stop reason: HOSPADM

## 2023-01-24 RX ORDER — POLYETHYLENE GLYCOL 3350 17 G/17G
17 POWDER, FOR SOLUTION ORAL DAILY PRN
Status: DISCONTINUED | OUTPATIENT
Start: 2023-01-24 | End: 2023-01-26 | Stop reason: HOSPADM

## 2023-01-24 RX ORDER — SODIUM CHLORIDE 9 MG/ML
INJECTION, SOLUTION INTRAVENOUS PRN
Status: DISCONTINUED | OUTPATIENT
Start: 2023-01-24 | End: 2023-01-26 | Stop reason: HOSPADM

## 2023-01-24 RX ORDER — ACETAMINOPHEN 325 MG/1
650 TABLET ORAL EVERY 6 HOURS PRN
Status: DISCONTINUED | OUTPATIENT
Start: 2023-01-24 | End: 2023-01-26 | Stop reason: HOSPADM

## 2023-01-24 RX ORDER — SERTRALINE HYDROCHLORIDE 25 MG/1
1 TABLET, FILM COATED ORAL DAILY
Status: DISCONTINUED | OUTPATIENT
Start: 2023-01-24 | End: 2023-01-24 | Stop reason: ALTCHOICE

## 2023-01-24 RX ORDER — CAL/D3/MAG11/ZINC/COP/MANG/BOR 600 MG-800
1 TABLET ORAL 2 TIMES DAILY
Status: DISCONTINUED | OUTPATIENT
Start: 2023-01-24 | End: 2023-01-24 | Stop reason: ALTCHOICE

## 2023-01-24 RX ORDER — HYDROMORPHONE HYDROCHLORIDE 1 MG/ML
0.5 INJECTION, SOLUTION INTRAMUSCULAR; INTRAVENOUS; SUBCUTANEOUS
Status: DISCONTINUED | OUTPATIENT
Start: 2023-01-24 | End: 2023-01-26 | Stop reason: HOSPADM

## 2023-01-24 RX ORDER — SODIUM CHLORIDE 9 MG/ML
INJECTION, SOLUTION INTRAVENOUS CONTINUOUS
Status: DISCONTINUED | OUTPATIENT
Start: 2023-01-24 | End: 2023-01-24

## 2023-01-24 RX ORDER — SODIUM CHLORIDE, SODIUM LACTATE, POTASSIUM CHLORIDE, CALCIUM CHLORIDE 600; 310; 30; 20 MG/100ML; MG/100ML; MG/100ML; MG/100ML
INJECTION, SOLUTION INTRAVENOUS CONTINUOUS
Status: DISCONTINUED | OUTPATIENT
Start: 2023-01-24 | End: 2023-01-25

## 2023-01-24 RX ORDER — ACETAMINOPHEN 650 MG/1
650 SUPPOSITORY RECTAL EVERY 6 HOURS PRN
Status: DISCONTINUED | OUTPATIENT
Start: 2023-01-24 | End: 2023-01-26 | Stop reason: HOSPADM

## 2023-01-24 RX ORDER — QUETIAPINE FUMARATE 100 MG/1
50 TABLET, FILM COATED ORAL 2 TIMES DAILY
Status: DISCONTINUED | OUTPATIENT
Start: 2023-01-24 | End: 2023-01-24 | Stop reason: ALTCHOICE

## 2023-01-24 RX ORDER — ONDANSETRON 4 MG/1
4 TABLET, ORALLY DISINTEGRATING ORAL EVERY 8 HOURS PRN
Status: DISCONTINUED | OUTPATIENT
Start: 2023-01-24 | End: 2023-01-26 | Stop reason: HOSPADM

## 2023-01-24 RX ORDER — SODIUM CHLORIDE 0.9 % (FLUSH) 0.9 %
5-40 SYRINGE (ML) INJECTION EVERY 12 HOURS SCHEDULED
Status: DISCONTINUED | OUTPATIENT
Start: 2023-01-24 | End: 2023-01-26 | Stop reason: HOSPADM

## 2023-01-24 RX ORDER — LEVOTHYROXINE SODIUM 0.07 MG/1
75 TABLET ORAL DAILY
Status: DISCONTINUED | OUTPATIENT
Start: 2023-01-24 | End: 2023-01-26 | Stop reason: HOSPADM

## 2023-01-24 RX ORDER — TRAMADOL HYDROCHLORIDE 50 MG/1
50 TABLET ORAL EVERY 6 HOURS PRN
Status: DISCONTINUED | OUTPATIENT
Start: 2023-01-24 | End: 2023-01-26 | Stop reason: HOSPADM

## 2023-01-24 RX ORDER — LACTULOSE 10 G/15ML
10 SOLUTION ORAL 2 TIMES DAILY
Status: DISCONTINUED | OUTPATIENT
Start: 2023-01-24 | End: 2023-01-24 | Stop reason: ALTCHOICE

## 2023-01-24 RX ORDER — QUETIAPINE FUMARATE 25 MG/1
50 TABLET, FILM COATED ORAL 2 TIMES DAILY
Status: DISCONTINUED | OUTPATIENT
Start: 2023-01-24 | End: 2023-01-26 | Stop reason: HOSPADM

## 2023-01-24 RX ORDER — ACETAMINOPHEN 500 MG
1000 TABLET ORAL ONCE
Status: COMPLETED | OUTPATIENT
Start: 2023-01-24 | End: 2023-01-24

## 2023-01-24 RX ORDER — HEPARIN SODIUM 5000 [USP'U]/ML
5000 INJECTION, SOLUTION INTRAVENOUS; SUBCUTANEOUS EVERY 8 HOURS SCHEDULED
Status: DISCONTINUED | OUTPATIENT
Start: 2023-01-24 | End: 2023-01-26 | Stop reason: HOSPADM

## 2023-01-24 RX ORDER — ONDANSETRON 2 MG/ML
4 INJECTION INTRAMUSCULAR; INTRAVENOUS EVERY 6 HOURS PRN
Status: DISCONTINUED | OUTPATIENT
Start: 2023-01-24 | End: 2023-01-26 | Stop reason: HOSPADM

## 2023-01-24 RX ORDER — MAGNESIUM SULFATE IN WATER 40 MG/ML
2000 INJECTION, SOLUTION INTRAVENOUS PRN
Status: DISCONTINUED | OUTPATIENT
Start: 2023-01-24 | End: 2023-01-24

## 2023-01-24 RX ORDER — POTASSIUM CHLORIDE 20 MEQ/1
40 TABLET, EXTENDED RELEASE ORAL PRN
Status: DISCONTINUED | OUTPATIENT
Start: 2023-01-24 | End: 2023-01-24

## 2023-01-24 RX ADMIN — QUETIAPINE FUMARATE 50 MG: 25 TABLET ORAL at 22:04

## 2023-01-24 RX ADMIN — DULOXETINE HYDROCHLORIDE 20 MG: 20 CAPSULE, DELAYED RELEASE ORAL at 08:04

## 2023-01-24 RX ADMIN — SODIUM CHLORIDE, POTASSIUM CHLORIDE, SODIUM LACTATE AND CALCIUM CHLORIDE: 600; 310; 30; 20 INJECTION, SOLUTION INTRAVENOUS at 05:42

## 2023-01-24 RX ADMIN — LACTULOSE 10 G: 20 SOLUTION ORAL at 16:50

## 2023-01-24 RX ADMIN — SODIUM CHLORIDE, PRESERVATIVE FREE 10 ML: 5 INJECTION INTRAVENOUS at 08:04

## 2023-01-24 RX ADMIN — QUETIAPINE FUMARATE 50 MG: 25 TABLET ORAL at 16:50

## 2023-01-24 RX ADMIN — ACETAMINOPHEN 650 MG: 325 TABLET ORAL at 22:04

## 2023-01-24 RX ADMIN — HEPARIN SODIUM 5000 UNITS: 5000 INJECTION INTRAVENOUS; SUBCUTANEOUS at 16:50

## 2023-01-24 RX ADMIN — SODIUM CHLORIDE: 9 INJECTION, SOLUTION INTRAVENOUS at 02:42

## 2023-01-24 RX ADMIN — ACETAMINOPHEN 1000 MG: 500 TABLET ORAL at 02:25

## 2023-01-24 RX ADMIN — SERTRALINE HYDROCHLORIDE 25 MG: 50 TABLET ORAL at 16:50

## 2023-01-24 RX ADMIN — HEPARIN SODIUM 5000 UNITS: 5000 INJECTION INTRAVENOUS; SUBCUTANEOUS at 22:05

## 2023-01-24 RX ADMIN — TRAMADOL HYDROCHLORIDE 50 MG: 50 TABLET ORAL at 19:11

## 2023-01-24 RX ADMIN — LEVOTHYROXINE SODIUM 75 MCG: 0.07 TABLET ORAL at 05:55

## 2023-01-24 ASSESSMENT — PAIN SCALES - GENERAL
PAINLEVEL_OUTOF10: 5
PAINLEVEL_OUTOF10: 5
PAINLEVEL_OUTOF10: 7
PAINLEVEL_OUTOF10: 6
PAINLEVEL_OUTOF10: 10
PAINLEVEL_OUTOF10: 8
PAINLEVEL_OUTOF10: 6

## 2023-01-24 ASSESSMENT — PAIN DESCRIPTION - DESCRIPTORS
DESCRIPTORS: ACHING;SORE
DESCRIPTORS: ACHING;DULL
DESCRIPTORS: ACHING
DESCRIPTORS: DISCOMFORT;ACHING

## 2023-01-24 ASSESSMENT — PAIN DESCRIPTION - ORIENTATION
ORIENTATION: LEFT

## 2023-01-24 ASSESSMENT — PAIN DESCRIPTION - LOCATION
LOCATION: LEG
LOCATION: LEG
LOCATION: FOOT
LOCATION: LEG;BACK

## 2023-01-24 NOTE — CONSULTS
[facility] Orthopedic Surgery  Consult Note    Patient: Enrique Clark  Admit Date: 1/24/2023  Requesting Physician: Anabel Nettles DO  Room: [address] [place] [address]    Chief complaint: Left sided hip pain    HPI: Enrique Clark is a 80 y.o. female who presented to [facility] last night due to a fall which occurred after she tripped in her bedroom. Has baseline dementia. Hx liver failure. Not on AC. Describes pain in left side low back/pelvis of moderate intensity and of aching nature since the fall which is relieved by rest. Denies new numbness/tingling. Independent imaging review of the left hip and pelvis via CT scan demonstrated: minimally displaced fractures of the left sacral ala and superior pubic rami with some extension into the infero-medial acetabulum. Unaffected IM nails in bilateral proximal femurs. Patient lives with daughter/son-in-law in private home with 3 steps to enter and uses walker to ambulate. Relevant notes, labs and other tests reviewed.   Medical History:  Past Medical History:   Diagnosis Date    Acute renal failure ([address] 11-27-12    Arthritis     At risk for falling 08/28/2018    Score of 11/24 for Dynamic Gait Index    Cirrhosis ([address] 9/25/2020    Dementia with behavioral problem 9/25/2020    Frequent falls     Gastroesophageal reflux disease 1/22/2019    Movement disorder     osteoporosis    Nephrotic syndrome 12/28/2012    Thyroid disease      Past Surgical History:   Procedure Laterality Date    COLONOSCOPY      EXCISION OF FACIAL MASS      skin ca    EYE SURGERY      cataract removal; bilat    FOREARM SURGERY Right 10/27/2022    OPEN REDUCTION INTERNAL FIXATION RIGHT DISTAL RADIUS-GIGI performed by Delgado Posadas MD at [facility] Left 8/21/2019    OPEN REDUCTION INTERNAL FIXATION OF INTERTROCHANTERIC FRACTURE OF LEFT HIPUSING GAMMA NAIL performed by Delgado Posadas MD at [address] [place] [address] Right 05/27/2018    right gamma nail with cables    SKIN BIOPSY         Social History:    reports that she has never smoked. She has never used smokeless tobacco.    Family History:        Problem Relation Age of Onset    High Blood Pressure Mother     Cancer Father         lung cancer; metastic    Cancer Daughter 52        colon cancer    Cancer Brother     Cancer Brother     Diabetes Brother        Medications:  ALL MEDICATIONS HAVE BEEN REVIEWED:  Scheduled:   DULoxetine  20 mg Oral Daily    levothyroxine  75 mcg Oral Daily    sodium chloride flush  5-40 mL IntraVENous 2 times per day    heparin (porcine)  5,000 Units SubCUTAneous 3 times per day     Continuous:   sodium chloride      lactated ringers IV soln 100 mL/hr at 01/24/23 0542     PRN:HYDROmorphone, sodium chloride flush, sodium chloride, ondansetron **OR** ondansetron, polyethylene glycol, acetaminophen **OR** acetaminophen    Allergies: Allergies   Allergen Reactions    Metoprolol Hives     Severe rash and itching    Amlodipine Itching     Itching    Sulfa Antibiotics Nausea And Vomiting    Adhesive Tape Other (See Comments)     Skin tears very easily       Review of Systems:  Constitutional: Negative for fever, chills, fatigue. Skin:  Negative for pruritis, rash  Eyes: Negative for photophobia and visual disturbance. ENT:  Negative for rhinorrhea, epistaxis, sore throat  Respiratory:  Negative for cough and shortness of breath. Cardiovascular: Negative for chest pain. Gastrointestinal: Negative for nausea, vomiting, diarrhea. Genitourinary: Negative for dysuria and difficulty urinating. Neurological: Negative for confusion, dysarthria, tremors, seizures. Psychiatric:  Negative for depression or anxiety  Musculoskeletal:  Positive for left sided hip/pelvis pain    Objective:  Vitals:    01/24/23 0740   BP:    Pulse: 75   Resp:    Temp:    SpO2:       Physical Examination:  GENERAL: No apparent distress, well-nourished  SKIN:  Warm and dry  EYES: Nonicteric.    ENT: Mucous membranes moist  HEAD: Normocephalic, atraumatic  RESPIRATORY: Resp easy and unlabored  CARDIOVASCULAR: Regular rate and rhythm  GI: Abdomen soft, nontender  NEURO: Awake and alert. No speech defect  PSYCHIATRIC: Appropriate affect; not agitated  MUSCULOSKELETAL:  left hip  Inspection: On exam there are no ulcerations, rashes or lesions about the left hip. There is pain to palpation of the left sacrum. No pain with logroll left hip  Motor: Intact DF/PF on the left  Sensation: Grossly intact to light touch throughout the left lower extremity in all nerve distributions. .  Vascular:  2+ left DP pulse. Labs reviewed:  Recent Labs     01/24/23 0203   WBC 14.2*   HGB 14.0   HCT 42.9        Recent Labs     01/24/23 0203      K 5.0      CO2 28   BUN 31*   CREATININE 1.3*   GLUCOSE 125*   CALCIUM 9.5     No results for input(s): INR, PROTIME in the last 72 hours. Lab Results   Component Value Date    COLORU DARK YELLOW (A) 01/06/2023    CLARITYU CLOUDY (A) 01/06/2023    PHUR 6.0 01/06/2023    GLUCOSEU Negative 01/06/2023    BLOODU Negative 01/06/2023    LEUKOCYTESUR TRACE (A) 01/06/2023    BILIRUBINUR Negative 01/06/2023    UROBILINOGEN 1.0 01/06/2023    RBCUA 1 01/06/2023    WBCUA 3 01/06/2023    BACTERIA None Seen 01/06/2023    AMORPHOUS Rare 05/21/2020       Imaging:  CT PELVIS WO CONTRAST Additional Contrast? None   Final Result   Displaced mildly comminuted fracture in the midportion of left inferior   ischiopubic ramus. There is fracture at the junction of the lateral end of the left superior   pubic ramus and the left acetabulum with subtle nondisplaced extension of   fracture line to the anteroinferior medial portion of left acetabular   articular surface. No additional fracture in the left acetabulum. Small transverse or coronal nondisplaced fracture in the mid lateral portion   of left side of sacrum.       In the rest of bilateral pelvic bones and right hip joint, there is no   evidence of fracture. Evidence of old internally fixed healed fractures in the neck and proximal   shafts of bilateral femurs with hardware in position without any new   fractures. CT LUMBAR SPINE WO CONTRAST   Final Result   No acute fracture or dislocation of the lumbar spine. Evidence of severe degenerative disc disease at L2-L3, L3-L4 and L4-L5 levels   of lumbar spine. Evidence of moderate to severe central canal stenosis at   L4-L5, L3-L4 and L2-L3 levels. CT CSpine W/O Contrast   Final Result   No evidence of acute fracture or dislocation in the cervical spine. No change in the cervical spine as compared to the previous CT scan of the   cervical spine on 12/05/2022. Evidence of multilevel severe degenerative disc disease throughout the   cervical spine, redemonstrated. CT Head W/O Contrast   Final Result   No acute intracranial hemorrhage, mass effect, midline shift, or sign of   acute territorial infarct. There may be a subacute infarct in the left   occipital region bordering the posterior horn of the left lateral ventricle. Chronic ischemic changes in the white matter and generalized volume loss are   redemonstrated. No acute fracture of the skull or significant scalp hematoma. Paranasal   sinuses and mastoid air cells are clear. IMPRESSION:  LEFT minimally displaced superior pubic rami and sacral ala fractures  Principal Problem:    Closed left hip fracture (HCC)  Active Problems:    Chronic liver failure without hepatic coma (HCC)    Hypothyroid    SHARAD (acute kidney injury) ([address]    Dementia ([facility]    Falls frequently  Resolved Problems:    * No resolved hospital problems. *      RECOMMENDATIONS:  Recommend non-operative management of the left sided pelvis fractures. - WBAT LLE  - Pain control  - DVT prophylaxis:per primary team  - PT/OT  - Dispo: per primary team    I have reviewed imaging and plan with Dr. Hallie Robbins. Olya Zimmer, PINEDA - CNP  1/24/2023  9:19 AM

## 2023-01-24 NOTE — DISCHARGE INSTR - COC
Continuity of Care Form    Patient Name: Angie Adames   :  1930  MRN:  [id number]    Admit date:  2023  Discharge date:  2023    Code Status Order: Full Code   Advance Directives:     Admitting Physician:  Thi Skinner DO  PCP: Ricki Johnston MD    Discharging Nurse: [place] [address] Unit/Room#: 4GM-4516/6320-95  Discharging Unit Phone Number: [phone]    Emergency Contact:   Extended Emergency Contact Information  Primary Emergency Contact: Naomy thorne, [address] Pat Harish of [address] [place] [address] Phone: [phone]  Mobile Phone: [phone]  Relation: Child  Secondary Emergency Contact: Snow Goodman  [place] Phone: [phone]  Relation: Grandchild    Past Surgical History:  Past Surgical History:   Procedure Laterality Date    COLONOSCOPY      EXCISION OF FACIAL MASS      skin ca    EYE SURGERY      cataract removal; bilat    FOREARM SURGERY Right 10/27/2022    OPEN REDUCTION INTERNAL FIXATION RIGHT DISTAL RADIUS-GIGI performed by Randall Herring MD at [facility] Left 2019    OPEN REDUCTION INTERNAL FIXATION OF INTERTROCHANTERIC FRACTURE OF LEFT HIPUSING GAMMA NAIL performed by Randall Herring MD at [address] [place] [address] Right 2018    right gamma nail with cables    SKIN BIOPSY         Immunization History:   Immunization History   Administered Date(s) Administered    COVID-19, PFIZER Bivalent BOOSTER, DO NOT Dilute, (age 12y+), IM, 30 mcg/0.3 mL 2023    COVID-19, PFIZER GRAY top, DO NOT Dilute, (age 15 y+), IM, 30 mcg/0.3 mL 2022    COVID-19, PFIZER PURPLE top, DILUTE for use, (age 15 y+), 30mcg/0.3mL 2021, 2021    Hepatitis A Adult (Havrix, Vaqta) 2022, 11/10/2022    Hepatitis B Adult (Engerix-B) 2022, 2022, 11/10/2022    Influenza, FLUAD, (age 72 y+), Adjuvanted, 0.5mL 2020, 10/19/2022    Influenza, Triv, inactivated, subunit, adjuvanted, IM (Fluad 65 yrs and older) 11/13/2019    Pneumococcal Conjugate 13-valent (Ayuikbb90) 04/24/2018    Pneumococcal Polysaccharide (Akccyuxrd56) 02/22/2019    Td (Adult), 5 Lf Tetanus Toxoid, Pf (Tenivac, Decavac) 12/05/2022    Td, unspecified formulation 06/25/2014    Tdap (Boostrix, Adacel) 10/18/2018       Active Problems:  Patient Active Problem List   Diagnosis Code    Nephrotic syndrome N04.9    Edema R60.9    Hypothyroid E03.9    Multiple joint pain M25.50    Age-related osteoporosis without current pathological fracture M81.0    Closed fracture of right femur ([facility]) S72.91XA    Closed displaced comminuted fracture of shaft of right femur ([facility]) S72.351A    Primary osteoarthritis of right knee M17.11    Gastroesophageal reflux disease K21.9    False positive syphilis serology R76.8    Memory deficit R41.3    Closed left hip fracture ([address] S72.002A    Closed fracture of left femur ([address] S72. 92XA    Primary osteoarthritis of left knee C87.71    Acute metabolic encephalopathy Y63.11    SHARAD (acute kidney injury) ([address] N17.9    Cirrhosis ([address] K74.60    Dementia ([address] F03.90    Falls, initial encounter W19. XXXA    Urge incontinence N39.41    Falls frequently R29.6    Closed displaced fracture of proximal phalanx of right middle finger S62.612A    Open wound of arm, left, initial encounter S41.102A    Chronic liver failure without hepatic coma ([facility]) K72.10    Fracture, [place], right, closed S52.531A    Closed fracture of right distal radius S52.501A    Closed left hip fracture, initial encounter ([address] S72.002A       Isolation/Infection:   Isolation            No Isolation          Patient Infection Status       Infection Onset Added Last Indicated Last Indicated By Review Planned Expiration Resolved Resolved By    None active    Resolved    COVID-19 (Rule Out) 12/05/22 12/05/22 12/05/22 COVID-19, Rapid (Ordered)   12/05/22 Rule-Out Test Resulted            Nurse Assessment:  Last Vital Signs: /77   Pulse 75   Temp 98 °F (36.7 °C) (Oral)   Resp 18   Wt 168 lb 12.8 oz (76.6 kg)   SpO2 98%   BMI 28.09 kg/m²     Last documented pain score (0-10 scale): Pain Level: 5  Last Weight:   Wt Readings from Last 1 Encounters:   01/24/23 168 lb 12.8 oz (76.6 kg)     Mental Status:  oriented to self, place, and situation, disoriented to time. IV Access:  - Peripheral IV - site  R Upper Arm, insertion date: 1/25/2023    Nursing Mobility/ADLs:  Walking   Assisted  Transfer  Assisted  Bathing  Assisted  Dressing  Assisted  Toileting  Assisted  Feeding  Assisted  Med Admin  Dependent  Med Delivery   whole    Wound Care Documentation and Therapy:  Incision 10/27/22 Radial Distal;Right (Active)   Number of days: 89        Elimination:  Continence: Bowel: no  Bladder: Yes  Urinary Catheter: None   Colostomy/Ileostomy/Ileal Conduit: No       Date of Last BM: 1/26/2023  No intake or output data in the 24 hours ending 01/24/23 1140  No intake/output data recorded. Safety Concerns:     Sundowners Sundrome and At Risk for Falls    Impairments/Disabilities:      None    Nutrition Therapy:  Current Nutrition Therapy:   - Oral Diet:  General    Routes of Feeding: Oral  Liquids: No Restrictions  Daily Fluid Restriction: no  Last Modified Barium Swallow with Video (Video Swallowing Test): not done    Treatments at the Time of Hospital Discharge:   Respiratory Treatments:     Oxygen Therapy:  is not on home oxygen therapy.   Ventilator:    - No ventilator support    Rehab Therapies: Physical Therapy  Weight Bearing Status/Restrictions: No weight bearing restrictions  Other Medical Equipment (for information only, NOT a DME order):  walker  Other Treatments:    Patient's personal belongings (please select all that are sent with patient):  Dentures upper    RN SIGNATURE:  Electronically signed by Lucinda Whyte RN on 1/26/23 at 1:32 PM EST    CASE MANAGEMENT/SOCIAL WORK SECTION    Inpatient Status Date: 01/24/2023    Readmission Risk Assessment Score:  Readmission Risk              Risk of Unplanned Readmission:  16           Discharging to Facility/ Agency   Name: [facility]  Address: [address] [place] [address], [address]  Phone: [phone]  Fax: [phone]      / signature: Electronically signed by Heavenly Hauser RN on 1/26/23 at 1:12 PM EST    PHYSICIAN SECTION    Prognosis: Good    Condition at Discharge: Stable    Rehab Potential (if transferring to Rehab): Good    Recommended Labs or Other Treatments After Discharge: Follow up with Dr Hazel Lam in 4 weeks    Physician Certification: I certify the above information and transfer of Kimmie West  is necessary for the continuing treatment of the diagnosis listed and that she requires [facility] for less 30 days.      Update Admission H&P: No change in H&P    PHYSICIAN SIGNATURE:  Electronically signed by Cedrick Mann PA-C on 1/26/23 at 1:06 PM EST

## 2023-01-24 NOTE — CARE COORDINATION
Discharge Planning Assessment  Risk of Readmission Score: 12%    RN discharge planner met with patient to discuss reason for admission, current living situation, and potential needs at the time of discharge. Demographics/Insurance verified Yes    Current type of dwelling: single story home with 3 steps to enter    Patient from ECF/SW confirmed with: N/A    Living arrangements: Daughter and son-in-law    Level of function/Support: the patient uses a walker to ambulate in the home. PCP: Jennifer Gonzales    Last Visit to PCP: 01/17/2023    DME: walker, walk-in shower, shower seat, grab bar    Active with any community resources/agencies/skilled home care: N/A    Medication compliance issues:  The patient's daughter oversee the medications    Financial issues that could impact healthcare: none    Tentative discharge plan: Ortho consult, PT/OT pending, ARU consult, discharge plan [place]      Transportation at the time of discharge: [place] RN    [facility]  Phone: [phone]

## 2023-01-24 NOTE — PROGRESS NOTES
Occupational/Physical Therapy  Sander Jacobs      Initiated evaluation with pt and PLOF information gathered. When beginning to initiate bed mobility, transport arrived to take pt for an MRI. Will complete evaluation tomorrow. 23 min spent with pt. Thank you.  Naila Spicer., OTR/L, [id number]  Zak Keyes, PT, DPT, [id number]

## 2023-01-24 NOTE — H&P
Hospital Medicine History & Physical        Name:  Hamilton Dash  /Age/Sex: 1930  (80 y.o. female)  MRN & CSN:  [id number] & [id number]     PCP: Lizette Mahmood MD    Date of Admission: 2023    Date of Service: Pt seen/examined on 2023    Patient Status:  Inpatient - Patient will most likely require more than 48 hours of treatment and requires intensive medical treatment/monitoring     Chief Complaint:    Chief Complaint   Patient presents with    Fall     Patient in by [facility] from home, EMS states patient was getting out of bed and got her feet tied up and fell. Patient c/o lower back pain. Patient has hx of dementia. History Of Present Illness:     80 y.o. female with PMHx of chronic liver failure, hypothyroidism, frequent falls, dementia who presented to [facility] with complaints of fall and left hip fracture. Very pleasant elderly lady. Patient states she was at home when she fell in the bathroom. She denies losing consciousness. She states she did hit her head, but did not notice any blood on the floor. Patient is not on any blood thinners. Patient states she has a history of falls in the past.  She denied feeling dizzy during this episode or lightheaded. She states she has been eating and drinking well at home. She denies chest pain, shortness of breath, nausea, vomiting, GI/ symptoms, edema, fever, or chills. Despite the patient providing history, she does have a history of dementia. She was alert and oriented on exam.  That being said, her left foot was erythematous and slightly edematous, which she did not initially acknowledge until it was acknowledged to her in the room. She denies tobacco, alcohol, or illicit drug use.     Past Medical History:          Diagnosis Date    Acute renal failure ([address] 12    Arthritis     At risk for falling 2018    Score of  for Dynamic Gait Index    Cirrhosis ([address] 2020    Dementia with behavioral problem 9/25/2020    Frequent falls     Gastroesophageal reflux disease 1/22/2019    Movement disorder     osteoporosis    Nephrotic syndrome 12/28/2012    Thyroid disease        Past Surgical History:          Procedure Laterality Date    COLONOSCOPY      EXCISION OF FACIAL MASS      skin ca    EYE SURGERY      cataract removal; bilat    FOREARM SURGERY Right 10/27/2022    OPEN REDUCTION INTERNAL FIXATION RIGHT DISTAL RADIUS-GIGI performed by Sherrie Obrien MD at [facility] Left 8/21/2019    OPEN REDUCTION INTERNAL FIXATION OF INTERTROCHANTERIC FRACTURE OF LEFT HIPUSING GAMMA NAIL performed by Sherrie Obrien MD at [address] [place] [address] Right 05/27/2018    right gamma nail with cables    SKIN BIOPSY         Medications Prior to Admission:      Prior to Admission medications    Medication Sig Start Date End Date Taking? Authorizing Provider   lactulose (CHRONULAC) 10 GM/15ML solution TAKE 30 ML BY MOUTH daily 1/18/23   Angel Grewal MD   QUEtiapine (SEROQUEL) 50 MG tablet Take 1 tablet by mouth 2 times daily 1/5/23   PINEDA Garrett - CNP   DULoxetine (CYMBALTA) 30 MG extended release capsule TAKE ONE CAPSULE BY MOUTH DAILY 12/29/22   Guero Sainz MD   alendronate (FOSAMAX) 70 MG tablet TAKE 1 TABLET BY MOUTH ONCE WEEKLY ON AN EMPTY STOMACH BEFORE BREAKFAST.  REMAIN UPRIGHT FOR 30 MINUTES AND TAKE WITH 8 OUNCES OF WATER 12/9/22   Guero Sainz MD   sertraline (ZOLOFT) 25 MG tablet TAKE ONE TABLET BY MOUTH DAILY 11/28/22   Angel Grewal MD   levothyroxine (SYNTHROID) 75 MCG tablet Take 1 tablet by mouth Daily 10/20/22   Angel Grewal MD   acetaminophen (TYLENOL) 500 MG tablet Take 1 tablet by mouth every 8 hours as needed for Pain  Patient not taking: Reported on 1/17/2023 1/6/21   Angel Grewal MD   Caltrate 600+D Plus Minerals (CALTRATE) 600-800 MG-UNIT TABS tablet Take 1 tablet by mouth 2 times daily 8/27/20   Angel Grewal MD Allergies:  Metoprolol, Amlodipine, Sulfa antibiotics, and Adhesive tape    Social History:      TOBACCO:   reports that she has never smoked. She has never used smokeless tobacco.  ETOH:   reports no history of alcohol use. E-cigarette/Vaping       Questions Responses    E-cigarette/Vaping Use Never User    Start Date     Passive Exposure     Quit Date     Counseling Given     Comments               Family History:      Reviewed and negative in regards to presenting illness/complaint. Problem Relation Age of Onset    High Blood Pressure Mother     Cancer Father         lung cancer; metastic    Cancer Daughter 52        colon cancer    Cancer Brother     Cancer Brother     Diabetes Brother        REVIEW OF SYSTEMS COMPLETED:   Pertinent positives as noted in the HPI. All other systems reviewed and negative. PHYSICAL EXAM PERFORMED:    /75   Pulse 80   Temp 97.6 °F (36.4 °C) (Oral)   Resp 20   SpO2 93%     General appearance:  No apparent distress, appears stated age and cooperative. HEENT:  Normal cephalic, atraumatic without obvious deformity. Pupils equal, round, and reactive to light. Extra ocular muscles intact. Conjunctivae/corneas clear. Neck: Supple, with full range of motion. No jugular venous distention. Trachea midline. Respiratory:  Normal respiratory effort. Clear to auscultation, bilaterally without Rales/Wheezes/Rhonchi. Cardiovascular:  Regular rate and rhythm with normal S1/S2 without murmurs, rubs or gallops. No peripheral edema. Abdomen: Soft, non-tender, non-distended with normal bowel sounds. : No CVA tenderness  Musculoskeletal:  No clubbing or cyanosis. Left foot erythematous and nonpitting edema up to ankle. Mild tenderness in right hip to palpation. Skin: Skin color, texture, turgor normal.  No rashes or lesions. Neurologic:  Neurovascularly intact without any focal sensory/motor deficits.  Cranial nerves: II-XII intact, grossly non-focal.  Psychiatric: Alert and oriented, thought content appropriate, normal insight  Peripheral Pulses: +2 palpable, equal bilaterally       Labs:     Recent Labs     01/24/23 0203   WBC 14.2*   HGB 14.0   HCT 42.9        Recent Labs     01/24/23 0203      K 5.0      CO2 28   BUN 31*   CREATININE 1.3*   CALCIUM 9.5     Recent Labs     01/24/23 0203   AST 22   ALT 16   BILITOT 0.4   ALKPHOS 107     No results for input(s): INR in the last 72 hours. No results for input(s): Lisa Fuchs in the last 72 hours. Urinalysis:      Lab Results   Component Value Date/Time    NITRU Negative 01/06/2023 02:17 PM    WBCUA 3 01/06/2023 02:17 PM    BACTERIA None Seen 01/06/2023 02:17 PM    RBCUA 1 01/06/2023 02:17 PM    BLOODU Negative 01/06/2023 02:17 PM    SPECGRAV 1.023 01/06/2023 02:17 PM    GLUCOSEU Negative 01/06/2023 02:17 PM    GLUCOSEU NEGATIVE 02/21/2012 12:32 PM       Radiology:     CT PELVIS WO CONTRAST Additional Contrast? None   Preliminary Result   Displaced mildly comminuted fracture in the midportion of left inferior   ischiopubic ramus. There is fracture at the junction of the lateral end of the left superior   pubic ramus and the left acetabulum with subtle nondisplaced extension of   fracture line to the anteroinferior medial portion of left acetabular   articular surface. No additional fracture in the left acetabulum. Small transverse or coronal nondisplaced fracture in the mid lateral portion   of left side of sacrum. In the rest of bilateral pelvic bones and right hip joint there is no   evidence of fracture. Evidence of old internally fixed healed fracture in the neck and proximal   shafts of bilateral femurs with hardware in position, without any new   fractures. CT LUMBAR SPINE WO CONTRAST   Preliminary Result   No acute fracture or dislocation lumbar spine. Evidence of severe degenerative disc disease at L2-L3, L3-L4 and L4-L5 levels   of lumbar spine. Evidence of moderate to severe central canal stenosis at   L4-L5, L3-L4 and L2-L3 levels. CT CSpine W/O Contrast   Preliminary Result   No evidence of acute fracture or dislocation in cervical spine. No change in cervical spine as compared to previous CT scan of cervical spine   on 12/05/2022. Evidence of multilevel severe degenerative disc disease throughout cervical   spine, redemonstrated. CT Head W/O Contrast   Final Result   No acute intracranial hemorrhage, mass effect, midline shift, or sign of   acute territorial infarct. There may be a subacute infarct in the left   occipital region bordering the posterior horn of the left lateral ventricle. Chronic ischemic changes in the white matter and generalized volume loss are   redemonstrated. No acute fracture of the skull or significant scalp hematoma. Paranasal   sinuses and mastoid air cells are clear. Medications:  Not in a hospital admission.   Current Facility-Administered Medications   Medication Dose Route Frequency Provider Last Rate Last Admin    0.9 % sodium chloride infusion   IntraVENous Continuous Orvil MD Dwayne 125 mL/hr at 01/24/23 0242 New Bag at 01/24/23 0242    HYDROmorphone HCl PF (DILAUDID) injection 0.5 mg  0.5 mg IntraVENous Q3H PRN Oral Dux, DO        Caltrate 600+D Plus Minerals (CALTRATE) 600-800 MG-UNIT tablet 1 tablet  1 tablet Oral BID Oral Dux, DO        DULoxetine (CYMBALTA) extended release capsule 20 mg  20 mg Oral Daily Kade Mendez, DO        lactulose (CHRONULAC) 10 GM/15ML solution 10 g  10 g Oral BID Oral Dux, DO        levothyroxine (SYNTHROID) tablet 75 mcg  75 mcg Oral Daily Kade Mendez, DO        QUEtiapine (SEROQUEL) tablet 50 mg  50 mg Oral BID Oral Dux, DO        sertraline (ZOLOFT) tablet 25 mg  1 tablet Oral Daily Kade Mendez, DO        sodium chloride flush 0.9 % injection 5-40 mL  5-40 mL IntraVENous 2 times per day Oral Dux, DO sodium chloride flush 0.9 % injection 5-40 mL  5-40 mL IntraVENous PRN Kade Terlep, DO        0.9 % sodium chloride infusion   IntraVENous PRN Socoe Moons, DO        ondansetron (ZOFRAN-ODT) disintegrating tablet 4 mg  4 mg Oral Q8H PRN Cedric Moons, DO        Or    ondansetron (ZOFRAN) injection 4 mg  4 mg IntraVENous Q6H PRN Cedric Arredondoons, DO        polyethylene glycol (GLYCOLAX) packet 17 g  17 g Oral Daily PRN Cedric Moons, DO        acetaminophen (TYLENOL) tablet 650 mg  650 mg Oral Q6H PRN Socoe Moons, DO        Or    acetaminophen (TYLENOL) suppository 650 mg  650 mg Rectal Q6H PRN Cedric Arredondoons, DO        lactated ringers IV soln infusion   IntraVENous Continuous Kade Mendez, DO        heparin (porcine) injection 5,000 Units  5,000 Units SubCUTAneous 3 times per day Cedric De Paz, DO        magnesium sulfate 2000 mg in 50 mL IVPB premix  2,000 mg IntraVENous PRN Socoe Moons, DO        potassium chloride (KLOR-CON M) extended release tablet 40 mEq  40 mEq Oral PRN Maralee Moons, DO        Or    potassium bicarb-citric acid (EFFER-K) effervescent tablet 40 mEq  40 mEq Oral PRN Maralee Moons, DO        Or    potassium chloride 10 mEq/100 mL IVPB (Peripheral Line)  10 mEq IntraVENous PRN Maralee Moons, DO         Current Outpatient Medications   Medication Sig Dispense Refill    lactulose (CHRONULAC) 10 GM/15ML solution TAKE 30 ML BY MOUTH daily 946 mL 1    QUEtiapine (SEROQUEL) 50 MG tablet Take 1 tablet by mouth 2 times daily 60 tablet 3    DULoxetine (CYMBALTA) 30 MG extended release capsule TAKE ONE CAPSULE BY MOUTH DAILY 30 capsule 5    alendronate (FOSAMAX) 70 MG tablet TAKE 1 TABLET BY MOUTH ONCE WEEKLY ON AN EMPTY STOMACH BEFORE BREAKFAST.  REMAIN UPRIGHT FOR 30 MINUTES AND TAKE WITH 8 OUNCES OF WATER 4 tablet 5    sertraline (ZOLOFT) 25 MG tablet TAKE ONE TABLET BY MOUTH DAILY 30 tablet 5    levothyroxine (SYNTHROID) 75 MCG tablet Take 1 tablet by mouth Daily 30 tablet 5    acetaminophen (TYLENOL) 500 MG tablet Take 1 tablet by mouth every 8 hours as needed for Pain (Patient not taking: Reported on 1/17/2023) 120 tablet 0    Caltrate 600+D Plus Minerals (CALTRATE) 600-800 MG-UNIT TABS tablet Take 1 tablet by mouth 2 times daily 180 tablet 1       Consults:    IP CONSULT TO ORTHOPEDIC SURGERY  IP CONSULT TO SOCIAL WORK    ASSESSMENT:    Active Hospital Problems    Diagnosis Date Noted    Chronic liver failure without hepatic coma ([facility] [K72.10] 10/19/2022     Priority: Medium    Falls frequently [R29.6] 06/02/2021    Dementia ([facility] [F03.90] 09/25/2020    SHARAD (acute kidney injury) ([facility] [N17.9] 09/23/2020    Closed left hip fracture ([facility] Boris Reina 08/21/2019    Hypothyroid [E03.9] 12/28/2012         PLAN:  This is a 80 y.o. female who presented to [facility] and is being treated for:    Left hip fracture  Frequent falls  -CT pelvis shows fracture at the junction of the lateral end of the left superior pubic ramus and the left acetabulum with subtle nondisplaced extension of the fracture line to the anteroinferior medial portion of the left acetabular articular surface; small transverse or coronal nondisplaced fracture in the mid lateral portion of the left side of the sacrum  -IVF  -N.p.o.  -Daily labs; replace electrolytes as needed  -Orthopedic surgery consulted; appreciate recommendations  -Social work consulted for placement    Dementia  -Continue home Seroquel and Zoloft    SHARAD  -Creatinine 1.3 on admission; baseline approximately 0.8  -Avoid nephrotoxins  -Daily labs; trend creatinine    Hypothyroidism  -Continue home Synthroid    Chronic liver failure  -Continue home lactulose        DVT ppx: Heparin  GI ppx: PPI  Diet: Diet NPO Exceptions are: Sips of Water with Meds  Code Status: Full Code    PT/OT Eval Status: Ordered    Disposition - ECF after medical stabilization and treatment       Yudith CalliDO  1/24/2023  4:14 AM    Please note that some part of this chart was generated using Dragon dictation software. Although every effort was made to ensure the accuracy of this automated transcription, some errors in transcription may have occurred inadvertently. If you may need any clarification, please do not hesitate to contact me through [facility].

## 2023-01-24 NOTE — ED PROVIDER NOTES
[address]  eMERGENCY dEPARTMENT eNCOUnter        Pt Name: Angie Adames  MRN: [id number]  [place] 4/13/1930  Date of evaluation: 1/24/2023  Provider: Lavone Gottron, MD  PCP: Ricki Johnston MD      CHIEF COMPLAINT       Chief Complaint   Patient presents with    Fall     [facility] in by [facility] from home, EMS states patient was getting out of bed and got her feet tied up and fell. Patient c/o lower back pain. Patient has hx of dementia. HISTORY OFPRESENT ILLNESS   (Location/Symptom, Timing/Onset, Context/Setting, Quality, Duration, Modifying Factors,Severity)  Note limiting factors. Angie Adames is a 80 y.o. female NSAID chronic swelling of her left ankle and had an x-ray of it recently has been wrapping her ankle tonight she was at the bathroom and fell to the floor struck her head on the door she complains of low back pain and pain in the hip area bilaterally complaints of chest or abdominal pain patient has history of chronic liver disease dementia osteoporosis    Nursing Notes were all reviewed and agreed with or any disagreements were addressed  in the HPI. REVIEW OF SYSTEMS    (2-9 systems for level 4, 10 or more for level 5)       REVIEW OF SYSTEMS    Constitutional:  Denies fever, chills, or weakness   Eyes:  Denies vision changes  HENT:  Denies sore throat or neck pain   Respiratory:  Denies cough or shortness of breath   Cardiovascular:  Denies chest pain  GI:  Denies abdominal pain, nausea, vomiting, or diarrhea   Musculoskeletal:   back pain   Skin: no rash or vesicles   Neurologic:  no headache weakness focal    Lymphatic:  no swollen  nodes   Psychiatric: no si or hs thoughts     All systems negative except as marked. Positives and Pertinent negatives as per HPI. Except as noted above in the ROS, all other systems were reviewed andnegative.        PASTMEDICAL HISTORY     Past Medical History:   Diagnosis Date    Acute renal failure ([address] 11-27-12    Arthritis     At risk for falling 08/28/2018    Score of 11/24 for Dynamic Gait Index    Cirrhosis ([address] 9/25/2020    Dementia with behavioral problem 9/25/2020    Frequent falls     Gastroesophageal reflux disease 1/22/2019    Movement disorder     osteoporosis    Nephrotic syndrome 12/28/2012    Thyroid disease          SURGICAL HISTORY       Past Surgical History:   Procedure Laterality Date    COLONOSCOPY      EXCISION OF FACIAL MASS      skin ca    EYE SURGERY      cataract removal; bilat    FOREARM SURGERY Right 10/27/2022    OPEN REDUCTION INTERNAL FIXATION RIGHT DISTAL RADIUS-GIGI performed by Carla Rabago MD at [facility] Left 8/21/2019    OPEN REDUCTION INTERNAL FIXATION OF INTERTROCHANTERIC FRACTURE OF LEFT HIPUSING GAMMA NAIL performed by Carla Rabago MD at [address] [place] [address] Right 05/27/2018    right gamma nail with cables    SKIN BIOPSY           CURRENT MEDICATIONS       Current Discharge Medication List        CONTINUE these medications which have NOT CHANGED    Details   lactulose (CHRONULAC) 10 GM/15ML solution TAKE 30 ML BY MOUTH daily  Qty: 946 mL, Refills: 1      QUEtiapine (SEROQUEL) 50 MG tablet Take 1 tablet by mouth 2 times daily  Qty: 60 tablet, Refills: 3    Associated Diagnoses: Late onset Alzheimer's disease with behavioral disturbance (HCC)      DULoxetine (CYMBALTA) 30 MG extended release capsule TAKE ONE CAPSULE BY MOUTH DAILY  Qty: 30 capsule, Refills: 5    Associated Diagnoses: Fibromyalgia      alendronate (FOSAMAX) 70 MG tablet TAKE 1 TABLET BY MOUTH ONCE WEEKLY ON AN EMPTY STOMACH BEFORE BREAKFAST.  REMAIN UPRIGHT FOR 30 MINUTES AND TAKE WITH 8 OUNCES OF WATER  Qty: 4 tablet, Refills: 5    Associated Diagnoses: Age-related osteoporosis without current pathological fracture      sertraline (ZOLOFT) 25 MG tablet TAKE ONE TABLET BY MOUTH DAILY  Qty: 30 tablet, Refills: 5      levothyroxine (SYNTHROID) 75 MCG tablet Take 1 tablet by mouth Daily  Qty: 30 tablet, Refills: 5    Associated Diagnoses: Hypothyroidism, unspecified type      acetaminophen (TYLENOL) 500 MG tablet Take 1 tablet by mouth every 8 hours as needed for Pain  Qty: 120 tablet, Refills: 0    Associated Diagnoses: Chronic pain of both knees      Caltrate 600+D Plus Minerals (CALTRATE) 600-800 MG-UNIT TABS tablet Take 1 tablet by mouth 2 times daily  Qty: 180 tablet, Refills: 1    Associated Diagnoses: Age-related osteoporosis with current pathological fracture with routine healing, subsequent encounter             ALLERGIES     Metoprolol, Amlodipine, Sulfa antibiotics, and Adhesive tape    FAMILY HISTORY       Family History   Problem Relation Age of Onset    High Blood Pressure Mother     Cancer Father         lung cancer; metastic    Cancer Daughter 52        colon cancer    Cancer Brother     Cancer Brother     Diabetes Brother           SOCIAL HISTORY       Social History     Socioeconomic History    Marital status:    Occupational History    Occupation: [employer]   Tobacco Use    Smoking status: Never    Smokeless tobacco: Never   Vaping Use    Vaping Use: Never used   Substance and Sexual Activity    Alcohol use: No    Drug use: No    Sexual activity: Not Currently     Partners: Male     Social Determinants of Health     Financial Resource Strain: Low Risk     Difficulty of Paying Living Expenses: Not hard at all   Food Insecurity: No Food Insecurity    Worried About Running Out of Food in the Last Year: Never true    [address] in the Last Year: Never true   Transportation Needs: No Transportation Needs    Lack of Transportation (Medical): No    Lack of Transportation (Non-Medical):  No   Physical Activity: Inactive    Days of Exercise per Week: 0 days    Minutes of Exercise per Session: 0 min   Stress: No Stress Concern Present    Feeling of Stress : Not at all   Social Connections: Socially Isolated    Frequency of Communication with Friends and Family: More than three times a week    Frequency of Social Gatherings with Friends and Family: More than three times a week    Attends Islam Services: Never    Active Member of Clubs or Organizations: No    Attends Club or Organization Meetings: Never    Marital Status:    Housing Stability: Low Risk     Unable to Pay for Housing in the Last Year: No    Number of [place] in the Last Year: 1    Unstable Housing in the Last Year: No       SCREENINGS    Jber Coma Scale  Eye Opening: Spontaneous  Best Verbal Response: Oriented  Best Motor Response: Obeys commands  Jber Coma Scale Score: 15        PHYSICAL EXAM    (up to 7 for level 4, 8 or more for level 5)     ED Triage Vitals   BP Temp Temp src Pulse Resp SpO2 Height Weight   -- -- -- -- -- -- -- --           General Appearance:  Alert, cooperative, no distress, appears stated age. Head:  Normocephalic, without obvious abnormality, atraumatic. Eyes:  conjunctiva/corneas clear, EOM's intact. Sclera anicteric. ENT: Mucous membranes moist.   Neck: Supple, symmetrical, trachea midline, no adenopathy. No jugular venous distention. Lungs:   No Respiratory Distress. no rales  rhonchi rub   Chest Wall:  Nontender  no deformity   Heart:  Rsr no murmer gallop    Abdomen:   Soft nontender no organomegally    Extremities:  Full range of motion. no deformity   Pulses: Equal  upper and lower    Skin:  No rashes or lesions to exposed skin. Neurologic: Alert and oriented X 3. Motor grossly normal.  Speech clear.  Cr n 2-12 intact   Spine is no deformity nontender to palpation no deformity to the lower extremities there is some slight swelling of the left ankle    DIAGNOSTIC RESULTS   LABS:    Labs Reviewed   CBC WITH AUTO DIFFERENTIAL - Abnormal; Notable for the following components:       Result Value    WBC 14.2 (*)     Neutrophils Absolute 11.0 (*)     All other components within normal limits   COMPREHENSIVE METABOLIC PANEL W/ REFLEX TO MG FOR LOW K - Abnormal; Notable for the following components:    Glucose 125 (*)     BUN 31 (*)     Creatinine 1.3 (*)     Est, Glom Filt Rate 38 (*)     All other components within normal limits   URINALYSIS WITH REFLEX TO CULTURE   TSH WITH REFLEX       All other labs were within normal range or not returned as of thisdictation. EKG: All EKG's are interpreted by the Emergency Department Physician who either signs or Co-signs this chart in the absence of a cardiologist.        RADIOLOGY:   Non-plain film images such as CT, Ultrasound and MRI are read by the radiologist. Manolo Browning images are visualized and preliminarily interpreted by the  ED Provider with the belowfindings:        Interpretation per the Radiologist below, if available at the time of this note:    CT PELVIS WO CONTRAST Additional Contrast? None   Preliminary Result   Displaced mildly comminuted fracture in the midportion of left inferior   ischiopubic ramus. There is fracture at the junction of the lateral end of the left superior   pubic ramus and the left acetabulum with subtle nondisplaced extension of   fracture line to the anteroinferior medial portion of left acetabular   articular surface. No additional fracture in the left acetabulum. Small transverse or coronal nondisplaced fracture in the mid lateral portion   of left side of sacrum. In the rest of bilateral pelvic bones and right hip joint there is no   evidence of fracture. Evidence of old internally fixed healed fracture in the neck and proximal   shafts of bilateral femurs with hardware in position, without any new   fractures. CT LUMBAR SPINE WO CONTRAST   Preliminary Result   No acute fracture or dislocation lumbar spine. Evidence of severe degenerative disc disease at L2-L3, L3-L4 and L4-L5 levels   of lumbar spine. Evidence of moderate to severe central canal stenosis at   L4-L5, L3-L4 and L2-L3 levels.          CT CSpine W/O Contrast Preliminary Result   No evidence of acute fracture or dislocation in cervical spine. No change in cervical spine as compared to previous CT scan of cervical spine   on 12/05/2022. Evidence of multilevel severe degenerative disc disease throughout cervical   spine, redemonstrated. CT Head W/O Contrast   Final Result   No acute intracranial hemorrhage, mass effect, midline shift, or sign of   acute territorial infarct. There may be a subacute infarct in the left   occipital region bordering the posterior horn of the left lateral ventricle. Chronic ischemic changes in the white matter and generalized volume loss are   redemonstrated. No acute fracture of the skull or significant scalp hematoma. Paranasal   sinuses and mastoid air cells are clear.                PROCEDURES   Unless otherwise noted below, none     Procedures    CRITICAL CARE TIME   N/A      CONSULTS:  IP CONSULT TO ORTHOPEDIC SURGERY  IP CONSULT TO SOCIAL WORK    EMERGENCY DEPARTMENT COURSE and DIFFERENTIAL DIAGNOSIS/MDM:   Vitals:    Vitals:    01/24/23 0443 01/24/23 0448 01/24/23 0530 01/24/23 0531   BP: 102/84  129/65    Pulse:   74    Resp:   22    Temp:   97.5 °F (36.4 °C)    TempSrc:   Oral    SpO2: 93% 92% 98%    Weight:    168 lb 12.8 oz (76.6 kg)       Patient was given the following medications:  Medications   HYDROmorphone HCl PF (DILAUDID) injection 0.5 mg (has no administration in time range)   DULoxetine (CYMBALTA) extended release capsule 20 mg (has no administration in time range)   levothyroxine (SYNTHROID) tablet 75 mcg (75 mcg Oral Given 1/24/23 0555)   sodium chloride flush 0.9 % injection 5-40 mL (has no administration in time range)   sodium chloride flush 0.9 % injection 5-40 mL (has no administration in time range)   0.9 % sodium chloride infusion (has no administration in time range)   ondansetron (ZOFRAN-ODT) disintegrating tablet 4 mg (has no administration in time range)     Or   ondansetron [facility]) injection 4 mg (has no administration in time range)   polyethylene glycol (GLYCOLAX) packet 17 g (has no administration in time range)   acetaminophen (TYLENOL) tablet 650 mg (has no administration in time range)     Or   acetaminophen (TYLENOL) suppository 650 mg (has no administration in time range)   lactated ringers IV soln infusion ( IntraVENous New Bag 1/24/23 0588)   heparin (porcine) injection 5,000 Units (5,000 Units SubCUTAneous Not Given 1/24/23 0585)   acetaminophen (TYLENOL) tablet 1,000 mg (1,000 mg Oral Given 1/24/23 3795)           Is this patient to be included in the SEP-1 Core Measure due to severe sepsis or septic shock? No   Exclusion criteria - the patient is NOT to be included for SEP-1 Core Measure due to: Infection is not suspected    Fracture of the left inferior and superior ramus nondisplaced fracture of the left acetabulum and a fracture of the left sacrum all nondisplaced patient will be admitted for acute kidney injury and the pelvic acetabular and sacral fracture and consult with Dr. Migdalia Fernandez  which  is their preference      FINAL IMPRESSION      1. Fall, initial encounter    2. Acute kidney injury ([address]    3. Closed nondisplaced fracture of anterior wall of left acetabulum, initial encounter ([address]    4. Closed fracture of sacrum, unspecified fracture morphology, initial encounter ([address]    5. Closed fracture of single ramus of left pubis, initial encounter [facility])        DISPOSITION/PLAN   DISPOSITION Admitted 01/24/2023 04:14:15 AM      PATIENT REFERRED TO:  No follow-up provider specified.     DISCHARGE MEDICATIONS:  Current Discharge Medication List          DISCONTINUED MEDICATIONS:  Current Discharge Medication List                 (Please note that portions of this note were completed with a voice recognition program.  Efforts were made to edit the dictations but occasionally words aremis-transcribed.)    Abigail Gamble MD (electronically signed)          Oliver Olivas Ellen Grover MD  01/24/23 5884

## 2023-01-24 NOTE — TELEPHONE ENCOUNTER
General Question     Subject: PT HAS BEEN ADMITTED  Patient and /or Facility Request: Ncihol Chan  Contact Number: [phone]      THE PT'S DAUGHTER ROSENDO CALLED. SHE'S UPSET BECAUSE DR LARES HASN'T BEEN CALLED TO SEE HER MOTHER. SHE WAS ADMITTED TO THE [facility] AND IS ROOM 4462. SHE FELL AND HAS A FRACTURED PELVIS AND TWO FRACTURES IN HER LEFT HIP. THEY WANT DR LARES TO TAKE CARE OF THE PATIENT.

## 2023-01-24 NOTE — PROGRESS NOTES
Pharmacy Home Medication Reconciliation Note    A medication reconciliation has been completed for Jeff Rubi 4/13/1930    Pharmacy: [address] [place] [address] provided by: Pharmacy, patient's daughter    The patient's home medication list is as follows:  Prior to Admission medications    Medication Sig Start Date End Date Taking? Authorizing Provider   lactulose (CHRONULAC) 10 GM/15ML solution Take 20 g by mouth every morning TAKE 30 ML BY MOUTH daily 1/18/23   Gustavo Giron MD   QUEtiapine (SEROQUEL) 50 MG tablet Take 1 tablet by mouth 2 times daily 1/5/23   Susan Diana, APRN - CNP   DULoxetine (CYMBALTA) 30 MG extended release capsule TAKE ONE CAPSULE BY MOUTH DAILY 12/29/22   Jessie Sainz MD   alendronate (FOSAMAX) 70 MG tablet TAKE 1 TABLET BY MOUTH ONCE WEEKLY ON AN EMPTY STOMACH BEFORE BREAKFAST. REMAIN UPRIGHT FOR 30 MINUTES AND TAKE WITH 8 OUNCES OF WATER  Patient taking differently: Takes every Wednesday. 12/9/22   Gustavo Giron MD   sertraline (ZOLOFT) 25 MG tablet TAKE ONE TABLET BY MOUTH DAILY 11/28/22   Gustavo Giron MD   levothyroxine (SYNTHROID) 75 MCG tablet Take 1 tablet by mouth Daily 10/20/22   Gustavo Giron MD   acetaminophen (TYLENOL) 500 MG tablet Take 1 tablet by mouth every 8 hours as needed for Pain  Patient not taking: No sig reported 1/6/21 1/24/23  Gustavo Giron MD   Caltrate 600+D Plus Minerals (CALTRATE) 600-800 MG-UNIT TABS tablet Take 1 tablet by mouth 2 times daily 8/27/20   Gustavo Giron MD      Patient is no longer taking acetaminophen. Timing of last doses updated.     Thank you,  Juan Araya, PharmD  PGY-1 Pharmacy Resident  [id number]

## 2023-01-24 NOTE — TELEPHONE ENCOUNTER
----- Message from Twilla Couch sent at 1/24/2023  9:16 AM EST -----  Subject: Message to Provider    QUESTIONS  Information for Provider? Pt fell 1/23 and fractured let hip in two   places. Is currently in [facility].   ---------------------------------------------------------------------------  --------------  [address] [employer]  [phone]; OK to leave message on voicemail  ---------------------------------------------------------------------------  --------------  SCRIPT ANSWERS  Relationship to Patient? Other  Representative Name? Vivienne Rasheed  Is the Representative on the appropriate HIPAA document in Epic?  Yes

## 2023-01-24 NOTE — PROGRESS NOTES
Patient admitted after midnight after having a fall. She states she has fallen multiple times over the past few months. She does walk with a walker and lives with her daughter. She tells me it is 2022 but could not tell me the president. Pain currently controlled. WBC is 14k. Minimal erythema of left ankle. Denies urinary symptoms. Check uric acid. Need UA. Hold abx pending UA. Repeat labs in am. CT suggests possible subacute infarct. Check MRI brain. If positive will need echo. PT/OT to see. ARU consult.       China Hernandez PA-C

## 2023-01-24 NOTE — TELEPHONE ENCOUNTER
Notified patient's daughter that Dr Talita Vickers is not in town and that is why he hasn't been consulted. Will have [address] review chart and will notify SMA upon his return to office.

## 2023-01-24 NOTE — PROGRESS NOTES
[facility] Department   Phone: [phone]    Physical Therapy    [x] Initial Evaluation            [] Daily Treatment Note         [] Discharge Summary      Patient: Marianne Jimenez   : 1930   MRN: [id number]   Date of Service:  2023  Admitting Diagnosis: Closed left hip fracture [facility])  Current Admission Summary: ***  Past Medical History:  has a past medical history of Acute renal failure ([facility] Arthritis, At risk for falling, Cirrhosis ([facility] Dementia with behavioral problem, Frequent falls, Gastroesophageal reflux disease, Movement disorder, Nephrotic syndrome, and Thyroid disease. Past Surgical History:  has a past surgical history that includes Excision of Facial Mass; skin biopsy; Colonoscopy; eye surgery; other surgical history (Right, 2018); Hip fracture surgery (Left, 2019); and Forearm surgery (Right, 10/27/2022). Discharge Recommendations: ***  DME Required For Discharge: {FFOTD/C HMPRALUME:87763}  Precautions/Restrictions: {FFRESTRICTIONS:23464}  Weight Bearing Restrictions: {FFWBRESTRICTIONS:46733}  [] Right Upper Extremity  [] Left Upper Extremity [] Right Lower Extremity  [] Left Lower Extremity     Required Braces/Orthotics: {ffbraces:45832}   [] Right  [] Left  Positional Restrictions:{ffpositionrestrictions:18347}    Pre-Admission Information   Lives With: family    Type of Home: house  Home Layout: one level  Home Access:  3 step to enter with handrail. Handrails are located on B side.   Bathroom Layout: tub/shower unit  Bathroom Equipment: grab bars in shower, grab bars around toilet, shower chair, hand held shower head  Toilet Height: standard height  Home Equipment: rolling walker, manual wheelchair  Transfer Assistance: Independent without use of device; has used walker to stand  Ambulation Assistance:modified independent with use of FWW  ADL Assistance: independent with all ADL's  IADL Assistance: requires assistance with meal prep, requires assistance with laundry, requires assistance for medication management  Active :        [] Yes  [x] No  Hand Dominance: [] Left  [x] Right  Current Employment:  Full time homemaker  Hobbies:   Recent Falls: Pt reports 3 falls day of admittance; pt reports 2 falls otherwise    Examination   Vision:   Vision Gross Assessment: WFL and Vision Corrective Device: wears glasses for reading  Hearing:   hard of hearing, no hearing aid  Observation:   {ffptobservation:80739}  Posture:   ***  Sensation:   {ffptsensation:23807}  Proprioception:    {ffptproprioception:59399}  Tone:   {ffpttone:00203}  Coordination Testing:   {ffptcoordination:62633}    ROM:   {FFPTROM:08690}  Strength:   {FFPTStrength:13412}  Therapist Clinical Decision Making (Complexity): {ffptdecisionmakin}  Clinical Presentation: {ffptclinicalpresentation:75362}      Subjective  General: ***  Pain: 8/10.   Location: L hip area  Pain Interventions: pain medication in place prior to arrival       Functional Mobility  Bed Mobility  Rolling Right: maximum assistance  Comments:  Transfers  {FFPTTRANSFERS:05682:p}  Comments:  Ambulation  {ffptambulationlist:25764}  Distance: ***  Gait Mechanics: ***  Comments:    Stair Mobility  {ffptstairlist:34850}  Comments:  Wheelchair Mobility:  {ffptw/clist:78088}  Comments:  Balance  {ffptbalancelist:63735}  Comments:    Other Therapeutic Interventions    Functional Outcomes                 Cognition  {ffptcognition:42111}  Orientation:    {ffptorientation:37486}  Command Following:   {ffptcommandfollowin}    Education  Barriers To Learning: {ffptlearning barriers:85182}  Patient Education: patient educated on {ffpteducation:39849}  Learning Assessment:  {ffptlearnin}    Assessment  Activity Tolerance: ***  Impairments Requiring Therapeutic Intervention: {ffptimpairments:06761}  Prognosis: {ffptprognosis:00664}  Clinical Assessment: ***  Safety Interventions: {ffptsafety:57271}    Plan  Frequency: {ffptfrequency:27390}  Current Treatment Recommendations: {ffpttreatmentrecommendations:83880}    Goals  Patient Goals: ***   Short Term Goals:  Time Frame: ***  {ffptgoals:57860}    Therapy Session Time      Individual Group Co-treatment   Time In         Time Out         Minutes           Timed Code Treatment Minutes:      Total Treatment Minutes:         Electronically Signed By: Sebas Bonner PT

## 2023-01-25 ENCOUNTER — TELEPHONE (OUTPATIENT)
Dept: ORTHOPEDIC SURGERY | Age: 88
End: 2023-01-25

## 2023-01-25 LAB
ALBUMIN SERPL-MCNC: 2.9 G/DL (ref 3.4–5)
AMMONIA: 28 UMOL/L (ref 11–51)
ANION GAP SERPL CALCULATED.3IONS-SCNC: 11 MMOL/L (ref 3–16)
BACTERIA: NORMAL /HPF
BASOPHILS ABSOLUTE: 0 K/UL (ref 0–0.2)
BASOPHILS RELATIVE PERCENT: 0.6 %
BILIRUBIN URINE: NEGATIVE
BLOOD, URINE: NEGATIVE
BUN BLDV-MCNC: 25 MG/DL (ref 7–20)
CALCIUM SERPL-MCNC: 8.4 MG/DL (ref 8.3–10.6)
CHLORIDE BLD-SCNC: 106 MMOL/L (ref 99–110)
CLARITY: CLEAR
CO2: 23 MMOL/L (ref 21–32)
COLOR: YELLOW
CREAT SERPL-MCNC: 1.1 MG/DL (ref 0.6–1.2)
EOSINOPHILS ABSOLUTE: 0.3 K/UL (ref 0–0.6)
EOSINOPHILS RELATIVE PERCENT: 3.4 %
EPITHELIAL CELLS, UA: 0 /HPF (ref 0–5)
GFR SERPL CREATININE-BSD FRML MDRD: 47 ML/MIN/{1.73_M2}
GLUCOSE BLD-MCNC: 106 MG/DL (ref 70–99)
GLUCOSE URINE: NEGATIVE MG/DL
HCT VFR BLD CALC: 40.5 % (ref 36–48)
HEMOGLOBIN: 13.5 G/DL (ref 12–16)
HYALINE CASTS: 0 /LPF (ref 0–8)
KETONES, URINE: NEGATIVE MG/DL
LEUKOCYTE ESTERASE, URINE: ABNORMAL
LYMPHOCYTES ABSOLUTE: 1.7 K/UL (ref 1–5.1)
LYMPHOCYTES RELATIVE PERCENT: 23.5 %
MAGNESIUM: 2.2 MG/DL (ref 1.8–2.4)
MCH RBC QN AUTO: 31.4 PG (ref 26–34)
MCHC RBC AUTO-ENTMCNC: 33.5 G/DL (ref 31–36)
MCV RBC AUTO: 93.7 FL (ref 80–100)
MICROSCOPIC EXAMINATION: YES
MONOCYTES ABSOLUTE: 1.2 K/UL (ref 0–1.3)
MONOCYTES RELATIVE PERCENT: 16.7 %
NEUTROPHILS ABSOLUTE: 4 K/UL (ref 1.7–7.7)
NEUTROPHILS RELATIVE PERCENT: 55.8 %
NITRITE, URINE: NEGATIVE
PDW BLD-RTO: 13.2 % (ref 12.4–15.4)
PH UA: 6.5 (ref 5–8)
PHOSPHORUS: 3.2 MG/DL (ref 2.5–4.9)
PLATELET # BLD: 191 K/UL (ref 135–450)
PMV BLD AUTO: 9.3 FL (ref 5–10.5)
POTASSIUM SERPL-SCNC: 4.5 MMOL/L (ref 3.5–5.1)
PROTEIN UA: NEGATIVE MG/DL
RBC # BLD: 4.32 M/UL (ref 4–5.2)
RBC UA: 1 /HPF (ref 0–4)
SODIUM BLD-SCNC: 140 MMOL/L (ref 136–145)
SPECIFIC GRAVITY UA: <=1.005 (ref 1–1.03)
URINE REFLEX TO CULTURE: ABNORMAL
URINE TYPE: ABNORMAL
UROBILINOGEN, URINE: 0.2 E.U./DL
WBC # BLD: 7.3 K/UL (ref 4–11)
WBC UA: 1 /HPF (ref 0–5)

## 2023-01-25 PROCEDURE — 83735 ASSAY OF MAGNESIUM: CPT

## 2023-01-25 PROCEDURE — 6360000002 HC RX W HCPCS: Performed by: STUDENT IN AN ORGANIZED HEALTH CARE EDUCATION/TRAINING PROGRAM

## 2023-01-25 PROCEDURE — 82140 ASSAY OF AMMONIA: CPT

## 2023-01-25 PROCEDURE — 97162 PT EVAL MOD COMPLEX 30 MIN: CPT

## 2023-01-25 PROCEDURE — 97535 SELF CARE MNGMENT TRAINING: CPT

## 2023-01-25 PROCEDURE — 80069 RENAL FUNCTION PANEL: CPT

## 2023-01-25 PROCEDURE — 6370000000 HC RX 637 (ALT 250 FOR IP): Performed by: STUDENT IN AN ORGANIZED HEALTH CARE EDUCATION/TRAINING PROGRAM

## 2023-01-25 PROCEDURE — 81001 URINALYSIS AUTO W/SCOPE: CPT

## 2023-01-25 PROCEDURE — 2580000003 HC RX 258: Performed by: STUDENT IN AN ORGANIZED HEALTH CARE EDUCATION/TRAINING PROGRAM

## 2023-01-25 PROCEDURE — 97166 OT EVAL MOD COMPLEX 45 MIN: CPT

## 2023-01-25 PROCEDURE — 97530 THERAPEUTIC ACTIVITIES: CPT

## 2023-01-25 PROCEDURE — 1200000000 HC SEMI PRIVATE

## 2023-01-25 PROCEDURE — 36415 COLL VENOUS BLD VENIPUNCTURE: CPT

## 2023-01-25 PROCEDURE — 85025 COMPLETE CBC W/AUTO DIFF WBC: CPT

## 2023-01-25 RX ORDER — LACTULOSE 10 G/15ML
20 SOLUTION ORAL DAILY
Status: DISCONTINUED | OUTPATIENT
Start: 2023-01-26 | End: 2023-01-26 | Stop reason: HOSPADM

## 2023-01-25 RX ADMIN — HEPARIN SODIUM 5000 UNITS: 5000 INJECTION INTRAVENOUS; SUBCUTANEOUS at 13:52

## 2023-01-25 RX ADMIN — LEVOTHYROXINE SODIUM 75 MCG: 0.07 TABLET ORAL at 06:01

## 2023-01-25 RX ADMIN — SODIUM CHLORIDE, PRESERVATIVE FREE 10 ML: 5 INJECTION INTRAVENOUS at 21:53

## 2023-01-25 RX ADMIN — HEPARIN SODIUM 5000 UNITS: 5000 INJECTION INTRAVENOUS; SUBCUTANEOUS at 06:02

## 2023-01-25 RX ADMIN — QUETIAPINE FUMARATE 50 MG: 25 TABLET ORAL at 10:02

## 2023-01-25 RX ADMIN — DULOXETINE HYDROCHLORIDE 20 MG: 20 CAPSULE, DELAYED RELEASE ORAL at 10:02

## 2023-01-25 RX ADMIN — ACETAMINOPHEN 650 MG: 325 TABLET ORAL at 21:47

## 2023-01-25 RX ADMIN — QUETIAPINE FUMARATE 50 MG: 25 TABLET ORAL at 21:47

## 2023-01-25 RX ADMIN — HEPARIN SODIUM 5000 UNITS: 5000 INJECTION INTRAVENOUS; SUBCUTANEOUS at 21:51

## 2023-01-25 RX ADMIN — SERTRALINE HYDROCHLORIDE 25 MG: 50 TABLET ORAL at 10:02

## 2023-01-25 RX ADMIN — LACTULOSE 10 G: 20 SOLUTION ORAL at 10:02

## 2023-01-25 ASSESSMENT — PAIN SCALES - GENERAL
PAINLEVEL_OUTOF10: 5
PAINLEVEL_OUTOF10: 0

## 2023-01-25 NOTE — PROGRESS NOTES
[address] [facility] Msb Department   Phone: [phone]    Occupational Therapy    [x] Initial Evaluation            [] Daily Treatment Note         [] Discharge Summary      Patient: Sander Jacobs   : 1930   MRN: [id number]   Date of Service:  2023    Admitting Diagnosis:  Closed left hip fracture [facility])  Current Admission Summary: 69-year-old female with a history of cirrhosis, dementia, GERD, and hypothyroidism who was admitted on  with left pelvic pain after a mechanical fall. Work-up revealed fractures within the left inferior ischial pubic ramus, left superior pubic ramus, left acetabulum with extension of the fracture line into the medial portion of the left acetabular articular surface, fracture within the left side of the sacrum. Ortho evaluated and suggested weightbearing as tolerated with nonoperative management. Therapy evaluations have been unable to be performed at this point. Patient is interested in going to the ARU or discharging to home only. Past Medical History:  has a past medical history of Acute renal failure ([address] Arthritis, At risk for falling, Cirrhosis ([address] Dementia with behavioral problem, Frequent falls, Gastroesophageal reflux disease, Movement disorder, Nephrotic syndrome, and Thyroid disease. Past Surgical History:  has a past surgical history that includes Excision of Facial Mass; skin biopsy; Colonoscopy; eye surgery; other surgical history (Right, 2018); Hip fracture surgery (Left, 2019); and Forearm surgery (Right, 10/27/2022). Discharge Recommendations: Sander Jacobs scored a 9/24 on the AM-PAC ADL Inpatient form. Current research shows that an AM-PAC score of 17 or less is typically not associated with a discharge to the patient's home setting.  Based on the patient's AM-PAC score and their current ADL deficits, it is recommended that the patient have 3-5 sessions per week of Occupational Therapy at d/c to increase the patient's independence. Please see assessment section for further patient specific details. If patient discharges prior to next session this note will serve as a discharge summary. Please see below for the latest assessment towards goals. DME Required For Discharge: DME to be determined pending patient progress    Precautions/Restrictions: high fall risk  Weight Bearing Restrictions: weight bearing as tolerated  [] Right Upper Extremity  [] Left Upper Extremity [] Right Lower Extremity  [x] Left Lower Extremity     Required Braces/Orthotics: no braces required   [] Right  [] Left  Positional Restrictions:no positional restrictions    Pre-Admission Information   Lives With: family                    Type of Home: house  Home Layout: one level  Home Access:  3 step to enter with handrail. Handrails are located on B side.   Bathroom Layout: tub/shower unit  Bathroom Equipment: grab bars in shower, grab bars around toilet, shower chair, hand held shower head  Toilet Height: standard height  Home Equipment: rolling walker, manual wheelchair  Transfer Assistance: Independent without use of device; has used walker to stand  Ambulation Assistance:modified independent with use of FWW  ADL Assistance: independent with all ADL's  IADL Assistance: requires assistance with meal prep, requires assistance with laundry, requires assistance for medication management  Active :        [] Yes                 [x] No  Hand Dominance: [] Left                 [x] Right  Current Employment:  Full time homemaker  Hobbies:   Recent Falls: Pt reports 3 falls day of admittance; pt reports 2 falls otherwise    Examination   Vision:   Vision Gross Assessment: Impaired and Vision Corrective Device: wears glasses for reading  Hearing:   hard of hearing, no hearing aid  Perception:   WFL  Observation:   General Observation:  fair  Posture:   poor  Sensation:   denies numbness and tingling  Proprioception:    diminished proprioception in (R) UE, (L) UE  Tone:   Hypertonic in (B) UE, (B) LE  Coordination Testing:   Coordination and Movement Description: (R) UE, (L) UE, (R) LE, (L) LE, fine motor impairments, gross motor impairments, tremors, decreased speed, decreased accuracy, global tremors   ROM:   Unable to formally assess due to decreased cognition. Strength:   Formal MMT held secondary to decreased cognition    Therapist Clinical Decision Making (Complexity): medium complexity  Clinical Presentation: evolving      Subjective  General: Patient supine in bed upon arrival, agreeable to therapy evaluation with maximal encouragement needed. Pain: Patient does not rate upon questioning  Pain Interventions: not applicable        Activities of Daily Living  Basic Activities of Daily Living  Upper Extremity Dressing: maximum assistance requires verbal cueing Increased time to complete task  Lower Extremity Dressing: dependent requires verbal cueing Increased time to complete task  Dressing Equipment: none  Dressing Comments: maxA for donning/doffing gown, dependent for donning/doffing brief, patient with incontinence of urine, patient with increased time needed, patient with maximal verbal, tactile, and physical cueing needed for initiation, sequencing, hand placement. Toileting Comments: Patient with incontinence of urine  General Comments: Patient with maximal verbal, tactile, and physical cueing needed for initiation, sequencing, hand placement, redirection for participation in functional ADLs. Instrumental Activities of Daily Living  No IADL completed on this date.     Functional Mobility  Bed Mobility  Supine to Sit: 2 person assistance with dependentA of 2   Sit to Supine: 2 person assistance with dependentA of 2   Rolling Left: maximum assistance  Rolling Right: maximum assistance  Scootin person assistance with dependentA of 2   Comments: Patient with increased time needed for bed mobility due to resistance to movement due to decreased cognition. Patient with multiple rolls needed for brief change and re-inserting purwick in place  Transfers  Sit to stand transfer:2 person assistance with maxA of 2   Stand to sit transfer: 2 person assistance with maxA of 2   Comments: Patient with maximal verbal, physical, and tactile cueing needed for hand placement, sequencing, and initiation for increased safety with functional transfers. Patient stood ~1 min in total  Functional Mobility:  Sitting Balance: contact guard assistance, minimal assistance. Sitting Balance Comment: seated EOB  Standing Balance: 2 person assistance with maxA of 2 . Standing Balance Comment: functional transfers, ADL completion, patient with increaesd posterior lean in stance      Other Therapeutic Interventions    Functional Outcomes  AM-PAC Inpatient Daily Activity Raw Score: 9    Cognition  Overall Cognitive Status: Impaired  Arousal/Alterness: inconsistent responses to stimuli  Following Commands: follows one step commands with repetition, follows one step commands with increased time, inconsistently follows commands  Attention Span: difficulty attending to directions, difficulty dividing attention  Memory: decreased recall of biographical information, decreased recall of precautions, decreased recall of recent events, decreased short term memory, decreased long term memory  Safety Judgement: decreased awareness of need for assistance, decreased awareness of need for safety  Problem Solving: decreased awareness of errors  Insights: not aware of deficits  Initiation: requires cues for all  Sequencing: requires cues for all  Comments: Patient with increased agitation upon initial eval attempt, upon return in PM patient less agitated and with maximal verbal cueing needed for participation, sequencing, initiation, motor planning.   Orientation:    oriented to person, disoriented to place, disoriented to time , and disoriented to situation  Command Following:   accurately follows one step commands     Education  Barriers To Learning: cognition  Patient Education: patient educated on goals, OT role and benefits, plan of care, precautions, ADL adaptive strategies, weight-bearing education, energy conservation, disease specific education, transfer training, discharge recommendations  Learning Assessment:  patient will require reinforcement due to cognitive deficits    Assessment  Activity Tolerance: Patient tolerated treatment, however limited by pain, decreased cognition, and agitation   Impairments Requiring Therapeutic Intervention: decreased functional mobility, decreased ADL status, decreased ROM, decreased strength, decreased safety awareness, decreased cognition, decreased endurance, decreased balance, decreased IADL, decreased fine motor control, decreased coordination, increased pain, decreased posture  Prognosis: fair  Clinical Assessment: Patient presents with the above deficits, impacting occupational performance and functioning below baseline, skilled OT services needed to address deficits to facilitate safe return to PLOF. Patient not appropriate for ARU.     Safety Interventions: patient left in bed, bed alarm in place, call light within reach, gait belt, patient at risk for falls, and nurse notified    Plan  Frequency: 7 x/week  Current Treatment Recommendations: strengthening, ROM, balance training, functional mobility training, transfer training, endurance training, patient/caregiver education, ADL/self-care training, pain management, safety education, and positioning    Goals  Patient Goals: Patient did not state   Short Term Goals:  Time Frame: discharge  Patient will complete upper body ADL at minimal assistance   Patient will complete lower body ADL at maximum assistance   Patient will complete functional transfers at maximum assistance   Patient will complete functional mobility at maximum assistance Therapy Session Time     Individual Group Co-treatment   Time In    8721   Time Out    1335   Minutes    48        Timed Code Treatment Minutes:   33 minutes   Total Treatment Minutes:  48 minutes        Electronically Signed By: PERFECTO Holbrook/JOSE [id number]

## 2023-01-25 NOTE — TELEPHONE ENCOUNTER
LVM for Anderson Mccoyer notifying her that Dr Armin Hilton is now aware and is planning on coming to see her tomorrow when he is there for surgery.

## 2023-01-25 NOTE — TELEPHONE ENCOUNTER
Other PATIENT'S DAUGHTER WOULD LIKE A CALL BACK  AFTER DR LARES SEES MOTHER TOMORROW. SHE IS REQUESTING A CALL BACK IN THE AFTERNOON.  Dede Morgan [phone]

## 2023-01-25 NOTE — PROGRESS NOTES
Patient is in the bed with call light in reach, she is alert but extremely confused this morning. She is not aware of her situation, time or place. Medications given per [facility], shift assessment completed. Bed alarm engaged.

## 2023-01-25 NOTE — CONSULTS
Patient: Jeff Rubi  [id number]  Date: 1/25/2023      Chief Complaint: Pelvic pain    History of Present Illness/Hospital Course:  70-year-old female with a history of cirrhosis, dementia, GERD, and hypothyroidism who was admitted on 1/24 with left pelvic pain after a mechanical fall. Work-up revealed fractures within the left inferior ischial pubic ramus, left superior pubic ramus, left acetabulum with extension of the fracture line into the medial portion of the left acetabular articular surface, fracture within the left side of the sacrum. Ortho evaluated and suggested weightbearing as tolerated with nonoperative management. Therapy evaluations have been unable to be performed at this point. Patient is interested in going to the ARU or discharging to home only. Prior Level of Function:  Modified independent with walker for ambulation, required assistance for ADLs and IADLs due to cognition    Current Level of Function:  Pending therapy evaluations     has a past medical history of Acute renal failure ([address] Arthritis, At risk for falling, Cirrhosis ([address] Dementia with behavioral problem, Frequent falls, Gastroesophageal reflux disease, Movement disorder, Nephrotic syndrome, and Thyroid disease. has a past surgical history that includes Excision of Facial Mass; skin biopsy; Colonoscopy; eye surgery; other surgical history (Right, 05/27/2018); Hip fracture surgery (Left, 8/21/2019); and Forearm surgery (Right, 10/27/2022). reports that she has never smoked. She has never used smokeless tobacco. She reports that she does not drink alcohol and does not use drugs. family history includes Cancer in her brother, brother, and father; Cancer (age of onset: 52) in her daughter; Diabetes in her brother; High Blood Pressure in her mother. REVIEW OF SYSTEMS:   CONSTITUTIONAL: negative for fevers, chills, diaphoresis, appetite change, night sweats and unexpected weight change.    HEENT: negative for hearing loss, tinnitus, ear drainage, sinus pressure, nasal congestion, epistaxis and snoring. RESPIRATORY: Negative for hemoptysis, cough, sputum production. CARDIOVASCULAR: negative for chest pain, palpitations, exertional chest pressure/discomfort, edema, syncope. GASTROINTESTINAL: negative for nausea, vomiting, diarrhea, constipation, blood in stool and abdominal pain. GENITOURINARY: negative for frequency, dysuria, urinary incontinence, decreased urine volume, and hematuria. HEMATOLOGIC/LYMPHATIC: negative for easy bruising, bleeding and lymphadenopathy. ALLERGIC/IMMUNOLOGIC: negative for recurrent infections, angioedema, anaphylaxis and drug reactions. ENDOCRINE: negative for weight changes and diabetic symptoms including polyuria, polydipsia and polyphagia. MUSCULOSKELETAL: negative for joint swelling, decreased range of motion and muscle weakness. NEUROLOGICAL: negative for headaches, slurred speech, unilateral weakness. PSYCHIATRIC/BEHAVIORAL: negative for hallucinations, behavioral problems, confusion and agitation. All pertinent positives are noted in the HPI. Physical Examination:  Vitals: Patient Vitals for the past 24 hrs:   BP Temp Temp src Pulse Resp SpO2   01/25/23 0958 101/65 97.3 °F (36.3 °C) Oral 83 16 93 %   01/24/23 2351 115/73 97.3 °F (36.3 °C) Oral 90 16 90 %   01/24/23 2016 119/71 98 °F (36.7 °C) Oral 82 16 94 %   01/24/23 1941 -- -- -- -- 16 --   01/24/23 1156 -- -- -- 70 -- --   01/24/23 1145 135/69 97.1 °F (36.2 °C) Oral 70 18 98 %     Psych: Stable mood, normal judgement, normal affect. Const: No distress  Eyes: Conjunctiva noninjected, no icterus noted; pupils equal, round. HENT: Atraumatic, normocephalic; Oral mucosa moist  Neck: Trachea midline, neck supple. No thyromegaly noted. CV: No audible murmurs  Resp: No increased WOB, no audible wheezing   GI: Nondistended   Neuro: Alert, oriented x3 (2022), higher level cognitive deficits.     Skin: No visible abnormalities  MSK: No joint abnormalities noted. Ext: No significant edema appreciated. No varicosities. Lab Results   Component Value Date    WBC 7.3 01/25/2023    HGB 13.5 01/25/2023    HCT 40.5 01/25/2023    MCV 93.7 01/25/2023     01/25/2023     Lab Results   Component Value Date    INR 1.04 10/19/2022    INR 0.95 12/12/2020    INR 0.97 12/11/2020    PROTIME 13.5 10/19/2022    PROTIME 11.0 12/12/2020    PROTIME 11.2 12/11/2020     Lab Results   Component Value Date    CREATININE 1.1 01/25/2023    BUN 25 (H) 01/25/2023     01/25/2023    K 4.5 01/25/2023     01/25/2023    CO2 23 01/25/2023     Lab Results   Component Value Date    ALT 16 01/24/2023    AST 22 01/24/2023    ALKPHOS 107 01/24/2023    BILITOT 0.4 01/24/2023       Most recent imaging studies revealed   EXAMINATION:   CT OF THE PELVIS WITHOUT CONTRAST 1/24/2023 12:45 am       TECHNIQUE:   CT of the pelvis was performed without the administration of intravenous   contrast.  Multiplanar reformatted images are provided for review. Adjustment of mA and/or kV according to patient size was utilized. Automated   exposure control, iterative reconstruction, and/or weight based adjustment of   the mA/kV was utilized to reduce the radiation dose to as low as reasonably   achievable. COMPARISON:   CT scan of abdomen and pelvis 12/29/2012. X-ray of pelvis on 07/05/2021. HISTORY:   ORDERING SYSTEM PROVIDED HISTORY: fall   TECHNOLOGIST PROVIDED HISTORY:   Reason for exam:->fall   Additional Contrast?->None   Decision Support Exception - unselect if not a suspected or confirmed   emergency medical condition->Emergency Medical Condition (MA)   Reason for Exam: fall   Relevant Medical/Surgical History: Fall (Patient in by [facility] from   home, EMS states patient was getting out of bed and got her feet tied up and   fell. Patient c/o lower back pain.  Patient has hx of dementia. )       FINDINGS:   Evidence of previous internally fixed healed fractures in the visualized   proximal bilateral femurs with intramedullary rods in position. There is   orthopedic pin in the neck of left femur extended to the upper portion of   head of left femur without any new fracture in the head or neck of left   femur. Similarly there is orthopedic pin in the neck of right femur,   extended to upper medial portion of head of right femur without any new   fracture in right femoral head or neck. The study demonstrates displaced mildly comminuted fractures in the   midportion of the left inferior ischiopubic ramus. There is evidence of fracture at the junction of the lateral end of the left   superior pubic ramus and the left acetabulum with subtle nondisplaced   extension of fracture line to the anteroinferior medial articular surface of   the left acetabulum. There is no evidence of fracture fragments in the left   hip joint space. In the rest of left acetabulum, there is no evidence of fracture. Evidence of mild osteoarthritis at bilateral hip joints. The study demonstrates small fracture in transverse orientation without   significant displacement in the mid lateral portion of left side of sacrum   with nondisplaced extension of fracture line to the articular surface of the   sacrum at the left SI joint. There is no demonstrable fracture in the left iliac bone. No evidence of   fracture in the right side of sacrum, right iliac bone, right acetabulum,   right ischium bone, right inferior ischiopubic ramus or right superior pubic   ramus. No definable fracture in lower sacrum or in coccyx. In visualized pelvic cavity, there is no acute process. Impression   Displaced mildly comminuted fracture in the midportion of left inferior   ischiopubic ramus.        There is fracture at the junction of the lateral end of the left superior   pubic ramus and the left acetabulum with subtle nondisplaced extension of   fracture line to the anteroinferior medial portion of left acetabular   articular surface. No additional fracture in the left acetabulum. Small transverse or coronal nondisplaced fracture in the mid lateral portion   of left side of sacrum. In the rest of bilateral pelvic bones and right hip joint, there is no   evidence of fracture. Evidence of old internally fixed healed fractures in the neck and proximal   shafts of bilateral femurs with hardware in position without any new   fractures. The above laboratory data have been reviewed. The above imaging data have been reviewed. The above medical testing have been reviewed. Body mass index is 28.09 kg/m². Assessment and Plan:  Multiple pelvic fractures with extension into the left acetabular surface: WBAT. Pain control. PT/OT  Dementia: SLP  Hypothyroidism    Dispo: Patient is pending therapy evaluations. Should she be functionally appropriate, we would be able to accept to ARU. Given her confusion and this new setting, she may benefit from discharging back to her home environment if functionally borderline to be safe to do so. We will follow-up therapy evaluations today. Thank you for the consultation. Kerri Cool MD 1/25/2023, 10:42 AM     * This document was created using dictation software. While all precautions were taken to ensure accuracy, errors may have occurred. Please disregard any typographical errors.

## 2023-01-25 NOTE — PROGRESS NOTES
Physical/Occupational Therapy Attempt  [place]    PT/OT attempted evaluation with patient. Pt confused and paranoid this date. Tells therapist \"you're going to go to nursing home\". Pt answers some questions before stating \"that's all you need to know\". When attempting to initiate functional mobility pt states she wants to get up but then refuses to move. Pt declines all ADLs and even repositioning in bed. RN and NP updated. Will follow-up as pt status and schedule allow. No charge.    Dea Al, PT, DPT #[id number]  Mabel Wilson, OTR/L [id number]

## 2023-01-25 NOTE — PROGRESS NOTES
[address] [facility] [address]   Phone: [phone]    Physical Therapy    [x] Initial Evaluation            [] Daily Treatment Note         [] Discharge Summary      Patient: Luis E Lawrence   : 1930   MRN: [id number]   Date of Service:  2023  Admitting Diagnosis: Closed left hip fracture [facility])  Current Admission Summary: Per ortho consult on 23 \"89 y.o. female who presented to [place] [address] last night due to a fall which occurred after she tripped in her bedroom. Has baseline dementia. Hx liver failure. Not on AC. Describes pain in left side low back/pelvis of moderate intensity and of aching nature since the fall which is relieved by rest. Denies new numbness/tingling. Independent imaging review of the left hip and pelvis via CT scan demonstrated: minimally displaced fractures of the left sacral ala and superior pubic rami with some extension into the infero-medial acetabulum. Unaffected IM nails in bilateral proximal femurs. Patient lives with daughter/son-in-law in private home with 3 steps to enter and uses walker to ambulate. \"  Past Medical History:  has a past medical history of Acute renal failure ([address] Arthritis, At risk for falling, Cirrhosis ([address] Dementia with behavioral problem, Frequent falls, Gastroesophageal reflux disease, Movement disorder, Nephrotic syndrome, and Thyroid disease. Past Surgical History:  has a past surgical history that includes Excision of Facial Mass; skin biopsy; Colonoscopy; eye surgery; other surgical history (Right, 2018); Hip fracture surgery (Left, 2019); and Forearm surgery (Right, 10/27/2022). Discharge Recommendations: Luis E Lawrence scored a 6/24 on the AM-PAC short mobility form. Current research shows that an AM-PAC score of 17 or less is typically not associated with a discharge to the patient's home setting.  Based on the patient's AM-PAC score and their current functional mobility deficits, it is recommended that the patient have 3-5 sessions per week of Physical Therapy at d/c to increase the patient's independence. Please see assessment section for further patient specific details. If patient discharges prior to next session this note will serve as a discharge summary. Please see below for the latest assessment towards goals. DME Required For Discharge: DME to be determined at next level of care    Precautions/Restrictions: high fall risk, weight bearing  Weight Bearing Restrictions: weight bearing as tolerated  [] Right Upper Extremity  [] Left Upper Extremity [] Right Lower Extremity  [x] Left Lower Extremity     Required Braces/Orthotics: no braces required   [] Right  [] Left  Positional Restrictions:no positional restrictions    Pre-Admission Information   Lives With: family                    Type of Home: house  Home Layout: one level  Home Access:  3 step to enter with handrail. Handrails are located on B side.   Bathroom Layout: tub/shower unit  Bathroom Equipment: grab bars in shower, grab bars around toilet, shower chair, hand held shower head  Toilet Height: standard height  Home Equipment: rolling walker, manual wheelchair  Transfer Assistance: Independent without use of device; has used walker to stand  Ambulation Assistance:modified independent with use of FWW  ADL Assistance: independent with all ADL's  IADL Assistance: requires assistance with meal prep, requires assistance with laundry, requires assistance for medication management  Active :        [] Yes                 [x] No  Hand Dominance: [] Left                 [x] Right  Current Employment:  Full time homemaker  Hobbies:   Recent Falls: Pt reports 3 falls day of admittance; pt reports 2 falls otherwise    Examination   Vision:   Vision Gross Assessment: WFL and Vision Corrective Device: wears glasses for reading  Hearing:   hard of hearing, no hearing aid  Observation:   General Observation:  Pt on RA throughout session. Mild swelling in (B) LEs. Posture:   Fair - forward head, rounded shoulders  Sensation:   Unable to formally test secondary to impaired cognition  ROM:   Appears to have PROM WFL as observed through functional mobility but not formally assessed due to impaired cognition  Strength:   Unable to formally assess due to impaired cognition and increased LE pain  Therapist Clinical Decision Making (Complexity): medium complexity  Clinical Presentation: evolving      Subjective  General: Patient semi-reclined in bed upon therapist arrival. Pt appears confused with frequent comments that are out of context such as \"oh those poor animals\". Pt nods that she wants to sit up but doesn't move - may have impaired motor planning due to her increased confusion. Pain: 9/10. Location: (L) hip and Pain rating taken based on observed faces and behaviors  Pain Interventions: RN notified and repositioned        Functional Mobility  Bed Mobility  Supine to Sit: 2 person assistance with Total   Sit to Supine: 2 person assistance with Total   Rolling Left: maximum assistance  Rolling Right: maximum assistance  Scootin person assistance with Total   Comments: Pt fearful of pain with mobility and impaired motor planning due to increased acute confusion and appears unable to initiate mobility. Transfers  Sit to stand transfer: 2 person assistance with Max A   Stand to sit transfer: 2 person assistance with Max A   Comments: 2 trials at EOB, unable to reach full stand for either trial.   Ambulation  Ambulation not tested on this date secondary to difficulty with transfers and increased pain. Distance:   Gait Mechanics:   Comments:    Stair Mobility  Stair mobility not completed on this date. Comments:  Wheelchair Mobility:  No w/c mobility completed on this date.   Comments:  Balance  Static Sitting Balance: poor (+): requires min (A) to maintain balance  Dynamic Sitting Balance: poor (+): requires min (A) to maintain balance  Static Standing Balance: poor (-): requires max (A) to maintain balance  Comments: Pt sat EOB x15 minutes with constant (B) UE support for muscle guarding. The patient completed a partial stand 2 x20 sec with Max A of 2. Other Therapeutic Interventions  Pt assisted with changing depends and pericare - see OT note.     Functional Outcomes  AM-PAC Inpatient Mobility Raw Score : 6              Cognition  Overall Cognitive Status: Impaired  Arousal/Alterness: delayed responses to stimuli, inconsistent responses to stimuli  Following Commands: inconsistently follows commands  Attention Span: difficulty attending to directions, difficulty dividing attention  Memory: decreased recall of biographical information, decreased short term memory  Safety Judgement: decreased awareness of need for assistance, decreased awareness of need for safety  Problem Solving: assistance required to generate solutions  Insights: decreased awareness of deficits  Initiation: requires cues for all  Sequencing: requires cues for all  Orientation:    disoriented x 4  Command Following:   impaired    Education  Barriers To Learning: cognition  Patient Education: patient educated on goals, PT role and benefits, plan of care, precautions, weight-bearing education, general safety, functional mobility training, disease specific education, discharge recommendations  Learning Assessment:  patient verbalizes understanding, would benefit from continued reinforcement, patient will require reinforcement due to cognitive deficits    Assessment  Activity Tolerance: Pt with increased pain with all mobility, improves with seated rest.   Impairments Requiring Therapeutic Intervention: decreased functional mobility, decreased ADL status, decreased strength, decreased safety awareness, decreased cognition, decreased endurance, decreased balance, increased pain  Prognosis: good  Clinical Assessment: Patient is a 79 yo female admitted [facility] s/p fall at home, found to have minimally displaced fractures of the (L) sacral ala, (L) supervision pubic rami with some extension into the infero-medial acetabulum. At this time the patient presents with increased confusion, even from yesterday afternoon (1/24). At this time the patient requires Max A of 2 to Total A of 2 for all bed mobility and she is unable to reach a full upright stand due to pain and poor command following. The patient is from home and is ambulatory at baseline. Recommending continued skilled PT in a SNF setting to safely progress pt's tolerance to activity and independence with functional mobility. The patient is not appropriate for ARU. Safety Interventions: patient left in bed, bed alarm in place, call light within reach, gait belt, patient at risk for falls, telesitter in use, and nurse notified    Plan  Frequency: 5-7 x/week  Current Treatment Recommendations: strengthening, balance training, functional mobility training, transfer training, gait training, endurance training, patient/caregiver education, pain management, home exercise program, safety education, equipment evaluation/education, and positioning    Goals  Patient Goals: None stated   Short Term Goals:  Time Frame: Before discharge  Patient will complete bed mobility at moderate assistance   Patient will complete sit<>stand transfers at moderate assistance  Patient will complete bed>chair transfers at Mod A with RW   Patient will ambulate 5 ft with use of rolling walker at 2 person assistance with Max A   Patient to maintain standing at maximum assistance for 2 minutes with (B) UE support.     Therapy Session Time      Individual Group Co-treatment   Time In     1672   Time Out     1335   Minutes     48     Timed Code Treatment Minutes:  33 Minutes  Total Treatment Minutes:  48 minutes       Electronically Signed By: Nura Hughes PT      Britany Sanchez PT, DPT #[id number]

## 2023-01-25 NOTE — TELEPHONE ENCOUNTER
Other PATIENT'S DAUGHTER IS CALLING BACK TO SPEAK WITH DR LARES AFTER SEEING HER THIS MORNING.  Dede  [phone]

## 2023-01-26 VITALS
TEMPERATURE: 97.7 F | RESPIRATION RATE: 16 BRPM | BODY MASS INDEX: 28.09 KG/M2 | WEIGHT: 168.8 LBS | DIASTOLIC BLOOD PRESSURE: 55 MMHG | HEART RATE: 76 BPM | SYSTOLIC BLOOD PRESSURE: 93 MMHG | OXYGEN SATURATION: 92 %

## 2023-01-26 PROBLEM — S32.592A CLOSED FRACTURE OF SINGLE RAMUS OF LEFT PUBIS (HCC): Status: ACTIVE | Noted: 2023-01-26

## 2023-01-26 LAB
ALBUMIN SERPL-MCNC: 2.7 G/DL (ref 3.4–5)
ANION GAP SERPL CALCULATED.3IONS-SCNC: 9 MMOL/L (ref 3–16)
BASOPHILS ABSOLUTE: 0.1 K/UL (ref 0–0.2)
BASOPHILS RELATIVE PERCENT: 0.6 %
BUN BLDV-MCNC: 25 MG/DL (ref 7–20)
CALCIUM SERPL-MCNC: 8.6 MG/DL (ref 8.3–10.6)
CHLORIDE BLD-SCNC: 103 MMOL/L (ref 99–110)
CO2: 27 MMOL/L (ref 21–32)
CREAT SERPL-MCNC: 1.2 MG/DL (ref 0.6–1.2)
EOSINOPHILS ABSOLUTE: 0.3 K/UL (ref 0–0.6)
EOSINOPHILS RELATIVE PERCENT: 2.6 %
GFR SERPL CREATININE-BSD FRML MDRD: 42 ML/MIN/{1.73_M2}
GLUCOSE BLD-MCNC: 95 MG/DL (ref 70–99)
HCT VFR BLD CALC: 38.2 % (ref 36–48)
HEMOGLOBIN: 12.6 G/DL (ref 12–16)
LYMPHOCYTES ABSOLUTE: 2.2 K/UL (ref 1–5.1)
LYMPHOCYTES RELATIVE PERCENT: 19.6 %
MAGNESIUM: 2.1 MG/DL (ref 1.8–2.4)
MCH RBC QN AUTO: 30.9 PG (ref 26–34)
MCHC RBC AUTO-ENTMCNC: 32.9 G/DL (ref 31–36)
MCV RBC AUTO: 93.8 FL (ref 80–100)
MONOCYTES ABSOLUTE: 1.4 K/UL (ref 0–1.3)
MONOCYTES RELATIVE PERCENT: 12.9 %
NEUTROPHILS ABSOLUTE: 7.2 K/UL (ref 1.7–7.7)
NEUTROPHILS RELATIVE PERCENT: 64.3 %
PDW BLD-RTO: 13.4 % (ref 12.4–15.4)
PHOSPHORUS: 4.1 MG/DL (ref 2.5–4.9)
PLATELET # BLD: 180 K/UL (ref 135–450)
PMV BLD AUTO: 9.2 FL (ref 5–10.5)
POTASSIUM SERPL-SCNC: 4.5 MMOL/L (ref 3.5–5.1)
RBC # BLD: 4.08 M/UL (ref 4–5.2)
SODIUM BLD-SCNC: 139 MMOL/L (ref 136–145)
WBC # BLD: 11.2 K/UL (ref 4–11)

## 2023-01-26 PROCEDURE — 6370000000 HC RX 637 (ALT 250 FOR IP): Performed by: NURSE PRACTITIONER

## 2023-01-26 PROCEDURE — 6370000000 HC RX 637 (ALT 250 FOR IP): Performed by: STUDENT IN AN ORGANIZED HEALTH CARE EDUCATION/TRAINING PROGRAM

## 2023-01-26 PROCEDURE — 85025 COMPLETE CBC W/AUTO DIFF WBC: CPT

## 2023-01-26 PROCEDURE — 83735 ASSAY OF MAGNESIUM: CPT

## 2023-01-26 PROCEDURE — 97530 THERAPEUTIC ACTIVITIES: CPT

## 2023-01-26 PROCEDURE — 6360000002 HC RX W HCPCS: Performed by: STUDENT IN AN ORGANIZED HEALTH CARE EDUCATION/TRAINING PROGRAM

## 2023-01-26 PROCEDURE — 80069 RENAL FUNCTION PANEL: CPT

## 2023-01-26 PROCEDURE — 6370000000 HC RX 637 (ALT 250 FOR IP): Performed by: PHYSICIAN ASSISTANT

## 2023-01-26 PROCEDURE — 2580000003 HC RX 258: Performed by: STUDENT IN AN ORGANIZED HEALTH CARE EDUCATION/TRAINING PROGRAM

## 2023-01-26 PROCEDURE — 97535 SELF CARE MNGMENT TRAINING: CPT

## 2023-01-26 PROCEDURE — 36415 COLL VENOUS BLD VENIPUNCTURE: CPT

## 2023-01-26 RX ORDER — TRAMADOL HYDROCHLORIDE 50 MG/1
50 TABLET ORAL EVERY 6 HOURS PRN
Qty: 10 TABLET | Refills: 0 | Status: SHIPPED | OUTPATIENT
Start: 2023-01-26 | End: 2023-01-29

## 2023-01-26 RX ADMIN — QUETIAPINE FUMARATE 50 MG: 25 TABLET ORAL at 08:22

## 2023-01-26 RX ADMIN — HEPARIN SODIUM 5000 UNITS: 5000 INJECTION INTRAVENOUS; SUBCUTANEOUS at 14:01

## 2023-01-26 RX ADMIN — LACTULOSE 20 G: 20 SOLUTION ORAL at 08:22

## 2023-01-26 RX ADMIN — LEVOTHYROXINE SODIUM 75 MCG: 0.07 TABLET ORAL at 05:46

## 2023-01-26 RX ADMIN — TRAMADOL HYDROCHLORIDE 50 MG: 50 TABLET ORAL at 05:46

## 2023-01-26 RX ADMIN — HEPARIN SODIUM 5000 UNITS: 5000 INJECTION INTRAVENOUS; SUBCUTANEOUS at 05:47

## 2023-01-26 RX ADMIN — SODIUM CHLORIDE, PRESERVATIVE FREE 10 ML: 5 INJECTION INTRAVENOUS at 08:22

## 2023-01-26 RX ADMIN — SERTRALINE HYDROCHLORIDE 25 MG: 50 TABLET ORAL at 08:22

## 2023-01-26 RX ADMIN — DULOXETINE HYDROCHLORIDE 20 MG: 20 CAPSULE, DELAYED RELEASE ORAL at 08:22

## 2023-01-26 ASSESSMENT — PAIN DESCRIPTION - ORIENTATION: ORIENTATION: LEFT

## 2023-01-26 ASSESSMENT — PAIN DESCRIPTION - DESCRIPTORS: DESCRIPTORS: DISCOMFORT

## 2023-01-26 ASSESSMENT — PAIN SCALES - WONG BAKER
WONGBAKER_NUMERICALRESPONSE: 8
WONGBAKER_NUMERICALRESPONSE: 4;2

## 2023-01-26 ASSESSMENT — PAIN DESCRIPTION - LOCATION: LOCATION: HIP

## 2023-01-26 NOTE — CARE COORDINATION
Discharge Planning Note:    Talked with Adam Viveros, daughter, on the phone. An approved SNF list was reviewed and the following referral was placed wt the request of the daughter:    - [facility]    Will continue to follow.     MICHAEL WilloughbyN RN    [facility]  Phone: [phone]

## 2023-01-26 NOTE — PROGRESS NOTES
[address] [facility]   Phone: [phone]    Physical Therapy    [] Initial Evaluation            [x] Daily Treatment Note         [] Discharge Summary      Patient: Car Guadarrama   : 1930   MRN: [id number]   Date of Service:  2023  Admitting Diagnosis: Closed left hip fracture [facility])  Current Admission Summary: Per ortho consult on 23 \"89 y.o. female who presented to [place] [address] last night due to a fall which occurred after she tripped in her bedroom. Has baseline dementia. Hx liver failure. Not on AC. Describes pain in left side low back/pelvis of moderate intensity and of aching nature since the fall which is relieved by rest. Denies new numbness/tingling. Independent imaging review of the left hip and pelvis via CT scan demonstrated: minimally displaced fractures of the left sacral ala and superior pubic rami with some extension into the infero-medial acetabulum. Unaffected IM nails in bilateral proximal femurs. Patient lives with daughter/son-in-law in private home with 3 steps to enter and uses walker to ambulate. \"  Past Medical History:  has a past medical history of Acute renal failure ([address] Arthritis, At risk for falling, Cirrhosis ([address] Dementia with behavioral problem, Frequent falls, Gastroesophageal reflux disease, Movement disorder, Nephrotic syndrome, and Thyroid disease. Past Surgical History:  has a past surgical history that includes Excision of Facial Mass; skin biopsy; Colonoscopy; eye surgery; other surgical history (Right, 2018); Hip fracture surgery (Left, 2019); and Forearm surgery (Right, 10/27/2022). Discharge Recommendations: Car Guadarrama scored a 6/24 on the AM-PAC short mobility form. Current research shows that an AM-PAC score of 17 or less is typically not associated with a discharge to the patient's home setting.  Based on the patient's AM-PAC score and their current functional mobility deficits, it is recommended that the patient have 3-5 sessions per week of Physical Therapy at d/c to increase the patient's independence. Please see assessment section for further patient specific details. If patient discharges prior to next session this note will serve as a discharge summary. Please see below for the latest assessment towards goals. DME Required For Discharge: DME to be determined at next level of care    Precautions/Restrictions: high fall risk, weight bearing  Weight Bearing Restrictions: weight bearing as tolerated  [] Right Upper Extremity  [] Left Upper Extremity [] Right Lower Extremity  [x] Left Lower Extremity     Required Braces/Orthotics: no braces required   [] Right  [] Left  Positional Restrictions:no positional restrictions    Pre-Admission Information   Lives With: family                    Type of Home: house  Home Layout: one level  Home Access:  3 step to enter with handrail. Handrails are located on B side. Bathroom Layout: tub/shower unit  Bathroom Equipment: grab bars in shower, grab bars around toilet, shower chair, hand held shower head  Toilet Height: standard height  Home Equipment: rolling walker, manual wheelchair  Transfer Assistance: Independent without use of device; has used walker to stand  Ambulation Assistance:modified independent with use of FWW  ADL Assistance: independent with all ADL's  IADL Assistance: requires assistance with meal prep, requires assistance with laundry, requires assistance for medication management  Active :        [] Yes                 [x] No  Hand Dominance: [] Left                 [x] Right  Current Employment:  Full time homemaker  Hobbies:   Recent Falls: Pt reports 3 falls day of admittance; pt reports 2 falls otherwise      Subjective  General: Patient semi-reclined in bed upon therapist arrival. Pt very lethargic this date. Opens her eyes to her name, but then closes them and is asleep again within seconds.  Pt more awake after Dr. Nadine Prescott was in to assess her (L) LE. Pt then agreeable to sitting up for lunch. States she is hungry. Pain: 9/10. Location: (L) hip and Pain rating taken based on observed faces and behaviors  Pain Interventions: RN notified and repositioned        Functional Mobility  Bed Mobility  Supine to Sit: 2 person assistance with Mod A   Scooting: moderate assistance, maximum assistance  Comments: Pt with improved initiation of movement this date though still requires increased time and cues. Supine>sit with HOB elevated. Scooting with Mod A at EOB but progressed to needing Max A in chair by end of session. Transfers  Sit to stand transfer: 2 person assistance with Max A   Stand to sit transfer: 2 person assistance with Max A   Bed to chair transfer: 2 person assistance with Max A   Comments: Sit>stand from EOB and chair to RW. Increased time for all sit>stands. When standing to RW pt needs increased time and hand over hand assist to move (L) hand to walker. Pt unable to take steps or weight shift with RW thus Stedy used to transfer pt to chair. Ambulation  Ambulation not tested on this date secondary to difficulty with transfers and increased pain. Distance:   Gait Mechanics:   Comments:    Stair Mobility  Stair mobility not completed on this date. Comments:  Wheelchair Mobility:  No w/c mobility completed on this date. Comments:  Balance  Static Sitting Balance: poor (+): requires min (A) to maintain balance  Dynamic Sitting Balance: poor (+): requires min (A) to maintain balance  Static Standing Balance: poor (-): requires max (A) to maintain balance  Comments: Pt sat unsupported at EOB x6 minutes total between sit<>stand trials. Pt sat unsupported at edge of recliner also for ADLs x10 minutes. Pt stood x1.5 minutes for pericare with Mod A progressing to Max A, (B) UE support on RW and cues for upright posture. Decreased weight bearing on (L) LE in standing.      Other Therapeutic Interventions  Pt assisted with bathing, dressing, and pericare - see OT note.    Attempted lateral weight shifts in standing. Pt unable to tolerate more than ~25% weight on (L) LE at this time. Good use of UE support to help attempt off-weighting (L) LE.    Functional Outcomes  -PAC Inpatient Mobility Raw Score : 6              Cognition  Overall Cognitive Status: Impaired  Arousal/Alterness: delayed responses to stimuli, inconsistent responses to stimuli  Following Commands: follows one step commands with increased time  Attention Span: appears intact  Memory: decreased recall of biographical information, decreased short term memory  Safety Judgement: decreased awareness of need for assistance, decreased awareness of need for safety  Problem Solving: decreased awareness of errors  Insights: decreased awareness of deficits  Initiation: requires cues for some  Sequencing: requires cues for some  Comment: Improved cognition this date, pt closer to her baseline of mild impairments from dementia. No paranoia or agitation noted this date.   Orientation:    oriented to person, oriented to place, disoriented to time , and disoriented to situation - Pt states initially that it is February 2002, states the ambulance brought her here but she doesn't know why  Command Following:   impaired    Education  Barriers To Learning: cognition  Patient Education: patient educated on goals, PT role and benefits, plan of care, precautions, weight-bearing education, general safety, functional mobility training, proper use of assistive device/equipment, disease specific education, transfer training, discharge recommendations  Learning Assessment:  patient verbalizes understanding, would benefit from continued reinforcement, patient will require reinforcement due to cognitive deficits    Assessment  Activity Tolerance: Pt with increased pain with all mobility, improves with seated rest. Pt able to work through the pain more this date.  Impairments  Requiring Therapeutic Intervention: decreased functional mobility, decreased ADL status, decreased strength, decreased safety awareness, decreased cognition, decreased endurance, decreased balance, increased pain, decreased posture  Prognosis: good  Clinical Assessment: Patient is a 81 yo female admitted to [facility] s/p fall at home, found to have minimally displaced fractures of the (L) sacral ala, (L) supervision pubic rami with some extension into the infero-medial acetabulum. The patient had improved cognition this date, though affects of her baseline mild dementia still noted. Pt required increased time to initiate movement but had consistent initiation this date. The patient continues to require Max A of 2 for sit>stand but was able to tolerate 1.5 minutes standing. She would benefit from further UE strengthening and LE exercises to promote lateral weight shifts in standing for her to be able to take steps. Recommending continued skilled PT in a SNF setting to safely progress pt's tolerance to activity and independence with functional mobility. The patient is not appropriate for ARU.    Safety Interventions: patient left in chair, chair alarm in place, call light within reach, gait belt, patient at risk for falls, telesitter in use, and nurse notified    Plan  Frequency: 5-7 x/week  Current Treatment Recommendations: strengthening, balance training, functional mobility training, transfer training, gait training, endurance training, patient/caregiver education, pain management, home exercise program, safety education, equipment evaluation/education, and positioning    Goals  Patient Goals: None stated   Short Term Goals:  Time Frame: Before discharge  Patient will complete bed mobility at moderate assistance   Patient will complete sit<>stand transfers at moderate assistance  Patient will complete bed>chair transfers at Mod A with RW   Patient will ambulate 5 ft with use of rolling walker at 2 person assistance with Max A   Patient to maintain standing at maximum assistance for 2 minutes with (B) UE support.     No goals met 1/26 but pt progressing    Therapy Session Time      Individual Group Co-treatment   Time In     1116   Time Out     1212   Minutes     56     Timed Code Treatment Minutes:  56 Minutes  Total Treatment Minutes:  56 minutes       Electronically Signed By: Shanon Love, PT      Darrel Foreman PT, DPT #[id number]

## 2023-01-26 NOTE — CARE COORDINATION
Discharge Plan:     Patient discharged to:    [facility] [address]    SW/DC Planner faxed, [address] and AVS to: [phone]    Narcotic Prescriptions faxed were: N/A    RN: will call report to: [phone]      Medical Transport with: [facility] [phone]     time: 1400    Family advised of discharge?: yes    HENS Submitted?:  yesa    All discharge needs met per case management.     MICHAEL RutherfordN RN    [facility]  Phone: [phone]

## 2023-01-26 NOTE — DISCHARGE SUMMARY
[facility] DISCHARGE SUMMARY    Patient Demographics    Alisa Blevins  Date of Birth. 4/13/1930  MRN. [id number]     Primary care provider. Mary Ellen Wooten MD  (Tel: [phone])    Admit date: 1/24/2023    Discharge date (blank if same as Note Date): Note Date: 1/26/2023     Reason for Hospitalization. Chief Complaint   Patient presents with    Fall     [facility], EMS states patient was getting out of bed and got her feet tied up and fell. Patient c/o lower back pain. Patient has hx of dementia. Significant Findings. Principal Problem:    Closed left hip fracture (HCC)  Active Problems:    Chronic liver failure without hepatic coma (HCC)    Closed fracture of single ramus of left pubis (HCC)    Hypothyroid    Fall    SHARAD (acute kidney injury) ([facility] [address]    Dementia ([facility] [address]    Falls frequently  Resolved Problems:    * No resolved hospital problems. *       Problems and results from this hospitalization that need follow up. None ***    Significant test results and incidental findings. ***    Invasive procedures and treatments. None [facility]. ***    Consults. IP CONSULT TO ORTHOPEDIC SURGERY  IP CONSULT TO SOCIAL WORK  IP CONSULT TO PHYSICAL MEDICINE REHAB    Physical examination on discharge day. BP (!) 93/55   Pulse 76   Temp 97.7 °F (36.5 °C) (Oral)   Resp 16   Wt 168 lb 12.8 oz (76.6 kg)   SpO2 92%   BMI 28.09 kg/m²   General appearance. Alert. Looks comfortable. HEENT. Sclera clear. Moist mucus membranes. Cardiovascular. Regular rate and rhythm, normal S1, S2. No murmur. Respiratory. Not using accessory muscles. Clear to auscultation bilaterally, no wheeze. Gastrointestinal. Abdomen soft, non-tender, not distended, normal bowel sounds  Neurology. Facial symmetry. No speech deficits.  Moving all extremities equally. Extremities. No edema in lower extremities. Skin. Warm, dry, normal turgor    Condition at time of discharge ***    Medication instructions provided to patient at discharge. Medication List        START taking these medications      traMADol 50 MG tablet  Commonly known as: ULTRAM  Take 1 tablet by mouth every 6 hours as needed for Pain for up to 3 days. Max Daily Amount: 200 mg            CHANGE how you take these medications      alendronate 70 MG tablet  Commonly known as: FOSAMAX  TAKE 1 TABLET BY MOUTH ONCE WEEKLY ON AN EMPTY STOMACH BEFORE BREAKFAST. REMAIN UPRIGHT FOR 30 MINUTES AND TAKE WITH 8 OUNCES OF WATER  What changed: See the new instructions. CONTINUE taking these medications      Caltrate 600+D Plus Minerals 600-800 MG-UNIT Tabs tablet  Take 1 tablet by mouth 2 times daily     DULoxetine 30 MG extended release capsule  Commonly known as: CYMBALTA  TAKE ONE CAPSULE BY MOUTH DAILY     lactulose 10 GM/15ML solution  Commonly known as: CHRONULAC     levothyroxine 75 MCG tablet  Commonly known as: SYNTHROID  Take 1 tablet by mouth Daily     QUEtiapine 50 MG tablet  Commonly known as: SEROquel  Take 1 tablet by mouth 2 times daily     sertraline 25 MG tablet  Commonly known as: ZOLOFT  TAKE ONE TABLET BY MOUTH DAILY               Where to Get Your Medications        You can get these medications from any pharmacy    Bring a paper prescription for each of these medications  traMADol 50 MG tablet         Discharge recommendations given to patient. Follow Up. *** in 1 week   Disposition. {disposition:81636}  Activity. {discharge activity:38204}  Diet: ADULT DIET; Regular      Spent *** minutes in discharge process. Signed:   Valerie Beckman PA-C     1/26/2023 1:06 PM

## 2023-01-26 NOTE — PROGRESS NOTES
Pt discharged to [facility] per physician order. Pt's belonging packed and taken with pt while transferring. Peripheral iv removed. All questions and queries answered. Discharge instruction reviewed. Pt stable at this time. Report given to [facility] transport team and being transported by them.

## 2023-01-26 NOTE — PROGRESS NOTES
Car Guadarrama  1/26/2023  [id number]    Chief Complaint: Closed left hip fracture ([address]    Subjective:   No overnight events. No current complaints. Confused and talking to someone in the room. ROS: No CP, SOB, dyspnea    Objective:  Patient Vitals for the past 24 hrs:   BP Temp Temp src Pulse Resp SpO2   01/26/23 0800 (!) 93/55 97.7 °F (36.5 °C) Oral 76 16 92 %   01/26/23 0616 -- -- -- -- 16 --   01/26/23 0542 110/62 97.5 °F (36.4 °C) Oral 64 16 94 %   01/26/23 0045 102/62 98 °F (36.7 °C) Oral 69 16 90 %   01/25/23 1954 129/79 98.1 °F (36.7 °C) Oral 85 16 94 %   01/25/23 1644 137/78 98.6 °F (37 °C) Axillary 87 17 93 %   01/25/23 1450 138/77 97.2 °F (36.2 °C) Axillary 83 17 92 %     Gen: No distress, pleasant. HEENT: Normocephalic, atraumatic. CV: No audible murmurs, well perfused extremities  Resp: No respiratory distress. No increased WOB  Abd: Soft, nontender nondistended  Ext: No edema. Neuro: Alert, oriented to person only, having a conversation with another person (no one in the room)    Laboratory data: Available via EMR. Therapy progress:    PT    Rolling: Level of difficulty - Unable   Sit to Stand from a Chair: Level of difficulty - Unable  Supine to Sit: Level of difficulty - Unable     Bed to Chair: Physical Assistance Required - Total  Ambulate Across Room: Physical Assistance Required - Total  Ascend 3-5 Steps With HR: Physical Assistance Required - Total    OT    Eating: Physical Assistance Required - A Lot  Grooming: Physical Assistance Required - A Lot  LB Dressing: Physical Assistance Required - Total  UB Dressing: Physical Assistance Required - A Lot  Bathing: Physical Assistance Required - Total  Toileting: Physical Assistance Required - Total    SLP         Body mass index is 28.09 kg/m².     Assessment:  Patient Active Problem List   Diagnosis    Nephrotic syndrome    Edema    Hypothyroid    Multiple joint pain    Age-related osteoporosis without current pathological fracture Closed fracture of right femur ([address]    Closed displaced comminuted fracture of shaft of right femur ([address]    Primary osteoarthritis of right knee    Gastroesophageal reflux disease    False positive syphilis serology    Memory deficit    Closed left hip fracture (HCC)    Closed fracture of left femur (HCC)    Primary osteoarthritis of left knee    Acute metabolic encephalopathy    SHARAD (acute kidney injury) ([address]    Cirrhosis ([address]    Dementia ([facility] [address]    Falls, initial encounter    Urge incontinence    Falls frequently    Closed displaced fracture of proximal phalanx of right middle finger    Open wound of arm, left, initial encounter    Chronic liver failure without hepatic coma (HCC)    Fracture, [place], right, closed    Closed fracture of right distal radius    Closed left hip fracture, initial encounter ([facility] [address]       Plan:   Multiple pelvic fractures with extension into the left acetabular surface: WBAT. Pain control. PT/OT  Dementia: SLP  Hypothyroidism     Dispo: Patient is not able to tolerate the required 3 hours of therapy as required in ARU. Would suggest SNF as her next level of care. Liaison to notify CM. We will sign off. Alexandra Emanuel MD 1/26/2023, 10:52 AM    * This document was created using dictation software. While all precautions were taken to ensure accuracy, errors may have occurred. Please disregard any typographical errors.

## 2023-01-26 NOTE — PROGRESS NOTES
[facility]  Progress Note    Chief complaint: Left sided hip pain    Subjective: The patient is sitting up in the bed. She reports no pain currently. Eating breakfast.  Denies new issues. Review of Systems:  Constitutional: Negative for fever, chills, fatigue. Skin:  Negative for pruritis, rash  Eyes: Negative for photophobia and visual disturbance. ENT:  Negative for rhinorrhea, epistaxis, sore throat  Respiratory:  Negative for cough and shortness of breath. Cardiovascular: Negative for chest pain. Gastrointestinal: Negative for nausea, vomiting, diarrhea. Genitourinary: Negative for dysuria and difficulty urinating. Neurological: Negative for confusion, dysarthria, tremors, seizures. Psychiatric:  Negative for depression or anxiety  Musculoskeletal:  Positive for left sided hip/pelvis pain    Objective:  Vitals:    01/26/23 0800   BP: (!) 93/55   Pulse: 76   Resp: 16   Temp: 97.7 °F (36.5 °C)   SpO2: 92%      Physical Examination:  GENERAL: No apparent distress, well-nourished  SKIN:  Warm and dry  EYES: Nonicteric. ENT: Mucous membranes moist  HEAD: Normocephalic, atraumatic  RESPIRATORY: Resp easy and unlabored  CARDIOVASCULAR: Regular rate and rhythm  GI: Abdomen soft, nontender  NEURO: Awake and alert. No speech defect  PSYCHIATRIC: Appropriate affect; not agitated  MUSCULOSKELETAL:  left hip  Inspection: On exam there are no ulcerations, rashes or lesions about the left hip. There is pain to palpation of the left sacrum. No pain with logroll left hip  Motor: Intact DF/PF on the left  Sensation: Grossly intact to light touch throughout the left lower extremity in all nerve distributions. .  Vascular:  2+ left DP pulse.     Labs reviewed:  Recent Labs     01/24/23  0203 01/25/23  0430 01/26/23 0457   WBC 14.2* 7.3 11.2*   HGB 14.0 13.5 12.6   HCT 42.9 40.5 38.2    191 180     Recent Labs     01/24/23  0203 01/25/23  0430 01/26/23 0457    140 139   K 5.0 4.5 4.5  106 103   CO2 28 23 27   BUN 31* 25* 25*   CREATININE 1.3* 1.1 1.2   GLUCOSE 125* 106* 95   CALCIUM 9.5 8.4 8.6   MG  --  2.20 2.10   PHOS  --  3.2 4.1     No results for input(s): INR, PROTIME in the last 72 hours. Lab Results   Component Value Date    COLORU Yellow 01/25/2023    CLARITYU Clear 01/25/2023    PHUR 6.5 01/25/2023    GLUCOSEU Negative 01/25/2023    BLOODU Negative 01/25/2023    LEUKOCYTESUR TRACE (A) 01/25/2023    BILIRUBINUR Negative 01/25/2023    UROBILINOGEN 0.2 01/25/2023    RBCUA 1 01/25/2023    WBCUA 1 01/25/2023    BACTERIA None Seen 01/25/2023    AMORPHOUS Rare 05/21/2020       Imaging:  MRI BRAIN WO CONTRAST   Final Result   No acute intracranial abnormality. CT PELVIS WO CONTRAST Additional Contrast? None   Final Result   Displaced mildly comminuted fracture in the midportion of left inferior   ischiopubic ramus. There is fracture at the junction of the lateral end of the left superior   pubic ramus and the left acetabulum with subtle nondisplaced extension of   fracture line to the anteroinferior medial portion of left acetabular   articular surface. No additional fracture in the left acetabulum. Small transverse or coronal nondisplaced fracture in the mid lateral portion   of left side of sacrum. In the rest of bilateral pelvic bones and right hip joint, there is no   evidence of fracture. Evidence of old internally fixed healed fractures in the neck and proximal   shafts of bilateral femurs with hardware in position without any new   fractures. CT LUMBAR SPINE WO CONTRAST   Final Result   No acute fracture or dislocation of the lumbar spine. Evidence of severe degenerative disc disease at L2-L3, L3-L4 and L4-L5 levels   of lumbar spine. Evidence of moderate to severe central canal stenosis at   L4-L5, L3-L4 and L2-L3 levels.          CT CSpine W/O Contrast   Final Result   No evidence of acute fracture or dislocation in the cervical spine.      No change in the cervical spine as compared to the previous CT scan of the   cervical spine on 12/05/2022. Evidence of multilevel severe degenerative disc disease throughout the   cervical spine, redemonstrated. CT Head W/O Contrast   Final Result   No acute intracranial hemorrhage, mass effect, midline shift, or sign of   acute territorial infarct. There may be a subacute infarct in the left   occipital region bordering the posterior horn of the left lateral ventricle. Chronic ischemic changes in the white matter and generalized volume loss are   redemonstrated. No acute fracture of the skull or significant scalp hematoma. Paranasal   sinuses and mastoid air cells are clear. IMPRESSION:  LEFT minimally displaced superior pubic rami and sacral ala fractures  Principal Problem:    Closed left hip fracture (HCC)  Active Problems:    Chronic liver failure without hepatic coma (HCC)    Hypothyroid    SHARAD (acute kidney injury) ([facility] [address]    Dementia ([facility] [address]    Falls frequently  Resolved Problems:    * No resolved hospital problems. *      PLAN:  Recommend non-operative management of the left sided pelvis fractures.   - WBAT LLE  - Pain control  - DVT prophylaxis:per primary team  - PT/OT  - Dispo: per primary team    Followup with  [facility] [place] in 4-6 weeks    PINEDA Andersen Aas - CNP  1/26/2023  10:54 AM

## 2023-01-26 NOTE — PROGRESS NOTES
[facility] PROGRESS NOTE    1/25/2023 10:11 AM        Name: Ashish Drew . Admitted: 1/24/2023  Primary Care Provider: Robe Griffith MD (Tel: [phone])      Subjective:  .  progres    Reviewed interval ancillary notes    Current Medications  [START ON 1/26/2023] lactulose (CHRONULAC) 10 GM/15ML solution 20 g, Daily  HYDROmorphone HCl PF (DILAUDID) injection 0.5 mg, Q3H PRN  DULoxetine (CYMBALTA) extended release capsule 20 mg, Daily  levothyroxine (SYNTHROID) tablet 75 mcg, Daily  sodium chloride flush 0.9 % injection 5-40 mL, 2 times per day  sodium chloride flush 0.9 % injection 5-40 mL, PRN  0.9 % sodium chloride infusion, PRN  ondansetron (ZOFRAN-ODT) disintegrating tablet 4 mg, Q8H PRN   Or  ondansetron (ZOFRAN) injection 4 mg, Q6H PRN  polyethylene glycol (GLYCOLAX) packet 17 g, Daily PRN  acetaminophen (TYLENOL) tablet 650 mg, Q6H PRN   Or  acetaminophen (TYLENOL) suppository 650 mg, Q6H PRN  heparin (porcine) injection 5,000 Units, 3 times per day  traMADol (ULTRAM) tablet 50 mg, Q6H PRN  QUEtiapine (SEROQUEL) tablet 50 mg, BID  sertraline (ZOLOFT) tablet 25 mg, Daily  perflutren lipid microspheres (DEFINITY) injection 1.5 mL, ONCE PRN        Objective:  /78   Pulse 87   Temp 98.6 °F (37 °C) (Axillary)   Resp 17   Wt 168 lb 12.8 oz (76.6 kg)   SpO2 93%   BMI 28.09 kg/m²     Intake/Output Summary (Last 24 hours) at 1/25/2023 1911  Last data filed at 1/25/2023 1853  Gross per 24 hour   Intake 861.91 ml   Output 750 ml   Net 111.91 ml      Wt Readings from Last 3 Encounters:   01/24/23 168 lb 12.8 oz (76.6 kg)   01/24/23 168 lb 12.8 oz (76.6 kg)   01/17/23 165 lb (74.8 kg)       General appearance:  Appears comfortable  Eyes: Sclera clear. Pupils equal.  ENT: Moist oral mucosa. Trachea midline, no adenopathy. Cardiovascular: Regular rhythm, normal S1, S2. No murmur.  No edema in lower extremities  Respiratory: Not using accessory muscles. Good inspiratory effort. Clear to auscultation bilaterally, no wheeze or crackles. GI: Abdomen soft, no tenderness, not distended, normal bowel sounds  Musculoskeletal: No cyanosis in digits, neck supple  Neurology: CN 2-12 grossly intact. No speech or motor deficits  Psych: Normal affect. Alert and oriented in time, place and person  Skin: Warm, dry, normal turgor    Labs and Tests:  CBC:   Recent Labs     01/24/23 0203 01/25/23  0430   WBC 14.2* 7.3   HGB 14.0 13.5    191     BMP:    Recent Labs     01/24/23 0203 01/25/23  0430    140   K 5.0 4.5    106   CO2 28 23   BUN 31* 25*   CREATININE 1.3* 1.1   GLUCOSE 125* 106*     Hepatic:   Recent Labs     01/24/23 0203   AST 22   ALT 16   BILITOT 0.4   ALKPHOS 107     CT pelvis  Displaced mildly comminuted fracture in the midportion of left inferior   ischiopubic ramus. There is fracture at the junction of the lateral end of the left superior   pubic ramus and the left acetabulum with subtle nondisplaced extension of   fracture line to the anteroinferior medial portion of left acetabular   articular surface. No additional fracture in the left acetabulum. Small transverse or coronal nondisplaced fracture in the mid lateral portion   of left side of sacrum. In the rest of bilateral pelvic bones and right hip joint, there is no   evidence of fracture. Evidence of old internally fixed healed fractures in the neck and proximal   shafts of bilateral femurs with hardware in position without any new   fractures       Problem List  Principal Problem:    Closed left hip fracture ([address]  Active Problems:    Chronic liver failure without hepatic coma (HCC)    Hypothyroid    SHARAD (acute kidney injury) ([address]    Dementia ([address]    Falls frequently  Resolved Problems:    * No resolved hospital problems.  *       Assessment & Plan:   L ischiopubic rami fracture extending into acetabulum- Minimally displaced and non operative. PT/OT to see. ARU would accept although not sure therapy will recommend as she has refused therapy eval this am already due to confusion. Cirrhosis-on lactulose, increase to her home dose. Check ammonia  Dementia-having increased confusion but calm at present time. Continue seroquel bid. Diet: ADULT DIET;  Regular  Code:Full Code        Jackie Suárez PA-C   1/25/2023 10:11 AM

## 2023-01-26 NOTE — PROGRESS NOTES
[address] [facility],6[address] Msb Department   Phone: [phone]    Occupational Therapy    [] Initial Evaluation            [x] Daily Treatment Note         [] Discharge Summary      Patient: Tayo Bettencourt   : 1930   MRN: [id number]   Date of Service:  2023    Admitting Diagnosis:  Closed left hip fracture [facility])  Current Admission Summary: 80-year-old female with a history of cirrhosis, dementia, GERD, and hypothyroidism who was admitted on  with left pelvic pain after a mechanical fall. Work-up revealed fractures within the left inferior ischial pubic ramus, left superior pubic ramus, left acetabulum with extension of the fracture line into the medial portion of the left acetabular articular surface, fracture within the left side of the sacrum. Ortho evaluated and suggested weightbearing as tolerated with nonoperative management. Therapy evaluations have been unable to be performed at this point. Patient is interested in going to the ARU or discharging to home only. Past Medical History:  has a past medical history of Acute renal failure ([address] Arthritis, At risk for falling, Cirrhosis ([address] Dementia with behavioral problem, Frequent falls, Gastroesophageal reflux disease, Movement disorder, Nephrotic syndrome, and Thyroid disease. Past Surgical History:  has a past surgical history that includes Excision of Facial Mass; skin biopsy; Colonoscopy; eye surgery; other surgical history (Right, 2018); Hip fracture surgery (Left, 2019); and Forearm surgery (Right, 10/27/2022). Discharge Recommendations: Tayo Bettencourt scored a 10/ on the AM-PAC ADL Inpatient form. Current research shows that an AM-PAC score of 17 or less is typically not associated with a discharge to the patient's home setting.  Based on the patient's AM-PAC score and their current ADL deficits, it is recommended that the patient have 3-5 sessions per week of Occupational Therapy at d/c to increase the patient's independence. Please see assessment section for further patient specific details. If patient discharges prior to next session this note will serve as a discharge summary. Please see below for the latest assessment towards goals. DME Required For Discharge: DME to be determined pending patient progress    Precautions/Restrictions: high fall risk  Weight Bearing Restrictions: weight bearing as tolerated  [] Right Upper Extremity  [] Left Upper Extremity [] Right Lower Extremity  [x] Left Lower Extremity     Required Braces/Orthotics: no braces required   [] Right  [] Left  Positional Restrictions:no positional restrictions    Pre-Admission Information   Lives With: family                    Type of Home: house  Home Layout: one level  Home Access:  3 step to enter with handrail. Handrails are located on B side. Bathroom Layout: tub/shower unit  Bathroom Equipment: grab bars in shower, grab bars around toilet, shower chair, hand held shower head  Toilet Height: standard height  Home Equipment: rolling walker, manual wheelchair  Transfer Assistance: Independent without use of device; has used walker to stand  Ambulation Assistance:modified independent with use of FWW  ADL Assistance: independent with all ADL's  IADL Assistance: requires assistance with meal prep, requires assistance with laundry, requires assistance for medication management  Active :        [] Yes                 [x] No  Hand Dominance: [] Left                 [x] Right  Current Employment:  Full time homemaker  Hobbies:   Recent Falls: Pt reports 3 falls day of admittance; pt reports 2 falls otherwise        Subjective  General: Patient supine in bed upon arrival, lethargic initially; improved with bed mobility.  Agreeable to OT/PT cotx session  Pain: Patient does not rate upon questioning L LE/pubic  Pain Interventions: pain medication in place prior to arrival, repositioned , and therapy activities modified        Activities of Daily Living  Basic Activities of Daily Living  Feeding: setup assistance  Feeding Comments: Assist to cut large fruit, open containers, pt able to self-feed  Grooming: maximum assistance  Grooming Comments: Hair comb/lotion application to B LE/feet  Upper Extremity Bathing: moderate assistance requires verbal cueing  Lower Extremity Bathing: dependent   Bathing Comments: Sponge bath seated in room chair, VC for initiation  Upper Extremity Dressing: minimal assistance  Lower Extremity Dressing: dependent  Dressing Equipment: none  Dressing Comments: Nieves clark, DOMINGO doff/denise brief/socks  Toileting Comments: Patient with Purewick and small smear of BM in brief, DEP for caro-care/brief, Purewick replaced    Instrumental Activities of Daily Living  No IADL completed on this date. Functional Mobility  Bed Mobility  Supine to Sit: 2 person assistance with ModA of 2   Scooting: moderate assistance, maximum assistance, Mod seated EOB progressing to MaxA in room chair  Comments: Patient with increased time needed for bed mobility with VC required for initiation and sequencing  Transfers  Sit to stand transfer:2 person assistance with maxA of 2   Stand to sit transfer: 2 person assistance with maxA of 2   Comments: Patient with maximal verbal, physical, and tactile cueing needed for hand placement, sequencing, and initiation for increased safety with functional transfers. Pt completed sit >< stand at RW x2 trials + x1 with STEDY. Recommend STEDY x2 to RN    Functional Mobility:  Sitting Balance: contact guard assistance. Sitting Balance Comment: pt tolerated EOB ~10 min  Standing Balance: moderate assistance, maximum assistance, ModA progressing to MaxA with RW.     Standing Balance Comment: functional transfers, ADL completion, patient with increased posterior lean in stance, pt tolerated ~2 min total    Functional Mobility Comment: DEP transfer with use of STEDY secondary to difficulty with transfers and pain    Other Therapeutic Interventions    Functional Outcomes  AM-PAC Inpatient Daily Activity Raw Score: 10    Cognition  Overall Cognitive Status: Impaired  Arousal/Alterness: inconsistent responses to stimuli  Following Commands: follows one step commands with repetition, follows one step commands with increased time, inconsistently follows commands  Attention Span: difficulty attending to directions, difficulty dividing attention  Memory: decreased recall of biographical information, decreased recall of precautions, decreased recall of recent events, decreased short term memory, decreased long term memory  Safety Judgement: decreased awareness of need for assistance, decreased awareness of need for safety  Problem Solving: decreased awareness of errors  Insights: decreased awareness of deficits  Initiation: requires cues for some  Sequencing: requires cues for some  Orientation:    oriented to person, oriented to place, disoriented to time , and disoriented to situation Pt new month but not year \"2002\"  Command Following:   accurately follows one step commands With increased time     Education  Barriers To Learning: cognition  Patient Education: patient educated on goals, OT role and benefits, plan of care, precautions, ADL adaptive strategies, weight-bearing education, energy conservation, disease specific education, transfer training, discharge recommendations  Learning Assessment:  patient will require reinforcement due to cognitive deficits    Assessment  Activity Tolerance: Fair, mobility limited by pain  Impairments Requiring Therapeutic Intervention: decreased functional mobility, decreased ADL status, decreased ROM, decreased strength, decreased safety awareness, decreased cognition, decreased endurance, decreased balance, decreased IADL, decreased fine motor control, decreased coordination, increased pain, decreased posture  Prognosis: fair  Clinical Assessment: Patient presents with the above deficits, impacting occupational performance and functioning below baseline, skilled OT services needed to address deficits to facilitate safe return to PLOF. Patient not appropriate for ARU.     Safety Interventions: patient left in chair, chair alarm in place, call light within reach, gait belt, patient at risk for falls, and nurse notified    Plan  Frequency: 7 x/week  Current Treatment Recommendations: strengthening, ROM, balance training, functional mobility training, transfer training, endurance training, patient/caregiver education, ADL/self-care training, pain management, safety education, and positioning    Goals  Patient Goals: Patient did not state   Short Term Goals:  Time Frame: discharge  Patient will complete upper body ADL at minimal assistance   Patient will complete lower body ADL at maximum assistance   Patient will complete functional transfers at maximum assistance   Patient will complete functional mobility at maximum assistance     No goals met, continue all goals 1/26  Therapy Session Time     Individual Group Co-treatment   Time In    1116   Time Out    1214   Minutes    58        Timed Code Treatment Minutes:   58 minutes   Total Treatment Minutes:  58 minutes        Electronically Signed By: ROWDY [id number]

## 2023-01-26 NOTE — PROGRESS NOTES
Morning assessment completed. Pt comfortably resting in bed. VSS. Oriented to self and situation, disoriented to time. Morning meds given per MAR. The care plan and education has been reviewed and mutually agreed upon with the patient.

## 2023-01-26 NOTE — TELEPHONE ENCOUNTER
Called and LVM for daughter. Will close call as it looks like [facility] already talked to her. Consult from 1/26-  PLAN: I discussed with Thomas Olivo and her daughter that the overall alignment of this fracture is good and that we can try to treat this non-operatively with a walker and WBAT. We discussed the risk of nonunion and  malunion. We will see her  back in 4  weeks at which time we will get a new xray of the pelvis.

## 2023-01-26 NOTE — CONSULTS
[id number] Orthopedic Surgery  Consult Note         This patient is seen in consultation at the request of Dr Eddie Justin DO    Reason for Consult:  Left hip pain, pelvic ring pubic ramus minimally displaced fracture. CHIEF COMPLAINT:  Left hip pain/ fall. Wendy Major History Obtained From:  patient, electronic medical record    HISTORY OF PRESENT ILLNESS:   Ms. Polo Reasons 80 y.o.  female well known to me seen today at [facility] for evaluation of a left hip pain which occurred when she fell. She is complaining of left hip pain. This is better with rest and worse with bearing any wt. The pain is sharp and not radiating. No numbness or tingling sensation. No other complaint. She was seen 1st at [facility] ED, where she was x-rayed and I was consulted . Past Medical History:        Diagnosis Date    Acute renal failure ([address] 11-27-12    Arthritis     At risk for falling 08/28/2018    Score of 11/24 for Dynamic Gait Index    Cir[address] 9/25/2020    Dementia with behavioral problem 9/25/2020    Frequent falls     Gastroesophageal reflux disease 1/22/2019    Movement disorder     osteoporosis    Nephrotic syndrome 12/28/2012    Thyroid disease        Past Surgical History:        Procedure Laterality Date    COLONOSCOPY      EXCISION OF FACIAL MASS      skin ca    EYE SURGERY      cataract removal; bilat    FOREARM SURGERY Right 10/27/2022    OPEN REDUCTION INTERNAL FIXATION RIGHT DISTAL RADIUS-GIGI performed by Paola Su MD at [facility] Left 8/21/2019    OPEN REDUCTION INTERNAL FIXATION OF INTERTROCHANTERIC FRACTURE OF LEFT HIPUSING GAMMA NAIL performed by Paola Su MD at [address] [place] [address] Right 05/27/2018    right gamma nail with cables    SKIN BIOPSY         Medications prior to admission:   Prior to Admission medications    Medication Sig Start Date End Date Taking?  Authorizing Provider   lactulose (CHRONULAC) 10 GM/15ML solution Take 20 g by mouth every morning TAKE 30 ML BY MOUTH daily 1/18/23   Miranda Shannon MD   QUEtiapine (SEROQUEL) 50 MG tablet Take 1 tablet by mouth 2 times daily 1/5/23   PINEDA Montenegro - CNP   DULoxetine (CYMBALTA) 30 MG extended release capsule TAKE ONE CAPSULE BY MOUTH DAILY 12/29/22   Keya Sainz MD   alendronate (FOSAMAX) 70 MG tablet TAKE 1 TABLET BY MOUTH ONCE WEEKLY ON AN EMPTY STOMACH BEFORE BREAKFAST. REMAIN UPRIGHT FOR 30 MINUTES AND TAKE WITH 8 OUNCES OF WATER  Patient taking differently: Takes every Wednesday.  12/9/22   Miranda Shannon MD   sertraline (ZOLOFT) 25 MG tablet TAKE ONE TABLET BY MOUTH DAILY 11/28/22   Miranda Shannon MD   levothyroxine (SYNTHROID) 75 MCG tablet Take 1 tablet by mouth Daily 10/20/22   Miranda Shannon MD   Caltrate 600+D Plus Minerals (CALTRATE) 600-800 MG-UNIT TABS tablet Take 1 tablet by mouth 2 times daily 8/27/20   Keya Sainz MD       Current Medications:   Current Facility-Administered Medications: lactulose (CHRONULAC) 10 GM/15ML solution 20 g, 20 g, Oral, Daily  HYDROmorphone HCl PF (DILAUDID) injection 0.5 mg, 0.5 mg, IntraVENous, Q3H PRN  DULoxetine (CYMBALTA) extended release capsule 20 mg, 20 mg, Oral, Daily  levothyroxine (SYNTHROID) tablet 75 mcg, 75 mcg, Oral, Daily  sodium chloride flush 0.9 % injection 5-40 mL, 5-40 mL, IntraVENous, 2 times per day  sodium chloride flush 0.9 % injection 5-40 mL, 5-40 mL, IntraVENous, PRN  0.9 % sodium chloride infusion, , IntraVENous, PRN  ondansetron (ZOFRAN-ODT) disintegrating tablet 4 mg, 4 mg, Oral, Q8H PRN **OR** ondansetron (ZOFRAN) injection 4 mg, 4 mg, IntraVENous, Q6H PRN  polyethylene glycol (GLYCOLAX) packet 17 g, 17 g, Oral, Daily PRN  acetaminophen (TYLENOL) tablet 650 mg, 650 mg, Oral, Q6H PRN **OR** acetaminophen (TYLENOL) suppository 650 mg, 650 mg, Rectal, Q6H PRN  heparin (porcine) injection 5,000 Units, 5,000 Units, SubCUTAneous, 3 times per day  traMADol (ULTRAM) tablet 50 mg, 50 mg, Oral, Q6H PRN  QUEtiapine (SEROQUEL) tablet 50 mg, 50 mg, Oral, BID  sertraline (ZOLOFT) tablet 25 mg, 25 mg, Oral, Daily  perflutren lipid microspheres (DEFINITY) injection 1.5 mL, 1.5 mL, IntraVENous, ONCE PRN    Allergies:  Metoprolol, Amlodipine, Sulfa antibiotics, and Adhesive tape    Social History     Socioeconomic History    Marital status:      Spouse name: Not on file    Number of children: Not on file    Years of education: Not on file    Highest education level: Not on file   Occupational History    Occupation: [employer]   Tobacco Use    Smoking status: Never    Smokeless tobacco: Never   Vaping Use    Vaping Use: Never used   Substance and Sexual Activity    Alcohol use: No    Drug use: No    Sexual activity: Not Currently     Partners: Male   Other Topics Concern    Not on file   Social History Narrative    Not on file     Social Determinants of Health     Financial Resource Strain: Low Risk     Difficulty of Paying Living Expenses: Not hard at all   Food Insecurity: No Food Insecurity    Worried About Running Out of Food in the Last Year: Never true    Ran Out of Food in the Last Year: Never true   Transportation Needs: No Transportation Needs    Lack of Transportation (Medical): No    Lack of Transportation (Non-Medical): No   Physical Activity: Inactive    Days of Exercise per Week: 0 days    Minutes of Exercise per Session: 0 min   Stress: No Stress Concern Present    Feeling of Stress : Not at all   Social Connections: Socially Isolated    Frequency of Communication with Friends and Family: More than three times a week    Frequency of Social Gatherings with Friends and Family: More than three times a week    Attends Zoroastrianism Services: Never    Active Member of Clubs or Organizations: No    Attends Club or Organization Meetings: Never    Marital Status:    Intimate Partner Violence: Not on file   Housing Stability: Low Risk     Unable to Pay for Housing in the Last Year: No    Number of Places Lived in the  Last Year: 1    Unstable Housing in the Last Year: No       Family History:  Family History   Problem Relation Age of Onset    High Blood Pressure Mother     Cancer Father         lung cancer; metastic    Cancer Daughter 52        colon cancer    Cancer Brother     Cancer Brother     Diabetes Brother          REVIEW OF SYSTEMS:   CONSTITUTIONAL: Denies unexplained weight loss, fevers, chills or fatigue  NEUROLOGICAL: Denies unsteady gait or progressive weakness    PSYCHOLOGICAL: Denies anxiety, depression   SKIN: Denies skin changes, delayed healing, rash, itching   HEMATOLOGIC: Denies easy bleeding or bruising  ENDOCRINE: Denies excessive thirst, urination, heat/cold  RESPIRATORY: Denies current dyspnea, cough  CARDIOVASCULAR: Negative for chest pain at this time. EYES: Negative for photophobia and visual disturbance. ENT:  Negative for rhinorrhea, epistaxis, sore throat, or hearing loss. GI: Denies nausea, vomiting, diarrhea   : Denies bowel or bladder issues   MUSCULOSKELETAL: Left hip pain. All other ROS reviewed in chart or with patient or family and are grossly negative. PHYSICAL EXAMINATION:  Ms. Naresh Talavera is a very pleasant 80 y.o. female who seen today in no acute distress, awake, alert, and oriented. She is well nourished and groomed. Patient with normal affect. Body mass index is 28.09 kg/m². . Skin warm and dry. Resting respiratory rate is 16. Resp deep and easy. Pulse is with regular rate and rhythm    BP (!) 93/55   Pulse 76   Temp 97.7 °F (36.5 °C) (Oral)   Resp 16   Wt 168 lb 12.8 oz (76.6 kg)   SpO2 92%   BMI 28.09 kg/m²        Airway is intact  Chest: chest clear, no wheezing, rales, normal symmetric air entry, no tachypnea, retractions or cyanosis  Heart: regular rate and rhythm ; heart sounds normal   Hearing intact, pupil equal and reactive bilateral  Lymphatics; No groin or axillary enlarged lymph nodes. Neck; No swelling  Abdomen; soft, non distended.      MUSCULOSKELETAL: Examination of both lower extrimiteis showing a decreased range of motion of the left hip compare to the other side because of pain. There is no swelling that can be seen, or ecchymosis over the left hip. She  has intact sensation and good pedal pulses. She has significant tenderness on deep palpation over the left pubic ramus. NEUROLOGIC:   Sensory:    Touch:                     Right Upper Extremity:  normal                   Left Upper Extremity:  normal                  Right Lower Extremity:  normal                  Left Lower Extremity:  normal        DATA:    CBC:   Lab Results   Component Value Date/Time    WBC 11.2 01/26/2023 04:57 AM    RBC 4.08 01/26/2023 04:57 AM    HGB 12.6 01/26/2023 04:57 AM    HCT 38.2 01/26/2023 04:57 AM    MCV 93.8 01/26/2023 04:57 AM    MCH 30.9 01/26/2023 04:57 AM    MCHC 32.9 01/26/2023 04:57 AM    RDW 13.4 01/26/2023 04:57 AM     01/26/2023 04:57 AM    MPV 9.2 01/26/2023 04:57 AM     WBC:    Lab Results   Component Value Date/Time    WBC 11.2 01/26/2023 04:57 AM     PT/INR:    Lab Results   Component Value Date/Time    PROTIME 13.5 10/19/2022 01:40 PM    INR 1.04 10/19/2022 01:40 PM     PTT:    Lab Results   Component Value Date/Time    APTT 30.1 12/12/2020 02:31 AM   [APTT    IMAGING: CT scan pelvis reviewed, dated 1/24/2023,  and showed a minimally displaced pubic ramus fracture. IMPRESSION: Left hip pain, pelvic ring pubic ramus minimally displaced fracture. PLAN: I discussed with Thomas Olivo and her daughter that the overall alignment of this fracture is good and that we can try to treat this non-operatively with a walker and WBAT. We discussed the risk of nonunion and  malunion. We will see her  back in 4  weeks at which time we will get a new xray of the pelvis. Thank you very much for the kind consultation and allowing me to participate in this patient's care. I will continue to keep you apprised of her progress.         Moira Avalos MD 1/26/2023  11:27 AM

## 2023-01-26 NOTE — PLAN OF CARE
Problem: Discharge Planning  Goal: Discharge to home or other facility with appropriate resources  1/26/2023 0105 by Hank Vale RN  Outcome: Progressing  1/25/2023 1132 by Rashida Ramon RN  Outcome: Progressing     Problem: Pain  Goal: Verbalizes/displays adequate comfort level or baseline comfort level  1/26/2023 0105 by Hank Vale RN  Outcome: Progressing  1/25/2023 1132 by Rashida Ramon RN  Outcome: Progressing     Problem: Safety - Adult  Goal: Free from fall injury  1/26/2023 0105 by Hank Vale RN  Outcome: Progressing  1/25/2023 1132 by Rashida Ramon RN  Outcome: Progressing     Problem: Skin/Tissue Integrity  Goal: Absence of new skin breakdown  Description: 1. Monitor for areas of redness and/or skin breakdown  2. Assess vascular access sites hourly  3. Every 4-6 hours minimum:  Change oxygen saturation probe site  4. Every 4-6 hours:  If on nasal continuous positive airway pressure, respiratory therapy assess nares and determine need for appliance change or resting period.   1/26/2023 0105 by Hank Vale RN  Outcome: Progressing  1/25/2023 1132 by Rashida Ramon RN  Outcome: Progressing     Problem: ABCDS Injury Assessment  Goal: Absence of physical injury  1/26/2023 0105 by Hank Vale RN  Outcome: Progressing  1/25/2023 1132 by Rashida Ramon RN  Outcome: Progressing

## 2023-01-27 ENCOUNTER — CARE COORDINATION (OUTPATIENT)
Dept: CASE MANAGEMENT | Age: 88
End: 2023-01-27

## 2023-01-27 ENCOUNTER — TELEPHONE (OUTPATIENT)
Dept: INTERNAL MEDICINE CLINIC | Age: 88
End: 2023-01-27

## 2023-01-27 NOTE — CARE COORDINATION
[facility] Update Call    2023    Patient: Luis E Lawrence Patient : 1930   MRN: <[id number]>  Reason for Admission: (found on [facility])- fall, closed nondisplaced fracture anterior wall of L acetabulum, closed fracture sacrum, closed fracture single ramis leftpubis, closed left hip fracture  Discharge Date: 23 RARS: Readmission Risk Score: 12.2     Post Acute Facility Update    Care Transitions Post Acute Facility Update    Care Transitions Interventions  Post Acute Facility: [address] [place] [address] Update  Reported Nursing Issues: New admission- 110/63, 98.5, 62 bpm, 16, 92% on room air. PT/ OT/ ST eval and treat. SEN hose on in AM off at HS. Weekly weights on     Skin- bruising on both arms, legs; redness on buttocks    Therapy- no therapy update since new patient   Anticipated discharge services: Came from home with daughter and son in law, ambulates with walker. Next IDT Planned Review: 2023    Future Appointments   Date Time Provider Marco Barreto   2023  1:00 PM Miranda Shannon MD [facility]       SN Notes:   Diet: - Oral Diet:  General  Wounds: buttocks Stage 1 nonblanchable erythema or discoloration  Medications:  Other: facility  Other: med rec from [facility]   Post-acute CC Notes: [facility] access for admission information    Sherrill Calhoun RN

## 2023-01-27 NOTE — TELEPHONE ENCOUNTER
Called daughter, Raul Monroe to check on status of patient. Patient in hospital 1/24 - 1/26 for pelvis/hip fx. Patient transferred to [facility] rehab facility    Daughter was asked to call office to inform of discharge date home. Next routine f/u OV 2/20 - can be changed to [place] visit if needed.

## 2023-01-27 NOTE — PROGRESS NOTES
Physician Progress Note      PATIENT:               Lord Tomas  CSN #:                  [id number]  :                       1930  ADMIT DATE:       2023 1:34 AM  DISCH DATE:        2023 2:34 PM  RESPONDING  PROVIDER #:        Nhi Cisneros PA-C          QUERY TEXT:    Pt admitted with multiple fractures. Pt noted to have \"fallen multiple times   over the past few months\" (IM ). If possible, please document in progress   notes and discharge summary if you are evaluating and/or treating any of the   following: The medical record reflects the following:  Risk Factors: 81 yo with osteoporosis and dementia    Clinical Indicators: previous Dexa scan from 20 showed Osteoporosis by   WHO criteria. ED, she was at the bathroom and fell to the floor  IM, She states she has fallen multiple times over the past few months    Treatment: Caltrate, imaging, surgery consult, ARU consult, on Fosamax at home  Options provided:  -- Osteoporotic pelvis/acetabulum fractures following falls which would not   usually break normal, healthy bones  -- Traumatic pelvis/acetabulum fractures  -- Other - I will add my own diagnosis  -- Disagree - Not applicable / Not valid  -- Disagree - Clinically unable to determine / Unknown  -- Refer to Clinical Documentation Reviewer    PROVIDER RESPONSE TEXT:    This patient has osteoporotic pelvis/acetabulum fractures following falls   which would not break normal, healthy bones. Query created by:  Sreedhar Verdugo on 2023 10:38 AM      Electronically signed by:  Nhi Cisneros PA-C 2023 6:00 PM

## 2023-02-01 ENCOUNTER — CARE COORDINATION (OUTPATIENT)
Dept: CASE MANAGEMENT | Age: 88
End: 2023-02-01

## 2023-02-01 NOTE — CARE COORDINATION
785 [facility] Update Call    2023    Patient: Becca Madrigal Patient : 1930   MRN: <[id number]>  Reason for Admission: (found on 4081 [facility])- fall, closed nondisplaced fracture anterior wall of L acetabulum, closed fracture sacrum, closed fracture single ramis leftpubis, closed left hip fracture   Discharge Date: 23 RARS: Readmission Risk Score: 12.2         Post Acute Facility Update    Care Transitions Post Acute Facility Update    Care Transitions Interventions  Post Acute Facility: 700 [facility] Update  Reported Nursing Issues: 144/66, 78 bpm, 16, 92% on room air. No pain. Hydrocortisone External Lotion 2.5 % (Hydrocortisone (Topical). Offer/provide/encourage toileting every 2 hours when awake and every 3-4 hours at night and PRN as tolerated. Therapy-  Transfer training with mod a with cues for appropriate sequencing and tech. Needs cues for sequencing in transfers. Bed Mobility: Maximum Assistance   Transfer Assistance: Moderate Assistance   Anticipated discharge services: Came from home with daughter and KYLEE, 3 steps in with walker. Next IDT Planned Review: 2023    Future Appointments   Date Time Provider Marco Barreto   2023  1:00 PM Adore Payton MD [facility] Michelle - HEN[facility]       SN Notes:   Diet: - Oral Diet:  General  Wounds: none  Medications:  Other: facility  Other:    Post-acute CC Notes: [facility] access    Bret Nageotte, RN

## 2023-02-07 ENCOUNTER — CARE COORDINATION (OUTPATIENT)
Dept: CASE MANAGEMENT | Age: 88
End: 2023-02-07

## 2023-02-10 ENCOUNTER — CARE COORDINATION (OUTPATIENT)
Dept: CASE MANAGEMENT | Age: 88
End: 2023-02-10

## 2023-02-10 NOTE — CARE COORDINATION
785 [facility] Update Call    2/10/2023    Patient: Dimitri Birmingham Patient : 1930   MRN: <[id number]>  Reason for Admission: (found on 4081 [facility])- fall, closed nondisplaced fracture anterior wall of L acetabulum, closed fracture sacrum, closed fracture single ramis leftpubis, closed left hip fracture   Discharge Date: 23 RARS: Readmission Risk Score: 12.2       Post Acute Facility Update    Care Transitions Post Acute Facility Update    Care Transitions Interventions  Post Acute Facility: 700 [facility] Update  Reported Nursing Issues: 98.0, 117/72, 76 bpm, 93% on room air. Having some confusion- baseline. Cleanse right arm with NS, pat dry, apply YOON, cover with oil emulsion, dry gauze and lightly tape- night shift. Therapy- gait- unsteady, balance- poor, toileting with raised BSC and grab bars, sit to stand x 2- modA, minimal steps to bed and sit- maxA, static standing- 1 minute, gait with RW x 100 feet x 2- CGA- min, transfer- mod with cues for sequencing,    ADLs: Moderate Assistance   Bed Mobility: Maximum Assistance   Transfer Assistance: Maximum Assistance   Ambulation Assistance: Contact Guard Assist - Hands on patient for balance   How far (in feet) is the patient ambulating?: 10   Does patient use an assistive device?: Yes   Assistive Devices: 2WW   Barriers to Discharge: Confusion, fall risk, weakness   Anticipated discharge services: Originally pt was going to [facility] for private pay respite as her dtr is having TKA today. Plan is still for her to go home but they may have her stay a bit longer- paid respite if still needed if DC from SNF portion.  No EDOD at this time              Next IDT Planned Review: 2023    Future Appointments   Date Time Provider Marco Barreto   2023  1:00 PM Moe Trujillo MD [facility] Michelle - [facility] CAGLE Notes:   Diet: - Oral Diet:  General  Wounds: right and arm not states in [facility]  Medications:  Other: facility  Other:    Post-acute CC Notes: [facility] access    Colleen Morin RN

## 2023-02-14 ENCOUNTER — CARE COORDINATION (OUTPATIENT)
Dept: CASE MANAGEMENT | Age: 88
End: 2023-02-14

## 2023-02-14 NOTE — CARE COORDINATION
785 [facility] Update Call    2023    Patient: Evi Ackerman Patient : 1930   MRN: <[id number]>  Reason for Admission: (found on 4081 [facility])- fall, closed nondisplaced fracture anterior wall of L acetabulum, closed fracture sacrum, closed fracture single ramis leftpubis, closed left hip fracture   Discharge Date: 23 RARS: Readmission Risk Score: 12.2     Post Acute Facility Update    Care Transitions Post Acute Facility Update    Care Transitions Interventions  Post Acute Facility: 700 [facility] Update  Reported Nursing Issues: 168.7$, 117/71, 98.1, 76 bpm, 17, 96% on oxygen (O2 is new within the past few days at night), pain 3/10- did get 1 tramadol yesterday. Therapy- not making much progress- needs a lot of encouragement and cues for sequencing- toileting- mod, UB ADL- min, LB ADL- max, sit to stand- min/ modA with cues for walker hand placement. Cognition a factor in sequencing   ADLs: Minimal Assistance   Bed Mobility: Contact Guard Assist - Hands on patient for balance   Transfer Assistance: Moderate Assistance   Ambulation Assistance: Contact Guard Assist - Hands on patient for balance   How far (in feet) is the patient ambulating?: 10   Does patient use an assistive device?: Yes   Assistive Devices: 2WW   Barriers to Discharge: Slow mobility progress   Anticipated date for discharge: 22    Anticipated discharge services: LCD , - will be in respite care (through [facility]) until her daughter can take her back at home (dtr had TKA last week and pt was to stay private pay respite while she recovers at home). Next IDT Planned Review:     Future Appointments   Date Time Provider Marco Barreto   2023  1:00 PM Darci Baumgarten, MD [facility] Michelle [facility]       SN Notes:   Diet: - Oral Diet:  General  Wounds: right, upper, and arm skin tear- healing   Medications:  Other: facility  Other: Post-acute CC Notes: IDT call/ 59 Laura Rodgers RN

## 2023-02-16 ENCOUNTER — TELEPHONE (OUTPATIENT)
Dept: ORTHOPEDIC SURGERY | Age: 88
End: 2023-02-16

## 2023-02-16 NOTE — TELEPHONE ENCOUNTER
Appointment Request     Patient requesting earlier appointment: Yes  Appointment offered to patient: 02/28/2023  Patient Contact Number: [phone]      PATIENT'S DAUGHTER IS REQUESTING 2/21 IN [address] [facility].

## 2023-02-17 ENCOUNTER — CARE COORDINATION (OUTPATIENT)
Dept: CASE MANAGEMENT | Age: 88
End: 2023-02-17

## 2023-02-17 NOTE — CARE COORDINATION
785 [facility] Update Call    2023    Patient: Melissa Diggs Patient : 1930   MRN: <[id number]>  Reason for Admission: (found on 4081 [facility])- fall, closed nondisplaced fracture anterior wall of L acetabulum, closed fracture sacrum, closed fracture single ramis leftpubis, closed left hip fracture   Discharge Date: 23 RARS: Readmission Risk Score: 12.2         Post Acute Facility Update    Care Transitions Post Acute Facility Update    Care Transitions Interventions  Post Acute Facility: 700 [facility] Update  Reported Nursing Issues: 169.4#, 121/70, 98.0, 79 bpm, 14, 96% on oxygen. Ortho Dr Prem Monroy  at [phone]. Steri strips to skin tear RLE every shift (skin tear from aide applying SEN hose). Cleanse right arm with NS, pat dry, apply YOON, cover with oil emulsion, dry gauze and lightly tape. Resident is confused. Current state of confusion is considered baseline for Resident. Level of cognitive impairment: Alert (some forgetfulness). Therapy- UB dressing- Tika, LB dressing- modA, sit to stand- modA, transfer Tika, toilet transfer- modA, UB bathing- SBA, LB dressing- modA,    ADLs: Moderate Assistance   Bed Mobility: Moderate Assistance   Transfer Assistance: Moderate Assistance   Ambulation Assistance: Moderate Assistance   How far (in feet) is the patient ambulating?: 10   Does patient use an assistive device?: Yes   Assistive Devices: 2WW   Barriers to Discharge: none   Anticipated discharge services: [facility] HEART D , DC  to respite stay (private pay/ [facility]). Charge nurse states that Hospice nurse yesterday said that with dementia diagnosis patient does not meet criteria. Respite stay d/t daughter having recent TKA and unable to help care for her at home while she is recovering.               Next IDT Planned Review: 2n/a     Future Appointments   Date Time Provider Marco Barreto   2023  1:15 PM Libertad Gongora MD [facility]       SN Notes:   Diet: - Oral Diet:  General  Wounds: buttocks Stage 1 nonblanchable erythema or discoloration  Medications:  Other: facility  Other:    Post-acute CC Notes: [facility] access    Rafael Bradley RN

## 2023-02-21 ENCOUNTER — OFFICE VISIT (OUTPATIENT)
Dept: ORTHOPEDIC SURGERY | Age: 88
End: 2023-02-21

## 2023-02-21 VITALS — WEIGHT: 168 LBS | HEIGHT: 65 IN | BODY MASS INDEX: 27.99 KG/M2

## 2023-02-21 DIAGNOSIS — S32.592A CLOSED FRACTURE OF SINGLE RAMUS OF LEFT PUBIS, INITIAL ENCOUNTER (HCC): Primary | ICD-10-CM

## 2023-02-21 NOTE — PROGRESS NOTES
CHIEF COMPLAINT: Left hip pain, pelvic ring pubic ramus minimally displaced fracture. DATE OF INJURY: 1/24/2023    Ms. Caesar Nash 80 y.o.  female presents today for evaluation of a left hip pain which occurred when she fell. She is complaining of left hip pain. She rates her pain a 0-1/10 VAS and states it is improving. This is better with rest and worse with bearing any wt. The pain is sharp and not radiating. No numbness or tingling sensation. No other complaint. She was seen 1st at [address] [place] [address], where she was x-rayed and I was consulted . She has been in a skilled nursing facility since her fracture. She is currently working with PT. Past Medical History:   Diagnosis Date    Acute renal failure ([address] 11-27-12    Arthritis     At risk for falling 08/28/2018    Score of 11/24 for Dynamic Gait Index    Cirrhosis ([address] 9/25/2020    Dementia with behavioral problem 9/25/2020    Frequent falls     Gastroesophageal reflux disease 1/22/2019    Movement disorder     osteoporosis    Nephrotic syndrome 12/28/2012    Thyroid disease        Past Surgical History:   Procedure Laterality Date    COLONOSCOPY      EXCISION OF FACIAL MASS      skin ca    EYE SURGERY      cataract removal; bilat    FOREARM SURGERY Right 10/27/2022    OPEN REDUCTION INTERNAL FIXATION RIGHT DISTAL RADIUS-GIGI performed by Ysabel Freeman MD at [facility] Left 8/21/2019    OPEN REDUCTION INTERNAL FIXATION OF INTERTROCHANTERIC FRACTURE OF LEFT HIPUSING GAMMA NAIL performed by Ysabel Freeman MD at [place] [address] Right 05/27/2018    right gamma nail with cables    SKIN BIOPSY         Social History     Socioeconomic History    Marital status:       Spouse name: Not on file    Number of children: Not on file    Years of education: Not on file    Highest education level: Not on file   Occupational History    Occupation: homeaker   Tobacco Use    Smoking status: Never    Smokeless tobacco: Never   Vaping Use    Vaping Use: Never used   Substance and Sexual Activity    Alcohol use: No    Drug use: No    Sexual activity: Not Currently     Partners: Male   Other Topics Concern    Not on file   Social History Narrative    Not on file     Social Determinants of Health     Financial Resource Strain: Low Risk     Difficulty of Paying Living Expenses: Not hard at all   Food Insecurity: No Food Insecurity    Worried About [address] in the Last Year: Never true    [address] in the Last Year: Never true   Transportation Needs: No Transportation Needs    Lack of Transportation (Medical): No    Lack of Transportation (Non-Medical): No   Physical Activity: Inactive    Days of Exercise per Week: 0 days    Minutes of Exercise per Session: 0 min   Stress: No Stress Concern Present    Feeling of Stress : Not at all   Social Connections: Socially Isolated    Frequency of Communication with Friends and Family: More than three times a week    Frequency of Social Gatherings with Friends and Family: More than three times a week    Attends [employer] Services: Never    Active Member of Clubs or Organizations: No    Attends Club or Organization Meetings: Never    Marital Status:     Intimate Partner Violence: Not on file   Housing Stability: Low Risk     Unable to Pay for Housing in the Last Year: No    Number of Places Lived in the Last Year: 1    Unstable Housing in the Last Year: No       Family History   Problem Relation Age of Onset    High Blood Pressure Mother     Cancer Father         lung cancer; metastic    Cancer Daughter 52        colon cancer    Cancer Brother     Cancer Brother     Diabetes Brother        Current Outpatient Medications on File Prior to Visit   Medication Sig Dispense Refill    lactulose (CHRONULAC) 10 GM/15ML solution Take 20 g by mouth every morning TAKE 30 ML BY MOUTH daily 946 mL 1    QUEtiapine (SEROQUEL) 50 MG tablet Take 1 tablet by mouth 2 times daily 60 tablet 3 DULoxetine (CYMBALTA) 30 MG extended release capsule TAKE ONE CAPSULE BY MOUTH DAILY 30 capsule 5    alendronate (FOSAMAX) 70 MG tablet TAKE 1 TABLET BY MOUTH ONCE WEEKLY ON AN EMPTY STOMACH BEFORE BREAKFAST. REMAIN UPRIGHT FOR 30 MINUTES AND TAKE WITH 8 OUNCES OF WATER (Patient taking differently: Takes every Wednesday.) 4 tablet 5    sertraline (ZOLOFT) 25 MG tablet TAKE ONE TABLET BY MOUTH DAILY 30 tablet 5    levothyroxine (SYNTHROID) 75 MCG tablet Take 1 tablet by mouth Daily 30 tablet 5    Caltrate 600+D Plus Minerals (CALTRATE) 600-800 MG-UNIT TABS tablet Take 1 tablet by mouth 2 times daily 180 tablet 1     No current facility-administered medications on file prior to visit. Pertinent items are noted in HPI  Review of systems reviewed from Patient History Form and available in the patient's chart under the Media tab. No change noted. PHYSICAL EXAMINATION:  Ms. Beto Cuevas is a very pleasant 80 y.o.  female who presents today in no acute distress, awake, alert, and oriented. She is well dressed, nourished and  groomed. Patient with normal affect. Height is  5' 5\" (1.651 m), weight is 168 lb (76.2 kg), Body mass index is 27.96 kg/m². Resting respiratory rate is 16. Examination of the gait, showed that the patient walks using a wheelchair. Examination of both lower extrimiteis showing a decreased range of motion of the left hip compare to the other side because of pain. There is no swelling that can be seen, or ecchymosis over the left hip. She  has intact sensation and good pedal pulses. She has significant tenderness on deep palpation over the left pubic ramus. IMAGING: Viancaraman Yarbrough were reviewed, dated today in office,  AP pelvis, and showed a minimally displaced pubic ramus fracture with callus. IMPRESSION: Left pelvic ring pubic ramus minimally displaced fracture.     PLAN: I discussed with Samantha Fox that the overall alignment of this fracture is good and that we can try to treat this non-operatively with a walker and WBAT. I discussed with the patient that I think that she would really benefit from a course of physical therapy for further strengthening and stretching. We discussed the risk of nonunion and  malunion. We will see her  back in 4  weeks at which time we will get a new xray of the pelvis.         Angela Abdi MD
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
## 2023-02-27 ENCOUNTER — CARE COORDINATION (OUTPATIENT)
Dept: CASE MANAGEMENT | Age: 88
End: 2023-02-27

## 2023-02-27 NOTE — CARE COORDINATION
785 [facility] Update Call    2023    Patient: Becky Mejia Patient : 1930   MRN: <[id number]>  Reason for Admission: (found on 4081 [facility])- fall, closed nondisplaced fracture anterior wall of L acetabulum, closed fracture sacrum, closed fracture single ramis leftpubis, closed left hip fracture   Discharge Date: 23 RARS: Readmission Risk Score: 12.2      Noted from previous call, pt LCD  with DC  to respite stay with private pay and [place] on [address] sent email to [employer] requesting confirmation on respite and DC plans   Will continue to follow   Email back from [employer] informing-issued a [facility] with DC date last week and she appealed and won her appeal.   Will follow up with IDT call tomorrow       Care Transitions Post Acute Facility Update    Care Transitions Interventions  Post Acute Facility: [address] [place] [address] Update

## 2023-02-28 ENCOUNTER — CARE COORDINATION (OUTPATIENT)
Dept: CASE MANAGEMENT | Age: 88
End: 2023-02-28

## 2023-02-28 NOTE — CARE COORDINATION
785 [facility] Update Call    2023    Patient: Janene Vidales Patient : 1930   MRN: <[id number]>  Reason for Admission:  (found on 408[address] [facility])- fall, closed nondisplaced fracture anterior wall of L acetabulum, closed fracture sacrum, closed fracture single ramis leftpubis, closed left hip fracture    Discharge Date: 23 RARS: Readmission Risk Score: 12.2       Post Acute Facility Update    Care Transitions Post Acute Facility Update    Care Transitions Interventions  Post Acute Facility: 700 [facility] Update  Reported Nursing Issues: 168.4#, 100/67, 98.5, 79 bpm, 17, 96% on oxygen (was 88% on room air last night). Pt had won appeal on discharge- still getting PT/ OT 5 times/ week. [address] No complaints of pain. Confusion at baseline. Therapy- making some progress walking- 50 feet, LB ADL- mod/ max, toilet transfer- min, clothing management- mod/ max, she is now able to walk with nursing to bathroom, 1 step with mod assist, sit to supine- CGA, bed to - min/ CGA,    ADLs: Moderate Assistance   Bed Mobility: Contact Guard Assist - Hands on patient for balance   Transfer Assistance: Minimal Assistance   Ambulation Assistance: Contact Guard Assist - Hands on patient for balance, Minimal Assistance   How far (in feet) is the patient ambulating?: 50   Does patient use an assistive device?: Yes   Assistive Devices: 2WW 50 feet x 2   Barriers to Discharge: Weakness, fall risk   Anticipated date for discharge: 3/7/23    Anticipated discharge services: LCD 3/6. DC 3/7. They are finding out if pt will switch to respite (dtr had TKA, pt was originally to be at respite facility during her recovery).                Next IDT Planned Review: 3/7/2023    Future Appointments   Date Time Provider Marco Barreto   2023  2:15 PM Kalpesh Bond MD [facility]       SN Notes:   Diet: - Oral Diet:  General  Wounds: right and calf skin tear  Medications: Other: facility  Other:    Post-acute CC Notes: IDT call and [facility] access    Tod Gillis RN

## 2023-03-07 ENCOUNTER — CARE COORDINATION (OUTPATIENT)
Dept: CASE MANAGEMENT | Age: 88
End: 2023-03-07

## 2023-03-07 NOTE — CARE COORDINATION
785 [facility] Update Call    3/7/2023    Patient: Car Guadarrama Patient : 1930   MRN: <[id number]>  Reason for Admission: (found on 4081 [facility])- fall, closed nondisplaced fracture anterior wall of L acetabulum, closed fracture sacrum, closed fracture single ramis leftpubis, closed left hip fracture     Discharge Date: 23 RARS: Readmission Risk Score: 12.2     Post Acute Facility Update    Care Transitions Post Acute Facility Update    Care Transitions Interventions  Post Acute Facility: [address] [place] [address] Update  Reported Nursing Issues: Daughter appealed discharge again with Medicare and won. Pt remains in their care    168.5#, 111/63, 97.8, 81 bpm, 16, 93% on room air. Pain 2/10. BLE wrapped in ACE wraps d/t edema. Edema and abnormal lung sounds- CXR and lasix ordered. CXR showed congestion. O2 prn. Legs elevated at rest.     Therapy- grooming while standing- CGA to min, sit to stand- CGA  to min with 2WW, walking with 2WW with verbal cues. Up/ down 4-6 steps- CGA with BHR, toileting- min, LB dressing- mod/ max,    ADLs: Moderate Assistance   Bed Mobility: Contact Guard Assist - Hands on patient for balance   Transfer Assistance: [address] on patient for balance   Ambulation Assistance: [address] on patient for balance   How far (in feet) is the patient ambulating?: 60   Does patient use an assistive device?: Yes   Assistive Devices: 2WW   Barriers to Discharge: Medicare appeals, fall risk, weakness   Anticipated date for discharge: 3/15/23    Anticipated discharge services: Possibly next week depending on daughter and continued appeals. Next IDT Planned Review: 3/14/2023    Future Appointments   Date Time Provider Marco Barreto   2023  2:15 PM Ngozi Cervantes MD [facility]       SN Notes:   Diet: - Oral Diet:  General  Wounds: right and calf skin tear  Medications:  Other: facility  Other: Post-acute CC Notes: IDT call and [address] access    Mayi Cheek RN

## 2023-03-10 ENCOUNTER — CARE COORDINATION (OUTPATIENT)
Dept: CASE MANAGEMENT | Age: 88
End: 2023-03-10

## 2023-03-10 NOTE — CARE COORDINATION
785 [facility] Update Call    3/10/2023    Patient: Melissa Diggs Patient : 1930   MRN: <[id number]>  Reason for Admission: (found on 4081 [facility])- fall, closed nondisplaced fracture anterior wall of L acetabulum, closed fracture sacrum, closed fracture single ramis leftpubis, closed left hip fracture      Discharge Date: 23 RARS: Readmission Risk Score: 12.2         Post Acute Facility Update    Care Transitions Post Acute Facility Update    Care Transitions Interventions  Post Acute Facility: 700 [facility] Update  Reported Nursing Issues: 169#, 105/61, 98.3, 88 bpm, 16, 91% on room air, pain 0/10. 2+ pitting edema- BLE. Edema is improving. Therapy- standing- 2.5 minutes, LB dressing- don/ doff LB clothes- mod/ max, toileting- Tika, toilet transfer- min/ CGA, toileting hygiene- max, supine to sit- CGA, STS- min/ CGA, up/ down 4 stairs- CGA,    ADLs: Contact Guard Assist - Hands on patient for balance   Bed Mobility: Contact Guard Assist - Hands on patient for balance   Ambulation Assistance: Contact Guard Assist - Hands on patient for balance   How far (in feet) is the patient ambulating?: 50   Does patient use an assistive device?: Yes   Assistive Devices: 2WW, 50 feet x 2   Barriers to Discharge: Cognitive function, dependence for care needs   Anticipated discharge services: Plan is still home with daughter and KYLEE. DC pending this week              Next IDT Planned Review: 3/14/2023    Future Appointments   Date Time Provider Marco Barreto   2023  2:15 PM Libertad Gongora MD [facility]       SN Notes:   Diet: - Oral Diet:  General  Wounds: calf RLE- skin tear healing, LLE posterio scratches healing scabs. Medications:  Other: facility  Other:    Post-acute CC Notes: Ana Bradley RN

## 2023-03-14 ENCOUNTER — CARE COORDINATION (OUTPATIENT)
Dept: CASE MANAGEMENT | Age: 88
End: 2023-03-14

## 2023-03-14 ENCOUNTER — TELEPHONE (OUTPATIENT)
Dept: ORTHOPEDIC SURGERY | Age: 88
End: 2023-03-14

## 2023-03-14 ENCOUNTER — OFFICE VISIT (OUTPATIENT)
Dept: ORTHOPEDIC SURGERY | Age: 88
End: 2023-03-14
Payer: MEDICARE

## 2023-03-14 VITALS — HEIGHT: 65 IN | BODY MASS INDEX: 27.99 KG/M2 | WEIGHT: 168 LBS

## 2023-03-14 DIAGNOSIS — M65.332 TRIGGER FINGER, LEFT MIDDLE FINGER: Primary | ICD-10-CM

## 2023-03-14 PROCEDURE — G8427 DOCREV CUR MEDS BY ELIG CLIN: HCPCS | Performed by: NURSE PRACTITIONER

## 2023-03-14 PROCEDURE — 1036F TOBACCO NON-USER: CPT | Performed by: NURSE PRACTITIONER

## 2023-03-14 PROCEDURE — 1090F PRES/ABSN URINE INCON ASSESS: CPT | Performed by: NURSE PRACTITIONER

## 2023-03-14 PROCEDURE — 1123F ACP DISCUSS/DSCN MKR DOCD: CPT | Performed by: NURSE PRACTITIONER

## 2023-03-14 PROCEDURE — G8417 CALC BMI ABV UP PARAM F/U: HCPCS | Performed by: NURSE PRACTITIONER

## 2023-03-14 PROCEDURE — 99214 OFFICE O/P EST MOD 30 MIN: CPT | Performed by: NURSE PRACTITIONER

## 2023-03-14 PROCEDURE — G8484 FLU IMMUNIZE NO ADMIN: HCPCS | Performed by: NURSE PRACTITIONER

## 2023-03-14 NOTE — CARE COORDINATION
[facility] Update Call    3/14/2023    Patient: Angely Pham Patient : 1930   MRN: <[id number]>  Reason for Admission: (found on [facility])- fall, closed nondisplaced fracture anterior wall of L acetabulum, closed fracture sacrum, closed fracture single ramis leftpubis, closed left hip fracture       Discharge Date: 23 RARS: Readmission Risk Score: 12.2       Post Acute Facility Update    Care Transitions Post Acute Facility Update    Care Transitions Interventions  Post Acute Facility: [address] [place] [address] Update  Reported Nursing Issues: Trigger finger surgery 3/16 at 0700 (left middle). Disoriented, requiring cues. LE pitting edema- 1+,     169#, 123/75, 98.0, 76 bpm, 16, 92% on room air. Therapy- transfer from EOB to [facility], toilet transfer with grab bar-- Tika/ Max for hygiene, donned bra- Tika, donned shirt- SBA, oral hygiene/ hair grooming- SBA, sit to stand from bed to - CGA   ADLs: Stand by Assist - Presence and Cueing   Bed Mobility: Contact Guard Assist - Hands on patient for balance   Transfer Assistance: Contact Guard Assist - Hands on patient for balance   How far (in feet) is the patient ambulating?: 75   Does patient use an assistive device?: Yes   Assistive Devices: 2WW   Anticipated date for discharge: 3/18/23    Anticipated discharge services: Home with daughter and KYLEE 3/18. Will follow up with pt at home next week               Next IDT Planned Review: n/a     Future Appointments   Date Time Provider Marco Barreto   3/16/2023  8:35 AM Barbra Hicks MD [facility]   2023  2:15 PM April MD Fabiola [facility]       SN Notes:   Diet: - Oral Diet:  Low Sodium (2gm)  Wounds: none  Medications:  Other: facility  Other:    Post-acute CC Notes: [facility] Access since writer not available for IDT call     Sawyer Terrell RN

## 2023-03-14 NOTE — TELEPHONE ENCOUNTER
Revised surgery to 3-16 @ 8:30 due to transportation issues. Faxed surg info to [facility] rehab as requested from [facility]. [phone].

## 2023-03-14 NOTE — PROGRESS NOTES
CHIEF COMPLAINT: Left long (middle) finger pain/ trigger finger. HISTORY:  Ms. Jenny Sin is 80 y.o.  female right handed presents today for evaluation of a left long (middle) finger pain which started to worsen over the past 6 months and is worsening over time. She states her finger is stuck for the past couple weeks and this is now interfering with her using her walker to ambulate, which affects her ADLs. Denies trauma or injury. The patient is complaining of triggering and pain left long (middle) finger with no numbness or tingling. This is worse with ROM, rest makes pain better. Reports she is currently working with physical therapy who attempted to try to manually disengage the trigger finger, but were unsuccessful due to her pain level. No other complaint. She is well-known to us for left pubic ramus fracture on 1/24/2023. Past Medical History:   Diagnosis Date    Acute renal failure ([address] 11-27-12    Arthritis     At risk for falling 08/28/2018    Score of 11/24 for Dynamic Gait Index    Cirrhosis ([address] 9/25/2020    Dementia with behavioral problem 9/25/2020    Frequent falls     Gastroesophageal reflux disease 1/22/2019    Movement disorder     osteoporosis    Nephrotic syndrome 12/28/2012    Thyroid disease        Past Surgical History:   Procedure Laterality Date    COLONOSCOPY      EXCISION OF FACIAL MASS      skin ca    EYE SURGERY      cataract removal; bilat    FOREARM SURGERY Right 10/27/2022    OPEN REDUCTION INTERNAL FIXATION RIGHT DISTAL RADIUS-GIGI performed by Susan Arroyo MD at [facility] Left 8/21/2019    OPEN REDUCTION INTERNAL FIXATION OF INTERTROCHANTERIC FRACTURE OF LEFT HIPUSING GAMMA NAIL performed by Susan Arroyo MD at [address] [place] [address] Right 05/27/2018    right gamma nail with cables    SKIN BIOPSY         Social History     Socioeconomic History    Marital status:       Spouse name: Not on file    Number of children: Not on file    Years of education: Not on file    Highest education level: Not on file   Occupational History    Occupation: homeaker   Tobacco Use    Smoking status: Never    Smokeless tobacco: Never   Vaping Use    Vaping Use: Never used   Substance and Sexual Activity    Alcohol use: No    Drug use: No    Sexual activity: Not Currently     Partners: Male   Other Topics Concern    Not on file   Social History Narrative    Not on file     Social Determinants of Health     Financial Resource Strain: Low Risk     Difficulty of Paying Living Expenses: Not hard at all   Food Insecurity: No Food Insecurity    Worried About Running Out of Food in the Last Year: Never true    Ran Out of Food in the Last Year: Never true   Transportation Needs: Not on file   Physical Activity: Not on file   Stress: Not on file   Social Connections: Not on file   Intimate Partner Violence: Not on file   Housing Stability: Not on file       Family History   Problem Relation Age of Onset    High Blood Pressure Mother     Cancer Father         lung cancer; metastic    Cancer Daughter 52        colon cancer    Cancer Brother     Cancer Brother     Diabetes Brother        Current Outpatient Medications on File Prior to Visit   Medication Sig Dispense Refill    lactulose (CHRONULAC) 10 GM/15ML solution Take 20 g by mouth every morning TAKE 30 ML BY MOUTH daily 946 mL 1    QUEtiapine (SEROQUEL) 50 MG tablet Take 1 tablet by mouth 2 times daily 60 tablet 3    DULoxetine (CYMBALTA) 30 MG extended release capsule TAKE ONE CAPSULE BY MOUTH DAILY 30 capsule 5    alendronate (FOSAMAX) 70 MG tablet TAKE 1 TABLET BY MOUTH ONCE WEEKLY ON AN EMPTY STOMACH BEFORE BREAKFAST.  REMAIN UPRIGHT FOR 30 MINUTES AND TAKE WITH 8 OUNCES OF WATER (Patient taking differently: Takes every Wednesday.) 4 tablet 5    sertraline (ZOLOFT) 25 MG tablet TAKE ONE TABLET BY MOUTH DAILY 30 tablet 5    levothyroxine (SYNTHROID) 75 MCG tablet Take 1 tablet by mouth Daily 30 tablet 5    Caltrate 600+D Plus Minerals (CALTRATE) 600-800 MG-UNIT TABS tablet Take 1 tablet by mouth 2 times daily 180 tablet 1     No current facility-administered medications on file prior to visit. Pertinent items are noted in HPI  Review of systems reviewed from Patient History Form and available in the patient's chart under the Media tab. No change noted. PHYSICAL EXAMINATION:  Ms. Colleen Simms is a very pleasant 80 y.o.  female who presents today in no acute distress, awake, alert, and oriented. She is well dressed, nourished and  groomed. Patient with normal affect. Height is  5' 5\" (1.651 m), weight is 168 lb (76.2 kg), Body mass index is 27.96 kg/m². Resting respiratory rate is 16. Examination of the gait, showed that the patient walks with distance of a wheelchair. Examination of both upper extremities showing a decrease range of motion with triggering of the left long (middle) finger compare to the other side. The left middle finger is stuck in the flexed position. There is mild swelling that can be seen with tenderness over A1 pulley. IMAGING: Surjit Kendrick were reviewed, taken today in the office, 3 views of the left hand, and showed no fracture. IMPRESSION: Left long (middle) finger pain/ trigger finger. PLAN:  I assured the patient that the xray is negative for acute fracture. The patient can take NSAIDs PRN. We recommended Cortisone injection of the left long (middle) finger A1 pulley and the patient is refusing at this time. She would like to address surgical options as this is interfering with her using a walker while ambulating, which interferes with her ADLs. I believe she will benefit from surgical release of the trigger finger, therefore, we discussed surgical as well as nonsurgical options.     I discussed the risks and benefits of surgery with the patient, including but not limited to infection, bleeding, pain, injury to nerves or blood vessels failure of the surgery and need for additional surgery. All the patient's questions were answered. We discussed an expected post-operative course. she  is understanding of this and wishes to proceed. The above assessment and plan was discussed with Dr. Edwina Rice who is in agreement. Plan on surgery Thursday at [facility].         PINEDA Whitaker - CNP

## 2023-03-15 ENCOUNTER — TELEPHONE (OUTPATIENT)
Dept: ORTHOPEDIC SURGERY | Age: 88
End: 2023-03-15

## 2023-03-15 ENCOUNTER — ANESTHESIA EVENT (OUTPATIENT)
Dept: OPERATING ROOM | Age: 88
End: 2023-03-15
Payer: MEDICARE

## 2023-03-15 RX ORDER — ACETAMINOPHEN 500 MG
500 TABLET ORAL EVERY 6 HOURS PRN
COMMUNITY

## 2023-03-15 RX ORDER — TRAMADOL HYDROCHLORIDE 50 MG/1
50 TABLET ORAL EVERY 6 HOURS PRN
COMMUNITY

## 2023-03-15 RX ORDER — FUROSEMIDE 20 MG/1
20 TABLET ORAL 2 TIMES DAILY
COMMUNITY
End: 2023-03-22 | Stop reason: SDUPTHER

## 2023-03-15 RX ORDER — SENNA PLUS 8.6 MG/1
2 TABLET ORAL DAILY
COMMUNITY

## 2023-03-15 NOTE — PROGRESS NOTES
Call received from Bree, patient's nurse at [facility] ([phone]). Patient's medical information and medications reviewed. Faxed the pre op instructions and ECF instructions to Bree at [phone]. Bree states that their transportation service, [employer] ([phone]),[employer] be providing transportation for the patient and she should arrive to [facility] at 0700. They are expecting her to be there around 4 hours. Bree states that she does not believe patient has any pressure wounds nor skin tears on body, but will verify.

## 2023-03-15 NOTE — PROGRESS NOTES
On the day of surgery please send with the patient verification of patients NPO status and the medication list with the date/time all medications were last taken. Please also send the name and number of the transport company bringing patient.

## 2023-03-15 NOTE — PROGRESS NOTES
Have called and left voice mail for daughter regarding her mother and where she is living. TEAMS messaged Armando Poster at Dr. Nicky Corrales office concerning patient's possible ECF living status. She repsonded that ppatient is residing at [facility]. Call placed to DON at [facility] regarding patient. ([phone]). Await return call.

## 2023-03-15 NOTE — TELEPHONE ENCOUNTER
Auth: NPR  Date: 03/16/23  Reference # None  Spoke with: None  Type of SX: Outpatient  Location: [facility]  CPT: 83436   DX: M65.30  SX area: LT hand  Insurance: Medicare A&B

## 2023-03-15 NOTE — PROGRESS NOTES
Spoke with daughter/POA Raul Justin, reviewed medications and patient's medical history with her. Brandon Silva states the only new med her mother is on is Lasix. Phone consent for surgery obtained. Requested that daughter fax to us the POA papers and the DNR paperwork to [phone]. Message left for KEVIN Singh at [facility] ([phone]) to request a call back concerning patient's list of current medications and  phone number to fax the pre op instructions. Case reviewed with Dr. Marybel Blevins. Patient will be a local case. Dr. Marybel Blevins did speak with patient's daughter, Brandon Silva, and consent for MAC sedation and DNR status in the event patient would require MAC sedation tomorrow.

## 2023-03-15 NOTE — PROGRESS NOTES
Name_______________________________________Printed:____________________  Date and time of surgery__3/16/23  0830  [facility]______________________Arrival Time:_0700 [address]_______________   1. The instructions given regarding when and if a patient needs to stop oral intake prior to surgery varies. Follow the specific instructions you were given                  _x__Nothing to eat or to drink after Midnight the night before.                   ____Carbo loading or instructions will be given to select patients-if you have been given those instructions -please do the following                           The evening before your surgery after dinner before midnight drink 40 ounces of gatorade. If you are diabetic use sugar free. The morning of surgery drink 40 ounces of water. This needs to be finished 3 hours prior to your surgery start time. 2. Take the following pills with a small sip of water on the morning of surgery: Duloxetine, sertraline and levothyroxine                  Do not take blood pressure medications ending in pril or sartan the aleksandr prior to surgery or the morning of surgery. Dr Ellyn Barrios patient are not to take any medications the AM of surgery. 3. Aspirin, Ibuprofen, Advil, Naproxen, Vitamin E and other Anti-inflammatory products and supplements should be stopped for 5 -7days before surgery or as directed by your physician. 4. Check with your Doctor regarding stopping Plavix, Coumadin,Eliquis, Lovenox,Effient,Pradaxa,Xarelto, Fragmin or other blood thinners and follow their instructions. 5. Do not smoke, and do not drink any alcoholic beverages 24 hours prior to surgery. This includes NA Beer. Refrain from the usage of any recreational drugs. 6. You may brush your teeth and gargle the morning of surgery. DO NOT SWALLOW WATER   7. You MUST make arrangements for a responsible adult to stay on site while you are here and take you home after your surgery.  You will not be allowed to leave alone or drive yourself home. It is strongly suggested someone stay with you the first 24 hrs. Your surgery will be cancelled if you do not have a ride home. 8. A parent/legal guardian must accompany a child scheduled for surgery and plan to stay at the hospital until the child is discharged. Please do not bring other children with you. 9. Please wear simple, loose fitting clothing to the hospital.  Mindi Abdi not bring valuables (money, credit cards, checkbooks, etc.) Do not wear any makeup (including no eye makeup) or nail polish on your fingers or toes. 10. DO NOT wear any jewelry or piercings on day of surgery. All body piercing jewelry must be removed. 11. If you have _x__dentures, they will be removed before going to the OR; we will provide you a container. If you wear ___contact lenses or _x__glasses, they will be removed; please bring a case for them. 12. Please see your family doctor/pediatrician for a history & physical and/or concerning medications. Bring any test results/reports from your physician's office. PCP__________________Phone___________H&P Appt. Date________             13 If you  have a Living Will and Durable Power of  for Healthcare, please bring in a copy. 15. Notify your Surgeon if you develop any illness between now and surgery  time, cough, cold, fever, sore throat, nausea, vomiting, etc.  Please notify your surgeon if you experience dizziness, shortness of breath or blurred vision between now & the time of your surgery             15. DO NOT shave your operative site 96 hours prior to surgery. For face & neck surgery, men may use an electric razor 48 hours prior to surgery. 16. Shower the night before or morning of surgery using an antibacterial soap or as you have been instructed. 17. To provide excellent care visitors will be limited to one in the room at any given time.              18.  Please bring picture ID and insurance card. 19.  Visit our web site for additional information:  [email or web address]              20.During flu season no children under the age of 15 are permitted in the hospital for the safety of all patients. 21. If you take a long acting insulin in the evening only  take half of your usual  dose the night  before your procedure              22. If you use a c-pap please bring DOS if staying overnight,             23.For your convenience [facility]  has a pharmacy on site to fill your prescriptions. 24. If you use oxygen and have a portable tank please bring it  with you the DOS             25. Bring a complete list of all your medications with name and dose include any supplements. 26. Other__________________________________________   *Please call pre admission testing if you any further questions   [facility]   [address] [place] [address]. [place]  [phone]   [address] [place] [address]       VISITOR POLICY(subject to change)    Current policy is 2 visitors per patient. No children. Mask is  at the discretion of the facility. Visiting hours are 8a-8p. Overnight visitors will be at the discretion of the nurse. All policies subject to change. All above information reviewed with patient in person or by phone. Patient verbalizes understanding. All questions and concerns addressed.                                                                                                  Patient/Rep____________________                                                                                                                                    PRE OP INSTRUCTIONS

## 2023-03-16 ENCOUNTER — ANESTHESIA (OUTPATIENT)
Dept: OPERATING ROOM | Age: 88
End: 2023-03-16
Payer: MEDICARE

## 2023-03-16 ENCOUNTER — HOSPITAL ENCOUNTER (OUTPATIENT)
Age: 88
Setting detail: OUTPATIENT SURGERY
Discharge: NURSING, REHAB OR LONG-TERM CARE | End: 2023-03-16
Attending: ORTHOPAEDIC SURGERY | Admitting: ORTHOPAEDIC SURGERY
Payer: MEDICARE

## 2023-03-16 ENCOUNTER — TELEPHONE (OUTPATIENT)
Dept: ORTHOPEDIC SURGERY | Age: 88
End: 2023-03-16

## 2023-03-16 VITALS
WEIGHT: 168 LBS | TEMPERATURE: 98.1 F | DIASTOLIC BLOOD PRESSURE: 61 MMHG | HEART RATE: 72 BPM | RESPIRATION RATE: 15 BRPM | OXYGEN SATURATION: 95 % | BODY MASS INDEX: 27.99 KG/M2 | SYSTOLIC BLOOD PRESSURE: 132 MMHG | HEIGHT: 65 IN

## 2023-03-16 PROCEDURE — 7100000011 HC PHASE II RECOVERY - ADDTL 15 MIN: Performed by: ORTHOPAEDIC SURGERY

## 2023-03-16 PROCEDURE — 3600000013 HC SURGERY LEVEL 3 ADDTL 15MIN: Performed by: ORTHOPAEDIC SURGERY

## 2023-03-16 PROCEDURE — 2709999900 HC NON-CHARGEABLE SUPPLY: Performed by: ORTHOPAEDIC SURGERY

## 2023-03-16 PROCEDURE — 6370000000 HC RX 637 (ALT 250 FOR IP): Performed by: ORTHOPAEDIC SURGERY

## 2023-03-16 PROCEDURE — 26055 INCISE FINGER TENDON SHEATH: CPT | Performed by: ORTHOPAEDIC SURGERY

## 2023-03-16 PROCEDURE — 7100000010 HC PHASE II RECOVERY - FIRST 15 MIN: Performed by: ORTHOPAEDIC SURGERY

## 2023-03-16 PROCEDURE — 3600000003 HC SURGERY LEVEL 3 BASE: Performed by: ORTHOPAEDIC SURGERY

## 2023-03-16 PROCEDURE — 2500000003 HC RX 250 WO HCPCS: Performed by: ORTHOPAEDIC SURGERY

## 2023-03-16 RX ORDER — CEPHALEXIN 500 MG/1
500 CAPSULE ORAL 4 TIMES DAILY
Qty: 12 CAPSULE | Refills: 0 | Status: SHIPPED | OUTPATIENT
Start: 2023-03-16 | End: 2023-03-19

## 2023-03-16 RX ORDER — BUPIVACAINE HYDROCHLORIDE 5 MG/ML
INJECTION, SOLUTION EPIDURAL; INTRACAUDAL
Status: COMPLETED | OUTPATIENT
Start: 2023-03-16 | End: 2023-03-16

## 2023-03-16 RX ORDER — CEPHALEXIN 250 MG/1
250 CAPSULE ORAL ONCE
Status: COMPLETED | OUTPATIENT
Start: 2023-03-16 | End: 2023-03-16

## 2023-03-16 RX ADMIN — CEPHALEXIN 250 MG: 250 CAPSULE ORAL at 08:11

## 2023-03-16 ASSESSMENT — PAIN - FUNCTIONAL ASSESSMENT: PAIN_FUNCTIONAL_ASSESSMENT: 0-10

## 2023-03-16 NOTE — PROGRESS NOTES
Pt arrived from OR to PACU bay 3. Report received from OR staff. Pt awake and in phase 2. Surgical incisions dressings in place to LUE. Pt on RA, NSR, and VSS. Will continue to monitor.

## 2023-03-16 NOTE — TELEPHONE ENCOUNTER
Notified patient's daughter that Dr Brenna Dubon said there are no significant restrictions where he would have to order therapy for this condition. She understood.

## 2023-03-16 NOTE — H&P
Preoperative H&P Update    The patient's History and Physical in the medical record from 3/14/2023 was reviewed by me today. Past Medical History:   Diagnosis Date    Acute renal failure ([address] 11/27/2012    Arthritis     At risk for falling 08/28/2018    Score of 11/24 for Dynamic Gait Index    Cirrhosis ([facility] 09/25/2020    Dementia with behavioral problem 09/25/2020    Depression     per ECF records    Frequent falls     Gastroesophageal reflux disease 01/22/2019    Movement disorder     osteoporosis    Nephrotic syndrome 12/28/2012    Osteoporosis     per ECF records    Pelvic fracture (HCC)     Thyroid disease      Past Surgical History:   Procedure Laterality Date    COLONOSCOPY      EXCISION OF FACIAL MASS      skin ca    EYE SURGERY      cataract removal; bilat    FOREARM SURGERY Right 10/27/2022    OPEN REDUCTION INTERNAL FIXATION RIGHT DISTAL RADIUS-GIGI performed by Yesenia Wright MD at [facility] Left 8/21/2019    OPEN REDUCTION INTERNAL FIXATION OF INTERTROCHANTERIC FRACTURE OF LEFT HIPUSING GAMMA NAIL performed by Yesenia Wright MD at [address] [place] [address] Right 05/27/2018    right gamma nail with cables    SKIN BIOPSY       No current facility-administered medications on file prior to encounter. Current Outpatient Medications on File Prior to Encounter   Medication Sig Dispense Refill    acetaminophen (TYLENOL) 500 MG tablet Take 500 mg by mouth every 6 hours as needed for Pain      furosemide (LASIX) 20 MG tablet Take 20 mg by mouth 2 times daily      senna (SENOKOT) 8.6 MG tablet Take 2 tablets by mouth daily Hold for loose stool      traMADol (ULTRAM) 50 MG tablet Take 50 mg by mouth every 6 hours as needed for Pain.       lactulose (CHRONULAC) 10 GM/15ML solution Take 30 g by mouth every morning TAKE 30 ML BY MOUTH daily 946 mL 1    QUEtiapine (SEROQUEL) 50 MG tablet Take 1 tablet by mouth 2 times daily (Patient not taking: Reported on 3/15/2023) 60 tablet 3    DULoxetine (CYMBALTA) 30 MG extended release capsule TAKE ONE CAPSULE BY MOUTH DAILY 30 capsule 5    alendronate (FOSAMAX) 70 MG tablet TAKE 1 TABLET BY MOUTH ONCE WEEKLY ON AN EMPTY STOMACH BEFORE BREAKFAST. REMAIN UPRIGHT FOR 30 MINUTES AND TAKE WITH 8 OUNCES OF WATER (Patient taking differently: Takes every Wednesday.) 4 tablet 5    sertraline (ZOLOFT) 25 MG tablet TAKE ONE TABLET BY MOUTH DAILY 30 tablet 5    levothyroxine (SYNTHROID) 75 MCG tablet Take 1 tablet by mouth Daily 30 tablet 5    Caltrate 600+D Plus Minerals (CALTRATE) 600-800 MG-UNIT TABS tablet Take 1 tablet by mouth 2 times daily 180 tablet 1       Allergies   Allergen Reactions    Metoprolol Hives     Severe rash and itching    Amlodipine Itching     Itching    Sulfa Antibiotics Nausea And Vomiting    Adhesive Tape Other (See Comments)     Skin tears very easily    Neosporin [Bacitracin-Polymyxin B] Itching     Nose became sore      I reviewed the HPI, medications, allergies, reason for surgery, diagnosis and treatment plan and there has been no change. The patient was evaluated by me today. Physical exam findings for this update include:    Vitals:    03/16/23 0721   BP: 135/69   Pulse: 80   Resp: 16   Temp: 99 °F (37.2 °C)   SpO2: 96%     Airway is intact  Chest: chest clear, no wheezing, rales, normal symmetric air entry, no tachypnea, retractions or cyanosis  Heart: regular rate and rhythm ; heart sounds normal  Findings on exam of the body region where surgery is to be performed include:  Left long (middle) finger pain/ trigger finger.     Electronically signed by Ramandeep Interiano MD on 3/16/2023 at 8:37 AM

## 2023-03-16 NOTE — PROGRESS NOTES
Report called to [facility]. DC instructions reviewed. Pt awaiting transport back to facility.

## 2023-03-16 NOTE — TELEPHONE ENCOUNTER
General Question     Subject: REQUESTING A CALL ABOUT HOW THE SX WENT.   Patient and /or Facility Request: Pablo  Number: [phone]

## 2023-03-16 NOTE — DISCHARGE INSTRUCTIONS
Post op instruction:  1- D/C to ECF  2- Dx Left long (middle) finger pain/ trigger finger. 3- NWB left hand  4- Elevation surgical site, with ice  5- Keep Drsg dry and clean, 3 days, then BandAid. 6- F/U in 2 weeks. Filemon Murphy MD, 3/16/2023      ORTHOPEDIC DISCHARGE INSTRUCTIONS    Follow your surgeons instructions. Make follow-up appointment. Observe operative area for signs of excessive bleeding such as a slow general ooze that saturates the dressing or bright red bleeding. In either case, apply pressure to the area and elevate if possible and call your surgeon right away. Observe the affected extremity for circulation or nerve impairment such as a change in color, numbness, tingling, coldness or increased pain. If any of these symptoms are present call your surgeon. Observe operative site for any signs of infection such as increased pain, redness, fever greater than 101 degrees, swelling, foul odor or drainage. Contact surgeon if any of these symptoms are present. If you become short of breath call your surgeon or go to the nearest emergency room. Remove dressing if directed by surgeon. Leave steristips or sutures or staples in place. You may loosen your ace wrap if it feels too tight, or if you have severe pain, or if it has swelling. Elevate extremity as directed by surgeon. You may shower when directed by surgeon. Use ice pack as directed by surgeon. Do not use heat. Avoid stress to suture line such as pulling, pushing or tugging. Take medications as ordered. Take pain medication with food. Do not drive or operate machinery while taking narcotics. Call your surgeon for any questions or problems.

## 2023-03-16 NOTE — BRIEF OP NOTE
Brief Postoperative Note      Patient: Bri Arellano  YOB: 1930  MRN: [id number]    Date of Procedure: 3/16/2023    Pre-Op Diagnosis: Trigger middle finger of left hand [M65.332]    Post-Op Diagnosis: Same       Procedure(s):  LEFT MIDDLE FINGER TRIGGER FINGER RELEASE - LOCAL ONLY    Surgeon(s):  Juany Javier MD    Assistant:  Surgical Assistant: Brandt Nieto    Anesthesia: Local    Estimated Blood Loss (mL): Minimal    Complications: None    Specimens:   * No specimens in log *    Implants:  * No implants in log *      Drains: * No LDAs found *    Findings: Same.     Electronically signed by Juany Javier MD on 3/16/2023 at 5:28 PM

## 2023-03-16 NOTE — PROGRESS NOTES
Discharge instructions reviewed with patient and pts nurse Jann Gomez at [facility]. Pt discharged via wheelchair per [facility]. Pt discharged with all belongings.  taking stable pt back to [facility].

## 2023-03-16 NOTE — TELEPHONE ENCOUNTER
Returned call to patient's daughter. Notified her that that the surgery went well and she was bending the finger and opening without issue. Daughter states patient is at the end of her 30 days in short term facility. She is going to be discharged within a week, but she is trying to fight it for an appeal. She was wondering if Dr David Vega think this surgery would benefit from some type of rehab which would allow her to stay longer.

## 2023-03-17 NOTE — OP NOTE
[address]                     [facility], [address]                                OPERATIVE REPORT    PATIENT NAME: Cora Pham                    :        1930  MED REC NO:   [id number]                          ROOM:  ACCOUNT NO:   [de-identified]                           ADMIT DATE: 2023  PROVIDER:     Frances Kenney MD    DATE OF PROCEDURE:  2023    PRIMARY CARE PROVIDER:  Md Gema Morales MD    PREOPERATIVE DIAGNOSES:  Left hand long trigger finger with contracture  of the PIP joint. POSTOPERATIVE DIAGNOSES:  Left hand long trigger finger with contracture  of the PIP joint. OPERATION PERFORMED:  Incision of tendon sheath, left hand long finger  with release of A1 pulley and manipulation of the PIP joint. SURGEON:  Frances Kenney MD    ASSISTANT:  Angel Mcdaniel, Surgical Assistant    ANESTHESIA:  Local anesthesia. ESTIMATED BLOOD LOSS: Minimal    INDICATIONS:  This is a 70-year-old female, who presented to our office  with a two-week history of stuck left long finger. She was diagnosed  with a trigger finger that is fixed and recommended surgical release. All risks, benefits, and alternatives were discussed with the patient  and she agreed to proceed with the surgical release. DESCRIPTION OF THE PROCEDURE:  The patient's left hand was marked. She  received 500 mg p.o. of Keflex. She was then brought to the operating  room and underwent a local anesthesia infiltration of the left hand  under sterile condition. A time-out was called confirming the patient  name, the site, and the procedure. A transverse incision was made over the distal palmar crease. Hemostasis was secured, as we did this without the tourniquet. There  was significant adhesion and thick scar tissue over the flexor tendon  that was incised with a 15-blade and we were able to release the A1  pulley with the scissors.   There was some stiffness of PT Evaluation     Today's date: 2019  Patient name: Rina Baumgarten  : 1943  MRN: [id number]  Referring provider: LUISANA Scott  Dx:   Encounter Diagnosis     ICD-10-CM    1  Right Achilles tendinitis M76 61                   Assessment  Assessment details: Rina Baumgarten is a pleasant 76 y o  female presents with right achilles pain  No further referral appears necessary at this time  Primary movement diagnosis of gastroc tightness and TCJ hypomobility resulting in symptoms of pain and limiting her participation/performance in walk long distances, wear shoes with backs, go down stairs   Primary impairments include:  1)  Gastroc tightness and muscle spasm - addressing with STR and stretching   2) Poor TCJ mobility -addressing with mobilizations   3) Achilles insertion irritation - addressing with modalities     Skilled PT services appropriate to facilitate return to prior level of function with transition to home exercise program for independent management when appropriate  Impairments: abnormal muscle firing, abnormal muscle tone, abnormal or restricted ROM, impaired physical strength, lacks appropriate home exercise program and pain with function  Understanding of Dx/Px/POC: good   Prognosis: good    Goals  Patient will successfully transition to home exercise program   Patient will be able to manage symptoms independently      Patient will be able to walk one mile without pain in 8 weeks  Patient will be able to go down stairs without limitation in 8 weeks  Patient will be able to wear backed shoes without discomfort in 8 weeks     Plan  Patient would benefit from: skilled PT  Referral necessary: No  Planned modality interventions: thermotherapy: hydrocollator packs  Planned therapy interventions: home exercise program, manual therapy, neuromuscular re-education, patient education, functional ROM exercises, strengthening, stretching, joint mobilization, graded activity, graded exercise, therapeutic exercise, body mechanics training, motor coordination training and activity modification  Frequency: 2x week  Duration in weeks: 8  Treatment plan discussed with: patient        Subjective Evaluation    History of Present Illness  Mechanism of injury: Rylan Dubois is a 76 y o  female presenting to therapy with complaints of right achilles pain  She reports that pain began a year ago and has gotten worse  She has had injection which helped her pain for a few weeks but pain returned  She is unable to wear shoes with backs as the pressure makes pain intolerable  She limits her walking or standing due to pain and hopes to get back to walking for exercise  Pain  Current pain ratin  At best pain ratin  At worst pain ratin  Quality: dull ache and sharp    Patient Goals  Patient goals for therapy: decreased pain, independence with ADLs/IADLs and increased motion          Objective     Observations     Additional Observation Details  Visible swelling/tendon hypertrophy to posterior heel with TTP    Palpation     Right Tenderness of the lateral gastrocnemius and medial gastrocnemius  Trigger point to lateral gastrocnemius and medial gastrocnemius  Tenderness     Right Ankle/Foot   Tenderness in the Achilles insertion       Neurological Testing     Sensation     Ankle/Foot   Left Ankle/Foot   Intact: light touch    Right Ankle/Foot   Intact: light touch     Active Range of Motion   Left Ankle/Foot   Dorsiflexion (ke): 5 degrees   Dorsiflexion (kf): 20 degrees   Plantar flexion: WFL  Inversion: WFL  Eversion: WFL    Right Ankle/Foot   Dorsiflexion (ke): 5 degrees   Dorsiflexion (kf): 15 degrees with pain  Plantar flexion: WFL  Inversion: WFL  Eversion: WFL    Passive Range of Motion   Left Ankle/Foot    Dorsiflexion (ke): 5 degrees   Dorsiflexion (kf): 25 degrees     Right Ankle/Foot    Dorsiflexion (ke): 5 degrees   Dorsiflexion (kf): 15 degrees      Joint Play     Right the PIP joint  that was manipulated. We were able to get the finger out completely  straight. The patient was actively able to extend and flex the long  finger at this point. We irrigated the incision copiously with normal  saline. We then closed the incision with a 4-0 nylon. Dressing was  then applied with Xeroform, 4x4, sterile Webril, and Ace wrap. The patient tolerated the procedure well and was taken to the recovery  in a stable condition. POSTOPERATIVE PLAN:  The patient will be [facility]. She was encouraged to start immediate range of motion of  the finger but nonweightbearing for at least two to three weeks.         Anthony Mo MD    D: 03/16/2023 17:34:25       T: 03/17/2023 0:33:53     [id number]  Job#: [id number]     Doc#: [id number]    CC:  [address] [place] [address], Md Ankle/Foot  Hypomobile in the talocrural joint, subtalar joint and midfoot         Flowsheet Rows      Most Recent Value   PT/OT G-Codes   Current Score  49   Projected Score  56          Precautions: None    Daily Treatment Diary     Manual  1/7            Posterior TCJ mobilizations  AF            Gastroc STR AF                                                       Exercise Diary              Gastroc stretching              Self gastroc soft tissue release                                                                                                                                                                                                                                                            Modalities

## 2023-03-20 ENCOUNTER — TELEPHONE (OUTPATIENT)
Dept: INTERNAL MEDICINE CLINIC | Age: 88
End: 2023-03-20

## 2023-03-20 ENCOUNTER — TELEPHONE (OUTPATIENT)
Dept: ORTHOPEDIC SURGERY | Age: 88
End: 2023-03-20

## 2023-03-20 ENCOUNTER — CARE COORDINATION (OUTPATIENT)
Dept: CASE MANAGEMENT | Age: 88
End: 2023-03-20

## 2023-03-20 NOTE — TELEPHONE ENCOUNTER
Other DAUGHTER CALLED STATES THAT REHAB FACILITY DID NOT FILLL RX FOR ANTIBIOTIC. WANTS TO KNOW IF SHE SHOULD STILL GET THIS FILLED.  PLS CALL TO ADVISE [phone]

## 2023-03-20 NOTE — CARE COORDINATION
[facility]o [facility]   4/4/2023  2:15 PM Hank Bunn MD  Ortho [facility]       Post Acute Care Manager provided contact information. No further follow-up call indicated based on severity of symptoms and risk factors.   Plan for next call: referral to ambulatory care manager-sent via staff message     Manolo Goyal RN

## 2023-03-22 ENCOUNTER — OFFICE VISIT (OUTPATIENT)
Dept: INTERNAL MEDICINE CLINIC | Age: 88
End: 2023-03-22

## 2023-03-22 VITALS
SYSTOLIC BLOOD PRESSURE: 114 MMHG | WEIGHT: 180 LBS | HEART RATE: 99 BPM | OXYGEN SATURATION: 99 % | DIASTOLIC BLOOD PRESSURE: 70 MMHG | HEIGHT: 65 IN | BODY MASS INDEX: 29.99 KG/M2

## 2023-03-22 DIAGNOSIS — I13.0 HYPERTENSIVE HEART AND CHRONIC KIDNEY DISEASE WITH HEART FAILURE AND STAGE 1 THROUGH STAGE 4 CHRONIC KIDNEY DISEASE, OR UNSPECIFIED CHRONIC KIDNEY DISEASE (HCC): ICD-10-CM

## 2023-03-22 DIAGNOSIS — M79.605 PAIN IN BOTH LOWER EXTREMITIES: ICD-10-CM

## 2023-03-22 DIAGNOSIS — Z09 HOSPITAL DISCHARGE FOLLOW-UP: Primary | ICD-10-CM

## 2023-03-22 DIAGNOSIS — M79.89 LEG SWELLING: ICD-10-CM

## 2023-03-22 DIAGNOSIS — N18.31 STAGE 3A CHRONIC KIDNEY DISEASE (HCC): ICD-10-CM

## 2023-03-22 DIAGNOSIS — G30.1 LATE ONSET ALZHEIMER'S DISEASE WITH BEHAVIORAL DISTURBANCE (HCC): ICD-10-CM

## 2023-03-22 DIAGNOSIS — F02.818 LATE ONSET ALZHEIMER'S DISEASE WITH BEHAVIORAL DISTURBANCE (HCC): ICD-10-CM

## 2023-03-22 DIAGNOSIS — M79.604 PAIN IN BOTH LOWER EXTREMITIES: ICD-10-CM

## 2023-03-22 DIAGNOSIS — Z87.81 H/O FRACTURE OF HIP: ICD-10-CM

## 2023-03-22 PROBLEM — N18.30 CHRONIC RENAL DISEASE, STAGE III (HCC): Status: ACTIVE | Noted: 2023-03-22

## 2023-03-22 RX ORDER — FUROSEMIDE 20 MG/1
20 TABLET ORAL 2 TIMES DAILY
Qty: 60 TABLET | Refills: 0 | Status: SHIPPED | OUTPATIENT
Start: 2023-03-22

## 2023-03-22 SDOH — ECONOMIC STABILITY: INCOME INSECURITY: HOW HARD IS IT FOR YOU TO PAY FOR THE VERY BASICS LIKE FOOD, HOUSING, MEDICAL CARE, AND HEATING?: NOT HARD AT ALL

## 2023-03-22 SDOH — ECONOMIC STABILITY: FOOD INSECURITY: WITHIN THE PAST 12 MONTHS, YOU WORRIED THAT YOUR FOOD WOULD RUN OUT BEFORE YOU GOT MONEY TO BUY MORE.: NEVER TRUE

## 2023-03-22 SDOH — ECONOMIC STABILITY: FOOD INSECURITY: WITHIN THE PAST 12 MONTHS, THE FOOD YOU BOUGHT JUST DIDN'T LAST AND YOU DIDN'T HAVE MONEY TO GET MORE.: NEVER TRUE

## 2023-03-22 ASSESSMENT — PATIENT HEALTH QUESTIONNAIRE - PHQ9
SUM OF ALL RESPONSES TO PHQ QUESTIONS 1-9: 0
SUM OF ALL RESPONSES TO PHQ QUESTIONS 1-9: 0
1. LITTLE INTEREST OR PLEASURE IN DOING THINGS: 0
2. FEELING DOWN, DEPRESSED OR HOPELESS: 0
SUM OF ALL RESPONSES TO PHQ QUESTIONS 1-9: 0
SUM OF ALL RESPONSES TO PHQ QUESTIONS 1-9: 0
SUM OF ALL RESPONSES TO PHQ9 QUESTIONS 1 & 2: 0

## 2023-03-22 NOTE — PROGRESS NOTES
tenderness  Scar will with stitches of the left hand. neurologic: Patient has been using walker    An electronic signature was used to authenticate this note. --Emeka Romano MD    An After Visit Summary was printed and given to the patient. Documentation was done using voice recognition dragon software. Every effort was made to ensure accuracy; however, inadvertent  Unintentional computerized transcription errors may be present.

## 2023-03-23 LAB
ANION GAP SERPL CALCULATED.3IONS-SCNC: 12 MMOL/L (ref 3–16)
BUN SERPL-MCNC: 19 MG/DL (ref 7–20)
CALCIUM SERPL-MCNC: 9 MG/DL (ref 8.3–10.6)
CHLORIDE SERPL-SCNC: 107 MMOL/L (ref 99–110)
CO2 SERPL-SCNC: 28 MMOL/L (ref 21–32)
CREAT SERPL-MCNC: 1.1 MG/DL (ref 0.6–1.2)
GFR SERPLBLD CREATININE-BSD FMLA CKD-EPI: 47 ML/MIN/{1.73_M2}
GLUCOSE SERPL-MCNC: 101 MG/DL (ref 70–99)
NT-PROBNP SERPL-MCNC: 334 PG/ML (ref 0–449)
POTASSIUM SERPL-SCNC: 4.2 MMOL/L (ref 3.5–5.1)
SODIUM SERPL-SCNC: 147 MMOL/L (ref 136–145)

## 2023-03-24 ENCOUNTER — CARE COORDINATION (OUTPATIENT)
Dept: CARE COORDINATION | Age: 88
End: 2023-03-24

## 2023-03-24 NOTE — CARE COORDINATION
RN-CC outreached patients daughter, Virginia Mahmood, regarding CC enrollment. Patient is currently in a therapy session with [facility]. RN-CC will call Kenyatta Howard back next week to discuss enrollment.

## 2023-03-27 ENCOUNTER — TELEPHONE (OUTPATIENT)
Dept: ORTHOPEDIC SURGERY | Age: 88
End: 2023-03-27

## 2023-03-27 ENCOUNTER — CARE COORDINATION (OUTPATIENT)
Dept: CARE COORDINATION | Age: 88
End: 2023-03-27

## 2023-03-27 ENCOUNTER — HOSPITAL ENCOUNTER (OUTPATIENT)
Dept: VASCULAR LAB | Age: 88
Discharge: HOME | End: 2023-03-27
Payer: MEDICARE

## 2023-03-27 DIAGNOSIS — M79.605 PAIN IN BOTH LOWER EXTREMITIES: ICD-10-CM

## 2023-03-27 DIAGNOSIS — M79.89 LEG SWELLING: ICD-10-CM

## 2023-03-27 DIAGNOSIS — M79.604 PAIN IN BOTH LOWER EXTREMITIES: ICD-10-CM

## 2023-03-27 PROCEDURE — 93970 EXTREMITY STUDY: CPT

## 2023-03-27 NOTE — TELEPHONE ENCOUNTER
Patient's daughter states ordering provider suggested ortho needed to see the dopplers. Results negative for DVT. Evidence of popliteal cysts. Notified her daughter that Dr Disha Greer will be in office at the appointment and I can make the providers aware of the recent testing.

## 2023-03-27 NOTE — TELEPHONE ENCOUNTER
General Question     Subject: WILL DR LARES BE ABLE TO SEE Pt WHEN SHE IS WITH [facility]LE ON  03/30  Patient and /or Facility Request:[facility]  Contact Number: [phone]    Pt IS SEEING TCOLE ON 03/30 AND HAS HAD RECENT  US DONE ON BILAT VENOUS. DR VELAZQUEZ WANTED TO MAKE SURE DR LARES LOOKS AT THEM AND, HOPEFULLY, CAN TALK WITH THE Pt, AS WELL.      PLEASE CALL [facility] SO THEY KNOW IF HE CAN SPEAK WITH THEM

## 2023-03-27 NOTE — CARE COORDINATION
CTN handoff to care coordination. RN-CC outreached patients daughter, Shreya Brown. RN-CC introduced self and role of care coordinator. Patient was at [facility] for 1/26- 3/18 following a fall at home resulting in closed nondisplaced fracture anterior wall of L acetabulum, closed fracture sacrum, closed fracture single ramis leftpubis, closed left hip fracture. Patient is now home with [facility] SN/PT/OT. Patient followed up with Dr. Alexandra Floyd on 3/22 - Lasix was increased to BID due to bilateral swelling and she is scheduled for a venous doppler today. Argentina Calero will call [facility] today to schedule 2 week f/u appointment with Dr. Angle Parks feels she is managing her mothers care ok on her own with assistance from [facility] at this time and denies need for additional support from RN-CC. RN-CC encouraged Argentina Calero to outreach RN-CC or [facility]IM if additional support is needed in the future.

## 2023-03-28 NOTE — RESULT ENCOUNTER NOTE
Unlikely to have blood clot. There may be some cyst in the back of the knee. Medication as prescribed.   Keep appointment with me as advised on April 5

## 2023-03-30 ENCOUNTER — OFFICE VISIT (OUTPATIENT)
Dept: ORTHOPEDIC SURGERY | Age: 88
End: 2023-03-30

## 2023-03-30 VITALS — HEIGHT: 65 IN | RESPIRATION RATE: 16 BRPM | BODY MASS INDEX: 29.99 KG/M2 | WEIGHT: 180 LBS

## 2023-03-30 DIAGNOSIS — M65.332 TRIGGER FINGER, LEFT MIDDLE FINGER: ICD-10-CM

## 2023-03-30 DIAGNOSIS — M17.0 PRIMARY OSTEOARTHRITIS OF BOTH KNEES: Primary | ICD-10-CM

## 2023-03-30 NOTE — PROGRESS NOTES
kg), Body mass index is 29.95 kg/m². Resting respiratory rate is 16. Examination of the gait, showed that the patient walks with assistance of a wheelchair. Examination of both knees showing full ROM, bilateral mild crepitus, tenderness on medial joint line, stable to varus and valgus stress. She has intact sensation and good pedal pulses. She has good strength in 2 planes, and has mild tenderness on deep palpation over the medial joint line. Knee reflex 1+ bilaterally. Evaluation of the left hand her sensation is intact distally and she is neurovascularly intact. The incision is intact with sutures. No drainage or erythema. She has decreased range of motion of the left hand middle finger PIP joint. Bilateral lower extremity venous Dopplers dated 3/28/2023 were consistent with  Right Impression   There is no evidence of deep or superficial venous thrombosis involving the   right lower extremity. Calf veins were not well visualized on the right. Evidence of a cystic structure seen in the popliteal space of the right leg   measuring 2.75 X 0.66 X 2.35 cm. Left Impression   There is no evidence of deep or superficial venous thrombosis involving the   left lower extremity. Calf veins were not well visualized on the left. Evidence of a cystic structure seen in the popliteal space of the left leg   measuring 7.53 X 1.58 x 3.42 cm. IMPRESSION:   1-Bilateral knee pain/osteoarthritis. 2-Left hand long trigger finger with contracture of the PIP joint, status post incision and release of the A1 pulley with manipulation of the PIP joint, date of surgery 3/16/2023      PLAN: I discussed with the patient the treatment options including both surgical and non-surgical treatment. We recommended Quad exercises and stretching of the calf and hamstrings which was taught to the patient today. She states that NSAIDs irritate her stomach.   I believe she will benefit from cortisone injection bilateral knee,

## 2023-04-04 ENCOUNTER — TELEPHONE (OUTPATIENT)
Dept: INTERNAL MEDICINE CLINIC | Age: 88
End: 2023-04-04

## 2023-04-05 ENCOUNTER — OFFICE VISIT (OUTPATIENT)
Dept: INTERNAL MEDICINE CLINIC | Age: 88
End: 2023-04-05

## 2023-04-05 VITALS
OXYGEN SATURATION: 93 % | WEIGHT: 168 LBS | HEIGHT: 65 IN | DIASTOLIC BLOOD PRESSURE: 70 MMHG | BODY MASS INDEX: 27.99 KG/M2 | SYSTOLIC BLOOD PRESSURE: 118 MMHG | HEART RATE: 84 BPM

## 2023-04-05 DIAGNOSIS — M79.89 LEG SWELLING: ICD-10-CM

## 2023-04-05 DIAGNOSIS — F33.1 MAJOR DEPRESSIVE DISORDER, RECURRENT, MODERATE (HCC): ICD-10-CM

## 2023-04-05 DIAGNOSIS — Z00.00 MEDICARE ANNUAL WELLNESS VISIT, SUBSEQUENT: Primary | ICD-10-CM

## 2023-04-05 RX ORDER — FUROSEMIDE 20 MG/1
20 TABLET ORAL DAILY
Qty: 30 TABLET | Refills: 0 | Status: SHIPPED | OUTPATIENT
Start: 2023-04-05

## 2023-04-05 ASSESSMENT — PATIENT HEALTH QUESTIONNAIRE - PHQ9
SUM OF ALL RESPONSES TO PHQ QUESTIONS 1-9: 0
2. FEELING DOWN, DEPRESSED OR HOPELESS: 0
SUM OF ALL RESPONSES TO PHQ QUESTIONS 1-9: 0
1. LITTLE INTEREST OR PLEASURE IN DOING THINGS: 0
SUM OF ALL RESPONSES TO PHQ9 QUESTIONS 1 & 2: 0

## 2023-04-05 ASSESSMENT — LIFESTYLE VARIABLES
HOW OFTEN DO YOU HAVE A DRINK CONTAINING ALCOHOL: NEVER
HOW MANY STANDARD DRINKS CONTAINING ALCOHOL DO YOU HAVE ON A TYPICAL DAY: PATIENT DOES NOT DRINK

## 2023-04-05 NOTE — PROGRESS NOTES
this visit:  Tobacco  Allergies  Meds  Problems  Med Hx  Surg Hx  Soc Hx  Fam Hx             An After Visit Summary was printed and given to the patient. Documentation was done using voice recognition dragon software. Every effort was made to ensure accuracy; however, inadvertent  Unintentional computerized transcription errors may be present.      True Fuentes MD

## 2023-04-05 NOTE — PATIENT INSTRUCTIONS
your doctor before you increase the amount of fluids you drink. Preventing falls at home  Remove raised doorway thresholds, throw rugs, and clutter. Repair loose carpet or raised areas in the floor. Move furniture and electrical cords to keep them out of walking paths. Use nonskid floor wax, and wipe up spills right away, especially on ceramic tile floors. If you use a walker or cane, put rubber tips on it. If you use crutches, clean the bottoms of them regularly with an abrasive pad, such as steel wool. Keep your house well lit, especially stairways, porches, and outside walkways. Use night-lights in areas such as hallways and bathrooms. Add extra light switches or use remote switches (such as switches that go on or off when you clap your hands) to make it easier to turn lights on if you have to get up during the night. Install sturdy handrails on stairways. Move items in your cabinets so that the things you use a lot are on the lower shelves (about waist level). Keep a cordless phone and a flashlight with new batteries by your bed. If possible, put a phone in each of the main rooms of your house, or carry a cell phone in case you fall and cannot reach a phone. Or, you can wear a device around your neck or wrist. You push a button that sends a signal for help. Wear low-heeled shoes that fit well and give your feet good support. Use footwear with nonskid soles. Check the heels and soles of your shoes for wear. Repair or replace worn heels or soles. Do not wear socks without shoes on smooth floors, such as wood. Walk on the grass when the sidewalks are slippery. If you live in an area that gets snow and ice in the winter, sprinkle salt on slippery steps and sidewalks. Or ask a family member or friend to do this for you. Preventing falls in the bath  Install grab bars and nonskid mats inside and outside your shower or tub and near the toilet and sinks. Use shower chairs and bath benches.   Use a hand-held

## 2023-04-14 ENCOUNTER — TELEPHONE (OUTPATIENT)
Dept: INTERNAL MEDICINE CLINIC | Age: 88
End: 2023-04-14

## 2023-04-25 ENCOUNTER — HOSPITAL ENCOUNTER (OUTPATIENT)
Dept: ULTRASOUND IMAGING | Age: 88
Discharge: HOME | End: 2023-04-25
Payer: MEDICARE

## 2023-04-25 DIAGNOSIS — R93.5 ABNORMAL ABDOMINAL ULTRASOUND: ICD-10-CM

## 2023-04-25 PROCEDURE — 76705 ECHO EXAM OF ABDOMEN: CPT

## 2023-04-25 NOTE — CARE COORDINATION
[facility] Update Call    2023    Patient: Lamine Suarez Patient : 1930   MRN: <[id number]>  Reason for Admission: (found on [facility])- fall, closed nondisplaced fracture anterior wall of L acetabulum, closed fracture sacrum, closed fracture single ramis leftpubis, closed left hip fracture   Discharge Date: 23 RARS: Readmission Risk Score: 12.2         Post Acute Facility Update    Care Transitions Post Acute Facility Update    Care Transitions Interventions  Post Acute Facility: [facility] Update  Reported Nursing Issues: 167.4#, 136/74, 97.5, 96 bpm, 18, 95% on room air. Tramadol now ordered every 6 hours scheduled ATC. Pt not able to request pain med d/t dementia. It appears she is much more comfortable. Pt has been wandering, confused but pleasant. [address] as of . Therapy- transfer bed to [facility] with walker- min/ CGA, toileting- mod, LB ADL- max (cues especially), UB ADL CGA, supine to sit- SBA/ CGA, transfers- min- mod, pt made it 10 feet but knees buckled and had to be assisted back to [facility], eating- set up with cues and help with menu, oral hygiene- cues then can do it by herself, toileting- cues. ADLs: Moderate Assistance   Bed Mobility: Contact Guard Assist - Hands on patient for balance   Transfer Assistance: Moderate Assistance   Ambulation Assistance: Minimal Assistance   How far (in feet) is the patient ambulating?: 10   Does patient use an assistive device?: Yes   Assistive Devices: 2WW   Barriers to Discharge: Daughter had TKA today () and may need some time before she is able to care for pt at home. Anticipated discharge services: Originally pt was going to [facility] for private pay respite as her dtr is having TKA today. Plan is still for her to go home but they may have her stay a bit longer- paid respite if still needed if DC from SNF portion.  No EDOD at this time- probably another week or two then likely will Intravascular ultrasound catheter inserted for high resolution imaging  go to respite until her daughter can help care for her. Next IDT Planned Review: 2/14/2023    Future Appointments   Date Time Provider Marco Barreto   2/20/2023  1:00 PM MD JAGUAR Boo Notes:   Diet: - Oral Diet:  General  Wounds: none  Medications:  Other: facility  Other:    Post-acute CC Notes: IDT call and [address] access    Hilary Doshi RN

## 2023-05-02 ENCOUNTER — TELEPHONE (OUTPATIENT)
Dept: INTERNAL MEDICINE CLINIC | Age: 88
End: 2023-05-02

## 2023-05-02 DIAGNOSIS — M79.605 PAIN IN BOTH LOWER EXTREMITIES: ICD-10-CM

## 2023-05-02 DIAGNOSIS — N18.31 STAGE 3A CHRONIC KIDNEY DISEASE (HCC): ICD-10-CM

## 2023-05-02 DIAGNOSIS — M79.604 PAIN IN BOTH LOWER EXTREMITIES: ICD-10-CM

## 2023-05-02 DIAGNOSIS — M79.89 LEG SWELLING: Primary | ICD-10-CM

## 2023-05-02 NOTE — TELEPHONE ENCOUNTER
Pt daughter called in requesting Rx for russell hose with velcro.  It was recommended by the home nurse at [facility]     Please call Daughter if you have any questions [phone]

## 2023-05-02 NOTE — TELEPHONE ENCOUNTER
Spoke to dtr--will fax it to [facility]. Twila Argueta is the nurse. Will order 2 pairs.

## 2023-05-12 ENCOUNTER — TELEPHONE (OUTPATIENT)
Dept: INTERNAL MEDICINE CLINIC | Age: 88
End: 2023-05-12

## 2023-05-12 NOTE — TELEPHONE ENCOUNTER
Raji Alves with [facility] calling for verbal orders. Patient had a fall and has a new skin tear they would like to do the same wound care as the previous skin tear. They would also like an order for 1 visit on 6/32 to recertify the patient for home care. Verbal order given for both.

## 2023-05-15 RX ORDER — LACTULOSE 10 G/15ML
SOLUTION ORAL
Qty: 946 ML | Refills: 1 | Status: SHIPPED | OUTPATIENT
Start: 2023-05-15 | End: 2023-07-06 | Stop reason: SDUPTHER

## 2023-05-17 ENCOUNTER — TELEPHONE (OUTPATIENT)
Dept: INTERNAL MEDICINE CLINIC | Age: 88
End: 2023-05-17

## 2023-05-17 NOTE — TELEPHONE ENCOUNTER
Dhruv Bolden from [employer] called in regards to needing verbal orders for skilled nursing recertification, Once a week for 4 weeks for wound care. Verbals can be given at [phone].

## 2023-05-27 DIAGNOSIS — M79.89 LEG SWELLING: ICD-10-CM

## 2023-05-30 RX ORDER — FUROSEMIDE 20 MG/1
TABLET ORAL
Qty: 180 TABLET | Refills: 1 | Status: SHIPPED | OUTPATIENT
Start: 2023-05-30

## 2023-05-31 ENCOUNTER — TELEPHONE (OUTPATIENT)
Dept: INTERNAL MEDICINE CLINIC | Age: 88
End: 2023-05-31

## 2023-05-31 NOTE — TELEPHONE ENCOUNTER
Misha Osorio from [facility] called to report patient having a fall on Thursday in her bathroom. She was home alone at the time. Dtr and son-in-law got her up and only reports skin tear on left forearm. Would like to get the okay to clean with normal saline and Mepilex dressing.

## 2023-06-16 DIAGNOSIS — E03.9 HYPOTHYROIDISM, UNSPECIFIED TYPE: ICD-10-CM

## 2023-06-16 RX ORDER — LEVOTHYROXINE SODIUM 0.07 MG/1
TABLET ORAL
Qty: 90 TABLET | Refills: 1 | Status: SHIPPED | OUTPATIENT
Start: 2023-06-16

## 2023-06-16 NOTE — TELEPHONE ENCOUNTER
Future Appointments    Encounter Information    Provider Department Appt Notes   7/6/2023 Audrey Luciano MD [facility] Internal Medicine 3 months     Past Visits    Date Provider Specialty Visit Type Primary Dx   04/05/2023 Audrey Luciano MD Internal Medicine Office Visit Medicare annual wellness visit, subsequent

## 2023-06-29 DIAGNOSIS — G30.1 LATE ONSET ALZHEIMER'S DISEASE WITH BEHAVIORAL DISTURBANCE (HCC): ICD-10-CM

## 2023-06-29 DIAGNOSIS — F02.818 LATE ONSET ALZHEIMER'S DISEASE WITH BEHAVIORAL DISTURBANCE (HCC): ICD-10-CM

## 2023-06-30 RX ORDER — QUETIAPINE FUMARATE 50 MG/1
TABLET, FILM COATED ORAL
Qty: 60 TABLET | Refills: 5 | Status: SHIPPED | OUTPATIENT
Start: 2023-06-30

## 2023-07-06 ENCOUNTER — OFFICE VISIT (OUTPATIENT)
Dept: INTERNAL MEDICINE CLINIC | Age: 88
End: 2023-07-06

## 2023-07-06 VITALS
HEART RATE: 75 BPM | OXYGEN SATURATION: 96 % | SYSTOLIC BLOOD PRESSURE: 122 MMHG | BODY MASS INDEX: 28.02 KG/M2 | WEIGHT: 168.4 LBS | DIASTOLIC BLOOD PRESSURE: 78 MMHG

## 2023-07-06 DIAGNOSIS — R44.3 HALLUCINATION: ICD-10-CM

## 2023-07-06 DIAGNOSIS — F02.818 LATE ONSET ALZHEIMER'S DISEASE WITH BEHAVIORAL DISTURBANCE (HCC): Primary | ICD-10-CM

## 2023-07-06 DIAGNOSIS — E03.9 HYPOTHYROIDISM, UNSPECIFIED TYPE: ICD-10-CM

## 2023-07-06 DIAGNOSIS — H61.23 EXCESSIVE CERUMEN IN BOTH EAR CANALS: ICD-10-CM

## 2023-07-06 DIAGNOSIS — K74.69 OTHER CIRRHOSIS OF LIVER (HCC): ICD-10-CM

## 2023-07-06 DIAGNOSIS — M79.7 FIBROMYALGIA: ICD-10-CM

## 2023-07-06 DIAGNOSIS — G30.1 LATE ONSET ALZHEIMER'S DISEASE WITH BEHAVIORAL DISTURBANCE (HCC): Primary | ICD-10-CM

## 2023-07-06 DIAGNOSIS — F33.1 MAJOR DEPRESSIVE DISORDER, RECURRENT, MODERATE (HCC): ICD-10-CM

## 2023-07-06 RX ORDER — LACTULOSE 10 G/15ML
20 SOLUTION ORAL NIGHTLY
Qty: 946 ML | Refills: 3 | Status: SHIPPED | OUTPATIENT
Start: 2023-07-06

## 2023-07-06 ASSESSMENT — ENCOUNTER SYMPTOMS
SHORTNESS OF BREATH: 0
WHEEZING: 0
TROUBLE SWALLOWING: 0
VOICE CHANGE: 0
PHOTOPHOBIA: 0

## 2023-07-06 NOTE — PROGRESS NOTES
Rubi Daniels  4/13/1930  female  80 y.o. SUBJECTIVE:       Chief Complaint   Patient presents with    3 Month Follow-Up    Other     Daughter reporting confusion/memory getting worse. Respite stay visit possibly toward the end of the month - daughter will be out of town. More hard of hearing per daughter. HPI:    Follow-up visit for chronic problems. Patient is getting progressive dementia symptoms. Could not tolerate Aricept or Namenda in the past.  History of chronic hallucination and delusion. No change of behavior from the baseline. Patient denies any exertional chest pain, dyspnea, palpitations, syncope, orthopnea, edema or paroxysmal nocturnal dyspnea. The patient denies cough, chest pain, dyspnea, wheezing or hemoptysis.       Past Medical History:   Diagnosis Date    Acute renal failure ([address]) 11/27/2012    Arthritis     At risk for falling 08/28/2018    Score of 11/24 for Dynamic Gait Index    Cirrhosis ([address]) 09/25/2020    Dementia with behavioral problem ([address]) 09/25/2020    Depression     per ECF records    Frequent falls     Gastroesophageal reflux disease 01/22/2019    Movement disorder     osteoporosis    Nephrotic syndrome 12/28/2012    Osteoporosis     per ECF records    Pelvic fracture (HCC)     Thyroid disease      Past Surgical History:   Procedure Laterality Date    COLONOSCOPY      EXCISION OF FACIAL MASS      skin ca    EYE SURGERY      cataract removal; bilat    FINGER TRIGGER RELEASE Left 3/16/2023    LEFT MIDDLE FINGER TRIGGER FINGER RELEASE - LOCAL ONLY performed by Karie Payne MD at [facility] Right 10/27/2022    OPEN REDUCTION INTERNAL FIXATION RIGHT DISTAL RADIUS-GIGI performed by Karie Payne MD at [facility] Left 8/21/2019    OPEN REDUCTION INTERNAL FIXATION OF INTERTROCHANTERIC FRACTURE OF LEFT HIPUSING GAMMA NAIL performed by Karie Payne MD at [facility] Right 05/27/2018    right

## 2023-07-18 DIAGNOSIS — M81.0 AGE-RELATED OSTEOPOROSIS WITHOUT CURRENT PATHOLOGICAL FRACTURE: ICD-10-CM

## 2023-07-19 RX ORDER — ALENDRONATE SODIUM 70 MG/1
TABLET ORAL
Qty: 12 TABLET | Refills: 1 | Status: ON HOLD | OUTPATIENT
Start: 2023-07-19 | End: 2023-08-09 | Stop reason: SDUPTHER

## 2023-07-25 ENCOUNTER — TELEPHONE (OUTPATIENT)
Dept: INTERNAL MEDICINE CLINIC | Age: 88
End: 2023-07-25

## 2023-07-25 NOTE — TELEPHONE ENCOUNTER
Pt had a fall on Friday. She has a skin tear on her left arm above the elbow about 4 inches long. Applied adaptic dressing. Hand is swollen since Sunday. She wanted to know if there were any recommendations. Advised hard to make a determination without seeing the patient. Voiced understanding. Aware dr. Mary Ellen Webb is gone for the day but would send to covering. She states it is hard to get her out of the house but could do a virtual visit after 2pm (when she gets off work) scheduled something with Rosalinda Wagner for tomorrow afternoon.

## 2023-07-25 NOTE — TELEPHONE ENCOUNTER
Called pts dtr to advise. She states due to her work schedule it is hard for her to get her mom to the appt. She will see what recommendations are made at the virtual tomorrow and go from there.

## 2023-07-25 NOTE — TELEPHONE ENCOUNTER
Appointment is appropriate to evaluate injuries.   In person would be much better than virtual if trying to provide wound care

## 2023-07-27 RX ORDER — SERTRALINE HYDROCHLORIDE 25 MG/1
TABLET, FILM COATED ORAL
Qty: 90 TABLET | Refills: 1 | Status: SHIPPED | OUTPATIENT
Start: 2023-07-27

## 2023-08-07 ENCOUNTER — APPOINTMENT (OUTPATIENT)
Dept: CT IMAGING | Age: 88
End: 2023-08-07
Payer: MEDICARE

## 2023-08-07 ENCOUNTER — OFFICE VISIT (OUTPATIENT)
Dept: INTERNAL MEDICINE CLINIC | Age: 88
End: 2023-08-07

## 2023-08-07 ENCOUNTER — HOSPITAL ENCOUNTER (INPATIENT)
Age: 88
LOS: 3 days | Discharge: NURSING, REHAB OR LONG-TERM CARE | End: 2023-08-10
Attending: STUDENT IN AN ORGANIZED HEALTH CARE EDUCATION/TRAINING PROGRAM | Admitting: STUDENT IN AN ORGANIZED HEALTH CARE EDUCATION/TRAINING PROGRAM
Payer: MEDICARE

## 2023-08-07 ENCOUNTER — TELEPHONE (OUTPATIENT)
Dept: INTERNAL MEDICINE CLINIC | Age: 88
End: 2023-08-07

## 2023-08-07 VITALS
HEART RATE: 85 BPM | WEIGHT: 166.6 LBS | HEIGHT: 65 IN | BODY MASS INDEX: 27.76 KG/M2 | DIASTOLIC BLOOD PRESSURE: 60 MMHG | SYSTOLIC BLOOD PRESSURE: 94 MMHG | OXYGEN SATURATION: 92 %

## 2023-08-07 DIAGNOSIS — I95.1 ORTHOSTATIC HYPOTENSION: ICD-10-CM

## 2023-08-07 DIAGNOSIS — S22.42XA CLOSED FRACTURE OF MULTIPLE RIBS OF LEFT SIDE, INITIAL ENCOUNTER: Primary | ICD-10-CM

## 2023-08-07 DIAGNOSIS — T14.8XXA MULTIPLE SKIN TEARS: ICD-10-CM

## 2023-08-07 DIAGNOSIS — R29.6 FALLS FREQUENTLY: ICD-10-CM

## 2023-08-07 DIAGNOSIS — M81.0 AGE-RELATED OSTEOPOROSIS WITHOUT CURRENT PATHOLOGICAL FRACTURE: ICD-10-CM

## 2023-08-07 DIAGNOSIS — M79.89 LEFT UPPER EXTREMITY SWELLING: ICD-10-CM

## 2023-08-07 DIAGNOSIS — M79.89 LEG SWELLING: ICD-10-CM

## 2023-08-07 PROBLEM — R53.1 WEAKNESS: Status: ACTIVE | Noted: 2023-08-07
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
## 2023-08-07 LAB
ALBUMIN SERPL-MCNC: 3.6 G/DL (ref 3.4–5)
ALBUMIN/GLOB SERPL: 1.2 {RATIO} (ref 1.1–2.2)
ALP SERPL-CCNC: 85 U/L (ref 40–129)
ALT SERPL-CCNC: 12 U/L (ref 10–40)
ANION GAP SERPL CALCULATED.3IONS-SCNC: 10 MMOL/L (ref 3–16)
AST SERPL-CCNC: 18 U/L (ref 15–37)
BACTERIA URNS QL MICRO: NORMAL /HPF
BASOPHILS # BLD: 0.1 K/UL (ref 0–0.2)
BASOPHILS NFR BLD: 0.8 %
BILIRUB SERPL-MCNC: 0.6 MG/DL (ref 0–1)
BILIRUB UR QL STRIP.AUTO: NEGATIVE
BUN SERPL-MCNC: 31 MG/DL (ref 7–20)
CALCIUM SERPL-MCNC: 9.2 MG/DL (ref 8.3–10.6)
CHLORIDE SERPL-SCNC: 104 MMOL/L (ref 99–110)
CLARITY UR: CLEAR
CO2 SERPL-SCNC: 27 MMOL/L (ref 21–32)
COLOR UR: YELLOW
CREAT SERPL-MCNC: 1.3 MG/DL (ref 0.6–1.2)
DEPRECATED RDW RBC AUTO: 14.1 % (ref 12.4–15.4)
EOSINOPHIL # BLD: 0.4 K/UL (ref 0–0.6)
EOSINOPHIL NFR BLD: 4.8 %
EPI CELLS #/AREA URNS AUTO: 3 /HPF (ref 0–5)
GFR SERPLBLD CREATININE-BSD FMLA CKD-EPI: 38 ML/MIN/{1.73_M2}
GLUCOSE SERPL-MCNC: 104 MG/DL (ref 70–99)
GLUCOSE UR STRIP.AUTO-MCNC: NEGATIVE MG/DL
HCT VFR BLD AUTO: 45.8 % (ref 36–48)
HGB BLD-MCNC: 14.8 G/DL (ref 12–16)
HGB UR QL STRIP.AUTO: NEGATIVE
HYALINE CASTS #/AREA URNS AUTO: 4 /LPF (ref 0–8)
KETONES UR STRIP.AUTO-MCNC: NEGATIVE MG/DL
LACTATE BLDV-SCNC: 1.4 MMOL/L (ref 0.4–1.9)
LEUKOCYTE ESTERASE UR QL STRIP.AUTO: NEGATIVE
LYMPHOCYTES # BLD: 1.9 K/UL (ref 1–5.1)
LYMPHOCYTES NFR BLD: 23 %
MCH RBC QN AUTO: 30.7 PG (ref 26–34)
MCHC RBC AUTO-ENTMCNC: 32.2 G/DL (ref 31–36)
MCV RBC AUTO: 95.1 FL (ref 80–100)
MONOCYTES # BLD: 0.9 K/UL (ref 0–1.3)
MONOCYTES NFR BLD: 10.7 %
NEUTROPHILS # BLD: 5.1 K/UL (ref 1.7–7.7)
NEUTROPHILS NFR BLD: 60.7 %
NITRITE UR QL STRIP.AUTO: NEGATIVE
NT-PROBNP SERPL-MCNC: 97 PG/ML (ref 0–449)
PH UR STRIP.AUTO: 6 [PH] (ref 5–8)
PLATELET # BLD AUTO: 215 K/UL (ref 135–450)
PMV BLD AUTO: 9.3 FL (ref 5–10.5)
POTASSIUM SERPL-SCNC: 4.4 MMOL/L (ref 3.5–5.1)
PROT SERPL-MCNC: 6.6 G/DL (ref 6.4–8.2)
PROT UR STRIP.AUTO-MCNC: ABNORMAL MG/DL
RBC # BLD AUTO: 4.82 M/UL (ref 4–5.2)
RBC CLUMPS #/AREA URNS AUTO: 1 /HPF (ref 0–4)
SODIUM SERPL-SCNC: 141 MMOL/L (ref 136–145)
SP GR UR STRIP.AUTO: 1.01 (ref 1–1.03)
TROPONIN, HIGH SENSITIVITY: 22 NG/L (ref 0–14)
UA COMPLETE W REFLEX CULTURE PNL UR: ABNORMAL
UA DIPSTICK W REFLEX MICRO PNL UR: YES
URN SPEC COLLECT METH UR: ABNORMAL
UROBILINOGEN UR STRIP-ACNC: 1 E.U./DL
WBC # BLD AUTO: 8.3 K/UL (ref 4–11)
WBC #/AREA URNS AUTO: 1 /HPF (ref 0–5)

## 2023-08-07 PROCEDURE — 93005 ELECTROCARDIOGRAM TRACING: CPT | Performed by: NURSE PRACTITIONER

## 2023-08-07 PROCEDURE — 36415 COLL VENOUS BLD VENIPUNCTURE: CPT

## 2023-08-07 PROCEDURE — 71250 CT THORAX DX C-: CPT

## 2023-08-07 PROCEDURE — 84436 ASSAY OF TOTAL THYROXINE: CPT

## 2023-08-07 PROCEDURE — 81001 URINALYSIS AUTO W/SCOPE: CPT

## 2023-08-07 PROCEDURE — 94760 N-INVAS EAR/PLS OXIMETRY 1: CPT

## 2023-08-07 PROCEDURE — 83605 ASSAY OF LACTIC ACID: CPT

## 2023-08-07 PROCEDURE — 84484 ASSAY OF TROPONIN QUANT: CPT

## 2023-08-07 PROCEDURE — 99285 EMERGENCY DEPT VISIT HI MDM: CPT

## 2023-08-07 PROCEDURE — 83880 ASSAY OF NATRIURETIC PEPTIDE: CPT

## 2023-08-07 PROCEDURE — 80053 COMPREHEN METABOLIC PANEL: CPT

## 2023-08-07 PROCEDURE — 85025 COMPLETE CBC W/AUTO DIFF WBC: CPT

## 2023-08-07 PROCEDURE — 70450 CT HEAD/BRAIN W/O DYE: CPT

## 2023-08-07 PROCEDURE — 1200000000 HC SEMI PRIVATE

## 2023-08-07 ASSESSMENT — ENCOUNTER SYMPTOMS
ABDOMINAL PAIN: 0
DIARRHEA: 0
SHORTNESS OF BREATH: 0
CHEST TIGHTNESS: 0
NAUSEA: 0
VOMITING: 0

## 2023-08-07 ASSESSMENT — PAIN - FUNCTIONAL ASSESSMENT: PAIN_FUNCTIONAL_ASSESSMENT: NONE - DENIES PAIN

## 2023-08-07 NOTE — ASSESSMENT & PLAN NOTE
History of frequent falls, all unwitnessed. Two falls in the past two weeks. Patient unsure if she hit her head with any recent falls. Orthostatic BP/pulse today:  Sittin/69, HR 78  Standin/61, 103. Explained to patient and her daughter that with orthostatic response that she had she is likely dehydrated and at risk for increased falls/potential severe injury. She was advised to go to emergency department for further evaluation and treatment as I don't believe it's safe for her to go home at this time. Patient and daughter agreeable to go to emergency department. Declined EMS transfer, daughter to drive her to [facility]. She is on diuretic but appears she's been hemodynamically stable at this dose.

## 2023-08-07 NOTE — ASSESSMENT & PLAN NOTE
Left arm appears to be healing well, compared with pictures on daughters phone from a few days ago. New right arm skin tear. See images attached. Discussed she could benefit from home health care.

## 2023-08-07 NOTE — PROGRESS NOTES
Emmanuel Feliz (:  1930) is a 80 y.o. female,Established patient, here for evaluation of the following chief complaint(s):  Arm Swelling (Pt daughter c/o right arm skin tear on 23 and left arm swelling /)    ASSESSMENT/PLAN:  Falls frequently/Orthostatic hypotension  Assessment & Plan:   History of frequent falls, all unwitnessed. Two falls in the past two weeks. Patient unsure if she hit her head with any recent falls. Difficult evaluating neuro changes as this is my first time seeing her and has progressive dementia. Orthostatic BP/pulse today:  Sittin/69, HR 78  Standin/61, 103. Explained to patient and her daughter that with orthostatic response that she had she is likely dehydrated and at risk for increased falls/potential severe injury. She was advised to go to emergency department for further evaluation and treatment as I don't believe it's safe for her to go home at this time. Patient and daughter agreeable to go to emergency department. Declined EMS transfer, daughter to drive her to [facility]. She is on diuretic but appears she's been hemodynamically stable at this dose. ?? Needing higher level of care given progressive dementia with frequent unwitnessed falls. Discussed with patients PCP. Left upper extremity swelling/multiple skin tears  Assessment & Plan:  Swelling likely dependent/possible restriction with bandaging. Daughter states she's been unable to keep patients arm elevated. Left arm wound appears to be healing well, compared with pictures on daughters phone from a few days ago. New right arm skin tear. See images attached. Discussed she could benefit from home health care. No follow-ups on file. SUBJECTIVE/OBJECTIVE:  HPI    80year old female brought in by her daughter for acute visit today. Had an unwitnessed fall with large skin tear 23. I was unable to see the patient over virtual visit  due to connectivity issues.

## 2023-08-07 NOTE — ED PROVIDER NOTES
Admit 08/07/2023 10:47:02 PM      PATIENT REFERRED TO:  No follow-up provider specified.     DISCHARGE MEDICATIONS:  New Prescriptions    No medications on file       DISCONTINUED MEDICATIONS:  Discontinued Medications    No medications on file              (Please note that portions of this note were completed with a voice recognition program.  Efforts were made to edit the dictations but occasionally words are mis-transcribed.)    PINEDA Rubin CNP (electronically signed)            PINEDA Rubin CNP  08/07/23 [id number]

## 2023-08-07 NOTE — ASSESSMENT & PLAN NOTE
Likely dependent/possible restriction with bandaging. Daughter states she's been unable to keep patients arm elevated.

## 2023-08-07 NOTE — TELEPHONE ENCOUNTER
She fell a few weeks ago has a bad skin tear, had tried to get in to see Doctor because hand started swelling did a virtual visit for mom. Went to Urgent Care on the 7/26/2023 due to no appt available for PCP. Got 1 week of antibiotics. From elbow down patient is swollen. Patient has fallen again with another new tear. Main concern is the original tear, tear is above the elbow. When daughter felt lower left arm and visualized slightly pinker in color and negative for being cold to the touch per daughter it might be a little warmer. Same day appt made for patient.

## 2023-08-08 LAB
ANION GAP SERPL CALCULATED.3IONS-SCNC: 6 MMOL/L (ref 3–16)
BASOPHILS # BLD: 0 K/UL (ref 0–0.2)
BASOPHILS NFR BLD: 0.5 %
BUN SERPL-MCNC: 29 MG/DL (ref 7–20)
CALCIUM SERPL-MCNC: 8.6 MG/DL (ref 8.3–10.6)
CHLORIDE SERPL-SCNC: 107 MMOL/L (ref 99–110)
CO2 SERPL-SCNC: 27 MMOL/L (ref 21–32)
CREAT SERPL-MCNC: 1.2 MG/DL (ref 0.6–1.2)
DEPRECATED RDW RBC AUTO: 14.3 % (ref 12.4–15.4)
EKG ATRIAL RATE: 62 BPM
EKG DIAGNOSIS: NORMAL
EKG P AXIS: 59 DEGREES
EKG P-R INTERVAL: 172 MS
EKG Q-T INTERVAL: 466 MS
EKG QRS DURATION: 136 MS
EKG QTC CALCULATION (BAZETT): 472 MS
EKG R AXIS: 44 DEGREES
EKG T AXIS: 52 DEGREES
EKG VENTRICULAR RATE: 62 BPM
EOSINOPHIL # BLD: 0.4 K/UL (ref 0–0.6)
EOSINOPHIL NFR BLD: 6 %
GFR SERPLBLD CREATININE-BSD FMLA CKD-EPI: 42 ML/MIN/{1.73_M2}
GLUCOSE SERPL-MCNC: 102 MG/DL (ref 70–99)
HCT VFR BLD AUTO: 39.3 % (ref 36–48)
HGB BLD-MCNC: 12.9 G/DL (ref 12–16)
LYMPHOCYTES # BLD: 1.9 K/UL (ref 1–5.1)
LYMPHOCYTES NFR BLD: 29.7 %
MCH RBC QN AUTO: 30.9 PG (ref 26–34)
MCHC RBC AUTO-ENTMCNC: 32.8 G/DL (ref 31–36)
MCV RBC AUTO: 94.3 FL (ref 80–100)
MONOCYTES # BLD: 0.9 K/UL (ref 0–1.3)
MONOCYTES NFR BLD: 15 %
NEUTROPHILS # BLD: 3 K/UL (ref 1.7–7.7)
NEUTROPHILS NFR BLD: 48.8 %
PLATELET # BLD AUTO: 174 K/UL (ref 135–450)
PMV BLD AUTO: 9.1 FL (ref 5–10.5)
POTASSIUM SERPL-SCNC: 3.8 MMOL/L (ref 3.5–5.1)
RBC # BLD AUTO: 4.17 M/UL (ref 4–5.2)
SODIUM SERPL-SCNC: 140 MMOL/L (ref 136–145)
T4 SERPL-MCNC: 5.8 UG/DL (ref 4.5–10.9)
T4 SERPL-MCNC: 7.5 UG/DL (ref 4.5–10.9)
TROPONIN, HIGH SENSITIVITY: 25 NG/L (ref 0–14)
WBC # BLD AUTO: 6.3 K/UL (ref 4–11)

## 2023-08-08 PROCEDURE — 97535 SELF CARE MNGMENT TRAINING: CPT

## 2023-08-08 PROCEDURE — 6370000000 HC RX 637 (ALT 250 FOR IP): Performed by: STUDENT IN AN ORGANIZED HEALTH CARE EDUCATION/TRAINING PROGRAM

## 2023-08-08 PROCEDURE — 84484 ASSAY OF TROPONIN QUANT: CPT

## 2023-08-08 PROCEDURE — 97530 THERAPEUTIC ACTIVITIES: CPT

## 2023-08-08 PROCEDURE — 97166 OT EVAL MOD COMPLEX 45 MIN: CPT

## 2023-08-08 PROCEDURE — 1200000000 HC SEMI PRIVATE

## 2023-08-08 PROCEDURE — 36415 COLL VENOUS BLD VENIPUNCTURE: CPT

## 2023-08-08 PROCEDURE — 80048 BASIC METABOLIC PNL TOTAL CA: CPT

## 2023-08-08 PROCEDURE — 6360000002 HC RX W HCPCS: Performed by: STUDENT IN AN ORGANIZED HEALTH CARE EDUCATION/TRAINING PROGRAM

## 2023-08-08 PROCEDURE — 85025 COMPLETE CBC W/AUTO DIFF WBC: CPT

## 2023-08-08 PROCEDURE — 97162 PT EVAL MOD COMPLEX 30 MIN: CPT

## 2023-08-08 PROCEDURE — 6370000000 HC RX 637 (ALT 250 FOR IP): Performed by: INTERNAL MEDICINE

## 2023-08-08 PROCEDURE — 93010 ELECTROCARDIOGRAM REPORT: CPT | Performed by: INTERNAL MEDICINE

## 2023-08-08 PROCEDURE — 2580000003 HC RX 258: Performed by: STUDENT IN AN ORGANIZED HEALTH CARE EDUCATION/TRAINING PROGRAM

## 2023-08-08 PROCEDURE — 84436 ASSAY OF TOTAL THYROXINE: CPT

## 2023-08-08 RX ORDER — CALCIUM CARBONATE 500 MG/1
500 TABLET, CHEWABLE ORAL ONCE
Status: COMPLETED | OUTPATIENT
Start: 2023-08-08 | End: 2023-08-08

## 2023-08-08 RX ORDER — DULOXETIN HYDROCHLORIDE 30 MG/1
30 CAPSULE, DELAYED RELEASE ORAL DAILY
Status: DISCONTINUED | OUTPATIENT
Start: 2023-08-08 | End: 2023-08-10 | Stop reason: HOSPADM

## 2023-08-08 RX ORDER — SODIUM CHLORIDE 9 MG/ML
INJECTION, SOLUTION INTRAVENOUS PRN
Status: DISCONTINUED | OUTPATIENT
Start: 2023-08-08 | End: 2023-08-10 | Stop reason: HOSPADM

## 2023-08-08 RX ORDER — ONDANSETRON 4 MG/1
4 TABLET, ORALLY DISINTEGRATING ORAL EVERY 8 HOURS PRN
Status: DISCONTINUED | OUTPATIENT
Start: 2023-08-08 | End: 2023-08-10 | Stop reason: HOSPADM

## 2023-08-08 RX ORDER — SODIUM CHLORIDE 0.9 % (FLUSH) 0.9 %
5-40 SYRINGE (ML) INJECTION PRN
Status: DISCONTINUED | OUTPATIENT
Start: 2023-08-08 | End: 2023-08-10 | Stop reason: HOSPADM

## 2023-08-08 RX ORDER — ACETAMINOPHEN 650 MG/1
650 SUPPOSITORY RECTAL EVERY 6 HOURS PRN
Status: DISCONTINUED | OUTPATIENT
Start: 2023-08-08 | End: 2023-08-10 | Stop reason: HOSPADM

## 2023-08-08 RX ORDER — ACETAMINOPHEN 325 MG/1
650 TABLET ORAL EVERY 6 HOURS PRN
Status: DISCONTINUED | OUTPATIENT
Start: 2023-08-08 | End: 2023-08-10 | Stop reason: HOSPADM

## 2023-08-08 RX ORDER — SODIUM CHLORIDE 9 MG/ML
INJECTION, SOLUTION INTRAVENOUS CONTINUOUS
Status: ACTIVE | OUTPATIENT
Start: 2023-08-08 | End: 2023-08-08

## 2023-08-08 RX ORDER — LIDOCAINE 4 G/G
1 PATCH TOPICAL DAILY
Status: DISCONTINUED | OUTPATIENT
Start: 2023-08-08 | End: 2023-08-10 | Stop reason: HOSPADM

## 2023-08-08 RX ORDER — QUETIAPINE FUMARATE 25 MG/1
50 TABLET, FILM COATED ORAL 2 TIMES DAILY
Status: DISCONTINUED | OUTPATIENT
Start: 2023-08-08 | End: 2023-08-10 | Stop reason: HOSPADM

## 2023-08-08 RX ORDER — HEPARIN SODIUM 5000 [USP'U]/ML
5000 INJECTION, SOLUTION INTRAVENOUS; SUBCUTANEOUS EVERY 8 HOURS SCHEDULED
Status: DISCONTINUED | OUTPATIENT
Start: 2023-08-08 | End: 2023-08-10 | Stop reason: HOSPADM

## 2023-08-08 RX ORDER — ONDANSETRON 2 MG/ML
4 INJECTION INTRAMUSCULAR; INTRAVENOUS EVERY 6 HOURS PRN
Status: DISCONTINUED | OUTPATIENT
Start: 2023-08-08 | End: 2023-08-10 | Stop reason: HOSPADM

## 2023-08-08 RX ORDER — POLYETHYLENE GLYCOL 3350 17 G/17G
17 POWDER, FOR SOLUTION ORAL DAILY PRN
Status: DISCONTINUED | OUTPATIENT
Start: 2023-08-08 | End: 2023-08-10 | Stop reason: HOSPADM

## 2023-08-08 RX ORDER — SODIUM CHLORIDE 0.9 % (FLUSH) 0.9 %
5-40 SYRINGE (ML) INJECTION EVERY 12 HOURS SCHEDULED
Status: DISCONTINUED | OUTPATIENT
Start: 2023-08-08 | End: 2023-08-10 | Stop reason: HOSPADM

## 2023-08-08 RX ORDER — LEVOTHYROXINE SODIUM 0.07 MG/1
75 TABLET ORAL DAILY
Status: DISCONTINUED | OUTPATIENT
Start: 2023-08-08 | End: 2023-08-10 | Stop reason: HOSPADM

## 2023-08-08 RX ADMIN — QUETIAPINE FUMARATE 50 MG: 25 TABLET ORAL at 03:14

## 2023-08-08 RX ADMIN — SODIUM CHLORIDE, PRESERVATIVE FREE 10 ML: 5 INJECTION INTRAVENOUS at 21:08

## 2023-08-08 RX ADMIN — QUETIAPINE FUMARATE 50 MG: 25 TABLET ORAL at 21:03

## 2023-08-08 RX ADMIN — QUETIAPINE FUMARATE 50 MG: 25 TABLET ORAL at 08:18

## 2023-08-08 RX ADMIN — SODIUM CHLORIDE: 9 INJECTION, SOLUTION INTRAVENOUS at 01:51

## 2023-08-08 RX ADMIN — LEVOTHYROXINE SODIUM 75 MCG: 0.07 TABLET ORAL at 06:15

## 2023-08-08 RX ADMIN — HEPARIN SODIUM 5000 UNITS: 5000 INJECTION INTRAVENOUS; SUBCUTANEOUS at 21:04

## 2023-08-08 RX ADMIN — DULOXETINE HYDROCHLORIDE 30 MG: 30 CAPSULE, DELAYED RELEASE ORAL at 08:19

## 2023-08-08 RX ADMIN — ANTACID TABLETS 500 MG: 500 TABLET, CHEWABLE ORAL at 21:03

## 2023-08-08 RX ADMIN — SERTRALINE 25 MG: 50 TABLET, FILM COATED ORAL at 08:18

## 2023-08-08 RX ADMIN — HEPARIN SODIUM 5000 UNITS: 5000 INJECTION INTRAVENOUS; SUBCUTANEOUS at 14:12

## 2023-08-08 RX ADMIN — HEPARIN SODIUM 5000 UNITS: 5000 INJECTION INTRAVENOUS; SUBCUTANEOUS at 06:15

## 2023-08-08 NOTE — CASE COMMUNICATION
Discharge Planning:     (CM) reviewed the patient's chart to assess needs. Patient's Readmission Risk Score is 11%. Patient's medical insurance is Medicare. [facility] No needs anticipated, at this time. CM team to follow. Staff to inform CM if additional discharge needs arise.      Electronically signed by Delsie Cranker on 8/8/23 at 11:11 AM EDT

## 2023-08-08 NOTE — PLAN OF CARE
Problem: Skin/Tissue Integrity - Adult  Goal: Skin integrity remains intact  Outcome: Progressing  Goal: Incisions, wounds, or drain sites healing without S/S of infection  Outcome: Progressing

## 2023-08-08 NOTE — PLAN OF CARE
Problem: Discharge Planning  Goal: Discharge to home or other facility with appropriate resources  Outcome: Progressing  Flowsheets (Taken 8/8/2023 0814 by Travis Armas, DANAE)  Discharge to home or other facility with appropriate resources: Refer to discharge planning if patient needs post-hospital services based on physician order or complex needs related to functional status, cognitive ability or social support system     Problem: Skin/Tissue Integrity - Adult  Goal: Skin integrity remains intact  8/8/2023 1944 by Ghada Thorne RN  Outcome: Progressing     Problem: Safety - Adult  Goal: Free from fall injury  Outcome: Progressing     Problem: ABCDS Injury Assessment  Goal: Absence of physical injury  Outcome: Progressing

## 2023-08-08 NOTE — PROGRESS NOTES
[address] [facility] Department   Phone: [phone]    Occupational Therapy    [x] Initial Evaluation            [] Daily Treatment Note         [] Discharge Summary      Patient: Gladis George   : 1930   MRN: [id number]   Date of Service:  2023    Admitting Diagnosis: Weakness  Current Admission Summary: Pt to ED with frequent falls/weakness at home. Past Medical History:  has a past medical history of Acute renal failure ([address]), Arthritis, At risk for falling, Cirrhosis ([address]), Dementia with behavioral problem ([address]), Depression, Frequent falls, Gastroesophageal reflux disease, Movement disorder, Nephrotic syndrome, Osteoporosis, Pelvic fracture ([address]), and Thyroid disease. Past Surgical History:  has a past surgical history that includes Excision of Facial Mass; skin biopsy; Colonoscopy; eye surgery; other surgical history (Right, 2018); Hip fracture surgery (Left, 2019); Forearm surgery (Right, 10/27/2022); and Finger trigger release (Left, 3/16/2023). Discharge Recommendations: Gladis George scored a 16/24 on the AM-PAC ADL Inpatient form. Current research shows that an AM-PAC score of 17 or less is typically not associated with a discharge to the patient's home setting. Based on the patient's AM-PAC score and their current ADL deficits, it is recommended that the patient have 3-5 sessions per week of Occupational Therapy at d/c to increase the patient's independence. Please see assessment section for further patient specific details. If patient discharges prior to next session this note will serve as a discharge summary. Please see below for the latest assessment towards goals.         DME Required For Discharge: patient has all required DME for discharge  Precautions/Restrictions: high fall risk  Weight Bearing Restrictions: no restrictions  [] Right Upper Extremity        [] Left Upper Extremity         [] Right Lower Extremity         [] Left

## 2023-08-08 NOTE — PROGRESS NOTES
transfer: contact guard assistance  Stand to sit transfer: contact guard assistance  Toilet transfer: minimal assistance  Comments: x1 STS from recliner without AD, pt noted to have wide RON with posterior lean and medial-lateral sway noted upon standing from recliner. Pt reaching for therapist's hands to maintain balance in standing. RW brought forth for support for standing and ambulation. x1 STS from toilet with use of grab bar. Ambulation:  Surface:level surface  Assistive Device: rolling walker  Assistance: contact guard assistance  Distance: 20'+8'  Gait Mechanics: wide Ron, significantly forward flexed trunk, decreased kaelyn, decreased B step lengths/heights  Comments: Pt requiring increased time to perform, no LOB. VC to stay within boundaries of walker for safety with min carryover noted. Stair Mobility:  Stair mobility not completed on this date. Comments:  Wheelchair Mobility:  No w/c mobility completed on this date. Comments:  Balance:  Static Sitting Balance: fair (+): maintains balance at SBA/supervision without use of UE support  Dynamic Sitting Balance: fair (+): maintains balance at SBA/supervision without use of UE support  Static Standing Balance: fair (-): maintains balance at CGA with use of UE support  Dynamic Standing Balance: fair (-): maintains balance at CGA with use of UE support  Comments: Pt sat at toilet for ADLs ~5-8 minutes total with SBA for balance. Pt requiring CGA to maintain standing balance with RW for UE support during clothing management following toileting.      Other Therapeutic Interventions    Functional Outcomes  AM-PAC Inpatient Mobility Raw Score : 17              Cognition  Overall Cognitive Status: Impaired  Arousal/Alertness: appropriate responses to stimuli  Following Commands: follows one step commands with repetition, follows one step commands with increased time  Attention Span: attends with cues to redirect  Memory: decreased recall of recent events,

## 2023-08-08 NOTE — H&P
V2.0  History and Physical      Name:  Ryland Councilman /Age/Sex: 1930  (80 y.o. female)   MRN & CSN:  [id number] & [id number] Encounter Date/Time: 2023 11:11 PM EDT   Location:  [facility] PCP: Delfino Soto MD       Hospital Day: 1    Assessment and Plan:   Ryland Councilman is a 80 y.o. female with a pmh of dementia, depression, GERD, osteoporosis who presents with Weakness    Hospital Problems             Last Modified POA    * (Principal) Weakness 2023 Yes       Plan:  Fall:  Rib fracture  Orthostatic hypotension:  Deconditioning  -Secondary to most likely orthostatic hypotension  -Patient had multiple falls recently at home. Daughter reports that patient has been feeling weak. -CT chest was done and showed old bilateral rib fracture and stable mild old compression fracture at T1.  -Check orthostatic vitals  -PT OT  -Pain control  -IV fluid continue  -Holding Lasix  -Check T4  -CT head    Hypothyroidism:  -Continue levothyroxine  -Check T4    Psych:  -Continue home Cymbalta, sertraline, and quetiapine    Disposition:   Current Living situation: Home  Expected Disposition: 1 to 2 days  Estimated D/C: 1 to 2 days    Diet No diet orders on file   DVT Prophylaxis [] Lovenox, [x]  Heparin, [] SCDs, [] Ambulation,  [] Eliquis, [] Xarelto, [] Coumadin   Code Status Prior   Surrogate Decision Maker/ POA Daughter         History from:     patient and EMR    History of Present Illness:     Chief Complaint: Weakness  Ryland Councilman is a 80 y.o. female with pmh of dementia, depression, GERD who presents with chief complaint of weakness. Patient presented to the hospital for concern of hypertension. She has had frequent and weakness fall. Per patient's daughter, the patient has fallen twice in the past 2 weeks and the bathroom. However, patient mentioned that she is doing fine and does not have any complaint. She does have history of dementia and is alert and oriented x3.   Patient's daughter

## 2023-08-08 NOTE — PROGRESS NOTES
Shift assessment completed. Neuro WNL. Denies any pain at this time. AM meds administered per MAR. The care plan and education has been reviewed and mutually agreed upon with the patient. Standard safety measures in place.

## 2023-08-08 NOTE — CASE COMMUNICATION
Discharge Planning:     (CM) spoke with patients daughter-Siomara [phone] about therapy recommendation for skilled therapy. Daughter is open and would like referral sent to [facility].  LVM for [phone]    Electronically signed by Sherie Hernandez on 8/8/23 at 3:21 PM EDT

## 2023-08-08 NOTE — ED NOTES
ED TO INPATIENT SBAR HANDOFF    Patient Name: Ryland Councilman   :  1930  80 y.o. MRN:  [id number]  Preferred Name  [address]  ED Room #:  ED-0020/20  Family/Caregiver Present no   Restraints no   Sitter no   Sepsis Risk Score Sepsis Risk Score: 0.77    Situation  Code Status: Prior No additional code details. Allergies: Metoprolol, Amlodipine, Sulfa antibiotics, Adhesive tape, and Neosporin [bacitracin-polymyxin b]  Weight: Patient Vitals for the past 96 hrs (Last 3 readings):   Weight   23 1831 166 lb 9.6 oz (75.6 kg)     Arrived from: home  Chief Complaint:   Chief Complaint   Patient presents with    Hypotension     Pt w c/o Hypotension. Pt went to PCP and was sent to ED d/t BP 90/60 during orthostatic BP     Hospital Problem/Diagnosis:  Principal Problem:    Weakness  Resolved Problems:    * No resolved hospital problems. *    Imaging:   CT CHEST ABDOMEN PELVIS WO CONTRAST Additional Contrast? None   Final Result   Chest:      1. Age indeterminate nondisplaced fractures of the left 4th through 6th ribs   anteriorly. 2. No other acute findings in the chest.   3. Old bilateral rib fractures and stable mild old compression fracture at T1. Abdomen pelvis:      1. No CT evidence of acute injury in the abdomen or pelvis. 2. Mild colonic diverticulosis.            Abnormal labs:   Abnormal Labs Reviewed   COMPREHENSIVE METABOLIC PANEL - Abnormal; Notable for the following components:       Result Value    Glucose 104 (*)     BUN 31 (*)     Creatinine 1.3 (*)     Est, Glom Filt Rate 38 (*)     All other components within normal limits   TROPONIN - Abnormal; Notable for the following components:    Troponin, High Sensitivity 22 (*)     All other components within normal limits   URINALYSIS WITH REFLEX TO CULTURE - Abnormal; Notable for the following components:    Protein, UA TRACE (*)     All other components within normal limits     Critical values: no     Abnormal Assessment Findings: 3 broken

## 2023-08-09 LAB
ANION GAP SERPL CALCULATED.3IONS-SCNC: 9 MMOL/L (ref 3–16)
BASOPHILS # BLD: 0.1 K/UL (ref 0–0.2)
BASOPHILS NFR BLD: 0.8 %
BUN SERPL-MCNC: 27 MG/DL (ref 7–20)
CALCIUM SERPL-MCNC: 8.9 MG/DL (ref 8.3–10.6)
CHLORIDE SERPL-SCNC: 105 MMOL/L (ref 99–110)
CO2 SERPL-SCNC: 24 MMOL/L (ref 21–32)
CREAT SERPL-MCNC: 1.1 MG/DL (ref 0.6–1.2)
DEPRECATED RDW RBC AUTO: 13.7 % (ref 12.4–15.4)
EOSINOPHIL # BLD: 0.4 K/UL (ref 0–0.6)
EOSINOPHIL NFR BLD: 6.3 %
GFR SERPLBLD CREATININE-BSD FMLA CKD-EPI: 47 ML/MIN/{1.73_M2}
GLUCOSE SERPL-MCNC: 92 MG/DL (ref 70–99)
HCT VFR BLD AUTO: 41 % (ref 36–48)
HGB BLD-MCNC: 13.4 G/DL (ref 12–16)
LYMPHOCYTES # BLD: 2.1 K/UL (ref 1–5.1)
LYMPHOCYTES NFR BLD: 32.4 %
MCH RBC QN AUTO: 30.9 PG (ref 26–34)
MCHC RBC AUTO-ENTMCNC: 32.8 G/DL (ref 31–36)
MCV RBC AUTO: 94.2 FL (ref 80–100)
MONOCYTES # BLD: 0.9 K/UL (ref 0–1.3)
MONOCYTES NFR BLD: 13.7 %
NEUTROPHILS # BLD: 3 K/UL (ref 1.7–7.7)
NEUTROPHILS NFR BLD: 46.8 %
PLATELET # BLD AUTO: 180 K/UL (ref 135–450)
PMV BLD AUTO: 9.2 FL (ref 5–10.5)
POTASSIUM SERPL-SCNC: 4.3 MMOL/L (ref 3.5–5.1)
RBC # BLD AUTO: 4.35 M/UL (ref 4–5.2)
SODIUM SERPL-SCNC: 138 MMOL/L (ref 136–145)
WBC # BLD AUTO: 6.4 K/UL (ref 4–11)

## 2023-08-09 PROCEDURE — 97116 GAIT TRAINING THERAPY: CPT

## 2023-08-09 PROCEDURE — 2580000003 HC RX 258: Performed by: STUDENT IN AN ORGANIZED HEALTH CARE EDUCATION/TRAINING PROGRAM

## 2023-08-09 PROCEDURE — 85025 COMPLETE CBC W/AUTO DIFF WBC: CPT

## 2023-08-09 PROCEDURE — 1200000000 HC SEMI PRIVATE

## 2023-08-09 PROCEDURE — 94150 VITAL CAPACITY TEST: CPT

## 2023-08-09 PROCEDURE — 36415 COLL VENOUS BLD VENIPUNCTURE: CPT

## 2023-08-09 PROCEDURE — 6360000002 HC RX W HCPCS: Performed by: STUDENT IN AN ORGANIZED HEALTH CARE EDUCATION/TRAINING PROGRAM

## 2023-08-09 PROCEDURE — 97530 THERAPEUTIC ACTIVITIES: CPT

## 2023-08-09 PROCEDURE — 6370000000 HC RX 637 (ALT 250 FOR IP): Performed by: STUDENT IN AN ORGANIZED HEALTH CARE EDUCATION/TRAINING PROGRAM

## 2023-08-09 PROCEDURE — 6370000000 HC RX 637 (ALT 250 FOR IP): Performed by: INTERNAL MEDICINE

## 2023-08-09 PROCEDURE — 80048 BASIC METABOLIC PNL TOTAL CA: CPT

## 2023-08-09 RX ORDER — ALENDRONATE SODIUM 70 MG/1
TABLET ORAL
Qty: 12 TABLET | Refills: 1 | Status: SHIPPED | OUTPATIENT
Start: 2023-08-09

## 2023-08-09 RX ORDER — FUROSEMIDE 20 MG/1
20 TABLET ORAL DAILY PRN
Qty: 30 TABLET | Refills: 0 | Status: SHIPPED
Start: 2023-08-09

## 2023-08-09 RX ORDER — LIDOCAINE 4 G/G
1 PATCH TOPICAL DAILY
Qty: 14 EACH | Refills: 0 | Status: SHIPPED | OUTPATIENT
Start: 2023-08-10 | End: 2023-08-24

## 2023-08-09 RX ADMIN — HEPARIN SODIUM 5000 UNITS: 5000 INJECTION INTRAVENOUS; SUBCUTANEOUS at 14:34

## 2023-08-09 RX ADMIN — QUETIAPINE FUMARATE 50 MG: 25 TABLET ORAL at 20:54

## 2023-08-09 RX ADMIN — SODIUM CHLORIDE, PRESERVATIVE FREE 10 ML: 5 INJECTION INTRAVENOUS at 08:27

## 2023-08-09 RX ADMIN — LEVOTHYROXINE SODIUM 75 MCG: 0.07 TABLET ORAL at 05:37

## 2023-08-09 RX ADMIN — QUETIAPINE FUMARATE 50 MG: 25 TABLET ORAL at 08:27

## 2023-08-09 RX ADMIN — SERTRALINE 25 MG: 50 TABLET, FILM COATED ORAL at 08:27

## 2023-08-09 RX ADMIN — HEPARIN SODIUM 5000 UNITS: 5000 INJECTION INTRAVENOUS; SUBCUTANEOUS at 05:37

## 2023-08-09 RX ADMIN — DULOXETINE HYDROCHLORIDE 30 MG: 30 CAPSULE, DELAYED RELEASE ORAL at 08:27

## 2023-08-09 RX ADMIN — ACETAMINOPHEN 650 MG: 325 TABLET ORAL at 20:58

## 2023-08-09 RX ADMIN — HEPARIN SODIUM 5000 UNITS: 5000 INJECTION INTRAVENOUS; SUBCUTANEOUS at 20:54

## 2023-08-09 ASSESSMENT — PAIN SCALES - GENERAL: PAINLEVEL_OUTOF10: 10

## 2023-08-09 NOTE — DISCHARGE INSTR - COC
Continuity of Care Form    Patient Name: Emmanuel Feliz   :  1930  MRN:  [id number]    Admit date:  2023  Discharge date:  08/10/2023    Code Status Order: Full Code   Advance Directives:     Admitting Physician:  Alessio Verdugo MD  PCP: Ildefonso Marcus MD    Discharging Nurse: [facility] Unit/Room#: 9DA-8977/2935-46  Discharging Unit Phone Number: [phone]    Emergency Contact:   Extended Emergency Contact Information  Primary Emergency Contact: [facility], [facility] of [address] Phone: [phone]  Mobile Phone: [phone]  Relation: Child  Secondary Emergency Contact: Pamela Parmar, [address] [place] [address] of [address] Phone: [phone]  Relation: Grandchild    Past Surgical History:  Past Surgical History:   Procedure Laterality Date    COLONOSCOPY      EXCISION OF FACIAL MASS      skin ca    EYE SURGERY      cataract removal; bilat    FINGER TRIGGER RELEASE Left 3/16/2023    LEFT MIDDLE FINGER TRIGGER FINGER RELEASE - LOCAL ONLY performed by Natty Dash MD at [facility] Right 10/27/2022    OPEN REDUCTION INTERNAL FIXATION RIGHT DISTAL RADIUS-GIGI performed by Natty Dash MD at [facility] Left 2019    OPEN REDUCTION INTERNAL FIXATION OF INTERTROCHANTERIC FRACTURE OF LEFT HIPUSING GAMMA NAIL performed by Natty Dash MD at [address] Right 2018    right gamma nail with cables    SKIN BIOPSY         Immunization History:   Immunization History   Administered Date(s) Administered    COVID-19, PFIZER Bivalent, DO NOT Dilute, (age 12y+), IM, 30 mcg/0.3 mL 2023    COVID-19, PFIZER PURPLE top, DILUTE for use, (age 15 y+), 30mcg/0.3mL 2021, 2021    Hep A, HAVRIX, VAQTA, (age 19y+), IM, 1mL 2022, 11/10/2022    Hep B, ENGERIX-B, (age 20y+), IM, 1mL 2022, 2022, 11/10/2022    Influenza, FLUAD, (age 72

## 2023-08-09 NOTE — CARE COORDINATION
04:20 AM     PTT:    Lab Results   Component Value Date/Time    APTT 30.1 12/12/2020 02:31 AM   [APTT}  PT/INR:    Lab Results   Component Value Date/Time    PROTIME 13.5 10/19/2022 01:40 PM    INR 1.04 10/19/2022 01:40 PM     HgBA1c:    Lab Results   Component Value Date/Time    LABA1C 5.4 02/04/2015 01:58 PM       Assessment   Alfredo Risk Score: Alfredo Scale Score: 21    Patient Active Problem List   Diagnosis Code    Nephrotic syndrome N04.9    Edema R60.9    Hypothyroid E03.9    Multiple joint pain M25.50    Age-related osteoporosis without current pathological fracture M81.0    Closed fracture of right femur (HCC) S72.91XA    Closed displaced comminuted fracture of shaft of right femur ([address]) S72.351A    Primary osteoarthritis of right knee M17.11    Gastroesophageal reflux disease K21.9    False positive syphilis serology R76.8    Memory deficit R41.3    Closed left hip fracture ([address]) S72.002A    Closed fracture of left femur ([address]) S72. 92XA    Primary osteoarthritis of left knee O50.32    Acute metabolic encephalopathy U19.63    SHARAD (acute kidney injury) ([address]) N17.9    Cirrhosis ([address]) K74.60    Dementia (HCC) F03.90    Urge incontinence N39.41    Falls frequently R29.6    Closed displaced fracture of proximal phalanx of right middle finger S62.612A    Open wound of arm, left, initial encounter S41.102A    Chronic liver failure without hepatic coma (HCC) K72.10    Fracture, [place], right, closed S52.531A    Closed fracture of right distal radius S52.501A    Closed left hip fracture, initial encounter ([address]) S72.002A    Closed fracture of single ramus of left pubis (HCC) S32.592A    Trigger middle finger of left hand M65.332    Late onset Alzheimer's disease with behavioral disturbance (HCC) G30.1, F02.818    Chronic renal disease, stage III ([address]) [041835] N18.30    Primary osteoarthritis of both knees M17.0    Major depressive disorder, recurrent, moderate F33.1    Left upper extremity swelling M79.89 Orthostatic hypotension I95.1    Multiple skin tears T14. 8XXA    Weakness R53.1       Measurements:  Wound 08/07/23 Brachial Left; Anterior; Lateral skin tear from fall (Active)   Wound Image   08/09/23 1642   Wound Etiology Skin Tear 08/09/23 1642   Dressing Status New dressing applied 08/09/23 1642   Wound Cleansed Cleansed with saline 08/09/23 1642   Dressing/Treatment Betadine swabs/povidone iodine;Silicone pad;Gauze dressing/dressing sponge; Ace wrap 08/09/23 1642   Wound Length (cm) 7 cm 08/09/23 1642   Wound Width (cm) 3 cm 08/09/23 1642   Wound Surface Area (cm^2) 21 cm^2 08/09/23 1642   Wound Assessment Pink/red 08/09/23 1642   Drainage Amount None (dry) 08/09/23 1642   Elizabeth-wound Assessment Fragile 08/09/23 1642   Margins Defined edges 08/09/23 1642   Wound Thickness Description not for Pressure Injury Partial thickness 08/09/23 1642   Number of days: 1     Incision 10/27/22 Radial Distal;Right (Active)   Number of days: 286       Incision 03/16/23 Finger (Comment which one) Anterior; Left (Active)   Number of days: 146     Patient seen for skin tear to left upper arm. Patient agreeable to visit. Patient remembered she fell at home. Patient reports she lives with daughter. Patient has large area of skin tear with epidermis removed in two areas. Wounds are pink and moist, no bleeding at this time. Wound cleansed with saline and antiseptic ointment applied. Will write wound care orders. Response to treatment:  Well tolerated by patient. Pain Assessment:  Severity:  0 / 10  Quality of pain: N/A  Wound Pain Timing/Severity: none  Premedicated: No    Plan   Plan of Care: Wound 08/07/23 Brachial Left; Anterior; Lateral skin tear from fall-Dressing/Treatment: Betadine swabs/povidone iodine, Silicone pad, Gauze dressing/dressing sponge, Ace wrap    Specialty Bed Required : No   [] Low Air Loss   [] Pressure Redistribution  [] Fluid Immersion  [] Bariatric  [] Total Pressure Relief  [] Other:

## 2023-08-09 NOTE — PROGRESS NOTES
[address] [facility] Department   Phone: [phone]    Physical Therapy    [x] Initial Evaluation            [] Daily Treatment Note         [] Discharge Summary      Patient: Aaron Camarillo   : 1930   MRN: [id number]   Date of Service:  2023  Admitting Diagnosis: Weakness    Current Admission Summary: Pt to ED with frequent falls/weakness at home. Left 4th and 6th rib fractures noted on chest CT. Past Medical History:  has a past medical history of Acute renal failure ([address]), Arthritis, At risk for falling, Cirrhosis ([address]), Dementia with behavioral problem ([address]), Depression, Frequent falls, Gastroesophageal reflux disease, Movement disorder, Nephrotic syndrome, Osteoporosis, Pelvic fracture ([address]), and Thyroid disease. Past Surgical History:  has a past surgical history that includes Excision of Facial Mass; skin biopsy; Colonoscopy; eye surgery; other surgical history (Right, 2018); Hip fracture surgery (Left, 2019); Forearm surgery (Right, 10/27/2022); and Finger trigger release (Left, 3/16/2023). Discharge Recommendations: Aaron Camarillo scored a 16/24 on the AM-PAC short mobility form. Current research shows that an AM-PAC score of 17 or less is typically not associated with a discharge to the patient's home setting. Based on the patient's AM-PAC score and their current functional mobility deficits, it is recommended that the patient have 3-5 sessions per week of Physical Therapy at d/c to increase the patient's independence. Please see assessment section for further patient specific details. If patient discharges prior to next session this note will serve as a discharge summary. Please see below for the latest assessment towards goals.      DME Required For Discharge: DME to be determined pending patient progress  Precautions/Restrictions: high fall risk  Weight Bearing Restrictions: no restrictions  [] Right Upper Extremity  [] Left Upper repetition, follows one step commands with increased time  Attention Span: attends with cues to redirect  Memory: decreased recall of recent events, decreased short term memory  Safety Judgement: decreased awareness of need for assistance, decreased awareness of need for safety  Problem Solving: assistance required to generate solutions, assistance required to implement solutions, decreased awareness of errors, assistance required to identify errors made, assistance required to correct errors made  Insights: decreased awareness of deficits  Initiation: requires cues for some  Sequencing: requires cues for some  Orientation:    oriented to person, oriented to place, oriented to situation, and disoriented to time  - states she fell and that she falls \"a lot\"  Command Following:   accurately follows one step commands    Education  Barriers To Learning: cognition and hearing  Patient Education: patient educated on goals, PT role and benefits, plan of care, general safety, functional mobility training, orientation, transfer training, discharge recommendations  Learning Assessment:  patient verbalizes understanding, would benefit from continued reinforcement, patient will require reinforcement due to cognitive deficits    Assessment  Activity Tolerance: Limited by generalized weakness, fatigue, and cardiovascular endurance deficits. Impairments Requiring Therapeutic Intervention: decreased functional mobility, decreased strength, decreased safety awareness, decreased cognition, decreased endurance, decreased balance  Prognosis: good  Clinical Assessment: Patient is very pleasant and participates well in therapy but is limited by deficits noted above. She is in need of 24 hour assist and supervision for safety given her repeated falls and cognitive deficits. Recommend continued therapy at d/c.   Safety Interventions: patient left in chair, chair alarm in place, call light within reach, gait belt, patient at risk for

## 2023-08-09 NOTE — CASE COMMUNICATION
Discharge Planning:     (CM) spoke with [address] in admission at [facility] [phone]. Facility able to accept with medically ready.     Electronically signed by Ryan Harrison on 8/9/23 at 9:08 AM EDT

## 2023-08-09 NOTE — PLAN OF CARE
Problem: Discharge Planning  Goal: Discharge to home or other facility with appropriate resources  Recent Flowsheet Documentation  Taken 8/9/2023 0825 by Praveen Mckeon RN  Discharge to home or other facility with appropriate resources: Refer to discharge planning if patient needs post-hospital services based on physician order or complex needs related to functional status, cognitive ability or social support system  8/8/2023 1944 by Farshad Lorenzo RN  Outcome: Progressing  [address] (Taken 8/8/2023 0814 by Praveen Mckeon RN)  Discharge to home or other facility with appropriate resources: Refer to discharge planning if patient needs post-hospital services based on physician order or complex needs related to functional status, cognitive ability or social support system     Problem: Skin/Tissue Integrity - Adult  Goal: Skin integrity remains intact  8/9/2023 0942 by Praveen Mckeon RN  Outcome: Progressing  8/8/2023 1944 by Farshad Lorenzo RN  Outcome: Progressing  Goal: Incisions, wounds, or drain sites healing without S/S of infection  Outcome: Progressing     Problem: Safety - Adult  Goal: Free from fall injury  8/9/2023 0942 by Praveen Mckeon RN  Outcome: Progressing  8/8/2023 1944 by Farshad Lorenzo RN  Outcome: Progressing     Problem: ABCDS Injury Assessment  Goal: Absence of physical injury  8/9/2023 0942 by Praveen Mckeon RN  Outcome: Progressing  8/8/2023 1944 by Farshad Lorenzo RN  Outcome: Progressing     Problem: Musculoskeletal - Adult  Goal: Return mobility to safest level of function  Outcome: Progressing  Goal: Maintain proper alignment of affected body part  Outcome: Progressing

## 2023-08-09 NOTE — DISCHARGE SUMMARY
[address] [facility] DISCHARGE SUMMARY    Patient Demographics    Patient. Lawrence Ross  Date of Birth. 4/13/1930  MRN. [id number]     Primary care provider. Kassie Meza MD  (Tel: [phone])    Admit date: 8/7/2023    Discharge date (blank if same as Note Date): Note Date: 8/9/2023     Reason for Hospitalization. Chief Complaint   Patient presents with    Hypotension     Pt w c/o Hypotension. Pt went to PCP and was sent to ED d/t BP 90/60 during orthostatic BP         Significant Findings. Principal Problem:    Weakness  Resolved Problems:    * No resolved hospital problems. *       Problems and results from this hospitalization that need follow up. Orthostatic hypotension    Significant test results and incidental findings. CT head 8/7/2023:  IMPRESSION:  No acute intracranial abnormality. CT chest / abd 8/7/2023:  IMPRESSION:  Chest:  1. Age indeterminate nondisplaced fractures of the left 4th through 6th ribs anteriorly. 2. No other acute findings in the chest.  3. Old bilateral rib fractures and stable mild old compression fracture at T1. Abdomen pelvis:   1. No CT evidence of acute injury in the abdomen or pelvis. 2. Mild colonic diverticulosis. Invasive procedures and treatments. None     [facility] Course. 79 yo female with hx dementia. She has been experiencing falls at home. Noted to have BP 90/60 at PCP office and sent to ER. Admitted with orthostatic hypotension and deconditioning.     Falls / deconditioning  - CT head with no acute abnormality  - Suspected secondary to orthostatic hypotension, BP reported to be 90/60 at PCP office  - Was taking furosemide at home for swelling in legs, held on admission  - PT / OT evaluated and recommended SNF, patient and family agreeable     Rib fractures  - CT scan with age indeterminate nondisplaced fractures of the left 4th known as: LASIX  Take 1 tablet by mouth daily as needed (weight gain, swelling, shortness of breath)  What changed: See the new instructions. CONTINUE taking these medications      acetaminophen 500 MG tablet  Commonly known as: TYLENOL     alendronate 70 MG tablet  Commonly known as: FOSAMAX  Takes every Thursday. Caltrate 600+D Plus Minerals 600-800 MG-UNIT Tabs tablet  Take 1 tablet by mouth 2 times daily     DULoxetine 30 MG extended release capsule  Commonly known as: CYMBALTA  TAKE ONE CAPSULE BY MOUTH DAILY     lactulose 10 GM/15ML solution  Commonly known as: CHRONULAC  Take 30 mLs by mouth nightly     levothyroxine 75 MCG tablet  Commonly known as: SYNTHROID  TAKE ONE TABLET BY MOUTH DAILY     QUEtiapine 50 MG tablet  Commonly known as: SEROQUEL  TAKE ONE TABLET BY MOUTH TWICE A DAY     sertraline 25 MG tablet  Commonly known as: ZOLOFT  TAKE ONE TABLET BY MOUTH DAILY               Where to Get Your Medications        These medications were sent to [facility] [address]  [address] [place] [address], [address] [place] [address]      Phone: [phone]   alendronate 70 MG tablet  lidocaine 4 % external patch       Information about where to get these medications is not yet available    Ask your nurse or doctor about these medications  furosemide 20 MG tablet         Discharge recommendations given to patient. Follow Up. PCP in 1 week   Disposition. home  Activity. activity as tolerated  Diet: ADULT DIET; Regular      Spent 40 minutes in discharge process.     Signed:  PINEDA Michel CNP     8/9/2023 1:01 PM

## 2023-08-09 NOTE — PROGRESS NOTES
Incentive Spirometry education and demonstration completed by Respiratory Therapy Yes      Response to education: Very Good     Teaching Time: 5 minutes    Minimum Predicted Vital Capacity - 570 mL. Patient's Actual Vital Capacity - 1100 mL. Turning over to Nursing for routine follow-up Yes.     Electronically signed by Nancy Finch RCP on 8/9/2023 at 5:03 PM

## 2023-08-10 VITALS
DIASTOLIC BLOOD PRESSURE: 80 MMHG | BODY MASS INDEX: 27.76 KG/M2 | TEMPERATURE: 98.2 F | WEIGHT: 166.6 LBS | HEART RATE: 67 BPM | HEIGHT: 65 IN | OXYGEN SATURATION: 96 % | SYSTOLIC BLOOD PRESSURE: 148 MMHG | RESPIRATION RATE: 16 BRPM

## 2023-08-10 LAB
ANION GAP SERPL CALCULATED.3IONS-SCNC: 8 MMOL/L (ref 3–16)
BASOPHILS # BLD: 0 K/UL (ref 0–0.2)
BASOPHILS NFR BLD: 0.8 %
BUN SERPL-MCNC: 24 MG/DL (ref 7–20)
CALCIUM SERPL-MCNC: 8.9 MG/DL (ref 8.3–10.6)
CHLORIDE SERPL-SCNC: 108 MMOL/L (ref 99–110)
CO2 SERPL-SCNC: 25 MMOL/L (ref 21–32)
CREAT SERPL-MCNC: 1.1 MG/DL (ref 0.6–1.2)
DEPRECATED RDW RBC AUTO: 13.8 % (ref 12.4–15.4)
EOSINOPHIL # BLD: 0.4 K/UL (ref 0–0.6)
EOSINOPHIL NFR BLD: 6.5 %
GFR SERPLBLD CREATININE-BSD FMLA CKD-EPI: 47 ML/MIN/{1.73_M2}
GLUCOSE SERPL-MCNC: 91 MG/DL (ref 70–99)
HCT VFR BLD AUTO: 40.5 % (ref 36–48)
HGB BLD-MCNC: 13.4 G/DL (ref 12–16)
LYMPHOCYTES # BLD: 2.1 K/UL (ref 1–5.1)
LYMPHOCYTES NFR BLD: 35.2 %
MCH RBC QN AUTO: 30.9 PG (ref 26–34)
MCHC RBC AUTO-ENTMCNC: 33.1 G/DL (ref 31–36)
MCV RBC AUTO: 93.3 FL (ref 80–100)
MONOCYTES # BLD: 0.9 K/UL (ref 0–1.3)
MONOCYTES NFR BLD: 14.8 %
NEUTROPHILS # BLD: 2.5 K/UL (ref 1.7–7.7)
NEUTROPHILS NFR BLD: 42.7 %
PLATELET # BLD AUTO: 178 K/UL (ref 135–450)
PMV BLD AUTO: 8.8 FL (ref 5–10.5)
POTASSIUM SERPL-SCNC: 4.1 MMOL/L (ref 3.5–5.1)
RBC # BLD AUTO: 4.34 M/UL (ref 4–5.2)
SODIUM SERPL-SCNC: 141 MMOL/L (ref 136–145)
WBC # BLD AUTO: 5.9 K/UL (ref 4–11)

## 2023-08-10 PROCEDURE — 80048 BASIC METABOLIC PNL TOTAL CA: CPT

## 2023-08-10 PROCEDURE — 6370000000 HC RX 637 (ALT 250 FOR IP): Performed by: INTERNAL MEDICINE

## 2023-08-10 PROCEDURE — 85025 COMPLETE CBC W/AUTO DIFF WBC: CPT

## 2023-08-10 PROCEDURE — 97530 THERAPEUTIC ACTIVITIES: CPT

## 2023-08-10 PROCEDURE — 6360000002 HC RX W HCPCS: Performed by: STUDENT IN AN ORGANIZED HEALTH CARE EDUCATION/TRAINING PROGRAM

## 2023-08-10 PROCEDURE — 97116 GAIT TRAINING THERAPY: CPT

## 2023-08-10 PROCEDURE — 36415 COLL VENOUS BLD VENIPUNCTURE: CPT

## 2023-08-10 PROCEDURE — 6370000000 HC RX 637 (ALT 250 FOR IP): Performed by: STUDENT IN AN ORGANIZED HEALTH CARE EDUCATION/TRAINING PROGRAM

## 2023-08-10 RX ADMIN — SERTRALINE 25 MG: 50 TABLET, FILM COATED ORAL at 08:12

## 2023-08-10 RX ADMIN — LEVOTHYROXINE SODIUM 75 MCG: 0.07 TABLET ORAL at 05:08

## 2023-08-10 RX ADMIN — HEPARIN SODIUM 5000 UNITS: 5000 INJECTION INTRAVENOUS; SUBCUTANEOUS at 14:23

## 2023-08-10 RX ADMIN — QUETIAPINE FUMARATE 50 MG: 25 TABLET ORAL at 08:12

## 2023-08-10 RX ADMIN — ACETAMINOPHEN 650 MG: 325 TABLET ORAL at 08:12

## 2023-08-10 RX ADMIN — HEPARIN SODIUM 5000 UNITS: 5000 INJECTION INTRAVENOUS; SUBCUTANEOUS at 05:08

## 2023-08-10 RX ADMIN — DULOXETINE HYDROCHLORIDE 30 MG: 30 CAPSULE, DELAYED RELEASE ORAL at 08:12

## 2023-08-10 ASSESSMENT — PAIN DESCRIPTION - LOCATION: LOCATION: HEAD

## 2023-08-10 ASSESSMENT — PAIN DESCRIPTION - ORIENTATION: ORIENTATION: MID;ANTERIOR

## 2023-08-10 ASSESSMENT — PAIN SCALES - GENERAL: PAINLEVEL_OUTOF10: 10

## 2023-08-10 ASSESSMENT — PAIN DESCRIPTION - PAIN TYPE: TYPE: ACUTE PAIN

## 2023-08-10 ASSESSMENT — PAIN DESCRIPTION - DESCRIPTORS: DESCRIPTORS: THROBBING

## 2023-08-10 NOTE — PROGRESS NOTES
Shift assessment completed, pt is only oriented to self, VSS, fall precautions in place, call light within reach, see flowsheets and MAR, will monitor pt. The care plan and education has been reviewed and mutually agreed upon with the patient.

## 2023-08-10 NOTE — PLAN OF CARE
Problem: Discharge Planning  Goal: Discharge to home or other facility with appropriate resources  Outcome: Progressing     Problem: Skin/Tissue Integrity - Adult  Goal: Skin integrity remains intact  8/9/2023 2230 by Dagmar Hodgkin, RN  Outcome: Progressing     Problem: Musculoskeletal - Adult  Goal: Return mobility to safest level of function  8/9/2023 2230 by Dagmar Hodgkin, RN  Outcome: Progressing     Problem: Musculoskeletal - Adult  Goal: Maintain proper alignment of affected body part  8/9/2023 2230 by Dagmar Hodgkin, RN  Outcome: Progressing     Problem: Safety - Adult  Goal: Free from fall injury  8/9/2023 2230 by Dagmar Hodgkin, RN  Outcome: Progressing     Problem: ABCDS Injury Assessment  Goal: Absence of physical injury  8/9/2023 2230 by Dagmar Hodgkin, RN  Outcome: Progressing

## 2023-08-10 NOTE — DISCHARGE SUMMARY
[address] [facility] DISCHARGE SUMMARY    Patient Demographics    Patient. Jenae Roman  Date of Birth. 4/13/1930  MRN. [id number]     Primary care provider. Jose Soto MD  (Tel: [phone])    Admit date: 8/7/2023    Discharge date (blank if same as Note Date): Note Date: 8/10/2023     Reason for Hospitalization. Chief Complaint   Patient presents with    Hypotension     Pt w c/o Hypotension. Pt went to PCP and was sent to ED d/t BP 90/60 during orthostatic BP         Significant Findings. Principal Problem:    Weakness  Resolved Problems:    * No resolved hospital problems. *       Problems and results from this hospitalization that need follow up. Orthostatic hypotension    Significant test results and incidental findings. CT head 8/7/2023:  IMPRESSION:  No acute intracranial abnormality. CT chest / abd 8/7/2023:  IMPRESSION:  Chest:  1. Age indeterminate nondisplaced fractures of the left 4th through 6th ribs anteriorly. 2. No other acute findings in the chest.  3. Old bilateral rib fractures and stable mild old compression fracture at T1. Abdomen pelvis:   1. No CT evidence of acute injury in the abdomen or pelvis. 2. Mild colonic diverticulosis. Invasive procedures and treatments. None     [facility] Course. 81 yo female with hx dementia. She has been experiencing falls at home. Noted to have BP 90/60 at PCP office and sent to ER. Admitted with orthostatic hypotension and deconditioning.     Falls / deconditioning  - CT head with no acute abnormality  - Suspected secondary to orthostatic hypotension, BP reported to be 90/60 at PCP office  - Was taking furosemide at home for swelling in legs, held on admission  - Knee high SEN hose when up  - PT / OT evaluated and recommended SNF, patient and family agreeable     Rib fractures  - CT scan with age indeterminate nondisplaced

## 2023-08-10 NOTE — CARE COORDINATION
Discharge Plan:     Patient discharged to:    [address] [place], [facility]    SW/DC Planner faxed, [address] [facility] and AVS to: [phone]    Narcotic Prescriptions faxed were: N/A    RN: will call report to: [phone]    Medical Transport with: [facility] [phone]     time: 1430    Family advised of discharge?: yes, Rosalva Joiner, daughter    DAVID Submitted?:  yes    All discharge needs met per case management.     MICHAEL DickersonN RN    [facility]  Phone: [phone]

## 2023-08-10 NOTE — PROGRESS NOTES
[address] [facility]   Phone: [phone]    Physical Therapy    [] Initial Evaluation            [x] Daily Treatment Note         [] Discharge Summary      Patient: Lawrence Ross   : 1930   MRN: [id number]   Date of Service:  8/10/2023  Admitting Diagnosis: Weakness  Current Admission Summary: Pt to ED with frequent falls/weakness at home. Left 4th and 6th rib fractures noted on chest CT. Past Medical History:  has a past medical history of Acute renal failure ([address]), Arthritis, At risk for falling, Cirrhosis ([address]), Dementia with behavioral problem ([address]), Depression, Frequent falls, Gastroesophageal reflux disease, Movement disorder, Nephrotic syndrome, Osteoporosis, Pelvic fracture ([address]), and Thyroid disease. Past Surgical History:  has a past surgical history that includes Excision of Facial Mass; skin biopsy; Colonoscopy; eye surgery; other surgical history (Right, 2018); Hip fracture surgery (Left, 2019); Forearm surgery (Right, 10/27/2022); and Finger trigger release (Left, 3/16/2023). Discharge Recommendations: Lawrence Ross scored a 16/24 on the AM-PAC short mobility form. Current research shows that an AM-PAC score of 17 or less is typically not associated with a discharge to the patient's home setting. Based on the patient's AM-PAC score and their current functional mobility deficits, it is recommended that the patient have 3-5 sessions per week of Physical Therapy at d/c to increase the patient's independence. Please see assessment section for further patient specific details. If patient discharges prior to next session this note will serve as a discharge summary. Please see below for the latest assessment towards goals.      DME Required For Discharge: DME to be determined pending patient progress  Precautions/Restrictions: high fall risk  Weight Bearing Restrictions: no restrictions  [] Right Upper Extremity  [] Left Upper

## 2023-08-11 ENCOUNTER — TELEPHONE (OUTPATIENT)
Dept: INTERNAL MEDICINE CLINIC | Age: 88
End: 2023-08-11

## 2023-08-11 ENCOUNTER — CARE COORDINATION (OUTPATIENT)
Dept: CARE COORDINATION | Age: 88
End: 2023-08-11

## 2023-08-11 NOTE — TELEPHONE ENCOUNTER
Care Transitions Initial Follow Up Call    Outreach made within 2 business days of discharge: Yes    Patient: Jovanni Will Patient : 1930   MRN: [id number]  Reason for Admission: There are no discharge diagnoses documented for the most recent discharge. Discharge Date: 8/10/23       Spoke with: Daughter, Missael Ortiz    Daughter discharged to [facility]. Daughter asked to please call office to schedule HFU visit. Discharge department/facility: [facility] Interactive Patient Contact:  Was patient able to fill all prescriptions: Yes  Was patient instructed to bring all medications to the follow-up visit: Yes  Is patient taking all medications as directed in the discharge summary?  Yes  Does patient understand their discharge instructions: Yes  Does patient have questions or concerns that need addressed prior to 7-14 day follow up office visit: yes - N/A    Scheduled appointment with PCP within 7-14 days    Follow Up  Future Appointments   Date Time Provider [address] [place] [address]   10/6/2023 11:20 AM Heather Landon  Preston Krishnamurthy LPN

## 2023-08-11 NOTE — CARE COORDINATION
[address] Update Call    2023    Patient: Aaron Camarillo Patient : 1930   MRN: <[id number]>  Reason for Admission: closed fracture multiple ribs, leg swelling, sent by PCP for orthostatic hypotension (90/60), frequent falls  Discharge Date: 8/10/23 RARS: Readmission Risk Score: 10.2       Post Acute Facility Update    Care Transitions Post Acute Facility Update    Care Transitions Interventions      Post Acute Facility Update   [facility]      Nursing   Prescribed diet: general    Reported Nursing Updates: 162.7#, 120/70, 97.5, 76 bpm, 16, 93% on room air, pain 0/10. PT/ OT/ ST eval and treat. CBC/ BMP weekly on Monday. Rehab/Functional   Cognitive function assessment: BIMS 2/15- severe cog impairment   Rehab Notes: New admission- no therapy notes available        SW/Discharge Planning   Discharge Planning / Barriers: No PLOF in Epic from this admission    Next IDT Planned Review: 23           Next IDT Planned Review: 8/15/2023    Future Appointments   Date Time Provider [address]   10/6/2023 11:20 AM Salma Viveros MD [facility]       SN Notes:   Diet: - Oral Diet:  General  Wounds: left, upper, and arm skin tear   Medications:  Other: med rec complete   Other:  email to IDT informing of admission and request for PLOF information  Post-acute CC Notes: [facility] access for admission     Sia Bateman RN

## 2023-08-15 ENCOUNTER — CARE COORDINATION (OUTPATIENT)
Dept: CARE COORDINATION | Age: 88
End: 2023-08-15

## 2023-08-15 NOTE — CARE COORDINATION
no           Rehab/Functional   Prior Level of Functioning: home with dtr   Cognitive function assessment: BIMS- 7/15- severe cog impairment   ADLs: Minimal Assistance   Bed Mobility: Stand by Assist - Presence and Cueing   Transfer Assistance: Minimal Assistance   Ambulation Assistance: Contact Guard Assist - Hands on patient for balance   How far (in feet) is the patient ambulating?: 84   Does patient use an assistive device?: Yes   Assistive Devices: 2WW   Rehab Notes: Balance- poor, roll right/ left- ind, sit to supine- mod, supine to sit on EOB- mod, STS- mod, chair/ bed to chair- mod, toilet transfer- mod, gait is unsteady, toilet hygiene- dep, UB dressing- min, LB dressing- max, toileting- mod,         SW/Discharge Planning   Palliative/Hospice Referral indicated: Neg   Discharge Planning / Barriers: Barriers- cognition, high fall risk, monitor BP for orthostatic, left knee gives out at times, limited bilateral shoulder ROM, old rib fractures, fatigue, lacks safety, balance    Pt from one level home with daughter, 4 steps to enter with BHR. Grab bars in shower and around toilet, rolling walker, quad cane, manual wheelchair. Pt mod independent with transfers/ ambulation with cane. Family does housekeeping    Care conference 8/18 at 1200   Care Progression on Track?: Pos  Next IDT Planned Review: 8/22/23           Next IDT Planned Review: 8/22/2023    Future Appointments   Date Time Provider [address]   10/6/2023 11:20 AM Macey Chery MD [facility] Michelle CAGLE Notes:   Diet: - Oral Diet:  General  Wounds: none  Medications:  Other: facility  Other:    Post-acute CC Notes: IDT call and [place] access    Radha Goodrich RN

## 2023-08-16 NOTE — PROGRESS NOTES
Infectious Diseases Clinic NewPatient Note      Reason for Visit:                Positive test for syphilis  Primary Care Physician:  Rich Travis MD  History Obtained From:   Patient , Medical Records     CHIEF COMPLAINT:    Chief Complaint   Patient presents with    New Patient       HISTORY OF PRESENT ILLNESS: Do Lopez is a 80 y.o. femalepatient, who was seen today in ID clinic at the request of Dr. Rich Travis MD    History was obtained from chart review and the patient. The patient was seen today for above mentioned complaints. The patient has history of syphilis and was diagnosed with that in 1988. She thinks that she received a penicillin injection at that time. She recently had a syphilis serology done on 9/18/18 which came back positive. Hence, She was referred to my clinic. Past Medical History:   Past medical  history wasreviewed by me during this visit in detail. Past Medical History:   Diagnosis Date    Acute renal failure ([facility] [address] 11-27-12    Arthritis     At risk for falling 08/28/2018    Score of 11/24 for Dynamic Gait Index    Frequent falls     Movement disorder     osteoporosis    Nephrotic syndrome 12/28/2012    Thyroid disease        Past Surgical History:  Past surgical history was reviewed by me during this visit in detail. Past Surgical History:   Procedure Laterality Date    COLONOSCOPY      EXCISION OF FACIAL MASS      skin ca    EYE SURGERY      cataract removal; bilat    OTHER SURGICAL HISTORY Right 05/27/2018    right gamma nail with cables    SKIN BIOPSY         Current Medications:    All medicationswere reviewed by me during this visit  Current Outpatient Prescriptions   Medication Sig Dispense Refill    donepezil (ARICEPT) 10 MG tablet Take 1 tablet by mouth nightly 30 tablet 2    levothyroxine (SYNTHROID) 50 MCG tablet TAKE ONE TABLET BY MOUTH DAILY 30 tablet 5    DULoxetine (CYMBALTA) 30 MG extended release capsule Take 1 No

## 2023-08-22 ENCOUNTER — CARE COORDINATION (OUTPATIENT)
Dept: CARE COORDINATION | Age: 88
End: 2023-08-22

## 2023-08-22 NOTE — CARE COORDINATION
[address] Update Call    2023    Patient: Waqar Bowers Patient : 1930   MRN: [id number]  Reason for Admission: old closed fracture multiple ribs, leg swelling, sent by PCP for orthostatic hypotension (90/60), frequent falls    Discharge Date: 8/10/23 RARS: Readmission Risk Score: 10.2         Post Acute Facility Update    Care Transitions Post Acute Facility Update    Care Transitions Interventions      Post Acute Facility Update   Post Acute Facility: [place]    ELOS: 21 days      Nursing   Prescribed diet: general    Nutrition intake assessment: 50-75%   Wounds: Pos   Wound Location: skin tear right upper arm   Wound Care Therapies: versatle dressing: apply YOON and wrap with Kerlex every shift. Leave Versatle in place except for weekly change on Tuesday. Labs: Abnormal   Abnormal Lab Notes: BUN 24 (normal )- no new orders   Disease specific clinical considerations: Alzheimer's/ dementia   Reported Nursing Updates: 162.7#,  (8/10/2023- pt is due today to have weight checked), 114/68, 98.0, 77 bpm, 19, 93% on oxygen, pain 0/10. CBC/ BMP- weekly on Monday. [facility] (in [facility]). LOS charting  Current LOS:  12      ELOS:  18-21  Anticipated DOD:  or   3DW? Yes  PLOF: home with daughter   Goals of care:  get stronger, return home   Nutritional intake:  50-75%   Wounds/ treatment/ stage: skin tear on left upper arm- Dressing orders- versatle dressing: apply YOON and wrap with Kerlex. Leave Versatle in place except for weekly change on Tuesday. Labs? WNL   Disease specific clinical considerations: Alzheimer's/ Dementia  Cognition:  mod impairment   Palliative/ hospice referral? no  Does caregiver need any support/ have needs? No- pt will have HC and COA   Anticipated DC dispo/ services/ date: home with dtr, HC and COA   DME/ equipment needed at DC? no   Barriers to transition? none  Care progression on track w/ ELOS?  yes  Interventions if

## 2023-08-29 ENCOUNTER — CARE COORDINATION (OUTPATIENT)
Dept: CARE COORDINATION | Age: 88
End: 2023-08-29

## 2023-08-29 NOTE — CARE COORDINATION
[address] Update Call    2023    Patient: Nirmala Cain Patient : 1930   MRN: [id number]  Reason for Admission: old closed fracture multiple ribs, leg swelling, sent by PCP for orthostatic hypotension (90/60), frequent falls    Discharge Date: 8/10/23 RARS: Readmission Risk Score: 10.2       Post Acute Facility Update    Care Transitions Post Acute Facility Update    Care Transitions Interventions      Post Acute Facility Update   Post Acute Facility: [employer]    ELOS: 21 days      Nursing   Prescribed diet: general    Nutrition intake assessment: 50-75%   Wounds: Pos   Wound Location: LUE skin tear- healing   Wound Care Therapies: Left upper arm skin tear: cleanse with NS, pat dry, apply versatel dressing once weekly. Labs: Normal   Disease specific clinical considerations: Alzheimer's/ Dementia   Reported Nursing Updates: 172#, 136/71, 98.1, 80 bpm, 17, 94%, pain 0/10. No complaints of pain. LOS charting  Current LOS:  19      ELOS:  18-21  Anticipated DOD:   3DW? Yes  PLOF: from home with dtr   Goals of care:  return home   Nutritional intake:  good   Wounds/ treatment/ stage: skin tear on LUE smaller  Labs? WNL   Disease specific clinical considerations: Alz/ Dem   Cognition:  baseline dementia   Palliative/ hospice referral? no  Does caregiver need any support/ have needs? no  Anticipated DC dispo/ services/ date: LCD , DC  with [facility]   DME/ equipment needed at DC? no   Barriers to transition? no  Care progression on track w/ ELOS? Will be over on days    Interventions if progression not on track?  Therapies continue   Care conference- complete  Appointments? no           Rehab/Functional   Prior Level of Functioning: home wtih dtr   Cognitive function assessment: BIMS 3/15- severe impairment   ADLs: Minimal Assistance   Bed Mobility: Minimal Assistance   Transfer Assistance: Minimal Assistance   Ambulation Assistance: Contact

## 2023-09-05 ENCOUNTER — CARE COORDINATION (OUTPATIENT)
Dept: CARE COORDINATION | Age: 88
End: 2023-09-05

## 2023-09-05 NOTE — CARE COORDINATION
[address] Update Call    2023    Patient: Chilango London Patient : 1930   MRN: [id number]  Reason for Admission: old closed fracture multiple ribs, leg swelling, sent by PCP for orthostatic hypotension (90/60), frequent falls     Discharge Date: 8/10/23 RARS: Readmission Risk Score: 10.2         Post Acute Facility Update    Care Transitions Post Acute Facility Update    Care Transitions Interventions      Post Acute Facility Update   Post Acute Facility: [place]    ELOS: 21 days      Nursing   Prescribed diet: general    Nutrition intake assessment: good 50-75%   Wounds: Pos   Wound Location: skin tear   Wound Care Therapies: Left upper arm skin tear: cleanse with NS, pat dry, apply versatel dressing once weekly. Getting smaller   Labs: Normal   Reported Nursing Updates: 171#, 126/68, 97.8, 68 bpm, 17, 95% on room air, pain 0/10. LOS charting  Current LOS:  26      ELOS:  18-21  Anticipated DOD: 2023  3DW? Yes  PLOF: home with dtr  Goals of care:  return home   Nutritional intake:  good   Wounds/ treatment/ stage: skin tear healing LUE  Labs? WNL   Disease specific clinical considerations: no  Cognition:  baseline   Palliative/ hospice referral? no  Does caregiver need any support/ have needs? no  Anticipated DC dispo/ services/ date:  with [facility] and [facility]   DME/ equipment needed at DC? no   Barriers to transition? none  Care progression on track w/ ELOS? Over on days   Interventions if progression not on track?  Therapies had continued   Care conference- complete   Appointments? no           Rehab/Functional   Prior Level of Functioning: home with dtr   Cognitive function assessment: baseline   ADLs: Contact Guard Assist - Hands on patient for balance   Bed Mobility: Stand by Assist - Presence and Cueing   Transfer Assistance: Contact Guard Assist - Hands on patient for balance   Ambulation Assistance: Stand by Assist - Presence and Cueing,

## 2023-09-11 ENCOUNTER — TELEPHONE (OUTPATIENT)
Dept: INTERNAL MEDICINE CLINIC | Age: 88
End: 2023-09-11

## 2023-09-11 ENCOUNTER — CARE COORDINATION (OUTPATIENT)
Dept: CARE COORDINATION | Age: 88
End: 2023-09-11

## 2023-09-11 NOTE — TELEPHONE ENCOUNTER
Rolando Palacios from [facility] is calling need verbal orders for skilled nursing-PT & OT----please call her at [phone]. Thanks.

## 2023-09-11 NOTE — CARE COORDINATION
[facility] Care Transitions Initial Follow Up Call    Call within 2 business days of discharge: Yes    Patient Current Location:  Home: [address] contacted the family by telephone to perform post hospital discharge assessment. Verified name and  with family as identifiers. Provided introduction to self, and explanation of the Post Acute Care Manager role. Patient: Omer Capps Patient : 1930   MRN: [id number]  Reason for Admission: old closed fracture multiple ribs, leg swelling, sent by PCP for orthostatic hypotension (90/60), frequent falls      Discharge Date: 8/10/23 RARS: Readmission Risk Score: 10.2      Last Discharge [address] [facility] [facility]       Date Complaint Diagnosis Description Type Department Provider    23 Hypotension Closed fracture of multiple ribs of left side, initial encounter . .. ED to Hosp-Admission (Discharged) (ADMITTED) Molly Hernandez MD; Nsehnii. .. Was this an external facility discharge? Yes, 2023  Discharge Facility: [address] [place] [address] to be reviewed by the provider   Additional needs identified to be addressed with provider: Yes  DC from SNF                Method of communication with provider: chart routing. Call to pt dtr, [place] (okay HIPAA) who states her mom is doing pretty good. States she has no s/s SOB or edema. Denies pain, states she does not check BP daily (pt lives with her) but she will to monitor for hypotension. States she is walking well with her walker but sometimes she needs reminders to be closer to it when using it. Agreeable to ACM referral   Denies needs  Writer informs this is final (Post-Acute) phone call- v/u. Encouraged call writer or providers if questions or concerns- v/u. Enrollment resolved        Post Acute Care Manager reviewed discharge instructions and medical action plan with family who verbalized understanding.  The family was given an

## 2023-09-11 NOTE — TELEPHONE ENCOUNTER
Spoke to dtr and did agree to this homecare (they evaluated her yesterday)--she was in hospital the d/c to [facility] d/mariama Friday 9/8/23

## 2023-09-14 ENCOUNTER — TELEPHONE (OUTPATIENT)
Dept: INTERNAL MEDICINE CLINIC | Age: 88
End: 2023-09-14

## 2023-09-14 NOTE — TELEPHONE ENCOUNTER
Spoke to GERALDINE hurt her neck and she is unable to transport mother here for appt . She has an appt with Ortho on Tuesday and she will call after that to reschedule appt. \"I can't even lift her walker\".

## 2023-09-18 ENCOUNTER — CARE COORDINATION (OUTPATIENT)
Dept: CARE COORDINATION | Age: 88
End: 2023-09-18

## 2023-09-18 NOTE — CARE COORDINATION
Ambulatory Care Coordination Note  2023    Patient Current Location: [address] [place] [address] contacted the family by telephone. Verified name and  with family as identifiers. Provided introduction to self, and explanation of the ACM role. ACM: Tracie Ferrer RN    Challenges to be reviewed by the provider   Additional needs identified to be addressed with provider: No  Hospital follow up  - message routed to [facility]               Method of communication with provider: none. CTN handoff to care coordination. RN-CC outreached patient - her daughter Duke Shah answered, confirmed she is listed on PHI communication form. Patient is a 81 yo female with hx dementia. She had been experiencing falls at home. Noted to have BP 90/60 at PCP office and sent to ER. Admitted with orthostatic hypotension and deconditioning. Transferred to [facility] SN[facility]. Patient lives with her daughter Kriss Hernandez. Kriss Hernandez manages her mothers care. Grab bars, shower chair, and walker in use. Patient does not have a life alert. Kriss Hernandez states her mother used a life alert in the past but it wouldn't go off when she fell, so they decided against using it. Cameras are installed t/o the home - Kriss Hernandez is able to watch her mother if she has to be left alone for a short time. Fall precautions discussed; keep hallways well lit, pathways clear of clutter, wear non skid socks. [facility] skilled nursing managing skin tears. All medications reviewed, no discrepancies noted. Kriss Hernandez is aware RNMauricioCC will route message to [facility] requesting appointment for hospital follow up. No additional resources requested at this time. Kriss Hernandez agreeable to f/u from RN-CC. PLAN:    [facility] f/u  Needs & concerns      Offered patient enrollment in the Remote Patient Monitoring (RPM) program for in-home monitoring: NA.     Ambulatory Care Coordination Assessment    Care Coordination Protocol  Week 1 - Initial Assessment

## 2023-09-21 ENCOUNTER — TELEPHONE (OUTPATIENT)
Dept: INTERNAL MEDICINE CLINIC | Age: 88
End: 2023-09-21

## 2023-09-21 NOTE — TELEPHONE ENCOUNTER
Dtr still unable to drive due to pain medications. She is wanting to wait till next week to do VV HFU if still needed.

## 2023-09-24 DIAGNOSIS — M79.7 FIBROMYALGIA: ICD-10-CM

## 2023-09-25 RX ORDER — DULOXETIN HYDROCHLORIDE 30 MG/1
CAPSULE, DELAYED RELEASE ORAL
Qty: 30 CAPSULE | Refills: 5 | Status: SHIPPED | OUTPATIENT
Start: 2023-09-25

## 2023-09-27 ENCOUNTER — TELEPHONE (OUTPATIENT)
Dept: INTERNAL MEDICINE CLINIC | Age: 88
End: 2023-09-27

## 2023-09-27 NOTE — TELEPHONE ENCOUNTER
Ok for tubigrip on arm to hold dressing in place--Ace causes swelling. , ok given to increase frequency of wound care to 2 x per week. Skin tear can be cleaned with NS ans epilex. Daughter will call back and move up her 10/6 appt.

## 2023-09-27 NOTE — TELEPHONE ENCOUNTER
Pt had a fall yesterday. She was found on the rest room floor. No injury was there after fall and helped pt get in the shower and dressed. Nurse Stone Mireles received at text message this morning from pt's daughter that she had another fall last night. Stone Mireles is going out to see the pt today at 3 and will report how the pt is doing.

## 2023-09-29 ENCOUNTER — TELEPHONE (OUTPATIENT)
Dept: INTERNAL MEDICINE CLINIC | Age: 88
End: 2023-09-29

## 2023-09-29 NOTE — TELEPHONE ENCOUNTER
Marcell Fitch from [facility] called in regards to this pt. She states that her daughter reported a fall on 09/27. She has a skin tear on her left shin and right forearm.  If anything further is needed, Marcell Fitch can be reached at [phone]

## 2023-10-03 SDOH — ECONOMIC STABILITY: HOUSING INSECURITY: IN THE LAST 12 MONTHS, HOW MANY PLACES HAVE YOU LIVED?: 1

## 2023-10-03 SDOH — ECONOMIC STABILITY: INCOME INSECURITY: IN THE LAST 12 MONTHS, WAS THERE A TIME WHEN YOU WERE NOT ABLE TO PAY THE MORTGAGE OR RENT ON TIME?: NO

## 2023-10-06 ENCOUNTER — TELEMEDICINE (OUTPATIENT)
Dept: INTERNAL MEDICINE CLINIC | Age: 88
End: 2023-10-06

## 2023-10-06 DIAGNOSIS — R29.6 FALLS FREQUENTLY: ICD-10-CM

## 2023-10-06 DIAGNOSIS — G30.1 SEVERE LATE ONSET ALZHEIMER'S DEMENTIA WITHOUT BEHAVIORAL DISTURBANCE, PSYCHOTIC DISTURBANCE, MOOD DISTURBANCE, OR ANXIETY (HCC): ICD-10-CM

## 2023-10-06 DIAGNOSIS — T14.8XXA OPEN WOUND: Primary | ICD-10-CM

## 2023-10-06 DIAGNOSIS — F02.C0 SEVERE LATE ONSET ALZHEIMER'S DEMENTIA WITHOUT BEHAVIORAL DISTURBANCE, PSYCHOTIC DISTURBANCE, MOOD DISTURBANCE, OR ANXIETY (HCC): ICD-10-CM

## 2023-10-06 RX ORDER — DOXYCYCLINE HYCLATE 100 MG
100 TABLET ORAL 2 TIMES DAILY
Qty: 20 TABLET | Refills: 0 | Status: SHIPPED | OUTPATIENT
Start: 2023-10-06 | End: 2023-10-16

## 2023-10-06 ASSESSMENT — ENCOUNTER SYMPTOMS
ABDOMINAL PAIN: 0
VOICE CHANGE: 0
SHORTNESS OF BREATH: 0
WHEEZING: 0
TROUBLE SWALLOWING: 0
NAUSEA: 0

## 2023-10-06 NOTE — PROGRESS NOTES
10/6/2023    TELEHEALTH EVALUATION -- Audio/Visual    HPI:    Merlinda Quiet (:  1930) has requested an audio/video evaluation for the following concern(s):  Chief Complaint   Patient presents with    Follow-up     In [id number] [facility] [address]  -8/10 then to [facility], released to home on 23    Other     Needing order for PT Nurse Tuesday and Friday. Aid on Tuesday, sent to [facility]. Currently only receiving nurse visit once weekly. Wound Infection     Wound on Left arm from elbow to the wrist - not new, started in July, not healing well. Need order for Aquacel Silver dressing for everyday. [facility] nurses only changing dressing 2x/wk - they are needing more supplies ordered to do daily. All the above concerns noted. Patient has been having open wounds since July. She was admitted to hospital in the nursing home. She is getting once a week dressing change by skilled nurse for the wound. Her wound has not been improving. Patient denies fever chills nausea vomiting or systemic symptoms. History of advanced dementia and patient is dependent on her daughter for ADL. Patient daughter also have to go to see physician frequently for her own medical illness. Per daughter her dementia symptom has been stable. Denies any agitation or significant behavioral changes from baseline. Review of Systems   Constitutional:  Negative for diaphoresis and unexpected weight change. HENT:  Negative for trouble swallowing and voice change. Respiratory:  Negative for shortness of breath and wheezing. Cardiovascular:  Negative for chest pain and palpitations. Gastrointestinal:  Negative for abdominal pain and nausea. Endocrine: Negative for polyphagia and polyuria. Genitourinary:  Negative for difficulty urinating, flank pain and frequency. Allergic/Immunologic: Negative for immunocompromised state. Neurological:  Negative for dizziness and light-headedness.    Psychiatric/Behavioral:  Negative for

## 2023-10-09 ENCOUNTER — TELEPHONE (OUTPATIENT)
Dept: INTERNAL MEDICINE CLINIC | Age: 88
End: 2023-10-09

## 2023-10-09 NOTE — TELEPHONE ENCOUNTER
Melania with [employer] calling states patient has a new skin tear on her R arm. No falls. [facility] is requesting verbal order to treat with saline and Mepilex every 3-4days. She states patient has other wounds they treat and this is how they are treating those. Verbal order given.

## 2023-10-11 ENCOUNTER — CARE COORDINATION (OUTPATIENT)
Dept: CARE COORDINATION | Age: 88
End: 2023-10-11

## 2023-10-11 NOTE — CARE COORDINATION
Ambulatory Care Coordination Note  10/11/2023    Patient Current Location: [place]     ACM contacted the patient by telephone. Verified name and  with patient as identifiers. Provided introduction to self, and explanation of the ACM role. Challenges to be reviewed by the provider   Additional needs identified to be addressed with provider: No  none               Method of communication with provider: none. ACM: Tracie Ferrer RN    RN-CC outreached patients daughter, Keiko Powell for cc follow up. [facility] is [facility] with [facility]. Keiko Powell states her mother had another fall today resulting in another skin tear to her forearm. Siomara cleansed and bandaged her arm. PT just left her home, SN coming today between 5 pm - 6 pm. Keiko Powell states her mother loses her balance often - she states her mother does nothing to try and prevent the fall d/t impaired memory. Fall precautions reinforced - family and patient will continue to work with physical therapy to decrease risk. SN managing wounds. Patient is scheduled with [address] [place] [address] wound clinic on 10/18/23. Keiko Powell will call [facility] to schedule 4 week f/u with Dr. Alfredo Mane states her momwas supposed to have a visit from the [facility] [address] aide today but the visit was cancelled d/t the aide having a large caseload. Keiko Powell states it is difficult to give her mother a bath d/t her own health issues. Keiko Powell has looked into various private pay agencies but states they require a minimum amount of hours per week which makes it unaffordable. She states she only needs an aide to assist with bathing. RN-CC gave Keiko Powell information on care. Rajiv Bills thanked RN-CC and states she will look into this service tonight. Her daughter's friend is a paid caregiver and might be able to help as well. We discussed a referral to [employer] for additional resources; caregiver support, daily care, care options and safety.  Keiko Powell requests RN-CC place the referral on her

## 2023-10-12 ENCOUNTER — TELEPHONE (OUTPATIENT)
Dept: INTERNAL MEDICINE CLINIC | Age: 88
End: 2023-10-12

## 2023-10-12 NOTE — TELEPHONE ENCOUNTER
Nurse from [facility] calling to report patient fell yesterday 10/11/23 advised standing in the bathroom and fell hit her head no bruising or redness, does have a bruise on her right arm and they are now taking care of both wounds.  Just wanted to advise

## 2023-10-12 NOTE — CARE COORDINATION
Call return received from Trevor Terrazas with [employer]; telephonic referral placed and Jannette Mart listed as contact.

## 2023-10-17 NOTE — PATIENT INSTRUCTIONS
[address], [address] [facility]  Telephone: [phone]    Discharge Instructions    Important reminders:    **If you have any signs and symptoms of illness (Cough, fever, congestion, nausea, vomiting, diarrhea, etc.) please call the wound care center prior to your appointment. 1. Increase Protein intake for optimal wound healing  2. No added salt to reduce any swelling  3. If diabetic, maintain good glucose control  4. If you smoke, smoking prohibits wound healing, we ask that you refrain from smoking. 5. When taking antibiotics take the entire prescription as ordered. Do not stop taking until medication is all gone unless otherwise instructed. 6. Exercise as tolerated. 7. Keep weight off wounds and reposition every 2 hours if applicable. 8. If wound(s) is on your lower extremity, elevate legs to the level of the heart or above for 30 minutes 4-5 times a day and/or when sitting. Avoid standing for long periods of time. 9. Do not get wounds wet in bath or shower unless otherwise instructed by your physician. If your wound is on your foot or leg, you may purchase a cast bag. Please ask at the pharmacy. If Vascular testing is ordered, please call [address] [place] [address] ([phone]) to schedule. Vascular tests ordered by Wound Care Physicians may take up to 2 hours to complete. Please keep that in mind when scheduling. If Vascular testing is scheduled, please bring supplies to replace your dressing after testing is done. The vascular department does not stock supplies. Wound: Left arm     With each dressing change, rinse wounds with 0.9% Saline. (May use wound wash or soft contact solution. Both can be purchased at a local drug store). If unable to obtain saline, may use a gentle soap and water.     Dressing care: Traid, dry dressing, 1 layer tubi - change Monday, Wednesday, and Friday    Home Care Agency/Facility: [facility]    Your wound-care

## 2023-10-18 ENCOUNTER — TELEPHONE (OUTPATIENT)
Dept: INTERNAL MEDICINE CLINIC | Age: 88
End: 2023-10-18

## 2023-10-18 ENCOUNTER — HOSPITAL ENCOUNTER (OUTPATIENT)
Dept: WOUND CARE | Age: 88
Discharge: HOME | End: 2023-10-18
Attending: SURGERY
Payer: MEDICARE

## 2023-10-18 VITALS
SYSTOLIC BLOOD PRESSURE: 90 MMHG | TEMPERATURE: 96.3 F | RESPIRATION RATE: 18 BRPM | DIASTOLIC BLOOD PRESSURE: 52 MMHG | HEART RATE: 79 BPM

## 2023-10-18 DIAGNOSIS — S41.102A OPEN WOUND OF LEFT UPPER ARM, INITIAL ENCOUNTER: ICD-10-CM

## 2023-10-18 DIAGNOSIS — S41.102A OPEN WOUND OF ARM, LEFT, INITIAL ENCOUNTER: Primary | ICD-10-CM

## 2023-10-18 PROCEDURE — 99213 OFFICE O/P EST LOW 20 MIN: CPT

## 2023-10-18 PROCEDURE — 11042 DBRDMT SUBQ TIS 1ST 20SQCM/<: CPT

## 2023-10-18 RX ORDER — LIDOCAINE 40 MG/G
CREAM TOPICAL ONCE
OUTPATIENT
Start: 2023-10-18 | End: 2023-10-18

## 2023-10-18 RX ORDER — TRIAMCINOLONE ACETONIDE 1 MG/G
OINTMENT TOPICAL ONCE
OUTPATIENT
Start: 2023-10-18 | End: 2023-10-18

## 2023-10-18 RX ORDER — LIDOCAINE 50 MG/G
OINTMENT TOPICAL ONCE
OUTPATIENT
Start: 2023-10-18 | End: 2023-10-18

## 2023-10-18 RX ORDER — BETAMETHASONE DIPROPIONATE 0.05 %
OINTMENT (GRAM) TOPICAL ONCE
OUTPATIENT
Start: 2023-10-18 | End: 2023-10-18

## 2023-10-18 RX ORDER — GENTAMICIN SULFATE 1 MG/G
OINTMENT TOPICAL ONCE
OUTPATIENT
Start: 2023-10-18 | End: 2023-10-18

## 2023-10-18 RX ORDER — SODIUM CHLOR/HYPOCHLOROUS ACID 0.033 %
SOLUTION, IRRIGATION IRRIGATION ONCE
OUTPATIENT
Start: 2023-10-18 | End: 2023-10-18

## 2023-10-18 RX ORDER — LIDOCAINE HYDROCHLORIDE 20 MG/ML
JELLY TOPICAL ONCE
OUTPATIENT
Start: 2023-10-18 | End: 2023-10-18

## 2023-10-18 RX ORDER — CLOBETASOL PROPIONATE 0.5 MG/G
OINTMENT TOPICAL ONCE
OUTPATIENT
Start: 2023-10-18 | End: 2023-10-18

## 2023-10-18 RX ORDER — LIDOCAINE HYDROCHLORIDE 40 MG/ML
SOLUTION TOPICAL ONCE
OUTPATIENT
Start: 2023-10-18 | End: 2023-10-18

## 2023-10-18 NOTE — PROGRESS NOTES
[address], [address] [facility]  Telephone: [phone]        [facility] Fax # [phone]      Patient Instructions     [address], [address] [facility]  Telephone: [phone]    Discharge Instructions    Important reminders:    **If you have any signs and symptoms of illness (Cough, fever, congestion, nausea, vomiting, diarrhea, etc.) please call the wound care center prior to your appointment. 1. Increase Protein intake for optimal wound healing  2. No added salt to reduce any swelling  3. If diabetic, maintain good glucose control  4. If you smoke, smoking prohibits wound healing, we ask that you refrain from smoking. 5. When taking antibiotics take the entire prescription as ordered. Do not stop taking until medication is all gone unless otherwise instructed. 6. Exercise as tolerated. 7. Keep weight off wounds and reposition every 2 hours if applicable. 8. If wound(s) is on your lower extremity, elevate legs to the level of the heart or above for 30 minutes 4-5 times a day and/or when sitting. Avoid standing for long periods of time. 9. Do not get wounds wet in bath or shower unless otherwise instructed by your physician. If your wound is on your foot or leg, you may purchase a cast bag. Please ask at the pharmacy. If Vascular testing is ordered, please call [address] [place] [address] [phone]) to schedule. Vascular tests ordered by Wound Care Physicians may take up to 2 hours to complete. Please keep that in mind when scheduling. If Vascular testing is scheduled, please bring supplies to replace your dressing after testing is done. The vascular department does not stock supplies. Wound: Left arm     With each dressing change, rinse wounds with 0.9% Saline. (May use wound wash or soft contact solution. Both can be purchased at a local drug store).  If unable to obtain saline,

## 2023-10-18 NOTE — PROGRESS NOTES
[address] [facility],Third Floor Progress and Procedure Note    Mack Pham  AGE: 80 y.o. GENDER: female    : 1930  TODAY'S DATE: 10/18/2023    Chief Complaint   Patient presents with    Wound Check     LUE        History of Present Illness     Mack Pham is a 80 y.o. female who presents today for wound evaluation. History of Wound: traumatic wound located on the left arm.    Wound Pain:  mild  Severity: 2/10   Wound Type: traumatic  Modifying Factors:  decreased mobility and dementia, frequent falls  Associated Signs/Symptoms:  pain    Past Medical History:   Diagnosis Date    Acute renal failure ([address]) 2012    Arthritis     At risk for falling 2018    Score of  for Dynamic Gait Index    Cirrhosis ([address]) 2020    Dementia with behavioral problem ([address]) 2020    Depression     per ECF records    Frequent falls     Gastroesophageal reflux disease 2019    Movement disorder     osteoporosis    Nephrotic syndrome 2012    Osteoporosis     per ECF records    Pelvic fracture (HCC)     Thyroid disease      Past Surgical History:   Procedure Laterality Date    COLONOSCOPY      EXCISION OF FACIAL MASS      skin ca    EYE SURGERY      cataract removal; bilat    FINGER TRIGGER RELEASE Left 3/16/2023    LEFT MIDDLE FINGER TRIGGER FINGER RELEASE - LOCAL ONLY performed by Simon Gibbs MD at [address] [facility] Right 10/27/2022    OPEN REDUCTION INTERNAL FIXATION RIGHT DISTAL RADIUS-GIGI performed by Simon Gibbs MD at [address] [facility] Left 2019    OPEN REDUCTION INTERNAL FIXATION OF INTERTROCHANTERIC FRACTURE OF LEFT HIPUSING GAMMA NAIL performed by Simon Gibbs MD at [address] [facility] Right 2018    right gamma nail with cables    SKIN BIOPSY       Current Outpatient Medications   Medication Sig Dispense Refill    DULoxetine (CYMBALTA) 30 MG extended release capsule TAKE ONE CAPSULE BY MOUTH DAILY 30

## 2023-10-18 NOTE — PLAN OF CARE
Discharge instructions given. Patient verbalized understanding. Return to [facility] as needed.   Called/faxed orders to [facility]

## 2023-10-18 NOTE — TELEPHONE ENCOUNTER
Lilian Arroyo from [facility] calling, pt fell on Thursday. Daughter found her on the floor, pt was not able to tell her how the fall happened. Lilian Arroyo saw her yesterday, no injuries. Helped her with showering and she is walking.
Noted.  Patient may made an office visit if needed with Dr. Miki Qureshi.
Patient

## 2023-10-26 ENCOUNTER — CARE COORDINATION (OUTPATIENT)
Dept: CARE COORDINATION | Age: 88
End: 2023-10-26

## 2023-10-26 NOTE — CARE COORDINATION
Ambulatory Care Coordination Note  10/26/2023    Patient Current Location: [place]     ACM contacted the patient by telephone. Verified name and  with patient as identifiers. Provided introduction to self, and explanation of the ACM role. Challenges to be reviewed by the provider   Additional needs identified to be addressed with provider: No  none               Method of communication with provider: none. ACM: Tracie Ferrer RN    Patient has hx of multiple falls d/t impaired memory and balance; dementia at baseline. Active with [facility]; receives 1 week of Respite Care per year. Patient remains active with [facility] SN/PT/HHA. Marcell Fitch states her mother has been discharged from [facility]. [facility] SN will continue to manage wounds until healed. Marcell Fitch states her mothers wounds are almost healed, no s/sx of infection reported. Discussed foods appropriate for healing; protein, vitamin c, hydration. [facility] monitoring blood pressure and WNL. Fall precautions reinforced, resources sent via [address] [place] [address]. In home camera's in use. Marcell Fitch spoke was with [employer] regarding support and recourses. She found this referral helpful and will continue to reach out as needed. She has been given information on care. com for private pay home health aide options. Her daughter's friend is a paid caregiver and might be able to help as well. Marcell Fitch is not interested in memory care units; prefers to keep her mother at home as long as possible. RN-CC offered to assist with scheduling 4 week f/u with pcp. Marcell Fitch declined assistance. Marcell Fitch has to go to see physician frequently for her own medical illness, so she will have to work around these appointments. PLAN:    F/u on needs & concerns  Possible discharge         Offered patient enrollment in the Remote Patient Monitoring (RPM) program for in-home monitoring: NA.     Lab Results       None            Care Coordination Interventions    Referral

## 2023-11-08 ENCOUNTER — TELEPHONE (OUTPATIENT)
Dept: INTERNAL MEDICINE CLINIC | Age: 88
End: 2023-11-08

## 2023-11-08 NOTE — TELEPHONE ENCOUNTER
Mili from [facility] is calling---did recertification today for skilled nursing and home health aide for 8 wks----will you sign orders --please call her at [phone].  Thanks.

## 2023-11-16 ENCOUNTER — CARE COORDINATION (OUTPATIENT)
Dept: CARE COORDINATION | Age: 88
End: 2023-11-16

## 2023-11-16 NOTE — CARE COORDINATION
Ambulatory Care Coordination Note  2023    Patient Current Location: [address] [place] [address] contacted the family by telephone. Verified name and  with family as identifiers. Provided introduction to self, and explanation of the ACM role. Challenges to be reviewed by the provider   Additional needs identified to be addressed with provider: No  none               Method of communication with provider: none. ACM: Tracie Ferrer, RN    RN-CC outreached patients daughter, Ashanti Meade for cc follow up. Patient remains active with home healthcare, skilled nursing coming weekly, therapy and home health aide come weekly. Patient is active with [facility]; receives 1 week per year of respite care - does not qualify for home health aide services d/t income. Levi Gustafson is scheduled for shoulder surgery next month and plans on placing her mother in respite care for 1 week following her surgery. The home health therapist gave Levi Gustafson a list of reliable private pay aides, Levi Gustafson plans on contacting them for help. No new falls reported to date, cameras remain in use. Levi Gustafson was referred to [employer] and connected with [facility]. Levi Gustafson has Buffy's contact information and will reach back out if needed. Patient is scheduled to follow up with pcp on 23. Levi Gustafson denies questions or concerns at this time. No care coordination needs voiced at this time, will proceed with discharge. Offered patient enrollment in the Remote Patient Monitoring (RPM) program for in-home monitoring:  n/a . Lab Results       None            Care Coordination Interventions    Referral from Primary Care Provider: No  Suggested Interventions and Community Resources  Fall Risk Prevention:  In Process (Comment: 10/11/23 amaya)  Other Services or Interventions: [employer]          Goals Addressed    None         Future Appointments   Date Time Provider [address]   2023 11:20 AM

## 2023-11-20 ENCOUNTER — TELEPHONE (OUTPATIENT)
Dept: INTERNAL MEDICINE CLINIC | Age: 88
End: 2023-11-20

## 2023-11-20 NOTE — TELEPHONE ENCOUNTER
Sandi Robison from [facility] is calling ----pt fell twice on Sat evening in her bedroom---she did hit her head on wall but has no injuries---she can be reached at [phone].  Thanks.

## 2023-11-27 ENCOUNTER — OFFICE VISIT (OUTPATIENT)
Dept: INTERNAL MEDICINE CLINIC | Age: 88
End: 2023-11-27
Payer: MEDICARE

## 2023-11-27 VITALS
SYSTOLIC BLOOD PRESSURE: 100 MMHG | HEART RATE: 89 BPM | DIASTOLIC BLOOD PRESSURE: 64 MMHG | WEIGHT: 163 LBS | OXYGEN SATURATION: 96 % | BODY MASS INDEX: 27.12 KG/M2

## 2023-11-27 DIAGNOSIS — E03.9 HYPOTHYROIDISM, UNSPECIFIED TYPE: ICD-10-CM

## 2023-11-27 DIAGNOSIS — G30.1 LATE ONSET ALZHEIMER'S DISEASE WITH BEHAVIORAL DISTURBANCE (HCC): Primary | ICD-10-CM

## 2023-11-27 DIAGNOSIS — R41.0 CONFUSION: ICD-10-CM

## 2023-11-27 DIAGNOSIS — K59.00 CONSTIPATION, UNSPECIFIED CONSTIPATION TYPE: ICD-10-CM

## 2023-11-27 DIAGNOSIS — G30.1 LATE ONSET ALZHEIMER'S DISEASE WITH BEHAVIORAL DISTURBANCE (HCC): ICD-10-CM

## 2023-11-27 DIAGNOSIS — F02.818 LATE ONSET ALZHEIMER'S DISEASE WITH BEHAVIORAL DISTURBANCE (HCC): ICD-10-CM

## 2023-11-27 DIAGNOSIS — F02.818 LATE ONSET ALZHEIMER'S DISEASE WITH BEHAVIORAL DISTURBANCE (HCC): Primary | ICD-10-CM

## 2023-11-27 PROCEDURE — G8484 FLU IMMUNIZE NO ADMIN: HCPCS | Performed by: INTERNAL MEDICINE

## 2023-11-27 PROCEDURE — G8427 DOCREV CUR MEDS BY ELIG CLIN: HCPCS | Performed by: INTERNAL MEDICINE

## 2023-11-27 PROCEDURE — 99214 OFFICE O/P EST MOD 30 MIN: CPT | Performed by: INTERNAL MEDICINE

## 2023-11-27 PROCEDURE — 1123F ACP DISCUSS/DSCN MKR DOCD: CPT | Performed by: INTERNAL MEDICINE

## 2023-11-27 PROCEDURE — 1036F TOBACCO NON-USER: CPT | Performed by: INTERNAL MEDICINE

## 2023-11-27 PROCEDURE — 1090F PRES/ABSN URINE INCON ASSESS: CPT | Performed by: INTERNAL MEDICINE

## 2023-11-27 PROCEDURE — G8417 CALC BMI ABV UP PARAM F/U: HCPCS | Performed by: INTERNAL MEDICINE

## 2023-11-27 RX ORDER — SENNA AND DOCUSATE SODIUM 50; 8.6 MG/1; MG/1
1 TABLET, FILM COATED ORAL DAILY
Qty: 90 TABLET | Refills: 0 | Status: SHIPPED | OUTPATIENT
Start: 2023-11-27

## 2023-11-27 ASSESSMENT — ENCOUNTER SYMPTOMS
WHEEZING: 0
SHORTNESS OF BREATH: 0
NAUSEA: 0
VOMITING: 0

## 2023-11-27 NOTE — PROGRESS NOTES
Najma Go  4/13/1930  female  80 y.o. SUBJECTIVE:       Chief Complaint   Patient presents with    Follow-up     F/u vv from 10/6     Anorexia     Not eating     Constipation     Some in her pants but wont go on the toilet     [address] [place] [address] reporting doesn't want to walk says she needs help going to fall walks with her head down     Speech Problem     Can't understand what she says sometimes       HPI:  All the above symptoms noted. Overall patient dementia symptoms gradually getting worse. Patient is completely unable to do any ADLs. Need to have event going to restroom  Continue to get hallucinations symptoms. She is getting home physical therapy, skilled nursing care, home health  Patient daughter is the caregiver who is expecting surgery at the right shoulder  She already reported arrangement for respite care for her mother.     Past Medical History:   Diagnosis Date    Acute renal failure ([address]) 11/27/2012    Arthritis     At risk for falling 08/28/2018    Score of 11/24 for Dynamic Gait Index    Cirrhosis ([address]) 09/25/2020    Dementia with behavioral problem ([address]) 09/25/2020    Depression     per ECF records    Frequent falls     Gastroesophageal reflux disease 01/22/2019    Movement disorder     osteoporosis    Nephrotic syndrome 12/28/2012    Osteoporosis     per ECF records    Pelvic fracture (HCC)     Thyroid disease      Past Surgical History:   Procedure Laterality Date    COLONOSCOPY      EXCISION OF FACIAL MASS      skin ca    EYE SURGERY      cataract removal; bilat    FINGER TRIGGER RELEASE Left 3/16/2023    LEFT MIDDLE FINGER TRIGGER FINGER RELEASE - LOCAL ONLY performed by Dominic Camacho MD at [address] [facility] Right 10/27/2022    OPEN REDUCTION INTERNAL FIXATION RIGHT DISTAL RADIUS-GIGI performed by Dominic Camacho MD at [address] [facility] Left 8/21/2019    OPEN REDUCTION INTERNAL FIXATION OF INTERTROCHANTERIC FRACTURE OF LEFT HIPUSING GAMMA NAIL

## 2023-11-28 LAB
ALBUMIN SERPL-MCNC: 3.1 G/DL (ref 3.4–5)
ALBUMIN/GLOB SERPL: 1.1 {RATIO} (ref 1.1–2.2)
ALP SERPL-CCNC: 135 U/L (ref 40–129)
ALT SERPL-CCNC: 10 U/L (ref 10–40)
ANION GAP SERPL CALCULATED.3IONS-SCNC: 14 MMOL/L (ref 3–16)
AST SERPL-CCNC: 16 U/L (ref 15–37)
BASOPHILS # BLD: 0.1 K/UL (ref 0–0.2)
BASOPHILS NFR BLD: 0.8 %
BILIRUB SERPL-MCNC: 0.5 MG/DL (ref 0–1)
BILIRUB UR QL STRIP.AUTO: ABNORMAL
BUN SERPL-MCNC: 20 MG/DL (ref 7–20)
CALCIUM SERPL-MCNC: 8.8 MG/DL (ref 8.3–10.6)
CHLORIDE SERPL-SCNC: 103 MMOL/L (ref 99–110)
CLARITY UR: ABNORMAL
CO2 SERPL-SCNC: 24 MMOL/L (ref 21–32)
COLOR UR: ABNORMAL
CREAT SERPL-MCNC: 1.2 MG/DL (ref 0.6–1.2)
CRYSTALS URNS MICRO: ABNORMAL /HPF
DEPRECATED RDW RBC AUTO: 14.7 % (ref 12.4–15.4)
EOSINOPHIL # BLD: 0.5 K/UL (ref 0–0.6)
EOSINOPHIL NFR BLD: 6.6 %
EPI CELLS #/AREA URNS HPF: ABNORMAL /HPF (ref 0–5)
FINE GRAN CASTS #/AREA URNS HPF: ABNORMAL /LPF (ref 0–2)
FOLATE SERPL-MCNC: 6.1 NG/ML (ref 4.78–24.2)
GFR SERPLBLD CREATININE-BSD FMLA CKD-EPI: 42 ML/MIN/{1.73_M2}
GLUCOSE SERPL-MCNC: 101 MG/DL (ref 70–99)
GLUCOSE UR STRIP.AUTO-MCNC: NEGATIVE MG/DL
HCT VFR BLD AUTO: 41.8 % (ref 36–48)
HGB BLD-MCNC: 13.4 G/DL (ref 12–16)
HGB UR QL STRIP.AUTO: NEGATIVE
HYALINE CASTS #/AREA URNS LPF: ABNORMAL /LPF (ref 0–2)
KETONES UR STRIP.AUTO-MCNC: ABNORMAL MG/DL
LEUKOCYTE ESTERASE UR QL STRIP.AUTO: ABNORMAL
LYMPHOCYTES # BLD: 0.9 K/UL (ref 1–5.1)
LYMPHOCYTES NFR BLD: 10.8 %
MCH RBC QN AUTO: 30 PG (ref 26–34)
MCHC RBC AUTO-ENTMCNC: 31.9 G/DL (ref 31–36)
MCV RBC AUTO: 93.9 FL (ref 80–100)
MONOCYTES # BLD: 1.3 K/UL (ref 0–1.3)
MONOCYTES NFR BLD: 15.7 %
NEUTROPHILS # BLD: 5.3 K/UL (ref 1.7–7.7)
NEUTROPHILS NFR BLD: 66.1 %
NITRITE UR QL STRIP.AUTO: NEGATIVE
PH UR STRIP.AUTO: 6.5 [PH] (ref 5–8)
PLATELET # BLD AUTO: 277 K/UL (ref 135–450)
PMV BLD AUTO: 9.3 FL (ref 5–10.5)
POTASSIUM SERPL-SCNC: 4.4 MMOL/L (ref 3.5–5.1)
PROT SERPL-MCNC: 5.9 G/DL (ref 6.4–8.2)
PROT UR STRIP.AUTO-MCNC: 300 MG/DL
RBC # BLD AUTO: 4.45 M/UL (ref 4–5.2)
RBC #/AREA URNS HPF: ABNORMAL /HPF (ref 0–4)
SODIUM SERPL-SCNC: 141 MMOL/L (ref 136–145)
SP GR UR STRIP.AUTO: 1.03 (ref 1–1.03)
TSH SERPL DL<=0.005 MIU/L-ACNC: 3.36 UIU/ML (ref 0.27–4.2)
UA DIPSTICK W REFLEX MICRO PNL UR: YES
URN SPEC COLLECT METH UR: ABNORMAL
UROBILINOGEN UR STRIP-ACNC: 1 E.U./DL
VIT B12 SERPL-MCNC: 673 PG/ML (ref 211–911)
WBC # BLD AUTO: 8 K/UL (ref 4–11)
WBC #/AREA URNS HPF: ABNORMAL /HPF (ref 0–5)

## 2023-11-30 ENCOUNTER — TELEPHONE (OUTPATIENT)
Dept: INTERNAL MEDICINE CLINIC | Age: 88
End: 2023-11-30

## 2023-11-30 NOTE — TELEPHONE ENCOUNTER
----- Message from THUAN sent at 11/30/2023  2:14 PM EST -----  Subject: Message to Provider    QUESTIONS  Information for Provider? Coco Wesley RN at [employer] wants to   let PCP know patient fell yesterday. She has no injuries. Family found her   on the floor near bed. Coco Wesley is recommending a floor sensor alarm to   alert caregiver she is getting out of bed. Akilah Zamora number is   [phone].  ---------------------------------------------------------------------------  --------------  [facility] INFO  [phone]; OK to leave message on voicemail  ---------------------------------------------------------------------------  --------------  SCRIPT ANSWERS  Relationship to Patient? Covered Entity  Covered Entity Type? Home Health Care? Representative Name?  Coco Wesley RN at [employer]

## 2023-12-04 ENCOUNTER — TELEPHONE (OUTPATIENT)
Dept: INTERNAL MEDICINE CLINIC | Age: 88
End: 2023-12-04

## 2023-12-04 NOTE — TELEPHONE ENCOUNTER
[facility] request 5 additional home physical therapy visits. She also wanted to notify Dr Grace Chakraborty patient had a fall in her bedroom Saturday. She states daughters found patient, they denied any injuries and assisted patient in getting up. Daughters expressed concerns to Daya Cutler that they think dementia may be progressing.

## 2023-12-05 ENCOUNTER — TELEPHONE (OUTPATIENT)
Dept: INTERNAL MEDICINE CLINIC | Age: 88
End: 2023-12-05

## 2023-12-05 NOTE — TELEPHONE ENCOUNTER
Deepti Carroll from [facility] care called in regards to get verbal orders for discontinuing wound care for Pt since she has no more skin tears. With doing this they can decrease to seeing her once a week. Please call Deepti Carroll back if these orders are okay. Deepti Carroll can be reached at [phone].

## 2023-12-06 ENCOUNTER — HOSPITAL ENCOUNTER (INPATIENT)
Age: 88
LOS: 5 days | Discharge: NURSING, REHAB OR LONG-TERM CARE | DRG: 511 | End: 2023-12-11
Attending: EMERGENCY MEDICINE | Admitting: INTERNAL MEDICINE
Payer: MEDICARE

## 2023-12-06 ENCOUNTER — APPOINTMENT (OUTPATIENT)
Dept: GENERAL RADIOLOGY | Age: 88
DRG: 511 | End: 2023-12-06
Payer: MEDICARE

## 2023-12-06 DIAGNOSIS — M25.551 RIGHT HIP PAIN: ICD-10-CM

## 2023-12-06 DIAGNOSIS — S80.01XA CONTUSION OF RIGHT KNEE, INITIAL ENCOUNTER: ICD-10-CM

## 2023-12-06 DIAGNOSIS — S62.102A LEFT WRIST FRACTURE, CLOSED, INITIAL ENCOUNTER: Primary | ICD-10-CM

## 2023-12-06 LAB
ALBUMIN SERPL-MCNC: 2.8 G/DL (ref 3.4–5)
ALBUMIN/GLOB SERPL: 0.8 {RATIO} (ref 1.1–2.2)
ALP SERPL-CCNC: 137 U/L (ref 40–129)
ALT SERPL-CCNC: 14 U/L (ref 10–40)
ANION GAP SERPL CALCULATED.3IONS-SCNC: 8 MMOL/L (ref 3–16)
AST SERPL-CCNC: 21 U/L (ref 15–37)
BASOPHILS # BLD: 0.1 K/UL (ref 0–0.2)
BASOPHILS NFR BLD: 0.8 %
BILIRUB SERPL-MCNC: 0.4 MG/DL (ref 0–1)
BUN SERPL-MCNC: 27 MG/DL (ref 7–20)
CALCIUM SERPL-MCNC: 9.2 MG/DL (ref 8.3–10.6)
CHLORIDE SERPL-SCNC: 103 MMOL/L (ref 99–110)
CO2 SERPL-SCNC: 25 MMOL/L (ref 21–32)
CREAT SERPL-MCNC: 1.2 MG/DL (ref 0.6–1.2)
DEPRECATED RDW RBC AUTO: 14.2 % (ref 12.4–15.4)
EOSINOPHIL # BLD: 0.3 K/UL (ref 0–0.6)
EOSINOPHIL NFR BLD: 5 %
GFR SERPLBLD CREATININE-BSD FMLA CKD-EPI: 42 ML/MIN/{1.73_M2}
GLUCOSE SERPL-MCNC: 107 MG/DL (ref 70–99)
HCT VFR BLD AUTO: 41.2 % (ref 36–48)
HGB BLD-MCNC: 13.7 G/DL (ref 12–16)
LYMPHOCYTES # BLD: 1 K/UL (ref 1–5.1)
LYMPHOCYTES NFR BLD: 16.2 %
MCH RBC QN AUTO: 30.6 PG (ref 26–34)
MCHC RBC AUTO-ENTMCNC: 33.2 G/DL (ref 31–36)
MCV RBC AUTO: 92.1 FL (ref 80–100)
MONOCYTES # BLD: 0.9 K/UL (ref 0–1.3)
MONOCYTES NFR BLD: 15 %
NEUTROPHILS # BLD: 3.9 K/UL (ref 1.7–7.7)
NEUTROPHILS NFR BLD: 63 %
PLATELET # BLD AUTO: 244 K/UL (ref 135–450)
PMV BLD AUTO: 8.1 FL (ref 5–10.5)
POTASSIUM SERPL-SCNC: 4.4 MMOL/L (ref 3.5–5.1)
PROT SERPL-MCNC: 6.3 G/DL (ref 6.4–8.2)
RBC # BLD AUTO: 4.47 M/UL (ref 4–5.2)
SODIUM SERPL-SCNC: 136 MMOL/L (ref 136–145)
WBC # BLD AUTO: 6.2 K/UL (ref 4–11)

## 2023-12-06 PROCEDURE — 80053 COMPREHEN METABOLIC PANEL: CPT

## 2023-12-06 PROCEDURE — 1200000000 HC SEMI PRIVATE

## 2023-12-06 PROCEDURE — 71045 X-RAY EXAM CHEST 1 VIEW: CPT

## 2023-12-06 PROCEDURE — 73502 X-RAY EXAM HIP UNI 2-3 VIEWS: CPT

## 2023-12-06 PROCEDURE — 99285 EMERGENCY DEPT VISIT HI MDM: CPT

## 2023-12-06 PROCEDURE — 85025 COMPLETE CBC W/AUTO DIFF WBC: CPT

## 2023-12-06 PROCEDURE — 6370000000 HC RX 637 (ALT 250 FOR IP): Performed by: INTERNAL MEDICINE

## 2023-12-06 PROCEDURE — 73562 X-RAY EXAM OF KNEE 3: CPT

## 2023-12-06 PROCEDURE — 2580000003 HC RX 258: Performed by: INTERNAL MEDICINE

## 2023-12-06 PROCEDURE — 93005 ELECTROCARDIOGRAM TRACING: CPT | Performed by: EMERGENCY MEDICINE

## 2023-12-06 PROCEDURE — 73110 X-RAY EXAM OF WRIST: CPT

## 2023-12-06 PROCEDURE — 73080 X-RAY EXAM OF ELBOW: CPT

## 2023-12-06 RX ORDER — ACETAMINOPHEN 650 MG/1
650 SUPPOSITORY RECTAL EVERY 6 HOURS PRN
Status: DISCONTINUED | OUTPATIENT
Start: 2023-12-06 | End: 2023-12-11 | Stop reason: HOSPADM

## 2023-12-06 RX ORDER — SODIUM CHLORIDE 0.9 % (FLUSH) 0.9 %
5-40 SYRINGE (ML) INJECTION EVERY 12 HOURS SCHEDULED
Status: DISCONTINUED | OUTPATIENT
Start: 2023-12-06 | End: 2023-12-11 | Stop reason: HOSPADM

## 2023-12-06 RX ORDER — FUROSEMIDE 20 MG/1
20 TABLET ORAL DAILY PRN
Status: DISCONTINUED | OUTPATIENT
Start: 2023-12-06 | End: 2023-12-07

## 2023-12-06 RX ORDER — DULOXETIN HYDROCHLORIDE 30 MG/1
30 CAPSULE, DELAYED RELEASE ORAL DAILY
Status: DISCONTINUED | OUTPATIENT
Start: 2023-12-07 | End: 2023-12-11 | Stop reason: HOSPADM

## 2023-12-06 RX ORDER — SODIUM CHLORIDE 0.9 % (FLUSH) 0.9 %
5-40 SYRINGE (ML) INJECTION PRN
Status: DISCONTINUED | OUTPATIENT
Start: 2023-12-06 | End: 2023-12-11 | Stop reason: HOSPADM

## 2023-12-06 RX ORDER — LEVOTHYROXINE SODIUM 0.07 MG/1
75 TABLET ORAL
Status: DISCONTINUED | OUTPATIENT
Start: 2023-12-07 | End: 2023-12-11 | Stop reason: HOSPADM

## 2023-12-06 RX ORDER — QUETIAPINE FUMARATE 25 MG/1
50 TABLET, FILM COATED ORAL 2 TIMES DAILY
Status: DISCONTINUED | OUTPATIENT
Start: 2023-12-06 | End: 2023-12-11 | Stop reason: HOSPADM

## 2023-12-06 RX ORDER — ALENDRONATE SODIUM 70 MG/1
70 TABLET ORAL WEEKLY
Status: DISCONTINUED | OUTPATIENT
Start: 2023-12-07 | End: 2023-12-06 | Stop reason: RX

## 2023-12-06 RX ORDER — SENNA AND DOCUSATE SODIUM 50; 8.6 MG/1; MG/1
1 TABLET, FILM COATED ORAL DAILY
Status: DISCONTINUED | OUTPATIENT
Start: 2023-12-07 | End: 2023-12-11 | Stop reason: HOSPADM

## 2023-12-06 RX ORDER — ENOXAPARIN SODIUM 100 MG/ML
30 INJECTION SUBCUTANEOUS DAILY
Status: DISCONTINUED | OUTPATIENT
Start: 2023-12-07 | End: 2023-12-11 | Stop reason: HOSPADM

## 2023-12-06 RX ORDER — ACETAMINOPHEN 325 MG/1
650 TABLET ORAL EVERY 6 HOURS PRN
Status: DISCONTINUED | OUTPATIENT
Start: 2023-12-06 | End: 2023-12-11 | Stop reason: HOSPADM

## 2023-12-06 RX ORDER — ACETAMINOPHEN 500 MG
500 TABLET ORAL EVERY 6 HOURS PRN
Status: DISCONTINUED | OUTPATIENT
Start: 2023-12-06 | End: 2023-12-06 | Stop reason: ALTCHOICE

## 2023-12-06 RX ORDER — ONDANSETRON 2 MG/ML
4 INJECTION INTRAMUSCULAR; INTRAVENOUS EVERY 6 HOURS PRN
Status: DISCONTINUED | OUTPATIENT
Start: 2023-12-06 | End: 2023-12-11 | Stop reason: HOSPADM

## 2023-12-06 RX ORDER — POLYETHYLENE GLYCOL 3350 17 G/17G
17 POWDER, FOR SOLUTION ORAL DAILY PRN
Status: DISCONTINUED | OUTPATIENT
Start: 2023-12-06 | End: 2023-12-11 | Stop reason: HOSPADM

## 2023-12-06 RX ORDER — SODIUM CHLORIDE 9 MG/ML
INJECTION, SOLUTION INTRAVENOUS PRN
Status: DISCONTINUED | OUTPATIENT
Start: 2023-12-06 | End: 2023-12-11 | Stop reason: HOSPADM

## 2023-12-06 RX ORDER — ONDANSETRON 4 MG/1
4 TABLET, ORALLY DISINTEGRATING ORAL EVERY 8 HOURS PRN
Status: DISCONTINUED | OUTPATIENT
Start: 2023-12-06 | End: 2023-12-11 | Stop reason: HOSPADM

## 2023-12-06 RX ADMIN — ACETAMINOPHEN 650 MG: 325 TABLET ORAL at 23:26

## 2023-12-06 RX ADMIN — QUETIAPINE FUMARATE 50 MG: 25 TABLET ORAL at 23:24

## 2023-12-06 RX ADMIN — Medication 10 ML: at 23:30

## 2023-12-06 ASSESSMENT — PAIN - FUNCTIONAL ASSESSMENT: PAIN_FUNCTIONAL_ASSESSMENT: 0-10

## 2023-12-06 ASSESSMENT — PAIN DESCRIPTION - LOCATION
LOCATION: ARM
LOCATION: HEAD

## 2023-12-06 ASSESSMENT — PAIN SCALES - GENERAL
PAINLEVEL_OUTOF10: 8
PAINLEVEL_OUTOF10: 0

## 2023-12-06 ASSESSMENT — PAIN DESCRIPTION - ORIENTATION: ORIENTATION: LEFT

## 2023-12-06 NOTE — ED PROVIDER NOTES
deformity of her left wrist with mild soft tissue swelling, light touch sensation distally is intact, radial pulse is strong, capillary refill is normal, skin is intact, elbow with minimal tenderness and no soft tissue swelling  Skin:  Warm, dry  Neurologic:  Alert, no slurred speech, PERRL, right arm with normal rom, right leg with pain to bending the knee, no foot drop, light touch sensation intact   Psychiatric:  Affect appropriate    RESULTS / MEDICAL DECISION MAKING:   Labs Reviewed   COMPREHENSIVE METABOLIC PANEL W/ REFLEX TO MG FOR LOW K - Abnormal; Notable for the following components:       Result Value    Glucose 107 (*)     BUN 27 (*)     Est, Glom Filt Rate 42 (*)     Total Protein 6.3 (*)     Albumin 2.8 (*)     Albumin/Globulin Ratio 0.8 (*)     Alkaline Phosphatase 137 (*)     All other components within normal limits   CBC WITH AUTO DIFFERENTIAL   COMPREHENSIVE METABOLIC PANEL W/ REFLEX TO MG FOR LOW K   CBC WITH AUTO DIFFERENTIAL     RADIOLOGY:  Plain x-rays were viewed by me:   XR HIP 2-3 VW W PELVIS RIGHT   Preliminary Result   No acute displaced pelvic or femoral fracture. Right femoral distal interlocking screw fracture. XR CHEST PORTABLE   Final Result   1. Increased interstitial markings in the lungs. Patchy opacities in both   lung bases, atelectasis versus pneumonia. 2. Trace bilateral pleural effusion. XR WRIST LEFT (MIN 3 VIEWS)   Final Result   Posteriorly dislocated acute comminuted fracture of the distal radius and   ulna as above.          XR ELBOW LEFT (MIN 3 VIEWS)   Final Result   No acute posttraumatic injury         XR KNEE RIGHT (3 VIEWS)   Final Result   No acute posttraumatic injury      Severe degenerative changes of the right knee           EKG:  Read by me in the absence of a cardiologist shows: Sinus rhythm, rate 80, right bundle branch block, repolarization abnormalities from conduction delay, axis 41 degrees, no major change when compared to

## 2023-12-07 ENCOUNTER — ANESTHESIA (OUTPATIENT)
Dept: OPERATING ROOM | Age: 88
End: 2023-12-07
Payer: MEDICARE

## 2023-12-07 ENCOUNTER — APPOINTMENT (OUTPATIENT)
Dept: GENERAL RADIOLOGY | Age: 88
DRG: 511 | End: 2023-12-07
Payer: MEDICARE

## 2023-12-07 ENCOUNTER — ANESTHESIA EVENT (OUTPATIENT)
Dept: OPERATING ROOM | Age: 88
End: 2023-12-07
Payer: MEDICARE

## 2023-12-07 PROBLEM — S52.502A CLOSED FRACTURE OF LEFT DISTAL RADIUS: Status: ACTIVE | Noted: 2023-12-07

## 2023-12-07 LAB
ALBUMIN SERPL-MCNC: 2.8 G/DL (ref 3.4–5)
ALBUMIN/GLOB SERPL: 0.8 {RATIO} (ref 1.1–2.2)
ALP SERPL-CCNC: 132 U/L (ref 40–129)
ALT SERPL-CCNC: 14 U/L (ref 10–40)
ANION GAP SERPL CALCULATED.3IONS-SCNC: 11 MMOL/L (ref 3–16)
AST SERPL-CCNC: 19 U/L (ref 15–37)
BASOPHILS # BLD: 0 K/UL (ref 0–0.2)
BASOPHILS NFR BLD: 0.6 %
BILIRUB SERPL-MCNC: 0.4 MG/DL (ref 0–1)
BUN SERPL-MCNC: 27 MG/DL (ref 7–20)
CALCIUM SERPL-MCNC: 9.1 MG/DL (ref 8.3–10.6)
CHLORIDE SERPL-SCNC: 102 MMOL/L (ref 99–110)
CO2 SERPL-SCNC: 24 MMOL/L (ref 21–32)
CREAT SERPL-MCNC: 1.3 MG/DL (ref 0.6–1.2)
DEPRECATED RDW RBC AUTO: 14.4 % (ref 12.4–15.4)
EKG ATRIAL RATE: 80 BPM
EKG DIAGNOSIS: NORMAL
EKG P AXIS: 71 DEGREES
EKG P-R INTERVAL: 174 MS
EKG Q-T INTERVAL: 436 MS
EKG QRS DURATION: 128 MS
EKG QTC CALCULATION (BAZETT): 502 MS
EKG R AXIS: 41 DEGREES
EKG T AXIS: 47 DEGREES
EKG VENTRICULAR RATE: 80 BPM
EOSINOPHIL # BLD: 0.4 K/UL (ref 0–0.6)
EOSINOPHIL NFR BLD: 4.4 %
GFR SERPLBLD CREATININE-BSD FMLA CKD-EPI: 38 ML/MIN/{1.73_M2}
GLUCOSE SERPL-MCNC: 99 MG/DL (ref 70–99)
HCT VFR BLD AUTO: 40.2 % (ref 36–48)
HGB BLD-MCNC: 13.3 G/DL (ref 12–16)
LYMPHOCYTES # BLD: 1.7 K/UL (ref 1–5.1)
LYMPHOCYTES NFR BLD: 20.1 %
MCH RBC QN AUTO: 30.5 PG (ref 26–34)
MCHC RBC AUTO-ENTMCNC: 33.2 G/DL (ref 31–36)
MCV RBC AUTO: 92 FL (ref 80–100)
MONOCYTES # BLD: 1.3 K/UL (ref 0–1.3)
MONOCYTES NFR BLD: 15.6 %
NEUTROPHILS # BLD: 4.9 K/UL (ref 1.7–7.7)
NEUTROPHILS NFR BLD: 59.3 %
PLATELET # BLD AUTO: 277 K/UL (ref 135–450)
PMV BLD AUTO: 8.7 FL (ref 5–10.5)
POTASSIUM SERPL-SCNC: 4.3 MMOL/L (ref 3.5–5.1)
PROT SERPL-MCNC: 6.1 G/DL (ref 6.4–8.2)
RBC # BLD AUTO: 4.37 M/UL (ref 4–5.2)
SODIUM SERPL-SCNC: 137 MMOL/L (ref 136–145)
WBC # BLD AUTO: 8.3 K/UL (ref 4–11)

## 2023-12-07 PROCEDURE — 73100 X-RAY EXAM OF WRIST: CPT

## 2023-12-07 PROCEDURE — 36415 COLL VENOUS BLD VENIPUNCTURE: CPT

## 2023-12-07 PROCEDURE — 6360000002 HC RX W HCPCS: Performed by: ORTHOPAEDIC SURGERY

## 2023-12-07 PROCEDURE — 3700000000 HC ANESTHESIA ATTENDED CARE: Performed by: ORTHOPAEDIC SURGERY

## 2023-12-07 PROCEDURE — 7100000001 HC PACU RECOVERY - ADDTL 15 MIN: Performed by: ORTHOPAEDIC SURGERY

## 2023-12-07 PROCEDURE — 3700000001 HC ADD 15 MINUTES (ANESTHESIA): Performed by: ORTHOPAEDIC SURGERY

## 2023-12-07 PROCEDURE — 2709999900 HC NON-CHARGEABLE SUPPLY: Performed by: ORTHOPAEDIC SURGERY

## 2023-12-07 PROCEDURE — 6360000002 HC RX W HCPCS: Performed by: NURSE ANESTHETIST, CERTIFIED REGISTERED

## 2023-12-07 PROCEDURE — 2580000003 HC RX 258: Performed by: INTERNAL MEDICINE

## 2023-12-07 PROCEDURE — 99223 1ST HOSP IP/OBS HIGH 75: CPT | Performed by: ORTHOPAEDIC SURGERY

## 2023-12-07 PROCEDURE — 25609 OPTX DST RD XART FX/EP SEP3+: CPT | Performed by: NURSE PRACTITIONER

## 2023-12-07 PROCEDURE — C1769 GUIDE WIRE: HCPCS | Performed by: ORTHOPAEDIC SURGERY

## 2023-12-07 PROCEDURE — 7100000000 HC PACU RECOVERY - FIRST 15 MIN: Performed by: ORTHOPAEDIC SURGERY

## 2023-12-07 PROCEDURE — 0PSJ04Z REPOSITION LEFT RADIUS WITH INTERNAL FIXATION DEVICE, OPEN APPROACH: ICD-10-PCS | Performed by: ORTHOPAEDIC SURGERY

## 2023-12-07 PROCEDURE — 2580000003 HC RX 258: Performed by: ORTHOPAEDIC SURGERY

## 2023-12-07 PROCEDURE — 6370000000 HC RX 637 (ALT 250 FOR IP): Performed by: NURSE PRACTITIONER

## 2023-12-07 PROCEDURE — 2580000003 HC RX 258: Performed by: NURSE PRACTITIONER

## 2023-12-07 PROCEDURE — C1713 ANCHOR/SCREW BN/BN,TIS/BN: HCPCS | Performed by: ORTHOPAEDIC SURGERY

## 2023-12-07 PROCEDURE — 2500000003 HC RX 250 WO HCPCS: Performed by: NURSE ANESTHETIST, CERTIFIED REGISTERED

## 2023-12-07 PROCEDURE — A4217 STERILE WATER/SALINE, 500 ML: HCPCS | Performed by: ORTHOPAEDIC SURGERY

## 2023-12-07 PROCEDURE — 2580000003 HC RX 258: Performed by: NURSE ANESTHETIST, CERTIFIED REGISTERED

## 2023-12-07 PROCEDURE — 3600000004 HC SURGERY LEVEL 4 BASE: Performed by: ORTHOPAEDIC SURGERY

## 2023-12-07 PROCEDURE — 3600000014 HC SURGERY LEVEL 4 ADDTL 15MIN: Performed by: ORTHOPAEDIC SURGERY

## 2023-12-07 PROCEDURE — 80053 COMPREHEN METABOLIC PANEL: CPT

## 2023-12-07 PROCEDURE — 85025 COMPLETE CBC W/AUTO DIFF WBC: CPT

## 2023-12-07 PROCEDURE — 25609 OPTX DST RD XART FX/EP SEP3+: CPT | Performed by: ORTHOPAEDIC SURGERY

## 2023-12-07 PROCEDURE — 2720000010 HC SURG SUPPLY STERILE: Performed by: ORTHOPAEDIC SURGERY

## 2023-12-07 PROCEDURE — 93010 ELECTROCARDIOGRAM REPORT: CPT | Performed by: INTERNAL MEDICINE

## 2023-12-07 PROCEDURE — 6370000000 HC RX 637 (ALT 250 FOR IP): Performed by: ANESTHESIOLOGY

## 2023-12-07 PROCEDURE — 1200000000 HC SEMI PRIVATE

## 2023-12-07 DEVICE — VOLAR PLATE INTERMEDIATE LEFT, X-SHORT
Type: IMPLANTABLE DEVICE | Site: WRIST | Status: FUNCTIONAL
Brand: VARIAX

## 2023-12-07 DEVICE — BONE SCREW, FULLY THREADED, T8
Type: IMPLANTABLE DEVICE | Site: WRIST | Status: FUNCTIONAL
Brand: VARIAX

## 2023-12-07 DEVICE — LOCKING SCREW, FULLY THREADED,T8
Type: IMPLANTABLE DEVICE | Site: WRIST | Status: FUNCTIONAL
Brand: VARIAX

## 2023-12-07 RX ORDER — SODIUM CHLORIDE 9 MG/ML
INJECTION, SOLUTION INTRAVENOUS CONTINUOUS PRN
Status: DISCONTINUED | OUTPATIENT
Start: 2023-12-07 | End: 2023-12-07 | Stop reason: SDUPTHER

## 2023-12-07 RX ORDER — PROPOFOL 10 MG/ML
INJECTION, EMULSION INTRAVENOUS PRN
Status: DISCONTINUED | OUTPATIENT
Start: 2023-12-07 | End: 2023-12-07 | Stop reason: SDUPTHER

## 2023-12-07 RX ORDER — HYDRALAZINE HYDROCHLORIDE 20 MG/ML
10 INJECTION INTRAMUSCULAR; INTRAVENOUS EVERY 6 HOURS PRN
Status: DISCONTINUED | OUTPATIENT
Start: 2023-12-07 | End: 2023-12-11 | Stop reason: HOSPADM

## 2023-12-07 RX ORDER — DEXAMETHASONE SODIUM PHOSPHATE 4 MG/ML
INJECTION, SOLUTION INTRA-ARTICULAR; INTRALESIONAL; INTRAMUSCULAR; INTRAVENOUS; SOFT TISSUE PRN
Status: DISCONTINUED | OUTPATIENT
Start: 2023-12-07 | End: 2023-12-07 | Stop reason: SDUPTHER

## 2023-12-07 RX ORDER — SODIUM CHLORIDE 0.9 % (FLUSH) 0.9 %
5-40 SYRINGE (ML) INJECTION PRN
Status: DISCONTINUED | OUTPATIENT
Start: 2023-12-07 | End: 2023-12-07 | Stop reason: HOSPADM

## 2023-12-07 RX ORDER — BUPIVACAINE HYDROCHLORIDE 5 MG/ML
INJECTION, SOLUTION EPIDURAL; INTRACAUDAL
Status: COMPLETED | OUTPATIENT
Start: 2023-12-07 | End: 2023-12-07

## 2023-12-07 RX ORDER — FENTANYL CITRATE 50 UG/ML
INJECTION, SOLUTION INTRAMUSCULAR; INTRAVENOUS PRN
Status: DISCONTINUED | OUTPATIENT
Start: 2023-12-07 | End: 2023-12-07 | Stop reason: SDUPTHER

## 2023-12-07 RX ORDER — LIDOCAINE HYDROCHLORIDE 20 MG/ML
INJECTION, SOLUTION EPIDURAL; INFILTRATION; INTRACAUDAL; PERINEURAL PRN
Status: DISCONTINUED | OUTPATIENT
Start: 2023-12-07 | End: 2023-12-07 | Stop reason: SDUPTHER

## 2023-12-07 RX ORDER — SODIUM CHLORIDE 9 MG/ML
INJECTION, SOLUTION INTRAVENOUS PRN
Status: DISCONTINUED | OUTPATIENT
Start: 2023-12-07 | End: 2023-12-07 | Stop reason: HOSPADM

## 2023-12-07 RX ORDER — HYDROCODONE BITARTRATE AND ACETAMINOPHEN 5; 325 MG/1; MG/1
1 TABLET ORAL EVERY 4 HOURS PRN
Qty: 18 TABLET | Refills: 0 | Status: SHIPPED | OUTPATIENT
Start: 2023-12-07 | End: 2023-12-10

## 2023-12-07 RX ORDER — HYDROMORPHONE HYDROCHLORIDE 2 MG/ML
0.5 INJECTION, SOLUTION INTRAMUSCULAR; INTRAVENOUS; SUBCUTANEOUS EVERY 5 MIN PRN
Status: DISCONTINUED | OUTPATIENT
Start: 2023-12-07 | End: 2023-12-07 | Stop reason: HOSPADM

## 2023-12-07 RX ORDER — OXYCODONE HYDROCHLORIDE 5 MG/1
5 TABLET ORAL
Status: DISCONTINUED | OUTPATIENT
Start: 2023-12-07 | End: 2023-12-07 | Stop reason: HOSPADM

## 2023-12-07 RX ORDER — SODIUM CHLORIDE 0.9 % (FLUSH) 0.9 %
5-40 SYRINGE (ML) INJECTION EVERY 12 HOURS SCHEDULED
Status: DISCONTINUED | OUTPATIENT
Start: 2023-12-07 | End: 2023-12-07 | Stop reason: HOSPADM

## 2023-12-07 RX ORDER — ONDANSETRON 2 MG/ML
4 INJECTION INTRAMUSCULAR; INTRAVENOUS
Status: DISCONTINUED | OUTPATIENT
Start: 2023-12-07 | End: 2023-12-07 | Stop reason: HOSPADM

## 2023-12-07 RX ORDER — ACETAMINOPHEN 500 MG
1000 TABLET ORAL ONCE
Status: COMPLETED | OUTPATIENT
Start: 2023-12-07 | End: 2023-12-07

## 2023-12-07 RX ORDER — PHENYLEPHRINE HCL IN 0.9% NACL 1 MG/10 ML
SYRINGE (ML) INTRAVENOUS PRN
Status: DISCONTINUED | OUTPATIENT
Start: 2023-12-07 | End: 2023-12-07 | Stop reason: SDUPTHER

## 2023-12-07 RX ORDER — MORPHINE SULFATE 2 MG/ML
2 INJECTION, SOLUTION INTRAMUSCULAR; INTRAVENOUS EVERY 4 HOURS PRN
Status: DISCONTINUED | OUTPATIENT
Start: 2023-12-07 | End: 2023-12-11 | Stop reason: HOSPADM

## 2023-12-07 RX ORDER — HYDROMORPHONE HYDROCHLORIDE 2 MG/ML
0.25 INJECTION, SOLUTION INTRAMUSCULAR; INTRAVENOUS; SUBCUTANEOUS EVERY 5 MIN PRN
Status: DISCONTINUED | OUTPATIENT
Start: 2023-12-07 | End: 2023-12-07 | Stop reason: HOSPADM

## 2023-12-07 RX ORDER — ONDANSETRON 2 MG/ML
INJECTION INTRAMUSCULAR; INTRAVENOUS PRN
Status: DISCONTINUED | OUTPATIENT
Start: 2023-12-07 | End: 2023-12-07 | Stop reason: SDUPTHER

## 2023-12-07 RX ORDER — ACETAMINOPHEN 325 MG/1
650 TABLET ORAL 3 TIMES DAILY
Status: DISCONTINUED | OUTPATIENT
Start: 2023-12-07 | End: 2023-12-11 | Stop reason: HOSPADM

## 2023-12-07 RX ADMIN — Medication 10 ML: at 21:22

## 2023-12-07 RX ADMIN — FENTANYL CITRATE 25 MCG: 50 INJECTION, SOLUTION INTRAMUSCULAR; INTRAVENOUS at 11:50

## 2023-12-07 RX ADMIN — ACETAMINOPHEN 1000 MG: 500 TABLET ORAL at 10:32

## 2023-12-07 RX ADMIN — Medication 200 MCG: at 11:11

## 2023-12-07 RX ADMIN — CEFAZOLIN 2000 MG: 2 INJECTION, POWDER, FOR SOLUTION INTRAMUSCULAR; INTRAVENOUS at 11:00

## 2023-12-07 RX ADMIN — ACETAMINOPHEN 650 MG: 325 TABLET ORAL at 21:22

## 2023-12-07 RX ADMIN — ACETAMINOPHEN 650 MG: 325 TABLET ORAL at 13:36

## 2023-12-07 RX ADMIN — FENTANYL CITRATE 25 MCG: 50 INJECTION, SOLUTION INTRAMUSCULAR; INTRAVENOUS at 11:37

## 2023-12-07 RX ADMIN — FENTANYL CITRATE 50 MCG: 50 INJECTION, SOLUTION INTRAMUSCULAR; INTRAVENOUS at 11:05

## 2023-12-07 RX ADMIN — Medication 10 ML: at 08:53

## 2023-12-07 RX ADMIN — DEXAMETHASONE SODIUM PHOSPHATE 8 MG: 4 INJECTION, SOLUTION INTRAMUSCULAR; INTRAVENOUS at 11:15

## 2023-12-07 RX ADMIN — QUETIAPINE FUMARATE 50 MG: 25 TABLET ORAL at 21:22

## 2023-12-07 RX ADMIN — ONDANSETRON 4 MG: 2 INJECTION INTRAMUSCULAR; INTRAVENOUS at 11:15

## 2023-12-07 RX ADMIN — PROPOFOL 100 MG: 10 INJECTION, EMULSION INTRAVENOUS at 11:05

## 2023-12-07 RX ADMIN — SODIUM CHLORIDE: 9 INJECTION, SOLUTION INTRAVENOUS at 11:00

## 2023-12-07 RX ADMIN — LIDOCAINE HYDROCHLORIDE 100 MG: 20 INJECTION, SOLUTION EPIDURAL; INFILTRATION; INTRACAUDAL; PERINEURAL at 11:05

## 2023-12-07 ASSESSMENT — PAIN DESCRIPTION - LOCATION
LOCATION: HAND
LOCATION: ARM

## 2023-12-07 ASSESSMENT — PAIN DESCRIPTION - DESCRIPTORS
DESCRIPTORS: ACHING;SHARP
DESCRIPTORS: ACHING

## 2023-12-07 ASSESSMENT — PAIN SCALES - GENERAL
PAINLEVEL_OUTOF10: 3
PAINLEVEL_OUTOF10: 10
PAINLEVEL_OUTOF10: 5

## 2023-12-07 ASSESSMENT — ENCOUNTER SYMPTOMS: SHORTNESS OF BREATH: 0

## 2023-12-07 ASSESSMENT — PAIN - FUNCTIONAL ASSESSMENT: PAIN_FUNCTIONAL_ASSESSMENT: 0-10

## 2023-12-07 ASSESSMENT — PAIN DESCRIPTION - ORIENTATION
ORIENTATION: LEFT
ORIENTATION: LEFT

## 2023-12-07 NOTE — H&P
Hospital Medicine History & Physical      Patient Name: Mini Coelho    : 1930    PCP: Jose Rafael Ma MD    Date of Service:  Patient seen and examined on 23     Chief Complaint:  fall    History Of Present Illness:    Mini Coelho is a 80 y.o. female with a PMH of depression and hypothyroidism osteoarthritis CKD presents to the ED after a fall. She states that she did not have a walker lost her balance and fell on the floor landing on her left hand and wrist.  Denies hitting her head or neck on the floor. Denies any loss of consciousness or headache. In the ED evaluation x-rays of the left wrist shows comminuted fracture. Orthopedic consulted. Further images done include right knee x-ray hip x-rays. X-ray of the hip shows concerns for right femoral distal interlocking screw fracture. Patient is being admitted for orthopedic surgery intervention. Patient is a demented, so much of the information is derived from conversation with the emergency room physician, review of the records,     Past Medical History:    Patient has a past medical history of Acute renal failure ([address]), Arthritis, At risk for falling, Cirrhosis ([address]), Dementia with behavioral problem ([address]), Depression, Frequent falls, Gastroesophageal reflux disease, Movement disorder, Nephrotic syndrome, Osteoporosis, Pelvic fracture ([address]), and Thyroid disease. Past Surgical History:    Patient has a past surgical history that includes Excision of Facial Mass; skin biopsy; Colonoscopy; eye surgery; other surgical history (Right, 2018); Hip fracture surgery (Left, 2019); Forearm surgery (Right, 10/27/2022); and Finger trigger release (Left, 3/16/2023). Medications Prior to Admission:      Prior to Admission medications    Medication Sig Start Date End Date Taking?  Authorizing Provider   sennosides-docusate sodium (SENOKOT-S) 8.6-50 MG tablet Take 1 tablet by mouth daily 23   Helene Sainz MD

## 2023-12-07 NOTE — ANESTHESIA POSTPROCEDURE EVALUATION
Department of Anesthesiology  Postprocedure Note    Patient: Nirmala Cain  MRN: [id number]  YOB: 1930  Date of evaluation: 12/7/2023      Procedure Summary     Date: 12/07/23 Room / Location: [address]    Anesthesia Start: 1100 Anesthesia Stop: 0376    Procedure: OPEN REDUCTION INTERNAL FIXATION LEFT DISTAL RADIUS WITH MINI C-ARM - GIGI (Left: Wrist) Diagnosis:       Closed fracture of distal end of left radius, unspecified fracture morphology, initial encounter      (Closed fracture of distal end of left radius, unspecified fracture morphology, initial encounter [S52.502A])    Surgeons: Luis Oconnor MD Responsible Provider: Almas Mora MD    Anesthesia Type: general ASA Status: 3          Anesthesia Type: No value filed.     Gab Phase I: Gab Score: 10    Gab Phase II:        Anesthesia Post Evaluation    Patient location during evaluation: PACU  Patient participation: complete - patient participated  Level of consciousness: awake  Pain score: 0  Airway patency: patent  Nausea & Vomiting: no nausea and no vomiting  Complications: no  Cardiovascular status: hemodynamically stable  Respiratory status: acceptable  Hydration status: stable  Multimodal analgesia pain management approach  Pain management: adequate

## 2023-12-07 NOTE — CONSULTS
[address] Orthopedic Surgery  Consult Note         This patient is seen in consultation at the request of Dr Jamil Goode MD and Davidcastillo Briceno MD    Reason for Consult:  Left wrist pain/ moderately displaced distal radius intraarticular fracture. CHIEF COMPLAINT:  Left wrist pain/ fall. History Obtained From:  patient, family member - daughter, electronic medical record    HISTORY OF PRESENT ILLNESS:    Ms. Dina Guzmán is a 80 y.o.  female right handed well known to me who seen today at [facility] for evaluation of a left wrist injury. The patient reports that this injury occurred when she fell. She was first seen and evaluated in , came via EMS, when she was x-rayed and splinted, and I was consulted. Rates pain a 8/10 VAS moderate, sharp, constant and show no change. Movement makes the pain worse, the splint and resting makes the pain better. Alleviating factors rest. No numbness or tingling sensation.       Past Medical History:        Diagnosis Date    Acute renal failure ([address]) 11/27/2012    Arthritis     At risk for falling 08/28/2018    Score of 11/24 for Dynamic Gait Index    Cirrhosis ([address]) 09/25/2020    Dementia with behavioral problem ([address]) 09/25/2020    Depression     per [facility] records    Frequent falls     Gastroesophageal reflux disease 01/22/2019    Movement disorder     osteoporosis    Nephrotic syndrome 12/28/2012    Osteoporosis     per [facility] records    Pelvic fracture (HCC)     Thyroid disease        Past Surgical History:        Procedure Laterality Date    COLONOSCOPY      EXCISION OF FACIAL MASS      skin ca    EYE SURGERY      cataract removal; bilat    FINGER TRIGGER RELEASE Left 3/16/2023    LEFT MIDDLE FINGER TRIGGER FINGER RELEASE - LOCAL ONLY performed by Louise Plunkett MD at [facility] Right 10/27/2022    OPEN REDUCTION INTERNAL FIXATION RIGHT DISTAL RADIUS-GIGI performed by Louise Plunkett MD at [address] [facility] Left 8/21/2019

## 2023-12-07 NOTE — PLAN OF CARE
[facility] Orthopedic Surgery  Consult Note          Orthopedic Consult, full note to follow. Got called about my patient Nirmala Cain 80 y.o. well known to me, will be admitted for a fall with left wrist injury. Xray reviewed, and showed displaced left distal radius fracture. Plan:  - Recommend ORIF left distal radius.  - Surgery tomorrow around 10:30 AM    Thank you very much for the kind consultation and allowing me to participate in this patient's care. I will continue to keep you apprised of her progress.          Luis Oconnor MD, 12/6/2023 8:38 PM

## 2023-12-07 NOTE — DISCHARGE INSTR - COC
(Active)   Number of days: 0        Elimination:  Continence: Bowel: No  Bladder: No  Urinary Catheter: None   Colostomy/Ileostomy/Ileal Conduit: No       Date of Last BM: 12/8  No intake or output data in the 24 hours ending 12/07/23 1140  No intake/output data recorded. Safety Concerns:     History of Falls (last 30 days) and At Risk for Falls    Impairments/Disabilities:      None    Nutrition Therapy:  Current Nutrition Therapy:   - Oral Diet:  General    Routes of Feeding: Oral  Liquids: Thin Liquids  Daily Fluid Restriction: no  Last Modified Barium Swallow with Video (Video Swallowing Test): not done    Treatments at the Time of Hospital Discharge:   Respiratory Treatments: none  Oxygen Therapy:  is not on home oxygen therapy. Ventilator:    - No ventilator support    Rehab Therapies: Physical Therapy and Occupational Therapy  Weight Bearing Status/Restrictions: No weight bearing through operative wrist; ok to use platform walker  Other Medical Equipment (for information only, NOT a DME order):  stand lift  Other Treatments: Wound Care:   Change dressing daily as needed for saturation. May leave wound open to air on postop day #7 if no drainage. Home health care or facility to discontinue staples/sutures 10-14 days post op. If pt has a splint and dressing, DO NOT REMOVE. This will be removed at office visit    Follow up: Follow up Dr Shu Wood 2 weeks post op. Call [phone] for appointment. You have demonstrated risk factors for osteoporosis, such as age greater than fifty years and evidence of a fracture.   Please see your primary care physician for evaluation for osteoporosis, including consideration for DEXA scanning, if this is felt to be clinically indicated      Patient's personal belongings (please select all that are sent with patient):  None    RN SIGNATURE:  Electronically signed by Desire Karyle Moats, RN on 12/11/23 at 3:11 PM EST    CASE MANAGEMENT/SOCIAL WORK

## 2023-12-07 NOTE — PLAN OF CARE
Problem: Discharge Planning  Goal: Discharge to home or other facility with appropriate resources  12/7/2023 0854 by Daniela Blake RN  Outcome: Progressing  12/7/2023 0139 by Xiomy Bueno RN  Outcome: Progressing     Problem: ABCDS Injury Assessment  Goal: Absence of physical injury  Outcome: Progressing     Problem: Skin/Tissue Integrity  Goal: Absence of new skin breakdown  Description: 1. Monitor for areas of redness and/or skin breakdown  2. Assess vascular access sites hourly  3. Every 4-6 hours minimum:  Change oxygen saturation probe site  4. Every 4-6 hours:  If on nasal continuous positive airway pressure, respiratory therapy assess nares and determine need for appliance change or resting period.   Outcome: Progressing     Problem: Safety - Adult  Goal: Free from fall injury  12/7/2023 0854 by Daniela Blake RN  Outcome: Progressing  12/7/2023 0139 by Xiomy Bueno RN  Outcome: Progressing     Problem: Pain  Goal: Verbalizes/displays adequate comfort level or baseline comfort level  Outcome: Progressing

## 2023-12-07 NOTE — BRIEF OP NOTE
Brief Postoperative Note      Patient: Laurie Spangler  YOB: 1930  MRN: [id number]    Date of Procedure: 12/7/2023    Pre-Op Diagnosis Codes:     * Closed fracture of distal end of left radius, unspecified fracture morphology, initial encounter [S52.502A]    Post-Op Diagnosis: Same       Procedure(s):  OPEN REDUCTION INTERNAL FIXATION LEFT DISTAL RADIUS WITH MINI C-ARM - GIGI    Surgeon(s):  Kvng Beltran MD    Assistant: Erick Johnson CNP    Anesthesia: General    Estimated Blood Loss (mL): Minimal    Complications: None    Specimens:   * No specimens in log *    Implants:  Implant Name Type Inv. Item Serial No.  Lot No. LRB No. Used Action   PLATE BNE [id number] 10 H XSH L DST RAD VOLAR JEROMY TI - [id number]  PLATE BNE [id number] 10 H XSH L DST RAD VOLAR JEROMY TI  [facility]  Left 1 Implanted   SCREW T8 BONE 2. [id number] - [id number]  SCREW T8 BONE 2. [id number]  [employer] [facility]  Left 1 Implanted   SCREW BNE LCK 2.7X12 MM TI STRL VARIAX - [id number]  SCREW BNE LCK 2.7X12 MM TI STRL VARIAX  [employer] [facility]  Left 1 Implanted   SCREW LK 2.7X14MM - [id number]  SCREW LK 2.7X14MM  [employer] [facility]  Left 1 Implanted   SCREW LOCKING 2.8KKH87GF - [id number]  SCREW LOCKING 2.3SIH48EY  [employer] [facility]  Left 2 Implanted   SCREW BNE L18MM OD27MM ST GRICELDA FULL THRD T8 DRV - [id number]  SCREW BNE L18MM OD27MM ST GRICELDA FULL THRD T8 DRV  [employer] [facility]  Left 4 Implanted   SCREW BNE L16MM OD27MM ST GRICELDA FULL THRD T8 DRV - [id number]  SCREW BNE L16MM OD27MM ST GRICELDA FULL THRD T8 DRV  [employer] [facility]  Left 1 Implanted         Drains:   External Urinary Catheter (Active)   Site Assessment Clean,dry & intact 12/07/23 0800   Placement Initiated 12/06/23 2305   Perineal Care Yes 12/06/23 2305   Suction 40 mmgHg continuous 12/06/23 2305       Findings: Same.       Electronically signed by Kvng Beltran MD on 12/7/2023 at 1:29 PM

## 2023-12-07 NOTE — CARE COORDINATION
Case Management Assessment  Initial Evaluation    Date/Time of Evaluation: 12/7/2023 8:32 AM  Assessment Completed by: Jesi Hayward RN    If patient is discharged prior to next notation, then this note serves as note for discharge by case management. Patient Name: Waqar Bowers                   YOB: 1930  Diagnosis: Right hip pain [M25.551]  Contusion of right knee, initial encounter [S80.01XA]  Left wrist fracture, closed, initial encounter Traci Mcmahon                   Date / Time: 12/6/2023  6:22 PM    Patient Admission Status: Inpatient   Readmission Risk (Low < 19, Mod (19-27), High > 27): Readmission Risk Score: 10.2    Current PCP: Tamia Rangel MD  PCP verified by CM? (P) Yes    Chart Reviewed: Yes      History Provided by: (P) Patient  Patient Orientation: (P) Alert and Oriented    Patient Cognition: (P) Alert    Hospitalization in the last 30 days (Readmission):  No    If yes, Readmission Assessment in  Navigator will be completed.     Advance Directives:      Code Status: DNR-CCA   Patient's Primary Decision Maker is: (P) Legal Next of Kin    Primary Decision Maker: Costa Echeverria - [phone]    Secondary Decision Maker: Kristi Kim - [phone]    Discharge Planning:    Patient lives with: (P) Children Type of Home: (P) House (Three stesp to enter)  Primary Care Giver: (P) Self  Patient Support Systems include: (P) Children   Current Financial resources: (P) Medicare  Current community resources: (P) None  Current services prior to admission: (P) None            Current DME:              Type of Home Care services:  (P) None (TBD)    ADLS  Prior functional level: (P) Assistance with the following:, Mobility (patient uses a walker for ambulation)  Current functional level: (P) Assistance with the following:, Mobility    PT AM-PAC:   /24  OT AM-PAC:   /24    Family can provide assistance at DC: (P) Yes  Would you like Case Management to

## 2023-12-07 NOTE — ACP (ADVANCE CARE PLANNING)
APRN notified by RN that patient's daughter informed RN patient is to be Do not resuscitate.  -I called daughter Tonio Wright. Per Dionne Lepe, her wishes are for the patient to be Do not resuscitate - CCA. Patient is unable to make her own decisions due to mental status.  -Daughter Dionne Lepe is aware DNR order will be suspended in OR for surgical procedure tomorrow and is agreeable to same.     PINEDA Michelle - NP

## 2023-12-08 LAB
ANION GAP SERPL CALCULATED.3IONS-SCNC: 5 MMOL/L (ref 3–16)
ANISOCYTOSIS BLD QL SMEAR: ABNORMAL
BACTERIA URNS QL MICRO: NORMAL /HPF
BASOPHILS # BLD: 0 K/UL (ref 0–0.2)
BASOPHILS NFR BLD: 0 %
BILIRUB UR QL STRIP.AUTO: NEGATIVE
BUN SERPL-MCNC: 26 MG/DL (ref 7–20)
CALCIUM SERPL-MCNC: 8.6 MG/DL (ref 8.3–10.6)
CHLORIDE SERPL-SCNC: 103 MMOL/L (ref 99–110)
CLARITY UR: CLEAR
CO2 SERPL-SCNC: 26 MMOL/L (ref 21–32)
COLOR UR: YELLOW
CREAT SERPL-MCNC: 1.4 MG/DL (ref 0.6–1.2)
DEPRECATED RDW RBC AUTO: 14.8 % (ref 12.4–15.4)
EOSINOPHIL # BLD: 0 K/UL (ref 0–0.6)
EOSINOPHIL NFR BLD: 0 %
EPI CELLS #/AREA URNS AUTO: 4 /HPF (ref 0–5)
GFR SERPLBLD CREATININE-BSD FMLA CKD-EPI: 35 ML/MIN/{1.73_M2}
GLUCOSE SERPL-MCNC: 114 MG/DL (ref 70–99)
GLUCOSE UR STRIP.AUTO-MCNC: NEGATIVE MG/DL
HCT VFR BLD AUTO: 38.8 % (ref 36–48)
HGB BLD-MCNC: 12.5 G/DL (ref 12–16)
HGB UR QL STRIP.AUTO: NEGATIVE
HYALINE CASTS #/AREA URNS AUTO: 5 /LPF (ref 0–8)
KETONES UR STRIP.AUTO-MCNC: NEGATIVE MG/DL
LEUKOCYTE ESTERASE UR QL STRIP.AUTO: NEGATIVE
LYMPHOCYTES # BLD: 2 K/UL (ref 1–5.1)
LYMPHOCYTES NFR BLD: 20 %
MCH RBC QN AUTO: 30.1 PG (ref 26–34)
MCHC RBC AUTO-ENTMCNC: 32.2 G/DL (ref 31–36)
MCV RBC AUTO: 93.4 FL (ref 80–100)
MONOCYTES # BLD: 1.8 K/UL (ref 0–1.3)
MONOCYTES NFR BLD: 18 %
NEUTROPHILS # BLD: 6.1 K/UL (ref 1.7–7.7)
NEUTROPHILS NFR BLD: 62 %
NITRITE UR QL STRIP.AUTO: NEGATIVE
OVALOCYTES BLD QL SMEAR: ABNORMAL
PH UR STRIP.AUTO: 5.5 [PH] (ref 5–8)
PLATELET # BLD AUTO: 244 K/UL (ref 135–450)
PLATELET BLD QL SMEAR: ADEQUATE
PMV BLD AUTO: 8.7 FL (ref 5–10.5)
POTASSIUM SERPL-SCNC: 4.2 MMOL/L (ref 3.5–5.1)
PROT UR STRIP.AUTO-MCNC: 100 MG/DL
RBC # BLD AUTO: 4.16 M/UL (ref 4–5.2)
RBC CLUMPS #/AREA URNS AUTO: 2 /HPF (ref 0–4)
SLIDE REVIEW: ABNORMAL
SODIUM SERPL-SCNC: 134 MMOL/L (ref 136–145)
SP GR UR STRIP.AUTO: 1.01 (ref 1–1.03)
UA COMPLETE W REFLEX CULTURE PNL UR: ABNORMAL
UA DIPSTICK W REFLEX MICRO PNL UR: YES
URN SPEC COLLECT METH UR: ABNORMAL
UROBILINOGEN UR STRIP-ACNC: 1 E.U./DL
WBC # BLD AUTO: 9.8 K/UL (ref 4–11)
WBC #/AREA URNS AUTO: 1 /HPF (ref 0–5)

## 2023-12-08 PROCEDURE — 36415 COLL VENOUS BLD VENIPUNCTURE: CPT

## 2023-12-08 PROCEDURE — APPNB45 APP NON BILLABLE 31-45 MINUTES: Performed by: NURSE PRACTITIONER

## 2023-12-08 PROCEDURE — 6360000002 HC RX W HCPCS: Performed by: STUDENT IN AN ORGANIZED HEALTH CARE EDUCATION/TRAINING PROGRAM

## 2023-12-08 PROCEDURE — 2580000003 HC RX 258: Performed by: NURSE PRACTITIONER

## 2023-12-08 PROCEDURE — 97166 OT EVAL MOD COMPLEX 45 MIN: CPT

## 2023-12-08 PROCEDURE — 99024 POSTOP FOLLOW-UP VISIT: CPT | Performed by: NURSE PRACTITIONER

## 2023-12-08 PROCEDURE — 97530 THERAPEUTIC ACTIVITIES: CPT

## 2023-12-08 PROCEDURE — 1200000000 HC SEMI PRIVATE

## 2023-12-08 PROCEDURE — 6370000000 HC RX 637 (ALT 250 FOR IP): Performed by: NURSE PRACTITIONER

## 2023-12-08 PROCEDURE — 80048 BASIC METABOLIC PNL TOTAL CA: CPT

## 2023-12-08 PROCEDURE — 97162 PT EVAL MOD COMPLEX 30 MIN: CPT

## 2023-12-08 PROCEDURE — 2580000003 HC RX 258: Performed by: STUDENT IN AN ORGANIZED HEALTH CARE EDUCATION/TRAINING PROGRAM

## 2023-12-08 PROCEDURE — 87040 BLOOD CULTURE FOR BACTERIA: CPT

## 2023-12-08 PROCEDURE — 85025 COMPLETE CBC W/AUTO DIFF WBC: CPT

## 2023-12-08 PROCEDURE — 81001 URINALYSIS AUTO W/SCOPE: CPT

## 2023-12-08 PROCEDURE — 6360000002 HC RX W HCPCS: Performed by: NURSE PRACTITIONER

## 2023-12-08 RX ORDER — 0.9 % SODIUM CHLORIDE 0.9 %
1000 INTRAVENOUS SOLUTION INTRAVENOUS ONCE
Status: COMPLETED | OUTPATIENT
Start: 2023-12-08 | End: 2023-12-08

## 2023-12-08 RX ORDER — SODIUM CHLORIDE 9 MG/ML
INJECTION, SOLUTION INTRAVENOUS CONTINUOUS
Status: ACTIVE | OUTPATIENT
Start: 2023-12-08 | End: 2023-12-09

## 2023-12-08 RX ORDER — TRAMADOL HYDROCHLORIDE 50 MG/1
50 TABLET ORAL EVERY 8 HOURS PRN
Status: DISCONTINUED | OUTPATIENT
Start: 2023-12-08 | End: 2023-12-11 | Stop reason: HOSPADM

## 2023-12-08 RX ADMIN — ENOXAPARIN SODIUM 30 MG: 100 INJECTION SUBCUTANEOUS at 09:43

## 2023-12-08 RX ADMIN — DULOXETINE HYDROCHLORIDE 30 MG: 30 CAPSULE, DELAYED RELEASE ORAL at 09:43

## 2023-12-08 RX ADMIN — SODIUM CHLORIDE 1000 ML: 9 INJECTION, SOLUTION INTRAVENOUS at 13:08

## 2023-12-08 RX ADMIN — SODIUM CHLORIDE: 9 INJECTION, SOLUTION INTRAVENOUS at 15:12

## 2023-12-08 RX ADMIN — TRAMADOL HYDROCHLORIDE 50 MG: 50 TABLET ORAL at 20:49

## 2023-12-08 RX ADMIN — ACETAMINOPHEN 650 MG: 325 TABLET ORAL at 20:49

## 2023-12-08 RX ADMIN — ACETAMINOPHEN 650 MG: 325 TABLET ORAL at 14:37

## 2023-12-08 RX ADMIN — SODIUM CHLORIDE: 9 INJECTION, SOLUTION INTRAVENOUS at 12:33

## 2023-12-08 RX ADMIN — ACETAMINOPHEN 650 MG: 325 TABLET ORAL at 09:43

## 2023-12-08 RX ADMIN — QUETIAPINE FUMARATE 50 MG: 25 TABLET ORAL at 09:43

## 2023-12-08 RX ADMIN — QUETIAPINE FUMARATE 50 MG: 25 TABLET ORAL at 20:49

## 2023-12-08 RX ADMIN — LEVOTHYROXINE SODIUM 75 MCG: 0.07 TABLET ORAL at 06:49

## 2023-12-08 RX ADMIN — STANDARDIZED SENNA CONCENTRATE AND DOCUSATE SODIUM 1 TABLET: 8.6; 5 TABLET ORAL at 09:43

## 2023-12-08 RX ADMIN — MORPHINE SULFATE 2 MG: 2 INJECTION, SOLUTION INTRAMUSCULAR; INTRAVENOUS at 22:47

## 2023-12-08 RX ADMIN — SERTRALINE HYDROCHLORIDE 25 MG: 50 TABLET ORAL at 09:43

## 2023-12-08 RX ADMIN — Medication 10 ML: at 09:51

## 2023-12-08 ASSESSMENT — PAIN SCALES - GENERAL
PAINLEVEL_OUTOF10: 5
PAINLEVEL_OUTOF10: 7

## 2023-12-08 ASSESSMENT — PAIN DESCRIPTION - LOCATION: LOCATION: ARM

## 2023-12-08 ASSESSMENT — PAIN DESCRIPTION - PAIN TYPE: TYPE: SURGICAL PAIN

## 2023-12-08 ASSESSMENT — PAIN DESCRIPTION - ORIENTATION: ORIENTATION: LEFT

## 2023-12-08 NOTE — PLAN OF CARE
Problem: Discharge Planning  Goal: Discharge to home or other facility with appropriate resources  12/8/2023 1033 by Magno Lopez RN  Outcome: Progressing  12/7/2023 2236 by Edil Calero RN  Outcome: Progressing     Problem: ABCDS Injury Assessment  Goal: Absence of physical injury  Outcome: Progressing     Problem: Skin/Tissue Integrity  Goal: Absence of new skin breakdown  Description: 1. Monitor for areas of redness and/or skin breakdown  2. Assess vascular access sites hourly  3. Every 4-6 hours minimum:  Change oxygen saturation probe site  4. Every 4-6 hours:  If on nasal continuous positive airway pressure, respiratory therapy assess nares and determine need for appliance change or resting period.   Outcome: Progressing     Problem: Safety - Adult  Goal: Free from fall injury  12/8/2023 1033 by Magno Lopez RN  Outcome: Progressing  12/7/2023 2236 by Edil Calero RN  Outcome: Progressing     Problem: Pain  Goal: Verbalizes/displays adequate comfort level or baseline comfort level  12/8/2023 1033 by Magno Lopez RN  Outcome: Progressing  12/7/2023 2236 by Edil Calero RN  Outcome: Progressing

## 2023-12-08 NOTE — CARE COORDINATION
Discharge Planning. The SW called the pt's daughter Radha Robert [phone] and left a voicemail to discuss therapy recommendation. The SW at this time is waiting on a return call.      Electronically signed by DORA Valdes on 12/8/2023 at 2:25 PM

## 2023-12-08 NOTE — PLAN OF CARE
Problem: Discharge Planning  Goal: Discharge to home or other facility with appropriate resources  12/7/2023 2236 by Rosanne Melendrez RN  Outcome: Progressing  12/7/2023 0854 by Akira Cao RN  Outcome: Progressing     Problem: ABCDS Injury Assessment  Goal: Absence of physical injury  12/7/2023 0854 by Akira Cao RN  Outcome: Progressing     Problem: Skin/Tissue Integrity  Goal: Absence of new skin breakdown  Description: 1. Monitor for areas of redness and/or skin breakdown  2. Assess vascular access sites hourly  3. Every 4-6 hours minimum:  Change oxygen saturation probe site  4. Every 4-6 hours:  If on nasal continuous positive airway pressure, respiratory therapy assess nares and determine need for appliance change or resting period.   12/7/2023 0854 by Akira Cao RN  Outcome: Progressing     Problem: Safety - Adult  Goal: Free from fall injury  12/7/2023 2236 by Rosanne Melendrez RN  Outcome: Progressing  12/7/2023 0854 by Akira Cao RN  Outcome: Progressing     Problem: Pain  Goal: Verbalizes/displays adequate comfort level or baseline comfort level  12/7/2023 2236 by Rosanne Melendrez RN  Outcome: Progressing  12/7/2023 0854 by Akira Cao RN  Outcome: Progressing

## 2023-12-09 LAB
ANION GAP SERPL CALCULATED.3IONS-SCNC: 5 MMOL/L (ref 3–16)
BASOPHILS # BLD: 0.1 K/UL (ref 0–0.2)
BASOPHILS NFR BLD: 0.8 %
BUN SERPL-MCNC: 21 MG/DL (ref 7–20)
CALCIUM SERPL-MCNC: 8.3 MG/DL (ref 8.3–10.6)
CHLORIDE SERPL-SCNC: 112 MMOL/L (ref 99–110)
CO2 SERPL-SCNC: 22 MMOL/L (ref 21–32)
CREAT SERPL-MCNC: 1 MG/DL (ref 0.6–1.2)
DEPRECATED RDW RBC AUTO: 14.8 % (ref 12.4–15.4)
EOSINOPHIL # BLD: 0.4 K/UL (ref 0–0.6)
EOSINOPHIL NFR BLD: 5.7 %
GFR SERPLBLD CREATININE-BSD FMLA CKD-EPI: 52 ML/MIN/{1.73_M2}
GLUCOSE SERPL-MCNC: 82 MG/DL (ref 70–99)
HCT VFR BLD AUTO: 41.3 % (ref 36–48)
HGB BLD-MCNC: 13.2 G/DL (ref 12–16)
LYMPHOCYTES # BLD: 1.7 K/UL (ref 1–5.1)
LYMPHOCYTES NFR BLD: 27.5 %
MCH RBC QN AUTO: 30.4 PG (ref 26–34)
MCHC RBC AUTO-ENTMCNC: 31.9 G/DL (ref 31–36)
MCV RBC AUTO: 95.2 FL (ref 80–100)
MONOCYTES # BLD: 1.1 K/UL (ref 0–1.3)
MONOCYTES NFR BLD: 17.4 %
NEUTROPHILS # BLD: 3 K/UL (ref 1.7–7.7)
NEUTROPHILS NFR BLD: 48.6 %
PLATELET # BLD AUTO: 214 K/UL (ref 135–450)
PMV BLD AUTO: 8.5 FL (ref 5–10.5)
POTASSIUM SERPL-SCNC: 4.4 MMOL/L (ref 3.5–5.1)
RBC # BLD AUTO: 4.34 M/UL (ref 4–5.2)
SODIUM SERPL-SCNC: 139 MMOL/L (ref 136–145)
WBC # BLD AUTO: 6.2 K/UL (ref 4–11)

## 2023-12-09 PROCEDURE — 97530 THERAPEUTIC ACTIVITIES: CPT

## 2023-12-09 PROCEDURE — 1200000000 HC SEMI PRIVATE

## 2023-12-09 PROCEDURE — 85025 COMPLETE CBC W/AUTO DIFF WBC: CPT

## 2023-12-09 PROCEDURE — 6360000002 HC RX W HCPCS: Performed by: NURSE PRACTITIONER

## 2023-12-09 PROCEDURE — 6370000000 HC RX 637 (ALT 250 FOR IP): Performed by: NURSE PRACTITIONER

## 2023-12-09 PROCEDURE — 36415 COLL VENOUS BLD VENIPUNCTURE: CPT

## 2023-12-09 PROCEDURE — 2580000003 HC RX 258: Performed by: NURSE PRACTITIONER

## 2023-12-09 PROCEDURE — 80048 BASIC METABOLIC PNL TOTAL CA: CPT

## 2023-12-09 RX ADMIN — ACETAMINOPHEN 650 MG: 325 TABLET ORAL at 14:35

## 2023-12-09 RX ADMIN — STANDARDIZED SENNA CONCENTRATE AND DOCUSATE SODIUM 1 TABLET: 8.6; 5 TABLET ORAL at 09:49

## 2023-12-09 RX ADMIN — SERTRALINE HYDROCHLORIDE 25 MG: 50 TABLET ORAL at 09:49

## 2023-12-09 RX ADMIN — ACETAMINOPHEN 650 MG: 325 TABLET ORAL at 21:08

## 2023-12-09 RX ADMIN — ACETAMINOPHEN 650 MG: 325 TABLET ORAL at 09:49

## 2023-12-09 RX ADMIN — QUETIAPINE FUMARATE 50 MG: 25 TABLET ORAL at 09:49

## 2023-12-09 RX ADMIN — ENOXAPARIN SODIUM 30 MG: 100 INJECTION SUBCUTANEOUS at 09:49

## 2023-12-09 RX ADMIN — Medication 10 ML: at 21:11

## 2023-12-09 RX ADMIN — TRAMADOL HYDROCHLORIDE 50 MG: 50 TABLET ORAL at 06:35

## 2023-12-09 RX ADMIN — QUETIAPINE FUMARATE 50 MG: 25 TABLET ORAL at 21:09

## 2023-12-09 RX ADMIN — LEVOTHYROXINE SODIUM 75 MCG: 0.07 TABLET ORAL at 06:35

## 2023-12-09 RX ADMIN — DULOXETINE HYDROCHLORIDE 30 MG: 30 CAPSULE, DELAYED RELEASE ORAL at 09:49

## 2023-12-09 ASSESSMENT — PAIN SCALES - PAIN ASSESSMENT IN ADVANCED DEMENTIA (PAINAD)
BODYLANGUAGE: 0
BREATHING: 0
FACIALEXPRESSION: 0
CONSOLABILITY: 0
NEGVOCALIZATION: 0
TOTALSCORE: 0

## 2023-12-09 ASSESSMENT — PAIN SCALES - GENERAL
PAINLEVEL_OUTOF10: 0

## 2023-12-09 NOTE — PLAN OF CARE
Problem: Discharge Planning  Goal: Discharge to home or other facility with appropriate resources  Outcome: Progressing     Problem: ABCDS Injury Assessment  Goal: Absence of physical injury  Outcome: Progressing     Problem: Skin/Tissue Integrity  Goal: Absence of new skin breakdown  Description: 1. Monitor for areas of redness and/or skin breakdown  2. Assess vascular access sites hourly  3. Every 4-6 hours minimum:  Change oxygen saturation probe site  4. Every 4-6 hours:  If on nasal continuous positive airway pressure, respiratory therapy assess nares and determine need for appliance change or resting period.   Outcome: Progressing     Problem: Safety - Adult  Goal: Free from fall injury  Outcome: Progressing     Problem: Pain  Goal: Verbalizes/displays adequate comfort level or baseline comfort level  Outcome: Progressing

## 2023-12-10 LAB
ANION GAP SERPL CALCULATED.3IONS-SCNC: 8 MMOL/L (ref 3–16)
BASOPHILS # BLD: 0.1 K/UL (ref 0–0.2)
BASOPHILS NFR BLD: 0.9 %
BUN SERPL-MCNC: 17 MG/DL (ref 7–20)
CALCIUM SERPL-MCNC: 8.6 MG/DL (ref 8.3–10.6)
CHLORIDE SERPL-SCNC: 107 MMOL/L (ref 99–110)
CO2 SERPL-SCNC: 22 MMOL/L (ref 21–32)
CREAT SERPL-MCNC: 0.9 MG/DL (ref 0.6–1.2)
DEPRECATED RDW RBC AUTO: 15 % (ref 12.4–15.4)
EOSINOPHIL # BLD: 0.6 K/UL (ref 0–0.6)
EOSINOPHIL NFR BLD: 11.1 %
GFR SERPLBLD CREATININE-BSD FMLA CKD-EPI: 59 ML/MIN/{1.73_M2}
GLUCOSE SERPL-MCNC: 81 MG/DL (ref 70–99)
HCT VFR BLD AUTO: 38 % (ref 36–48)
HGB BLD-MCNC: 12.5 G/DL (ref 12–16)
LYMPHOCYTES # BLD: 1.5 K/UL (ref 1–5.1)
LYMPHOCYTES NFR BLD: 25.3 %
MCH RBC QN AUTO: 30.7 PG (ref 26–34)
MCHC RBC AUTO-ENTMCNC: 32.9 G/DL (ref 31–36)
MCV RBC AUTO: 93.2 FL (ref 80–100)
MONOCYTES # BLD: 1.1 K/UL (ref 0–1.3)
MONOCYTES NFR BLD: 18.9 %
NEUTROPHILS # BLD: 2.5 K/UL (ref 1.7–7.7)
NEUTROPHILS NFR BLD: 43.8 %
PLATELET # BLD AUTO: 209 K/UL (ref 135–450)
PMV BLD AUTO: 8 FL (ref 5–10.5)
POTASSIUM SERPL-SCNC: 4.5 MMOL/L (ref 3.5–5.1)
RBC # BLD AUTO: 4.08 M/UL (ref 4–5.2)
SODIUM SERPL-SCNC: 137 MMOL/L (ref 136–145)
WBC # BLD AUTO: 5.8 K/UL (ref 4–11)

## 2023-12-10 PROCEDURE — 2580000003 HC RX 258: Performed by: NURSE PRACTITIONER

## 2023-12-10 PROCEDURE — 1200000000 HC SEMI PRIVATE

## 2023-12-10 PROCEDURE — 6360000002 HC RX W HCPCS: Performed by: STUDENT IN AN ORGANIZED HEALTH CARE EDUCATION/TRAINING PROGRAM

## 2023-12-10 PROCEDURE — 6370000000 HC RX 637 (ALT 250 FOR IP): Performed by: NURSE PRACTITIONER

## 2023-12-10 PROCEDURE — 80048 BASIC METABOLIC PNL TOTAL CA: CPT

## 2023-12-10 PROCEDURE — 97530 THERAPEUTIC ACTIVITIES: CPT

## 2023-12-10 PROCEDURE — 36415 COLL VENOUS BLD VENIPUNCTURE: CPT

## 2023-12-10 PROCEDURE — 6360000002 HC RX W HCPCS: Performed by: NURSE PRACTITIONER

## 2023-12-10 PROCEDURE — 85025 COMPLETE CBC W/AUTO DIFF WBC: CPT

## 2023-12-10 RX ADMIN — ACETAMINOPHEN 650 MG: 325 TABLET ORAL at 20:25

## 2023-12-10 RX ADMIN — DULOXETINE HYDROCHLORIDE 30 MG: 30 CAPSULE, DELAYED RELEASE ORAL at 08:59

## 2023-12-10 RX ADMIN — QUETIAPINE FUMARATE 50 MG: 25 TABLET ORAL at 08:58

## 2023-12-10 RX ADMIN — ENOXAPARIN SODIUM 30 MG: 100 INJECTION SUBCUTANEOUS at 09:45

## 2023-12-10 RX ADMIN — HYDRALAZINE HYDROCHLORIDE 10 MG: 20 INJECTION, SOLUTION INTRAMUSCULAR; INTRAVENOUS at 09:00

## 2023-12-10 RX ADMIN — ACETAMINOPHEN 650 MG: 325 TABLET ORAL at 08:59

## 2023-12-10 RX ADMIN — SERTRALINE HYDROCHLORIDE 25 MG: 50 TABLET ORAL at 08:58

## 2023-12-10 RX ADMIN — Medication 10 ML: at 09:00

## 2023-12-10 RX ADMIN — STANDARDIZED SENNA CONCENTRATE AND DOCUSATE SODIUM 1 TABLET: 8.6; 5 TABLET ORAL at 08:58

## 2023-12-10 RX ADMIN — QUETIAPINE FUMARATE 50 MG: 25 TABLET ORAL at 20:26

## 2023-12-10 RX ADMIN — TRAMADOL HYDROCHLORIDE 50 MG: 50 TABLET ORAL at 11:25

## 2023-12-10 RX ADMIN — ACETAMINOPHEN 650 MG: 325 TABLET ORAL at 15:30

## 2023-12-10 RX ADMIN — Medication 10 ML: at 20:27

## 2023-12-10 RX ADMIN — LEVOTHYROXINE SODIUM 75 MCG: 0.07 TABLET ORAL at 06:53

## 2023-12-10 ASSESSMENT — PAIN SCALES - PAIN ASSESSMENT IN ADVANCED DEMENTIA (PAINAD)
BREATHING: 0
FACIALEXPRESSION: 0
BODYLANGUAGE: 0
NEGVOCALIZATION: 0
BODYLANGUAGE: 0
FACIALEXPRESSION: 0
TOTALSCORE: 0
BREATHING: 0
TOTALSCORE: 0
CONSOLABILITY: 0
TOTALSCORE: 0
BREATHING: 0
FACIALEXPRESSION: 0
BODYLANGUAGE: 0
CONSOLABILITY: 0
FACIALEXPRESSION: 0
TOTALSCORE: 0
CONSOLABILITY: 0
CONSOLABILITY: 0
BODYLANGUAGE: 0
NEGVOCALIZATION: 0
NEGVOCALIZATION: 0
BREATHING: 0
FACIALEXPRESSION: 0
BREATHING: 0
NEGVOCALIZATION: 0
BREATHING: 0
TOTALSCORE: 0
FACIALEXPRESSION: 0
NEGVOCALIZATION: 0
TOTALSCORE: 0
TOTALSCORE: 0
BODYLANGUAGE: 0
BODYLANGUAGE: 0
NEGVOCALIZATION: 0
BODYLANGUAGE: 0
TOTALSCORE: 0
FACIALEXPRESSION: 0
CONSOLABILITY: 0
CONSOLABILITY: 0
BREATHING: 0
CONSOLABILITY: 0
FACIALEXPRESSION: 0
CONSOLABILITY: 0
NEGVOCALIZATION: 0
BREATHING: 0
BODYLANGUAGE: 0
NEGVOCALIZATION: 0

## 2023-12-10 ASSESSMENT — PAIN - FUNCTIONAL ASSESSMENT: PAIN_FUNCTIONAL_ASSESSMENT: PREVENTS OR INTERFERES SOME ACTIVE ACTIVITIES AND ADLS

## 2023-12-10 ASSESSMENT — PAIN SCALES - GENERAL
PAINLEVEL_OUTOF10: 7
PAINLEVEL_OUTOF10: 0

## 2023-12-10 ASSESSMENT — PAIN DESCRIPTION - LOCATION: LOCATION: ARM

## 2023-12-10 ASSESSMENT — PAIN DESCRIPTION - DESCRIPTORS: DESCRIPTORS: SORE;THROBBING

## 2023-12-10 ASSESSMENT — PAIN DESCRIPTION - ORIENTATION: ORIENTATION: LEFT

## 2023-12-10 NOTE — PLAN OF CARE
Problem: Discharge Planning  Goal: Discharge to home or other facility with appropriate resources  12/9/2023 2354 by Flavia De La Vega RN  Outcome: Progressing  12/9/2023 1030 by Phuong Andrade RN  Outcome: Progressing     Problem: ABCDS Injury Assessment  Goal: Absence of physical injury  12/9/2023 1030 by Phuong Andrade RN  Outcome: Progressing     Problem: Skin/Tissue Integrity  Goal: Absence of new skin breakdown  Description: 1. Monitor for areas of redness and/or skin breakdown  2. Assess vascular access sites hourly  3. Every 4-6 hours minimum:  Change oxygen saturation probe site  4. Every 4-6 hours:  If on nasal continuous positive airway pressure, respiratory therapy assess nares and determine need for appliance change or resting period.   12/9/2023 2354 by Flavia De La Vega RN  Outcome: Progressing  12/9/2023 1030 by Phuong Andrade RN  Outcome: Progressing     Problem: Safety - Adult  Goal: Free from fall injury  12/9/2023 2354 by Flavia De La Vega RN  Outcome: Progressing  12/9/2023 1030 by Phuong Andrade RN  Outcome: Progressing     Problem: Pain  Goal: Verbalizes/displays adequate comfort level or baseline comfort level  12/9/2023 2354 by Flavia De La Vega RN  Outcome: Progressing  12/9/2023 1030 by Phuong Andrade RN  Outcome: Progressing

## 2023-12-10 NOTE — PLAN OF CARE
Problem: Discharge Planning  Goal: Discharge to home or other facility with appropriate resources  12/10/2023 1000 by Vianey Woodard RN  Outcome: Progressing     Problem: ABCDS Injury Assessment  Goal: Absence of physical injury  Outcome: Progressing     Problem: Skin/Tissue Integrity  Goal: Absence of new skin breakdown  Description: 1. Monitor for areas of redness and/or skin breakdown  2. Assess vascular access sites hourly  3. Every 4-6 hours minimum:  Change oxygen saturation probe site  4. Every 4-6 hours:  If on nasal continuous positive airway pressure, respiratory therapy assess nares and determine need for appliance change or resting period.   12/10/2023 1000 by Vianey Woodard RN  Outcome: Progressing     Problem: Safety - Adult  Goal: Free from fall injury  12/10/2023 1000 by Vianey Woodard RN  Outcome: Progressing     Problem: Pain  Goal: Verbalizes/displays adequate comfort level or baseline comfort level  12/10/2023 1000 by Vianey Woodard RN  Outcome: Progressing

## 2023-12-11 VITALS
TEMPERATURE: 97.6 F | DIASTOLIC BLOOD PRESSURE: 72 MMHG | BODY MASS INDEX: 30.78 KG/M2 | RESPIRATION RATE: 16 BRPM | SYSTOLIC BLOOD PRESSURE: 160 MMHG | HEIGHT: 61 IN | OXYGEN SATURATION: 93 % | WEIGHT: 163 LBS | HEART RATE: 82 BPM

## 2023-12-11 LAB
ANION GAP SERPL CALCULATED.3IONS-SCNC: 8 MMOL/L (ref 3–16)
BASOPHILS # BLD: 0.1 K/UL (ref 0–0.2)
BASOPHILS NFR BLD: 0.7 %
BUN SERPL-MCNC: 21 MG/DL (ref 7–20)
CALCIUM SERPL-MCNC: 9.2 MG/DL (ref 8.3–10.6)
CHLORIDE SERPL-SCNC: 106 MMOL/L (ref 99–110)
CO2 SERPL-SCNC: 25 MMOL/L (ref 21–32)
CREAT SERPL-MCNC: 1.1 MG/DL (ref 0.6–1.2)
DEPRECATED RDW RBC AUTO: 14.9 % (ref 12.4–15.4)
EOSINOPHIL # BLD: 0.7 K/UL (ref 0–0.6)
EOSINOPHIL NFR BLD: 9 %
GFR SERPLBLD CREATININE-BSD FMLA CKD-EPI: 47 ML/MIN/{1.73_M2}
GLUCOSE SERPL-MCNC: 93 MG/DL (ref 70–99)
HCT VFR BLD AUTO: 40.6 % (ref 36–48)
HGB BLD-MCNC: 13.5 G/DL (ref 12–16)
LYMPHOCYTES # BLD: 1.3 K/UL (ref 1–5.1)
LYMPHOCYTES NFR BLD: 18.1 %
MCH RBC QN AUTO: 31 PG (ref 26–34)
MCHC RBC AUTO-ENTMCNC: 33.2 G/DL (ref 31–36)
MCV RBC AUTO: 93.3 FL (ref 80–100)
MONOCYTES # BLD: 0.8 K/UL (ref 0–1.3)
MONOCYTES NFR BLD: 10.5 %
NEUTROPHILS # BLD: 4.5 K/UL (ref 1.7–7.7)
NEUTROPHILS NFR BLD: 61.7 %
PATH INTERP BLD-IMP: NORMAL
PLATELET # BLD AUTO: 283 K/UL (ref 135–450)
PMV BLD AUTO: 8.2 FL (ref 5–10.5)
POTASSIUM SERPL-SCNC: 3.8 MMOL/L (ref 3.5–5.1)
RBC # BLD AUTO: 4.35 M/UL (ref 4–5.2)
SODIUM SERPL-SCNC: 139 MMOL/L (ref 136–145)
WBC # BLD AUTO: 7.4 K/UL (ref 4–11)

## 2023-12-11 PROCEDURE — 80048 BASIC METABOLIC PNL TOTAL CA: CPT

## 2023-12-11 PROCEDURE — 6360000002 HC RX W HCPCS: Performed by: NURSE PRACTITIONER

## 2023-12-11 PROCEDURE — 97535 SELF CARE MNGMENT TRAINING: CPT

## 2023-12-11 PROCEDURE — 85025 COMPLETE CBC W/AUTO DIFF WBC: CPT

## 2023-12-11 PROCEDURE — 36415 COLL VENOUS BLD VENIPUNCTURE: CPT

## 2023-12-11 PROCEDURE — 6370000000 HC RX 637 (ALT 250 FOR IP): Performed by: NURSE PRACTITIONER

## 2023-12-11 PROCEDURE — 2580000003 HC RX 258: Performed by: NURSE PRACTITIONER

## 2023-12-11 PROCEDURE — 97530 THERAPEUTIC ACTIVITIES: CPT

## 2023-12-11 RX ADMIN — LEVOTHYROXINE SODIUM 75 MCG: 0.07 TABLET ORAL at 06:41

## 2023-12-11 RX ADMIN — TRAMADOL HYDROCHLORIDE 50 MG: 50 TABLET ORAL at 11:39

## 2023-12-11 RX ADMIN — ENOXAPARIN SODIUM 30 MG: 100 INJECTION SUBCUTANEOUS at 10:13

## 2023-12-11 RX ADMIN — SERTRALINE HYDROCHLORIDE 25 MG: 50 TABLET ORAL at 10:14

## 2023-12-11 RX ADMIN — DULOXETINE HYDROCHLORIDE 30 MG: 30 CAPSULE, DELAYED RELEASE ORAL at 10:18

## 2023-12-11 RX ADMIN — Medication 10 ML: at 10:18

## 2023-12-11 RX ADMIN — ACETAMINOPHEN 650 MG: 325 TABLET ORAL at 10:14

## 2023-12-11 RX ADMIN — STANDARDIZED SENNA CONCENTRATE AND DOCUSATE SODIUM 1 TABLET: 8.6; 5 TABLET ORAL at 10:14

## 2023-12-11 RX ADMIN — QUETIAPINE FUMARATE 50 MG: 25 TABLET ORAL at 10:14

## 2023-12-11 ASSESSMENT — PAIN DESCRIPTION - ORIENTATION: ORIENTATION: LEFT

## 2023-12-11 ASSESSMENT — PAIN SCALES - GENERAL: PAINLEVEL_OUTOF10: 5

## 2023-12-11 ASSESSMENT — PAIN DESCRIPTION - LOCATION: LOCATION: LEG;WRIST

## 2023-12-11 ASSESSMENT — PAIN SCALES - WONG BAKER: WONGBAKER_NUMERICALRESPONSE: 0

## 2023-12-11 NOTE — PLAN OF CARE
Problem: Discharge Planning  Goal: Discharge to home or other facility with appropriate resources  12/11/2023 1220 by Rashida Carnes RN  Outcome: Progressing  12/11/2023 0149 by Anabell Lawson RN  Outcome: Progressing     Problem: ABCDS Injury Assessment  Goal: Absence of physical injury  12/11/2023 1220 by Rashida Carnes RN  Outcome: Progressing  12/11/2023 0149 by Anabell Lawson RN  Outcome: Progressing     Problem: Skin/Tissue Integrity  Goal: Absence of new skin breakdown  Description: 1. Monitor for areas of redness and/or skin breakdown  2. Assess vascular access sites hourly  3. Every 4-6 hours minimum:  Change oxygen saturation probe site  4. Every 4-6 hours:  If on nasal continuous positive airway pressure, respiratory therapy assess nares and determine need for appliance change or resting period.   12/11/2023 1220 by Rashida Carnes RN  Outcome: Progressing  12/11/2023 0149 by Anabell Lawson RN  Outcome: Progressing     Problem: Safety - Adult  Goal: Free from fall injury  12/11/2023 1220 by Rashida Carnes RN  Outcome: Progressing  12/11/2023 0149 by Anabell Lawson RN  Outcome: Progressing     Problem: Pain  Goal: Verbalizes/displays adequate comfort level or baseline comfort level  12/11/2023 1220 by Rashida Carnes RN  Outcome: Progressing  12/11/2023 0149 by Anabell Lawson RN  Outcome: Progressing

## 2023-12-11 NOTE — CARE COORDINATION
SW received a voicemail from daughter, Ck Stafford [phone], who stated that she would like pt to go to [facility]. Called [facility] and left a message. Faxed a referral as well. Waiting on a response.     Electronically signed by DORA Goldsmith, LSW on 12/11/2023 at 12:00 PM

## 2023-12-11 NOTE — CARE COORDINATION
12/11/23 1328   IMM Letter   IMM Letter given to Patient/Family/Significant other/Guardian/POA/by: Case Management - dtr ok with form being delivered less than 4 hours to discharge   IMM Letter date given: 12/11/23   IMM Letter time given: 1327     Electronically signed by DORA Fuentes, LSW on 12/11/2023 at 1:28 PM negative responds to verbal commands

## 2023-12-11 NOTE — DISCHARGE SUMMARY
Hospital Medicine Discharge Summary    Patient ID: Seamus Skelton      Patient's PCP: Priya Malone MD    Admit Date: 12/6/2023     Discharge Date:     Admitting Physician: Hung Perera MD     Discharge Physician: Rodrigo Zamora MD     Discharge Diagnoses:    Principal Problem:    Left wrist fracture, closed, initial encounter  Active Problems:    Closed fracture of left distal radius  Resolved Problems:    * No resolved hospital problems. *        The patient was seen and examined on day of discharge and this discharge summary is in conjunction with any daily progress note from day of discharge. Hospital Course:     Seamus Skelton is a 80 y.o. female who was admitted for fall. Patient is a 80 y.o. female with a PMH of depression and hypothyroidism osteoarthritis CKD presents to the ED after a fall. She states that she did not have a walker lost her balance and fell on the floor landing on her left hand and wrist.  Denies hitting her head or neck on the floor. Denies any loss of consciousness or headache. In the ED evaluation x-rays of the left wrist shows comminuted fracture. Orthopedic consulted. Further images done include right knee x-ray hip x-rays. X-ray of the hip shows concerns for right femoral distal interlocking screw fracture. Patient is being admitted for orthopedic surgery intervention. Patient is a demented, so much of the information is derived from conversation with the emergency room physician, review of the records. #. Left wrist fracture:  - S/p ORIF left distal radius  - Patient presented with chief complain of fall. X ray showed presence of posteriorly dislocated acute comminuted fracture of the distal radius and ulna. Patient underwent ORIF of left distal radius on 12/07/2023. PT OT was consulted. #. Hypotension:  - Resolved with IV fluid. Patient became hypotensive the day after surgery. She was started on IVF. CBC was done and showed no leukocytosis.   UA

## 2023-12-11 NOTE — CARE COORDINATION
SW received a call back from Saray at [facility] stating yes they can accept the patient today. No precert required. [facility]. HENS completed. Discharge packet completed. All documents faxed. Daughter, facility and RN informed of plan and time. Discharge Plan:  [address]  [address] [facility]  Phone: [phone]  Fax: [phone]    [facility] transport at 4:30pm today.     Electronically signed by DORA Ayoub, ANHW on 12/11/2023 at 1:43 PM Normal

## 2023-12-12 ENCOUNTER — TELEPHONE (OUTPATIENT)
Dept: INTERNAL MEDICINE CLINIC | Age: 88
End: 2023-12-12

## 2023-12-12 LAB
BACTERIA BLD CULT ORG #2: NORMAL
BACTERIA BLD CULT: NORMAL

## 2023-12-12 NOTE — TELEPHONE ENCOUNTER
Care Transitions Initial Follow Up Call    Outreach made within 2 business days of discharge: Yes    Patient: Damien Gonzalez Patient : [id number]   MRN: [id number]  Reason for Admission: There are no discharge diagnoses documented for the most recent discharge. Discharge Date: 23      LV for daughter, Steffanie Liang. According to chart, patient was discharged to SNF. Daughter asked to please call the office back once patient is d/c'd home. Scheduled appointment with PCP within 7-14 days    Follow Up  No future appointments.     Gustavo Mcconnell LPN

## 2023-12-13 ENCOUNTER — TELEPHONE (OUTPATIENT)
Dept: ORTHOPEDIC SURGERY | Age: 88
End: 2023-12-13

## 2023-12-14 ENCOUNTER — TELEPHONE (OUTPATIENT)
Dept: ORTHOPEDIC SURGERY | Age: 88
End: 2023-12-14

## 2023-12-14 ENCOUNTER — TELEPHONE (OUTPATIENT)
Dept: INTERNAL MEDICINE CLINIC | Age: 88
End: 2023-12-14

## 2023-12-14 NOTE — TELEPHONE ENCOUNTER
Daughter was called back. This writer left her a VM Tuesday. Call was made to clarify that pt was discharged to a rehab facility. Pt is @ [facility]. Daughter will let us know when she is discharged home and we can made a HFU visit.

## 2023-12-14 NOTE — TELEPHONE ENCOUNTER
RESCHED 12/26/23 Pt IS IN  CARE FACILITY AND MAY NEED TO RESCHED THROUGH THE FACILITY, BUT IS MOVED, FOR NOW.

## 2023-12-14 NOTE — TELEPHONE ENCOUNTER
[facility] Name: Barbara  Contact Name: JOSE  Contact Number: [phone]  Request or Information: PT REMOVED SPLINT AND DRESSING FROM ARM THIS AM  STAFF REWRAPPED AND PUT SPLINT BACK ON NO SWELLING  JUST WANTED TO ADV DOCTOR

## 2023-12-26 ENCOUNTER — TELEPHONE (OUTPATIENT)
Dept: ORTHOPEDIC SURGERY | Age: 88
End: 2023-12-26

## 2023-12-26 NOTE — TELEPHONE ENCOUNTER
Medical Facility Question     Facility Name: [facility] Name: Cristhian Riddle Number: [phone]  Request or Information: PATIENT REMOVED BANDAGE AND WON'T PUT BRACE ON. PLEASE ADVISE.

## 2023-12-26 NOTE — TELEPHONE ENCOUNTER
Niesha Alejandro is going to do their best to get patient to leave forearm splint on. They will tape it if necessary.

## 2023-12-28 ENCOUNTER — OFFICE VISIT (OUTPATIENT)
Dept: ORTHOPEDIC SURGERY | Age: 88
End: 2023-12-28

## 2023-12-28 ENCOUNTER — TELEPHONE (OUTPATIENT)
Dept: ORTHOPEDIC SURGERY | Age: 88
End: 2023-12-28

## 2023-12-28 VITALS — WEIGHT: 162.92 LBS | BODY MASS INDEX: 30.76 KG/M2 | HEIGHT: 61 IN

## 2023-12-28 DIAGNOSIS — S52.531A CLOSED COLLES' FRACTURE OF RIGHT RADIUS, INITIAL ENCOUNTER: ICD-10-CM

## 2023-12-28 DIAGNOSIS — M25.532 LEFT WRIST PAIN: Primary | ICD-10-CM

## 2023-12-28 PROCEDURE — 99024 POSTOP FOLLOW-UP VISIT: CPT | Performed by: NURSE PRACTITIONER

## 2023-12-28 PROCEDURE — APPNB15 APP NON BILLABLE TIME 0-15 MINS: Performed by: NURSE PRACTITIONER

## 2023-12-28 NOTE — TELEPHONE ENCOUNTER
Spoke with Kenyatta Galloway (on HIPAA). Explained non weight bearing status and brace situation. Kenyatta Galloway does not remember seeing Lorena Gunter ever wear a brace while in the nursing home. She will stay in contact with our office incase a brace will need ordered.

## 2023-12-28 NOTE — PROGRESS NOTES
DIAGNOSIS:  Left distal radius severely comminuted 3-4 parts intra articular displaced fracture, status post ORIF. DATE OF SURGERY: 12/7/2023. HISTORY OF PRESENT ILLNESS:  Ms. Malka Tavares 80 y.o.  female right handed returns today  for 2 weeks postoperative visit. The patient denies any significant pain in the left wrist.  Rates pain a 1/10 VAS mild, aching, intermittent and are improving. Aggravating factors using it. Alleviating factors rest.  No numbness or tingling sensation. No fever or Chills. She  is in a splint. She is in a skilled nursing facility and has been taking her splint off on her own. Denies smoking. PHYSICAL EXAMINATION:  The incision is completely healed . No signs of any erythema or drainage. She  has no pain with the active or passive range of motion of the left wrist, but decrease ROM. She  has intact sensation, distally, and she  is neurovascularly intact. IMAGING:  Three views left wrist taken today in the office showed anatomic alignment of left distal radius, plate and screws in good position, no loosening. IMPRESSION:  2 weeks out from left distal radius ORIF and doing very well. PLAN:  I have told the patient to work on ROM, as well as strengthening exercises. A removable forearm brace was instructed to be used, she has this at the skilled nursing facility. Nonweightbearing left arm for 6 weeks. She is able to use a platform walker to ambulate, use the wrist brace when doing so. The patient will come back for a follow up in 6 weeks. At that time, we will take 3 views of the left wrist.       As this patient has demonstrated risk factors for osteoporosis, such as age greater than fifty years and evidence of a fracture, I have referred the patient back to the primary care physician for evaluation for osteoporosis, including consideration for DEXA scanning, if this is felt to be clinically indicated.   The patient is advised to contact the primary care 0

## 2023-12-28 NOTE — TELEPHONE ENCOUNTER
General Question     Subject: UPDATE ON PT CARE  Patient and /or Facility Request: [facility]  Contact Number: [phone]    PT DAUGHTER CALLED SAID PT MAY SAY SHE IS DOING BETTER BUT ADV DAUGHTER WRIST WAS HURTING VERY BAD YESTERDAY.  ALSO PLEASE ADV THAT IF PT IS STILL NON WEIGHT BEARING SHE CAN STAY AT FACULITY AT DAUGHTER JUST HAD SHOULDER SX NOT ABLE TO TAKE CARE OF PT RIGHT NOW IF RELEASED DAUGHTER WOULD ALSO LIKE A CLL AFTER APPT FOR UPDATE SINCE SHE ISN'T ABLE TO ATTEND

## 2024-01-18 ENCOUNTER — CARE COORDINATION (OUTPATIENT)
Dept: CASE MANAGEMENT | Age: 89
End: 2024-01-18

## 2024-01-18 ENCOUNTER — CLINICAL DOCUMENTATION ONLY (OUTPATIENT)
Facility: CLINIC | Age: 89
End: 2024-01-18

## 2024-01-18 NOTE — CARE COORDINATION
Care Transitions Post-Acute Facility Discharge Call    2024    Patient: Earnestine Villagomez Patient : 1930   MRN: [id number]  Reason for Admission: L wrist fx  Discharge Date: 23 RARS: Readmission Risk Score: 14.2        Acute Care Course:    to  [place] L wrist fx s/p fall ORIF on  - Dr Anjel Jiménez long-term to home with [facility] - called for SOC    HFU made:   Dr Hobbs Healing well 1) removable splint 2) continue ROM 3) Patform walker  24 f/u with ortho    Sig Hx:   Dementia, osteo, hypothyroid    DME: platform walker    Conversation:   Left HIPPA compliant message regarding the nature of the call and a request for a return call with my contact information      GEORGIA Hill, [phone]  [facility] Transition Nurse     Called [facility] for SOC     Follow up plan:  Will re-attempt           Care Transitions Post Acute Facility Transition    Post Acute Facility: [facility]  Post Acute Admit Date: 23  Post Acute Discharge Date: 24  ELOS: 21 days  Do you have all of your prescriptions and are they filled?: Yes   Patient Reports: Daughter is picking up new ones today.   Post Acute Services: Home Health (Comment: [facility])         Do you have support at home?: Child   Patient DME: Shower chair, Walker, Other   Other Patient DME: RAISED TOILET SEAT, LIFE ALERT, GRAB BARS, SHOWER CHAIR, HAND HELD SHOWER - 10/3/23 [facility]      Care Transitions Interventions         Future Appointments   Date Time Provider Department Center   2024 11:40 AM Kvng Barahona MD [place] [facility] [place] [facility]   2024  1:45 PM Abdifatah Hobbs MD [facility]     GEORGIA Hill, RN   [facility]/ [facility] Transition Nurse  [phone]

## 2024-01-19 ENCOUNTER — CARE COORDINATION (OUTPATIENT)
Dept: CASE MANAGEMENT | Age: 89
End: 2024-01-19

## 2024-01-19 NOTE — CARE COORDINATION
Left HIPPA compliant message regarding the nature of the call and a request for a return call with my contact information      MICHAEL HillN, [phone]  [facility] Transition Nurse

## 2024-01-22 ENCOUNTER — OFFICE VISIT (OUTPATIENT)
Dept: INTERNAL MEDICINE CLINIC | Age: 89
End: 2024-01-22
Payer: MEDICARE

## 2024-01-22 VITALS — SYSTOLIC BLOOD PRESSURE: 94 MMHG | DIASTOLIC BLOOD PRESSURE: 52 MMHG
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
## 2024-01-22 DIAGNOSIS — K72.10 CHRONIC LIVER FAILURE WITHOUT HEPATIC COMA (HCC): ICD-10-CM

## 2024-01-22 DIAGNOSIS — F02.818 LATE ONSET ALZHEIMER'S DISEASE WITH BEHAVIORAL DISTURBANCE (HCC): ICD-10-CM

## 2024-01-22 DIAGNOSIS — F02.C0 SEVERE LATE ONSET ALZHEIMER'S DEMENTIA WITHOUT BEHAVIORAL DISTURBANCE, PSYCHOTIC DISTURBANCE, MOOD DISTURBANCE, OR ANXIETY (HCC): ICD-10-CM

## 2024-01-22 DIAGNOSIS — N18.31 STAGE 3A CHRONIC KIDNEY DISEASE (HCC): ICD-10-CM

## 2024-01-22 DIAGNOSIS — S62.102D CLOSED FRACTURE OF LEFT WRIST WITH ROUTINE HEALING, SUBSEQUENT ENCOUNTER: Primary | ICD-10-CM

## 2024-01-22 DIAGNOSIS — I13.0 HYPERTENSIVE HEART AND CHRONIC KIDNEY DISEASE WITH HEART FAILURE AND STAGE 1 THROUGH STAGE 4 CHRONIC KIDNEY DISEASE, OR UNSPECIFIED CHRONIC KIDNEY DISEASE (HCC): ICD-10-CM

## 2024-01-22 DIAGNOSIS — F33.1 MAJOR DEPRESSIVE DISORDER, RECURRENT, MODERATE (HCC): ICD-10-CM

## 2024-01-22 DIAGNOSIS — G30.1 LATE ONSET ALZHEIMER'S DISEASE WITH BEHAVIORAL DISTURBANCE (HCC): ICD-10-CM

## 2024-01-22 DIAGNOSIS — G30.1 SEVERE LATE ONSET ALZHEIMER'S DEMENTIA WITHOUT BEHAVIORAL DISTURBANCE, PSYCHOTIC DISTURBANCE, MOOD DISTURBANCE, OR ANXIETY (HCC): ICD-10-CM

## 2024-01-22 PROBLEM — S62.109A BROKEN WRIST: Status: ACTIVE | Noted: 2024-01-22

## 2024-01-22 PROCEDURE — 1123F ACP DISCUSS/DSCN MKR DOCD: CPT | Performed by: INTERNAL MEDICINE

## 2024-01-22 PROCEDURE — G8417 CALC BMI ABV UP PARAM F/U: HCPCS | Performed by: INTERNAL MEDICINE

## 2024-01-22 PROCEDURE — G8427 DOCREV CUR MEDS BY ELIG CLIN: HCPCS | Performed by: INTERNAL MEDICINE

## 2024-01-22 PROCEDURE — G8484 FLU IMMUNIZE NO ADMIN: HCPCS | Performed by: INTERNAL MEDICINE

## 2024-01-22 PROCEDURE — 99213 OFFICE O/P EST LOW 20 MIN: CPT | Performed by: INTERNAL MEDICINE

## 2024-01-22 PROCEDURE — 1036F TOBACCO NON-USER: CPT | Performed by: INTERNAL MEDICINE

## 2024-01-22 PROCEDURE — 1090F PRES/ABSN URINE INCON ASSESS: CPT | Performed by: INTERNAL MEDICINE

## 2024-01-22 ASSESSMENT — ENCOUNTER SYMPTOMS
WHEEZING: 0
EYE PAIN: 0
ABDOMINAL PAIN: 0
BLOOD IN STOOL: 0
SHORTNESS OF BREATH: 0
SINUS PRESSURE: 0
EYE REDNESS: 0

## 2024-01-22 ASSESSMENT — PATIENT HEALTH QUESTIONNAIRE - PHQ9: DEPRESSION UNABLE TO ASSESS: FUNCTIONAL CAPACITY MOTIVATION LIMITS ACCURACY

## 2024-01-22 NOTE — PROGRESS NOTES
lower leg: No edema.   Lymphadenopathy:      Cervical: No cervical adenopathy.   Skin:     General: Skin is warm and dry.      Findings: No rash.   Neurological:      General: No focal deficit present.      Mental Status: She is alert and oriented to person, place, and time.   Psychiatric:         Mood and Affect: Mood normal.         Behavior: Behavior normal.           Electronically signed by Kvng Barahona MD on 1/22/2024 at 12:11 PM.

## 2024-01-22 NOTE — ASSESSMENT & PLAN NOTE
Has walker and wheelchair  Instructed on proper use of walker and supervision  Due to advanced age and dementia remains a fall risk  Social work is working to get a bed alarm to help reduce fall risk

## 2024-01-23 ENCOUNTER — TELEPHONE (OUTPATIENT)
Dept: INTERNAL MEDICINE CLINIC | Age: 89
End: 2024-01-23

## 2024-01-23 ENCOUNTER — CARE COORDINATION (OUTPATIENT)
Dept: CARE COORDINATION | Age: 89
End: 2024-01-23

## 2024-01-23 NOTE — CARE COORDINATION
CTN handoff to care coordination. RN-CC outreached patients daughter, Siomara Ro. Patient and daughter are familiar to RN-CC - patient was discharged from care coordination on 11/16/23. Patient dc'd from [facility] 1/18/24 Left wrist injury, fall 1/18/24 1 hr after getting home from rehab no injury, unsure if hit her head, and Urinary Tract Infection. Completed hospital f/u on 1/22/24. Patient dc'd home w/[facility]; SN/PT/OT.  Siomara states MSW has been consulted to help them get a bed alarm. Patient is active with [facility] and receives 1 week of respite care per year. Siomara underwent shoulder surgery in December - already had to use up 1 week of respite care this year to allow herself time to heal.     Cameras remain in use. Siomara was referred to [facility] [facility] and connected with Buffy Gibbs. Siomara has Buffy's contact information and will reach back out if needed.  Siomara feels are their needs are currently being met by [facility] and declined additional f/u from RN-CC at this time. Siomara states she has my contact information and will call if additional needs arise. No further outreach indicated.

## 2024-01-23 NOTE — TELEPHONE ENCOUNTER
Mili from [facility] calling in asking for orders for skilled nursing, HHA, PT, OT and social work referral. Patient saw a covering provider in the office yesterday for her hosp follow up visit.    Verbal orders given by this writer.

## 2024-01-26 ENCOUNTER — HOSPITAL ENCOUNTER (OUTPATIENT)
Age: 89
Setting detail: SPECIMEN
Discharge: HOME | End: 2024-01-26
Payer: MEDICARE

## 2024-01-26 ENCOUNTER — TELEPHONE (OUTPATIENT)
Dept: INTERNAL MEDICINE CLINIC | Age: 89
End: 2024-01-26

## 2024-01-26 LAB
BACTERIA URNS QL MICRO: ABNORMAL /HPF
BILIRUB UR QL STRIP.AUTO: NEGATIVE
CLARITY UR: CLEAR
COLOR UR: YELLOW
EPI CELLS #/AREA URNS AUTO: 4 /HPF (ref 0–5)
GLUCOSE UR STRIP.AUTO-MCNC: NEGATIVE MG/DL
HGB UR QL STRIP.AUTO: NEGATIVE
HYALINE CASTS #/AREA URNS AUTO: 0 /LPF (ref 0–8)
KETONES UR STRIP.AUTO-MCNC: NEGATIVE MG/DL
LEUKOCYTE ESTERASE UR QL STRIP.AUTO: NEGATIVE
NITRITE UR QL STRIP.AUTO: NEGATIVE
PH UR STRIP.AUTO: >=9 [PH] (ref 5–8)
PROT UR STRIP.AUTO-MCNC: 100 MG/DL
RBC CLUMPS #/AREA URNS AUTO: 1 /HPF (ref 0–4)
SP GR UR STRIP.AUTO: 1.01 (ref 1–1.03)
UA DIPSTICK W REFLEX MICRO PNL UR: YES
URN SPEC COLLECT METH UR: ABNORMAL
UROBILINOGEN UR STRIP-ACNC: 1 E.U./DL
WBC #/AREA URNS AUTO: 1 /HPF (ref 0–5)

## 2024-01-26 PROCEDURE — 81001 URINALYSIS AUTO W/SCOPE: CPT

## 2024-01-26 PROCEDURE — 87086 URINE CULTURE/COLONY COUNT: CPT

## 2024-01-26 NOTE — TELEPHONE ENCOUNTER
Kenrick from [facility] calling for verbal UA order---pt has change in demeanor and pain with urination---please call him at [phone].  Thanks.

## 2024-01-27 LAB — BACTERIA UR CULT: NORMAL

## 2024-01-29 ENCOUNTER — TELEPHONE (OUTPATIENT)
Dept: ORTHOPEDIC SURGERY | Age: 89
End: 2024-01-29

## 2024-01-29 NOTE — TELEPHONE ENCOUNTER
Medical Facility Question     [facility]  Contact Name: JO  Contact Number: [phone]  Request or Information: CLARIFICATION FOR ACTIVITIES ORDER, STATUS POST L WRIST FRACTURE

## 2024-01-29 NOTE — TELEPHONE ENCOUNTER
Spoke with Tati. Informed her that patient can perform AROM and PROM and strengthening exercises. She is to be NWB on wrist until 2/8. She can utilize her platform walker with the wrist brace on. She can take the brace off during rest and sleep.

## 2024-01-29 NOTE — TELEPHONE ENCOUNTER
General Question     Subject: RETURNING CALL   Patient and /or Facility Request: [facility]   Contact Number: [phone]    JO AN OCCUPATIONAL THERAPIST FROM [facility] RETURNING JAKE'S CALL IN DR LARES'S OFFICE      PLEASE CALL JO BACK [phone]

## 2024-01-31 ENCOUNTER — TELEPHONE (OUTPATIENT)
Dept: INTERNAL MEDICINE CLINIC | Age: 89
End: 2024-01-31

## 2024-01-31 NOTE — TELEPHONE ENCOUNTER
Tati Hall,  from [facility] calling in asking about palliative care referral to be faxed to [facility]/Palliative Care.    Referral made last year and is still active/open. Referral faxed to [facility] at [phone] per request.    Tati Hall can be reached for any questions/concerns at [phone].    No further action needed at this time.

## 2024-02-12 ENCOUNTER — TELEPHONE (OUTPATIENT)
Dept: INTERNAL MEDICINE CLINIC | Age: 89
End: 2024-02-12

## 2024-02-12 NOTE — TELEPHONE ENCOUNTER
Spoke with pt daughter per HIPAA asking if pt has had any UTI symptoms. Daughter stated no. Daughter will call office if anything changes.

## 2024-02-15 ENCOUNTER — OFFICE VISIT (OUTPATIENT)
Dept: ORTHOPEDIC SURGERY | Age: 89
End: 2024-02-15

## 2024-02-15 VITALS — WEIGHT: 162 LBS | HEIGHT: 60 IN | BODY MASS INDEX: 31.8 KG/M2

## 2024-02-15 DIAGNOSIS — S52.592A OTHER CLOSED FRACTURE OF DISTAL END OF LEFT RADIUS, INITIAL ENCOUNTER: Primary | ICD-10-CM

## 2024-02-15 PROCEDURE — 99024 POSTOP FOLLOW-UP VISIT: CPT | Performed by: NURSE PRACTITIONER

## 2024-02-15 PROCEDURE — APPNB15 APP NON BILLABLE TIME 0-15 MINS: Performed by: NURSE PRACTITIONER

## 2024-02-16 ENCOUNTER — TELEPHONE (OUTPATIENT)
Dept: ORTHOPEDIC SURGERY | Age: 89
End: 2024-02-16

## 2024-02-16 NOTE — TELEPHONE ENCOUNTER
General Question TCOLE     Subject: CAN THE WEIGHT BEARING APPARATUS ON WALKER BE REMOVED?   Patient and /or Facility Request: Siomara Ro  Contact Number:  [phone]    THEY FORGOT TO CHECK  AT OV THURSDAY, IF THE APPARATUS TO KEEP THE Pt FROM WEIGHT BEARING WHEN USING THE WALKER, CAN BE REMOVED?     PLEASE CALL DAUGHTER SIOMARA TO CONFIRM.

## 2024-02-19 ENCOUNTER — TELEPHONE (OUTPATIENT)
Dept: INTERNAL MEDICINE CLINIC | Age: 89
End: 2024-02-19

## 2024-02-19 NOTE — TELEPHONE ENCOUNTER
Spoke with pt daughter and number given to dispatch [facility] for daughter to schedule pt.

## 2024-02-19 NOTE — TELEPHONE ENCOUNTER
Kadie from [facility] called in to let  know that pt reported 2 falls over the weekend. No injuries, just a sore left hip. The daughter is concerned of a UTI because her urine has been darker than usual the past couple days. She also adds that pt has been more confused over the last week. She is requesting a urinalysis be done. If any questions please call Kadie back at [phone].

## 2024-02-19 NOTE — TELEPHONE ENCOUNTER
Maddi from [facility] calling, asking for verbal order for wound care. Pt has 2 skin tears to the left & mid pre tibial area of her leg. They are currently cleaning with saline & using mepilex dry dressing. They would like to clean with saline & use xeroform and change twice weekly. Please advise and call Evonne with verbal, VM is secure and message can be left.

## 2024-02-20 ENCOUNTER — TELEPHONE (OUTPATIENT)
Dept: INTERNAL MEDICINE CLINIC | Age: 89
End: 2024-02-20

## 2024-02-20 NOTE — TELEPHONE ENCOUNTER
Nurse Melania from [facility] calling in regarding concerns noted in encounter from yesterday. Pt has had 2 falls, no injuries. Dtr is reporting more confusion, dark urine.    VV scheduled for tomorrow for patient re UTI symptoms. Melania stated they may obtain the urine for testing if need be once  gives the order. Melania can be reached at [phone] with verbal orders.
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
## 2024-03-24 DIAGNOSIS — E03.9 HYPOTHYROIDISM, UNSPECIFIED TYPE: ICD-10-CM

## 2024-03-25 ENCOUNTER — TELEPHONE (OUTPATIENT)
Dept: INTERNAL MEDICINE CLINIC | Age: 89
End: 2024-03-25

## 2024-03-25 RX ORDER — LEVOTHYROXINE SODIUM 0.07 MG/1
75 TABLET ORAL DAILY
Qty: 30 TABLET | Refills: 0 | Status: SHIPPED | OUTPATIENT
Start: 2024-03-25

## 2024-03-25 NOTE — TELEPHONE ENCOUNTER
Medication:   Requested Prescriptions     Pending Prescriptions Disp Refills    levothyroxine (SYNTHROID) 75 MCG tablet [Pharmacy Med Name: LEVOTHYROXINE 75 MCG TABLET] 90 tablet 1     Sig: TAKE 1 TABLET BY MOUTH DAILY        Last Filled:  6/16/23    Patient Phone Number: [phone] (home)     Last appt: 1/22/2024   Next appt: Visit date not found    Last OARRS:       4/5/2023     1:57 PM   RX Monitoring   Periodic Controlled Substance Monitoring No signs of potential drug abuse or diversion identified.

## 2024-04-08 ENCOUNTER — COMMUNITY CARE MANAGEMENT (OUTPATIENT)
Facility: CLINIC | Age: 89
End: 2024-04-08

## 2024-04-10 ENCOUNTER — OFFICE VISIT (OUTPATIENT)
Dept: ORTHOPEDIC SURGERY | Age: 89
End: 2024-04-10
Payer: MEDICARE

## 2024-04-10 VITALS — HEIGHT: 60 IN | RESPIRATION RATE: 16 BRPM | WEIGHT: 162 LBS | BODY MASS INDEX: 31.8 KG/M2

## 2024-04-10 DIAGNOSIS — S63.659A SAGITTAL BAND RUPTURE AT METACARPOPHALANGEAL JOINT, INITIAL ENCOUNTER: Primary | ICD-10-CM

## 2024-04-10 PROCEDURE — G8428 CUR MEDS NOT DOCUMENT: HCPCS | Performed by: PHYSICIAN ASSISTANT

## 2024-04-10 PROCEDURE — 99203 OFFICE O/P NEW LOW 30 MIN: CPT | Performed by: PHYSICIAN ASSISTANT

## 2024-04-10 PROCEDURE — 1123F ACP DISCUSS/DSCN MKR DOCD: CPT | Performed by: PHYSICIAN ASSISTANT

## 2024-04-10 PROCEDURE — 1090F PRES/ABSN URINE INCON ASSESS: CPT | Performed by: PHYSICIAN ASSISTANT

## 2024-04-10 PROCEDURE — G8417 CALC BMI ABV UP PARAM F/U: HCPCS | Performed by: PHYSICIAN ASSISTANT

## 2024-04-10 PROCEDURE — 1036F TOBACCO NON-USER: CPT | Performed by: PHYSICIAN ASSISTANT

## 2024-04-10 NOTE — PROGRESS NOTES
Ms. Earnestine Villagomez is a 89 y.o. right handed woman  who is seen today in Hand Surgical Consultation at the request of MILLICENT Sainz MD.    She presents today regarding left symptoms which have been present for approximately 3 months.  A history of antecedent trauma or injury is Absent.  She reports symptoms to include moderate pain and stiffness with no popping, catching or locking of the right Middle Finger.  Finger symptoms are Seldom worse in the morning or overnight.  She reports mild pain located at the base of the symptomatic finger(s). Symptoms show no change over time.  She has previously had left middle finger trigger finger release by Dr. Abdifatah Hobbs. She states that her finger has been stuck in a locked position for a few weeks.     Previous treatment has included Surgical treatment in the form of Left, Trigger Finger Release.  She does not claim relation of her symptoms to her required work activities.  She has not undergone any form of testing.    I have today reviewed with Earnestine Villagomez the clinically relevant, past medical history, medications, allergies,  family history, social history, and Review Of Systems & I have documented any details relevant to today's presenting complaints in my history above.  Ms. Earnestine Villagomez's self-reported past medical history, medications, allergies,  family history, social history, and Review Of Systems have been scanned into the chart under the \"Media\" tab.    Physical Exam:  Ms. Earnestine Villagomez's most recent vitals:  Vitals  Respirations: 16  Height: 152.4 cm (5')  Weight - Scale: 73.5 kg (162 lb)    She is well nourished, oriented to person, place & time.  She demonstrates appropriate mood and affect as well as normal gait and station.    Skin: Normal in appearance, Normal Color, and Free of Lesions Bilaterally   Digital range of motion is limited by pain and limited by joint contracture on the Left, greater than right.   No triggering is noted upon flexion

## 2024-04-12 ENCOUNTER — TELEPHONE (OUTPATIENT)
Dept: INTERNAL MEDICINE CLINIC | Age: 89
End: 2024-04-12

## 2024-04-12 NOTE — TELEPHONE ENCOUNTER
Okay to order.  However I do not see patient has any appointment with me.  Please schedule appointment.  It could be virtual.

## 2024-04-12 NOTE — TELEPHONE ENCOUNTER
Kadie PT with [facility] calling to confirm Dr. Sainz will sign home PT orders.     Kadie also wanted to report she saw pt today and her daughter reported a fall on Wednesday 4/10. No injuries or increased pain, pt did have a bruise on the lateral aspect of left thigh. Pt's daughter & son in law were home and were able to help her up.     Please call Kadie with verbal, VM is private & secure so message can be left if she does not answer.

## 2024-04-12 NOTE — TELEPHONE ENCOUNTER
Yary from [facility] calling in for patient. She has been in contact with pt and daughter for scheduling with their dept and dtr is requesting an order for a transport chair. They have no preference for a DME company. Ok for order?

## 2024-04-15 ENCOUNTER — TELEMEDICINE (OUTPATIENT)
Dept: INTERNAL MEDICINE CLINIC | Age: 89
End: 2024-04-15
Payer: MEDICARE

## 2024-04-15 DIAGNOSIS — M79.7 FIBROMYALGIA: ICD-10-CM

## 2024-04-15 DIAGNOSIS — R29.6 FALLS FREQUENTLY: ICD-10-CM

## 2024-04-15 DIAGNOSIS — F33.9 RECURRENT MAJOR DEPRESSIVE DISORDER, REMISSION STATUS UNSPECIFIED (HCC): ICD-10-CM

## 2024-04-15 DIAGNOSIS — F02.818 LATE ONSET ALZHEIMER'S DISEASE WITH BEHAVIORAL DISTURBANCE (HCC): ICD-10-CM

## 2024-04-15 DIAGNOSIS — G30.1 LATE ONSET ALZHEIMER'S DISEASE WITH BEHAVIORAL DISTURBANCE (HCC): ICD-10-CM

## 2024-04-15 DIAGNOSIS — S62.102D CLOSED FRACTURE OF LEFT WRIST WITH ROUTINE HEALING, SUBSEQUENT ENCOUNTER: ICD-10-CM

## 2024-04-15 DIAGNOSIS — Z09 HOSPITAL DISCHARGE FOLLOW-UP: Primary | ICD-10-CM

## 2024-04-15 PROCEDURE — 1090F PRES/ABSN URINE INCON ASSESS: CPT | Performed by: INTERNAL MEDICINE

## 2024-04-15 PROCEDURE — 99214 OFFICE O/P EST MOD 30 MIN: CPT | Performed by: INTERNAL MEDICINE

## 2024-04-15 PROCEDURE — 1123F ACP DISCUSS/DSCN MKR DOCD: CPT | Performed by: INTERNAL MEDICINE

## 2024-04-15 PROCEDURE — G8427 DOCREV CUR MEDS BY ELIG CLIN: HCPCS | Performed by: INTERNAL MEDICINE

## 2024-04-15 PROCEDURE — G8417 CALC BMI ABV UP PARAM F/U: HCPCS | Performed by: INTERNAL MEDICINE

## 2024-04-15 PROCEDURE — 1036F TOBACCO NON-USER: CPT | Performed by: INTERNAL MEDICINE

## 2024-04-15 RX ORDER — MIDODRINE HYDROCHLORIDE 2.5 MG/1
2.5 TABLET ORAL 2 TIMES DAILY
COMMUNITY
Start: 2024-04-12

## 2024-04-15 RX ORDER — DULOXETIN HYDROCHLORIDE 60 MG/1
60 CAPSULE, DELAYED RELEASE ORAL DAILY
Qty: 90 CAPSULE | Refills: 1 | Status: SHIPPED | OUTPATIENT
Start: 2024-04-15

## 2024-04-15 SDOH — ECONOMIC STABILITY: FOOD INSECURITY: WITHIN THE PAST 12 MONTHS, YOU WORRIED THAT YOUR FOOD WOULD RUN OUT BEFORE YOU GOT MONEY TO BUY MORE.: NEVER TRUE

## 2024-04-15 SDOH — ECONOMIC STABILITY: FOOD INSECURITY: WITHIN THE PAST 12 MONTHS, THE FOOD YOU BOUGHT JUST DIDN'T LAST AND YOU DIDN'T HAVE MONEY TO GET MORE.: NEVER TRUE

## 2024-04-15 SDOH — ECONOMIC STABILITY: INCOME INSECURITY: HOW HARD IS IT FOR YOU TO PAY FOR THE VERY BASICS LIKE FOOD, HOUSING, MEDICAL CARE, AND HEATING?: NOT HARD AT ALL

## 2024-04-15 ASSESSMENT — PATIENT HEALTH QUESTIONNAIRE - PHQ9
3. TROUBLE FALLING OR STAYING ASLEEP: MORE THAN HALF THE DAYS
SUM OF ALL RESPONSES TO PHQ QUESTIONS 1-9: 4
8. MOVING OR SPEAKING SO SLOWLY THAT OTHER PEOPLE COULD HAVE NOTICED. OR THE OPPOSITE, BEING SO FIGETY OR RESTLESS THAT YOU HAVE BEEN MOVING AROUND A LOT MORE THAN USUAL: NOT AT ALL
SUM OF ALL RESPONSES TO PHQ QUESTIONS 1-9: 4
SUM OF ALL RESPONSES TO PHQ QUESTIONS 1-9: 4
SUM OF ALL RESPONSES TO PHQ9 QUESTIONS 1 & 2: 0
5. POOR APPETITE OR OVEREATING: SEVERAL DAYS
6. FEELING BAD ABOUT YOURSELF - OR THAT YOU ARE A FAILURE OR HAVE LET YOURSELF OR YOUR FAMILY DOWN: NOT AT ALL
4. FEELING TIRED OR HAVING LITTLE ENERGY: SEVERAL DAYS
10. IF YOU CHECKED OFF ANY PROBLEMS, HOW DIFFICULT HAVE THESE PROBLEMS MADE IT FOR YOU TO DO YOUR WORK, TAKE CARE OF THINGS AT HOME, OR GET ALONG WITH OTHER PEOPLE: SOMEWHAT DIFFICULT
2. FEELING DOWN, DEPRESSED OR HOPELESS: NOT AT ALL
7. TROUBLE CONCENTRATING ON THINGS, SUCH AS READING THE NEWSPAPER OR WATCHING TELEVISION: NOT AT ALL
SUM OF ALL RESPONSES TO PHQ QUESTIONS 1-9: 4
9. THOUGHTS THAT YOU WOULD BE BETTER OFF DEAD, OR OF HURTING YOURSELF: NOT AT ALL
1. LITTLE INTEREST OR PLEASURE IN DOING THINGS: NOT AT ALL

## 2024-04-15 NOTE — PROGRESS NOTES
If you are not or unsure, please re-schedule the visit: Yes, I confirm.       Total time spent on this encounter: Not billed by time    --MILLICENT Sainz MD on 4/15/2024 at 3:36 PM    An electronic signature was used to authenticate this note.

## 2024-04-17 ENCOUNTER — TELEPHONE (OUTPATIENT)
Dept: INTERNAL MEDICINE CLINIC | Age: 89
End: 2024-04-17

## 2024-04-17 ENCOUNTER — COMMUNITY CARE MANAGEMENT (OUTPATIENT)
Facility: CLINIC | Age: 89
End: 2024-04-17

## 2024-04-17 DIAGNOSIS — R29.6 FALLS FREQUENTLY: Primary | ICD-10-CM

## 2024-04-17 NOTE — TELEPHONE ENCOUNTER
The number given for adriana was not in service, also lvm for daughter in regards to the dystolic and systolic numbers to hold the midodrine. Thank you

## 2024-04-17 NOTE — TELEPHONE ENCOUNTER
Leanne from [facility] calling in about status of transport chair order. She has been in contact with pt/dtr and dtr was asking about it. Per previous encounter 4/12, Dr.Islam chandler with order. Order made and faxed today to [employer] as dtr has no preference on DME company.     [employer]  Fax [phone]  Office [phone]

## 2024-04-17 NOTE — TELEPHONE ENCOUNTER
Leanne from [facility] calling in on behalf of patient/daughter. She is asking for parameters on which to hold Midodrine.    Leanne can be reached at [phone] and daughter would like to be called as well.

## 2024-04-25 ENCOUNTER — OFFICE VISIT (OUTPATIENT)
Dept: ORTHOPEDIC SURGERY | Age: 89
End: 2024-04-25

## 2024-04-25 VITALS — BODY MASS INDEX: 31.8 KG/M2 | HEIGHT: 60 IN | WEIGHT: 162 LBS

## 2024-04-25 DIAGNOSIS — S52.592A OTHER CLOSED FRACTURE OF DISTAL END OF LEFT RADIUS, INITIAL ENCOUNTER: ICD-10-CM

## 2024-04-25 DIAGNOSIS — S52.502A CLOSED FRACTURE OF DISTAL END OF LEFT RADIUS, UNSPECIFIED FRACTURE MORPHOLOGY, INITIAL ENCOUNTER: Primary | ICD-10-CM

## 2024-04-25 DIAGNOSIS — S72.002A CLOSED LEFT HIP FRACTURE, INITIAL ENCOUNTER (HCC): ICD-10-CM

## 2024-04-25 DIAGNOSIS — M17.0 PRIMARY OSTEOARTHRITIS OF BOTH KNEES: ICD-10-CM

## 2024-04-25 DIAGNOSIS — S63.659A SAGITTAL BAND RUPTURE AT METACARPOPHALANGEAL JOINT, INITIAL ENCOUNTER: ICD-10-CM

## 2024-04-25 DIAGNOSIS — S32.592A CLOSED FRACTURE OF SINGLE RAMUS OF LEFT PUBIS, INITIAL ENCOUNTER (HCC): ICD-10-CM

## 2024-04-25 NOTE — PROGRESS NOTES
a new xray of the left wrist.      As this patient has demonstrated risk factors for osteoporosis, such as age greater than fifty years and evidence of a fracture, I have referred the patient back to the primary care physician for evaluation for osteoporosis, including consideration for DEXA scanning, if this is felt to be clinically indicated.  The patient is advised to contact the primary care physician to follow-up for further evaluation.          Procedures    Angela Douglas Titan Wrist Long Forearm Brace     Patient was prescribed a Angela Douglas Titan Wrist and Forearm Brace.   The left wrist will require stabilization / immobilization from this semi-rigid / rigid orthosis to improve their function.  The orthosis will assist in protecting the affected area, provide functional support and facilitate healing.    The patient was educated and fit by a healthcare professional with expert knowledge and specialization in brace application while under the direct supervision of the treating physician.  Verbal and written instructions for the use of and application of this item were provided.   They were instructed to contact the office immediately should the brace result in increased pain, decreased sensation, increased swelling or worsening of the condition.     Abdifatah Hobbs MD

## 2024-05-10 DIAGNOSIS — E03.9 HYPOTHYROIDISM, UNSPECIFIED TYPE: ICD-10-CM

## 2024-05-10 RX ORDER — LEVOTHYROXINE SODIUM 0.07 MG/1
75 TABLET ORAL DAILY
Qty: 90 TABLET | Refills: 1 | Status: SHIPPED | OUTPATIENT
Start: 2024-05-10

## (undated) DEVICE — GLOVE ORANGE PI 8   MSG9080

## (undated) DEVICE — SUTURE MCRYL + SZ 4-0 L27IN ABSRB UD L19MM PS-2 3/8 CIR MCP426H

## (undated) DEVICE — CONTROL SYRINGE LUER-LOCK TIP: Brand: MONOJECT

## (undated) DEVICE — DRILL, AO SMALL: Brand: GAMMA

## (undated) DEVICE — 3M™ STERI-STRIP™ REINFORCED ADHESIVE SKIN CLOSURES, R1547, 1/2 IN X 4 IN (12 MM X 100 MM), 6 STRIPS/ENVELOPE: Brand: 3M™ STERI-STRIP™

## (undated) DEVICE — APPLICATOR MEDICATED 26 CC SOLUTION HI LT ORNG CHLORAPREP

## (undated) DEVICE — GLOVE SURG SZ 65 L12IN FNGR THK79MIL GRN LTX FREE

## (undated) DEVICE — DRESSING,GAUZE,XEROFORM,CURAD,1"X8",ST: Brand: CURAD

## (undated) DEVICE — UNTHREADED GUIDE WIRE: Brand: FIXOS

## (undated) DEVICE — KIT OR ROOM TURNOVER W/STRAP

## (undated) DEVICE — GLOVE SURG SZ 65 L12IN THK75MIL DK GRN LTX FREE

## (undated) DEVICE — GLOVE ORTHO 8   MSG9480

## (undated) DEVICE — SYRINGE 60ML BULB IRRIG ST LF

## (undated) DEVICE — SOLUTION IRRIG 500ML 0.9% SOD CHLO USP POUR PLAS BTL

## (undated) DEVICE — GAUZE,SPONGE,4"X4",8PLY,STRL,LF,10/TRAY: Brand: MEDLINE

## (undated) DEVICE — MASTISOL ADHESIVE LIQ 2/3ML

## (undated) DEVICE — GLOVE SURG SZ 6 L12IN THK75MIL DK GRN LTX FREE

## (undated) DEVICE — HYPODERMIC SAFETY NEEDLE: Brand: MAGELLAN

## (undated) DEVICE — STERILE VELCLOSE ELASTIC BANDAGE, 4IN: Brand: VELCLOSE

## (undated) DEVICE — NEEDLE HYPO 22GA L1.5IN BLK POLYPR HUB S STL REG BVL STR

## (undated) DEVICE — BANDAGE ELASTIC 5YDX4IN ST COMPRSS LF NON ADH

## (undated) DEVICE — PACK PROCEDURE SURG EXTREMITY MFFOP CUST

## (undated) DEVICE — 3M™ TEGADERM™ TRANSPARENT FILM DRESSING FRAME STYLE, 1628, 6 IN X 8 IN (15 CM X 20 CM), 10/CT 8CT/CASE: Brand: 3M™ TEGADERM™

## (undated) DEVICE — BANDAGE COMPR W4INXL12FT E DISP ESMARCH EVEN

## (undated) DEVICE — BANDAGE COMPR W4INXL10YD WHITE/BEIGE E MTRX HK LOOP CLSR

## (undated) DEVICE — SUTURE VCRL SZ 2-0 L18IN ABSRB UD CT-1 L36MM 1/2 CIR J839D

## (undated) DEVICE — ZIMMER® STERILE DISPOSABLE TOURNIQUET CUFF WITH PLC, DUAL PORT, SINGLE BLADDER, 18 IN. (46 CM)

## (undated) DEVICE — MAJOR SET UP PK

## (undated) DEVICE — DRAPE HND W114XL142IN BLU POLYPR W O PCH FEN CRD AND TB HLDR

## (undated) DEVICE — KIRSCHNER WIRE

## (undated) DEVICE — TOWEL,OR,DSP,ST,BLUE,STD,4/PK,20PK/CS: Brand: MEDLINE

## (undated) DEVICE — PAD,ABDOMINAL,8"X10",ST,LF: Brand: MEDLINE

## (undated) DEVICE — SUTURE VCRL + SZ 3-0 L18IN ABSRB UD SH 1/2 CIR TAPERCUT NDL VCP864D

## (undated) DEVICE — INTENDED FOR TISSUE SEPARATION, AND OTHER PROCEDURES THAT REQUIRE A SHARP SURGICAL BLADE TO PUNCTURE OR CUT.: Brand: BARD-PARKER ® STAINLESS STEEL BLADES

## (undated) DEVICE — CORD,CAUTERY,BIPOLAR,STERILE: Brand: MEDLINE

## (undated) DEVICE — SOLUTION IRRIG 500ML 0.9% SOD CHL USP POUR PLAS BTL

## (undated) DEVICE — GLOVE,SURG,SENSICARE SLT,LF,PF,8: Brand: MEDLINE

## (undated) DEVICE — MEDICINE CUP, GRADUATED, STER: Brand: MEDLINE

## (undated) DEVICE — COVER LT HNDL BLU PLAS

## (undated) DEVICE — SHEET,DRAPE,53X77,STERILE: Brand: MEDLINE

## (undated) DEVICE — BANDAGE COMPR W4INXL5YD BGE HI E W/ REM CLP SURE-WRAP

## (undated) DEVICE — ELECTRODE PT RET AD L9FT HI MOIST COND ADH HYDRGEL CORDED

## (undated) DEVICE — PADDING CAST W4INXL4YD ST COT RAYON MICROPLEATED HIGHLY

## (undated) DEVICE — DRILL, AO, SCALED: Brand: VARIAX

## (undated) DEVICE — PADDING CAST N ADH 12X6 IN CRIMPED FINISH 100% COTTON WBRLII

## (undated) DEVICE — GLOVE 8 LTX ST TRIFLEX POWDER LK

## (undated) DEVICE — MAT FLR ABS DISP

## (undated) DEVICE — GLOVE SURG SZ 65 THK91MIL LTX FREE SYN POLYISOPRENE

## (undated) DEVICE — STERILE LATEX POWDER-FREE SURGICAL GLOVESWITH NITRILE COATING: Brand: PROTEXIS

## (undated) DEVICE — BANDAGE COBAN 4 IN COMPR W4INXL5YD FOAM COHESIVE QUIK STK SELF ADH SFT

## (undated) DEVICE — GUIDE WIRE, BALL-TIPPED, STERILE

## (undated) DEVICE — SYRINGE IRRIG 60ML SFT PLIABLE BLB EZ TO GRP 1 HND USE W/

## (undated) DEVICE — STRIP,CLOSURE,WOUND,MEDI-STRIP,1/2X4: Brand: MEDLINE

## (undated) DEVICE — MASC TURNOVER KIT: Brand: MEDLINE INDUSTRIES, INC.

## (undated) DEVICE — 6619 2 PTNT ISO SYS INCISE AREA&LT;(&GT;&&LT;)&GT;P: Brand: STERI-DRAPE™ IOBAN™ 2

## (undated) DEVICE — NEEDLE,22GX1.5",REG,BEVEL: Brand: MEDLINE

## (undated) DEVICE — CLOSED TUBE CLIP: Brand: GAMMA

## (undated) DEVICE — RESTRAINT EXT ANK WRST LT BLU FOAM 2 STRP SIDE BCKL HK AND

## (undated) DEVICE — KIRSCHNER WIRE
Type: IMPLANTABLE DEVICE | Site: WRIST | Status: NON-FUNCTIONAL
Brand: VARIAX
Removed: 2022-10-27

## (undated) DEVICE — K-WIRE

## (undated) DEVICE — C-ARM: Brand: UNBRANDED

## (undated) DEVICE — 3 ML SYRINGE LUER-LOCK TIP: Brand: MONOJECT

## (undated) DEVICE — NEEDLE HYPO 22GA L1 1/2IN PIVOTING SHLD FOR LUERLOCK SYR

## (undated) DEVICE — SUTURE ETHLN SZ 4-0 L18IN NONABSORBABLE BLK L19MM PS-2 3/8 1667H

## (undated) DEVICE — CHLORAPREP 26ML ORANGE

## (undated) DEVICE — PADDING CAST W4INXL4YD SPUN DACRON POLY POR NON STERILE

## (undated) DEVICE — GLOVE SURG SZ 6 THK91MIL LTX FREE SYN POLYISOPRENE ANTI